# Patient Record
Sex: MALE | Race: WHITE | HISPANIC OR LATINO | Employment: OTHER | URBAN - METROPOLITAN AREA
[De-identification: names, ages, dates, MRNs, and addresses within clinical notes are randomized per-mention and may not be internally consistent; named-entity substitution may affect disease eponyms.]

---

## 2017-04-29 ENCOUNTER — HOSPITAL ENCOUNTER (INPATIENT)
Facility: HOSPITAL | Age: 62
LOS: 9 days | Discharge: HOME | DRG: 291 | End: 2017-05-08
Attending: FAMILY MEDICINE | Admitting: FAMILY MEDICINE
Payer: MEDICAID

## 2017-04-29 DIAGNOSIS — N17.9 ACUTE RENAL FAILURE, UNSPECIFIED ACUTE RENAL FAILURE TYPE: ICD-10-CM

## 2017-04-29 DIAGNOSIS — R07.9 CHEST PAIN: ICD-10-CM

## 2017-04-29 DIAGNOSIS — R07.9 CHEST PAIN, UNSPECIFIED TYPE: ICD-10-CM

## 2017-04-29 DIAGNOSIS — E78.5 HYPERLIPIDEMIA, UNSPECIFIED HYPERLIPIDEMIA TYPE: ICD-10-CM

## 2017-04-29 DIAGNOSIS — E78.5 HYPERLIPIDEMIA ASSOCIATED WITH TYPE 2 DIABETES MELLITUS: ICD-10-CM

## 2017-04-29 DIAGNOSIS — N17.9 AKI (ACUTE KIDNEY INJURY): ICD-10-CM

## 2017-04-29 DIAGNOSIS — E11.69 HYPERLIPIDEMIA ASSOCIATED WITH TYPE 2 DIABETES MELLITUS: ICD-10-CM

## 2017-04-29 DIAGNOSIS — I50.1 PULMONARY EDEMA WITH CONGESTIVE HEART FAILURE: Primary | ICD-10-CM

## 2017-04-29 DIAGNOSIS — R73.9 HYPERGLYCEMIA: ICD-10-CM

## 2017-04-29 DIAGNOSIS — I15.2 HYPERTENSION ASSOCIATED WITH DIABETES: ICD-10-CM

## 2017-04-29 DIAGNOSIS — E11.59 HYPERTENSION ASSOCIATED WITH DIABETES: ICD-10-CM

## 2017-04-29 LAB
ALBUMIN SERPL BCP-MCNC: 4 G/DL
ALLENS TEST: ABNORMAL
ALP SERPL-CCNC: 91 IU/L
ALT SERPL W/O P-5'-P-CCNC: 23 IU/L
ANION GAP SERPL CALC-SCNC: 13 MMOL/L
AST SERPL-CCNC: 18 IU/L
BACTERIA #/AREA URNS HPF: NORMAL /HPF
BASOPHILS # BLD AUTO: 0.08 K/UL
BASOPHILS NFR BLD: 0.6 %
BILIRUB SERPL-MCNC: 0.6 MG/DL
BILIRUB UR QL STRIP: NEGATIVE
BUN SERPL-MCNC: 40 MG/DL
CALCIUM SERPL-MCNC: 9 MG/DL
CHLORIDE SERPL-SCNC: 107 MMOL/L
CLARITY UR: CLEAR
CO2 SERPL-SCNC: 21 MMOL/L
COLOR UR: YELLOW
CREAT SERPL-MCNC: 2.07 MG/DL
DELSYS: ABNORMAL
DIFFERENTIAL METHOD: ABNORMAL
EOSINOPHIL # BLD AUTO: 0.3 K/UL
EOSINOPHIL NFR BLD: 2.4 %
ERYTHROCYTE [DISTWIDTH] IN BLOOD BY AUTOMATED COUNT: 13.9 %
EST. GFR  (AFRICAN AMERICAN): 38.8 ML/MIN/1.73 M^2
EST. GFR  (NON AFRICAN AMERICAN): 33.6 ML/MIN/1.73 M^2
GLUCOSE SERPL-MCNC: 134 MG/DL
GLUCOSE UR QL STRIP: NEGATIVE
HCO3 UR-SCNC: 19.5 MMOL/L (ref 24–28)
HCT VFR BLD AUTO: 30.4 %
HCT VFR BLD CALC: 27 %PCV (ref 36–54)
HGB BLD-MCNC: 10.5 G/DL
HGB BLD-MCNC: 9 G/DL
HGB UR QL STRIP: ABNORMAL
HYALINE CASTS #/AREA URNS LPF: 0 /LPF
INR PPP: 1.1
KETONES UR QL STRIP: NEGATIVE
LACTATE SERPL-SCNC: 1.3 MMOL/L
LEUKOCYTE ESTERASE UR QL STRIP: NEGATIVE
LYMPHOCYTES # BLD AUTO: 1.1 K/UL
LYMPHOCYTES NFR BLD: 8.3 %
MCH RBC QN AUTO: 26.6 PG
MCHC RBC AUTO-ENTMCNC: 34.5 %
MCV RBC AUTO: 77 FL
MICROSCOPIC COMMENT: NORMAL
MONOCYTES # BLD AUTO: 0.9 K/UL
MONOCYTES NFR BLD: 6.5 %
NEUTROPHILS # BLD AUTO: 10.7 K/UL
NEUTROPHILS NFR BLD: 82 %
NITRITE UR QL STRIP: NEGATIVE
NT-PROBNP: 1590 PG/ML
PCO2 BLDA: 32.1 MMHG (ref 35–45)
PH SMN: 7.39 [PH] (ref 7.35–7.45)
PH UR STRIP: 6 [PH] (ref 5–8)
PLATELET # BLD AUTO: 342 K/UL
PMV BLD AUTO: 10.5 FL
PO2 BLDA: 65 MMHG (ref 80–100)
POC BE: -5 MMOL/L
POC IONIZED CALCIUM: 1.21 MMOL/L (ref 1.06–1.42)
POC SATURATED O2: 93 % (ref 95–100)
POC TCO2: 20 MMOL/L (ref 23–27)
POCT GLUCOSE: 105 MG/DL (ref 70–110)
POCT GLUCOSE: 142 MG/DL (ref 70–110)
POCT GLUCOSE: 215 MG/DL (ref 70–110)
POCT GLUCOSE: 82 MG/DL (ref 70–110)
POTASSIUM BLD-SCNC: 4 MMOL/L (ref 3.5–5.1)
POTASSIUM SERPL-SCNC: 4 MMOL/L
PROCALCITONIN SERPL IA-MCNC: <0.09 NG/ML
PROT SERPL-MCNC: 7.5 G/DL
PROT UR QL STRIP: ABNORMAL
PROTHROMBIN TIME: 12.7 SEC
RBC # BLD AUTO: 3.95 M/UL
RBC #/AREA URNS HPF: 2 /HPF (ref 0–4)
SAMPLE: ABNORMAL
SITE: ABNORMAL
SODIUM BLD-SCNC: 138 MMOL/L (ref 136–145)
SODIUM SERPL-SCNC: 141 MMOL/L
SP GR UR STRIP: 1.02 (ref 1–1.03)
TROPONIN I SERPL DL<=0.01 NG/ML-MCNC: 0.01 NG/ML
TROPONIN I SERPL DL<=0.01 NG/ML-MCNC: 0.02 NG/ML
TROPONIN I SERPL DL<=0.01 NG/ML-MCNC: 0.03 NG/ML
URN SPEC COLLECT METH UR: ABNORMAL
UROBILINOGEN UR STRIP-ACNC: NEGATIVE EU/DL
VANCOMYCIN SERPL-MCNC: <1.1 UG/ML
WBC # BLD AUTO: 13.03 K/UL
WBC #/AREA URNS HPF: 0 /HPF (ref 0–5)

## 2017-04-29 PROCEDURE — 87205 SMEAR GRAM STAIN: CPT

## 2017-04-29 PROCEDURE — 36415 COLL VENOUS BLD VENIPUNCTURE: CPT

## 2017-04-29 PROCEDURE — 82803 BLOOD GASES ANY COMBINATION: CPT

## 2017-04-29 PROCEDURE — 27000221 HC OXYGEN, UP TO 24 HOURS

## 2017-04-29 PROCEDURE — 83605 ASSAY OF LACTIC ACID: CPT

## 2017-04-29 PROCEDURE — 96374 THER/PROPH/DIAG INJ IV PUSH: CPT

## 2017-04-29 PROCEDURE — 80053 COMPREHEN METABOLIC PANEL: CPT

## 2017-04-29 PROCEDURE — 85610 PROTHROMBIN TIME: CPT

## 2017-04-29 PROCEDURE — 84484 ASSAY OF TROPONIN QUANT: CPT | Mod: 91

## 2017-04-29 PROCEDURE — 83036 HEMOGLOBIN GLYCOSYLATED A1C: CPT

## 2017-04-29 PROCEDURE — 87040 BLOOD CULTURE FOR BACTERIA: CPT

## 2017-04-29 PROCEDURE — 85025 COMPLETE CBC W/AUTO DIFF WBC: CPT

## 2017-04-29 PROCEDURE — 36600 WITHDRAWAL OF ARTERIAL BLOOD: CPT

## 2017-04-29 PROCEDURE — 96375 TX/PRO/DX INJ NEW DRUG ADDON: CPT

## 2017-04-29 PROCEDURE — 63600175 PHARM REV CODE 636 W HCPCS: Performed by: FAMILY MEDICINE

## 2017-04-29 PROCEDURE — 25000242 PHARM REV CODE 250 ALT 637 W/ HCPCS: Performed by: FAMILY MEDICINE

## 2017-04-29 PROCEDURE — 84145 PROCALCITONIN (PCT): CPT

## 2017-04-29 PROCEDURE — 80202 ASSAY OF VANCOMYCIN: CPT

## 2017-04-29 PROCEDURE — 94761 N-INVAS EAR/PLS OXIMETRY MLT: CPT

## 2017-04-29 PROCEDURE — 94640 AIRWAY INHALATION TREATMENT: CPT

## 2017-04-29 PROCEDURE — 99285 EMERGENCY DEPT VISIT HI MDM: CPT | Mod: 25

## 2017-04-29 PROCEDURE — 11000001 HC ACUTE MED/SURG PRIVATE ROOM

## 2017-04-29 PROCEDURE — 93005 ELECTROCARDIOGRAM TRACING: CPT

## 2017-04-29 PROCEDURE — 81000 URINALYSIS NONAUTO W/SCOPE: CPT

## 2017-04-29 PROCEDURE — 83880 ASSAY OF NATRIURETIC PEPTIDE: CPT

## 2017-04-29 PROCEDURE — 25000003 PHARM REV CODE 250: Performed by: FAMILY MEDICINE

## 2017-04-29 PROCEDURE — 93010 ELECTROCARDIOGRAM REPORT: CPT | Mod: ,,, | Performed by: INTERNAL MEDICINE

## 2017-04-29 RX ORDER — FUROSEMIDE 10 MG/ML
40 INJECTION INTRAMUSCULAR; INTRAVENOUS
Status: COMPLETED | OUTPATIENT
Start: 2017-04-29 | End: 2017-04-29

## 2017-04-29 RX ORDER — IPRATROPIUM BROMIDE AND ALBUTEROL SULFATE 2.5; .5 MG/3ML; MG/3ML
3 SOLUTION RESPIRATORY (INHALATION) ONCE
Status: COMPLETED | OUTPATIENT
Start: 2017-04-29 | End: 2017-04-29

## 2017-04-29 RX ORDER — CEFEPIME HYDROCHLORIDE 2 G/50ML
2 INJECTION, SOLUTION INTRAVENOUS
Status: DISCONTINUED | OUTPATIENT
Start: 2017-04-29 | End: 2017-05-01

## 2017-04-29 RX ORDER — LOSARTAN POTASSIUM AND HYDROCHLOROTHIAZIDE 12.5; 5 MG/1; MG/1
1 TABLET ORAL DAILY
Status: ON HOLD | COMMUNITY
End: 2017-05-08

## 2017-04-29 RX ORDER — PRAVASTATIN SODIUM 20 MG/1
20 TABLET ORAL DAILY
Status: DISCONTINUED | OUTPATIENT
Start: 2017-04-30 | End: 2017-05-08 | Stop reason: HOSPADM

## 2017-04-29 RX ORDER — ACETAMINOPHEN 500 MG
1000 TABLET ORAL
Status: COMPLETED | OUTPATIENT
Start: 2017-04-29 | End: 2017-04-29

## 2017-04-29 RX ORDER — FAMOTIDINE 10 MG/ML
20 INJECTION INTRAVENOUS 2 TIMES DAILY
Status: DISCONTINUED | OUTPATIENT
Start: 2017-04-29 | End: 2017-05-01 | Stop reason: DRUGHIGH

## 2017-04-29 RX ORDER — FUROSEMIDE 10 MG/ML
40 INJECTION INTRAMUSCULAR; INTRAVENOUS 2 TIMES DAILY
Status: DISCONTINUED | OUTPATIENT
Start: 2017-04-29 | End: 2017-04-30

## 2017-04-29 RX ORDER — CARVEDILOL 6.25 MG/1
6.25 TABLET ORAL 2 TIMES DAILY WITH MEALS
Status: ON HOLD | COMMUNITY
End: 2017-05-07 | Stop reason: HOSPADM

## 2017-04-29 RX ORDER — PRAVASTATIN SODIUM 20 MG/1
20 TABLET ORAL DAILY
Status: ON HOLD | COMMUNITY
End: 2017-05-08

## 2017-04-29 RX ORDER — ENOXAPARIN SODIUM 100 MG/ML
40 INJECTION SUBCUTANEOUS EVERY 24 HOURS
Status: DISCONTINUED | OUTPATIENT
Start: 2017-04-29 | End: 2017-05-02 | Stop reason: DRUGHIGH

## 2017-04-29 RX ORDER — AMLODIPINE BESYLATE 5 MG/1
10 TABLET ORAL DAILY
Status: DISCONTINUED | OUTPATIENT
Start: 2017-04-29 | End: 2017-05-08 | Stop reason: HOSPADM

## 2017-04-29 RX ORDER — GARLIC 1000 MG
CAPSULE ORAL
COMMUNITY
End: 2017-06-14 | Stop reason: SDUPTHER

## 2017-04-29 RX ORDER — SERTRALINE HYDROCHLORIDE 25 MG/1
25 TABLET, FILM COATED ORAL DAILY
Status: ON HOLD | COMMUNITY
End: 2017-05-08

## 2017-04-29 RX ORDER — INSULIN ASPART 100 [IU]/ML
INJECTION, SOLUTION INTRAVENOUS; SUBCUTANEOUS CONTINUOUS
Status: ON HOLD | COMMUNITY
End: 2017-05-08 | Stop reason: HOSPADM

## 2017-04-29 RX ORDER — ENALAPRILAT 1.25 MG/ML
1.25 INJECTION INTRAVENOUS
Status: COMPLETED | OUTPATIENT
Start: 2017-04-29 | End: 2017-04-29

## 2017-04-29 RX ORDER — ASPIRIN 325 MG
325 TABLET, DELAYED RELEASE (ENTERIC COATED) ORAL
Status: COMPLETED | OUTPATIENT
Start: 2017-04-29 | End: 2017-04-29

## 2017-04-29 RX ORDER — CETIRIZINE HYDROCHLORIDE 10 MG/1
10 TABLET, CHEWABLE ORAL DAILY
COMMUNITY
End: 2019-02-19

## 2017-04-29 RX ORDER — NAPROXEN SODIUM 220 MG/1
81 TABLET, FILM COATED ORAL DAILY
Status: ON HOLD | COMMUNITY
End: 2017-05-08

## 2017-04-29 RX ADMIN — ENALAPRILAT 1.25 MG: 1.25 INJECTION, SOLUTION INTRAVENOUS at 10:04

## 2017-04-29 RX ADMIN — AMLODIPINE BESYLATE 10 MG: 5 TABLET ORAL at 05:04

## 2017-04-29 RX ADMIN — FAMOTIDINE 20 MG: 10 INJECTION, SOLUTION INTRAVENOUS at 09:04

## 2017-04-29 RX ADMIN — ENOXAPARIN SODIUM 40 MG: 100 INJECTION SUBCUTANEOUS at 05:04

## 2017-04-29 RX ADMIN — ACETAMINOPHEN 1000 MG: 500 TABLET ORAL at 12:04

## 2017-04-29 RX ADMIN — NITROGLYCERIN 1 INCH: 20 OINTMENT TOPICAL at 10:04

## 2017-04-29 RX ADMIN — FUROSEMIDE 40 MG: 10 INJECTION, SOLUTION INTRAMUSCULAR; INTRAVENOUS at 10:04

## 2017-04-29 RX ADMIN — VANCOMYCIN HYDROCHLORIDE 1000 MG: 1 INJECTION, POWDER, LYOPHILIZED, FOR SOLUTION INTRAVENOUS at 05:04

## 2017-04-29 RX ADMIN — ASPIRIN 325 MG: 325 TABLET, DELAYED RELEASE ORAL at 10:04

## 2017-04-29 RX ADMIN — FUROSEMIDE 40 MG: 10 INJECTION, SOLUTION INTRAMUSCULAR; INTRAVENOUS at 05:04

## 2017-04-29 RX ADMIN — IPRATROPIUM BROMIDE AND ALBUTEROL SULFATE 3 ML: .5; 3 SOLUTION RESPIRATORY (INHALATION) at 10:04

## 2017-04-29 RX ADMIN — CEFEPIME HYDROCHLORIDE 2 G: 2 INJECTION, SOLUTION INTRAVENOUS at 05:04

## 2017-04-29 NOTE — ED PROVIDER NOTES
Encounter Date: 4/29/2017       History     Chief Complaint   Patient presents with    Shortness of Breath     SOB that began x 1 days ago, reports CP      Review of patient's allergies indicates:  No Known Allergies  Patient is a 61 y.o. male presenting with the following complaint: shortness of breath. The history is provided by the patient.   Shortness of Breath   This is a new problem. The average episode lasts 1 day. The problem occurs continuously.The current episode started yesterday. The problem has been gradually worsening. Associated symptoms include chest pain and leg swelling. Pertinent negatives include no fever and no cough.     Past Medical History:   Diagnosis Date    Diabetes mellitus     Hypertension      Past Surgical History:   Procedure Laterality Date    PANCREATECTOMY       No family history on file.  Social History   Substance Use Topics    Smoking status: Never Smoker    Smokeless tobacco: Not on file    Alcohol use Not on file     Review of Systems   Constitutional: Negative for fever.   Respiratory: Positive for shortness of breath. Negative for cough.    Cardiovascular: Positive for chest pain and leg swelling. Negative for palpitations.   All other systems reviewed and are negative.      Physical Exam   Initial Vitals   BP Pulse Resp Temp SpO2   -- -- -- -- --            Physical Exam    Nursing note and vitals reviewed.  Constitutional: He appears well-developed and well-nourished.   HENT:   Head: Normocephalic and atraumatic.   Eyes: Conjunctivae and EOM are normal. Pupils are equal, round, and reactive to light.   Neck: Normal range of motion. Neck supple.   Cardiovascular: Normal rate.   Pulmonary/Chest: No respiratory distress. He has rales. He exhibits no tenderness.   Abdominal: Soft. Bowel sounds are normal.   Musculoskeletal: Normal range of motion. He exhibits edema (2+ pitting edema bilaterally).   Neurological: He is alert. He has normal strength and normal reflexes.    Skin: Skin is warm. No rash noted. No erythema.   Psychiatric: He has a normal mood and affect. His behavior is normal.         ED Course   Procedures  Labs Reviewed   CBC W/ AUTO DIFFERENTIAL   COMPREHENSIVE METABOLIC PANEL   TROPONIN I   B-TYPE NATRIURETIC PEPTIDE   PROTIME-INR     EKG Readings: (Independently Interpreted)   Initial Reading: No STEMI.   Normal sinus rhythm at 92 beats for minute normal MT interval normal QRS duration no acute ST segment changes noted          Medical Decision Making:   History:   I obtained history from: someone other than patient.       <> Summary of History: Daughter reports that the patient was diagnosed with cardiomegaly about a year ago and the country.  Also reports was seen at [facility] approximately one month ago for depression.  Has also been seen in [facility] in the past.  Initial Assessment:   Ace and presents dyspneic with chest pain complaining of dyspnea on exertion.  Appears to be in congestive heart failure  Differential Diagnosis:   Congestive heart failure, cardiomyopathy, acute renal failure  Independently Interpreted Test(s):   I have ordered and independently interpreted X-rays - see prior notes.  Clinical Tests:   Radiological Study: Ordered and Reviewed  Medical Tests: Ordered and Reviewed         1048 discussed with patient and family advises would prefer to be transferred to [facility] in [place].  Patient also states his last GFR was 45% approximately 1 month ago.    11:03 AM discussed with Dr. Carol Ann Silva pending callback.      discussed with the patient again patient advises would prefer to go to [place] did readvised Dr. Silva and discussed with Dr. Gonzáles' resident who accepted the patient in transfer        ED Course     Clinical Impression:   The primary encounter diagnosis was Pulmonary edema with congestive heart failure. Diagnoses of Acute renal failure, unspecified acute renal failure type, Chest  pain, unspecified type, Hyperglycemia, and Chest pain were also pertinent to this visit.          Isma Clarke MD  05/04/17 0835

## 2017-04-29 NOTE — IP AVS SNAPSHOT
[facility]  [address]  [place] [address]  Phone: [phone]           Patient Discharge Instructions   Our goal is to set you up for success. This packet includes information on your condition, medications, and your home care.  It will help you care for yourself to prevent having to return to the hospital.     Please ask your nurse if you have any questions.      There are many details to remember when preparing to leave the hospital. Here is what you will need to do:    1. Take your medicine. If you are prescribed medications, review your Medication List on the following pages. You may have new medications to  at the pharmacy and others that you'll need to stop taking. Review the instructions for how and when to take your medications. Talk with your doctor or nurses if you are unsure of what to do.     2. Go to your follow-up appointments. Specific follow-up information is listed in the following pages. Your may be contacted by a nurse or clinical provider about future appointments. Be sure we have all of the phone numbers to reach you. Please contact your provider's office if you are unable to make an appointment.     3. Watch for warning signs. Your doctor or nurse will give you detailed warning signs to watch for and when to call for assistance. These instructions may also include educational information about your condition. If you experience any of warning signs to your health, call your doctor.               ** Verify the list of medication(s) below is accurate and up to date. Carry this with you in case of emergency. If your medications have changed, please notify your healthcare provider.             Medication List      START taking these medications        Additional Info                      albuterol-ipratropium 2.5mg-0.5mg/3mL 0.5 mg-3 mg(2.5 mg base)/3 mL nebulizer solution   Commonly known as:  DUO-NEB   Quantity:  810 mL   Refills:  0   Dose:  3 mL    Last time this  was given:  3 mLs on 5/8/2017  8:25 AM   Instructions:  Take 3 mLs by nebulization every 6 (six) hours while awake. Rescue     Begin Date    AM    Noon    PM    Bedtime       furosemide 40 MG tablet   Commonly known as:  LASIX   Quantity:  180 tablet   Refills:  0   Dose:  80 mg    Last time this was given:  20 mg on 5/8/2017 11:10 AM   Instructions:  Take 2 tablets (80 mg total) by mouth once daily.     Begin Date    AM    Noon    PM    Bedtime       sevelamer carbonate 800 mg Tab   Commonly known as:  RENVELA   Refills:  0   Dose:  800 mg    Last time this was given:  800 mg on 5/8/2017 11:10 AM   Instructions:  Take 1 tablet (800 mg total) by mouth 3 (three) times daily with meals.     Begin Date    AM    Noon    PM    Bedtime         CHANGE how you take these medications        Additional Info                      carvedilol 12.5 MG tablet   Commonly known as:  COREG   Quantity:  180 tablet   Refills:  0   Dose:  12.5 mg   What changed:    - medication strength  - how much to take    Last time this was given:  12.5 mg on 5/8/2017  8:45 AM   Instructions:  Take 1 tablet (12.5 mg total) by mouth 2 (two) times daily with meals.     Begin Date    AM    Noon    PM    Bedtime         CONTINUE taking these medications        Additional Info                      amlodipine 10 MG tablet   Commonly known as:  NORVASC   Quantity:  30 tablet   Refills:  11   Dose:  10 mg    Last time this was given:  10 mg on 5/8/2017  8:45 AM   Instructions:  Take 1 tablet (10 mg total) by mouth once daily.     Begin Date    AM    Noon    PM    Bedtime       aspirin 81 MG Chew   Refills:  0   Dose:  81 mg    Instructions:  Take 81 mg by mouth once daily.     Begin Date    AM    Noon    PM    Bedtime       cetirizine 10 mg chewable tablet   Refills:  0   Dose:  10 mg    Instructions:  Take 10 mg by mouth once daily.     Begin Date    AM    Noon    PM    Bedtime       garlic 1,000 mg Cap   Refills:  0    Instructions:  Take by mouth.      Begin Date    AM    Noon    PM    Bedtime       insulin aspart 100 unit/mL injection   Commonly known as:  NOVOLOG   Refills:  0    Instructions:  Inject into the skin continuous. [employer] insulin pump-Dr. Blount manages at [facility]     Begin Date    AM    Noon    PM    Bedtime       losartan-hydrochlorothiazide 50-12.5 mg 50-12.5 mg per tablet   Commonly known as:  HYZAAR   Refills:  0   Dose:  1 tablet    Instructions:  Take 1 tablet by mouth once daily.     Begin Date    AM    Noon    PM    Bedtime       olmesartan 40 MG tablet   Commonly known as:  BENICAR   Refills:  0   Dose:  40 mg    Instructions:  Take 40 mg by mouth once daily.     Begin Date    AM    Noon    PM    Bedtime       pravastatin 20 MG tablet   Commonly known as:  PRAVACHOL   Refills:  0   Dose:  20 mg    Last time this was given:  20 mg on 5/8/2017  8:45 AM   Instructions:  Take 20 mg by mouth once daily.     Begin Date    AM    Noon    PM    Bedtime       sertraline 25 MG tablet   Commonly known as:  ZOLOFT   Refills:  0   Dose:  25 mg    Instructions:  Take 25 mg by mouth once daily.     Begin Date    AM    Noon    PM    Bedtime            Where to Get Your Medications      These medications were sent to [facility] - [address]  [address], [place] [address]     Phone:  [phone]     furosemide 40 MG tablet         Information about where to get these medications is not yet available     ! Ask your nurse or doctor about these medications     albuterol-ipratropium 2.5mg-0.5mg/3mL 0.5 mg-3 mg(2.5 mg base)/3 mL nebulizer solution    carvedilol 12.5 MG tablet    sevelamer carbonate 800 mg Tab                  Please bring to all follow up appointments:    1. A copy of your discharge instructions.  2. All medicines you are currently taking in their original bottles.  3. Identification and insurance card.    Please arrive 15 minutes ahead of scheduled  "appointment time.    Please call 24 hours in advance if you must reschedule your appointment and/or time.        Your Scheduled Appointments     May 16, 2017  1:20 PM [facility]   Hospital Follow Up with Marilyn Elliott MD   [facility]    [address], [address]  [place] [address] [phone]              Follow-up Information     Follow up with [facility]. Schedule an appointment as soon as possible for a visit in 1 week.    Specialties:  Neurosurgery, Plastic Surgery, Podiatry, Surgical Oncology, Allergy, Cardiothoracic Surgery, Otolaryngology, Gastroenterology, Breast Surgery, Oral Surgery, Oral and Maxillofacial Surgery, Cardiology, Bariatrics, Internal Medicine, Family Medicine, Colon and Rectal Surgery, Dental General Practice, Gynecology, Orthopedic Surgery, Genetics, Endocrinology, Vascular Surgery, Physical Medicine and Rehabilitation, Urology, Neurology, Dermatology, Rheumatology    Why:  nephrology    Contact information:    [address] [facility] [address]  [phone]          Follow up with Calvin Elliott MD On 5/16/2017.    Specialty:  Family Medicine    Why:  1:20pm    Contact information:    [address] [place] [address]  [address]  [place] [address]  [phone]        Referrals     Future Orders    Ambulatory consult to Occupational Therapy     Ambulatory consult to Physical Therapy         Discharge Instructions     Future Orders    Activity as tolerated     Diet renal     TRANSFER TUB BENCH FOR HOME USE     Questions:    Type of Transfer Tub Bench:  Padded    Height:  5' 8" (1.727 m)    Weight:  77.5 kg (170 lb 13.7 oz)    Does patient have medical equipment at home?:  none    Length of need (1-99 months):  99    WALKER FOR HOME USE     Questions:    Type of Walker:  Adult (5'4"-6'6")    With wheels?:  Yes    Height:  5' 8" (1.727 m)    Weight:  77.5 kg (170 lb 13.7 oz)    Length of need (1-99 months):  99    Platform " "attachment:      Accessories/Other:      Assistance needed:      Does patient have medical equipment at home?:  none    Please check all that apply:  Patient is unable to safely ambulate without equipment.    Patient needs help to get in and out of chair.    Walker will be used for gait training.      Discharge References/Attachments     HEART FAILURE: MAKING CHANGES TO YOUR DIET ([place])    HEART FAILURE: TAKING MEDICATION TO CONTROL  ([place])    KNOW YOUR BASELINES, HEART FAILURE  ([place])    MY HEART FAILURE SYMPTOMS CHART ([place])    HEART FAILURE: TRACKING YOUR WEIGHT ([place])    HEART FAILURE: BEING ACTIVE ([place])    HYPERTENSION, ESTABLISHED ([place])    CHRONIC KIDNEY DISEASE (CKD) ([place])    STROKE, DISCHARGE INSTRUCTIONS FOR ([place])    BLOOD SUGAR LOG, USING A ([place])    DIABETES AND HEART DISEASE ([place])    FUROSEMIDE TABLETS (ENGLISH)    SEVELAMER CAPSULES OR TABLETS (ENGLISH)    ALBUTEROL; IPRATROPIUM SOLUTION FOR INHALATION (ENGLISH)        Primary Diagnosis     Your primary diagnosis was:  Heart Failure      Admission Information     Date & Time Provider Department CSN    4/29/2017  9:44 AM Keegan Gonzáles III, MD [facility] [id number]      Care Providers     Provider Role Specialty Primary office phone    Keegan Gonzáles III, MD Attending Provider Family Medicine [phone]    Johann Bruno MD Consulting Physician  Nephrology [phone]      Your Vitals Were     BP Pulse Temp Resp Height Weight    126/60 (BP Location: Left arm, Patient Position: Lying, BP Method: Automatic) 58 98.3 °F (36.8 °C) (Oral) 18 5' 8" (1.727 m) 74.9 kg (165 lb 2 oz)    SpO2 BMI             94% 25.11 kg/m2         Recent Lab Values        6/17/2016 4/29/2017 5/6/2017                     9:41 PM  3:03 PM  4:38 AM         A1C 10.3 (H) 7.6 (H) 7.7 (H)         Comment for A1C at  3:03 PM on 4/29/2017:  According to ADA guidelines, hemoglobin A1C <7.0% represents  optimal control in non-pregnant " diabetic patients.  Different  metrics may apply to specific populations.   Standards of Medical Care in Diabetes - 2016.  For the purpose of screening for the presence of diabetes:  <5.7%     Consistent with the absence of diabetes  5.7-6.4%  Consistent with increasing risk for diabetes   (prediabetes)  >or=6.5%  Consistent with diabetes  Currently no consensus exists for use of hemoglobin A1C  for diagnosis of diabetes for children.      Comment for A1C at  4:38 AM on 5/6/2017:  According to ADA guidelines, hemoglobin A1C <7.0% represents  optimal control in non-pregnant diabetic patients.  Different  metrics may apply to specific populations.   Standards of Medical Care in Diabetes - 2016.  For the purpose of screening for the presence of diabetes:  <5.7%     Consistent with the absence of diabetes  5.7-6.4%  Consistent with increasing risk for diabetes   (prediabetes)  >or=6.5%  Consistent with diabetes  Currently no consensus exists for use of hemoglobin A1C  for diagnosis of diabetes for children.        Allergies as of 5/8/2017        Reactions    Cayenne Pepper       [facility]     [facility] Care [facility] - 24/7 Assistance  Unless otherwise directed by your provider, please contact [facility], our nurse care line that is available for 24/7 assistance.     Registered nurses in the [facility] provide clinical advisement, health education, appointment booking, and other advisory services.  Call for this free service at [phone].        Advance Directives     An advance directive is a document which, in the event you are no longer able to make decisions for yourself, tells your healthcare team what kind of treatment you do or do not want to receive, or who you would like to make those decisions for you.  If you do not currently have an advance directive, [employer] encourages you to create one.  For more information call:  [phone]), [phone] ([phone]),   or log on to [email or web address].        Language Assistance Services     ATTENTION: Language assistance services are available, free of charge. Please call [phone].      ATENCIÓN: Si jose duarte, tiene a gaston disposición servicios gratuitos de asistencia lingüística. Llsophia al [phone].     CHÚ Ý: N?u b?n nói Ti?ng Vi?t, có các d?ch v? h? tr? ngôn ng? mi?n phí dành cho b?n. G?i s? [phone].        Heart Failure Education       Heart Failure: Being Active  You have a condition called heart failure. Being active doesnt mean that you have to wear yourself out. Even a little movement each day helps to strengthen your heart. If you cant get out to exercise, you can do simple stretching and strengthening exercises at home. These are good ways to keep you well-conditioned and prevent you and your heart from becoming excessively weak.    Ideas to get you started  · Add a little movement to things you do now. Walk to mail letters. Park your car at the far end of the parking lot and walk to the store. Walk up a flight of stairs instead of taking the elevator.  · Choose activities you enjoy. You might walk, swim, or ride an exercise bike. Things like gardening and washing the car count, too. Other possibilities include: washing dishes, walking the dog, walking around the mall, and doing aerobic activities with friends.  · Join a group exercise program at a [facility] or [facility], a senior center, or a community center. Or look into a hospital cardiac rehabilitation program. Ask your doctor if you qualify.  Tips to keep you going  · Get up and get dressed each day. Go to a coffee shop and read a newspaper or go somewhere that you'll be in the presence of other active people. Youll feel more like being active.  · Make a plan. Choose one or more activities that you enjoy and that you can easily do. Then plan to do at least one each day. You might write your plan on a calendar.  · Go with a friend or a group if  you like company. This can help you feel supported and stay motivated, too.  · Plan social events that you enjoy. This will keep you mentally engaged as well as physically motivated to do things you find pleasure in.  For your safety  · Talk with your healthcare provider before starting an exercise program.  · Exercise indoors when its too hot or too cold outside, or when the air quality is poor. Try walking at a shopping mall.  · Wear socks and sturdy shoes to maintain your balance and prevent falls.  · Start slowly. Do a few minutes several times a day at first. Increase your time and speed little by little.  · Stop and rest whenever you feel tired or get short of breath.  · Dont push yourself on days when you dont feel well.  Date Last Reviewed: 3/20/2016  © 2285-1216 [employer]. [address] [place] [address]. All rights reserved. This information is not intended as a substitute for professional medical care. Always follow your healthcare professional's instructions.              Heart Failure: Evaluating Your Heart  You have a condition called heart failure. To evaluate your condition, your doctor will examine you, ask questions, and do some tests. Along with looking for signs of heart failure, the doctor looks for any other health problems that may have led to heart failure. The results of your evaluation will help your doctor form a treatment plan.  Health history and physical exam  Your visit will start with a health history. Tell the doctor about any symptoms youve noticed and about all medicines you take. Then youll have a physical exam. This includes listening to your heartbeat and breathing. Youll also be checked for swelling (edema) in your legs and neck. When you have fluid buildup or fluid in the lungs, it may be called congestive heart failure.  Diagnosing heart failure     During an echocardiogram, sound waves bounce off the heart. These are converted into a picture  on the screen.   The following may be done to help your doctor form a diagnosis:  · X-rays show the size and shape of your heart. These pictures can also show fluid in your lungs.  · An electrocardiogram (ECG or EKG) shows the pattern of your heartbeat. Small pads (electrodes) are placed on your chest, arms, and legs. Wires connect the pads to the ECG machine, which records your hearts electrical signals. This can give the doctor information about heart function.  · An echocardiogram uses ultrasound waves to show the structure and movement of your heart muscle. This shows how well the heart pumps. It also shows the thickness of the heart walls, and if the heart is enlarged. It is one of the most useful, non-invasive tests as it provides information about the heart's general function. This helps your doctor make treatment decisions.  · Lab tests evaluate small amounts of blood or urine for signs of problems. A BNP lab test can help diagnose and evaluate heart failure. BNP stands for B-type natriuretic peptide. The ventricles secrete more BNP when heart failure worsens. Lab tests can also provide information about metabolic dysfunction or heart dysfunction.  Your treatment plan  Based on the results of your evaluation and tests, your doctor will develop a treatment plan. This plan is designed to relieve some of your heart failure symptoms and help make you more comfortable. Your treatment plan may include:  · Medicine to help your heart work better and improve your quality of life  · Changes in what you eat and drink to help prevent fluid from backing up in your body  · Daily monitoring of your weight and heart failure symptoms to see how well your treatment plan is working  · Exercise to help you stay healthy  · Help with quitting smoking  · Emotional and psychological support to help adjust to the changes  · Referrals to other specialists to make sure you are being treated comprehensively  Date Last Reviewed:  3/21/2016  © 5114-9731 [employer]. [address] [place] [address], [place] [address]. All rights reserved. This information is not intended as a substitute for professional medical care. Always follow your healthcare professional's instructions.              Heart Failure: Making Changes to Your Diet  You have a condition called heart failure. When you have heart failure, excess fluid is more likely to build up in your body because your heart isn't working well. This makes the heart work harder to pump blood. Fluid buildup causes symptoms such as shortness of breath and swelling (edema). This is often referred to as congestive heart failure or CHF. Controlling the amount of salt (sodium) you eat may help stop fluid from building up. Your doctor may also tell you to reduce the amount of fluid you drink.  Reading food labels    Your healthcare provider will tell you how much sodium you can eat each day. Read food labels to keep track. Keep in mind that certain foods are high in salt. These include canned, frozen, and processed foods. Check the amount of sodium in each serving. Watch out for high-sodium ingredients. These include MSG (monosodium glutamate), baking soda, and sodium phosphate.   Eating less salt  Give yourself time to get used to eating less salt. It may take a little while. Here are some tips to help:  · Take the saltshaker off the table. Replace it with salt-free herb mixes and spices.  · Eat fresh or plain frozen vegetables. These have much less salt than canned vegetables.  · Choose low-sodium snacks like sodium-free pretzels, crackers, or air-popped popcorn.  · Dont add salt to your food when youre cooking. Instead, season your foods with pepper, lemon, garlic, or onion.  · When you eat out, ask that your food be cooked without added salt.  · Avoid eating fried foods as these often have a great deal of salt.  If youre told to limit fluids  You may need to limit how much fluid you have to  help prevent swelling. This includes anything that is liquid at room temperature, such as ice cream and soup. If your doctor tells you to limit fluid, try these tips:  · Measure drinks in a measuring cup before you drink them. This will help you meet daily goals.  · Chill drinks to make them more refreshing.  · Suck on frozen lemon wedges to quench thirst.  · Only drink when youre thirsty.  · Chew sugarless gum or suck on hard candy to keep your mouth moist.  · Weigh yourself daily to know if your body's fluid content is rising.  My sodium goal  Your healthcare provider may give you a sodium goal to meet each day. This includes sodium found in food as well as salt that you add. My goal is to eat no more than ___________ mg of sodium per day.     When to call your doctor  Call your doctor right away if you have any symptoms of worsening heart failure. These can include:  · Sudden weight gain  · Increased swelling of your legs or ankles  · Trouble breathing when youre resting or at night  · Increase in the number of pillows you have to sleep on  · Chest pain, pressure, discomfort, or pain in the jaw, neck, or back   Date Last Reviewed: 3/21/2016  © 9984-5176 [employer]. [address] [place] [address], [place] [address]. All rights reserved. This information is not intended as a substitute for professional medical care. Always follow your healthcare professional's instructions.              Heart Failure: Medicines to Help Your Heart    You have a condition called heart failure (also known as congestive heart failure, or CHF). Your doctor will likely prescribe medicines for heart failure and any underlying health problems you have. Most heart failure patients take one or more types of medicinen. Your healthcare provider will work to find the combination of medicines that works best for you.  Heart failure medicines  Here are the most common heart failure medicines:  · ACE inhibitors lower blood pressure and  decrease strain on the heart. This makes it easier for the heart to pump. Angiotensin receptor blockers have similar effects. These are prescribed for some patients instead of ACE inhibitors.  · Beta-blockers relieve stress on the heart. They also improve symptoms. They may also improve the heart's pumping action over time.  · Diuretics (also called water pills) help rid your body of excess water. This can help rid your body of swelling (edema). Having less fluid to pump means your heart doesnt have to work as hard. Some diuretics make your body lose a mineral called potassium. Your doctor will tell you if you need to take supplements or eat more foods high in potassium.  · Digoxin helps your heart pump with more strength. This helps your heart pump more blood with each beat. So, more oxygen-rich blood travels to the rest of the body.  · Aldosterone antagonists help alter hormones and decrease strain on the heart.  · Hydralazine and nitrates are two separate medicines used together to treat heart failure. They may come in one combination pill. They lower blood pressure and decrease how hard the heart has to pump.  Medicines for related conditions  Controlling other heart problems helps keep heart failure under control, too. Depending on other heart problems you have, medicines may be prescribed to:  · Lower blood pressure (antihypertensives).  · Lower cholesterol levels (statins).  · Prevent blood clots (anticoagulants or aspirin).  · Keep the heartbeat steady (antiarrhythmics).  Date Last Reviewed: 3/5/2016  © 7739-8493 [employer]. [address] [place] [address], [place] [address]. All rights reserved. This information is not intended as a substitute for professional medical care. Always follow your healthcare professional's instructions.              Heart Failure: Procedures That May Help    The heart is a muscle that pumps oxygen-rich blood to all parts of the body. When you have heart failure, the  heart is not able to pump as well as it should. Blood and fluid may back up into the lungs (congestive heart failure), and some parts of the body dont get enough oxygen-rich blood to work normally. These problems lead to the symptoms of heart failure.     Certain procedures may help the heart pump better in some cases of heart failure. Some procedures are done to treat health problems that may have caused the heart failure such as coronary artery disease or heart rhythm problems. For more serious heart failure, other options are available.  Treating artery and valve problems  If you have coronary artery disease or valve disease, procedures may be done to improve blood flow. This helps the heart pump better, which can improve heart failure symptoms. First, your doctor may do a cardiac catheterization to help detect clogged blood vessels or valve damage. During this procedure, a  thin tube (catheter) in inserted into a blood vessel and guided to the heart. There a dye is injected and a special type of X-ray (angiogram) is taken of the blood vessels. Procedures to open a blocked artery or fix damaged valves can also be done using catheterization.  · Angioplasty uses a balloon-tipped instrument at the end of the catheter. The balloon is inflated to widen the narrowed artery. In many cases, a stent is expanded to further support the narrowed artery. A stent is a metal mesh tube.  · Valve surgery repairs or replacement of faulty valves can also be done during catheterization so blood can flow properly through the chambers of the heart.  Bypass surgery is another option to help treat blocked arteries. It uses a healthy blood vessel from elsewhere in the body. The healthy blood vessel is attached above and below the blocked area so that blood can flow around the blocked artery.  Treating heart rhythm problems  A device may be placed in the chest to help a weak heart maintain a healthy, heartbeat so the heart can pump more  effectively:  · Pacemaker. A pacemaker is an implanted device that regulates your heartbeat electronically. It monitors your heart's rhythm and generates a painless electric impulse that helps the heart beat in a regular rhythm. A pacemaker is programmed to meet your specific heart rhythm needs.  · Biventricular pacing/cardiac resynchronization therapy. A type of pacemaker that paces both pumping chambers of the heart at the same time to coordinate contractions and to improve the heart's function. Some people with heart failure are candidates for this therapy.  · Implantable cardioverter defibrillator. A device similar to a pacemaker that senses when the heart is beating too fast and delivers an electrical shock to convert the fast rhythm to a normal rhythm. This can be a life saving device.  In severe cases  In more serious cases of heart failure when other treatments no longer work, other options may include:  · Ventricular assist devices (VADs). These are mechanical devices used to take over the pumping function for one or both of the heart's ventricles, or pumping chambers. A VAD may be necessary when heart failure progresses to the point that medicines and other treatments no longer help. In some cases, a VAD may be used as a bridge to transplant.  · Heart transplant. This is replacing the diseased heart with a healthy one from a donor. This is an option for a few people who are very sick. A heart transplant is very serious and not an option for all patients. Your doctor can tell you more.  Date Last Reviewed: 3/20/2016  © 2716-7545 [employer]. [address] [place] [address]. All rights reserved. This information is not intended as a substitute for professional medical care. Always follow your healthcare professional's instructions.              Heart Failure: Tracking Your Weight  You have a condition called heart failure. When you have heart failure, a sudden weight gain or a steady  rise in weight is a warning sign that your body is retaining too much water and salt. This could mean your heart failure is getting worse. If left untreated, it can cause problems for your lungs and result in shortness of breath. Weighing yourself each day is the best way to know if youre retaining water. If your weight goes up quickly, call your doctor. You will be given instructions on how to get rid of the excess water. You will likely need medicines and to avoid salt. This will help your heart work better.  Call your doctor if you gain more than 2 pounds in 1 day, more than 5 pounds in 1 week, or whatever weight gain you were told to report by your doctor. This is often a sign of worsening heart failure and needs to be evaluated and treated. Your doctor will tell you what to do next.   Tips for weighing yourself    · Weigh yourself at the same time each morning, wearing the same clothes. Weigh yourself after urinating and before eating.  · Use the same scale each day. Make sure the numbers are easy to read. Put the scale on a flat, hard surface -- not on a rug or carpet.  · Do not stop weighing yourself. If you forget one day, weigh again the next morning.  How to use your weight chart  · Keep your weight chart near the scale. Write your weight on the chart as soon as you get off the scale.  · Fill in the month and the start date on the chart. Then write down your weight each day. Your chart will look like this:    · If you miss a day, leave the space blank. Weigh yourself the next day and write your weight in the next space.  · Take your weight chart with you when you go to see your doctor.  Date Last Reviewed: 3/20/2016  © 2098-9626 [employer]. [address] [place] [address], [place] [address]. All rights reserved. This information is not intended as a substitute for professional medical care. Always follow your healthcare professional's instructions.              Heart Failure: Warning Signs of a  Flare-Up  You have a condition called heart failure. Once you have heart failure, flare-ups can happen. Below are signs that can mean your heart failure is getting worse. If you notice any of these warning signs, call your healthcare provider.  Swelling    · Your feet, ankles, or lower legs get puffier.  · You notice skin changes on your lower legs.  · Your shoes feel too tight.  · Your clothes are tighter in the waist.  · You have trouble getting rings on or off your fingers.  Shortness of breath  · You have to breathe harder even when youre doing your normal activities or when youre resting.  · You are short of breath walking up stairs or even short distances.  · You wake up at night short of breath or coughing.  · You need to use more pillows or sit up to sleep.  · You wake up tired or restless.  Other warning signs  · You feel weaker, dizzy, or more tired.  · You have chest pain or changes in your heartbeat.  · You have a cough that wont go away.  · You cant remember things or dont feel like eating.  Tracking your weight  Gaining weight is often the first warning sign that heart failure is getting worse. Gaining even a few pounds can be a sign that your body is retaining excess water and salt. Weighing yourself each day in the morning after you urinate and before you eat, is the best way to know if you're retaining water. Get a scale that is easy to read and make sure you wear the same clothes and use the same scale every time you weigh. Your healthcare provider will show you how to track your weight. Call your doctor if you gain more than 2 pounds in 1 day, 5 pounds in 1 week, or whatever weight gain you were told to report by your doctor. This is often a sign of worsening heart failure and needs to be evaluated and treated before it compromises your breathing. Your doctor will tell you what to do next.    Date Last Reviewed: 3/15/2016  © 4369-0585 [employer]. [address] [place] [address],  [place] [address]. All rights reserved. This information is not intended as a substitute for professional medical care. Always follow your healthcare professional's instructions.              [facility] Sign-Up     Activating your [facility] account is as easy as 1-2-3!     1) Visit [email or web address], select Sign Up Now, enter this activation code and your date of birth, then select Next.  [email or web address]  Expires: 6/15/2017  8:22 PM      2) Create a username and password to use when you visit [facility] in the future and select a security question in case you lose your password and select Next.    3) Enter your e-mail address and click Sign Up!    Additional Information  If you have questions, please e-mail [email or web address] or call [phone] to talk to our [facility] staff. Remember, [facility] is NOT to be used for urgent needs. For medical emergencies, dial 911.          [facility] complies with applicable Federal civil rights laws and does not discriminate on the basis of race, color, national origin, age, disability, or sex.

## 2017-04-29 NOTE — PLAN OF CARE
Problem: Patient Care Overview  Goal: Plan of Care Review  Outcome: Ongoing (interventions implemented as appropriate)  Reviewed plan of care with patient and family members. Patient verbalized understanding. Pt on cardiac diet; tolerates PO meds well. Pt blood glucose monitored and covered independently w/ insulin pump - okay to use per nursing communication. Pt on 2L NC; sats 96%. Up with assist to bathroom. Patient on continuous tele monitoring; NSR; HR 70s. No true red alarms or ectopy noted. Bed alarm set, bed in lowest position, call bell in reach. Fall precautions maintained. Family to remain at bedside. Will continue to monitor.

## 2017-04-29 NOTE — ED NOTES
Resting comfortably in bed at this time. Skin warm and dry. Family at bedside. States he is feeling a little better.

## 2017-04-29 NOTE — NURSING
Pt arrived to hospital via [facility]cher. Transferred self to bed. AAOx3. Pt able to communicate however Congolese is primary language. Daughter and aunt at bedside to assist in communication. VSS per flowsheet. Dr. Alberto at bedside assessing patient. Non-skid socks placed on pt. Verbalized understanding of fall precautions. Bed alarm set, bed in lowest position, call bell in reach. Will continue to monitor.

## 2017-04-29 NOTE — ED NOTES
Pt states he is feeling better at this time, rates the discomfort in his chest 6/10. Family at bedside

## 2017-04-29 NOTE — ED NOTES
Hospitalist at [facility] paged. Dr. Clarke speaking to Dr. Silva at this time.

## 2017-04-29 NOTE — ED NOTES
Pt family requesting [facility] because patient daughter lives out that way but he does not have PCP there

## 2017-04-29 NOTE — SIGNIFICANT EVENT
Results for TAYO TSE (MRN [id number]) as of 4/29/2017 10:55   Ref. Range 4/29/2017 10:42   POC Sodium Latest Ref Range: 136 - 145 mmol/L 138   POC Potassium Latest Ref Range: 3.5 - 5.1 mmol/L 4.0   POC Ionized Calcium Latest Ref Range: 1.06 - 1.42 mmol/L 1.21   POC Hematocrit Latest Ref Range: 36 - 54 %PCV 27 (L)   POC HEMOGLOBIN Latest Units: g/dL 9   POC PH Latest Ref Range: 7.35 - 7.45  7.391   POC PCO2 Latest Ref Range: 35 - 45 mmHg 32.1 (L)   POC PO2 Latest Ref Range: 80 - 100 mmHg 65 (L)   POC BE Latest Ref Range: -2 to 2 mmol/L -5   POC HCO3 Latest Ref Range: 24 - 28 mmol/L 19.5 (L)   POC SATURATED O2 Latest Ref Range: 95 - 100 % 93 (L)   POC TCO2 Latest Ref Range: 23 - 27 mmol/L 20 (L)   Sample Unknown ARTERIAL   DelSys Unknown Nasal Can   Allens Test Unknown Pass   Site Unknown LR   Results reported to Dr. Isma Clarke at bedside on 4/29/2017 @ 1042 by [email or web address]. ABG drawn on NC at 3LPM.

## 2017-04-29 NOTE — H&P
[facility]  History & Physical    SUBJECTIVE:     Chief Complaint/Reason for Admission: SOB    History of Present Illness:  Patient is a 61 y.o. male with PMHx of HLD, HTN, DM2 transferred from [facility] with complaints of progressively worsening SOB x4 days that awakens him from sleep. Also endorses pleuritic CP that began today,  non productive cough and PND. Denies previous episodes but state that while in [place] 1 year ago he was told that he had cardiomegaly was not given any other specifics. Denies palpitations, sputum production, wheezing, sick contacts, recent illness.      PTA Medications   Medication Sig    aspirin 81 MG Chew Take 81 mg by mouth once daily.    carvedilol (COREG) 6.25 MG tablet Take 6.25 mg by mouth 2 (two) times daily with meals.    cetirizine 10 mg chewable tablet Take 10 mg by mouth once daily.    garlic 1,000 mg Cap Take by mouth.    insulin aspart (NOVOLOG) 100 unit/mL injection Inject into the skin continuous. Contin[facility]os insulin pump-Dr. Blount manages at [facility]    losartan-hydrochlorothiazide 50-12.5 mg (HYZAAR) 50-12.5 mg per tablet Take 1 tablet by mouth once daily.    pravastatin (PRAVACHOL) 20 MG tablet Take 20 mg by mouth once daily.    sertraline (ZOLOFT) 25 MG tablet Take 25 mg by mouth once daily.    amlodipine (NORVASC) 10 MG tablet Take 1 tablet (10 mg total) by mouth once daily.    olmesartan (BENICAR) 40 MG tablet Take 40 mg by mouth once daily.       Review of patient's allergies indicates:   Allergen Reactions    Cayenne pepper        Past Medical History:   Diagnosis Date    Diabetes mellitus     Hypertension      Past Surgical History:   Procedure Laterality Date    PANCREATECTOMY       History reviewed. No pertinent family history.  Social History   Substance Use Topics    Smoking status: Never Smoker    Smokeless tobacco: None    Alcohol use No      Review of Systems   Constitutional: Positive for fever and  malaise/fatigue. Negative for chills and diaphoresis.   HENT: Negative for congestion and sore throat.    Eyes: Negative for pain.   Respiratory: Positive for cough (non productive) and shortness of breath. Negative for sputum production and wheezing.         Pleuritic chest pain   Cardiovascular: Positive for leg swelling and PND. Negative for palpitations and orthopnea.   Gastrointestinal: Negative for abdominal pain, nausea and vomiting.   Genitourinary: Negative for dysuria, frequency, hematuria and urgency.   Musculoskeletal: Negative for back pain and neck pain.   Neurological: Negative for dizziness, tingling, focal weakness and headaches.   Psychiatric/Behavioral: Negative for suicidal ideas. The patient is not nervous/anxious.        OBJECTIVE:     Vital Signs (Most Recent):  Temp: (!) 100.5 °F (38.1 °C) (04/29/17 1429)  Pulse: 75 (04/29/17 1429)  Resp: (!) 22 (04/29/17 1429)  BP: 131/60 (04/29/17 1429)  SpO2: (!) 94 % (04/29/17 1316)    Physical Exam   Constitutional: He appears well-developed and well-nourished.   Appears fatigued   HENT:   Head: Normocephalic and atraumatic.   Mouth/Throat: Oropharynx is clear and moist.   NC in place   Eyes: EOM are normal.   Neck: Normal range of motion. Neck supple.   JVD   Cardiovascular: Normal rate, regular rhythm and normal heart sounds.    No murmur heard.  Pulmonary/Chest: No respiratory distress. He has no wheezes. He has rales (diffuse).   Diminished breath sounds bibasilar   Abdominal: Soft. Bowel sounds are normal. He exhibits no mass. There is no tenderness. There is no guarding.   Musculoskeletal: Normal range of motion. He exhibits edema (bilateral LE pitting). He exhibits no tenderness or deformity.   Neurological: He is alert. No cranial nerve deficit. He exhibits normal muscle tone. Coordination normal.   Skin: Skin is warm and dry.   Right lower abdominal region insulin pump   Psychiatric: Judgment and thought content normal.       Laboratory:  CBC:    Recent Labs  Lab 04/29/17  0951 04/29/17  1042   WBC 13.03*  --    RBC 3.95*  --    HGB 10.5*  --    HCT 30.4* 27*     --    MCV 77*  --    MCH 26.6*  --    MCHC 34.5  --      CMP:   Recent Labs  Lab 04/29/17  0951   *   CALCIUM 9.0   ALBUMIN 4.0   PROT 7.5      K 4.0   CO2 21*      BUN 40*   CREATININE 2.07*   ALKPHOS 91   ALT 23   AST 18   BILITOT 0.6     Cardiac markers:   Recent Labs  Lab 04/29/17  0951   TROPONINI 0.015       Diagnostic Results:  CXR: Central pulmonary vascular congestion.  Indication of some interstitial pulmonary edema.  Probable small bilateral pleural effusions.  No large effusions are present. No pneumothorax.    ASSESSMENT/PLAN:   Patient is 60 yo M with PMHx of HLD, HTN, DM2 transferred from [facility] with complaints of progressively worsening SOB found to have pulmonary edema on CXR.    Pulmonary Edema 2/2 Undiagnosed CHF  -Patient with complaints of progressive SOB and bilateral LE edema. CXR with pulmonary vascular congestion  -O2 sats 96% on 4 L.   -ABG: pH: 7.391, PCO2: 32.1, PO2: 65  -BNP 1590. EKG NSR. Troponin negative x1 . Will trend EKG and troponins  -No hx of CHF diagnosis. Will order echo  -Lasix 40 mg BID. Monitor I/Os. Strict I/Os    ALBERTO  -BUN/Cr 40/2.07. Baseline Cr 1.2 (6/2016)  -May be 2/2 to CHF exacerbation  -Will diuresis and monitor Cr  -Will hold nephrotoxics     Fever of Unknown Origin  -Pt with fever 100.5 F  -Denies sick contacts and recent illness  -Will cover with renally dosed vanc and cefepime   -F/U blood cx, urine gram stain  -LA 1.3, F/U procalcitonin    HTN  -Home meds Norvasc, Losartan-HCTZ, Benicar  -BP 130s-150s/60-70s  -Will start Norvasc 10 mg     HLD  -Restart home Lipitor  -Will order Lipid Panel    Diabetes Type 2  -HbA1c 10.3% (6/2016)   -Novolog continuous pump  -Monitor BS      Plan discussed with Dr. Gonzáles     DVT ppx: Lovenox  GI PPX: Famotidine    Dispo: F/U cultures, I/Os, Procalcitonin    Ary Alberto,  MD  4/29/2017  [facility]  1

## 2017-04-29 NOTE — PLAN OF CARE
Temp 100.7. Pt complains of chest pain 6/10 continued from [facility]. Dr. Alberto aware. 2D echo and troponins q6h ordered. Nitro paste to chest intact. No further orders. Pt resting in bed. Will continue to monitor.    @1600: Pt states he feels ok; chest pain 3/10. Pt resting in bed; asymptomatic. Will continue to monitor.

## 2017-04-29 NOTE — ED TRIAGE NOTES
SOB that began x 1 days ago, reports CP. Pt unable to speak in full sentences and appears generally uncomfortable

## 2017-04-30 LAB
ALBUMIN SERPL BCP-MCNC: 3 G/DL
ALP SERPL-CCNC: 70 U/L
ALT SERPL W/O P-5'-P-CCNC: 9 U/L
ANION GAP SERPL CALC-SCNC: 9 MMOL/L
AST SERPL-CCNC: 14 U/L
BASOPHILS # BLD AUTO: 0.07 K/UL
BASOPHILS NFR BLD: 0.8 %
BILIRUB SERPL-MCNC: 0.5 MG/DL
BUN SERPL-MCNC: 43 MG/DL
CALCIUM SERPL-MCNC: 8.8 MG/DL
CHLORIDE SERPL-SCNC: 107 MMOL/L
CK MB SERPL-MCNC: 2.4 NG/ML
CK MB SERPL-MCNC: 2.4 NG/ML
CK MB SERPL-RTO: 1.2 %
CK MB SERPL-RTO: 1.2 %
CK SERPL-CCNC: 197 U/L
CK SERPL-CCNC: 197 U/L
CO2 SERPL-SCNC: 19 MMOL/L
CREAT SERPL-MCNC: 2.1 MG/DL
DIFFERENTIAL METHOD: ABNORMAL
EOSINOPHIL # BLD AUTO: 0.3 K/UL
EOSINOPHIL NFR BLD: 3 %
ERYTHROCYTE [DISTWIDTH] IN BLOOD BY AUTOMATED COUNT: 14 %
EST. GFR  (AFRICAN AMERICAN): 38 ML/MIN/1.73 M^2
EST. GFR  (NON AFRICAN AMERICAN): 33 ML/MIN/1.73 M^2
ESTIMATED AVG GLUCOSE: 171 MG/DL
FLUAV AG SPEC QL IA: NEGATIVE
FLUBV AG SPEC QL IA: NEGATIVE
GLUCOSE SERPL-MCNC: 48 MG/DL
GRAM STN SPEC: NORMAL
GRAM STN SPEC: NORMAL
HBA1C MFR BLD HPLC: 7.6 %
HCT VFR BLD AUTO: 26 %
HGB BLD-MCNC: 9 G/DL
LYMPHOCYTES # BLD AUTO: 1.3 K/UL
LYMPHOCYTES NFR BLD: 14 %
MCH RBC QN AUTO: 26.2 PG
MCHC RBC AUTO-ENTMCNC: 34.6 %
MCV RBC AUTO: 76 FL
MONOCYTES # BLD AUTO: 0.8 K/UL
MONOCYTES NFR BLD: 8.3 %
NEUTROPHILS # BLD AUTO: 6.8 K/UL
NEUTROPHILS NFR BLD: 73.8 %
PLATELET # BLD AUTO: 295 K/UL
PMV BLD AUTO: 10.2 FL
POCT GLUCOSE: 123 MG/DL (ref 70–110)
POCT GLUCOSE: 195 MG/DL (ref 70–110)
POCT GLUCOSE: 43 MG/DL (ref 70–110)
POCT GLUCOSE: 45 MG/DL (ref 70–110)
POCT GLUCOSE: 96 MG/DL (ref 70–110)
POCT GLUCOSE: 97 MG/DL (ref 70–110)
POTASSIUM SERPL-SCNC: 3.8 MMOL/L
PROT SERPL-MCNC: 7 G/DL
RBC # BLD AUTO: 3.43 M/UL
SODIUM SERPL-SCNC: 135 MMOL/L
SPECIMEN SOURCE: NORMAL
TROPONIN I SERPL DL<=0.01 NG/ML-MCNC: 0.02 NG/ML
TROPONIN I SERPL DL<=0.01 NG/ML-MCNC: 0.03 NG/ML
WBC # BLD AUTO: 9.21 K/UL

## 2017-04-30 PROCEDURE — 82553 CREATINE MB FRACTION: CPT

## 2017-04-30 PROCEDURE — 36415 COLL VENOUS BLD VENIPUNCTURE: CPT

## 2017-04-30 PROCEDURE — 94761 N-INVAS EAR/PLS OXIMETRY MLT: CPT

## 2017-04-30 PROCEDURE — 25000003 PHARM REV CODE 250: Performed by: FAMILY MEDICINE

## 2017-04-30 PROCEDURE — 85025 COMPLETE CBC W/AUTO DIFF WBC: CPT

## 2017-04-30 PROCEDURE — 84484 ASSAY OF TROPONIN QUANT: CPT

## 2017-04-30 PROCEDURE — 63600175 PHARM REV CODE 636 W HCPCS: Performed by: FAMILY MEDICINE

## 2017-04-30 PROCEDURE — 93005 ELECTROCARDIOGRAM TRACING: CPT

## 2017-04-30 PROCEDURE — 80053 COMPREHEN METABOLIC PANEL: CPT

## 2017-04-30 PROCEDURE — 87400 INFLUENZA A/B EACH AG IA: CPT

## 2017-04-30 PROCEDURE — 27000221 HC OXYGEN, UP TO 24 HOURS

## 2017-04-30 PROCEDURE — 11000001 HC ACUTE MED/SURG PRIVATE ROOM

## 2017-04-30 PROCEDURE — 93010 ELECTROCARDIOGRAM REPORT: CPT | Mod: ,,, | Performed by: INTERNAL MEDICINE

## 2017-04-30 RX ORDER — NITROGLYCERIN 0.4 MG/1
0.4 TABLET SUBLINGUAL EVERY 5 MIN PRN
Status: DISCONTINUED | OUTPATIENT
Start: 2017-04-30 | End: 2017-05-08 | Stop reason: HOSPADM

## 2017-04-30 RX ORDER — NITROGLYCERIN 0.4 MG/1
0.4 TABLET SUBLINGUAL ONCE
Status: DISCONTINUED | OUTPATIENT
Start: 2017-04-30 | End: 2017-04-30

## 2017-04-30 RX ORDER — MORPHINE SULFATE 2 MG/ML
2 INJECTION, SOLUTION INTRAMUSCULAR; INTRAVENOUS EVERY 4 HOURS PRN
Status: DISCONTINUED | OUTPATIENT
Start: 2017-04-30 | End: 2017-05-08 | Stop reason: HOSPADM

## 2017-04-30 RX ORDER — INSULIN ASPART 100 [IU]/ML
1-10 INJECTION, SOLUTION INTRAVENOUS; SUBCUTANEOUS
Status: DISCONTINUED | OUTPATIENT
Start: 2017-04-30 | End: 2017-05-08 | Stop reason: HOSPADM

## 2017-04-30 RX ORDER — FUROSEMIDE 10 MG/ML
20 INJECTION INTRAMUSCULAR; INTRAVENOUS ONCE
Status: COMPLETED | OUTPATIENT
Start: 2017-04-30 | End: 2017-04-30

## 2017-04-30 RX ORDER — IBUPROFEN 200 MG
24 TABLET ORAL
Status: DISCONTINUED | OUTPATIENT
Start: 2017-04-30 | End: 2017-05-08 | Stop reason: HOSPADM

## 2017-04-30 RX ORDER — POTASSIUM CHLORIDE 20 MEQ/15ML
40 SOLUTION ORAL ONCE
Status: COMPLETED | OUTPATIENT
Start: 2017-04-30 | End: 2017-04-30

## 2017-04-30 RX ORDER — FUROSEMIDE 10 MG/ML
60 INJECTION INTRAMUSCULAR; INTRAVENOUS 2 TIMES DAILY
Status: DISCONTINUED | OUTPATIENT
Start: 2017-04-30 | End: 2017-05-01

## 2017-04-30 RX ORDER — IBUPROFEN 200 MG
16 TABLET ORAL
Status: DISCONTINUED | OUTPATIENT
Start: 2017-04-30 | End: 2017-05-08 | Stop reason: HOSPADM

## 2017-04-30 RX ORDER — GLUCAGON 1 MG
1 KIT INJECTION
Status: DISCONTINUED | OUTPATIENT
Start: 2017-04-30 | End: 2017-05-08 | Stop reason: HOSPADM

## 2017-04-30 RX ADMIN — FUROSEMIDE 40 MG: 10 INJECTION, SOLUTION INTRAMUSCULAR; INTRAVENOUS at 08:04

## 2017-04-30 RX ADMIN — MORPHINE SULFATE 2 MG: 2 INJECTION, SOLUTION INTRAMUSCULAR; INTRAVENOUS at 12:04

## 2017-04-30 RX ADMIN — FAMOTIDINE 20 MG: 10 INJECTION, SOLUTION INTRAVENOUS at 08:04

## 2017-04-30 RX ADMIN — FAMOTIDINE 20 MG: 10 INJECTION, SOLUTION INTRAVENOUS at 09:04

## 2017-04-30 RX ADMIN — FUROSEMIDE 20 MG: 10 INJECTION, SOLUTION INTRAMUSCULAR; INTRAVENOUS at 11:04

## 2017-04-30 RX ADMIN — PRAVASTATIN SODIUM 20 MG: 20 TABLET ORAL at 08:04

## 2017-04-30 RX ADMIN — INSULIN ASPART 1 UNITS: 100 INJECTION, SOLUTION INTRAVENOUS; SUBCUTANEOUS at 09:04

## 2017-04-30 RX ADMIN — NITROGLYCERIN 0.4 MG: 0.4 TABLET SUBLINGUAL at 11:04

## 2017-04-30 RX ADMIN — AMLODIPINE BESYLATE 10 MG: 5 TABLET ORAL at 08:04

## 2017-04-30 RX ADMIN — FUROSEMIDE 60 MG: 10 INJECTION, SOLUTION INTRAMUSCULAR; INTRAVENOUS at 05:04

## 2017-04-30 RX ADMIN — POTASSIUM CHLORIDE 40 MEQ: 20 SOLUTION ORAL at 11:04

## 2017-04-30 RX ADMIN — INSULIN DETEMIR 10 UNITS: 100 INJECTION, SOLUTION SUBCUTANEOUS at 09:04

## 2017-04-30 RX ADMIN — ENOXAPARIN SODIUM 40 MG: 100 INJECTION SUBCUTANEOUS at 05:04

## 2017-04-30 RX ADMIN — CEFEPIME HYDROCHLORIDE 2 G: 2 INJECTION, SOLUTION INTRAVENOUS at 04:04

## 2017-04-30 RX ADMIN — CEFEPIME HYDROCHLORIDE 2 G: 2 INJECTION, SOLUTION INTRAVENOUS at 05:04

## 2017-04-30 RX ADMIN — Medication 1 G: at 05:04

## 2017-04-30 NOTE — PLAN OF CARE
During rounds. pt complaining of non-radiating midsternal chest pain 6/10. /84. HR 84. Dr. Najera notified. STAT EKG and troponin ordered. Will continue to monitor.    @11:37: EKG at bedside. Nitro SL ordered. Morphine PRN ordered.     @11:45: /81. HR 95. Temp 98.8. Dr. Gonzáles and team at bedside assessing pt.     @11:52: Chest pain 6/10 still. One tab nitro SL administered.    @11:57: Chest pain 5/10. /81. HR 93. Temp 98.8. Second nitro SL administered.    @12:05: Chest pain 4/10. /70. HR 93. Temp 98.7. Per Dr. Ramirez and Dr. Najera, administered morphine 2mg IV and hold off on 3rd nitro tab. Continue to monitor pt.     @12:10: Chest pain 3/10. Pt verbalized understanding to call if chest pain persists or worsens. Son at bedside. Will continue to monitor.     @1428: Chest xray done. Pt resting in bed; denies chest pain. Will continue to monitor.

## 2017-04-30 NOTE — PROGRESS NOTES
Progress Note  [facility]    Admit Date: 4/29/2017   LOS: 1 day     SUBJECTIVE:     Follow-up For:  Volume overload and ALBERTO    Pt was seen and examined. Pt still complaining of SOB. Denies CP.     Scheduled Meds:   amlodipine  10 mg Oral Daily    ceFEPime (MAXIPIME) IVPB  2 g Intravenous Q12H    enoxaparin  40 mg Subcutaneous Daily    famotidine (PF)  20 mg Intravenous BID    furosemide  40 mg Intravenous BID    pravastatin  20 mg Oral Daily    vancomycin (VANCOCIN) IVPB  1,000 mg Intravenous Q24H     Continuous Infusions:   PRN Meds:    Review of patient's allergies indicates:   Allergen Reactions    Cayenne pepper        Review of Systems  As per subjective    OBJECTIVE:     Vital Signs (Most Recent)  Temp: 98.4 °F (36.9 °C) (04/30/17 0753)  Pulse: 84 (04/30/17 0841)  Resp: 18 (04/30/17 0753)  BP: (!) 156/67 (04/30/17 0841)  SpO2: 95 % (04/30/17 0753)    Vital Signs Range (Last 24H):  Temp:  [97.9 °F (36.6 °C)-100.7 °F (38.2 °C)]   Pulse:  [65-87]   Resp:  [18-30]   BP: (115-177)/(55-82)   SpO2:  [93 %-96 %]     I & O (Last 24H):  Intake/Output Summary (Last 24 hours) at 04/30/17 1028  Last data filed at 04/30/17 0700   Gross per 24 hour   Intake             1090 ml   Output             1425 ml   Net             -335 ml     Wt Readings from Last 3 Encounters:   04/30/17 88 kg (194 lb 0.1 oz)   06/17/16 83.5 kg (184 lb)     Physical Exam:  Gen: mildly distressed  HEENT: NC, AT  Chest: CTABL  CVS: RRR, no m/r/g  Abdomen: soft, NT, ND, no masses, +BS  Ext: no edema, pulses 2+   Skin: ro rashes  Neuro: A&O x 3, CN's grossly intact, sensation intact to light touch  Psych: Normal mood, affect, thought content    LABS  CBC    Recent Labs  Lab 04/29/17  0951 04/29/17  1042 04/30/17  0530   WBC 13.03*  --  9.21   RBC 3.95*  --  3.43*   HGB 10.5*  --  9.0*   HCT 30.4* 27* 26.0*     --  295   MCV 77*  --  76*   MCH 26.6*  --  26.2*   MCHC 34.5  --  34.6     BMP    Recent Labs  Lab 04/29/17  0951  04/30/17  0530    135*   K 4.0 3.8   CO2 21* 19*    107   BUN 40* 43*   CREATININE 2.07* 2.1*   * 48*       POCT-Glucose  POCT Glucose   Date Value Ref Range Status   04/30/2017 96 70 - 110 mg/dL Final   04/30/2017 45 (LL) 70 - 110 mg/dL Final   04/29/2017 82 70 - 110 mg/dL Final   04/29/2017 105 70 - 110 mg/dL Final   04/29/2017 142 (H) 70 - 110 mg/dL Final   04/29/2017 215 (H) 70 - 110 mg/dL Final         Recent Labs  Lab 04/29/17  0951 04/30/17  0530   CALCIUM 9.0 8.8     LFT    Recent Labs  Lab 04/29/17  0951 04/30/17  0530   PROT 7.5 7.0   ALBUMIN 4.0 3.0*   BILITOT 0.6 0.5   AST 18 14   ALKPHOS 91 70   ALT 23 9*       COAGS    Recent Labs  Lab 04/29/17  0951   INR 1.1     CE    Recent Labs  Lab 04/29/17  1503 04/29/17  1732 04/29/17  2257   TROPONINI 0.024 0.030* 0.027*     ABGs    Recent Labs  Lab 04/29/17  1042   PH 7.391   PCO2 32.1*   PO2 65*   HCO3 19.5*   POCSATURATED 93*   BE -5     BNP  No results for input(s): BNP in the last 168 hours.  UA    Recent Labs  Lab 04/29/17  1804   COLORU Yellow   SPECGRAV 1.020   PHUR 6.0   PROTEINUA 2+*   BACTERIA None     LAST HbA1c  Lab Results   Component Value Date    HGBA1C 7.6 (H) 04/29/2017         Diagnostic Results:    None    ASSESSMENT/PLAN:   Patient is 62 yo M with PMHx of HLD, HTN, DM2 transferred from [facility] with complaints of progressively worsening SOB found to have pulmonary edema on CXR.     Pulmonary Edema 2/2 Undiagnosed CHF  - Patient with complained on admit of progressive SOB and bilateral LE edema. CXR with pulmonary vascular congestion  - O2 sats 96% on 4 L.   - ABG: pH: 7.391, PCO2: 32.1, PO2: 65  - BNP 1590. EKG NSR.   - Mild elevation in troponins, will obtain 1 more trop to confirm downward trend  - No hx of CHF diagnosis. Echo ordered and pending  - Currently on lasix 40 mg BID, only put out ~500cc overnight  - Will increase lasix dose today  - Continue to monitor I/Os. Strict I/Os     ALBERTO  - On admit, BUN/Cr  40/2.07. Baseline Cr 1.2 (6/2016)  - May be 2/2 to CHF exacerbation  - Cr essentially unchanged.   - Continue diuresis and monitor Cr  - Holding nephrotoxics      Fever of Unknown Origin  - afebrile overnight  - Denies sick contacts and recent illness  - Covering with renally dosed vanc and cefepime   - F/U blood cx, urine gram stain  - LA 1.3, Procalcitonin wnl     HTN  - Home meds Norvasc, Losartan-HCTZ, Benicar  - BP 130s-150s/60-70s  - Continue Norvasc 10 mg      HLD  - Restart home Lipitor  - Lipid Panel     Diabetes Type 2  - HbA1c 10.3% (6/2016)   - Novolog continuous pump  - Monitor BS       DVT ppx: Lovenox  GI PPX: Famotidine     Dispo: F/U cultures, I/Os, Procalcitonin      4/30/2017 Keith Gill MD  10:28 AM

## 2017-04-30 NOTE — PLAN OF CARE
Rechecked blood sugar 96. Pt states he is doing well. Sitting up and eating breakfast. Will continue to monitor.

## 2017-04-30 NOTE — PLAN OF CARE
"accucheck blood glucose 45mg/dl. Awake,alert, asymptomatic. 280 ml of apple apple juice given. Tolerated well. Dr Gill notified of 45 blood glucose and pt asymptomatic. States"recheck blood sugar in an hour".  "

## 2017-04-30 NOTE — PLAN OF CARE
Problem: Patient Care Overview  Goal: Plan of Care Review  Outcome: Ongoing (interventions implemented as appropriate)  Plan of care reviewed.blood glucose monitored.pt has insulin pump No SOB noted.On O2@2L/nc.Diuresing well. Administer antibiotic as ordered.bed alarm on for safety. Son at bedside.

## 2017-04-30 NOTE — PT/OT/SLP PROGRESS
Physical Therapy      Ming Boateng  MRN: [id number]    Patient not seen today for eval secondary pt off of floor in medical testing. Will follow-up as able.    Marva Strickland, PT   4/30/17

## 2017-04-30 NOTE — PLAN OF CARE
Pt transferred off floor for chest PA and lateral via stretcher w/ oxygen. Son and nursing student escorted pt with transport Ole.     @1426: Pt back in bed, resting. Denies chest pain. Family at bedside. Will continue to monitor.

## 2017-04-30 NOTE — PLAN OF CARE
Problem: Patient Care Overview  Goal: Plan of Care Review  Outcome: Ongoing (interventions implemented as appropriate)  Reviewed plan of care with patient and family members. Patient verbalized understanding. Pt on 3L NC; sats 95-96%. Pt on cardiac diet; tolerates PO meds well. Blood glucose monitored and covered per personal insulin pump. Flu swab culture obtained. Chest xray done. Pt on continuous tele monitoring; NSR 80s-90s. No true red alarms or ectopy noted. Pt denies SOB and chest pain at this time. Bed alarm set, bed in lowest position, call bell in reach, family at bedside. Will continue to monitor.

## 2017-04-30 NOTE — PLAN OF CARE
Pt was transferred from [facility] with complaints of progressively worsening SOB x4 days that awakens him from sleep. Also endorses pleuritic CP that began today, non productive cough and PND. Denies previous episodes but state that while in [place] 1 year ago he was told that he had cardiomegaly was not given any other specifics. Denies palpitations, sputum production, wheezing, sick contacts, recent illness. Hx of HLD, HTN, DM2       04/30/17 1111   Discharge Assessment   Assessment Type Discharge Planning Assessment   Confirmed/corrected address and phone number on facesheet? Yes   Assessment information obtained from? Patient   Expected Length of Stay (days) 3   Communicated expected length of stay with patient/caregiver yes   Prior to hospitilization cognitive status: Alert/Oriented   Prior to hospitalization functional status: Needs Assistance   Current cognitive status: Alert/Oriented   Current Functional Status: Needs Assistance   Arrived From home or self-care   Lives With sibling(s)  (- Zaria Moreno [phone])   Able to Return to Prior Arrangements no   Is patient able to care for self after discharge? Unable to determine at this time (comments)   How many people do you have in your home that can help with your care after discharge? 1   Who are your caregiver(s) and their phone number(s)? alejandro Moreno [phone]   Patient's perception of discharge disposition home or selfcare   Readmission Within The Last 30 Days no previous admission in last 30 days   Patient currently being followed by outpatient case management? No   Patient currently receives home health services? No   Does the patient currently use HME? No   Patient currently receives private duty nursing? No   Patient currently receives any other outside agency services? No   Equipment Currently Used at Home none   Do you have any problems affording any of your prescribed medications? No   Is the patient taking medications  as prescribed? yes   Do you have any financial concerns preventing you from receiving the healthcare you need? No   Does the patient have transportation to healthcare appointments? Yes   Transportation Available family or friend will provide   On Dialysis? No   Does the patient receive services at the [facility]mad[facility] Clinic? No   Are there any open cases? No   Discharge Plan A Home   Discharge Plan B Home with family   Patient/Family In Agreement With Plan yes     Susu Norwood RN Transitional Navigator  [phone]

## 2017-04-30 NOTE — PT/OT/SLP PROGRESS
Occupational Therapy      Ming Boateng  MRN: [id number]    Patient not seen today secondary to nursing hold upon attempt for OT initial eval @1230.  Pt. with c/o chest pain. Will follow-up later.    Gabbie Stevenson, OT  4/30/2017

## 2017-04-30 NOTE — PLAN OF CARE
Blood sugar 43. 350cc apple juice w/sugar added given to patient with larry crackers. Pt alert, asymptomatic. Dr. Najera notified. Will continue to monitor and recheck BS.    New orders for sliding scale placed. Insulin pump removed by patient and given to family member to take home.       @1710: Rechecked blood sugar: 123. Pt currently eating dinner; turkey and cheese sandwich. Will continue to monitor.

## 2017-05-01 LAB
ALBUMIN SERPL BCP-MCNC: 2.8 G/DL
ALP SERPL-CCNC: 70 U/L
ALT SERPL W/O P-5'-P-CCNC: 7 U/L
ANION GAP SERPL CALC-SCNC: 12 MMOL/L
AST SERPL-CCNC: 10 U/L
BASOPHILS # BLD AUTO: 0.08 K/UL
BASOPHILS NFR BLD: 0.9 %
BILIRUB SERPL-MCNC: 0.5 MG/DL
BUN SERPL-MCNC: 42 MG/DL
CALCIUM SERPL-MCNC: 8.7 MG/DL
CHLORIDE SERPL-SCNC: 104 MMOL/L
CO2 SERPL-SCNC: 18 MMOL/L
CREAT SERPL-MCNC: 2.4 MG/DL
DIASTOLIC DYSFUNCTION: YES
DIFFERENTIAL METHOD: ABNORMAL
EOSINOPHIL # BLD AUTO: 0.2 K/UL
EOSINOPHIL NFR BLD: 2.2 %
ERYTHROCYTE [DISTWIDTH] IN BLOOD BY AUTOMATED COUNT: 14 %
EST. GFR  (AFRICAN AMERICAN): 32 ML/MIN/1.73 M^2
EST. GFR  (NON AFRICAN AMERICAN): 28 ML/MIN/1.73 M^2
ESTIMATED PA SYSTOLIC PRESSURE: 15.67
GLUCOSE SERPL-MCNC: 199 MG/DL
HCT VFR BLD AUTO: 23.7 %
HGB BLD-MCNC: 8.1 G/DL
LYMPHOCYTES # BLD AUTO: 0.8 K/UL
LYMPHOCYTES NFR BLD: 9.3 %
MCH RBC QN AUTO: 26 PG
MCHC RBC AUTO-ENTMCNC: 34.2 %
MCV RBC AUTO: 76 FL
MONOCYTES # BLD AUTO: 0.8 K/UL
MONOCYTES NFR BLD: 9.5 %
NEUTROPHILS # BLD AUTO: 6.8 K/UL
NEUTROPHILS NFR BLD: 77.9 %
PLATELET # BLD AUTO: 311 K/UL
PMV BLD AUTO: 10.2 FL
POCT GLUCOSE: 192 MG/DL (ref 70–110)
POCT GLUCOSE: 207 MG/DL (ref 70–110)
POCT GLUCOSE: 214 MG/DL (ref 70–110)
POCT GLUCOSE: 214 MG/DL (ref 70–110)
POTASSIUM SERPL-SCNC: 4.4 MMOL/L
PROT SERPL-MCNC: 7 G/DL
RBC # BLD AUTO: 3.12 M/UL
RETIRED EF AND QEF - SEE NOTES: 65 (ref 55–65)
SODIUM SERPL-SCNC: 134 MMOL/L
VANCOMYCIN SERPL-MCNC: 14 UG/ML
WBC # BLD AUTO: 8.67 K/UL

## 2017-05-01 PROCEDURE — 25000003 PHARM REV CODE 250: Performed by: FAMILY MEDICINE

## 2017-05-01 PROCEDURE — 80053 COMPREHEN METABOLIC PANEL: CPT

## 2017-05-01 PROCEDURE — 11000001 HC ACUTE MED/SURG PRIVATE ROOM

## 2017-05-01 PROCEDURE — 36415 COLL VENOUS BLD VENIPUNCTURE: CPT

## 2017-05-01 PROCEDURE — 97530 THERAPEUTIC ACTIVITIES: CPT

## 2017-05-01 PROCEDURE — 85025 COMPLETE CBC W/AUTO DIFF WBC: CPT

## 2017-05-01 PROCEDURE — 63600175 PHARM REV CODE 636 W HCPCS

## 2017-05-01 PROCEDURE — 97165 OT EVAL LOW COMPLEX 30 MIN: CPT

## 2017-05-01 PROCEDURE — 93005 ELECTROCARDIOGRAM TRACING: CPT

## 2017-05-01 PROCEDURE — 63600175 PHARM REV CODE 636 W HCPCS: Performed by: FAMILY MEDICINE

## 2017-05-01 PROCEDURE — 84484 ASSAY OF TROPONIN QUANT: CPT

## 2017-05-01 PROCEDURE — 93306 TTE W/DOPPLER COMPLETE: CPT

## 2017-05-01 PROCEDURE — 27000221 HC OXYGEN, UP TO 24 HOURS

## 2017-05-01 PROCEDURE — 94761 N-INVAS EAR/PLS OXIMETRY MLT: CPT

## 2017-05-01 PROCEDURE — 80202 ASSAY OF VANCOMYCIN: CPT

## 2017-05-01 PROCEDURE — 97535 SELF CARE MNGMENT TRAINING: CPT

## 2017-05-01 PROCEDURE — 97161 PT EVAL LOW COMPLEX 20 MIN: CPT

## 2017-05-01 RX ORDER — FUROSEMIDE 20 MG/1
20 TABLET ORAL DAILY
Status: DISCONTINUED | OUTPATIENT
Start: 2017-05-02 | End: 2017-05-02

## 2017-05-01 RX ORDER — FAMOTIDINE 20 MG/1
20 TABLET, FILM COATED ORAL DAILY
Status: DISCONTINUED | OUTPATIENT
Start: 2017-05-02 | End: 2017-05-02

## 2017-05-01 RX ORDER — HYDRALAZINE HYDROCHLORIDE 20 MG/ML
10 INJECTION INTRAMUSCULAR; INTRAVENOUS EVERY 8 HOURS PRN
Status: DISCONTINUED | OUTPATIENT
Start: 2017-05-01 | End: 2017-05-08 | Stop reason: HOSPADM

## 2017-05-01 RX ORDER — MOXIFLOXACIN HYDROCHLORIDE 400 MG/250ML
400 INJECTION, SOLUTION INTRAVENOUS
Status: COMPLETED | OUTPATIENT
Start: 2017-05-01 | End: 2017-05-03

## 2017-05-01 RX ORDER — FAMOTIDINE 10 MG/ML
20 INJECTION INTRAVENOUS DAILY
Status: DISCONTINUED | OUTPATIENT
Start: 2017-05-02 | End: 2017-05-01

## 2017-05-01 RX ADMIN — NITROGLYCERIN 0.4 MG: 0.4 TABLET SUBLINGUAL at 08:05

## 2017-05-01 RX ADMIN — INSULIN DETEMIR 10 UNITS: 100 INJECTION, SOLUTION SUBCUTANEOUS at 09:05

## 2017-05-01 RX ADMIN — INSULIN ASPART 2 UNITS: 100 INJECTION, SOLUTION INTRAVENOUS; SUBCUTANEOUS at 09:05

## 2017-05-01 RX ADMIN — AMLODIPINE BESYLATE 10 MG: 5 TABLET ORAL at 08:05

## 2017-05-01 RX ADMIN — MOXIFLOXACIN HYDROCHLORIDE 400 MG: 400 INJECTION, SOLUTION INTRAVENOUS at 03:05

## 2017-05-01 RX ADMIN — INSULIN ASPART 2 UNITS: 100 INJECTION, SOLUTION INTRAVENOUS; SUBCUTANEOUS at 11:05

## 2017-05-01 RX ADMIN — MORPHINE SULFATE 2 MG: 2 INJECTION, SOLUTION INTRAMUSCULAR; INTRAVENOUS at 11:05

## 2017-05-01 RX ADMIN — INSULIN ASPART 4 UNITS: 100 INJECTION, SOLUTION INTRAVENOUS; SUBCUTANEOUS at 04:05

## 2017-05-01 RX ADMIN — FUROSEMIDE 60 MG: 10 INJECTION, SOLUTION INTRAMUSCULAR; INTRAVENOUS at 08:05

## 2017-05-01 RX ADMIN — FAMOTIDINE 20 MG: 10 INJECTION, SOLUTION INTRAVENOUS at 08:05

## 2017-05-01 RX ADMIN — ENOXAPARIN SODIUM 40 MG: 100 INJECTION SUBCUTANEOUS at 04:05

## 2017-05-01 RX ADMIN — CEFEPIME HYDROCHLORIDE 2 G: 2 INJECTION, SOLUTION INTRAVENOUS at 04:05

## 2017-05-01 RX ADMIN — PRAVASTATIN SODIUM 20 MG: 20 TABLET ORAL at 08:05

## 2017-05-01 RX ADMIN — INSULIN ASPART 4 UNITS: 100 INJECTION, SOLUTION INTRAVENOUS; SUBCUTANEOUS at 08:05

## 2017-05-01 NOTE — PLAN OF CARE
Problem: Patient Care Overview  Goal: Plan of Care Review  Outcome: Ongoing (interventions implemented as appropriate)  Reviewed plan of care with patient and family members. Patient verbalized understanding. Pt had 2D echo and chest xray done today. Patient on continuous tele monitoring; NSR 70s-80s. No true red alarms or ectopy noted. Denies chest pain throughout shift. Bed alarm set, bed in lowest position, call bell in reach. Fall precautions maintained, family to remain at bedside. Will continue to monitor.

## 2017-05-01 NOTE — PLAN OF CARE
Problem: Physical Therapy Goal  Goal: Physical Therapy Goal  Goals to be met by: 2017     Patient will increase functional independence with mobility by performin. Supine to sit with Modified [place]  2. Sit to stand transfer with Modified [place]  3. Gait x 100 feet with Modified [place] using Single-point Cane/ or RW .   Outcome: Ongoing (interventions implemented as appropriate)  Recommend Home with outpatient PT and OT  No immediate DME noted

## 2017-05-01 NOTE — PT/OT/SLP EVAL
Physical Therapy  Evaluation    Ming Boateng   MRN: [id number]   Admitting Diagnosis: Pulmonary edema with congestive heart failure    PT Received On: 05/01/17  PT Start Time: 1330     PT Stop Time: 1345    PT Total Time (min): 15 min       Billable Minutes:  Evaluation 15    Diagnosis: Pulmonary edema with congestive heart failure  The primary encounter diagnosis was Pulmonary edema with congestive heart failure. Diagnoses of Acute renal failure, unspecified acute renal failure type, Chest pain, unspecified type, Hyperglycemia, and Chest pain were also pertinent to this visit.      Past Medical History:   Diagnosis Date    Diabetes mellitus     Hypertension       Past Surgical History:   Procedure Laterality Date    PANCREATECTOMY         Referring physician: Eliecer  Date referred to PT: 5/1/2015    General Precautions: Standard, LVAD, vision impaired  Orthopedic Precautions: N/A   Braces: N/A            Patient History:  Lives With: sibling(s)  Living Arrangements: house  Home Accessibility: stairs within home  Stair Railings at Home: none  Transportation Available: family or friend will provide  Living Environment Comment: will living with daughter after DC from hospital   Equipment Currently Used at Home: none  DME owned (not currently used): none    Previous Level of Function:  Ambulation Skills: independent  Transfer Skills: independent  ADL Skills: independent    Subjective:  Communicated with primary nurse prior to session.  Primary language Telugu   Chief Complaint: weakness  Patient goals:  Get well and go home    Pain Rating:  (did not rate on scale)   Location - Side: Bilateral     Location: chest     Pain Rating Post-Intervention:  (same as PTA)    Objective:   Patient found with: telemetry, oxygen     Cognitive Exam:  Oriented to: Person, Place and Time    Follows Commands/attention: Follows one-step commands and Follows two-step commands  Communication: clear/fluent  Safety awareness/insight to  disability: impaired    Physical Exam:  Postural examination/scapula alignment: Rounded shoulder    Skin integrity: Visible skin intact  Edema: Mild BLE    Sensation:   Intact    Upper Extremity Range of Motion: See O T note  Right Upper Extremity:   Left Upper Extremity:     Upper Extremity Strength:See O T note  Right Upper Extremity:   Left Upper Extremity:     Lower Extremity Range of Motion:  Right Lower Extremity: WFL  Left Lower Extremity: WFL    Lower Extremity Strength:  Right Lower Extremity: WFL  Left Lower Extremity: WFL     Fine motor coordination:  na    Gross motor coordination: impaired    Functional Mobility:  Bed Mobility:  Supine to Sit: Minimum Assistance  Sit to Supine: Minimum Assistance    Transfers:  Sit <> Stand Assistance: Stand By Assistance, Contact Guard Assistance  Sit <> Stand Assistive Device: Rolling Walker    Gait:   Gait Distance: 100 ft with O2 at 3 liter   Assistance 1: Contact Guard Assistance, Stand by Assistance  Gait Assistive Device: Rolling walker  Gait Pattern: reciprocal  Gait Deviation(s): decreased erum, decreased step length, decreased stride length (poor vision L peripheal field)    Stairs:  na    Balance:   Static Sit: FAIR+: Able to take MINIMAL challenges from all directions  Dynamic Sit: FAIR+: Maintains balance through MINIMAL excursions of active trunk motion  Static Stand: FAIR+: Takes MINIMAL challenges from all directions  Dynamic stand: FAIR: Needs CONTACT GUARD during gait    Therapeutic Activities and Exercises:  na    AM-PAC 6 CLICK MOBILITY  How much help from another person does this patient currently need?   1 = Unable, Total/Dependent Assistance  2 = A lot, Maximum/Moderate Assistance  3 = A little, Minimum/Contact Guard/Supervision  4 = None, Modified [place]/Independent    Turning over in bed (including adjusting bedclothes, sheets and blankets)?: 4  Sitting down on and standing up from a chair with arms (e.g., wheelchair, bedside commode,  etc.): 4  Moving from lying on back to sitting on the side of the bed?: 3  Moving to and from a bed to a chair (including a wheelchair)?: 3  Need to walk in hospital room?: 3  Climbing 3-5 steps with a railing?: 3  Total Score: 20     AM-PAC Raw Score CMS G-Code Modifier Level of Impairment Assistance   6 % Total / Unable   7 - 9 CM 80 - 100% Maximal Assist   10 - 14 CL 60 - 80% Moderate Assist   15 - 19 CK 40 - 60% Moderate Assist   20 - 22 CJ 20 - 40% Minimal Assist   23 CI 1-20% SBA / CGA   24 CH 0% Independent/ Mod I     Patient left HOB elevated with all lines intact, call button in reach and family present.    Assessment:   Ming Boateng is a 61 y.o. male with a medical diagnosis of Pulmonary edema with congestive heart failure and presents with weakness, gait disturbance and decline from PLOF Patient should benefit from gait training and strengthening. Patient noted to have a drop in O2 sat from 94 to 88 with gait stopped and patient recovered to 94% no symptoms reported by patient     Rehab identified problem list/impairments: Rehab identified problem list/impairments: weakness, gait instability, impaired balance, impaired endurance, impaired cardiopulmonary response to activity, impaired self care skills, impaired functional mobilty, edema, pain, decreased lower extremity function, decreased upper extremity function    Rehab potential is good.    Activity tolerance: Fair    Discharge recommendations: Discharge Facility/Level Of Care Needs: home health OT, outpatient OT     Barriers to discharge: Barriers to Discharge: None    Equipment recommendations: Equipment Needed After Discharge: bath bench     GOALS:   Physical Therapy Goals        Problem: Physical Therapy Goal    Goal Priority Disciplines Outcome Goal Variances Interventions   Physical Therapy Goal     PT/OT, PT Ongoing (interventions implemented as appropriate)     Description:  Goals to be met by: 6/1/2017     Patient will increase  functional independence with mobility by performin. Supine to sit with Modified [place]  2. Sit to stand transfer with Modified [place]  3. Gait  x 100 feet with Modified [place] using Single-point Cane/ or RW .                 PLAN:    Patient to be seen 6 x/week to address the above listed problems via gait training, therapeutic activities, therapeutic exercises  Plan of Care expires: 17  Plan of Care reviewed with: patient          David G Vilma, PT  2017

## 2017-05-01 NOTE — PLAN OF CARE
Problem: Occupational Therapy Goal  Goal: Occupational Therapy Goal  Goals to be met by: 5/7/2017    Patient will increase functional independence with ADLs by performing:    LE Dressing with Set-up Assistance.  Grooming while standing with Supervision.  Toileting from toilet with Modified [place] for hygiene and clothing management.   Supine to sit with Modified [place].  Stand pivot transfers with Supervision.  Toilet transfer to toilet with Supervision.  Increased functional strength to WNL for use with ADLs and fx mobility.  Outcome: Ongoing (interventions implemented as appropriate)  Pt. Limited by impaired standing balance, weakness, lacks safety, decreased vision, decreased endurance limiting indep and safety in ADLS and fx mobility. Per son, pt was indep ADLS and ambulates without a device prior to onset but has been having recent falls x 2- getting out of tub and on step to house. Pt. has visual  deficits-low vision especially L eye(cataracts with multiple surgeries) which limits indep with use of glucometer pump reading digital numbers and auntie not knowledgaable about assisting him; Son says pt. may over or under medicating him self as a result.  Son indicated he has been trying to reach vendor to come to the home to give instruction to family to help with device use. Pt. Performed bed mobility supervision; sit<>stand SBA; ambulated with SBA-CGA 2/2 swaying balance and LOB.  toiletig Supervision; toilet tf.SBA; LB dressing SBA for socks.  Grooming SBA standing at sink with swaying balance.  He would benefit from OP OT/PT to address strength trn, balance, modifictation trn and education for vision deficits for increase indep and safety with ADLS, medication management and fx mobility. OT explained POC to pt and son; reviewed use of large magnifier to assist with visual deficits to reading small print on glucometer, safety with RW for fx mobility in room.  Continue with OT POC.

## 2017-05-01 NOTE — PLAN OF CARE
Problem: Cardiac: Heart Failure (Adult)  Goal: Signs and Symptoms of Listed Potential Problems Will be Absent, Minimized or Managed (Cardiac: Heart Failure)  Signs and symptoms of listed potential problems will be absent, minimized or managed by discharge/transition of care (reference Cardiac: Heart Failure (Adult) CPG).   Monitor for any SOB and or chest pain. Continuous telemetry monitoring in progress.

## 2017-05-01 NOTE — PROGRESS NOTES
Progress Note  [facility]    Admit Date: 4/29/2017   LOS: 2 days     SUBJECTIVE:   Follow-up For:  Volume overload and ALBERTO    Pt was getting an Echo this am. NAEON per night resident.    Scheduled Meds:   amlodipine  10 mg Oral Daily    ceFEPime (MAXIPIME) IVPB  2 g Intravenous Q12H    enoxaparin  40 mg Subcutaneous Daily    famotidine (PF)  20 mg Intravenous BID    furosemide  60 mg Intravenous BID    insulin detemir  10 Units Subcutaneous QHS    pravastatin  20 mg Oral Daily    vancomycin 1 g in 0.9% sodium chloride 250 mL IVPB (ready to mix system)  1 g Intravenous Q24H     Continuous Infusions:   PRN Meds:    Review of patient's allergies indicates:   Allergen Reactions    Cayenne pepper        Review of Systems  Unable to assess    OBJECTIVE:     Vital Signs (Most Recent)  Temp: 98.6 °F (37 °C) (05/01/17 0811)  Pulse: 80 (05/01/17 0811)  Resp: 20 (05/01/17 0811)  BP: (!) 151/63 (05/01/17 0811)  SpO2: 95 % (05/01/17 0837)    Vital Signs Range (Last 24H):  Temp:  [97.7 °F (36.5 °C)-99.6 °F (37.6 °C)]   Pulse:  [74-95]   Resp:  [18-22]   BP: (140-191)/(63-84)   SpO2:  [88 %-97 %]     I & O (Last 24H):    Intake/Output Summary (Last 24 hours) at 05/01/17 1003  Last data filed at 05/01/17 0900   Gross per 24 hour   Intake              530 ml   Output             2600 ml   Net            -2070 ml     Wt Readings from Last 3 Encounters:   05/01/17 85.5 kg (188 lb 7.9 oz)   06/17/16 83.5 kg (184 lb)     Physical Exam:  Unable to perform.    LABS  CBC    Recent Labs  Lab 04/29/17  0951 04/29/17  1042 04/30/17  0530 05/01/17  0424   WBC 13.03*  --  9.21 8.67   RBC 3.95*  --  3.43* 3.12*   HGB 10.5*  --  9.0* 8.1*   HCT 30.4* 27* 26.0* 23.7*     --  295 311   MCV 77*  --  76* 76*   MCH 26.6*  --  26.2* 26.0*   MCHC 34.5  --  34.6 34.2     BMP    Recent Labs  Lab 04/29/17  0951 04/30/17  0530 05/01/17  0424    135* 134*   K 4.0 3.8 4.4   CO2 21* 19* 18*    107 104   BUN 40* 43* 42*    CREATININE 2.07* 2.1* 2.4*   * 48* 199*       POCT-Glucose  POCT Glucose   Date Value Ref Range Status   05/01/2017 214 (H) 70 - 110 mg/dL Final   04/30/2017 195 (H) 70 - 110 mg/dL Final   04/30/2017 123 (H) 70 - 110 mg/dL Final   04/30/2017 43 (LL) 70 - 110 mg/dL Final   04/30/2017 97 70 - 110 mg/dL Final   04/30/2017 96 70 - 110 mg/dL Final   04/30/2017 45 (LL) 70 - 110 mg/dL Final   04/29/2017 82 70 - 110 mg/dL Final   04/29/2017 105 70 - 110 mg/dL Final   04/29/2017 142 (H) 70 - 110 mg/dL Final   04/29/2017 215 (H) 70 - 110 mg/dL Final         Recent Labs  Lab 04/29/17  0951 04/30/17  0530 05/01/17  0424   CALCIUM 9.0 8.8 8.7     LFT    Recent Labs  Lab 04/29/17  0951 04/30/17  0530 05/01/17  0424   PROT 7.5 7.0 7.0   ALBUMIN 4.0 3.0* 2.8*   BILITOT 0.6 0.5 0.5   AST 18 14 10   ALKPHOS 91 70 70   ALT 23 9* 7*       COAGS    Recent Labs  Lab 04/29/17  0951   INR 1.1     CE    Recent Labs  Lab 04/29/17  1732 04/29/17  2257 04/30/17  1158   TROPONINI 0.030* 0.027* 0.024     LAST HbA1c  Lab Results   Component Value Date    HGBA1C 7.6 (H) 04/29/2017       Diagnostic Results:  IP CONSULT TO CARDIOLOGY-[facility]    ASSESSMENT/PLAN:   Patient is 60 yo M with PMHx of HLD, HTN, DM2 transferred from [facility] with complaints of progressively worsening SOB found to have pulmonary edema on CXR.     Pulmonary Edema 2/2 Undiagnosed CHF  - Patient with complained on admit of progressive SOB and bilateral LE edema. CXR with pulmonary vascular congestion.  - O2 sats 96% on 4 L.   - ABG: pH: 7.391, PCO2: 32.1, PO2: 65  - BNP 1590. EKG NSR.   - trop trended down will get one additional trop to complete the series.  - Echo pending  - Currently on lasix 40 mg BID, only put out ~500cc overnight  - Will increase lasix dose today  - Continue to monitor I/Os. Strict I/Os     ALBERTO  - On admit, BUN/Cr 40/2.07. Baseline Cr 1.2 (6/2016)  - May be 2/2 to CHF exacerbation  - Cr worsened to 2.4. Will hold lasix.   - could be 2/2 to abx.  Will deescalate abx today.  - Holding nephrotoxics     Fever of Unknown Origin  - afebrile since 4/29  - CXR shows possible PNA.  - Denies sick contacts and recent illness  - Cont.vanc and cefepime   - cultures show NGTD so far  - LA 1.3, Procalcitonin wnl  - will deescalate ABX today possibly to moxi.     HTN  - Home meds Norvasc, Losartan-HCTZ, Benicar  - BP 130s-150s/60-70s  - Continue Norvasc 10 mg      HLD  - Restart home Lipitor     Diabetes Type 2  - HbA1c 10.3% (6/2016)   - on home insulin pump but was d/c due to patients multiple episodes of hypoglycemia  - currently on levemir 10u.  - continue to monitor BS.       DVT ppx: Lovenox  GI PPX: Famotidine     Dispo: f/u Echo. Hold lasix for now. Wean patient off O2 as tolerated.    Joseph Rolle MD  [facility] PGY 2  5/1/2017 10:03 AM

## 2017-05-01 NOTE — PROGRESS NOTES
Rounded on patient. Patient Citizen of [place] speaking only. Daughter Nica at bedside Nica [phone]. Daughter states that patient doesn't go to Dr. negrete anymore but goes to [facility] and sees a nephrologist (1st name is Dr. Laura- dtr unsure of last name)- daughter states next appt is on 5/17 at [facility]. Patient does NOT wear oxygen at home.    Fiorella Briceño, RN, CCM, CMSRN  RN Transition Navigator  [phone]

## 2017-05-01 NOTE — PLAN OF CARE
Problem: Patient Care Overview  Goal: Plan of Care Review  Outcome: Ongoing (interventions implemented as appropriate)  Pt's SpO2 95% on 3 lpm NC. No respiratory distress noted. Will continue to monitor SpO2.
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
## 2017-05-01 NOTE — PROGRESS NOTES
.Pharmacy New Medication Education    Patient accepted medication education.    Pharmacy educated patient on the following medications, using the teach-back method.   Norvasc  Cefepime  Lovenox  Pepcid  Lasix  Detemir  Novolog  Morphine  Ntg  Pravachol  Vancomycin    Learners of pharmacy medication education included:  patient    Patient +/- learner response:  verbalize understanding

## 2017-05-01 NOTE — PLAN OF CARE
Problem: Cardiac: Heart Failure (Adult)  Goal: Signs and Symptoms of Listed Potential Problems Will be Absent, Minimized or Managed (Cardiac: Heart Failure)  Signs and symptoms of listed potential problems will be absent, minimized or managed by discharge/transition of care (reference Cardiac: Heart Failure (Adult) CPG).   Outcome: Ongoing (interventions implemented as appropriate)  No c/o chest pain. Pt diuresing well. Maintains O2 during  Shift. No true red alarm noted . maintain NSR HR in the 70's to 80's.blood sugar monitored as ordered.

## 2017-05-01 NOTE — PLAN OF CARE
Problem: Fall Risk (Adult)  Goal: Absence of Falls  Patient will demonstrate the desired outcomes by discharge/transition of care.   Outcome: Ongoing (interventions implemented as appropriate)  Keep bed alarm on and call bell in reach. Q 2 hour rounding to maintain safety.

## 2017-05-01 NOTE — PLAN OF CARE
Pt off floor for 2D echo via wheelchair with oxygen in place. Transported with Bibi.     @1026: Pt back in bed from echo. Denies SOB and chest pain. Bed alarm set, will continue to monitor.

## 2017-05-02 LAB
ALBUMIN SERPL BCP-MCNC: 2.9 G/DL
ALP SERPL-CCNC: 78 U/L
ALT SERPL W/O P-5'-P-CCNC: 9 U/L
ANION GAP SERPL CALC-SCNC: 14 MMOL/L
AST SERPL-CCNC: 15 U/L
BACTERIA #/AREA URNS HPF: ABNORMAL /HPF
BASOPHILS # BLD AUTO: 0.08 K/UL
BASOPHILS NFR BLD: 0.9 %
BILIRUB SERPL-MCNC: 0.6 MG/DL
BILIRUB UR QL STRIP: NEGATIVE
BUN SERPL-MCNC: 49 MG/DL
CALCIUM SERPL-MCNC: 9.2 MG/DL
CHLORIDE SERPL-SCNC: 106 MMOL/L
CHLORIDE UR-SCNC: 31 MMOL/L
CLARITY UR: CLEAR
CO2 SERPL-SCNC: 18 MMOL/L
COLOR UR: YELLOW
CREAT SERPL-MCNC: 2.9 MG/DL
DIFFERENTIAL METHOD: ABNORMAL
EOSINOPHIL # BLD AUTO: 0.3 K/UL
EOSINOPHIL NFR BLD: 3.4 %
ERYTHROCYTE [DISTWIDTH] IN BLOOD BY AUTOMATED COUNT: 13.7 %
EST. GFR  (AFRICAN AMERICAN): 26 ML/MIN/1.73 M^2
EST. GFR  (NON AFRICAN AMERICAN): 22 ML/MIN/1.73 M^2
GLUCOSE SERPL-MCNC: 138 MG/DL
GLUCOSE UR QL STRIP: ABNORMAL
HCT VFR BLD AUTO: 26.1 %
HGB BLD-MCNC: 9 G/DL
HGB UR QL STRIP: ABNORMAL
HYALINE CASTS #/AREA URNS LPF: 1 /LPF
KETONES UR QL STRIP: NEGATIVE
LEUKOCYTE ESTERASE UR QL STRIP: NEGATIVE
LYMPHOCYTES # BLD AUTO: 0.9 K/UL
LYMPHOCYTES NFR BLD: 10.8 %
MCH RBC QN AUTO: 26 PG
MCHC RBC AUTO-ENTMCNC: 34.5 %
MCV RBC AUTO: 75 FL
MICROSCOPIC COMMENT: ABNORMAL
MONOCYTES # BLD AUTO: 0.6 K/UL
MONOCYTES NFR BLD: 7.1 %
NEUTROPHILS # BLD AUTO: 6.7 K/UL
NEUTROPHILS NFR BLD: 77.6 %
NITRITE UR QL STRIP: NEGATIVE
PH UR STRIP: 5 [PH] (ref 5–8)
PLATELET # BLD AUTO: 328 K/UL
PMV BLD AUTO: 9.9 FL
POCT GLUCOSE: 139 MG/DL (ref 70–110)
POCT GLUCOSE: 184 MG/DL (ref 70–110)
POCT GLUCOSE: 216 MG/DL (ref 70–110)
POCT GLUCOSE: 319 MG/DL (ref 70–110)
POTASSIUM SERPL-SCNC: 4.2 MMOL/L
POTASSIUM UR-SCNC: 43 MMOL/L
PROT SERPL-MCNC: 7.4 G/DL
PROT UR QL STRIP: ABNORMAL
RBC # BLD AUTO: 3.46 M/UL
RBC #/AREA URNS HPF: 7 /HPF (ref 0–4)
SODIUM SERPL-SCNC: 138 MMOL/L
SODIUM UR-SCNC: 23 MMOL/L
SP GR UR STRIP: 1.02 (ref 1–1.03)
SQUAMOUS #/AREA URNS HPF: 1 /HPF
TROPONIN I SERPL DL<=0.01 NG/ML-MCNC: 0.03 NG/ML
URN SPEC COLLECT METH UR: ABNORMAL
UROBILINOGEN UR STRIP-ACNC: NEGATIVE EU/DL
WBC # BLD AUTO: 8.61 K/UL
WBC #/AREA URNS HPF: 1 /HPF (ref 0–5)

## 2017-05-02 PROCEDURE — 25000242 PHARM REV CODE 250 ALT 637 W/ HCPCS

## 2017-05-02 PROCEDURE — 94799 UNLISTED PULMONARY SVC/PX: CPT

## 2017-05-02 PROCEDURE — 36415 COLL VENOUS BLD VENIPUNCTURE: CPT

## 2017-05-02 PROCEDURE — 63600175 PHARM REV CODE 636 W HCPCS: Performed by: INTERNAL MEDICINE

## 2017-05-02 PROCEDURE — 84300 ASSAY OF URINE SODIUM: CPT

## 2017-05-02 PROCEDURE — 81000 URINALYSIS NONAUTO W/SCOPE: CPT

## 2017-05-02 PROCEDURE — 85025 COMPLETE CBC W/AUTO DIFF WBC: CPT

## 2017-05-02 PROCEDURE — 82436 ASSAY OF URINE CHLORIDE: CPT

## 2017-05-02 PROCEDURE — 25000003 PHARM REV CODE 250

## 2017-05-02 PROCEDURE — 83935 ASSAY OF URINE OSMOLALITY: CPT

## 2017-05-02 PROCEDURE — 63600175 PHARM REV CODE 636 W HCPCS: Performed by: FAMILY MEDICINE

## 2017-05-02 PROCEDURE — 11000001 HC ACUTE MED/SURG PRIVATE ROOM

## 2017-05-02 PROCEDURE — 25000003 PHARM REV CODE 250: Performed by: FAMILY MEDICINE

## 2017-05-02 PROCEDURE — 27000221 HC OXYGEN, UP TO 24 HOURS

## 2017-05-02 PROCEDURE — 80053 COMPREHEN METABOLIC PANEL: CPT

## 2017-05-02 PROCEDURE — 84133 ASSAY OF URINE POTASSIUM: CPT

## 2017-05-02 PROCEDURE — 94640 AIRWAY INHALATION TREATMENT: CPT

## 2017-05-02 PROCEDURE — 94761 N-INVAS EAR/PLS OXIMETRY MLT: CPT

## 2017-05-02 PROCEDURE — 63600175 PHARM REV CODE 636 W HCPCS

## 2017-05-02 PROCEDURE — 93005 ELECTROCARDIOGRAM TRACING: CPT

## 2017-05-02 RX ORDER — FUROSEMIDE 10 MG/ML
80 INJECTION INTRAMUSCULAR; INTRAVENOUS 2 TIMES DAILY
Status: DISCONTINUED | OUTPATIENT
Start: 2017-05-02 | End: 2017-05-02

## 2017-05-02 RX ORDER — ENOXAPARIN SODIUM 100 MG/ML
30 INJECTION SUBCUTANEOUS EVERY 24 HOURS
Status: DISCONTINUED | OUTPATIENT
Start: 2017-05-02 | End: 2017-05-08 | Stop reason: HOSPADM

## 2017-05-02 RX ORDER — FUROSEMIDE 10 MG/ML
80 INJECTION INTRAMUSCULAR; INTRAVENOUS 2 TIMES DAILY
Status: DISCONTINUED | OUTPATIENT
Start: 2017-05-02 | End: 2017-05-03

## 2017-05-02 RX ORDER — IPRATROPIUM BROMIDE AND ALBUTEROL SULFATE 2.5; .5 MG/3ML; MG/3ML
3 SOLUTION RESPIRATORY (INHALATION)
Status: DISCONTINUED | OUTPATIENT
Start: 2017-05-02 | End: 2017-05-08 | Stop reason: HOSPADM

## 2017-05-02 RX ORDER — CARVEDILOL 12.5 MG/1
12.5 TABLET ORAL 2 TIMES DAILY WITH MEALS
Status: DISCONTINUED | OUTPATIENT
Start: 2017-05-02 | End: 2017-05-08 | Stop reason: HOSPADM

## 2017-05-02 RX ADMIN — HYDRALAZINE HYDROCHLORIDE 10 MG: 20 INJECTION INTRAMUSCULAR; INTRAVENOUS at 12:05

## 2017-05-02 RX ADMIN — INSULIN ASPART 2 UNITS: 100 INJECTION, SOLUTION INTRAVENOUS; SUBCUTANEOUS at 12:05

## 2017-05-02 RX ADMIN — HYDRALAZINE HYDROCHLORIDE 10 MG: 20 INJECTION INTRAMUSCULAR; INTRAVENOUS at 10:05

## 2017-05-02 RX ADMIN — INSULIN DETEMIR 10 UNITS: 100 INJECTION, SOLUTION SUBCUTANEOUS at 08:05

## 2017-05-02 RX ADMIN — MORPHINE SULFATE 2 MG: 2 INJECTION, SOLUTION INTRAMUSCULAR; INTRAVENOUS at 11:05

## 2017-05-02 RX ADMIN — FAMOTIDINE 20 MG: 20 TABLET, FILM COATED ORAL at 10:05

## 2017-05-02 RX ADMIN — ENOXAPARIN SODIUM 30 MG: 100 INJECTION SUBCUTANEOUS at 05:05

## 2017-05-02 RX ADMIN — INSULIN ASPART 4 UNITS: 100 INJECTION, SOLUTION INTRAVENOUS; SUBCUTANEOUS at 05:05

## 2017-05-02 RX ADMIN — INSULIN ASPART 4 UNITS: 100 INJECTION, SOLUTION INTRAVENOUS; SUBCUTANEOUS at 08:05

## 2017-05-02 RX ADMIN — FUROSEMIDE 80 MG: 10 INJECTION, SOLUTION INTRAMUSCULAR; INTRAVENOUS at 03:05

## 2017-05-02 RX ADMIN — CARVEDILOL 12.5 MG: 12.5 TABLET, FILM COATED ORAL at 05:05

## 2017-05-02 RX ADMIN — IPRATROPIUM BROMIDE AND ALBUTEROL SULFATE 3 ML: .5; 3 SOLUTION RESPIRATORY (INHALATION) at 07:05

## 2017-05-02 RX ADMIN — IPRATROPIUM BROMIDE AND ALBUTEROL SULFATE 3 ML: .5; 3 SOLUTION RESPIRATORY (INHALATION) at 02:05

## 2017-05-02 RX ADMIN — MOXIFLOXACIN HYDROCHLORIDE 400 MG: 400 INJECTION, SOLUTION INTRAVENOUS at 03:05

## 2017-05-02 RX ADMIN — AMLODIPINE BESYLATE 10 MG: 5 TABLET ORAL at 10:05

## 2017-05-02 RX ADMIN — FUROSEMIDE 20 MG: 20 TABLET ORAL at 10:05

## 2017-05-02 RX ADMIN — PRAVASTATIN SODIUM 20 MG: 20 TABLET ORAL at 10:05

## 2017-05-02 NOTE — PLAN OF CARE
Problem: Patient Care Overview  Goal: Plan of Care Review  SpO2   94% on   3lpm NC. No apparent distress noted. Will continue to monitor.

## 2017-05-02 NOTE — PT/OT/SLP PROGRESS
Physical Therapy      Ming Boateng  MRN: [id number]  Time 1145    Patient found up in chair with spouse present Patient refused c/o weakness and pain.Will follow as able    David Esparza, PT

## 2017-05-02 NOTE — PT/OT/SLP PROGRESS
Occupational Therapy      Ming Boateng  MRN: [id number]    Patient not seen today secondary to refusal @1416; feeling weak & experiencing 5/10 chest pain described as pressure. Son present to translate English <-> Croatian.  Communicated with nursing prior to attempt at OT tx.  Will follow-up later.    Gabbie Stevenson, OT  5/2/2017

## 2017-05-02 NOTE — PROGRESS NOTES
[facility] Resident JACK Progress Note    Subjective:      Ming Boateng is a 61 y.o. male who is being followed by the [facility] service at [facility] for New Onset Heart Failure.    Patient had an episode of substernal chest pain overnight.  Describes the pain as a 6/10, crushing, pressure-like  pain in the center of his chest which did not radiate.  States that the pain lasted for a few minutes and was relieved with SL Nitroglycerin x 3 and Morphine 2 mg IV x 1.      Still complaining of SOB and non-productive, persistent cough.  Has been having good UOP.  Fluid balance has been net negative 3380 mL since admission.       Objective:   Last 24 Hour Vital Signs:  BP  Min: 137/64  Max: 178/81  Temp  Av.7 °F (37.1 °C)  Min: 97.8 °F (36.6 °C)  Max: 99.5 °F (37.5 °C)  Pulse  Av.1  Min: 70  Max: 86  Resp  Av.2  Min: 18  Max: 22  SpO2  Av %  Min: 94 %  Max: 96 %  I/O last 3 completed shifts:  In: 580 [P.O.:530; IV Piggyback:50]  Out: 2475 [Urine:2475]    Physical Examination:  General: resting comfortably; no apparent respiratory distress  HEENT: NC/AT, PERRL, EOMI, oropharynx clear, nose clear, nasal cannula in place  Neck: JVP @ 8 cm, neck supple, no LAD  CVS: RRR, no murmurs  Resp: decreased BS at B/L bases with bibasilar crackles; no wheezes; no increased work of breathing  Abd: + BS, soft, NT, ND  Ext: 1+ pedal edema to ankles, no cyanosis; distal pulses 2+ and symmetric; CR <2 sec  Skin: chronic venous changes to B/L LE; bruises on B/L LE  Neuro: AAO x 3, no focal deficits       Laboratory:  Laboratory Data Reviewed: yes  Pertinent Findings:  Troponin 0.28  BUN/Cr 49/2.9    Microbiology Data Reviewed: yes  Pertinent Findings:  Microbiology Results (last 7 days)     Procedure Component Value Units Date/Time    Blood culture [[id number]] Collected:  17 1026    Order Status:  Completed Specimen:  Blood from Peripheral, Antecubital, Right Updated:  17      Blood Culture, Routine No Growth to date     Blood Culture, Routine No Growth to date     Blood Culture, Routine No Growth to date    Blood culture [[id number]] Collected:  04/29/17 1243    Order Status:  Completed Specimen:  Blood from Peripheral, Antecubital, Right Updated:  05/01/17 2012     Blood Culture, Routine No Growth to date     Blood Culture, Routine No Growth to date     Blood Culture, Routine No Growth to date    Gram stain [[id number]] Collected:  04/29/17 1809    Order Status:  Completed Specimen:  Urine from Urine, Clean Catch Updated:  04/30/17 0425     Gram Stain Result No WBC's      No organisms seen          Other Results:  EKG (my interpretation): NSR; no evidence of acute ischemia    Radiology Data Reviewed: yes  Pertinent Findings:  pending    Current Medications:     Infusions:        Scheduled:   amlodipine  10 mg Oral Daily    enoxaparin  40 mg Subcutaneous Daily    famotidine  20 mg Oral Daily    furosemide  20 mg Oral Daily    insulin detemir  10 Units Subcutaneous QHS    moxifloxacin  400 mg Intravenous Q24H    pravastatin  20 mg Oral Daily        PRN:  dextrose 50%, dextrose 50%, glucagon (human recombinant), glucose, glucose, hydrALAZINE, insulin aspart, morphine, nitroGLYCERIN    Antibiotics and Day Number of Therapy:  Moxifloxacin    Lines and Day Number of Therapy:  PIV    Assessment:     Ming Boateng is a 61 y.o.male with  Patient Active Problem List    Diagnosis Date Noted    Acute renal failure     Chest pain     Pulmonary edema with congestive heart failure 04/29/2017    Hyperglycemia 06/17/2016    ALBERTO (acute kidney injury) 06/17/2016    Essential hypertension 06/17/2016    Microcytic anemia 06/17/2016    HLD (hyperlipidemia) 06/17/2016        Plan:     Newly Diagnosed HFpEF   - BNP 1590 on presentation  - CXR with cardiolmegaly, pulmonary vascular congestion, and B/L pleural effusions R>L   - Echo with normal EF and diastolic dysfunction  - recommend increase Lasix  to 80 mg IV BID   - daily weights  - low Na diet  - continue fluid restriction 1500 mL daily  - continue to monitor strict I/Os  - recommend that patient undergo an ischemic work-up if he continues to have persistent chest pain     HTN  - BPs better controlled  - recommend strict BP control as best as patient tolerates in the setting of heart failure  - continue Amlodipine 10 mg PO daily   - recommend restarting home Coreg and increasing the dose to 12.5 mg PO BID; continue to increase Coreg as pt tolerates to maximize BP control      HLD  - recommend fasting Lipid panel  - continue Pravastatin     ALBERTO on CKD 3  - BUN/Cr 49/2.9; baseline Cr 1.2  - recommend urine Na, urine Cr, urine urea nitrogen, and urine Eos  - recommend changing lovenox ppx to heparin ppx in the setting of kidney injury  - continue to avoid nephrotoxic agents  - f/u Renal recommendations     Thank you for allowing us to participate in the care of this patient. Please contact me if you have any questions regarding this consult.    Guille Yeh  [id number]  [facility]

## 2017-05-02 NOTE — PROGRESS NOTES
Estimated Creatinine Clearance: 28.5 mL/min (based on Cr of 2.9).  Lovenox 40 mg q24h renal dose adjusted to   Lovenox 30 mg per P&T approved pharmacy protocol

## 2017-05-02 NOTE — PT/OT/SLP PROGRESS
Physical Therapy      Ming Boateng  MRN: [id number]  Time 940    Patient not seen am  OTR radiology. Will follow as able    David Esparza, PT

## 2017-05-02 NOTE — PLAN OF CARE
Problem: Cardiac: Heart Failure (Adult)  Goal: Signs and Symptoms of Listed Potential Problems Will be Absent, Minimized or Managed (Cardiac: Heart Failure)  Signs and symptoms of listed potential problems will be absent, minimized or managed by discharge/transition of care (reference Cardiac: Heart Failure (Adult) CPG).   Outcome: Ongoing (interventions implemented as appropriate)  Pt VSS and sbp 130's-140's overnight. Pt c/o chest pressure 6/10, pt was given nitro SL x3 and pain was relieved until pt ambulated to restroom and became dyspneic and short of breath. MD called to the bedside. Repeat Troponin and EKG drawn, 2 mg IV  Morphine administered and pt felt full relief and felt comfortable enough to fall asleep. Pt is feeling unrested and expresses inability to get good rest in hospital from all of the people coming in and out. Pt and son educated at length about CHF s/s, lifestyle modifications, dietary changes and new 1.5 L fluid restriction. Pt son at the bedside and translating for pt. Plan of care reviewed with patient. Patient verbalized complete understanding. Fall precautions maintained. Bed in lowest position, locked, call light within reach, and bed alarm on. Side rails up x's 2 with slip resistant socks on. Nurse instructed patient to notify staff for any assistance and pt verbalized complete understanding.        Tele Recap:  NSR, 70-80's  No ectopy noted.      Bedside shift report completed with day shift Rn, care handed off.

## 2017-05-02 NOTE — PLAN OF CARE
Problem: Cardiac: Heart Failure (Adult)  Goal: Signs and Symptoms of Listed Potential Problems Will be Absent, Minimized or Managed (Cardiac: Heart Failure)  Signs and symptoms of listed potential problems will be absent, minimized or managed by discharge/transition of care (reference Cardiac: Heart Failure (Adult) CPG).   Outcome: Ongoing (interventions implemented as appropriate)  Pt attempted to get up to use the restroom, pt became increasingly SOB and HICKS. Pt had to sit at the edge of bed for 10 minutes prior to being able to stand up. Dr. Gill notified. MD at bedside to assess pt. Nurse notified to call if pt c/o persistent CP or worsening s/s. No further orders. Nurse will continue to monitor.

## 2017-05-02 NOTE — PROGRESS NOTES
Progress Note  [facility]    Admit Date: 4/29/2017   LOS: 3 days     SUBJECTIVE:   Follow-up For:  Volume overload and ALBERTO    Pt was seen and examined. Patient states he is still SOB and feels he has chest pressure. He states the pain is worsened from yesterday. Patient denies diaphoresis or L arm weakness.     Scheduled Meds:   amlodipine  10 mg Oral Daily    enoxaparin  40 mg Subcutaneous Daily    insulin detemir  10 Units Subcutaneous QHS    moxifloxacin  400 mg Intravenous Q24H    pravastatin  20 mg Oral Daily     Continuous Infusions:   PRN Meds:    Review of patient's allergies indicates:   Allergen Reactions    Cayenne pepper        Review of Systems  As per subjective    OBJECTIVE:     Vital Signs (Most Recent)  Temp: 97.6 °F (36.4 °C) (05/02/17 0735)  Pulse: 85 (05/02/17 0735)  Resp: 20 (05/02/17 0735)  BP: (!) 171/78 (05/02/17 0735)  SpO2: (!) 94 % (05/02/17 0812)    Vital Signs Range (Last 24H):  Temp:  [97.6 °F (36.4 °C)-99.5 °F (37.5 °C)]   Pulse:  [70-86]   Resp:  [18-22]   BP: (137-178)/(59-98)   SpO2:  [94 %-96 %]     I & O (Last 24H):    Intake/Output Summary (Last 24 hours) at 05/02/17 1022  Last data filed at 05/02/17 0517   Gross per 24 hour   Intake              100 ml   Output             1075 ml   Net             -975 ml     Wt Readings from Last 3 Encounters:   05/01/17 85.5 kg (188 lb 7.9 oz)   06/17/16 83.5 kg (184 lb)     Physical Exam:  Physical Exam   Constitutional: He is oriented to person, place, and time.   On 3L O2   HENT:   Head: Normocephalic and atraumatic.   Eyes: Conjunctivae are normal.   Neck: Neck supple. No JVD present.   Cardiovascular: Normal rate, regular rhythm, normal heart sounds and intact distal pulses.    Pulmonary/Chest: He is in respiratory distress.   Shallow breathing   Abdominal: Soft. Bowel sounds are normal. There is no tenderness.   Musculoskeletal: He exhibits no edema.   Neurological: He is alert and oriented to person, place, and time.      LABS  CBC    Recent Labs  Lab 04/30/17 0530 05/01/17 0424 05/02/17 0517   WBC 9.21 8.67 8.61   RBC 3.43* 3.12* 3.46*   HGB 9.0* 8.1* 9.0*   HCT 26.0* 23.7* 26.1*    311 328   MCV 76* 76* 75*   MCH 26.2* 26.0* 26.0*   MCHC 34.6 34.2 34.5     BMP    Recent Labs  Lab 04/30/17 0530 05/01/17 0424 05/02/17 0517   * 134* 138   K 3.8 4.4 4.2   CO2 19* 18* 18*    104 106   BUN 43* 42* 49*   CREATININE 2.1* 2.4* 2.9*   GLU 48* 199* 138*       POCT-Glucose  POCT Glucose   Date Value Ref Range Status   05/02/2017 139 (H) 70 - 110 mg/dL Final   05/01/2017 214 (H) 70 - 110 mg/dL Final   05/01/2017 207 (H) 70 - 110 mg/dL Final   05/01/2017 192 (H) 70 - 110 mg/dL Final   05/01/2017 214 (H) 70 - 110 mg/dL Final   04/30/2017 195 (H) 70 - 110 mg/dL Final   04/30/2017 123 (H) 70 - 110 mg/dL Final   04/30/2017 43 (LL) 70 - 110 mg/dL Final   04/30/2017 97 70 - 110 mg/dL Final   04/30/2017 96 70 - 110 mg/dL Final   04/30/2017 45 (LL) 70 - 110 mg/dL Final   04/29/2017 82 70 - 110 mg/dL Final   04/29/2017 105 70 - 110 mg/dL Final   04/29/2017 142 (H) 70 - 110 mg/dL Final   04/29/2017 215 (H) 70 - 110 mg/dL Final         Recent Labs  Lab 04/30/17 0530 05/01/17 0424 05/02/17 0517   CALCIUM 8.8 8.7 9.2     LFT    Recent Labs  Lab 04/30/17 0530 05/01/17 0424 05/02/17 0517   PROT 7.0 7.0 7.4   ALBUMIN 3.0* 2.8* 2.9*   BILITOT 0.5 0.5 0.6   AST 14 10 15   ALKPHOS 70 70 78   ALT 9* 7* 9*       COAGS    Recent Labs  Lab 04/29/17  0951   INR 1.1     CE    Recent Labs  Lab 04/29/17  2257 04/30/17  1158 05/01/17  2340   TROPONINI 0.027* 0.024 0.028*     LAST HbA1c  Lab Results   Component Value Date    HGBA1C 7.6 (H) 04/29/2017       Diagnostic Results:  IP CONSULT TO CARDIOLOGY-[facility]  IP CONSULT TO NEPHROLOGY-[facility]    ASSESSMENT/PLAN:   Patient is 62 yo M with PMHx of HLD, HTN, DM2 transferred from [facility] with complaints of progressively worsening SOB found to have pulmonary edema on CXR.     Pulmonary Edema 2/2  Undiagnosed CHF  - Patient with complained on admit of progressive SOB and bilateral LE edema. CXR with pulmonary vascular congestion.  - O2 sats 96% on 4 L.   - ABG: pH: 7.391, PCO2: 32.1, PO2: 65  - BNP 1590. EKG NSR.   - trop trended down will get one additional trop to complete the series.  - Echo shows DD with EF of 65%  - CP is worsening. Will trend trop x 2.  - Cards recs is to include coreg for BP control.  - started IV lasix 80mg BID for aggressive diuresis      ALBERTO  - On admit, BUN/Cr 40/2.07. Baseline Cr 1.2 (6/2016)  - May be 2/2 to CHF exacerbation  - Cr worsened to 2.9.   - Holding nephrotoxics  - will get urine lytes  - consulted nephro due to worsening Cr.     Fever of Unknown Origin  - afebrile since 4/29  - CXR shows possible PNA.  - Denies sick contacts and recent illness  - Cont.vanc and cefepime   - cultures show NGTD so far  - LA 1.3, Procalcitonin wnl  - on moxi    HTN  - Home meds Norvasc, Losartan-HCTZ, Benicar  - BP 130s-150s/60-70s  - Continue Norvasc 10 mg     HLD  - Lipitor     Diabetes Type 2  - HbA1c 10.3% (6/2016)   - on home insulin pump but was d/c due to patients multiple episodes of hypoglycemia  - currently on levemir 10u.  - continue to monitor BS.       DVT ppx: Lovenox  GI PPX: Famotidine     Dispo: f/u nephro recs. Continue diuresis for now.    Joseph Rolle MD  [facility] PGY 2  5/2/2017 10:03 AM

## 2017-05-02 NOTE — CONSULTS
U Nephrology Consult  H&P      Consult Requested By: Keegan Gonzáles III, MD  Reason for Consult: alberto on ckd    SUBJECTIVE:     History of Present Illness:  60yo  male, pmhx ckd, htn, dm, chf    nephro on board for ALBERTO. Base Cr 2.4. Now 2.9    Pt states he feels ok, no ha, some cp, no sob, no gi bleed, no gu bleed, no seizures    Review Of Systems:  Neg as per hpi    Past Medical History:   Diagnosis Date    Diabetes mellitus     Hypertension      Past Surgical History:   Procedure Laterality Date    PANCREATECTOMY       History reviewed. No pertinent family history.  Social History   Substance Use Topics    Smoking status: Never Smoker    Smokeless tobacco: None    Alcohol use No       Review of patient's allergies indicates:   Allergen Reactions    Cayenne pepper         Medications:   albuterol-ipratropium 2.5mg-0.5mg/3mL  3 mL Nebulization Q6H WAKE    amlodipine  10 mg Oral Daily    carvedilol  12.5 mg Oral BID WM    enoxaparin  30 mg Subcutaneous Daily    furosemide  80 mg Intravenous BID    insulin detemir  10 Units Subcutaneous QHS    moxifloxacin  400 mg Intravenous Q24H    pravastatin  20 mg Oral Daily         OBJECTIVE:     Vital Signs (Most Recent)  Vitals:    05/02/17 0812 05/02/17 1125 05/02/17 1148 05/02/17 1415   BP:  (!) 182/77     BP Location:  Left arm     Patient Position:  Lying     BP Method:  Automatic     Pulse:  96  94   Resp:  20  18   Temp:  98.3 °F (36.8 °C)     TempSrc:  Oral     SpO2: (!) 94%  95% (!) 94%   Weight:       Height:               Date 05/02/17 0700 - 05/03/17 0659   Shift 1055-7570 6390-6587 7338-2987 24 Hour Total   I  N  T  A  K  E   P.O. 125   125    Shift Total  (mL/kg) 125  (1.5)   125  (1.5)   O  U  T  P  U  T   Urine  (mL/kg/hr) 750  (1.1)   750    Shift Total  (mL/kg) 750  (8.8)   750  (8.8)   Weight (kg) 85.5 85.5 85.5 85.5           Physical Exam:  gen nad  heent ncat eomi  cvs s1s2 heard, no friction rub  resp rales  abd soft  Ext trace  edema  Neuro no asterixis      Laboratory:    Recent Labs  Lab 04/30/17  0530 05/01/17  0424 05/02/17  0517   WBC 9.21 8.67 8.61   HGB 9.0* 8.1* 9.0*   HCT 26.0* 23.7* 26.1*    311 328   MONO 8.3  0.8 9.5  0.8 7.1  0.6       Recent Labs  Lab 04/30/17  0530 05/01/17  0424 05/02/17  0517   * 134* 138   K 3.8 4.4 4.2    104 106   CO2 19* 18* 18*   BUN 43* 42* 49*   CREATININE 2.1* 2.4* 2.9*   CALCIUM 8.8 8.7 9.2       Diagnostic Results:  X-Ray: Reviewed  US: Reviewed  Echo: Reviewed  ACCESS    ASSESSMENT/PLAN:     Patient Active Problem List   Diagnosis    Hyperglycemia    ALBERTO (acute kidney injury)    Essential hypertension    Microcytic anemia    HLD (hyperlipidemia)    Pulmonary edema with congestive heart failure    Acute renal failure    Chest pain       Plan:     PT C/O SOME CP RIGHT NOW  -- f/u ekg  -- mgmt as per primary and cardio    ALBERTO ON CKD  -- cont to monitor uop and Cr daily  -- no need hd at this time  -- continue lasix 80 iv bid    LYTES  -- acceptable, cont to monitor    HTN  -- cont current meds    ANEMIA  -- check iron panel, give iron prn    DM, CHF  -- mgmt as per primary    D/W ATTENDING DR XIONG

## 2017-05-02 NOTE — PLAN OF CARE
Problem: Cardiac: Heart Failure (Adult)  Goal: Signs and Symptoms of Listed Potential Problems Will be Absent, Minimized or Managed (Cardiac: Heart Failure)  Signs and symptoms of listed potential problems will be absent, minimized or managed by discharge/transition of care (reference Cardiac: Heart Failure (Adult) CPG).   Outcome: Ongoing (interventions implemented as appropriate)  Pt son alerted Rn to worsening s/s of SOB and increasing midsternal, non-radiating chest pressure of 6/10. Md Gill notified. Md to order ekg and troponin. Rn instructed to give prn iv morphine when ekg and trop drawn. Son art the bedside. Nurse to continue to monitor.

## 2017-05-03 LAB
ALBUMIN SERPL BCP-MCNC: 2.6 G/DL
ALP SERPL-CCNC: 77 U/L
ALT SERPL W/O P-5'-P-CCNC: 9 U/L
ANION GAP SERPL CALC-SCNC: 13 MMOL/L
AST SERPL-CCNC: 9 U/L
BASOPHILS # BLD AUTO: 0.05 K/UL
BASOPHILS NFR BLD: 0.6 %
BILIRUB SERPL-MCNC: 0.6 MG/DL
BUN SERPL-MCNC: 59 MG/DL
CALCIUM SERPL-MCNC: 8.9 MG/DL
CHLORIDE SERPL-SCNC: 104 MMOL/L
CO2 SERPL-SCNC: 18 MMOL/L
CREAT SERPL-MCNC: 3.3 MG/DL
DIFFERENTIAL METHOD: ABNORMAL
EOSINOPHIL # BLD AUTO: 0.1 K/UL
EOSINOPHIL NFR BLD: 1.5 %
ERYTHROCYTE [DISTWIDTH] IN BLOOD BY AUTOMATED COUNT: 13.9 %
EST. GFR  (AFRICAN AMERICAN): 22 ML/MIN/1.73 M^2
EST. GFR  (NON AFRICAN AMERICAN): 19 ML/MIN/1.73 M^2
GLUCOSE SERPL-MCNC: 142 MG/DL
HCT VFR BLD AUTO: 22.1 %
HGB BLD-MCNC: 7.7 G/DL
HYPOCHROMIA BLD QL SMEAR: ABNORMAL
LYMPHOCYTES # BLD AUTO: 1 K/UL
LYMPHOCYTES NFR BLD: 12.4 %
MAGNESIUM SERPL-MCNC: 1.8 MG/DL
MCH RBC QN AUTO: 26 PG
MCHC RBC AUTO-ENTMCNC: 34.8 %
MCV RBC AUTO: 75 FL
MONOCYTES # BLD AUTO: 0.5 K/UL
MONOCYTES NFR BLD: 6.3 %
NEUTROPHILS # BLD AUTO: 6.2 K/UL
NEUTROPHILS NFR BLD: 79.2 %
OSMOLALITY UR: 340 MOSM/KG
PHOSPHATE SERPL-MCNC: 5.5 MG/DL
PLATELET # BLD AUTO: 323 K/UL
PMV BLD AUTO: 10 FL
POCT GLUCOSE: 156 MG/DL (ref 70–110)
POCT GLUCOSE: 170 MG/DL (ref 70–110)
POCT GLUCOSE: 208 MG/DL (ref 70–110)
POCT GLUCOSE: 268 MG/DL (ref 70–110)
POTASSIUM SERPL-SCNC: 4.3 MMOL/L
PROT SERPL-MCNC: 6.8 G/DL
RBC # BLD AUTO: 2.96 M/UL
SODIUM SERPL-SCNC: 135 MMOL/L
WBC # BLD AUTO: 7.88 K/UL

## 2017-05-03 PROCEDURE — 94761 N-INVAS EAR/PLS OXIMETRY MLT: CPT

## 2017-05-03 PROCEDURE — 25000003 PHARM REV CODE 250: Performed by: INTERNAL MEDICINE

## 2017-05-03 PROCEDURE — 63600175 PHARM REV CODE 636 W HCPCS: Performed by: INTERNAL MEDICINE

## 2017-05-03 PROCEDURE — 63600175 PHARM REV CODE 636 W HCPCS: Performed by: FAMILY MEDICINE

## 2017-05-03 PROCEDURE — 94660 CPAP INITIATION&MGMT: CPT

## 2017-05-03 PROCEDURE — 63600175 PHARM REV CODE 636 W HCPCS

## 2017-05-03 PROCEDURE — 80053 COMPREHEN METABOLIC PANEL: CPT

## 2017-05-03 PROCEDURE — 27000221 HC OXYGEN, UP TO 24 HOURS

## 2017-05-03 PROCEDURE — 25000242 PHARM REV CODE 250 ALT 637 W/ HCPCS

## 2017-05-03 PROCEDURE — 84100 ASSAY OF PHOSPHORUS: CPT

## 2017-05-03 PROCEDURE — 25000003 PHARM REV CODE 250: Performed by: FAMILY MEDICINE

## 2017-05-03 PROCEDURE — 85025 COMPLETE CBC W/AUTO DIFF WBC: CPT

## 2017-05-03 PROCEDURE — 97530 THERAPEUTIC ACTIVITIES: CPT

## 2017-05-03 PROCEDURE — 94640 AIRWAY INHALATION TREATMENT: CPT

## 2017-05-03 PROCEDURE — 36415 COLL VENOUS BLD VENIPUNCTURE: CPT

## 2017-05-03 PROCEDURE — 11000001 HC ACUTE MED/SURG PRIVATE ROOM

## 2017-05-03 PROCEDURE — 83735 ASSAY OF MAGNESIUM: CPT

## 2017-05-03 PROCEDURE — 94799 UNLISTED PULMONARY SVC/PX: CPT

## 2017-05-03 PROCEDURE — 27000190 HC CPAP FULL FACE MASK W/VALVE

## 2017-05-03 RX ORDER — METOLAZONE 5 MG/1
10 TABLET ORAL DAILY
Status: DISCONTINUED | OUTPATIENT
Start: 2017-05-03 | End: 2017-05-03

## 2017-05-03 RX ORDER — FUROSEMIDE 10 MG/ML
200 INJECTION INTRAMUSCULAR; INTRAVENOUS 2 TIMES DAILY
Status: DISCONTINUED | OUTPATIENT
Start: 2017-05-04 | End: 2017-05-04

## 2017-05-03 RX ORDER — FUROSEMIDE 10 MG/ML
200 INJECTION INTRAMUSCULAR; INTRAVENOUS ONCE
Status: COMPLETED | OUTPATIENT
Start: 2017-05-03 | End: 2017-05-03

## 2017-05-03 RX ORDER — METOLAZONE 5 MG/1
10 TABLET ORAL ONCE
Status: COMPLETED | OUTPATIENT
Start: 2017-05-03 | End: 2017-05-03

## 2017-05-03 RX ADMIN — INSULIN ASPART 4 UNITS: 100 INJECTION, SOLUTION INTRAVENOUS; SUBCUTANEOUS at 12:05

## 2017-05-03 RX ADMIN — INSULIN ASPART 2 UNITS: 100 INJECTION, SOLUTION INTRAVENOUS; SUBCUTANEOUS at 08:05

## 2017-05-03 RX ADMIN — INSULIN ASPART 1 UNITS: 100 INJECTION, SOLUTION INTRAVENOUS; SUBCUTANEOUS at 09:05

## 2017-05-03 RX ADMIN — AMLODIPINE BESYLATE 10 MG: 5 TABLET ORAL at 08:05

## 2017-05-03 RX ADMIN — IPRATROPIUM BROMIDE AND ALBUTEROL SULFATE 3 ML: .5; 3 SOLUTION RESPIRATORY (INHALATION) at 08:05

## 2017-05-03 RX ADMIN — METOLAZONE 10 MG: 5 TABLET ORAL at 12:05

## 2017-05-03 RX ADMIN — FUROSEMIDE 80 MG: 10 INJECTION, SOLUTION INTRAMUSCULAR; INTRAVENOUS at 10:05

## 2017-05-03 RX ADMIN — PRAVASTATIN SODIUM 20 MG: 20 TABLET ORAL at 08:05

## 2017-05-03 RX ADMIN — INSULIN DETEMIR 10 UNITS: 100 INJECTION, SOLUTION SUBCUTANEOUS at 09:05

## 2017-05-03 RX ADMIN — CARVEDILOL 12.5 MG: 12.5 TABLET, FILM COATED ORAL at 06:05

## 2017-05-03 RX ADMIN — IPRATROPIUM BROMIDE AND ALBUTEROL SULFATE 3 ML: .5; 3 SOLUTION RESPIRATORY (INHALATION) at 12:05

## 2017-05-03 RX ADMIN — FUROSEMIDE 200 MG: 10 INJECTION, SOLUTION INTRAMUSCULAR; INTRAVENOUS at 01:05

## 2017-05-03 RX ADMIN — INSULIN ASPART 6 UNITS: 100 INJECTION, SOLUTION INTRAVENOUS; SUBCUTANEOUS at 06:05

## 2017-05-03 RX ADMIN — MOXIFLOXACIN HYDROCHLORIDE 400 MG: 400 INJECTION, SOLUTION INTRAVENOUS at 03:05

## 2017-05-03 RX ADMIN — ENOXAPARIN SODIUM 30 MG: 100 INJECTION SUBCUTANEOUS at 06:05

## 2017-05-03 RX ADMIN — CARVEDILOL 12.5 MG: 12.5 TABLET, FILM COATED ORAL at 08:05

## 2017-05-03 NOTE — PROGRESS NOTES
[facility] renal Resident HO-III Progress Note    Subjective:      Pt still sob; states exertional chestpain; still with orthopnea and B LE edema     Objective:   Last 24 Hour Vital Signs:  BP  Min: 115/55  Max: 150/70  Temp  Av.5 °F (36.9 °C)  Min: 98 °F (36.7 °C)  Max: 99.3 °F (37.4 °C)  Pulse  Av.6  Min: 60  Max: 94  Resp  Av  Min: 18  Max: 20  SpO2  Av %  Min: 93 %  Max: 96 %  I/O last 3 completed shifts:  In: 375 [P.O.:375]  Out: 1800 [Urine:1800]    Physical Examination:  GEN - somnolent; oriented to place   NECK - JVP appr 15cm H2O  CHEST - crackles 2/3 up B lung fields  HEART - distant heart sounds; rrr, no m/r/s3/s4  ABD - obese; nonttp; BS +  EXT - 2+ pitting edema to distal knee; warm; 2+ B DP pulses    Laboratory:  Laboratory Data Reviewed: yes  Pertinent Findings:  Component      Latest Ref Rng & Units 5/3/2017   Sodium      136 - 145 mmol/L 135 (L)   Potassium      3.5 - 5.1 mmol/L 4.3   Chloride      95 - 110 mmol/L 104   CO2      23 - 29 mmol/L 18 (L)   Glucose      70 - 110 mg/dL 142 (H)   BUN, Bld      8 - 23 mg/dL 59 (H)   Creatinine      0.5 - 1.4 mg/dL 3.3 (H)   Calcium      8.7 - 10.5 mg/dL 8.9   Total Protein      6.0 - 8.4 g/dL 6.8   Albumin      3.5 - 5.2 g/dL 2.6 (L)   Total Bilirubin      0.1 - 1.0 mg/dL 0.6   Alkaline Phosphatase      55 - 135 U/L 77   AST      10 - 40 U/L 9 (L)   ALT      10 - 44 U/L 9 (L)   Anion Gap      8 - 16 mmol/L 13   eGFR if African American      >60 mL/min/1.73 m:2 22 (A)   eGFR if non African American      >60 mL/min/1.73 m:2 19 (A)           Current Medications:     Infusions:        Scheduled:   albuterol-ipratropium 2.5mg-0.5mg/3mL  3 mL Nebulization Q6H WAKE    amlodipine  10 mg Oral Daily    carvedilol  12.5 mg Oral BID WM    enoxaparin  30 mg Subcutaneous Daily    furosemide  200 mg Intravenous Once    insulin detemir  10 Units Subcutaneous QHS    moxifloxacin  400 mg Intravenous Q24H    pravastatin  20 mg Oral Daily         PRN:  dextrose 50%, dextrose 50%, glucagon (human recombinant), glucose, glucose, hydrALAZINE, insulin aspart, morphine, nitroGLYCERIN      Assessment/Plan     ALBERTO with hypervolemia on CKD III with AGMA  -unknown etiol as of now but includes HFpEF; BL Cr from [facility] records is 1.4-1.7  -Cr worse today; will place cath, give lasix and metolazone as pt is nearing respiratory compromise; renal US pending  -will order Pr/Cr ratio in AM; urine lytes in AM  -expect pt's renal function to improve with diuresis      Case discussed with staff and primary team    Brien Be II  [facility] -III

## 2017-05-03 NOTE — PLAN OF CARE
Problem: Cardiac: Heart Failure (Adult)  Goal: Signs and Symptoms of Listed Potential Problems Will be Absent, Minimized or Managed (Cardiac: Heart Failure)  Signs and symptoms of listed potential problems will be absent, minimized or managed by discharge/transition of care (reference Cardiac: Heart Failure (Adult) CPG).   Pt resting in bed at this time. Family at bedside. Pt continues to have dyspnea on exertion. VSS. Tolerating medications as ordered. Taveras patent and draining to gravity. Clear, yellow urine. Good UOP this shift. Educated with son at bedside translating. Encouraged elevated HOB. Denies pain at this time, reports feeling better today than yesterday. Calling appropriately. Hourly rounding in place. Call light within reach.

## 2017-05-03 NOTE — PROGRESS NOTES
Progress Note  [facility]    Admit Date: 4/29/2017   LOS: 4 days     SUBJECTIVE:   Follow-up For:  Volume overload and ALBERTO    Pt was seen and examined. Patient continues to have SOB. States he uses the incentive spirometry. States chest pain has improved. Denies any other complaints.     Scheduled Meds:   albuterol-ipratropium 2.5mg-0.5mg/3mL  3 mL Nebulization Q6H WAKE    amlodipine  10 mg Oral Daily    carvedilol  12.5 mg Oral BID WM    enoxaparin  30 mg Subcutaneous Daily    insulin detemir  10 Units Subcutaneous QHS    pravastatin  20 mg Oral Daily     Continuous Infusions:   PRN Meds:    Review of patient's allergies indicates:   Allergen Reactions    Cayenne pepper        Review of Systems  As per subjective    OBJECTIVE:     Vital Signs (Most Recent)  Temp: 98.2 °F (36.8 °C) (05/03/17 1209)  Pulse: 68 (05/03/17 1613)  Resp: 18 (05/03/17 1613)  BP: 137/65 (05/03/17 1209)  SpO2: (!) 94 % (05/03/17 1613)    Vital Signs Range (Last 24H):  Temp:  [98 °F (36.7 °C)-99.3 °F (37.4 °C)]   Pulse:  [60-82]   Resp:  [18-20]   BP: (115-150)/(55-70)   SpO2:  [93 %-96 %]     I & O (Last 24H):    Intake/Output Summary (Last 24 hours) at 05/03/17 1727  Last data filed at 05/03/17 1251   Gross per 24 hour   Intake              125 ml   Output             1325 ml   Net            -1200 ml     Wt Readings from Last 3 Encounters:   05/01/17 85.5 kg (188 lb 7.9 oz)   06/17/16 83.5 kg (184 lb)     Physical Exam:  Physical Exam   Constitutional: He is oriented to person, place, and time.   On 3L O2   HENT:   Head: Normocephalic and atraumatic.   Eyes: Conjunctivae are normal.   Neck: Neck supple. No JVD present.   Cardiovascular: Normal rate, regular rhythm, normal heart sounds and intact distal pulses.    Pulmonary/Chest: He is in respiratory distress. He has no rales. He exhibits no tenderness.   Shallow breathing   Abdominal: Soft. Bowel sounds are normal. There is no tenderness.   Musculoskeletal: Edema: +1.    Neurological: He is alert and oriented to person, place, and time.     LABS  CBC    Recent Labs  Lab 05/01/17 0424 05/02/17 0517 05/03/17  0446   WBC 8.67 8.61 7.88   RBC 3.12* 3.46* 2.96*   HGB 8.1* 9.0* 7.7*   HCT 23.7* 26.1* 22.1*    328 323   MCV 76* 75* 75*   MCH 26.0* 26.0* 26.0*   MCHC 34.2 34.5 34.8     BMP    Recent Labs  Lab 05/01/17 0424 05/02/17 0517 05/03/17  0446   * 138 135*   K 4.4 4.2 4.3   CO2 18* 18* 18*    106 104   BUN 42* 49* 59*   CREATININE 2.4* 2.9* 3.3*   * 138* 142*       POCT-Glucose  POCT Glucose   Date Value Ref Range Status   05/03/2017 268 (H) 70 - 110 mg/dL Final   05/03/2017 208 (H) 70 - 110 mg/dL Final   05/03/2017 156 (H) 70 - 110 mg/dL Final   05/02/2017 319 (H) 70 - 110 mg/dL Final   05/02/2017 216 (H) 70 - 110 mg/dL Final   05/02/2017 184 (H) 70 - 110 mg/dL Final   05/02/2017 139 (H) 70 - 110 mg/dL Final   05/01/2017 214 (H) 70 - 110 mg/dL Final   05/01/2017 207 (H) 70 - 110 mg/dL Final   05/01/2017 192 (H) 70 - 110 mg/dL Final   05/01/2017 214 (H) 70 - 110 mg/dL Final   04/30/2017 195 (H) 70 - 110 mg/dL Final         Recent Labs  Lab 05/01/17 0424 05/02/17 0517 05/03/17 0445 05/03/17  0446   CALCIUM 8.7 9.2  --  8.9   MG  --   --  1.8  --    PHOS  --   --  5.5*  --      LFT    Recent Labs  Lab 05/01/17 0424 05/02/17 0517 05/03/17  0446   PROT 7.0 7.4 6.8   ALBUMIN 2.8* 2.9* 2.6*   BILITOT 0.5 0.6 0.6   AST 10 15 9*   ALKPHOS 70 78 77   ALT 7* 9* 9*       COAGS    Recent Labs  Lab 04/29/17  0951   INR 1.1     CE    Recent Labs  Lab 04/29/17  2257 04/30/17  1158 05/01/17  2340   TROPONINI 0.027* 0.024 0.028*     LAST HbA1c  Lab Results   Component Value Date    HGBA1C 7.6 (H) 04/29/2017       Diagnostic Results:  IP CONSULT TO CARDIOLOGY-[facility]  IP CONSULT TO NEPHROLOGY-[facility]    ASSESSMENT/PLAN:   Patient is 62 yo M with PMHx of HLD, HTN, DM2 transferred from [facility] with complaints of progressively worsening SOB found to have pulmonary edema  on CXR.     Pulmonary Edema 2/2 Undiagnosed CHF  - Patient with complained on admit of progressive SOB and bilateral LE edema. CXR with pulmonary vascular congestion.  - CXR from 5/2/17 still shows pulmonary edema  - patient has a net output of 4L  - per nephro will start lasix 160mg BID.  - per card will start BIPAP overnight as well  - currently on 3L O2     ALBERTO  - On admit, BUN/Cr 40/2.07. Baseline Cr 1.2 (6/2016)  - May be 2/2 to CHF exacerbation  - Cr worsened to 3.3  - f/u urine lytes  - per nephro will start lasix at 160mg BID and metolazone     PNA  - CXR shows possible PNA.  - on moxi    HTN  - Home meds Norvasc, Losartan-HCTZ, Benicar  - BP 130s-150s/60-70s  - Continue Norvasc 10 mg     HLD  - Lipitor     Diabetes Type 2  - HbA1c 10.3% (6/2016)   - on home insulin pump but was d/c due to patients multiple episodes of hypoglycemia  - currently on levemir 10u.  - continue to monitor BS.       DVT ppx: Lovenox  GI PPX: Famotidine     Dispo: aggressive diuresis. F/u urine lytes.     Joseph Rolle MD  [facility] PGY 2  5/3/2017 10:03 AM

## 2017-05-03 NOTE — PROGRESS NOTES
[facility] Resident [facility] Progress Note    Subjective:      Ming Boateng is a 61 y.o. male who is being followed by the [facility] service at [facility] for New Onset Heart Failure.    Pt and son reports some continued chest discomfort overnight relieved by Morphine. Still with a cough, but it is improving and occurring mostly at night. He was able to eat his whole food tray this morning; previously only able to eat a couple of bites.     Objective:   Last 24 Hour Vital Signs:  BP  Min: 115/55  Max: 182/77  Temp  Av.5 °F (36.9 °C)  Min: 98 °F (36.7 °C)  Max: 99.3 °F (37.4 °C)  Pulse  Av.5  Min: 60  Max: 96  Resp  Av.3  Min: 18  Max: 20  SpO2  Av.3 %  Min: 93 %  Max: 96 %  I/O last 3 completed shifts:  In: 375 [P.O.:375]  Out: 1800 [Urine:1800]  Net Balance this admission: -4.4L    Physical Examination:  General: resting comfortably; no apparent respiratory distress  HEENT: NC/AT, PERRL, EOMI, oropharynx clear, nose clear, nasal cannula in place  Neck: JVP @ 8 cm, neck supple, no LAD  CVS: RRR, no murmurs  Resp: decreased BS at B/L bases with bibasilar crackles; no wheezes; no increased work of breathing, on 3L NC O2  Abd: + BS, soft, NT, ND  Ext: 1+ pedal edema to ankles, no cyanosis; distal pulses 2+ and symmetric; CR <2 sec  Skin: chronic venous changes to B/L LE; bruises on B/L LE  Neuro: AAO x 3, no focal deficits       Laboratory:  Laboratory Data Reviewed: yes  Pertinent Findings:  BUN/Cr 49/2.9-> 59/3.3    Microbiology Data Reviewed: yes  Pertinent Findings:  Microbiology Results (last 7 days)     Procedure Component Value Units Date/Time    Blood culture [[id number]] Collected:  17 1243    Order Status:  Completed Specimen:  Blood from Peripheral, Antecubital, Right Updated:  17     Blood Culture, Routine No Growth to date     Blood Culture, Routine No Growth to date     Blood Culture, Routine No Growth to date     Blood Culture, Routine No Growth  to date    Blood culture [[id number]] Collected:  04/29/17 1026    Order Status:  Completed Specimen:  Blood from Peripheral, Antecubital, Right Updated:  05/02/17 2012     Blood Culture, Routine No Growth to date     Blood Culture, Routine No Growth to date     Blood Culture, Routine No Growth to date     Blood Culture, Routine No Growth to date    Gram stain [[id number]] Collected:  04/29/17 1809    Order Status:  Completed Specimen:  Urine from Urine, Clean Catch Updated:  04/30/17 0425     Gram Stain Result No WBC's      No organisms seen        Other Results:  EKG (my interpretation): NSR; no evidence of acute ischemia    Radiology Data Reviewed: yes  Pertinent Findings:  pending    Current Medications:     Infusions:        Scheduled:   albuterol-ipratropium 2.5mg-0.5mg/3mL  3 mL Nebulization Q6H WAKE    amlodipine  10 mg Oral Daily    carvedilol  12.5 mg Oral BID WM    enoxaparin  30 mg Subcutaneous Daily    furosemide  80 mg Intravenous BID    insulin detemir  10 Units Subcutaneous QHS    moxifloxacin  400 mg Intravenous Q24H    pravastatin  20 mg Oral Daily        PRN:  dextrose 50%, dextrose 50%, glucagon (human recombinant), glucose, glucose, hydrALAZINE, insulin aspart, morphine, nitroGLYCERIN    Antibiotics and Day Number of Therapy:  Moxifloxacin    Lines and Day Number of Therapy:  PIV    Assessment:     Ming Boateng is a 61 y.o.male with  Patient Active Problem List    Diagnosis Date Noted    Acute renal failure     Chest pain     Pulmonary edema with congestive heart failure 04/29/2017    Hyperglycemia 06/17/2016    ALBERTO (acute kidney injury) 06/17/2016    Essential hypertension 06/17/2016    Microcytic anemia 06/17/2016    HLD (hyperlipidemia) 06/17/2016        Plan:     Newly Diagnosed HFpEF   - BNP 1590 on presentation  - CXR with cardiolmegaly, pulmonary vascular congestion, and B/L pleural effusions R>L   - Echo with normal EF and diastolic dysfunction  - recommended increase Lasix  to 80 mg IV BID; given worsening ALBERTO and rise in creatinine today (3.3), recommend less aggressive diuresis for now    -recommend BiPAP at night  - continue to monitor strict I/Os with daily weights  - low Na diet with fluid restriction to 1500mL daily   - recommend that patient undergo an ischemic work-up if he continues to have persistent chest pain     HTN  - BPs better controlled  - recommend strict BP control as best as patient tolerates in the setting of heart failure  - continue Amlodipine 10 mg PO daily   - recommend restarting home Coreg and increasing the dose to 12.5 mg PO BID; continue to increase Coreg as pt tolerates to maximize BP control      HLD  - recommend fasting Lipid panel  - continue Pravastatin     ALBERTO on CKD 3  - BUN/Cr 49/2.9; baseline Cr 1.2  - recommend urine Na, urine Cr, urine urea nitrogen, and urine Eos  - recommend changing lovenox ppx to heparin ppx in the setting of kidney injury  - continue to avoid nephrotoxic agents  - f/u Renal recommendations     Thank you for allowing us to participate in the care of this patient. Please contact me if you have any questions regarding this consult.    David Cardoza  [facility]  [facility] Card[facility] Service

## 2017-05-03 NOTE — PLAN OF CARE
Rounded with MD team, patient remains in hospital. Patient on oxygen (does NOT wear home oxygen).  MD team informed patient they will order BIPAP for patient to trial.    PT/OT recommending outpatient PT/OT  Patient does have CPAP at home.  Sees Renal doctor at [facility].    Will obtain followup with [facility] family medicine clinic upon discharge.       05/03/17 1142   Discharge Reassessment   Assessment Type Discharge Planning Reassessment   Can the patient answer the patient profile reliably? Yes, cognitively intact   Describe the patient's ability to walk at the present time. Minor restrictions or changes   How often would a person be available to care for the patient? Whenever needed   Discharge plan remains the same: Yes   Provided patient/caregiver education on the expected discharge date and the discharge plan Yes   Discharge Plan A Home;Home with family   Explained to the the patient/caregiver why the discharge planned changed: Yes   Involved the patient/caregiver in establishing a new discharge plan: Yes     Fiorella Briceño, RN, CCM, CMSRN  RN Transition Navigator  [phone]

## 2017-05-03 NOTE — PT/OT/SLP PROGRESS
Physical Therapy      Ming Boateng  MRN: [id number]    Patient not seen today secondary to pt/family refusing at this time, stated he amb'd a short distance this morning and was having chest pains. Pt up in bathroom at this time, NO O2 on, provided extension line for pt to use when moving around room w/ family.  Nsg notified. Will follow-up in AM.    Deepika Hickey PTA

## 2017-05-03 NOTE — PLAN OF CARE
Problem: Occupational Therapy Goal  Goal: Occupational Therapy Goal  Goals to be met by: 5/7/2017    Patient will increase functional independence with ADLs by performing:    LE Dressing with Set-up Assistance.  Grooming while standing with Supervision.  Toileting from toilet with Modified [place] for hygiene and clothing management.   Supine to sit with Modified [place].  Stand pivot transfers with Supervision.  Toilet transfer to toilet with Supervision.  Increased functional strength to WNL for use with ADLs and fx mobility.   Outcome: Ongoing (interventions implemented as appropriate)  Son present for English <-> Yi translation.  Pt. With c/o initial 3/10 CP .  Educated on safe hand placement during RW usage for transitional movement.  Performed sit <-> stand at bedside chair X 3 with initial v/c for safe hand placement with good carryover.  Required prolonged seated rest break.  Pt. With c/o 9/10 CP after ther-act.  Pt. Expressed pain decreased to 7/10 with prolonged seated rest break.  OT treatment session terminated early per family request.  Nursing notified.  Pt. Limited by CP with activity this day.  To benefit from OT to increase independence in self-care, improve endurance/activity tolerance, improve balance/stability.  Continue POC.

## 2017-05-04 LAB
ALBUMIN SERPL BCP-MCNC: 3 G/DL
ALP SERPL-CCNC: 92 U/L
ALT SERPL W/O P-5'-P-CCNC: 12 U/L
ANION GAP SERPL CALC-SCNC: 15 MMOL/L
AST SERPL-CCNC: 14 U/L
BACTERIA BLD CULT: NORMAL
BACTERIA BLD CULT: NORMAL
BASOPHILS # BLD AUTO: 0.08 K/UL
BASOPHILS NFR BLD: 0.9 %
BILIRUB SERPL-MCNC: 0.4 MG/DL
BUN SERPL-MCNC: 70 MG/DL
CALCIUM SERPL-MCNC: 9.3 MG/DL
CHLORIDE SERPL-SCNC: 99 MMOL/L
CO2 SERPL-SCNC: 19 MMOL/L
CREAT SERPL-MCNC: 3.3 MG/DL
CREAT UR-MCNC: 32.3 MG/DL
CREAT UR-MCNC: 32.3 MG/DL
DIFFERENTIAL METHOD: ABNORMAL
EOSINOPHIL # BLD AUTO: 0.3 K/UL
EOSINOPHIL NFR BLD: 3.8 %
ERYTHROCYTE [DISTWIDTH] IN BLOOD BY AUTOMATED COUNT: 13.8 %
EST. GFR  (AFRICAN AMERICAN): 22 ML/MIN/1.73 M^2
EST. GFR  (NON AFRICAN AMERICAN): 19 ML/MIN/1.73 M^2
GLUCOSE SERPL-MCNC: 118 MG/DL
HCT VFR BLD AUTO: 24.1 %
HGB BLD-MCNC: 8.5 G/DL
LYMPHOCYTES # BLD AUTO: 1.1 K/UL
LYMPHOCYTES NFR BLD: 12.4 %
MAGNESIUM SERPL-MCNC: 1.9 MG/DL
MCH RBC QN AUTO: 26.2 PG
MCHC RBC AUTO-ENTMCNC: 35.3 %
MCV RBC AUTO: 74 FL
MONOCYTES # BLD AUTO: 0.6 K/UL
MONOCYTES NFR BLD: 6.7 %
NEUTROPHILS # BLD AUTO: 6.9 K/UL
NEUTROPHILS NFR BLD: 76.1 %
PHOSPHATE SERPL-MCNC: 5.8 MG/DL
PLATELET # BLD AUTO: 378 K/UL
PMV BLD AUTO: 9.7 FL
POCT GLUCOSE: 141 MG/DL (ref 70–110)
POCT GLUCOSE: 177 MG/DL (ref 70–110)
POCT GLUCOSE: 201 MG/DL (ref 70–110)
POCT GLUCOSE: 225 MG/DL (ref 70–110)
POTASSIUM SERPL-SCNC: 3.8 MMOL/L
PROT SERPL-MCNC: 7.7 G/DL
PROT UR-MCNC: 57 MG/DL
PROT/CREAT RATIO, UR: 1.76
RBC # BLD AUTO: 3.24 M/UL
SODIUM SERPL-SCNC: 133 MMOL/L
UUN UR-MCNC: 158 MG/DL
WBC # BLD AUTO: 9.06 K/UL

## 2017-05-04 PROCEDURE — 36415 COLL VENOUS BLD VENIPUNCTURE: CPT

## 2017-05-04 PROCEDURE — 97530 THERAPEUTIC ACTIVITIES: CPT

## 2017-05-04 PROCEDURE — 27000221 HC OXYGEN, UP TO 24 HOURS

## 2017-05-04 PROCEDURE — 25000003 PHARM REV CODE 250: Performed by: FAMILY MEDICINE

## 2017-05-04 PROCEDURE — 63600175 PHARM REV CODE 636 W HCPCS: Performed by: FAMILY MEDICINE

## 2017-05-04 PROCEDURE — 97110 THERAPEUTIC EXERCISES: CPT

## 2017-05-04 PROCEDURE — 25000242 PHARM REV CODE 250 ALT 637 W/ HCPCS

## 2017-05-04 PROCEDURE — 11000001 HC ACUTE MED/SURG PRIVATE ROOM

## 2017-05-04 PROCEDURE — 80053 COMPREHEN METABOLIC PANEL: CPT

## 2017-05-04 PROCEDURE — 83735 ASSAY OF MAGNESIUM: CPT

## 2017-05-04 PROCEDURE — 97116 GAIT TRAINING THERAPY: CPT

## 2017-05-04 PROCEDURE — 97535 SELF CARE MNGMENT TRAINING: CPT

## 2017-05-04 PROCEDURE — 63600175 PHARM REV CODE 636 W HCPCS

## 2017-05-04 PROCEDURE — 25000003 PHARM REV CODE 250: Performed by: INTERNAL MEDICINE

## 2017-05-04 PROCEDURE — 85025 COMPLETE CBC W/AUTO DIFF WBC: CPT

## 2017-05-04 PROCEDURE — 94660 CPAP INITIATION&MGMT: CPT

## 2017-05-04 PROCEDURE — 63600175 PHARM REV CODE 636 W HCPCS: Performed by: INTERNAL MEDICINE

## 2017-05-04 PROCEDURE — 99900035 HC TECH TIME PER 15 MIN (STAT)

## 2017-05-04 PROCEDURE — 84540 ASSAY OF URINE/UREA-N: CPT

## 2017-05-04 PROCEDURE — 94799 UNLISTED PULMONARY SVC/PX: CPT

## 2017-05-04 PROCEDURE — 94761 N-INVAS EAR/PLS OXIMETRY MLT: CPT

## 2017-05-04 PROCEDURE — 94640 AIRWAY INHALATION TREATMENT: CPT

## 2017-05-04 PROCEDURE — 84100 ASSAY OF PHOSPHORUS: CPT

## 2017-05-04 PROCEDURE — 82570 ASSAY OF URINE CREATININE: CPT

## 2017-05-04 RX ORDER — FUROSEMIDE 10 MG/ML
160 INJECTION INTRAMUSCULAR; INTRAVENOUS 2 TIMES DAILY
Status: DISCONTINUED | OUTPATIENT
Start: 2017-05-04 | End: 2017-05-06

## 2017-05-04 RX ORDER — METOLAZONE 5 MG/1
10 TABLET ORAL DAILY
Status: DISCONTINUED | OUTPATIENT
Start: 2017-05-04 | End: 2017-05-08 | Stop reason: HOSPADM

## 2017-05-04 RX ADMIN — INSULIN ASPART 2 UNITS: 100 INJECTION, SOLUTION INTRAVENOUS; SUBCUTANEOUS at 10:05

## 2017-05-04 RX ADMIN — IPRATROPIUM BROMIDE AND ALBUTEROL SULFATE 3 ML: .5; 3 SOLUTION RESPIRATORY (INHALATION) at 07:05

## 2017-05-04 RX ADMIN — CARVEDILOL 12.5 MG: 12.5 TABLET, FILM COATED ORAL at 05:05

## 2017-05-04 RX ADMIN — METOLAZONE 10 MG: 5 TABLET ORAL at 08:05

## 2017-05-04 RX ADMIN — IPRATROPIUM BROMIDE AND ALBUTEROL SULFATE 3 ML: .5; 3 SOLUTION RESPIRATORY (INHALATION) at 02:05

## 2017-05-04 RX ADMIN — INSULIN ASPART 2 UNITS: 100 INJECTION, SOLUTION INTRAVENOUS; SUBCUTANEOUS at 12:05

## 2017-05-04 RX ADMIN — AMLODIPINE BESYLATE 10 MG: 5 TABLET ORAL at 08:05

## 2017-05-04 RX ADMIN — FUROSEMIDE 160 MG: 10 INJECTION, SOLUTION INTRAMUSCULAR; INTRAVENOUS at 05:05

## 2017-05-04 RX ADMIN — FUROSEMIDE 200 MG: 10 INJECTION, SOLUTION INTRAMUSCULAR; INTRAVENOUS at 01:05

## 2017-05-04 RX ADMIN — CARVEDILOL 12.5 MG: 12.5 TABLET, FILM COATED ORAL at 08:05

## 2017-05-04 RX ADMIN — FUROSEMIDE 160 MG: 10 INJECTION, SOLUTION INTRAMUSCULAR; INTRAVENOUS at 09:05

## 2017-05-04 RX ADMIN — INSULIN DETEMIR 10 UNITS: 100 INJECTION, SOLUTION SUBCUTANEOUS at 10:05

## 2017-05-04 RX ADMIN — ENOXAPARIN SODIUM 30 MG: 100 INJECTION SUBCUTANEOUS at 05:05

## 2017-05-04 RX ADMIN — PRAVASTATIN SODIUM 20 MG: 20 TABLET ORAL at 08:05

## 2017-05-04 NOTE — PLAN OF CARE
Problem: Occupational Therapy Goal  Goal: Occupational Therapy Goal  Goals to be met by: 5/7/2017    Patient will increase functional independence with ADLs by performing:    LE Dressing with Set-up Assistance.  Grooming while standing with Supervision.  Toileting from toilet with Modified [place] for hygiene and clothing management.   Supine to sit with Modified [place].  Stand pivot transfers with Supervision.  Toilet transfer to toilet with Supervision.  Increased functional strength to WNL for use with ADLs and fx mobility.   Outcome: Ongoing (interventions implemented as appropriate)  Ming Boateng is a 61 y.o. male with a medical diagnosis of Pulmonary edema with congestive heart failure and presents with L sided weakness, SOB and with decreased overall strength, endurance, balance and [place] and safety with ADL's and fx mobility. Pt progressing towards goals. Reports fatigue and mild SOB after session, however, no chest pain. Continue OT services to address functional goals  JUDD Gonzalez/MIKI

## 2017-05-04 NOTE — PROGRESS NOTES
U renal Resident -III Progress Note    Subjective:      Pt less sob; denies chestpain     Objective:   Last 24 Hour Vital Signs:  BP  Min: 121/59  Max: 143/59  Temp  Av.4 °F (36.9 °C)  Min: 98.1 °F (36.7 °C)  Max: 98.6 °F (37 °C)  Pulse  Av.6  Min: 57  Max: 82  Resp  Av  Min: 18  Max: 22  SpO2  Av %  Min: 93 %  Max: 95 %  I/O last 3 completed shifts:  In: 500 [P.O.:250; IV Piggyback:250]  Out: 3900 [Urine:3900]    Physical Examination:  GEN - somnolent; NAD  NECK - JVP appr 11-12cm H2O  CHEST - crackles 1/2 way up B lung fields  HEART - distant heart sounds; rrr, no m/r/s3/s4  ABD - obese; nonttp; BS +  EXT - 2+ pitting edema to mid shin; warm; 2+ B DP pulses    Laboratory:  Laboratory Data Reviewed: yes  Pertinent Findings:  AM labs pending      Current Medications:     Infusions:        Scheduled:   albuterol-ipratropium 2.5mg-0.5mg/3mL  3 mL Nebulization Q6H WAKE    amlodipine  10 mg Oral Daily    carvedilol  12.5 mg Oral BID WM    enoxaparin  30 mg Subcutaneous Daily    furosemide  160 mg Intravenous BID    insulin detemir  10 Units Subcutaneous QHS    pravastatin  20 mg Oral Daily        PRN:  dextrose 50%, dextrose 50%, glucagon (human recombinant), glucose, glucose, hydrALAZINE, insulin aspart, morphine, nitroGLYCERIN      Assessment/Plan     ALBERTO with hypervolemia on CKD III with AGMA  -unknown etiol as of now but includes HFpEF; BL Cr from [facility] records is 1.4-1.7; renal US with evidence of chronicity  -UO approx 3 L; arellano inserted with approx 600mL of urine - ? Component of retention?; CXR with minimal improvement from 17  -would rec continued diuresis with metolazone/lasix in AM and lasix in pm      Case discussed with staff and primary team    Brien Be II  [facility] Internal Medicine -III

## 2017-05-04 NOTE — PT/OT/SLP PROGRESS
Occupational Therapy  Treatment    Ming Boateng   MRN: [id number]   Admitting Diagnosis: Pulmonary edema with congestive heart failure    OT Date of Treatment: 17   OT Start Time: 1330  OT Stop Time: 1354  OT Total Time (min): 24 min    Billable Minutes:  Self Care/Home Management 14 and Therapeutic Activity 10    General Precautions: Standard, vision impaired  Orthopedic Precautions:    Braces:      Do you have any cultural, spiritual, Pentecostalism conflicts, given your current situation?: none    Subjective:  Communicated with RN prior to session.  Son present to translate. Pt agreeable to therapy-wants to walk.    Pain Ratin/10                   Objective:  Patient found with: telemetry, oxygen     Functional Mobility:  Bed Mobility:  Rolling/Turning to Left: With side rail, Stand by assistance  Scooting/Bridging: Stand by Assistance (scoot EOB)  Supine to Sit: Contact Guard Assistance, WIth side rail  Sit to Supine: Contact Guard Assistance    Transfers:   Sit <> Stand Assistance: Contact Guard Assistance  Sit <> Stand Assistive Device: Rolling Walker  Bed <> Chair Technique: Stand Pivot  Bed <> Chair Transfer Assistance: Contact Guard Assistance  Bed <> Chair Assistive Device: Rolling Walker    Functional Ambulation: Pt ambulate to and from door x 2 (rest break between sets) and to and from sink with Min A using RW ~50 feet total.  Pt's son present translating throughout tx. Pt required vc's to increase step length and widen base of support and to hold head up. Pt require assist to manage RW.  Pt weaker on L side. Pt with delayed responses to instructions given.      Activities of Daily Living:     LE Dressing Level of Assistance: Minimum assistance (brice socks EOB)  Grooming Position: Standing at sink x 3 min   Grooming Level of Assistance: Contact guard assistance                Balance:   Static Sit: FAIR: Maintains without assist, but unable to take any challenges   Dynamic Sit: FAIR: Cannot move trunk  without losing balance  Static Stand: POOR+: Needs MINIMAL assist to maintain  Dynamic stand: POOR: N/A      AM-PAC 6 CLICK ADL   How much help from another person does this patient currently need?   1 = Unable, Total/Dependent Assistance  2 = A lot, Maximum/Moderate Assistance  3 = A little, Minimum/Contact Guard/Supervision  4 = None, Modified [place]/Independent    Putting on and taking off regular lower body clothing? : 3  Bathing (including washing, rinsing, drying)?: 3  Toileting, which includes using toilet, bedpan, or urinal? : 3  Putting on and taking off regular upper body clothing?: 3  Taking care of personal grooming such as brushing teeth?: 3  Eating meals?: 3  Total Score: 18     AM-PAC Raw Score CMS G-Code Modifier Level of Impairment Assistance   6 % Total / Unable   7 - 9 CM 80 - 100% Maximal Assist   10 - 14 CL 60 - 80% Moderate Assist   15 - 19 CK 40 - 60% Moderate Assist   20 - 22 CJ 20 - 40% Minimal Assist   23 CI 1-20% SBA / CGA   24 CH 0% Independent/ Mod I     Patient left supine with all lines intact, call button in reach, bed alarm on and n present    ASSESSMENT:  Ming Boateng is a 61 y.o. male with a medical diagnosis of Pulmonary edema with congestive heart failure and presents with L sided weakness, SOB and with decreased overall strength, endurance, balance and [place] and safety with ADL's and fx mobility. Pt progressing towards goals. Reports fatigue and mild SOB after session, however, no chest pain. Continue OT services to address functional goals    .    Rehab identified problem list/impairments: Rehab identified problem list/impairments: weakness, impaired endurance, impaired self care skills, impaired functional mobilty, gait instability, impaired balance, visual deficits, decreased safety awareness    Rehab potential is good.    Activity tolerance: Good    Discharge recommendations:       Barriers to discharge:      Equipment recommendations:       GOALS:    Occupational Therapy Goals        Problem: Occupational Therapy Goal    Goal Priority Disciplines Outcome Interventions   Occupational Therapy Goal     OT, PT/OT Ongoing (interventions implemented as appropriate)    Description:  Goals to be met by: 5/7/2017    Patient will increase functional independence with ADLs by performing:    LE Dressing with Set-up Assistance.  Grooming while standing with Supervision.  Toileting from toilet with Modified [place] for hygiene and clothing management.   Supine to sit with Modified [place].  Stand pivot transfers with Supervision.  Toilet transfer to toilet with Supervision.  Increased functional strength to WNL for use with ADLs and fx mobility.                Plan:  Patient to be seen 5 x/week to address the above listed problems via self-care/home management, therapeutic activities, therapeutic exercises  Plan of Care expires: 06/01/17  Plan of Care reviewed with: patient, salma Dubon JOHNATHAN Moody  05/04/2017

## 2017-05-04 NOTE — PROGRESS NOTES
[facility] Card[facility] Resident HOJAYLAN Progress Note    Subjective:      Ming Boateng is a 61 y.o. male who is being followed by the [facility] service at [facility] for new onset HFpEF.      Patient's breathing was much improved overnight on BiPAP.  Also states that his cough is much improved.  Still with good UOP on increased diuretic therapy.  Pt is net negative 3.1 L overnight and net negative 7.3 L since admission.      Denies any fevers, chills, N/V, chest pain, or leg swelling.        Objective:   Last 24 Hour Vital Signs:  BP  Min: 121/59  Max: 143/59  Temp  Av.4 °F (36.9 °C)  Min: 98.1 °F (36.7 °C)  Max: 98.6 °F (37 °C)  Pulse  Av.6  Min: 57  Max: 82  Resp  Av  Min: 18  Max: 22  SpO2  Av %  Min: 93 %  Max: 95 %  I/O last 3 completed shifts:  In: 500 [P.O.:250; IV Piggyback:250]  Out: 3900 [Urine:3900]    Physical Examination:  General: resting comfortably in the chair; no apparent respiratory distress  HEENT: NC/AT, PERRL, EOMI, oropharynx clear, nose clear, nasal cannula in place  Neck: JVP @ 8 cm, neck supple, no LAD  CVS: RRR, no murmurs  Resp: decreased BS at B/L bases with bibasilar crackles; no wheezes; no increased work of breathing, on 3L NC O2  Abd: + BS, soft, NT, ND  Ext: 1+ pedal edema to ankles, no cyanosis; distal pulses 2+ and symmetric; CR <2 sec  Skin: chronic venous changes to B/L LE; bruises on B/L LE  Neuro: AAO x 3, no focal deficits       Laboratory:  Laboratory Data Reviewed: yes  Pertinent Findings:  CBC and CMP pending    Microbiology Data Reviewed: yes  Pertinent Findings:  Microbiology Results (last 7 days)     Procedure Component Value Units Date/Time    Blood culture [[id number]] Collected:  17 1243    Order Status:  Completed Specimen:  Blood from Peripheral, Antecubital, Right Updated:  17     Blood Culture, Routine No Growth to date     Blood Culture, Routine No Growth to date     Blood Culture, Routine No Growth to date      Blood Culture, Routine No Growth to date     Blood Culture, Routine No Growth to date    Blood culture [[id number]] Collected:  04/29/17 1026    Order Status:  Completed Specimen:  Blood from Peripheral, Antecubital, Right Updated:  05/03/17 2012     Blood Culture, Routine No Growth to date     Blood Culture, Routine No Growth to date     Blood Culture, Routine No Growth to date     Blood Culture, Routine No Growth to date     Blood Culture, Routine No Growth to date    Gram stain [[id number]] Collected:  04/29/17 1809    Order Status:  Completed Specimen:  Urine from Urine, Clean Catch Updated:  04/30/17 0425     Gram Stain Result No WBC's      No organisms seen          Other Results:  EKG (my interpretation): no new EKG    Radiology Data Reviewed: yes  Pertinent Findings:  5/4 CXR pending  5/3 Renal U/S - chronic medical renal disease; no hydronephrosis; no nephrolithiasis    Current Medications:     Infusions:        Scheduled:   albuterol-ipratropium 2.5mg-0.5mg/3mL  3 mL Nebulization Q6H WAKE    amlodipine  10 mg Oral Daily    carvedilol  12.5 mg Oral BID WM    enoxaparin  30 mg Subcutaneous Daily    furosemide  160 mg Intravenous BID    insulin detemir  10 Units Subcutaneous QHS    pravastatin  20 mg Oral Daily        PRN:  dextrose 50%, dextrose 50%, glucagon (human recombinant), glucose, glucose, hydrALAZINE, insulin aspart, morphine, nitroGLYCERIN    Antibiotics and Day Number of Therapy:  Moxifloxacin    Lines and Day Number of Therapy:  PIV    Assessment:     Ming Boateng is a 61 y.o.male with  Patient Active Problem List    Diagnosis Date Noted    Acute renal failure     Chest pain     Pulmonary edema with congestive heart failure 04/29/2017    Hyperglycemia 06/17/2016    ALBERTO (acute kidney injury) 06/17/2016    Essential hypertension 06/17/2016    Microcytic anemia 06/17/2016    HLD (hyperlipidemia) 06/17/2016        Plan:     Newly Diagnosed HFpEF   - continue nightly BiPAP  - continue  diuresis per Nephrology recs  - continue to monitor strict I/Os with daily weights  - low Na diet with fluid restriction to 1500mL daily   - recommend that patient undergo an ischemic work-up if he continues to have persistent chest pain     HTN  - BPs better controlled  - continue Amlodipine 10 mg PO daily   - continue Coreg; recommend increasing Coreg to 25 mg PO BID as BP still elevated      HLD  - recommend fasting Lipid panel  - continue Pravastatin     ALBERTO on CKD 3  - BUN/Cr pending today  - urine lytes pending  - continue to avoid nephrotoxic agents  - f/u Renal recommendations       Guille Yeh  [facility] Internal Medicine HO-IV  [facility] Cardiology Service

## 2017-05-04 NOTE — PROGRESS NOTES
Progress Note  [facility]    Admit Date: 4/29/2017   LOS: 5 days     SUBJECTIVE:   Follow-up For:  Volume overload and ALBERTO    Pt was seen and examined. Patient continues to have SOB. Did well with BIPAP and lasix overnight. Feels his breathing has improved.    Scheduled Meds:   albuterol-ipratropium 2.5mg-0.5mg/3mL  3 mL Nebulization Q6H WAKE    amlodipine  10 mg Oral Daily    carvedilol  12.5 mg Oral BID WM    enoxaparin  30 mg Subcutaneous Daily    furosemide  160 mg Intravenous BID    insulin detemir  10 Units Subcutaneous QHS    metOLazone  10 mg Oral Daily    pravastatin  20 mg Oral Daily     Continuous Infusions:   PRN Meds:    Review of patient's allergies indicates:   Allergen Reactions    Cayenne pepper        Review of Systems  As per subjective    OBJECTIVE:     Vital Signs (Most Recent)  Temp: 98.3 °F (36.8 °C) (05/04/17 0800)  Pulse: 70 (05/04/17 0800)  Resp: (!) 21 (05/04/17 0800)  BP: (!) 144/69 (05/04/17 0800)  SpO2: (!) 94 % (05/04/17 0728)    Vital Signs Range (Last 24H):  Temp:  [98.1 °F (36.7 °C)-98.6 °F (37 °C)]   Pulse:  [57-70]   Resp:  [18-22]   BP: (121-144)/(59-69)   SpO2:  [93 %-95 %]     I & O (Last 24H):    Intake/Output Summary (Last 24 hours) at 05/04/17 0915  Last data filed at 05/04/17 0604   Gross per 24 hour   Intake              375 ml   Output             3300 ml   Net            -2925 ml     Wt Readings from Last 3 Encounters:   05/01/17 85.5 kg (188 lb 7.9 oz)   06/17/16 83.5 kg (184 lb)     Physical Exam:  Physical Exam   Constitutional: He is oriented to person, place, and time.   On 3L O2   HENT:   Head: Normocephalic and atraumatic.   Eyes: Conjunctivae are normal.   Neck: Neck supple. No JVD present.   Cardiovascular: Normal rate, regular rhythm, normal heart sounds and intact distal pulses.    Pulmonary/Chest: He is in respiratory distress. He has no rales. He exhibits no tenderness.   Abdominal: Soft. Bowel sounds are normal. There is no tenderness.    Musculoskeletal: Edema: +1.   Neurological: He is alert and oriented to person, place, and time.     LABS  CBC    Recent Labs  Lab 05/02/17 0517 05/03/17 0446 05/04/17  0755   WBC 8.61 7.88 9.06   RBC 3.46* 2.96* 3.24*   HGB 9.0* 7.7* 8.5*   HCT 26.1* 22.1* 24.1*    323 378*   MCV 75* 75* 74*   MCH 26.0* 26.0* 26.2*   MCHC 34.5 34.8 35.3     BMP    Recent Labs  Lab 05/01/17 0424 05/02/17 0517 05/03/17 0446   * 138 135*   K 4.4 4.2 4.3   CO2 18* 18* 18*    106 104   BUN 42* 49* 59*   CREATININE 2.4* 2.9* 3.3*   * 138* 142*       POCT-Glucose  POCT Glucose   Date Value Ref Range Status   05/04/2017 141 (H) 70 - 110 mg/dL Final   05/03/2017 170 (H) 70 - 110 mg/dL Final   05/03/2017 268 (H) 70 - 110 mg/dL Final   05/03/2017 208 (H) 70 - 110 mg/dL Final   05/03/2017 156 (H) 70 - 110 mg/dL Final   05/02/2017 319 (H) 70 - 110 mg/dL Final   05/02/2017 216 (H) 70 - 110 mg/dL Final   05/02/2017 184 (H) 70 - 110 mg/dL Final   05/02/2017 139 (H) 70 - 110 mg/dL Final   05/01/2017 214 (H) 70 - 110 mg/dL Final   05/01/2017 207 (H) 70 - 110 mg/dL Final   05/01/2017 192 (H) 70 - 110 mg/dL Final         Recent Labs  Lab 05/01/17 0424 05/02/17 0517 05/03/17 0445 05/03/17 0446 05/04/17  0755   CALCIUM 8.7 9.2  --  8.9  --    MG  --   --  1.8  --  1.9   PHOS  --   --  5.5*  --  5.8*     LFT    Recent Labs  Lab 05/01/17 0424 05/02/17  0517 05/03/17  0446   PROT 7.0 7.4 6.8   ALBUMIN 2.8* 2.9* 2.6*   BILITOT 0.5 0.6 0.6   AST 10 15 9*   ALKPHOS 70 78 77   ALT 7* 9* 9*       COAGS    Recent Labs  Lab 04/29/17  0951   INR 1.1     CE    Recent Labs  Lab 04/29/17  2257 04/30/17  1158 05/01/17  2340   TROPONINI 0.027* 0.024 0.028*     LAST HbA1c  Lab Results   Component Value Date    HGBA1C 7.6 (H) 04/29/2017       Diagnostic Results:  IP CONSULT TO CARDIOLOGY-[facility]  IP CONSULT TO NEPHROLOGY-[facility]    ASSESSMENT/PLAN:   Patient is 62 yo M with PMHx of HLD, HTN, DM2 transferred from [facility] with complaints  of progressively worsening SOB found to have pulmonary edema on CXR.     Pulmonary Edema 2/2 Undiagnosed CHF  - Patient with complained on admit of progressive SOB and bilateral LE edema. CXR with pulmonary vascular congestion.  - CXR from 5/2/17 still shows pulmonary edema  - Echo shows DD w/ 60% EF  - patient had a 3L of UOP over the last 24 hrs  - continue 200mg of lasix  - did well with BIPAP overnight  - currently on 3L O2      ALBERTO  - On admit, BUN/Cr 40/2.07. Baseline Cr 1.2 (6/2016)  - May be 2/2 to CHF exacerbation  - Cr pending  - per nephro will start lasix at 200mg BID and metolazone     PNA  - CXR shows possible PNA.  - on moxi    HTN  - Home meds Norvasc, Losartan-HCTZ, Benicar  - BP 130s-150s/60-70s  - Continue Norvasc 10 mg     HLD  - Lipitor     Diabetes Type 2  - HbA1c 10.3% (6/2016)   - on home insulin pump but was d/c due to patients multiple episodes of hypoglycemia  - currently on levemir 10u.  - continue to monitor BS.       DVT ppx: Lovenox  GI PPX: Famotidine     Dispo: aggressive diuresis. F/u CMP to monitor Cr.    Joseph Rolle MD  [facility] PGY 2  5/4/2017 10:03 AM

## 2017-05-04 NOTE — PLAN OF CARE
Problem: Patient Care Overview  Goal: Plan of Care Review  Outcome: Ongoing (interventions implemented as appropriate)  Pt's SpO2 94% on 3 lpm NC. No adverse reactions to aerosol tx. No respiratory distress noted. Continue to monitor SpO2.

## 2017-05-05 LAB
ALBUMIN SERPL BCP-MCNC: 3 G/DL
ALP SERPL-CCNC: 88 U/L
ALT SERPL W/O P-5'-P-CCNC: 14 U/L
ANION GAP SERPL CALC-SCNC: 16 MMOL/L
AST SERPL-CCNC: 15 U/L
BASOPHILS # BLD AUTO: 0.07 K/UL
BASOPHILS NFR BLD: 0.9 %
BILIRUB SERPL-MCNC: 0.3 MG/DL
BUN SERPL-MCNC: 74 MG/DL
CALCIUM SERPL-MCNC: 9.3 MG/DL
CHLORIDE SERPL-SCNC: 96 MMOL/L
CO2 SERPL-SCNC: 23 MMOL/L
CREAT SERPL-MCNC: 3.1 MG/DL
DIFFERENTIAL METHOD: ABNORMAL
EOSINOPHIL # BLD AUTO: 0.5 K/UL
EOSINOPHIL NFR BLD: 5.9 %
ERYTHROCYTE [DISTWIDTH] IN BLOOD BY AUTOMATED COUNT: 13.7 %
EST. GFR  (AFRICAN AMERICAN): 24 ML/MIN/1.73 M^2
EST. GFR  (NON AFRICAN AMERICAN): 21 ML/MIN/1.73 M^2
GLUCOSE SERPL-MCNC: 137 MG/DL
HCT VFR BLD AUTO: 23.6 %
HGB BLD-MCNC: 8.2 G/DL
HYPOCHROMIA BLD QL SMEAR: ABNORMAL
LYMPHOCYTES # BLD AUTO: 1 K/UL
LYMPHOCYTES NFR BLD: 13 %
MAGNESIUM SERPL-MCNC: 1.8 MG/DL
MCH RBC QN AUTO: 25.8 PG
MCHC RBC AUTO-ENTMCNC: 34.7 %
MCV RBC AUTO: 74 FL
MONOCYTES # BLD AUTO: 0.8 K/UL
MONOCYTES NFR BLD: 10.2 %
NEUTROPHILS # BLD AUTO: 5.4 K/UL
NEUTROPHILS NFR BLD: 70 %
PHOSPHATE SERPL-MCNC: 6.5 MG/DL
PLATELET # BLD AUTO: 383 K/UL
PLATELET BLD QL SMEAR: ABNORMAL
PMV BLD AUTO: 9.4 FL
POCT GLUCOSE: 137 MG/DL (ref 70–110)
POCT GLUCOSE: 189 MG/DL (ref 70–110)
POCT GLUCOSE: 242 MG/DL (ref 70–110)
POLYCHROMASIA BLD QL SMEAR: ABNORMAL
POTASSIUM SERPL-SCNC: 3.4 MMOL/L
PROT SERPL-MCNC: 7.7 G/DL
RBC # BLD AUTO: 3.18 M/UL
SODIUM SERPL-SCNC: 135 MMOL/L
TARGETS BLD QL SMEAR: ABNORMAL
WBC # BLD AUTO: 7.77 K/UL

## 2017-05-05 PROCEDURE — 97116 GAIT TRAINING THERAPY: CPT

## 2017-05-05 PROCEDURE — 11000001 HC ACUTE MED/SURG PRIVATE ROOM

## 2017-05-05 PROCEDURE — 97110 THERAPEUTIC EXERCISES: CPT

## 2017-05-05 PROCEDURE — 94761 N-INVAS EAR/PLS OXIMETRY MLT: CPT

## 2017-05-05 PROCEDURE — 94799 UNLISTED PULMONARY SVC/PX: CPT

## 2017-05-05 PROCEDURE — 25000003 PHARM REV CODE 250

## 2017-05-05 PROCEDURE — 25000003 PHARM REV CODE 250: Performed by: FAMILY MEDICINE

## 2017-05-05 PROCEDURE — 25000242 PHARM REV CODE 250 ALT 637 W/ HCPCS

## 2017-05-05 PROCEDURE — 85025 COMPLETE CBC W/AUTO DIFF WBC: CPT

## 2017-05-05 PROCEDURE — 94640 AIRWAY INHALATION TREATMENT: CPT

## 2017-05-05 PROCEDURE — 97535 SELF CARE MNGMENT TRAINING: CPT

## 2017-05-05 PROCEDURE — 63600175 PHARM REV CODE 636 W HCPCS: Performed by: FAMILY MEDICINE

## 2017-05-05 PROCEDURE — 84100 ASSAY OF PHOSPHORUS: CPT

## 2017-05-05 PROCEDURE — 27000221 HC OXYGEN, UP TO 24 HOURS

## 2017-05-05 PROCEDURE — 25000003 PHARM REV CODE 250: Performed by: INTERNAL MEDICINE

## 2017-05-05 PROCEDURE — 63600175 PHARM REV CODE 636 W HCPCS

## 2017-05-05 PROCEDURE — 83735 ASSAY OF MAGNESIUM: CPT

## 2017-05-05 PROCEDURE — 80053 COMPREHEN METABOLIC PANEL: CPT

## 2017-05-05 RX ORDER — POTASSIUM CHLORIDE 7.45 MG/ML
10 INJECTION INTRAVENOUS ONCE
Status: COMPLETED | OUTPATIENT
Start: 2017-05-05 | End: 2017-05-05

## 2017-05-05 RX ORDER — SEVELAMER CARBONATE 800 MG/1
800 TABLET, FILM COATED ORAL
Status: DISCONTINUED | OUTPATIENT
Start: 2017-05-05 | End: 2017-05-08 | Stop reason: HOSPADM

## 2017-05-05 RX ORDER — POTASSIUM CHLORIDE 20 MEQ/1
40 TABLET, EXTENDED RELEASE ORAL 2 TIMES DAILY
Status: COMPLETED | OUTPATIENT
Start: 2017-05-05 | End: 2017-05-05

## 2017-05-05 RX ADMIN — FUROSEMIDE 160 MG: 10 INJECTION, SOLUTION INTRAMUSCULAR; INTRAVENOUS at 09:05

## 2017-05-05 RX ADMIN — INSULIN ASPART 4 UNITS: 100 INJECTION, SOLUTION INTRAVENOUS; SUBCUTANEOUS at 04:05

## 2017-05-05 RX ADMIN — METOLAZONE 10 MG: 5 TABLET ORAL at 09:05

## 2017-05-05 RX ADMIN — AMLODIPINE BESYLATE 10 MG: 5 TABLET ORAL at 09:05

## 2017-05-05 RX ADMIN — POTASSIUM CHLORIDE 10 MEQ: 10 INJECTION, SOLUTION INTRAVENOUS at 11:05

## 2017-05-05 RX ADMIN — CARVEDILOL 12.5 MG: 12.5 TABLET, FILM COATED ORAL at 04:05

## 2017-05-05 RX ADMIN — SEVELAMER CARBONATE 800 MG: 800 TABLET, FILM COATED ORAL at 12:05

## 2017-05-05 RX ADMIN — CARVEDILOL 12.5 MG: 12.5 TABLET, FILM COATED ORAL at 09:05

## 2017-05-05 RX ADMIN — ENOXAPARIN SODIUM 30 MG: 100 INJECTION SUBCUTANEOUS at 04:05

## 2017-05-05 RX ADMIN — FUROSEMIDE 160 MG: 10 INJECTION, SOLUTION INTRAMUSCULAR; INTRAVENOUS at 05:05

## 2017-05-05 RX ADMIN — SEVELAMER CARBONATE 800 MG: 800 TABLET, FILM COATED ORAL at 09:05

## 2017-05-05 RX ADMIN — IPRATROPIUM BROMIDE AND ALBUTEROL SULFATE 3 ML: .5; 3 SOLUTION RESPIRATORY (INHALATION) at 08:05

## 2017-05-05 RX ADMIN — SEVELAMER CARBONATE 800 MG: 800 TABLET, FILM COATED ORAL at 04:05

## 2017-05-05 RX ADMIN — IPRATROPIUM BROMIDE AND ALBUTEROL SULFATE 3 ML: .5; 3 SOLUTION RESPIRATORY (INHALATION) at 01:05

## 2017-05-05 RX ADMIN — POTASSIUM CHLORIDE 40 MEQ: 20 TABLET, EXTENDED RELEASE ORAL at 09:05

## 2017-05-05 RX ADMIN — POTASSIUM CHLORIDE 40 MEQ: 20 TABLET, EXTENDED RELEASE ORAL at 10:05

## 2017-05-05 RX ADMIN — INSULIN DETEMIR 10 UNITS: 100 INJECTION, SOLUTION SUBCUTANEOUS at 10:05

## 2017-05-05 RX ADMIN — PRAVASTATIN SODIUM 20 MG: 20 TABLET ORAL at 09:05

## 2017-05-05 RX ADMIN — INSULIN ASPART 2 UNITS: 100 INJECTION, SOLUTION INTRAVENOUS; SUBCUTANEOUS at 10:05

## 2017-05-05 NOTE — PT/OT/SLP PROGRESS
Physical Therapy  Treatment    Ming Boateng   MRN: [id number]   Admitting Diagnosis: Pulmonary edema with congestive heart failure    PT Received On: 17  PT Start Time: 1420     PT Stop Time: 1445    PT Total Time (min): 25 min       Billable Minutes:  Gait [id number] and Therapeutic Exercise 12    Treatment Type: Treatment  PT/PTA: PTA     PTA Visit Number: 1       General Precautions: Standard, LVAD, vision impaired  Orthopedic Precautions: N/A   Braces:           Subjective:  Communicated with RN (kristine) prior to session.  Pt agreed to tx.    Pain Ratin/10              Pain Rating Post-Intervention: 0/10    Objective:   Patient found with: telemetry, oxygen    Functional Mobility:  Bed Mobility:   Supine to Sit: Stand by Assistance  Sit to Supine: Stand by Assistance    Transfers:  Sit <> Stand Assistance: Contact Guard Assistance (vc's for hand placement)  Sit <> Stand Assistive Device: Rolling Walker    Gait:   Gait Distance: 64ft with RW,  Mod/Gabrielle , and assist for portable O2 @ 3lpm.  Assist for RW management on turns and for balance secondary to Mild to moderate L lean that increased toward the end of gait.  Assistance 1: Minimum assistance, Moderate assistance  Gait Assistive Device: Rolling walker  Gait Pattern: reciprocal  Gait Deviation(s): decreased erum, decreased step length, decreased stride length, lateral lean    Stairs:      Balance:   Static Sit: FAIR+: Able to take MINIMAL challenges from all directions  Dynamic Sit: FAIR+: Maintains balance through MINIMAL excursions of active trunk motion  Static Stand: FAIR+: Takes MINIMAL challenges from all directions  Dynamic stand: POOR: N/A     Therapeutic Activities and Exercises:  Seated BLE therex: AP, LAQ, hip flexion/ABD/ADD x 10 -15 reps with occasional Gabrielle and vc's for proper technique.   AMB: as above:  Vc's to keep eyes opened toward the end of gait with increased L lateral lean.    AM-PAC 6 CLICK MOBILITY  How much help from another  person does this patient currently need?   1 = Unable, Total/Dependent Assistance  2 = A lot, Maximum/Moderate Assistance  3 = A little, Minimum/Contact Guard/Supervision  4 = None, Modified [place]/Independent    Turning over in bed (including adjusting bedclothes, sheets and blankets)?: 4  Sitting down on and standing up from a chair with arms (e.g., wheelchair, bedside commode, etc.): 3  Moving from lying on back to sitting on the side of the bed?: 4  Moving to and from a bed to a chair (including a wheelchair)?: 3  Need to walk in hospital room?: 3  Climbing 3-5 steps with a railing?: 3  Total Score: 20    AM-PAC Raw Score CMS G-Code Modifier Level of Impairment Assistance   6 % Total / Unable   7 - 9 CM 80 - 100% Maximal Assist   10 - 14 CL 60 - 80% Moderate Assist   15 - 19 CK 40 - 60% Moderate Assist   20 - 22 CJ 20 - 40% Minimal Assist   23 CI 1-20% SBA / CGA   24 CH 0% Independent/ Mod I     Patient left supine with all lines intact, call button in reach, bed alarm on and RN notified.    Assessment:  Ming Boateng is a 61 y.o. male with a medical diagnosis of Pulmonary edema with congestive heart failure and presents with decreased strength, endurance, and balance.  Pt would continue to benefit from P.T. To address impairments listed below.    Rehab identified problem list/impairments: Rehab identified problem list/impairments: weakness, impaired endurance, impaired self care skills, impaired functional mobilty, gait instability, impaired balance, decreased safety awareness, visual deficits    Rehab potential is fair.    Activity tolerance: Fair    Discharge recommendations: Discharge Facility/Level Of Care Needs: home health PT     Barriers to discharge: Barriers to Discharge: None    Equipment recommendations: Equipment Needed After Discharge: bath bench     GOALS:   Physical Therapy Goals        Problem: Physical Therapy Goal    Goal Priority Disciplines Outcome Goal Variances Interventions    Physical Therapy Goal     PT/OT, PT Ongoing (interventions implemented as appropriate)     Description:  Goals to be met by: 2017     Patient will increase functional independence with mobility by performin. Supine to sit with Modified [place]  2. Sit to stand transfer with Modified [place]  3. Gait  x 100 feet with Modified [place] using Single-point Cane/ or RW .                 PLAN:    Patient to be seen 6 x/week  to address the above listed problems via gait training, therapeutic activities, therapeutic exercises  Plan of Care expires: 17  Plan of Care reviewed with: patient         Monalisa Murry, PTA  2017

## 2017-05-05 NOTE — PT/OT/SLP PROGRESS
Physical Therapy  Treatment    Ming Boateng   MRN: [id number]   Admitting Diagnosis: Pulmonary edema with congestive heart failure    PT Received On: 17  PT Start Time: 1345     PT Stop Time: 1358    PT Total Time (min): 13 min       Billable Minutes:  Therapeutic Exercise 13    Treatment Type: Treatment  PT/PTA: PTA     PTA Visit Number: 2       General Precautions: Standard, LVAD, vision impaired  Orthopedic Precautions: N/A   Braces:           Subjective:  Communicated with RN (Marva) prior to session.  Pt declined OOB or EOB activities secondary to fatigue.  Pt agreed to BLE therex in bed.      Pain Ratin/10              Pain Rating Post-Intervention: 0/10    Objective:   Patient found with: telemetry, peripheral IV    Functional Mobility:  Bed Mobility:   Supine to Sit: Stand by Assistance  Sit to Supine: Stand by Assistance    Transfers:  Sit <> Stand Assistance: Contact Guard Assistance (vc's for hand placement)  Sit <> Stand Assistive Device: Rolling Walker    Gait:   Gait Distance: 64ft with RW,  Mod/Gabrielle , and assist for portable O2 @ 3lpm.  Assist for RW management on turns and for balance secondary to Mild to moderate L lean that increased toward the end of gait.  Assistance 1: Minimum assistance, Moderate assistance  Gait Assistive Device: Rolling walker  Gait Pattern: reciprocal  Gait Deviation(s): decreased erum, decreased step length, decreased stride length, lateral lean    Stairs:  Therapeutic Activities and Exercises:  Supine BLE therex/PROM:  AP, SAQs, heelslides, hip ABD/ADD/IR/ER x 15 reps.  HC stretches 3 x 30 hold.  Pt had difficulty keeping his eyes opened toward the end of tx.    AM-PAC 6 CLICK MOBILITY  How much help from another person does this patient currently need?   1 = Unable, Total/Dependent Assistance  2 = A lot, Maximum/Moderate Assistance  3 = A little, Minimum/Contact Guard/Supervision  4 = None, Modified [place]/Independent    Turning over in bed (including  adjusting bedclothes, sheets and blankets)?: 4  Sitting down on and standing up from a chair with arms (e.g., wheelchair, bedside commode, etc.): 3  Moving from lying on back to sitting on the side of the bed?: 4  Moving to and from a bed to a chair (including a wheelchair)?: 3  Need to walk in hospital room?: 3  Climbing 3-5 steps with a railing?: 3  Total Score: 20    AM-PAC Raw Score CMS G-Code Modifier Level of Impairment Assistance   6 % Total / Unable   7 - 9 CM 80 - 100% Maximal Assist   10 - 14 CL 60 - 80% Moderate Assist   15 - 19 CK 40 - 60% Moderate Assist   20 - 22 CJ 20 - 40% Minimal Assist   23 CI 1-20% SBA / CGA   24 CH 0% Independent/ Mod I     Patient left supine with all lines intact, call button in reach, bed alarm on and RN notified.    Assessment:  Ming Boateng is a 61 y.o. male with a medical diagnosis of Pulmonary edema with congestive heart failure and presents with decreased strength and endurance.  Pt would continue to benefit from P.T. To address impairments listed below.    Rehab identified problem list/impairments: Rehab identified problem list/impairments: weakness, impaired endurance, impaired self care skills, impaired functional mobilty, decreased lower extremity function, visual deficits, decreased safety awareness    Rehab potential is fair.    Activity tolerance: Fair    Discharge recommendations: Discharge Facility/Level Of Care Needs: home health PT     Barriers to discharge: Barriers to Discharge: None    Equipment recommendations: Equipment Needed After Discharge: bath bench     GOALS:   Physical Therapy Goals        Problem: Physical Therapy Goal    Goal Priority Disciplines Outcome Goal Variances Interventions   Physical Therapy Goal     PT/OT, PT Ongoing (interventions implemented as appropriate)     Description:  Goals to be met by: 2017     Patient will increase functional independence with mobility by performin. Supine to sit with Modified  [place]  2. Sit to stand transfer with Modified [place]  3. Gait  x 100 feet with Modified [place] using Single-point Cane/ or RW .                 PLAN:    Patient to be seen 6 x/week  to address the above listed problems via gait training, therapeutic activities, therapeutic exercises  Plan of Care expires: 06/01/17  Plan of Care reviewed with: patient         Monalisa Murry, PTA  05/05/2017

## 2017-05-05 NOTE — PROGRESS NOTES
Home Oxygen Evaluation    Date Performed: 2017    1) Patient's Home O2 Sat on room air, while at rest: 97%        If O2 sats on room air at rest are 88% or below, patient qualifies. No additional testing needed. Document N/A in steps 2 and 3. If 89% or above, complete steps 2.      2) Patient's O2 Sat on room air while exercisin%        If O2 sats on room air while exercising remain 89% or above patient does not qualify, no further testing needed Document N/A in step 3. If O2 sats on room air while exercising are 88% or below, continue to step 3.

## 2017-05-05 NOTE — PLAN OF CARE
Problem: Occupational Therapy Goal  Goal: Occupational Therapy Goal  Goals to be met by: 5/7/2017    Patient will increase functional independence with ADLs by performing:    LE Dressing with Set-up Assistance.  Grooming while standing with Supervision.  Toileting from toilet with Modified [place] for hygiene and clothing management.   Supine to sit with Modified [place].  Stand pivot transfers with Supervision.  Toilet transfer to toilet with Supervision.  Increased functional strength to WNL for use with ADLs and fx mobility.   Outcome: Ongoing (interventions implemented as appropriate)  Ming Boateng is a 61 y.o. male with a medical diagnosis of Pulmonary edema with congestive heart failure and presents with L sided weakness, delayed processing and with decreased overall strength, endurance, balance and [place] and safety with ADL's and fx mobility. Pt ambulated to and from sink and performed simple G/H tasks on room air per RN request and O2 sats at 94%. Pt is unsafe ambulating and transferring with RW, requires Min A to manage RW and CGA-Gabrielle for balance. Pt is a fall risk. Rec 24 hour assistance at time of discharge. Family reports pt needs a RW, BSC and bath bench at time of discharge. [place] notified.   Continue OT services to address functional goals.     JUDD Gonzalez/MIKI

## 2017-05-05 NOTE — PLAN OF CARE
Problem: Physical Therapy Goal  Goal: Physical Therapy Goal  Goals to be met by: 2017     Patient will increase functional independence with mobility by performin. Supine to sit with Modified [place]  2. Sit to stand transfer with Modified [place]  3. Gait x 100 feet with Modified [place] using Single-point Cane/ or RW .    Outcome: Ongoing (interventions implemented as appropriate)  Continue working toward goals

## 2017-05-05 NOTE — PLAN OF CARE
Problem: Cardiac: Heart Failure (Adult)  Goal: Signs and Symptoms of Listed Potential Problems Will be Absent, Minimized or Managed (Cardiac: Heart Failure)  Signs and symptoms of listed potential problems will be absent, minimized or managed by discharge/transition of care (reference Cardiac: Heart Failure (Adult) CPG).   Pt resting comfortably in bed with son at bedside. Breathing appears less labored this shift. UOP continues via arellano draining to gravity. Pt more fatigued today after walking with PT. Calling appropriately. Call light within reach.

## 2017-05-05 NOTE — PROGRESS NOTES
Patient is not able to wear the BIPAP duo to claustrophobia to the mask. Patient's daughter said he was not able to wear his home CPAP because he can not tolerate his mask.

## 2017-05-05 NOTE — PROGRESS NOTES
Progress Note  [facility]    Admit Date: 4/29/2017   LOS: 6 days     SUBJECTIVE:   Follow-up For:  Volume overload and ALBERTO    Pt was seen and examined. Patient continues to have SOB. Patient refused BIPAP last night. Daughter stated he did not sleep at night time. Patient states he feels better but daughter states he looks fatigue.    Scheduled Meds:   albuterol-ipratropium 2.5mg-0.5mg/3mL  3 mL Nebulization Q6H WAKE    amlodipine  10 mg Oral Daily    carvedilol  12.5 mg Oral BID WM    enoxaparin  30 mg Subcutaneous Daily    furosemide  160 mg Intravenous BID    insulin detemir  10 Units Subcutaneous QHS    metOLazone  10 mg Oral Daily    potassium chloride  40 mEq Oral BID    pravastatin  20 mg Oral Daily    sevelamer carbonate  800 mg Oral TID WM     Continuous Infusions:   PRN Meds:    Review of patient's allergies indicates:   Allergen Reactions    Cayenne pepper        Review of Systems  As per subjective    OBJECTIVE:     Vital Signs (Most Recent)  Temp: 98.4 °F (36.9 °C) (05/05/17 0415)  Pulse: 60 (05/05/17 0530)  Resp: 18 (05/05/17 0415)  BP: (!) 155/72 (05/05/17 0415)  SpO2: 96 % (05/05/17 0430)    Vital Signs Range (Last 24H):  Temp:  [97.3 °F (36.3 °C)-99 °F (37.2 °C)]   Pulse:  [56-68]   Resp:  [17-20]   BP: (140-155)/(64-72)   SpO2:  [96 %-99 %]     I & O (Last 24H):    Intake/Output Summary (Last 24 hours) at 05/05/17 0823  Last data filed at 05/05/17 0415   Gross per 24 hour   Intake              525 ml   Output             5350 ml   Net            -4825 ml     Wt Readings from Last 3 Encounters:   05/01/17 85.5 kg (188 lb 7.9 oz)   06/17/16 83.5 kg (184 lb)     Physical Exam:  Physical Exam   Constitutional: He is oriented to person, place, and time.   On 3L O2  fatigue   HENT:   Head: Normocephalic and atraumatic.   Eyes: Conjunctivae are normal.   Neck: Neck supple. No JVD present.   Cardiovascular: Normal rate, regular rhythm, normal heart sounds and intact distal pulses.     Pulmonary/Chest: He is in respiratory distress. He has no rales. He exhibits no tenderness.   Abdominal: Soft. Bowel sounds are normal. There is no tenderness.   Neurological: He is alert and oriented to person, place, and time.     LABS  CBC    Recent Labs  Lab 05/03/17 0446 05/04/17 0755 05/05/17 0427   WBC 7.88 9.06 7.77   RBC 2.96* 3.24* 3.18*   HGB 7.7* 8.5* 8.2*   HCT 22.1* 24.1* 23.6*    378* 383*   MCV 75* 74* 74*   MCH 26.0* 26.2* 25.8*   MCHC 34.8 35.3 34.7     BMP    Recent Labs  Lab 05/03/17 0446 05/04/17 0755 05/05/17 0427   * 133* 135*   K 4.3 3.8 3.4*   CO2 18* 19* 23    99 96   BUN 59* 70* 74*   CREATININE 3.3* 3.3* 3.1*   * 118* 137*       POCT-Glucose  POCT Glucose   Date Value Ref Range Status   05/05/2017 137 (H) 70 - 110 mg/dL Final   05/04/2017 201 (H) 70 - 110 mg/dL Final   05/04/2017 225 (H) 70 - 110 mg/dL Final   05/04/2017 177 (H) 70 - 110 mg/dL Final   05/04/2017 141 (H) 70 - 110 mg/dL Final   05/03/2017 170 (H) 70 - 110 mg/dL Final   05/03/2017 268 (H) 70 - 110 mg/dL Final   05/03/2017 208 (H) 70 - 110 mg/dL Final   05/03/2017 156 (H) 70 - 110 mg/dL Final   05/02/2017 319 (H) 70 - 110 mg/dL Final   05/02/2017 216 (H) 70 - 110 mg/dL Final   05/02/2017 184 (H) 70 - 110 mg/dL Final         Recent Labs  Lab 05/03/17 0445 05/03/17 0446 05/04/17 0755 05/05/17 0427   CALCIUM  --  8.9 9.3 9.3   MG 1.8  --  1.9 1.8   PHOS 5.5*  --  5.8* 6.5*     LFT    Recent Labs  Lab 05/03/17  0446 05/04/17  0755 05/05/17  0427   PROT 6.8 7.7 7.7   ALBUMIN 2.6* 3.0* 3.0*   BILITOT 0.6 0.4 0.3   AST 9* 14 15   ALKPHOS 77 92 88   ALT 9* 12 14       COAGS    Recent Labs  Lab 04/29/17  0951   INR 1.1     CE    Recent Labs  Lab 04/29/17  2257 04/30/17  1158 05/01/17  2340   TROPONINI 0.027* 0.024 0.028*     LAST HbA1c  Lab Results   Component Value Date    HGBA1C 7.6 (H) 04/29/2017       Diagnostic Results:  IP CONSULT TO CARDIOLOGY-[facility]  IP CONSULT TO  NEPHROLOGY-[facility]    ASSESSMENT/PLAN:   Patient is 62 yo M with PMHx of HLD, HTN, DM2 transferred from [facility] with complaints of progressively worsening SOB found to have pulmonary edema on CXR.     Pulmonary Edema 2/2 Undiagnosed CHF  - Patient with complained on admit of progressive SOB and bilateral LE edema. CXR with pulmonary vascular congestion.  - CXR from 5/2/17 still shows pulmonary edema  - Echo shows DD w/ 60% EF  - patient had a 5.3L of UOP over the last 24 hrs  - continue lasix 160mg BID  - currently on 3L O2. Will wean as tolerated.   - refused BIPAP overnight  - f/u nephro and cardiac recs     ALBERTO  - On admit, BUN/Cr 40/2.07. Baseline Cr 1.2 (6/2016)  - May be 2/2 to CHF exacerbation  - Cr improved to 3.1 from 3.3.   - per nephro will start lasix at 160mg BID and metolazone in AM dose of lasix     PNA  - CXR shows possible PNA.  - on duonebs  - on moxi    HTN  - Home meds Norvasc, Losartan-HCTZ, Benicar  - BP 130s-150s/60-70s  - Continue Norvasc 10 mg     HLD  - Lipitor     Diabetes Type 2  - HbA1c 10.3% (6/2016)   - on home insulin pump but was d/c due to patients multiple episodes of hypoglycemia  - currently on levemir 10u.  - continue to monitor BS.       DVT ppx: Lovenox  GI PPX: Famotidine     Dispo: continue aggressive diuresis.     Joseph Rolle MD  [facility] PGY 2  5/5/2017 10:03 AM

## 2017-05-05 NOTE — PT/OT/SLP PROGRESS
Occupational Therapy  Treatment    Ming Boateng   MRN: [id number]   Admitting Diagnosis: Pulmonary edema with congestive heart failure    OT Date of Treatment: 17   OT Start Time: 10261120  OT Stop Time: 10411130  OT Total Time (min): 15 min/10= 25 total    Billable Minutes:  Self Care/Home Management 15/10    General Precautions: Standard, fall, vision impaired  Orthopedic Precautions: N/A  Braces:      Do you have any cultural, spiritual, Yarsani conflicts, given your current situation?: none    Subjective:  Communicated with RN prior to session.  Pt agreeable to tx. Family present to translate.    Pain Ratin/10              Pain Rating Post-Intervention: 0/10    Objective:  Patient found with: telemetry, oxygen, peripheral IV     Functional Mobility:  Bed Mobility:       Transfers:   Sit <> Stand Assistance: Contact Guard Assistance  Sit <> Stand Assistive Device: Rolling Walker  Bed <> Chair Technique: Stand Pivot  Bed <> Chair Transfer Assistance: Contact Guard Assistance, Minimum Assistance  Bed <> Chair Assistive Device: Rolling Walker    Functional Ambulation: Pt amb ~16 feet total with CGA-Min A using RW. Pt with decreased awareness of L side and leans to L side with gait. Pt unsafe with RW and requires vc's throughout for walker safety.     Activities of Daily Living:     Grooming Position: Standing at sink x 4 min. Pt leaning to L side and requires vc's to correct posture.   Grooming Level of Assistance: Contact guard assistance, Minimum assistance                  Balance:   Static Sit: FAIR+: Able to take MINIMAL challenges from all directions  Dynamic Sit: FAIR+: Maintains balance through MINIMAL excursions of active trunk motion  Static Stand: POOR+: Needs MINIMAL assist to maintain  Dynamic stand: POOR: N/A    Therapeutic Activities and Exercises:  Pt performed BUE AROM for shld flexion, elbow flex/ext, and fisting x 10 reps. VC's to include LUE in therex.    AM-PAC 6 CLICK ADL   How much  help from another person does this patient currently need?   1 = Unable, Total/Dependent Assistance  2 = A lot, Maximum/Moderate Assistance  3 = A little, Minimum/Contact Guard/Supervision  4 = None, Modified [place]/Independent    Putting on and taking off regular lower body clothing? : 3  Bathing (including washing, rinsing, drying)?: 3  Toileting, which includes using toilet, bedpan, or urinal? : 3  Putting on and taking off regular upper body clothing?: 3  Taking care of personal grooming such as brushing teeth?: 3  Eating meals?: 3  Total Score: 18     AM-PAC Raw Score CMS G-Code Modifier Level of Impairment Assistance   6 % Total / Unable   7 - 9 CM 80 - 100% Maximal Assist   10 - 14 CL 60 - 80% Moderate Assist   15 - 19 CK 40 - 60% Moderate Assist   20 - 22 CJ 20 - 40% Minimal Assist   23 CI 1-20% SBA / CGA   24 CH 0% Independent/ Mod I     Patient left up in chair with all lines intact, call button in reach and family present    ASSESSMENT:  Ming Boateng is a 61 y.o. male with a medical diagnosis of Pulmonary edema with congestive heart failure and presents with L sided weakness, delayed processing and with decreased overall strength, endurance, balance and [place] and safety with ADL's and fx mobility. Pt ambulated to and from sink and performed simple G/H tasks on room air per RN request and O2 sats at 94%. Pt is unsafe ambulating and transferring with RW, requires Min A to manage RW and CGA-Gabrielle for balance. Rec 24 hour assistance at time of discharge. Family reports pt needs a RW, BSC and bath bench at time of discharge. [place] notified.   Continue OT services to address functional goals.     .    Rehab identified problem list/impairments: Rehab identified problem list/impairments: weakness, impaired endurance, impaired self care skills, impaired functional mobilty, gait instability, impaired balance, visual deficits, impaired cognition, decreased coordination, decreased upper extremity  function, decreased lower extremity function, decreased safety awareness, decreased ROM, impaired coordination, impaired fine motor    Rehab potential is good.    Activity tolerance: Good    Discharge recommendations:       Barriers to discharge:      Equipment recommendations: bedside commode, walker, rolling, bath bench     GOALS:   Occupational Therapy Goals        Problem: Occupational Therapy Goal    Goal Priority Disciplines Outcome Interventions   Occupational Therapy Goal     OT, PT/OT Ongoing (interventions implemented as appropriate)    Description:  Goals to be met by: 5/7/2017    Patient will increase functional independence with ADLs by performing:    LE Dressing with Set-up Assistance.  Grooming while standing with Supervision.  Toileting from toilet with Modified [place] for hygiene and clothing management.   Supine to sit with Modified [place].  Stand pivot transfers with Supervision.  Toilet transfer to toilet with Supervision.  Increased functional strength to WNL for use with ADLs and fx mobility.                Plan:  Patient to be seen 5 x/week to address the above listed problems via self-care/home management, therapeutic activities, therapeutic exercises  Plan of Care expires: 06/01/17  Plan of Care reviewed with: patient, daughter         Ling MoodyJOHNATHAN  05/05/2017

## 2017-05-05 NOTE — PLAN OF CARE
Problem: Physical Therapy Goal  Goal: Physical Therapy Goal  Goals to be met by: 2017     Patient will increase functional independence with mobility by performin. Supine to sit with Modified [place]  2. Sit to stand transfer with Modified [place]  3. Gait x 100 feet with Modified [place] using Single-point Cane/ or RW .    Outcome: Ongoing (interventions implemented as appropriate)  Continue working toward goals.

## 2017-05-05 NOTE — PROGRESS NOTES
[facility] Resident [facility] Progress Note    Subjective:      Pt less sob; denies chestpain     Objective:   Last 24 Hour Vital Signs:  BP  Min: 140/68  Max: 155/72  Temp  Av.3 °F (36.8 °C)  Min: 97.3 °F (36.3 °C)  Max: 99 °F (37.2 °C)  Pulse  Av.2  Min: 56  Max: 70  Resp  Av.8  Min: 17  Max: 21  SpO2  Av.4 %  Min: 96 %  Max: 99 %  I/O last 3 completed shifts:  In: 525 [P.O.:525]  Out: 7750 [Urine:7750]    Physical Examination:  GEN - somnolent; NAD  NECK - JVP appr 11cm H2O  CHEST - crackles 1/4 way up B lung fields  HEART - distant heart sounds; rrr, no m/r/s3/s4  ABD - obese; nonttp; BS +  EXT - 1+ pitting edema to ankles; warm; 2+ B DP pulses    Laboratory:  Laboratory Data Reviewed: yes  Pertinent Findings:  Component      Latest Ref Rng & Units 2017   Sodium      136 - 145 mmol/L 135 (L)   Potassium      3.5 - 5.1 mmol/L 3.4 (L)   Chloride      95 - 110 mmol/L 96   CO2      23 - 29 mmol/L 23   Glucose      70 - 110 mg/dL 137 (H)   BUN, Bld      8 - 23 mg/dL 74 (H)   Creatinine      0.5 - 1.4 mg/dL 3.1 (H)   Calcium      8.7 - 10.5 mg/dL 9.3   Total Protein      6.0 - 8.4 g/dL 7.7   Albumin      3.5 - 5.2 g/dL 3.0 (L)   Total Bilirubin      0.1 - 1.0 mg/dL 0.3   Alkaline Phosphatase      55 - 135 U/L 88   AST      10 - 40 U/L 15   ALT      10 - 44 U/L 14   Anion Gap      8 - 16 mmol/L 16   eGFR if African American      >60 mL/min/1.73 m:2 24 (A)   eGFR if non African American      >60 mL/min/1.73 m:2 21 (A)         Current Medications:     Infusions:        Scheduled:   albuterol-ipratropium 2.5mg-0.5mg/3mL  3 mL Nebulization Q6H WAKE    amlodipine  10 mg Oral Daily    carvedilol  12.5 mg Oral BID WM    enoxaparin  30 mg Subcutaneous Daily    furosemide  160 mg Intravenous BID    insulin detemir  10 Units Subcutaneous QHS    metOLazone  10 mg Oral Daily    potassium chloride  40 mEq Oral BID    pravastatin  20 mg Oral Daily    sevelamer carbonate  800 mg Oral TID WM         PRN:  dextrose 50%, dextrose 50%, glucagon (human recombinant), glucose, glucose, hydrALAZINE, insulin aspart, morphine, nitroGLYCERIN      Assessment/Plan     ALBERTO with hypervolemia on CKD III with AGMA  -unknown etiol as of now but includes HFpEF; BL Cr from [facility] records is 1.4-1.7; renal US with evidence of chronicity  -Cr 3.1 - improving; UO approx 5 L; arellano inserted with approx 600mL of urine - ? Component of retention?; CXR with improvement from 5/4/17  -would rec continued diuresis with metolazone/lasix in AM and lasix in pm X1 more day; then consider switching to lower dose diuretic or oral  -Mg/K replacement per primary team      Case discussed with staff and primary team    Brien Be II  [facility] -[facility]

## 2017-05-05 NOTE — PLAN OF CARE
Problem: Patient Care Overview  Goal: Plan of Care Review  Pt received on 2.5 lpm NC with SpO2 98 %. Pt doing IS @  500 L. Will continue to encourage deep breathing and coughing. No respiratory distress noted. Will continue to monitor.

## 2017-05-06 LAB
ALBUMIN SERPL BCP-MCNC: 3.1 G/DL
ALP SERPL-CCNC: 93 U/L
ALT SERPL W/O P-5'-P-CCNC: 13 U/L
ANION GAP SERPL CALC-SCNC: 14 MMOL/L
AST SERPL-CCNC: 19 U/L
BASOPHILS # BLD AUTO: 0.07 K/UL
BASOPHILS NFR BLD: 0.9 %
BILIRUB SERPL-MCNC: 0.3 MG/DL
BUN SERPL-MCNC: 75 MG/DL
CALCIUM SERPL-MCNC: 9.7 MG/DL
CHLORIDE SERPL-SCNC: 95 MMOL/L
CO2 SERPL-SCNC: 26 MMOL/L
CREAT SERPL-MCNC: 2.7 MG/DL
DIFFERENTIAL METHOD: ABNORMAL
EOSINOPHIL # BLD AUTO: 0.4 K/UL
EOSINOPHIL NFR BLD: 5.5 %
ERYTHROCYTE [DISTWIDTH] IN BLOOD BY AUTOMATED COUNT: 13.5 %
EST. GFR  (AFRICAN AMERICAN): 28 ML/MIN/1.73 M^2
EST. GFR  (NON AFRICAN AMERICAN): 24 ML/MIN/1.73 M^2
GLUCOSE SERPL-MCNC: 212 MG/DL
HCT VFR BLD AUTO: 26.4 %
HGB BLD-MCNC: 9.3 G/DL
HYPOCHROMIA BLD QL SMEAR: ABNORMAL
LYMPHOCYTES # BLD AUTO: 1 K/UL
LYMPHOCYTES NFR BLD: 13.3 %
MAGNESIUM SERPL-MCNC: 1.7 MG/DL
MCH RBC QN AUTO: 26.1 PG
MCHC RBC AUTO-ENTMCNC: 35.2 %
MCV RBC AUTO: 74 FL
MONOCYTES # BLD AUTO: 0.9 K/UL
MONOCYTES NFR BLD: 11.5 %
NEUTROPHILS # BLD AUTO: 5.2 K/UL
NEUTROPHILS NFR BLD: 68.8 %
PHOSPHATE SERPL-MCNC: 5.6 MG/DL
PLATELET # BLD AUTO: 447 K/UL
PLATELET BLD QL SMEAR: ABNORMAL
PMV BLD AUTO: 9.9 FL
POCT GLUCOSE: 192 MG/DL (ref 70–110)
POCT GLUCOSE: 210 MG/DL (ref 70–110)
POCT GLUCOSE: 238 MG/DL (ref 70–110)
POCT GLUCOSE: 242 MG/DL (ref 70–110)
POCT GLUCOSE: 269 MG/DL (ref 70–110)
POLYCHROMASIA BLD QL SMEAR: ABNORMAL
POTASSIUM SERPL-SCNC: 4 MMOL/L
PROT SERPL-MCNC: 8 G/DL
RBC # BLD AUTO: 3.57 M/UL
SODIUM SERPL-SCNC: 135 MMOL/L
TARGETS BLD QL SMEAR: ABNORMAL
WBC # BLD AUTO: 7.62 K/UL

## 2017-05-06 PROCEDURE — 94799 UNLISTED PULMONARY SVC/PX: CPT

## 2017-05-06 PROCEDURE — 63600175 PHARM REV CODE 636 W HCPCS: Performed by: FAMILY MEDICINE

## 2017-05-06 PROCEDURE — 27000221 HC OXYGEN, UP TO 24 HOURS

## 2017-05-06 PROCEDURE — 25000003 PHARM REV CODE 250: Performed by: INTERNAL MEDICINE

## 2017-05-06 PROCEDURE — 36415 COLL VENOUS BLD VENIPUNCTURE: CPT

## 2017-05-06 PROCEDURE — 63600175 PHARM REV CODE 636 W HCPCS: Performed by: STUDENT IN AN ORGANIZED HEALTH CARE EDUCATION/TRAINING PROGRAM

## 2017-05-06 PROCEDURE — 11000001 HC ACUTE MED/SURG PRIVATE ROOM

## 2017-05-06 PROCEDURE — 63600175 PHARM REV CODE 636 W HCPCS

## 2017-05-06 PROCEDURE — 25000242 PHARM REV CODE 250 ALT 637 W/ HCPCS

## 2017-05-06 PROCEDURE — 25000003 PHARM REV CODE 250: Performed by: FAMILY MEDICINE

## 2017-05-06 PROCEDURE — 83735 ASSAY OF MAGNESIUM: CPT

## 2017-05-06 PROCEDURE — 83036 HEMOGLOBIN GLYCOSYLATED A1C: CPT

## 2017-05-06 PROCEDURE — 84100 ASSAY OF PHOSPHORUS: CPT

## 2017-05-06 PROCEDURE — 99900035 HC TECH TIME PER 15 MIN (STAT)

## 2017-05-06 PROCEDURE — 80053 COMPREHEN METABOLIC PANEL: CPT

## 2017-05-06 PROCEDURE — 25000003 PHARM REV CODE 250

## 2017-05-06 PROCEDURE — 94640 AIRWAY INHALATION TREATMENT: CPT

## 2017-05-06 PROCEDURE — 94761 N-INVAS EAR/PLS OXIMETRY MLT: CPT

## 2017-05-06 PROCEDURE — 85025 COMPLETE CBC W/AUTO DIFF WBC: CPT

## 2017-05-06 RX ORDER — FUROSEMIDE 10 MG/ML
40 INJECTION INTRAMUSCULAR; INTRAVENOUS 2 TIMES DAILY
Status: DISCONTINUED | OUTPATIENT
Start: 2017-05-06 | End: 2017-05-08

## 2017-05-06 RX ORDER — MAGNESIUM SULFATE HEPTAHYDRATE 40 MG/ML
2 INJECTION, SOLUTION INTRAVENOUS ONCE
Status: COMPLETED | OUTPATIENT
Start: 2017-05-06 | End: 2017-05-06

## 2017-05-06 RX ADMIN — SEVELAMER CARBONATE 800 MG: 800 TABLET, FILM COATED ORAL at 01:05

## 2017-05-06 RX ADMIN — ENOXAPARIN SODIUM 30 MG: 100 INJECTION SUBCUTANEOUS at 05:05

## 2017-05-06 RX ADMIN — INSULIN DETEMIR 10 UNITS: 100 INJECTION, SOLUTION SUBCUTANEOUS at 08:05

## 2017-05-06 RX ADMIN — INSULIN ASPART 4 UNITS: 100 INJECTION, SOLUTION INTRAVENOUS; SUBCUTANEOUS at 05:05

## 2017-05-06 RX ADMIN — FUROSEMIDE 160 MG: 10 INJECTION, SOLUTION INTRAMUSCULAR; INTRAVENOUS at 09:05

## 2017-05-06 RX ADMIN — PRAVASTATIN SODIUM 20 MG: 20 TABLET ORAL at 09:05

## 2017-05-06 RX ADMIN — INSULIN ASPART 6 UNITS: 100 INJECTION, SOLUTION INTRAVENOUS; SUBCUTANEOUS at 01:05

## 2017-05-06 RX ADMIN — FUROSEMIDE 40 MG: 10 INJECTION, SOLUTION INTRAMUSCULAR; INTRAVENOUS at 05:05

## 2017-05-06 RX ADMIN — METOLAZONE 10 MG: 5 TABLET ORAL at 09:05

## 2017-05-06 RX ADMIN — MAGNESIUM SULFATE IN WATER 2 G: 40 INJECTION, SOLUTION INTRAVENOUS at 10:05

## 2017-05-06 RX ADMIN — IPRATROPIUM BROMIDE AND ALBUTEROL SULFATE 3 ML: .5; 3 SOLUTION RESPIRATORY (INHALATION) at 02:05

## 2017-05-06 RX ADMIN — AMLODIPINE BESYLATE 10 MG: 5 TABLET ORAL at 09:05

## 2017-05-06 RX ADMIN — CARVEDILOL 12.5 MG: 12.5 TABLET, FILM COATED ORAL at 05:05

## 2017-05-06 RX ADMIN — SEVELAMER CARBONATE 800 MG: 800 TABLET, FILM COATED ORAL at 05:05

## 2017-05-06 RX ADMIN — SEVELAMER CARBONATE 800 MG: 800 TABLET, FILM COATED ORAL at 09:05

## 2017-05-06 RX ADMIN — CARVEDILOL 12.5 MG: 12.5 TABLET, FILM COATED ORAL at 09:05

## 2017-05-06 RX ADMIN — IPRATROPIUM BROMIDE AND ALBUTEROL SULFATE 3 ML: .5; 3 SOLUTION RESPIRATORY (INHALATION) at 07:05

## 2017-05-06 RX ADMIN — IPRATROPIUM BROMIDE AND ALBUTEROL SULFATE 3 ML: .5; 3 SOLUTION RESPIRATORY (INHALATION) at 08:05

## 2017-05-06 NOTE — NURSING
Informed patient and son to call when he urinates so that I can scan bladder to check for residual.

## 2017-05-06 NOTE — NURSING
Patient's son states patient used the restroom and told nursing assistant that patient went. Instructed patient to call my phone directly so that I can scan bladder.

## 2017-05-06 NOTE — NURSING
Patients daughter states that patient has not urinated again but she will contact us when patient does.

## 2017-05-06 NOTE — PROGRESS NOTES
Progress Note  [facility]  Admit Date: 4/29/2017   LOS: 7 days   SUBJECTIVE:   Follow-up For: Volume overload and ALBERTO    Patient seen and examined this AM. No acute events overnight. Patient continues to require supplemental O2 but states that his symptoms have improve significantly. Tolerating PO diet without any difficulties.    Review of Systems   Constitutional: Negative for chills and fever.   Respiratory: Positive for shortness of breath (Improving). Negative for cough, sputum production and wheezing.    Cardiovascular: Negative for chest pain.   Gastrointestinal: Negative for abdominal pain, nausea and vomiting.   Genitourinary: Negative for dysuria.   Musculoskeletal: Negative for back pain.   Neurological: Negative for dizziness and headaches.     OBJECTIVE:   Vital Signs (Most Recent)  Temp: 97.6 °F (36.4 °C) (05/06/17 0735)  Pulse: 60 (05/06/17 0917)  Resp: 20 (05/06/17 0828)  BP: 137/67 (05/06/17 0735)  SpO2: 99 % (05/06/17 0828)    I & O (Last 24H):  Intake/Output Summary (Last 24 hours) at 05/06/17 0954  Last data filed at 05/06/17 0005   Gross per 24 hour   Intake              250 ml   Output             3750 ml   Net            -3500 ml     Wt Readings from Last 3 Encounters:   05/01/17 85.5 kg (188 lb 7.9 oz)   06/17/16 83.5 kg (184 lb)       Current Diet Order   Procedures    Diet Cardiac Fluid - 1500mL; Diabetic 1800, Renal     renal     Order Specific Question:   Fluid restriction:     Answer:   Fluid - 1500mL     Order Specific Question:   Additional restrictions:     Answer:   Diabetic 1800     Order Specific Question:   Additional restrictions:     Answer:   Renal        Physical Exam   Constitutional: He is oriented to person, place, and time. No distress.   HENT:   Head: Normocephalic and atraumatic.   Cardiovascular: Normal rate, regular rhythm and normal heart sounds.    No murmur heard.  Pulmonary/Chest: Effort normal. No respiratory distress. He has no wheezes. He has rales.    Abdominal: Soft. He exhibits no distension. There is no tenderness.   Musculoskeletal: Normal range of motion. He exhibits no edema or tenderness.   Neurological: He is alert and oriented to person, place, and time.   Skin: He is not diaphoretic.     Laboratory Data:  CBC    Recent Labs  Lab 05/03/17  0446 05/04/17 0755 05/05/17 0427 05/06/17  0438   WBC 7.88 9.06 7.77  --    RBC 2.96* 3.24* 3.18*  --    HGB 7.7* 8.5* 8.2* 9.3*   HCT 22.1* 24.1* 23.6* 26.4*    378* 383* 447*   MCV 75* 74* 74* 74*   MCH 26.0* 26.2* 25.8* 26.1*   MCHC 34.8 35.3 34.7 35.2     CMP    Recent Labs  Lab 05/04/17 0755 05/05/17 0427 05/06/17  0438   CALCIUM 9.3 9.3 9.7   PROT 7.7 7.7 8.0   * 135* 135*   K 3.8 3.4* 4.0   CO2 19* 23 26   CL 99 96 95   BUN 70* 74* 75*   CREATININE 3.3* 3.1* 2.7*   ALKPHOS 92 88 93   ALT 12 14 13   AST 14 15 19   BILITOT 0.4 0.3 0.3     POCT-Glucose  POCT Glucose   Date Value Ref Range Status   05/06/2017 210 (H) 70 - 110 mg/dL Final   05/05/2017 192 (H) 70 - 110 mg/dL Final   05/05/2017 242 (H) 70 - 110 mg/dL Final   05/05/2017 189 (H) 70 - 110 mg/dL Final   05/05/2017 137 (H) 70 - 110 mg/dL Final   05/04/2017 201 (H) 70 - 110 mg/dL Final   05/04/2017 225 (H) 70 - 110 mg/dL Final   05/04/2017 177 (H) 70 - 110 mg/dL Final   05/04/2017 141 (H) 70 - 110 mg/dL Final   05/03/2017 170 (H) 70 - 110 mg/dL Final   05/03/2017 268 (H) 70 - 110 mg/dL Final   05/03/2017 208 (H) 70 - 110 mg/dL Final     MICRO  Microbiology Results (last 7 days)     Procedure Component Value Units Date/Time    Blood culture [[id number]] Collected:  04/29/17 1026    Order Status:  Completed Specimen:  Blood from Peripheral, Antecubital, Right Updated:  05/04/17 2012     Blood Culture, Routine No growth after 5 days.    Blood culture [[id number]] Collected:  04/29/17 1243    Order Status:  Completed Specimen:  Blood from Peripheral, Antecubital, Right Updated:  05/04/17 2012     Blood Culture, Routine No growth after 5 days.     Gram stain [[id number]] Collected:  04/29/17 1809    Order Status:  Completed Specimen:  Urine from Urine, Clean Catch Updated:  04/30/17 5409     Gram Stain Result No WBC's      No organisms seen        ASSESSMENT/PLAN:   Ming Boateng is a 61 y.o. male with PMHx of HLD, HTN, DM2 transferred from [facility] with complaints of progressively worsening SOB found to have pulmonary edema on CXR.    Pulmonary Edema  - CXR showing pulmonary edema  - UOP 3750cc overnight  - Nephrology consulted, appreciate rec's  - Continue diuresis, reduce lasix to 40mg BID    ALBERTO  - BUN/Cr 75/2.7 this AM  - Cr improved from 3.1 yesterday  - Avoid nephrotoxic agents  - Continue to monitor    PNA  - Possible PNA on CXR  - Continue Moxifloxacin    HTN  - /67 this AM  - Continue Norvasc 10mg    HLD  - Continue home Lipitor    DM  - Last Hb A1c 10.3% (6/2016), f/u repeat Hb A1c  - Continue Levemir    PPx  Lovenox  Pepcid    Dispo: Continue diuresis. Monitor I&Os    Calvin Elliott MD  [facility]  2  05/06/2017 10:08 AM

## 2017-05-06 NOTE — PLAN OF CARE
Problem: Patient Care Overview  Goal: Plan of Care Review  Pt received on nasal cannula at  1 lpm. SPO2  99%.  Pt in no apparent respiratory distress.  Will continue to monitor.

## 2017-05-06 NOTE — PLAN OF CARE
Problem: Patient Care Overview  Goal: Plan of Care Review  Outcome: Ongoing (interventions implemented as appropriate)  Plan of care reviewed with patient and family. Voice understanding. NSR on monitor today with no red alarms noted. Taveras discontinued today with voiding trial. Patient will be monitored overnight.

## 2017-05-07 LAB
ALBUMIN SERPL BCP-MCNC: 3.3 G/DL
ALP SERPL-CCNC: 89 U/L
ALT SERPL W/O P-5'-P-CCNC: 14 U/L
ANION GAP SERPL CALC-SCNC: 11 MMOL/L
ANISOCYTOSIS BLD QL SMEAR: SLIGHT
AST SERPL-CCNC: 14 U/L
BASOPHILS # BLD AUTO: 0.09 K/UL
BASOPHILS NFR BLD: 1.1 %
BILIRUB SERPL-MCNC: 0.3 MG/DL
BUN SERPL-MCNC: 77 MG/DL
CALCIUM SERPL-MCNC: 9.8 MG/DL
CHLORIDE SERPL-SCNC: 92 MMOL/L
CO2 SERPL-SCNC: 31 MMOL/L
CREAT SERPL-MCNC: 2.8 MG/DL
DIFFERENTIAL METHOD: ABNORMAL
EOSINOPHIL # BLD AUTO: 0.4 K/UL
EOSINOPHIL NFR BLD: 5.1 %
ERYTHROCYTE [DISTWIDTH] IN BLOOD BY AUTOMATED COUNT: 13.4 %
EST. GFR  (AFRICAN AMERICAN): 27 ML/MIN/1.73 M^2
EST. GFR  (NON AFRICAN AMERICAN): 23 ML/MIN/1.73 M^2
ESTIMATED AVG GLUCOSE: 174 MG/DL
GLUCOSE SERPL-MCNC: 191 MG/DL
HBA1C MFR BLD HPLC: 7.7 %
HCT VFR BLD AUTO: 29.1 %
HGB BLD-MCNC: 10.2 G/DL
HYPOCHROMIA BLD QL SMEAR: ABNORMAL
LYMPHOCYTES # BLD AUTO: 1.2 K/UL
LYMPHOCYTES NFR BLD: 14.6 %
MAGNESIUM SERPL-MCNC: 2.2 MG/DL
MCH RBC QN AUTO: 26 PG
MCHC RBC AUTO-ENTMCNC: 35.1 %
MCV RBC AUTO: 74 FL
MONOCYTES # BLD AUTO: 0.8 K/UL
MONOCYTES NFR BLD: 10 %
NEUTROPHILS # BLD AUTO: 5.8 K/UL
NEUTROPHILS NFR BLD: 69.2 %
PHOSPHATE SERPL-MCNC: 5.6 MG/DL
PLATELET # BLD AUTO: 471 K/UL
PLATELET BLD QL SMEAR: ABNORMAL
PMV BLD AUTO: 9.5 FL
POCT GLUCOSE: 191 MG/DL (ref 70–110)
POCT GLUCOSE: 230 MG/DL (ref 70–110)
POCT GLUCOSE: 253 MG/DL (ref 70–110)
POCT GLUCOSE: 266 MG/DL (ref 70–110)
POTASSIUM SERPL-SCNC: 3.4 MMOL/L
PROT SERPL-MCNC: 8.1 G/DL
RBC # BLD AUTO: 3.93 M/UL
SODIUM SERPL-SCNC: 134 MMOL/L
WBC # BLD AUTO: 8.36 K/UL

## 2017-05-07 PROCEDURE — 63600175 PHARM REV CODE 636 W HCPCS: Performed by: FAMILY MEDICINE

## 2017-05-07 PROCEDURE — 80053 COMPREHEN METABOLIC PANEL: CPT

## 2017-05-07 PROCEDURE — 83735 ASSAY OF MAGNESIUM: CPT

## 2017-05-07 PROCEDURE — 25000003 PHARM REV CODE 250: Performed by: INTERNAL MEDICINE

## 2017-05-07 PROCEDURE — 97116 GAIT TRAINING THERAPY: CPT

## 2017-05-07 PROCEDURE — 11000001 HC ACUTE MED/SURG PRIVATE ROOM

## 2017-05-07 PROCEDURE — 25000003 PHARM REV CODE 250

## 2017-05-07 PROCEDURE — 94761 N-INVAS EAR/PLS OXIMETRY MLT: CPT

## 2017-05-07 PROCEDURE — 25000003 PHARM REV CODE 250: Performed by: FAMILY MEDICINE

## 2017-05-07 PROCEDURE — 63600175 PHARM REV CODE 636 W HCPCS: Performed by: STUDENT IN AN ORGANIZED HEALTH CARE EDUCATION/TRAINING PROGRAM

## 2017-05-07 PROCEDURE — 84100 ASSAY OF PHOSPHORUS: CPT

## 2017-05-07 PROCEDURE — 94799 UNLISTED PULMONARY SVC/PX: CPT

## 2017-05-07 PROCEDURE — 25000242 PHARM REV CODE 250 ALT 637 W/ HCPCS

## 2017-05-07 PROCEDURE — 97110 THERAPEUTIC EXERCISES: CPT

## 2017-05-07 PROCEDURE — 99900035 HC TECH TIME PER 15 MIN (STAT)

## 2017-05-07 PROCEDURE — 94640 AIRWAY INHALATION TREATMENT: CPT

## 2017-05-07 PROCEDURE — 85025 COMPLETE CBC W/AUTO DIFF WBC: CPT

## 2017-05-07 RX ORDER — FUROSEMIDE 40 MG/1
40 TABLET ORAL 2 TIMES DAILY
Qty: 180 TABLET | Refills: 0
Start: 2017-05-07 | End: 2017-05-08

## 2017-05-07 RX ORDER — SEVELAMER CARBONATE 800 MG/1
800 TABLET, FILM COATED ORAL
Start: 2017-05-07 | End: 2017-05-08

## 2017-05-07 RX ORDER — CARVEDILOL 12.5 MG/1
12.5 TABLET ORAL 2 TIMES DAILY WITH MEALS
Qty: 180 TABLET | Refills: 0
Start: 2017-05-07 | End: 2017-05-08

## 2017-05-07 RX ORDER — IPRATROPIUM BROMIDE AND ALBUTEROL SULFATE 2.5; .5 MG/3ML; MG/3ML
3 SOLUTION RESPIRATORY (INHALATION)
Qty: 810 ML | Refills: 0
Start: 2017-05-07 | End: 2017-05-08 | Stop reason: HOSPADM

## 2017-05-07 RX ORDER — FUROSEMIDE 10 MG/ML
40 INJECTION INTRAMUSCULAR; INTRAVENOUS 2 TIMES DAILY
Status: CANCELLED | OUTPATIENT
Start: 2017-05-07

## 2017-05-07 RX ORDER — AMLODIPINE BESYLATE 10 MG/1
10 TABLET ORAL DAILY
Qty: 90 TABLET | Refills: 0 | Status: CANCELLED
Start: 2017-05-07 | End: 2018-05-07

## 2017-05-07 RX ADMIN — INSULIN ASPART 6 UNITS: 100 INJECTION, SOLUTION INTRAVENOUS; SUBCUTANEOUS at 05:05

## 2017-05-07 RX ADMIN — SEVELAMER CARBONATE 800 MG: 800 TABLET, FILM COATED ORAL at 05:05

## 2017-05-07 RX ADMIN — IPRATROPIUM BROMIDE AND ALBUTEROL SULFATE 3 ML: .5; 3 SOLUTION RESPIRATORY (INHALATION) at 08:05

## 2017-05-07 RX ADMIN — ENOXAPARIN SODIUM 30 MG: 100 INJECTION SUBCUTANEOUS at 05:05

## 2017-05-07 RX ADMIN — INSULIN ASPART 2 UNITS: 100 INJECTION, SOLUTION INTRAVENOUS; SUBCUTANEOUS at 08:05

## 2017-05-07 RX ADMIN — IPRATROPIUM BROMIDE AND ALBUTEROL SULFATE 3 ML: .5; 3 SOLUTION RESPIRATORY (INHALATION) at 07:05

## 2017-05-07 RX ADMIN — CARVEDILOL 12.5 MG: 12.5 TABLET, FILM COATED ORAL at 08:05

## 2017-05-07 RX ADMIN — PRAVASTATIN SODIUM 20 MG: 20 TABLET ORAL at 08:05

## 2017-05-07 RX ADMIN — IPRATROPIUM BROMIDE AND ALBUTEROL SULFATE 3 ML: .5; 3 SOLUTION RESPIRATORY (INHALATION) at 01:05

## 2017-05-07 RX ADMIN — FUROSEMIDE 40 MG: 10 INJECTION, SOLUTION INTRAMUSCULAR; INTRAVENOUS at 08:05

## 2017-05-07 RX ADMIN — METOLAZONE 10 MG: 5 TABLET ORAL at 08:05

## 2017-05-07 RX ADMIN — FUROSEMIDE 40 MG: 10 INJECTION, SOLUTION INTRAMUSCULAR; INTRAVENOUS at 05:05

## 2017-05-07 RX ADMIN — AMLODIPINE BESYLATE 10 MG: 5 TABLET ORAL at 08:05

## 2017-05-07 RX ADMIN — CARVEDILOL 12.5 MG: 12.5 TABLET, FILM COATED ORAL at 05:05

## 2017-05-07 RX ADMIN — SEVELAMER CARBONATE 800 MG: 800 TABLET, FILM COATED ORAL at 08:05

## 2017-05-07 RX ADMIN — SEVELAMER CARBONATE 800 MG: 800 TABLET, FILM COATED ORAL at 11:05

## 2017-05-07 RX ADMIN — INSULIN DETEMIR 10 UNITS: 100 INJECTION, SOLUTION SUBCUTANEOUS at 10:05

## 2017-05-07 RX ADMIN — INSULIN ASPART 2 UNITS: 100 INJECTION, SOLUTION INTRAVENOUS; SUBCUTANEOUS at 10:05

## 2017-05-07 NOTE — PLAN OF CARE
Cxr completed , refused to det back into bed.Attempting to get up to bathroom without calling. Safety/fall risk teaching reinforced. .

## 2017-05-07 NOTE — PLAN OF CARE
Problem: Patient Care Overview  Goal: Plan of Care Review  SpO2   97% on  1 lpm NC. Pt getting   500mL of predicted   2800mL on incentive spirometry. Pt encouraged to continue deep breathing and coughing. No apparent distress noted. Will continue to monitor

## 2017-05-07 NOTE — PROGRESS NOTES
Progress Note  [facility]    Admit Date: 4/29/2017   LOS: 8 days     SUBJECTIVE:   Follow-up For:  Volume overload and ALBERTO    Pt was seen and examined. Still on 1L of O2. Patient otherwise states he feels better.    Scheduled Meds:   albuterol-ipratropium 2.5mg-0.5mg/3mL  3 mL Nebulization Q6H WAKE    amlodipine  10 mg Oral Daily    carvedilol  12.5 mg Oral BID WM    enoxaparin  30 mg Subcutaneous Daily    furosemide  40 mg Intravenous BID    insulin detemir  10 Units Subcutaneous QHS    metOLazone  10 mg Oral Daily    pravastatin  20 mg Oral Daily    sevelamer carbonate  800 mg Oral TID WM     Continuous Infusions:   PRN Meds:    Review of patient's allergies indicates:   Allergen Reactions    Cayenne pepper        Review of Systems  As per subjective    OBJECTIVE:     Vital Signs (Most Recent)  Temp: 97.6 °F (36.4 °C) (05/07/17 0405)  Pulse: (!) 58 (05/07/17 0834)  Resp: 18 (05/07/17 0833)  BP: 134/72 (05/07/17 0834)  SpO2: 96 % (05/07/17 0833)    Vital Signs Range (Last 24H):  Temp:  [97.6 °F (36.4 °C)-98.7 °F (37.1 °C)]   Pulse:  [52-66]   Resp:  [16-20]   BP: (131-146)/(58-72)   SpO2:  [95 %-99 %]     I & O (Last 24H):    Intake/Output Summary (Last 24 hours) at 05/07/17 0842  Last data filed at 05/07/17 0335   Gross per 24 hour   Intake              500 ml   Output             2925 ml   Net            -2425 ml     Wt Readings from Last 3 Encounters:   05/07/17 77.5 kg (170 lb 13.7 oz)   06/17/16 83.5 kg (184 lb)     Physical Exam:  Physical Exam   Constitutional: He is oriented to person, place, and time.   On 1L O2   HENT:   Head: Normocephalic and atraumatic.   Eyes: Conjunctivae are normal.   Neck: Neck supple. No JVD present.   Cardiovascular: Normal rate, regular rhythm, normal heart sounds and intact distal pulses.    Pulmonary/Chest: He has no wheezes. He has no rales. He exhibits no tenderness.   Abdominal: Soft. Bowel sounds are normal. There is no tenderness.   Musculoskeletal: He  exhibits no edema.   Neurological: He is alert and oriented to person, place, and time.     LABS  CBC    Recent Labs  Lab 05/05/17 0427 05/06/17 0438 05/07/17  0358   WBC 7.77 7.62 8.36   RBC 3.18* 3.57* 3.93*   HGB 8.2* 9.3* 10.2*   HCT 23.6* 26.4* 29.1*   * 447* 471*   MCV 74* 74* 74*   MCH 25.8* 26.1* 26.0*   MCHC 34.7 35.2 35.1     BMP    Recent Labs  Lab 05/05/17 0427 05/06/17 0438 05/07/17 0358   * 135* 134*   K 3.4* 4.0 3.4*   CO2 23 26 31*   CL 96 95 92*   BUN 74* 75* 77*   CREATININE 3.1* 2.7* 2.8*   * 212* 191*       POCT-Glucose  POCT Glucose   Date Value Ref Range Status   05/07/2017 191 (H) 70 - 110 mg/dL Final   05/06/2017 238 (H) 70 - 110 mg/dL Final   05/06/2017 242 (H) 70 - 110 mg/dL Final   05/06/2017 269 (H) 70 - 110 mg/dL Final   05/06/2017 210 (H) 70 - 110 mg/dL Final   05/05/2017 192 (H) 70 - 110 mg/dL Final   05/05/2017 242 (H) 70 - 110 mg/dL Final   05/05/2017 189 (H) 70 - 110 mg/dL Final   05/05/2017 137 (H) 70 - 110 mg/dL Final   05/04/2017 201 (H) 70 - 110 mg/dL Final   05/04/2017 225 (H) 70 - 110 mg/dL Final   05/04/2017 177 (H) 70 - 110 mg/dL Final         Recent Labs  Lab 05/05/17 0427 05/06/17 0438 05/07/17  0358   CALCIUM 9.3 9.7 9.8   MG 1.8 1.7 2.2   PHOS 6.5* 5.6* 5.6*     LFT    Recent Labs  Lab 05/05/17  0427 05/06/17  0438 05/07/17  0358   PROT 7.7 8.0 8.1   ALBUMIN 3.0* 3.1* 3.3*   BILITOT 0.3 0.3 0.3   AST 15 19 14   ALKPHOS 88 93 89   ALT 14 13 14     CE    Recent Labs  Lab 04/30/17  1158 05/01/17  2340   TROPONINI 0.024 0.028*     LAST HbA1c  Lab Results   Component Value Date    HGBA1C 7.7 (H) 05/06/2017       Diagnostic Results:  IP CONSULT TO CARDIOLOGY-[facility]  IP CONSULT TO NEPHROLOGY-[facility]    ASSESSMENT/PLAN:   Ming Boateng is a 61 y.o. male with PMHx of HLD, HTN, DM2 transferred from [facility] with complaints of progressively worsening SOB found to have pulmonary edema on CXR.     Pulmonary Edema  - CXR showing pulmonary edema  - UOP 3750cc  overnight  - Nephrology consulted, appreciate rec's  - Continue diuresis on lasix to 40mg BID     ALBERTO  - BUN/Cr 75/2.7 this AM  - Cr increased to 2.8 from 2.7  - Avoid nephrotoxic agents  - Continue to monitor  - weight today is 18lbs from admit.     PNA  - Possible PNA on CXR  - Continue Moxifloxacin     HTN  - /67 this AM  - Continue Norvasc 10mg     HLD  - Continue home Lipitor     DM  - Last Hb A1c 10.3% (6/2016), f/u repeat Hb A1c  - Continue Levemir     PPx  Lovenox  Pepcid     Dispo: Continue diuresis. F/u w/ cards and nephro.    Joseph Rolle MD  [facility] PGY 2  5/7/2017 10:03 AM

## 2017-05-07 NOTE — PLAN OF CARE
Transported to x ray. Left sided weakness persists. Pox on RA 95%.Unsteady upon ambulation.Dr Phillips aware of above.On telemetry an d fall precautions.Bed alarm on while in bed.

## 2017-05-07 NOTE — PROGRESS NOTES
[facility] renal Resident HO-III Progress Note    Subjective:      Pt seen and examined. LL swelling decreases. Feels ok. Sons at bedside     Objective:   Last 24 Hour Vital Signs:  BP  Min: 131/63  Max: 145/66  Temp  Av.2 °F (36.8 °C)  Min: 97.6 °F (36.4 °C)  Max: 98.7 °F (37.1 °C)  Pulse  Av.9  Min: 54  Max: 63  Resp  Av.6  Min: 16  Max: 20  SpO2  Av %  Min: 94 %  Max: 99 %  Weight  Av.8 kg (171 lb 8.3 oz)  Min: 77.8 kg (171 lb 8.3 oz)  Max: 77.8 kg (171 lb 8.3 oz)  I/O last 3 completed shifts:  In: 930 [P.O.:930]  Out: 6050 [Urine:6050]    Physical Examination:  GEN - somnolent; NAD  NECK - JVP appr 11cm H2O  CHEST - crackles 1/4 way up B lung fields  HEART - distant heart sounds; rrr, no m/r/s3/s4  ABD - obese; nonttp; BS +  EXT - 1+ pitting edema to ankles; warm; 2+ B DP pulses    Laboratory:  Laboratory Data Reviewed: yes  Pertinent Findings:  Component      Latest Ref Rng & Units 2017   Sodium      136 - 145 mmol/L 135 (L)   Potassium      3.5 - 5.1 mmol/L 3.4 (L)   Chloride      95 - 110 mmol/L 96   CO2      23 - 29 mmol/L 23   Glucose      70 - 110 mg/dL 137 (H)   BUN, Bld      8 - 23 mg/dL 74 (H)   Creatinine      0.5 - 1.4 mg/dL 3.1 (H)   Calcium      8.7 - 10.5 mg/dL 9.3   Total Protein      6.0 - 8.4 g/dL 7.7   Albumin      3.5 - 5.2 g/dL 3.0 (L)   Total Bilirubin      0.1 - 1.0 mg/dL 0.3   Alkaline Phosphatase      55 - 135 U/L 88   AST      10 - 40 U/L 15   ALT      10 - 44 U/L 14   Anion Gap      8 - 16 mmol/L 16   eGFR if African American      >60 mL/min/1.73 m:2 24 (A)   eGFR if non African American      >60 mL/min/1.73 m:2 21 (A)         Current Medications:     Infusions:        Scheduled:   albuterol-ipratropium 2.5mg-0.5mg/3mL  3 mL Nebulization Q6H WAKE    amlodipine  10 mg Oral Daily    carvedilol  12.5 mg Oral BID WM    enoxaparin  30 mg Subcutaneous Daily    furosemide  40 mg Intravenous BID    insulin detemir  10 Units Subcutaneous QHS    metOLazone  10 mg Oral  Daily    pravastatin  20 mg Oral Daily    sevelamer carbonate  800 mg Oral TID WM        PRN:  dextrose 50%, dextrose 50%, glucagon (human recombinant), glucose, glucose, hydrALAZINE, insulin aspart, morphine, nitroGLYCERIN      Assessment/Plan     ALBERTO with hypervolemia on CKD III with AGMA  -unknown etiol as of now but includes HFpEF; BL Cr from [facility] records is 1.4-1.7; renal US with evidence of chronicity  -Cr - improving; UO approx 3 L; arellano inserted with approx 600mL of urine - ? Component of retention?; CXR with improvement from 5/4/17  -would rec continued diuresis with metolazone/lasix   -Mg/K replacement per primary team    D/w attending Dr Kumar

## 2017-05-07 NOTE — PT/OT/SLP PROGRESS
Physical Therapy  Treatment    Ming Boateng   MRN: [id number]   Admitting Diagnosis: Pulmonary edema with congestive heart failure    PT Received On: 17  PT Start Time: 1146     PT Stop Time: 1222    PT Total Time (min): 36 min       Billable Minutes:  Gait Training 20 and Therapeutic Exercise  16    Treatment Type: Treatment  PT/PTA: PTA     PTA Visit Number: 3       General Precautions: Standard, LVAD, vision impaired  Orthopedic Precautions: N/A   Braces: N/A         Subjective:  Communicated with nursing prior to session. Pt in bedside chair and pt and sister agreed to treatment. Sister agreed to push wheelchair behind pt while performing ambulation training.      Pain Ratin/10              Pain Rating Post-Intervention: 0/10    Objective:   Patient found with: telemetry    Functional Mobility:  Bed Mobility:   Scooting/Bridging: Stand by Assistance  Supine to Sit:  (activity did not occur as pt up in bedside chair)  Sit to Supine:  (activity did not occur as pt requested to sit in bedisde chair)    Transfers:  Sit <> Stand Assistance: Contact Guard Assistance (verbal cueing for proper hand placement)  Sit <> Stand Assistive Device: Rolling Walker    Gait:   Gait Distance: ~80 ft x 2 trials  Verbal/tactile cueing for walker management  Assistance 1: Minimum assistance  Gait Assistive Device: Rolling walker  Gait Deviation(s): decreased erum, decreased step length, decreased stride length    Stairs:      Balance:   Static Sit: FAIR+: Able to take MINIMAL challenges from all directions  Dynamic Sit: FAIR+: Maintains balance through MINIMAL excursions of active trunk motion  Static Stand: FAIR: Maintains without assist but unable to take challenges  Dynamic stand: FAIR: Needs CONTACT GUARD during gait     Therapeutic Activities and Exercises:  All transfers with assistance as documented above. Pt performed ambulation training ~80ft x 2 trials with RW and min A plus verbal/tactile cueing for walker  management. PTA stood on pt's left side to encourage looking to left secondary to left sided weakness and pt favoring turning head to right side. Did not ambulate with oxygen and SpO2 level post session at 95 RA. Seated therapeutic exercises performed between ambulation trials. BLE's 1 x 10 reps consisting of LAQ's, hip add/abd, and heel/toe raises. Required some tactile cueing to ensure full ROM on LLE.    AM-PAC 6 CLICK MOBILITY  How much help from another person does this patient currently need?   1 = Unable, Total/Dependent Assistance  2 = A lot, Maximum/Moderate Assistance  3 = A little, Minimum/Contact Guard/Supervision  4 = None, Modified [place]/Independent    Turning over in bed (including adjusting bedclothes, sheets and blankets)?: 4  Sitting down on and standing up from a chair with arms (e.g., wheelchair, bedside commode, etc.): 3  Moving from lying on back to sitting on the side of the bed?: 4  Moving to and from a bed to a chair (including a wheelchair)?: 3  Need to walk in hospital room?: 3  Climbing 3-5 steps with a railing?: 3  Total Score: 20    Patient left up in chair with all lines intact, call button in reach, nursing notified and sister present.    Assessment:  Ming Boateng is a 61 y.o. male with a medical diagnosis of Pulmonary edema with congestive heart failure. Pt observed ambulating in hallway in early am with son. Pt seen at ~11:46 for therapy session. Able to increase ambulation distance and oxygen not donned during exercise session with SpO2 level taken post session and level at 95 RA. Pt still needs verbal cueing to increase activities done to left side secondary to weakness. Will continue PT to achieve all established goals.        Rehab identified problem list/impairments: Rehab identified problem list/impairments: weakness, impaired endurance, impaired self care skills, impaired functional mobilty, gait instability, impaired balance, visual deficits    Rehab potential is  good.    Activity tolerance: Good    Discharge recommendations:       Barriers to discharge:      Equipment recommendations:       GOALS:   Physical Therapy Goals        Problem: Physical Therapy Goal    Goal Priority Disciplines Outcome Goal Variances Interventions   Physical Therapy Goal     PT/OT, PT Ongoing (interventions implemented as appropriate)     Description:  Goals to be met by: 2017     Patient will increase functional independence with mobility by performin. Supine to sit with Modified [place]  2. Sit to stand transfer with Modified [place]  3. Gait  x 100 feet with Modified [place] using Single-point Cane/ or RW .                 PLAN:    Patient to be seen 6 x/week  to address the above listed problems via gait training, therapeutic activities, therapeutic exercises  Plan of Care expires: 17  Plan of Care reviewed with: patient, sibling         Paulette LATIF Lachelle, PTA  2017

## 2017-05-07 NOTE — PLAN OF CARE
Problem: Physical Therapy Goal  Goal: Physical Therapy Goal  Goals to be met by: 2017     Patient will increase functional independence with mobility by performin. Supine to sit with Modified [place]  2. Sit to stand transfer with Modified [place]  3. Gait x 100 feet with Modified [place] using Single-point Cane/ or RW .    Outcome: Ongoing (interventions implemented as appropriate)      Pt continues to progress toward goals.

## 2017-05-07 NOTE — PLAN OF CARE
Continued on telemetry and fall precautions.No true red alarm ectopy.  Voiding well per urinal. Family at bedside.

## 2017-05-08 VITALS
HEIGHT: 68 IN | BODY MASS INDEX: 25.03 KG/M2 | RESPIRATION RATE: 18 BRPM | SYSTOLIC BLOOD PRESSURE: 126 MMHG | WEIGHT: 165.13 LBS | DIASTOLIC BLOOD PRESSURE: 60 MMHG | OXYGEN SATURATION: 94 % | TEMPERATURE: 98 F | HEART RATE: 58 BPM

## 2017-05-08 LAB
ALBUMIN SERPL BCP-MCNC: 3.5 G/DL
ALP SERPL-CCNC: 93 U/L
ALT SERPL W/O P-5'-P-CCNC: 18 U/L
ANION GAP SERPL CALC-SCNC: 15 MMOL/L
ANISOCYTOSIS BLD QL SMEAR: SLIGHT
AST SERPL-CCNC: 20 U/L
BASOPHILS # BLD AUTO: 0.09 K/UL
BASOPHILS NFR BLD: 1.1 %
BILIRUB SERPL-MCNC: 0.3 MG/DL
BUN SERPL-MCNC: 85 MG/DL
CALCIUM SERPL-MCNC: 10 MG/DL
CHLORIDE SERPL-SCNC: 92 MMOL/L
CO2 SERPL-SCNC: 27 MMOL/L
CREAT SERPL-MCNC: 3 MG/DL
DIFFERENTIAL METHOD: ABNORMAL
EOSINOPHIL # BLD AUTO: 0.4 K/UL
EOSINOPHIL NFR BLD: 4.2 %
ERYTHROCYTE [DISTWIDTH] IN BLOOD BY AUTOMATED COUNT: 13.5 %
EST. GFR  (AFRICAN AMERICAN): 25 ML/MIN/1.73 M^2
EST. GFR  (NON AFRICAN AMERICAN): 21 ML/MIN/1.73 M^2
GLUCOSE SERPL-MCNC: 208 MG/DL
HCT VFR BLD AUTO: 31.4 %
HGB BLD-MCNC: 10.7 G/DL
HYPOCHROMIA BLD QL SMEAR: ABNORMAL
LYMPHOCYTES # BLD AUTO: 1.5 K/UL
LYMPHOCYTES NFR BLD: 17.9 %
MAGNESIUM SERPL-MCNC: 2.5 MG/DL
MCH RBC QN AUTO: 25.4 PG
MCHC RBC AUTO-ENTMCNC: 34.1 %
MCV RBC AUTO: 75 FL
MONOCYTES # BLD AUTO: 0.5 K/UL
MONOCYTES NFR BLD: 6 %
NEUTROPHILS # BLD AUTO: 6.1 K/UL
NEUTROPHILS NFR BLD: 70.8 %
PHOSPHATE SERPL-MCNC: 5.3 MG/DL
PLATELET # BLD AUTO: 524 K/UL
PLATELET BLD QL SMEAR: ABNORMAL
PMV BLD AUTO: 9.7 FL
POCT GLUCOSE: 204 MG/DL (ref 70–110)
POCT GLUCOSE: 268 MG/DL (ref 70–110)
POLYCHROMASIA BLD QL SMEAR: ABNORMAL
POTASSIUM SERPL-SCNC: 3.7 MMOL/L
PROT SERPL-MCNC: 8.5 G/DL
RBC # BLD AUTO: 4.21 M/UL
SODIUM SERPL-SCNC: 134 MMOL/L
TARGETS BLD QL SMEAR: ABNORMAL
WBC # BLD AUTO: 8.56 K/UL

## 2017-05-08 PROCEDURE — 25000003 PHARM REV CODE 250: Performed by: FAMILY MEDICINE

## 2017-05-08 PROCEDURE — 80053 COMPREHEN METABOLIC PANEL: CPT

## 2017-05-08 PROCEDURE — 25000242 PHARM REV CODE 250 ALT 637 W/ HCPCS

## 2017-05-08 PROCEDURE — 97110 THERAPEUTIC EXERCISES: CPT

## 2017-05-08 PROCEDURE — 25000003 PHARM REV CODE 250: Performed by: INTERNAL MEDICINE

## 2017-05-08 PROCEDURE — 63600175 PHARM REV CODE 636 W HCPCS: Performed by: STUDENT IN AN ORGANIZED HEALTH CARE EDUCATION/TRAINING PROGRAM

## 2017-05-08 PROCEDURE — 25000003 PHARM REV CODE 250

## 2017-05-08 PROCEDURE — 36415 COLL VENOUS BLD VENIPUNCTURE: CPT

## 2017-05-08 PROCEDURE — 84100 ASSAY OF PHOSPHORUS: CPT

## 2017-05-08 PROCEDURE — 83735 ASSAY OF MAGNESIUM: CPT

## 2017-05-08 PROCEDURE — 94640 AIRWAY INHALATION TREATMENT: CPT

## 2017-05-08 PROCEDURE — 97116 GAIT TRAINING THERAPY: CPT

## 2017-05-08 PROCEDURE — 94761 N-INVAS EAR/PLS OXIMETRY MLT: CPT

## 2017-05-08 RX ORDER — FUROSEMIDE 20 MG/1
20 TABLET ORAL DAILY
Status: DISCONTINUED | OUTPATIENT
Start: 2017-05-08 | End: 2017-05-08 | Stop reason: HOSPADM

## 2017-05-08 RX ORDER — LANCING DEVICE
1 EACH MISCELLANEOUS
Qty: 1 EACH | Refills: 11 | Status: SHIPPED | OUTPATIENT
Start: 2017-05-08 | End: 2018-02-05 | Stop reason: SDUPTHER

## 2017-05-08 RX ORDER — LOSARTAN POTASSIUM AND HYDROCHLOROTHIAZIDE 12.5; 5 MG/1; MG/1
1 TABLET ORAL DAILY
Qty: 90 TABLET | Refills: 0 | Status: SHIPPED | OUTPATIENT
Start: 2017-05-08 | End: 2017-06-14

## 2017-05-08 RX ORDER — PRAVASTATIN SODIUM 20 MG/1
20 TABLET ORAL DAILY
Qty: 90 TABLET | Refills: 0 | Status: SHIPPED | OUTPATIENT
Start: 2017-05-08 | End: 2017-08-07 | Stop reason: SDUPTHER

## 2017-05-08 RX ORDER — INSULIN GLARGINE 100 [IU]/ML
15 INJECTION, SOLUTION SUBCUTANEOUS NIGHTLY
Qty: 4.5 ML | Refills: 11 | Status: SHIPPED | OUTPATIENT
Start: 2017-05-08 | End: 2017-05-08

## 2017-05-08 RX ORDER — INSULIN GLARGINE 300 [IU]/ML
15 INJECTION, SOLUTION SUBCUTANEOUS NIGHTLY
Qty: 4.5 ML | Refills: 3 | Status: SHIPPED | OUTPATIENT
Start: 2017-05-08 | End: 2017-06-18 | Stop reason: SDUPTHER

## 2017-05-08 RX ORDER — AMLODIPINE BESYLATE 10 MG/1
10 TABLET ORAL DAILY
Qty: 90 TABLET | Refills: 0 | Status: SHIPPED | OUTPATIENT
Start: 2017-05-08 | End: 2017-08-30 | Stop reason: SDUPTHER

## 2017-05-08 RX ORDER — CARVEDILOL 12.5 MG/1
12.5 TABLET ORAL 2 TIMES DAILY WITH MEALS
Qty: 180 TABLET | Refills: 0 | Status: SHIPPED | OUTPATIENT
Start: 2017-05-08 | End: 2017-05-19 | Stop reason: SDUPTHER

## 2017-05-08 RX ORDER — NAPROXEN SODIUM 220 MG/1
81 TABLET, FILM COATED ORAL DAILY
Qty: 90 TABLET | Refills: 0 | Status: SHIPPED | OUTPATIENT
Start: 2017-05-08 | End: 2017-06-14 | Stop reason: SDUPTHER

## 2017-05-08 RX ORDER — SEVELAMER CARBONATE 800 MG/1
800 TABLET, FILM COATED ORAL
Qty: 42 TABLET | Refills: 0 | Status: SHIPPED | OUTPATIENT
Start: 2017-05-08 | End: 2019-02-19

## 2017-05-08 RX ORDER — LANCETS
1 EACH MISCELLANEOUS
Qty: 100 EACH | Refills: 11 | Status: SHIPPED | OUTPATIENT
Start: 2017-05-08 | End: 2017-08-30 | Stop reason: SDUPTHER

## 2017-05-08 RX ORDER — FUROSEMIDE 40 MG/1
80 TABLET ORAL DAILY
Qty: 180 TABLET | Refills: 0 | Status: SHIPPED | OUTPATIENT
Start: 2017-05-08 | End: 2017-08-09 | Stop reason: SDUPTHER

## 2017-05-08 RX ORDER — CALCIUM ACETATE 667 MG/1
667 CAPSULE ORAL
Qty: 270 CAPSULE | Refills: 3 | Status: SHIPPED | OUTPATIENT
Start: 2017-05-08 | End: 2018-04-25

## 2017-05-08 RX ORDER — INSULIN PUMP SYRINGE, 3 ML
EACH MISCELLANEOUS
Qty: 1 EACH | Refills: 0 | Status: ON HOLD | OUTPATIENT
Start: 2017-05-08 | End: 2020-09-18 | Stop reason: HOSPADM

## 2017-05-08 RX ORDER — FUROSEMIDE 40 MG/1
80 TABLET ORAL DAILY
Qty: 180 TABLET | Refills: 0 | Status: SHIPPED | OUTPATIENT
Start: 2017-05-08 | End: 2017-05-08

## 2017-05-08 RX ORDER — SERTRALINE HYDROCHLORIDE 25 MG/1
25 TABLET, FILM COATED ORAL DAILY
Qty: 90 TABLET | Refills: 0 | Status: SHIPPED | OUTPATIENT
Start: 2017-05-08 | End: 2017-08-07 | Stop reason: SDUPTHER

## 2017-05-08 RX ORDER — PEN NEEDLE, DIABETIC 29 G X1/2"
1 NEEDLE, DISPOSABLE MISCELLANEOUS
Qty: 100 EACH | Refills: 11 | Status: SHIPPED | OUTPATIENT
Start: 2017-05-08 | End: 2017-08-30 | Stop reason: SDUPTHER

## 2017-05-08 RX ADMIN — FUROSEMIDE 40 MG: 10 INJECTION, SOLUTION INTRAMUSCULAR; INTRAVENOUS at 08:05

## 2017-05-08 RX ADMIN — CARVEDILOL 12.5 MG: 12.5 TABLET, FILM COATED ORAL at 08:05

## 2017-05-08 RX ADMIN — PRAVASTATIN SODIUM 20 MG: 20 TABLET ORAL at 08:05

## 2017-05-08 RX ADMIN — SEVELAMER CARBONATE 800 MG: 800 TABLET, FILM COATED ORAL at 08:05

## 2017-05-08 RX ADMIN — INSULIN ASPART 4 UNITS: 100 INJECTION, SOLUTION INTRAVENOUS; SUBCUTANEOUS at 08:05

## 2017-05-08 RX ADMIN — AMLODIPINE BESYLATE 10 MG: 5 TABLET ORAL at 08:05

## 2017-05-08 RX ADMIN — FUROSEMIDE 20 MG: 20 TABLET ORAL at 11:05

## 2017-05-08 RX ADMIN — IPRATROPIUM BROMIDE AND ALBUTEROL SULFATE 3 ML: .5; 3 SOLUTION RESPIRATORY (INHALATION) at 08:05

## 2017-05-08 RX ADMIN — INSULIN ASPART 6 UNITS: 100 INJECTION, SOLUTION INTRAVENOUS; SUBCUTANEOUS at 11:05

## 2017-05-08 RX ADMIN — SEVELAMER CARBONATE 800 MG: 800 TABLET, FILM COATED ORAL at 11:05

## 2017-05-08 RX ADMIN — METOLAZONE 10 MG: 5 TABLET ORAL at 08:05

## 2017-05-08 NOTE — PROGRESS NOTES
[facility] renal Resident [facility] Progress Note    Subjective:      Denies chestpain     Objective:   Last 24 Hour Vital Signs:  BP  Min: 124/62  Max: 157/72  Temp  Av.9 °F (36.6 °C)  Min: 97.4 °F (36.3 °C)  Max: 98.5 °F (36.9 °C)  Pulse  Av.2  Min: 56  Max: 65  Resp  Av.4  Min: 16  Max: 18  SpO2  Av.9 %  Min: 92 %  Max: 97 %  Weight  Av.9 kg (165 lb 2 oz)  Min: 74.9 kg (165 lb 2 oz)  Max: 74.9 kg (165 lb 2 oz)  I/O last 3 completed shifts:  In: 540 [P.O.:540]  Out: 2375 [Urine:2375]    Physical Examination:  GEN - somnolent; NAD  NECK - flat neck veins  CHEST - CTA B  HEART - distant heart sounds; rrr, no m/r/s3/s4  ABD - obese; nonttp; BS +  EXT - no edema; warm; 2+ B DP pulses    Laboratory:  Laboratory Data Reviewed: yes  Pertinent Findings:  Component      Latest Ref Rng & Units 2017   Sodium      136 - 145 mmol/L 134 (L)   Potassium      3.5 - 5.1 mmol/L 3.7   Chloride      95 - 110 mmol/L 92 (L)   CO2      23 - 29 mmol/L 27   Glucose      70 - 110 mg/dL 208 (H)   BUN, Bld      8 - 23 mg/dL 85 (H)   Creatinine      0.5 - 1.4 mg/dL 3.0 (H)   Calcium      8.7 - 10.5 mg/dL 10.0   Total Protein      6.0 - 8.4 g/dL 8.5 (H)   Albumin      3.5 - 5.2 g/dL 3.5   Total Bilirubin      0.1 - 1.0 mg/dL 0.3   Alkaline Phosphatase      55 - 135 U/L 93   AST      10 - 40 U/L 20   ALT      10 - 44 U/L 18   Anion Gap      8 - 16 mmol/L 15   eGFR if African American      >60 mL/min/1.73 m:2 25 (A)   eGFR if non African American      >60 mL/min/1.73 m:2 21 (A)       Current Medications:     Infusions:        Scheduled:   albuterol-ipratropium 2.5mg-0.5mg/3mL  3 mL Nebulization Q6H WAKE    amlodipine  10 mg Oral Daily    carvedilol  12.5 mg Oral BID WM    enoxaparin  30 mg Subcutaneous Daily    furosemide  20 mg Oral Daily    insulin detemir  10 Units Subcutaneous QHS    metOLazone  10 mg Oral Daily    pravastatin  20 mg Oral Daily    sevelamer carbonate  800 mg Oral TID WM        PRN:  dextrose 50%,  dextrose 50%, glucagon (human recombinant), glucose, glucose, hydrALAZINE, insulin aspart, morphine, nitroGLYCERIN      Assessment/Plan     ALBERTO with hypervolemia on CKD III with AGMA  -unknown etiol as of now but includes HFpEF; BL Cr from [facility] records is 1.4-1.7; renal US with evidence of chronicity  -would continue with oral lasix at 80mg daily with close clinic f/u as outpt; would stop metolazone  -if being discharged, pt needs close f/u with nephrology at [facility] where he is an established pt      Case discussed with staff and primary team    Brien Be II  [facility]

## 2017-05-08 NOTE — PLAN OF CARE
Problem: Patient Care Overview  Goal: Plan of Care Review  Outcome: Ongoing (interventions implemented as appropriate)  Pt on RA, SPO2  96%.  Pt in no apparent respiratory distress. Will continue to monitor.

## 2017-05-08 NOTE — PLAN OF CARE
Problem: Physical Therapy Goal  Goal: Physical Therapy Goal  Goals to be met by: 2017     Patient will increase functional independence with mobility by performin. Supine to sit with Modified [place] MET 17  2. Sit to stand transfer with Modified [place]  3. Gait x 100 feet with Modified [place] using Single-point Cane/ or RW .    Outcome: Ongoing (interventions implemented as appropriate)  Goal 1 met

## 2017-05-08 NOTE — PROGRESS NOTES
Faxed Rolling walker and TTB to [facility].    Fiorella Briceño, RN, CCM, CMSRN  RN Transition Navigator  [phone]

## 2017-05-08 NOTE — PT/OT/SLP PROGRESS
Physical Therapy  Treatment    Ming Boateng   MRN: [id number]   Admitting Diagnosis: Pulmonary edema with congestive heart failure    PT Received On: 17  PT Start Time: 1227     PT Stop Time: 1252    PT Total Time (min): 25 min       Billable Minutes:  Gait [id number] and Therapeutic Exercise 11    Treatment Type: Treatment  PT/PTA: PTA     PTA Visit Number: 4       General Precautions: Standard, fall, vision impaired  Orthopedic Precautions: N/A   Braces:           Subjective:  Communicated with nsg prior to session.      Pain Ratin/10                   Objective:   Patient found with: telemetry    Functional Mobility:  Bed Mobility:        Transfers:  Sit <> Stand Assistance: Supervision, Stand By Assistance  Sit <> Stand Assistive Device: Rolling Walker    Gait:   Gait Distance: ~175' X 1 with v/c's and Min A for turning RW  Assistance 1: Contact Guard Assistance, Minimum assistance  Gait Assistive Device: Rolling walker  Gait Pattern: reciprocal  Gait Deviation(s): decreased erum, decreased step length, decreased stride length    Stairs:      Balance:   Static Sit: FAIR+: Able to take MINIMAL challenges from all directions  Dynamic Sit: FAIR+: Maintains balance through MINIMAL excursions of active trunk motion  Static Stand: FAIR+: Takes MINIMAL challenges from all directions  Dynamic stand: POOR: N/A     Therapeutic Activities and Exercises:       AM-PAC 6 CLICK MOBILITY  How much help from another person does this patient currently need?   1 = Unable, Total/Dependent Assistance  2 = A lot, Maximum/Moderate Assistance  3 = A little, Minimum/Contact Guard/Supervision  4 = None, Modified [place]/Independent    Turning over in bed (including adjusting bedclothes, sheets and blankets)?: 4  Sitting down on and standing up from a chair with arms (e.g., wheelchair, bedside commode, etc.): 3  Moving from lying on back to sitting on the side of the bed?: 4  Moving to and from a bed to a chair (including a  wheelchair)?: 3  Need to walk in hospital room?: 3  Climbing 3-5 steps with a railing?: 3  Total Score: 20    AM-PAC Raw Score CMS G-Code Modifier Level of Impairment Assistance   6 % Total / Unable   7 - 9 CM 80 - 100% Maximal Assist   10 - 14 CL 60 - 80% Moderate Assist   15 - 19 CK 40 - 60% Moderate Assist   20 - 22 CJ 20 - 40% Minimal Assist   23 CI 1-20% SBA / CGA   24 CH 0% Independent/ Mod I     Patient left supine with all lines intact, call button in reach and son present.    Assessment:  Ming Boateng is a 61 y.o. male with a medical diagnosis of Pulmonary edema with congestive heart failure and presents with .    Rehab identified problem list/impairments: Rehab identified problem list/impairments: weakness, impaired endurance, impaired functional mobilty, gait instability, impaired balance, impaired coordination    Rehab potential is good.    Activity tolerance: Fair    Discharge recommendations: Discharge Facility/Level Of Care Needs: outpatient PT (as per conversation with pt's son)     Barriers to discharge:      Equipment recommendations: Equipment Needed After Discharge: bath bench     GOALS:   Physical Therapy Goals        Problem: Physical Therapy Goal    Goal Priority Disciplines Outcome Goal Variances Interventions   Physical Therapy Goal     PT/OT, PT Ongoing (interventions implemented as appropriate)     Description:  Goals to be met by: 2017     Patient will increase functional independence with mobility by performin. Supine to sit with Modified [place] MET 17  2. Sit to stand transfer with Modified [place]  3. Gait  x 100 feet with Modified [place] using Single-point Cane/ or RW .                  PLAN:    Patient to be seen 6 x/week  to address the above listed problems via gait training, therapeutic activities, therapeutic exercises  Plan of Care expires: 17  Plan of Care reviewed with: patient, son         Brant LOPEZ Anthony, PTA  2017

## 2017-05-08 NOTE — PLAN OF CARE
Problem: Patient Care Overview  Goal: Plan of Care Review  Outcome: Ongoing (interventions implemented as appropriate)  Pt's SpO2 95% on RA. No adverse reactions to aerosol tx. No respiratory distress noted. Continue to monitor SpO2.

## 2017-05-08 NOTE — PROGRESS NOTES
[facility] renal Resident HO-III Progress Note    Subjective:      Pt seen and examined. LL swelling decreases. Feels ok. Sons at bedside     Objective:   Last 24 Hour Vital Signs:  BP  Min: 132/67  Max: 146/70  Temp  Av.8 °F (36.6 °C)  Min: 97.6 °F (36.4 °C)  Max: 98 °F (36.7 °C)  Pulse  Av.7  Min: 52  Max: 66  Resp  Av.3  Min: 16  Max: 18  SpO2  Av.8 %  Min: 95 %  Max: 97 %  Weight  Av.5 kg (170 lb 13.7 oz)  Min: 77.5 kg (170 lb 13.7 oz)  Max: 77.5 kg (170 lb 13.7 oz)  I/O last 3 completed shifts:  In: 1040 [P.O.:1040]  Out: 3750 [Urine:3750]    Physical Examination:  GEN - somnolent; NAD  NECK - JVP appr 11cm H2O  CHEST - crackles 1/4 way up B lung fields  HEART - distant heart sounds; rrr, no m/r/s3/s4  ABD - obese; nonttp; BS +  EXT - 1+ pitting edema to ankles; warm; 2+ B DP pulses    Laboratory:  Laboratory Data Reviewed: yes  Pertinent Findings:  Component      Latest Ref Rng & Units 2017   Sodium      136 - 145 mmol/L 135 (L)   Potassium      3.5 - 5.1 mmol/L 3.4 (L)   Chloride      95 - 110 mmol/L 96   CO2      23 - 29 mmol/L 23   Glucose      70 - 110 mg/dL 137 (H)   BUN, Bld      8 - 23 mg/dL 74 (H)   Creatinine      0.5 - 1.4 mg/dL 3.1 (H)   Calcium      8.7 - 10.5 mg/dL 9.3   Total Protein      6.0 - 8.4 g/dL 7.7   Albumin      3.5 - 5.2 g/dL 3.0 (L)   Total Bilirubin      0.1 - 1.0 mg/dL 0.3   Alkaline Phosphatase      55 - 135 U/L 88   AST      10 - 40 U/L 15   ALT      10 - 44 U/L 14   Anion Gap      8 - 16 mmol/L 16   eGFR if African American      >60 mL/min/1.73 m:2 24 (A)   eGFR if non African American      >60 mL/min/1.73 m:2 21 (A)         Current Medications:     Infusions:        Scheduled:   albuterol-ipratropium 2.5mg-0.5mg/3mL  3 mL Nebulization Q6H WAKE    amlodipine  10 mg Oral Daily    carvedilol  12.5 mg Oral BID WM    enoxaparin  30 mg Subcutaneous Daily    furosemide  40 mg Intravenous BID    insulin detemir  10 Units Subcutaneous QHS    metOLazone  10 mg  Oral Daily    pravastatin  20 mg Oral Daily    sevelamer carbonate  800 mg Oral TID WM        PRN:  dextrose 50%, dextrose 50%, glucagon (human recombinant), glucose, glucose, hydrALAZINE, insulin aspart, morphine, nitroGLYCERIN      Assessment/Plan     ALBERTO with hypervolemia on CKD III with AGMA  -unknown etiol as of now but includes HFpEF; BL Cr [facility] is 1.4-1.7; renal US with evidence of chronicity  -Cr - improving; arellano removed, uop adequate  -would rec continued diuresis with metolazone/lasix   -Mg/K replacement per primary team    D/w attending Dr Kumar

## 2017-05-08 NOTE — PROGRESS NOTES
Progress Note  [facility]    Admit Date: 4/29/2017   LOS: 9 days     SUBJECTIVE:   Follow-up For:  Volume overload and ALBERTO    Pt was seen and examined. TC. Patient states he is improving. Denies any complaints at this time.    Scheduled Meds:   albuterol-ipratropium 2.5mg-0.5mg/3mL  3 mL Nebulization Q6H WAKE    amlodipine  10 mg Oral Daily    carvedilol  12.5 mg Oral BID WM    enoxaparin  30 mg Subcutaneous Daily    furosemide  40 mg Intravenous BID    insulin detemir  10 Units Subcutaneous QHS    metOLazone  10 mg Oral Daily    pravastatin  20 mg Oral Daily    sevelamer carbonate  800 mg Oral TID WM     Continuous Infusions:   PRN Meds:    Review of patient's allergies indicates:   Allergen Reactions    Cayenne pepper        Review of Systems  As per subjective    OBJECTIVE:     Vital Signs (Most Recent)  Temp: 98.1 °F (36.7 °C) (05/08/17 0800)  Pulse: 63 (05/08/17 0825)  Resp: 18 (05/08/17 0825)  BP: (!) 147/70 (05/08/17 0800)  SpO2: (!) 94 % (05/08/17 0825)    Vital Signs Range (Last 24H):  Temp:  [97.4 °F (36.3 °C)-98.5 °F (36.9 °C)]   Pulse:  [56-65]   Resp:  [16-18]   BP: (124-157)/(61-72)   SpO2:  [92 %-97 %]     I & O (Last 24H):    Intake/Output Summary (Last 24 hours) at 05/08/17 0926  Last data filed at 05/08/17 0800   Gross per 24 hour   Intake              610 ml   Output             1275 ml   Net             -665 ml     Wt Readings from Last 3 Encounters:   05/08/17 74.9 kg (165 lb 2 oz)   06/17/16 83.5 kg (184 lb)     Physical Exam:  Physical Exam   Constitutional: He is oriented to person, place, and time.   HENT:   Head: Normocephalic and atraumatic.   Eyes: Conjunctivae are normal.   Neck: Neck supple. No JVD present.   Cardiovascular: Normal rate, regular rhythm, normal heart sounds and intact distal pulses.    Pulmonary/Chest: He has no wheezes. He has no rales. He exhibits no tenderness.   Abdominal: Soft. Bowel sounds are normal. There is no tenderness.   Musculoskeletal:  He exhibits no edema.   Neurological: He is alert and oriented to person, place, and time.     LABS  CBC    Recent Labs  Lab 05/06/17 0438 05/07/17 0358 05/08/17 0616   WBC 7.62 8.36 8.56   RBC 3.57* 3.93* 4.21*   HGB 9.3* 10.2* 10.7*   HCT 26.4* 29.1* 31.4*   * 471* 524*   MCV 74* 74* 75*   MCH 26.1* 26.0* 25.4*   MCHC 35.2 35.1 34.1     BMP    Recent Labs  Lab 05/06/17 0438 05/07/17 0358 05/08/17 0616   * 134* 134*   K 4.0 3.4* 3.7   CO2 26 31* 27   CL 95 92* 92*   BUN 75* 77* 85*   CREATININE 2.7* 2.8* 3.0*   * 191* 208*       POCT-Glucose  POCT Glucose   Date Value Ref Range Status   05/08/2017 204 (H) 70 - 110 mg/dL Final   05/07/2017 230 (H) 70 - 110 mg/dL Final   05/07/2017 266 (H) 70 - 110 mg/dL Final   05/07/2017 253 (H) 70 - 110 mg/dL Final   05/07/2017 191 (H) 70 - 110 mg/dL Final   05/06/2017 238 (H) 70 - 110 mg/dL Final   05/06/2017 242 (H) 70 - 110 mg/dL Final   05/06/2017 269 (H) 70 - 110 mg/dL Final   05/06/2017 210 (H) 70 - 110 mg/dL Final   05/05/2017 192 (H) 70 - 110 mg/dL Final   05/05/2017 242 (H) 70 - 110 mg/dL Final   05/05/2017 189 (H) 70 - 110 mg/dL Final         Recent Labs  Lab 05/06/17 0438 05/07/17 0358 05/08/17 0616   CALCIUM 9.7 9.8 10.0   MG 1.7 2.2 2.5   PHOS 5.6* 5.6* 5.3*     LFT    Recent Labs  Lab 05/06/17  0438 05/07/17  0358 05/08/17  0616   PROT 8.0 8.1 8.5*   ALBUMIN 3.1* 3.3* 3.5   BILITOT 0.3 0.3 0.3   AST 19 14 20   ALKPHOS 93 89 93   ALT 13 14 18     CE    Recent Labs  Lab 05/01/17  2340   TROPONINI 0.028*     LAST HbA1c  Lab Results   Component Value Date    HGBA1C 7.7 (H) 05/06/2017       Diagnostic Results:  IP CONSULT TO CARDIOLOGY-[facility]  IP CONSULT TO NEPHROLOGY-[facility]    ASSESSMENT/PLAN:   Ming Boateng is a 61 y.o. male with PMHx of HLD, HTN, DM2 transferred from [facility] with complaints of progressively worsening SOB found to have pulmonary edema on CXR.     Pulmonary Edema  - CXR showing pulmonary edema  - UOP 3750cc overnight  -  Nephrology consulted, appreciate rec's  - Continue diuresis on lasix to 40mg BID     ALBERTO  - BUN/Cr 75/2.7 this AM  - Cr increased to 3.0 from 2.8  - weight today is 18lbs less from admit  - patient is on IV lasix 40mg BID.   - will change to PO lasix 40mg. Since patient is probably now over diuresed      PNA  - Possible PNA on CXR  - dc moxi since patient received his course.     HTN  - /67 this AM  - Continue Norvasc 10mg     HLD  - Continue home Lipitor     DM  - Last Hb A1c 10.3% (6/2016), f/u repeat Hb A1c  - Continue Levemir     PPx  Lovenox  Pepcid     Dispo: changed to PO lasix. F/u SW.    Joseph Rolle MD  [facility] PGY 2  5/8/2017 10:03 AM

## 2017-05-08 NOTE — PLAN OF CARE
Future Appointments  Date Time Provider Department Center   5/16/2017 1:20 PM [facility]     Patient received DME equipment RW and BSC from [facility] this morning.       05/08/17 1441   Final Note   Assessment Type Final Discharge Note   Discharge Disposition Home   Discharge planning education complete? Yes   Hospital Follow Up  Appt(s) scheduled? Yes   Discharge plans and expectations educations in teach back method with documentation complete? Yes   Offered [facility] -- Bedside Delivery? Yes   Discharge/Hospital Encounter Summary to (non-[facility]) PCP No   Referral to Outpatient Case Management complete? No   Referral to / orders for Home Health Complete? No   30 day supply of medicines given at discharge, if documented non-compliance / non-adherence? No   Any social issues identified prior to discharge? No   Did you assess the readiness or willingness of the family or caregiver to support self management of care? No   Right Care Referral Info   Post Acute Recommendation No Care     Fiorella Briceño, RN, CCM, CMSRN  RN Transition Navigator  [phone]

## 2017-05-08 NOTE — PLAN OF CARE
Problem: Patient Care Overview  Goal: Plan of Care Review  Outcome: Ongoing (interventions implemented as appropriate)  Pt on RA, SPO2  94%.  Pt in no apparent respiratory distress. Will continue to monitor.

## 2017-05-10 NOTE — PT/OT/SLP DISCHARGE
Occupational Therapy Discharge Summary    Ming Boateng  MRN: [id number]   Pulmonary edema with congestive heart failure   Patient Discharged from acute Occupational Therapy on 5/8/17.  Please refer to prior OT note dated on 5/5/17 for functional status.     Assessment:   Patient appropriate for care in another setting.  GOALS:   Occupational Therapy Goals     Not on file      Multidisciplinary Problems (Resolved)        Problem: Occupational Therapy Goal    Goal Priority Disciplines Outcome Interventions   Occupational Therapy Goal   (Resolved)     OT, PT/OT Outcome(s) achieved    Description:  Goals to be met by: 5/7/2017    Patient will increase functional independence with ADLs by performing:    LE Dressing with Set-up Assistance.  Grooming while standing with Supervision.  Toileting from toilet with Modified [place] for hygiene and clothing management.   Supine to sit with Modified [place].  Stand pivot transfers with Supervision.  Toilet transfer to toilet with Supervision.  Increased functional strength to WNL for use with ADLs and fx mobility.              Reasons for Discontinuation of Therapy Services  Transfer to alternate level of care.      Plan:  Patient Discharged to: home; see chart for d/c dispo.    Anna Harris, OT  5/10/2017

## 2017-05-11 PROBLEM — N17.9 ACUTE RENAL FAILURE: Status: ACTIVE | Noted: 2017-05-11

## 2017-05-11 PROBLEM — R07.9 CHEST PAIN: Status: ACTIVE | Noted: 2017-05-11

## 2017-05-14 NOTE — DISCHARGE SUMMARY
Discharge Summary      Admit Date: 4/29/2017    Discharge Date and Time:5/8/2017     Attending Physician: Dr. Keegan Gonzáles    Discharge Physician: Joseph Rolle    Principal Diagnoses: Pulmonary edema with congestive heart failure  The primary encounter diagnosis was Pulmonary edema with congestive heart failure. Diagnoses of Acute renal failure, unspecified acute renal failure type, Chest pain, unspecified type, Hyperglycemia, Chest pain, ALBERTO (acute kidney injury), and Hyperlipidemia, unspecified hyperlipidemia type were also pertinent to this visit.    Discharged Condition: stable    Hospital Course: Ming Boateng is a 61 y.o. male with pmh  has a past medical history of Diabetes mellitus and Hypertension. who presented with Pulmonary edema with congestive heart failure. The following is the hospital course problems:  Patient is a 61 y.o. male with PMHx of HLD, HTN, DM2 transferred from [facility] with complaints of progressively worsening SOB x4 days that awakens him from sleep. Also endorses pleuritic CP that began today, non productive cough and PND. Denies previous episodes but state that while in [place] 1 year ago he was told that he had cardiomegaly was not given any other specifics. Denies palpitations, sputum production, wheezing, sick contacts, recent illness.    Ming Boateng is a 61 y.o. male with PMHx of HLD, HTN, DM2 transferred from [facility] with complaints of progressively worsening SOB found to have pulmonary edema on CXR.      Pulmonary Edema  - CXR showing pulmonary edema  - started diuresing the patient with lasix  - Cr initially increased but declined after patient was started on more aggressive diuresis with lasix 160mg BID  - patient was net neg 17L at discharge.  - patient's dry weight is 165lbs      ALBERTO  - Cr improved throughout hospital stay  - most likely 2/2 to cardiorenal syndrome      PNA  - CXR showed possible PNA  - received moxi      HTN  - oh home  Norvasc 10mg      HLD  -  Continued on  home Lipitor      DM  - Continued Levemir with ISS    Consults: IP CONSULT TO CARDIOLOGY-[facility]  IP CONSULT TO NEPHROLOGY-[facility]    Significant Diagnostic Studies:   CBC     Recent Labs  Lab 05/06/17 0438 05/07/17 0358 05/08/17 0616   WBC 7.62 8.36 8.56   RBC 3.57* 3.93* 4.21*   HGB 9.3* 10.2* 10.7*   HCT 26.4* 29.1* 31.4*   * 471* 524*   MCV 74* 74* 75*   MCH 26.1* 26.0* 25.4*   MCHC 35.2 35.1 34.1      BMP     Recent Labs  Lab 05/06/17 0438 05/07/17 0358 05/08/17 0616   * 134* 134*   K 4.0 3.4* 3.7   CO2 26 31* 27   CL 95 92* 92*   BUN 75* 77* 85*   CREATININE 2.7* 2.8* 3.0*   * 191* 208*         POCT-Glucose  POCT Glucose   Date Value Ref Range Status   05/08/2017 204 (H) 70 - 110 mg/dL Final   05/07/2017 230 (H) 70 - 110 mg/dL Final   05/07/2017 266 (H) 70 - 110 mg/dL Final   05/07/2017 253 (H) 70 - 110 mg/dL Final   05/07/2017 191 (H) 70 - 110 mg/dL Final   05/06/2017 238 (H) 70 - 110 mg/dL Final   05/06/2017 242 (H) 70 - 110 mg/dL Final   05/06/2017 269 (H) 70 - 110 mg/dL Final   05/06/2017 210 (H) 70 - 110 mg/dL Final   05/05/2017 192 (H) 70 - 110 mg/dL Final   05/05/2017 242 (H) 70 - 110 mg/dL Final   05/05/2017 189 (H) 70 - 110 mg/dL Final       Recent Labs  Lab 05/06/17 0438 05/07/17 0358 05/08/17 0616   CALCIUM 9.7 9.8 10.0   MG 1.7 2.2 2.5   PHOS 5.6* 5.6* 5.3*      LFT     Recent Labs  Lab 05/06/17 0438 05/07/17  0358 05/08/17  0616   PROT 8.0 8.1 8.5*   ALBUMIN 3.1* 3.3* 3.5   BILITOT 0.3 0.3 0.3   AST 19 14 20   ALKPHOS 93 89 93   ALT 13 14 18      CE     Recent Labs  Lab 05/01/17  2340   TROPONINI 0.028*      LAST HbA1c        Lab Results   Component Value Date     HGBA1C 7.7 (H) 05/06/2017         Disposition: Home or Self Care    Patient Instructions:   Discharge Medication List as of 5/8/2017  3:03 PM      START taking these medications    Details   albuterol-ipratropium 2.5mg-0.5mg/3mL (DUO-NEB) 0.5 mg-3 mg(2.5 mg base)/3 mL nebulizer solution Take 3 mLs by  "nebulization every 6 (six) hours while awake. Rescue, Starting 5/7/2017, Until Mon 5/7/18, No Print      sevelamer carbonate (RENVELA) 800 mg Tab Take 1 tablet (800 mg total) by mouth 3 (three) times daily with meals., Starting 5/7/2017, Until Mon 5/7/18, No Print         CONTINUE these medications which have CHANGED    Details   carvedilol (COREG) 12.5 MG tablet Take 1 tablet (12.5 mg total) by mouth 2 (two) times daily with meals., Starting 5/7/2017, Until Mon 5/7/18, No Print      furosemide (LASIX) 40 MG tablet Take 2 tablets (80 mg total) by mouth once daily., Starting 5/8/2017, Until Tue 5/8/18, Normal         CONTINUE these medications which have NOT CHANGED    Details   cetirizine 10 mg chewable tablet Take 10 mg by mouth once daily., Until Discontinued, Historical Med      garlic 1,000 mg Cap Take by mouth., Until Discontinued, Historical Med      aspirin 81 MG Chew Take 81 mg by mouth once daily., Until Discontinued, Historical Med      insulin aspart (NOVOLOG) 100 unit/mL injection Inject into the skin continuous. [employer] insulin pump-Dr. Blount manages at [facility], Until Discontinued, Historical Med      losartan-hydrochlorothiazide 50-12.5 mg (HYZAAR) 50-12.5 mg per tablet Take 1 tablet by mouth once daily., Until Discontinued, Historical Med      pravastatin (PRAVACHOL) 20 MG tablet Take 20 mg by mouth once daily., Until Discontinued, Historical Med      sertraline (ZOLOFT) 25 MG tablet Take 25 mg by mouth once daily., Until Discontinued, Historical Med      amlodipine (NORVASC) 10 MG tablet Take 1 tablet (10 mg total) by mouth once daily., Starting 6/18/2016, Until Sun 6/18/17, Print      olmesartan (BENICAR) 40 MG tablet Take 40 mg by mouth once daily., Until Discontinued, Historical Med               Discharge Procedure Orders  TRANSFER TUB BENCH FOR HOME USE   Order Specific Question Answer Comments   Type of Transfer Tub Bench: Padded    Height: 5' 8" (1.727 m)    Weight: 77.5 kg (170 " "lb 13.7 oz)    Does patient have medical equipment at home? none    Length of need (1-99 months): 99      WALKER FOR HOME USE   Order Specific Question Answer Comments   Type of Walker: Adult (5'4"-6'6")    With wheels? Yes    Height: 5' 8" (1.727 m)    Weight: 77.5 kg (170 lb 13.7 oz)    Length of need (1-99 months): 99    Does patient have medical equipment at home? none    Please check all that apply: Patient is unable to safely ambulate without equipment.    Please check all that apply: Patient needs help to get in and out of chair.    Please check all that apply: Walker will be used for gait training.      COMPREHENSIVE METABOLIC PANEL   Standing Status: Future  Standing Exp. Date: 07/07/18     PHOSPHORUS   Standing Status: Future  Standing Exp. Date: 07/07/18     Ambulatory consult to Physical Therapy   Referral Priority: Routine Referral Type: Physical Medicine   Referral Reason: Specialty Services Required    Requested Specialty: Physical Therapy    Number of Visits Requested: 1      Ambulatory consult to Occupational Therapy   Referral Priority: Routine Referral Type: Occupational Therapy   Referral Reason: Specialty Services Required    Requested Specialty: Occupational Therapy    Number of Visits Requested: 1      Diet renal     Activity as tolerated       Follow-up Information     Follow up with [facility]. Schedule an appointment as soon as possible for a visit in 1 week.    Specialties:  Neurosurgery, Plastic Surgery, Podiatry, Surgical Oncology, Allergy, Cardiothoracic Surgery, Otolaryngology, Gastroenterology, Breast Surgery, Oral Surgery, Oral and Maxillofacial Surgery, Cardiology, Bariatrics, Internal Medicine, Family Medicine, Colon and Rectal Surgery, Dental General Practice, Gynecology, Orthopedic Surgery, Genetics, Endocrinology, Vascular Surgery, Physical Medicine and Rehabilitation, Urology, Neurology, Dermatology, Rheumatology    Why:  nephrology    Contact " information:    [address] [facility] [place] [address]  [phone]          Follow up with Calvin Elliott MD On 5/16/2017.    Specialty:  Family Medicine    Why:  1:20pm    Contact information:    Mariel [facility]  [address] [place] [address]  [phone]              Joseph Rolle  05/08/2017  4:40 PM

## 2017-05-16 ENCOUNTER — HOSPITAL ENCOUNTER (EMERGENCY)
Facility: HOSPITAL | Age: 62
Discharge: HOME | End: 2017-05-16
Attending: EMERGENCY MEDICINE
Payer: MEDICAID

## 2017-05-16 VITALS
DIASTOLIC BLOOD PRESSURE: 75 MMHG | RESPIRATION RATE: 14 BRPM | BODY MASS INDEX: 25.76 KG/M2 | TEMPERATURE: 98 F | HEIGHT: 68 IN | SYSTOLIC BLOOD PRESSURE: 157 MMHG | HEART RATE: 56 BPM | WEIGHT: 170 LBS | OXYGEN SATURATION: 100 %

## 2017-05-16 DIAGNOSIS — R55 SYNCOPE, UNSPECIFIED SYNCOPE TYPE: Primary | ICD-10-CM

## 2017-05-16 LAB
ALBUMIN SERPL BCP-MCNC: 3.7 G/DL
ALP SERPL-CCNC: 75 U/L
ALT SERPL W/O P-5'-P-CCNC: 18 U/L
ANION GAP SERPL CALC-SCNC: 14 MMOL/L
AST SERPL-CCNC: 17 U/L
BASOPHILS # BLD AUTO: 0.11 K/UL
BASOPHILS NFR BLD: 1.6 %
BILIRUB SERPL-MCNC: 0.3 MG/DL
BNP SERPL-MCNC: 41 PG/ML
BUN SERPL-MCNC: 99 MG/DL
CALCIUM SERPL-MCNC: 9.8 MG/DL
CHLORIDE SERPL-SCNC: 96 MMOL/L
CO2 SERPL-SCNC: 27 MMOL/L
CREAT SERPL-MCNC: 2.4 MG/DL
DIFFERENTIAL METHOD: ABNORMAL
EOSINOPHIL # BLD AUTO: 0.2 K/UL
EOSINOPHIL NFR BLD: 3.2 %
ERYTHROCYTE [DISTWIDTH] IN BLOOD BY AUTOMATED COUNT: 13.7 %
EST. GFR  (AFRICAN AMERICAN): 32 ML/MIN/1.73 M^2
EST. GFR  (NON AFRICAN AMERICAN): 28 ML/MIN/1.73 M^2
GLUCOSE SERPL-MCNC: 165 MG/DL
HCT VFR BLD AUTO: 30.5 %
HGB BLD-MCNC: 10.5 G/DL
HYPOCHROMIA BLD QL SMEAR: ABNORMAL
LYMPHOCYTES # BLD AUTO: 1.3 K/UL
LYMPHOCYTES NFR BLD: 17.9 %
MCH RBC QN AUTO: 25.8 PG
MCHC RBC AUTO-ENTMCNC: 34.4 %
MCV RBC AUTO: 75 FL
MONOCYTES # BLD AUTO: 0.5 K/UL
MONOCYTES NFR BLD: 7.5 %
NEUTROPHILS # BLD AUTO: 4.9 K/UL
NEUTROPHILS NFR BLD: 69.8 %
PLATELET # BLD AUTO: 482 K/UL
PLATELET BLD QL SMEAR: ABNORMAL
PMV BLD AUTO: 10 FL
POCT GLUCOSE: 189 MG/DL (ref 70–110)
POTASSIUM SERPL-SCNC: 3.9 MMOL/L
PROT SERPL-MCNC: 7.8 G/DL
RBC # BLD AUTO: 4.07 M/UL
SODIUM SERPL-SCNC: 137 MMOL/L
TROPONIN I SERPL DL<=0.01 NG/ML-MCNC: 0.01 NG/ML
WBC # BLD AUTO: 6.97 K/UL

## 2017-05-16 PROCEDURE — 99284 EMERGENCY DEPT VISIT MOD MDM: CPT | Mod: 25

## 2017-05-16 PROCEDURE — 83880 ASSAY OF NATRIURETIC PEPTIDE: CPT

## 2017-05-16 PROCEDURE — 80053 COMPREHEN METABOLIC PANEL: CPT

## 2017-05-16 PROCEDURE — 84484 ASSAY OF TROPONIN QUANT: CPT

## 2017-05-16 PROCEDURE — 82962 GLUCOSE BLOOD TEST: CPT

## 2017-05-16 PROCEDURE — 93005 ELECTROCARDIOGRAM TRACING: CPT

## 2017-05-16 PROCEDURE — 85025 COMPLETE CBC W/AUTO DIFF WBC: CPT

## 2017-05-16 PROCEDURE — 93010 ELECTROCARDIOGRAM REPORT: CPT | Mod: ,,, | Performed by: INTERNAL MEDICINE

## 2017-05-16 NOTE — ED AVS SNAPSHOT
[facility]  [address] [place] [address]               Ming Boateng   2017  1:00 PM   ED    Description:  Male : 1955   Department:  [facility]           Your Care was Coordinated By:     Provider Role From To    Darius Campos MD Attending Provider 17 1311 --      Reason for Visit     Loss of Consciousness           Diagnoses this Visit        Comments    Syncope, unspecified syncope type    -  Primary       ED Disposition     ED Disposition Condition Comment    Discharge             To Do List           Follow-up Information     Follow up with Calvin Elliott MD On 2017.    Specialty:  Family Medicine    Why:  On Friday at 10 AM    Contact information:    [address]  [address]  [place] [address]  [phone]        [facility]     [facility] -  Assistance  Unless otherwise directed by your provider, please contact [facility], our nurse care line that is available for  assistance.     Registered nurses in the [facility] provide: appointment scheduling, clinical advisement, health education, and other advisory services.  Call: [phone] (toll free)               Medications           Message regarding Medications     Verify the changes and/or additions to your medication regime listed below are the same as discussed with your clinician today.  If any of these changes or additions are incorrect, please notify your healthcare provider.        These medications were administered today        Dose Freq    sodium chloride 0.9% bolus 1,000 mL 1,000 mL Once    Sig: Inject 1,000 mLs into the vein once.    Class: Normal    Route: Intravenous           Verify that the below list of medications is an accurate representation of the medications you are currently taking.  If none reported, the list may be blank. If incorrect, please contact your healthcare provider. Carry this  "list with you in case of emergency.           Current Medications     amlodipine (NORVASC) 10 MG tablet Take 1 tablet (10 mg total) by mouth once daily.    aspirin 81 MG Chew Take 1 tablet (81 mg total) by mouth once daily.    blood sugar diagnostic Strp 1 strip by Misc.(Non-Drug; Combo Route) route 3 (three) times daily with meals.    blood-glucose meter kit Use as instructed    calcium acetate (PHOSLO) 667 mg capsule Take 1 capsule (667 mg total) by mouth 3 (three) times daily with meals.    carvedilol (COREG) 12.5 MG tablet Take 1 tablet (12.5 mg total) by mouth 2 (two) times daily with meals.    cetirizine 10 mg chewable tablet Take 10 mg by mouth once daily.    furosemide (LASIX) 40 MG tablet Take 2 tablets (80 mg total) by mouth once daily.    garlic 1,000 mg Cap Take by mouth.    insulin glargine, TOUJEO, (TOUJEO) 300 unit/mL (1.5 mL) InPn pen Inject 15 Units into the skin every evening.    lancets (LANCETS,ULTRA THIN) Misc 1 lancet by Misc.(Non-Drug; Combo Route) route 3 (three) times daily with meals.    lancing device Misc 1 lancet by Misc.(Non-Drug; Combo Route) route 3 (three) times daily with meals.    losartan-hydrochlorothiazide 50-12.5 mg (HYZAAR) 50-12.5 mg per tablet Take 1 tablet by mouth once daily.    pen needle, diabetic 29 gauge x 1/2" Ndle 1 pen by Misc.(Non-Drug; Combo Route) route 3 (three) times daily with meals.    pravastatin (PRAVACHOL) 20 MG tablet Take 1 tablet (20 mg total) by mouth once daily.    sertraline (ZOLOFT) 25 MG tablet Take 1 tablet (25 mg total) by mouth once daily.    sevelamer carbonate (RENVELA) 800 mg Tab Take 1 tablet (800 mg total) by mouth 3 (three) times daily with meals.    sodium chloride 0.9% bolus 1,000 mL Inject 1,000 mLs into the vein once.           Clinical Reference Information           Your Vitals Were     BP Pulse Temp Resp Height Weight    143/61 (Patient Position: Sitting) 52 97.7 °F (36.5 °C) (Oral) 18 5' 8" (1.727 m) 77.1 kg (170 lb)    SpO2 BMI    "          100% 25.85 kg/m2         Allergies as of 5/16/2017        Reactions    Cayenne Pepper       Immunizations Administered on Date of Encounter - 5/16/2017     None      ED Micro, Lab, POCT     Start Ordered       Status Ordering Provider    05/16/17 1318 05/16/17 1318  POCT glucose  Once      Final result     05/16/17 1312 05/16/17 1312  CBC auto differential  STAT      Final result     05/16/17 1312 05/16/17 1312  Comprehensive metabolic panel  STAT      Final result     05/16/17 1312 05/16/17 1312  Urinalysis - Clean Catch  STAT      Acknowledged     05/16/17 1312 05/16/17 1312  Troponin I  STAT      Final result     05/16/17 1312 05/16/17 1312  B-Type natriuretic peptide (BNP)  STAT      Final result       ED Imaging Orders     Start Ordered       Status Ordering Provider    05/16/17 1312 05/16/17 1312  X-Ray Chest PA And Lateral  1 time imaging      Final result       Discharge References/Attachments     SYNCOPE, CAUSES OF (ENGLISH)    SYNCOPE, TREATING: PREVENTION (ENGLISH)      Your Scheduled Appointments     May 19, 2017 10:20 AM CDT   Established Patient Visit with Shailesh Najera MD   [facility] ([facility])    [address] [place] [address], [address]  [facility] [address]   [phone]            May 24, 2017  9:00 AM CDT   New Occupational Therapy Patient with Talisha Young, OT   [facility]    [address]  [place] [address]   [phone]            May 24, 2017 10:00 AM CDT   New Physical Therapy Patient with Elizabeth Santiago, PT   [facility] ([facility]    [address]  [place] [address]   [phone]              [employer] [employer] [facility] Sign-Up     Activating your [facility] account is as easy as 1-2-3!     1) Visit [email or web address], select Sign Up Now, enter this activation code and your date of birth, then select Next.  [email or web address]  Expires: 6/15/2017  8:22 PM      2) Create a username  and password to use when you visit [facility] in the future and select a security question in case you lose your password and select Next.    3) Enter your e-mail address and click Sign Up!    Additional Information  If you have questions, please e-mail [email or web address] or call [phone] to talk to our [employer] staff. Remember, [facility] is NOT to be used for urgent needs. For medical emergencies, dial 911.          [facility] complies with applicable Federal civil rights laws and does not discriminate on the basis of race, color, national origin, age, disability, or sex.        Language Assistance Services     ATTENTION: Language assistance services are available, free of charge. Please call [phone].      ATENCIÓN: Si habla español, tiene a gaston disposición servicios gratuitos de asistencia lingüística. Llame al [phone].     CHÚ Ý: N?u b?n nói Ti?ng Vi?t, có các d?ch v? h? tr? ngôn ng? mi?n phí dành cho b?n. G?i s? [phone].

## 2017-05-16 NOTE — ED PROVIDER NOTES
Encounter Date: 5/16/2017       History     Chief Complaint   Patient presents with    Loss of Consciousness     became weak and dizzy today and lost consciousness briefly, falling to the ground. Witnessed by family. Denies hitting head.      Review of patient's allergies indicates:   Allergen Reactions    Cayenne pepper      HPI Comments: 61-year-old male brought to the emergency room by daughter after a syncopal episode.  Patient had just stood up and he felt lightheaded, subsequently falling.  Was back to baseline after being laid supine.  Denies any symptoms at this time.  Does report landing on his left elbow, but he and family deny striking his head.  It was recently discharged after being admitted for 1-2 weeks for fluid overload, and has been adhering to his fluid restriction and  regimen.  However, he reports one episode of near-syncope last week and now a syncopal episode today.  Denies any other symptoms.  Denies any vision changes, sore throat, headache focal numbness or weakness, chest pain, shortness of breath, abdominal pain, constipation, diarrhea, dysuria, hematuria, fever, chills.    The history is provided by the patient and a relative.     Past Medical History:   Diagnosis Date    CHF (congestive heart failure)     Diabetes mellitus     Hypertension      Past Surgical History:   Procedure Laterality Date    PANCREATECTOMY       No family history on file.  Social History   Substance Use Topics    Smoking status: Never Smoker    Smokeless tobacco: None    Alcohol use No     Review of Systems   Constitutional: Negative for chills, fatigue and fever.   HENT: Negative for congestion, rhinorrhea, sore throat and voice change.    Eyes: Negative for photophobia, pain and redness.   Respiratory: Negative for cough, choking and shortness of breath.    Cardiovascular: Negative for chest pain, palpitations and leg swelling.   Gastrointestinal: Negative for abdominal pain, diarrhea, nausea and  vomiting.   Genitourinary: Negative for dysuria, frequency and urgency.   Musculoskeletal: Negative for back pain, neck pain and neck stiffness.   Neurological: Positive for syncope and light-headedness. Negative for seizures, speech difficulty, numbness and headaches.   All other systems reviewed and are negative.      Physical Exam   Initial Vitals   BP Pulse Resp Temp SpO2   05/16/17 1301 05/16/17 1301 05/16/17 1301 05/16/17 1301 05/16/17 1301   143/61 52 18 97.7 °F (36.5 °C) 100 %     Physical Exam    Nursing note and vitals reviewed.  Constitutional: He appears well-developed and well-nourished. He appears distressed.   HENT:   Head: Normocephalic and atraumatic.   Oropharynx clear; dry mucous membranes   Eyes: Conjunctivae and EOM are normal. Pupils are equal, round, and reactive to light.   Neck: Normal range of motion. Neck supple. No tracheal deviation present.   Cardiovascular: Normal rate, regular rhythm, normal heart sounds and intact distal pulses.   Pulmonary/Chest: Breath sounds normal. No respiratory distress. He has no wheezes. He has no rhonchi. He has no rales.   Abdominal: Soft. Bowel sounds are normal. He exhibits no distension. There is no tenderness. There is no rebound and no guarding.   Musculoskeletal: Normal range of motion. He exhibits edema (Trace pitting edema to bilateral lower extremities). He exhibits no tenderness.   Abrasion to left elbow noted   Neurological: He is alert and oriented to person, place, and time. He has normal strength. No cranial nerve deficit or sensory deficit.   Skin: Skin is warm and dry.         ED Course   Procedures  Labs Reviewed   CBC W/ AUTO DIFFERENTIAL - Abnormal; Notable for the following:        Result Value    RBC 4.07 (*)     Hemoglobin 10.5 (*)     Hematocrit 30.5 (*)     MCV 75 (*)     MCH 25.8 (*)     Platelets 482 (*)     Lymph% 17.9 (*)     Platelet Estimate Increased (*)     All other components within normal limits   COMPREHENSIVE METABOLIC  PANEL - Abnormal; Notable for the following:     Glucose 165 (*)     BUN, Bld 99 (*)     Creatinine 2.4 (*)     eGFR if  32 (*)     eGFR if non  28 (*)     All other components within normal limits   POCT GLUCOSE - Abnormal; Notable for the following:     POCT Glucose 189 (*)     All other components within normal limits   TROPONIN I   B-TYPE NATRIURETIC PEPTIDE   URINALYSIS     EKG Readings: (Independently Interpreted)   Initial Reading: No STEMI. Previous EKG Date: 5/2/17 (Unchanged). Rhythm: Normal Sinus Rhythm. Heart Rate: 54. Ectopy: No Ectopy. Conduction: Normal. ST Segments: Normal ST Segments. T Waves: Normal. Axis: Normal.     ECG Results         EKG 12-lead (Final result) Result time:  05/16/17 14:10:51    Final result by Interface, Lab In [facility] (05/16/17 14:10:51)    Narrative:    Test Reason : R07.9  Blood Pressure :  /  mmHG  Vent. Rate : 054 BPM     Atrial Rate : 054 BPM     P-R Int : 208 ms          QRS Dur : 104 ms      QT Int : 478 ms       P-R-T Axes : 061 049 072 degrees     QTc Int : 453 ms    Sinus bradycardia  Otherwise normal ECG  Compared to previous tracing  rate have decreased    Confirmed by [place]an [place]ario [place] ([id number]) on 5/16/2017 2:10:45 PM    Referred By: [email or web address]   SELF           Confirmed By:Anil Rosario MD            X-Rays:   Independently Interpreted Readings:   Chest X-Ray: CXR Interpreted by radiologist and visualized by me:      Imaging Results         X-Ray Chest PA And Lateral (Final result) Result time:  05/16/17 14:02:16    Final result by Sterling Sanon MD (05/16/17 14:02:16)    Impression:     No active process.      Electronically signed by: CHIO SANON MD  Date:     05/16/17  Time:    14:02     Narrative:    2 views of chest, comparison 05/07/2017.  Mild cardiomegaly.  Lungs clear.              Medical Decision Making:   Initial Assessment:   61-year-old Male presents the emergency department after a syncopal  episode  Differential Diagnosis:   Dehydration, vasovagal, electrolyte dyscrasia, arrhythmia, iatrogenic  Independently Interpreted Test(s):   I have ordered and independently interpreted X-rays - see prior notes.  I have ordered and independently interpreted EKG Reading(s) - see prior notes  Clinical Tests:   Lab Tests: Reviewed       <> Summary of Lab: Elevated BUNs consistent with prior  ED Management:  Patient in no apparent distress at this time.  I have called [facility] family medicine to attempt to reschedule the patient's appointment that he missed today.  Discussed disposition with family including discharge with instructions to follow-up with [facility] family medicine, return with new or worsening symptoms.  They expressed good understanding and are comfortable with discharge at this time. [facility] FM has seen patient at bedside as well, has scheduled an appointment for tomorrow at 1500                   ED Course     Clinical Impression:   The encounter diagnosis was Syncope, unspecified syncope type.    Disposition:   Disposition: Discharged  Condition: Stable       Darius Campos MD  05/16/17 0798

## 2017-05-16 NOTE — ED TRIAGE NOTES
60 Y/O M with CC of LOC. Pt had syncopal episode today. Family states HR had been 60 and was given his medications, following parameters. Currently bradycardic. States is feeling a little better. No other complaints verbalized. Patient on cardiac monitor, automatic blood pressure cuff and pulse oximeter. NAD noted. Will continue to monitor.

## 2017-05-19 ENCOUNTER — OFFICE VISIT (OUTPATIENT)
Dept: FAMILY MEDICINE | Facility: HOSPITAL | Age: 62
End: 2017-05-19
Attending: FAMILY MEDICINE
Payer: MEDICAID

## 2017-05-19 VITALS
SYSTOLIC BLOOD PRESSURE: 111 MMHG | WEIGHT: 173.94 LBS | HEART RATE: 65 BPM | DIASTOLIC BLOOD PRESSURE: 62 MMHG | BODY MASS INDEX: 26.36 KG/M2 | HEIGHT: 68 IN

## 2017-05-19 DIAGNOSIS — I50.30 (HFPEF) HEART FAILURE WITH PRESERVED EJECTION FRACTION: ICD-10-CM

## 2017-05-19 DIAGNOSIS — I50.1 PULMONARY EDEMA WITH CONGESTIVE HEART FAILURE: Primary | ICD-10-CM

## 2017-05-19 PROCEDURE — 99213 OFFICE O/P EST LOW 20 MIN: CPT | Performed by: FAMILY MEDICINE

## 2017-05-19 RX ORDER — LOSARTAN POTASSIUM AND HYDROCHLOROTHIAZIDE 25; 100 MG/1; MG/1
1 TABLET ORAL
COMMUNITY
End: 2017-08-30 | Stop reason: SDUPTHER

## 2017-05-19 RX ORDER — PRAVASTATIN SODIUM 20 MG/1
20 TABLET ORAL
COMMUNITY
End: 2017-06-14 | Stop reason: SDUPTHER

## 2017-05-19 RX ORDER — NAPROXEN SODIUM 220 MG/1
81 TABLET, FILM COATED ORAL
COMMUNITY
Start: 2016-10-06 | End: 2017-08-09 | Stop reason: SDUPTHER

## 2017-05-19 RX ORDER — INSULIN LISPRO 100 [IU]/ML
6 INJECTION, SOLUTION INTRAVENOUS; SUBCUTANEOUS
COMMUNITY
Start: 2016-10-06 | End: 2017-06-18 | Stop reason: SDUPTHER

## 2017-05-19 RX ORDER — AMLODIPINE BESYLATE 5 MG/1
5 TABLET ORAL EVERY MORNING
Status: ON HOLD | COMMUNITY
Start: 2016-10-06 | End: 2017-07-20

## 2017-05-19 RX ORDER — INSULIN ASPART 100 [IU]/ML
0.5 INJECTION, SOLUTION INTRAVENOUS; SUBCUTANEOUS
COMMUNITY
End: 2017-06-18 | Stop reason: SDUPTHER

## 2017-05-19 RX ORDER — INSULIN GLARGINE 100 [IU]/ML
10 INJECTION, SOLUTION SUBCUTANEOUS
COMMUNITY
Start: 2016-10-06 | End: 2017-06-18 | Stop reason: SDUPTHER

## 2017-05-19 RX ORDER — SERTRALINE HYDROCHLORIDE 100 MG/1
100 TABLET, FILM COATED ORAL
Status: ON HOLD | COMMUNITY
End: 2017-07-20

## 2017-05-19 RX ORDER — CARVEDILOL 12.5 MG/1
6.25 TABLET ORAL 2 TIMES DAILY WITH MEALS
Qty: 180 TABLET | Refills: 0 | Status: SHIPPED | OUTPATIENT
Start: 2017-05-19 | End: 2017-08-30 | Stop reason: SDUPTHER

## 2017-05-19 RX ORDER — HYDROCODONE BITARTRATE AND ACETAMINOPHEN 7.5; 325 MG/1; MG/1
TABLET ORAL
Refills: 0 | Status: ON HOLD | COMMUNITY
Start: 2017-02-20 | End: 2017-07-20

## 2017-05-19 RX ORDER — IBUPROFEN 600 MG/1
TABLET ORAL
Refills: 0 | COMMUNITY
Start: 2017-02-20 | End: 2017-12-14

## 2017-05-19 RX ORDER — ASPIRIN 81 MG/1
81 TABLET ORAL
COMMUNITY
Start: 2017-03-19 | End: 2017-06-14 | Stop reason: SDUPTHER

## 2017-05-19 RX ORDER — GARLIC 1000 MG
CAPSULE ORAL
COMMUNITY
End: 2019-02-19

## 2017-05-19 NOTE — PROGRESS NOTES
Subjective:       Patient ID: Ming Boateng is a 61 y.o. male.    Chief Complaint: Follow-up (hospital)    HPI Comments: Patient is a 62 yo male here for hospital follow up. Patient was recently admitted for new onset decompensated HFpEF with EF of 65%. Today patient's daughter states that patient last week patient had syncopal episode that resulted in patient being taken to the ED. In the ED patient was evaluated and it was determined that it was caused by high dose of coreg, 12.5mg BID. Today patient was been taking 6.25mg BID and has not felt dizzy since. Family is planning moving to [place] with son for better at home care.     Review of Systems   Constitutional: Negative for chills, fatigue and fever.   HENT: Negative for congestion and sinus pressure.    Respiratory: Negative for chest tightness and shortness of breath.    Cardiovascular: Negative for chest pain, palpitations and leg swelling.   Gastrointestinal: Negative for abdominal pain, constipation, diarrhea, nausea and vomiting.   Genitourinary: Negative for dysuria.   Neurological: Negative for light-headedness.       Objective:      Vitals:    05/19/17 1102   BP: 111/62   Pulse: 65     Physical Exam   Constitutional: He is oriented to person, place, and time. He appears well-developed and well-nourished.   HENT:   Head: Normocephalic and atraumatic.   Eyes: Pupils are equal, round, and reactive to light.   Cardiovascular: Normal rate, regular rhythm, normal heart sounds and intact distal pulses.  Exam reveals no gallop and no friction rub.    No murmur heard.  Pulmonary/Chest: Effort normal and breath sounds normal. No respiratory distress. He has no wheezes. He has no rales. He exhibits no tenderness.   Abdominal: Soft. Bowel sounds are normal. There is no tenderness.   Musculoskeletal: Normal range of motion. He exhibits no edema or tenderness.   Neurological: He is alert and oriented to person, place, and time. He has normal reflexes.   Skin: Skin is  warm. No erythema.   Psychiatric: He has a normal mood and affect. His behavior is normal.   Nursing note and vitals reviewed.      Assessment:       1. Pulmonary edema with congestive heart failure    2. (HFpEF) heart failure with preserved ejection fraction        Plan:       Pulmonary edema with congestive heart failure  -     Ambulatory referral to Cardiology    (HFpEF) heart failure with preserved ejection fraction  -     Ambulatory referral to Cardiology  -     carvedilol (COREG) 12.5 MG tablet; Take 0.5 tablets (6.25 mg total) by mouth 2 (two) times daily with meals.  Dispense: 180 tablet; Refill: 0    Patient to continue follow up with Ne[facility]rology at [facility].     Return in about 4 weeks (around 6/16/2017).    Shailesh Najera MD  5/19/2017  1:27 PM  [employer] [facility] PGY-2

## 2017-05-22 NOTE — PROGRESS NOTES
I assume primary medical responsibility for this patient, I have reviewed the case history, findings, diagnosis and treatment plan with the resident and agree that the care is reasonable and necessary. This service has been performed by a resident without the presence of a teaching physician under the primary care exception  Suzi Fulton  5/22/2017

## 2017-05-24 ENCOUNTER — CLINICAL SUPPORT (OUTPATIENT)
Dept: REHABILITATION | Facility: HOSPITAL | Age: 62
End: 2017-05-24
Attending: STUDENT IN AN ORGANIZED HEALTH CARE EDUCATION/TRAINING PROGRAM
Payer: MEDICAID

## 2017-05-24 DIAGNOSIS — R29.898 WEAKNESS OF BOTH LOWER EXTREMITIES: Primary | ICD-10-CM

## 2017-05-24 DIAGNOSIS — Z78.9 DECREASED ACTIVITIES OF DAILY LIVING (ADL): ICD-10-CM

## 2017-05-24 DIAGNOSIS — R26.81 GAIT INSTABILITY: ICD-10-CM

## 2017-05-24 DIAGNOSIS — R29.898 UPPER EXTREMITY WEAKNESS: ICD-10-CM

## 2017-05-24 DIAGNOSIS — R53.81 DEBILITY: ICD-10-CM

## 2017-05-24 PROCEDURE — 97163 PT EVAL HIGH COMPLEX 45 MIN: CPT | Performed by: PHYSICAL MEDICINE & REHABILITATION

## 2017-05-24 PROCEDURE — 97166 OT EVAL MOD COMPLEX 45 MIN: CPT

## 2017-05-24 NOTE — PLAN OF CARE
Create Note                              My Note   10:07 AM             Edit                                       Name:Ming Boateng    Physician:Sendy Dueñas DO    Date of eval:5/24/2017    Orders:  Physical Therapy evaluate and treat    Clinic: [id number]    Diagnosis:      1.     Weakness of both lower extremities              2.     Gait instability              3.     Debility                        Precautions: CVA, CHF    Evaluation date: 5/24/2017    Visit # authorized: 1/50    Authorization period: 12/31/17    Plan of care expiration: 7/19/17         Start time: 10:10 AM    Stop Time: 10:55 AM           Timed                   Procedure      Min         Units                                                                                              Untimed                   Procedure      Min      Units        IE     45     1                                                           Subjective            Chief complaint: Patient's son states 3 weeks ago patient collapsed at home due to CHF and was hospitalized for 9 days. After discharge, fell at home in bathtub and sustained cuts and abrasions on left elbow and left side of back.  Also, states in November, 2016 patient had CVA with weakness to left side. Post CVA, was using RW with assistance of another person to move in house. States now needs assistance with transfers and with walking.     Onset of condition : 6 months/3 weeks     Mechanism of onset :  CVA/CHF/Fall         Current functional limitations: transfers; walking, dressing and bathing    PLOF includes:Independent with self care and ambulated independent of AD    Prior Physical Therapy: Acute care PT/OT              Home/Living Environment:Lives with family with 5 inch step to enter home         Pertinent PMH/Comorbidities:HTN, CHF, CVA, Diabetes, fainting/dizziness occasionally, renal failure         Patients structured exercise routine:  "None    Exercise routine prior to onset: None         Occupation: Pt is retired.         Allergies:              Review of patient's allergies indicates:       Allergen     Reactions            Cayenne     Anaphylaxis                   Throat swells up             Cayenne pepper                       MRI: None on file              Xray: None on file         Pain level with 0 being the lowest and 10 being the highest presently: 0/10    Pain level with 0 being the lowest and 10 being the highest at worst: 0/10    Pain level with 0 being the lowest and 10 being the highest at best: 0/10          Patient Goals: "I want to be able to walk better"           Objective            Postural examination in standing and sitting:    - (+) forward head    - (+) forward shoulders    - (-)  hip high    -(-)  shoulder high    - (-) genu valgus/varus              Functional assessment:     - walking/gait: Ambulates using RW with SBA of one with slow pace and short stride    - sit to stand: Independent, uses hands to push up from sitting    - sit to supine: min assist of one         - supine to sit: Min assist of one              Knee  ROM: (measured in degrees)       Knee     (R)     (L)       Flexion     135     135       Extension     0     0            Hip  ROM: (measured in degrees)       Hip     (R)     (L)       Flexion     100     100       Abduction     20     20       Extension     5     5                                                    Muscle Strength      MMT     R     L       Hip flexion     4/5     4/5       Hip abduction     4/5     4/5       Hip extension     4/5     4/5       Hip ER     4/5     4/5       Hip IR     4/5     4/5       Knee extension     4/5     4/5       Knee flexion     4/5     4/5       Ankle dorsiflexion     4/5     4/5       Ankle plantar flexion     4/5     4/5       Ankle inversion     4/5     4/5       Ankle eversion     4/5     4/5            Endurance is  fair          Special " tests:Balance: TUG: used RW and took 26 seconds to complete                                         30 second Chair Stand Test: 0 --used his hands to push up form sitting    High risk for falls              Sensation: Intact         Outcome measures:      Impairment function:  Mobility    FOTO  score: 26/100         PATIENT SEEN FOR EVALUATION ONLY                      Assessment       This is a 61 y.o. male referred to outpatient physical therapy and presents with a medical diagnosis of debility, lower extremity weakness, gait instability and PT diagnosis/findings of decreased strength of bilateral lower extremities, requires assitance with transfers, gait instability  demonstrating limitations as described in the problem list. Patient was in agreement with set goals and plan of care.  Pt will benefit from physical therapy services in order to maximize functional independence. Rehab potential is good           History    Co-morbidities and personal factors that may impact the plan of care     Examination    Body Structures and Functions, activity limitations and participation restrictions that may impact the plan of care     Clinical Presentation       Decision Making/ Complexity Score       Co-morbidities:          CVA, CHF, dizziness/fainting, renal falure         Personal Factors:     Language-speaks Tajik     Body Regions:bilateral lower extremities         Body Systems: Musculoskeletal: decreased strength BLE; neuromuscular: gait instabilty and poor/fair balance ; requires assist wit transfers         Activity limitations/Participation Restrictions: Mobility, self care and general tasks and demands                                     Evoloving clinical presentation with unstable and unpredictable characteristics               High         FOTO Score: 26/100                      Medical necessity is demonstrated by the following IMPAIRMENTS/PROBLEM LIST:    Decreased strength BLE    Gait  Instability    Requires assistance for transfers    Requires assistance with ADL and household activities     Functional impairment rating of: 70%         GOALS:     Short Term Goals:  4 weeks    Increase strength 1/2 muscle grade    Patient will be able to perform transfers independently    Be able to perform HEP with minimal cueing required    Improve functional impairment of mobility to 50%         Long Term Goals: 8 weeks    Improve muscle strength with MMT to 4+/5 to 5/5    Restore ability to ambulate with normal gait pattern using necessary AD    Restore ability to stand for ADL with good balance    Restore ability to perform sit to stand transfer independently    Restore ability to climb stairs in a safe manner with necessary AD    Restore ability to perform sit to supine and supine to sit transfers independently    Restore ability to perform ADL's independently     Improve functional impairment of mobility to 40%           Plan            Pt will be treated by physical therapy 2 times a week for 8 weeks to include: Therapeutic exercises to increase ROM, strength and stabilization; joint and soft tissue mobilization with manual therapy techniques to decrease muscle tightness, pain and improve joint mobility; neuromuscular re-education to improve balance, coordination, kinesthetic sense and proprioception, therapeutic activities to improve coordination, strength and function, therapeutic taping to decrease pain, provide support and improve function of the LE(s); modalities such as moist heat, ice, ultrasound and electrical stimulation to increase circulation, decrease pain and inflammation; dry needling with manual therapy techniques to decrease pain, inflammation and swelling, increase circulation and promote healing process will be considered and utilized as needed;   Pt may be seen by PTA to carry out plan of care as part of the Rehab team.         I certify the need for these services furnished under this  plan of treatment and while under my care.         ____________________________________ Physician/Referring Practitioner                                              Date of Signature                             Elizabeth Santiago PT

## 2017-05-24 NOTE — PROGRESS NOTES
Name:Ming Boateng  Physician:Sendy Dueñas DO  Date of eval:5/24/2017  Orders:  Physical Therapy evaluate and treat  Clinic: [id number]  Diagnosis:  1. Weakness of both lower extremities     2. Gait instability     3. Debility         Precautions: CVA, CHF  Evaluation date: 5/24/2017  Visit # authorized: 1/50  Authorization period: 12/31/17  Plan of care expiration: 7/19/17    Start time: 10:10 AM  Stop Time: 10:55 AM    Timed     Procedure  Min     Units                         Untimed     Procedure  Min  Units    IE 45 1                 Subjective     Chief complaint: Patient's son states 3 weeks ago patient collapsed at home due to CHF and was hospitalized for 9 days. After discharge, fell at home in bathtub and sustained cuts and abrasions on left elbow and left side of back.  Also, states in November, 2016 patient had CVA with weakness to left side. Post CVA, was using RW with assistance of another person to move in house. States now needs assistance with transfers and with walking.   Onset of condition : 6 months/3 weeks   Mechanism of onset :  CVA/CHF/Fall    Current functional limitations: transfers; walking, dressing and bathing  PLOF includes:Independent with self care and ambulated independent of AD  Prior Physical Therapy: Acute care PT/OT      Home/Living Environment:Lives with family with 5 inch step to enter home    Pertinent PMH/Comorbidities:HTN, CHF, CVA, Diabetes, fainting/dizziness occasionally, renal failure    Patients structured exercise routine: None  Exercise routine prior to onset: None    Occupation: Pt is retired.    Allergies:    Review of patient's allergies indicates:   Allergen Reactions    Cayenne Anaphylaxis     Throat swells up     Cayenne pepper        MRI: None on file            Xray: None on file    Pain level with 0 being the lowest and 10 being the highest presently: 0/10  Pain level with 0 being the lowest and 10 being the highest at worst: 0/10  Pain level with 0 being the  "lowest and 10 being the highest at best: 0/10     Patient Goals: "I want to be able to walk better"    Objective     Postural examination in standing and sitting:  - (+) forward head  - (+) forward shoulders  - (-)  hip high  -(-)  shoulder high  - (-) genu valgus/varus        Functional assessment:   - walking/gait: Ambulates using RW with SBA of one with slow pace and short stride  - sit to stand: Independent, uses hands to push up from sitting  - sit to supine: min assist of one       - supine to sit: Min assist of one      Knee  ROM: (measured in degrees)   Knee (R) (L)   Flexion 135 135   Extension 0 0     Hip  ROM: (measured in degrees)   Hip (R) (L)   Flexion 100 100   Abduction 20 20   Extension 5 5               Muscle Strength  MMT R L   Hip flexion 4/5 4/5   Hip abduction 4/5 4/5   Hip extension 4/5 4/5   Hip ER 4/5 4/5   Hip IR 4/5 4/5   Knee extension 4/5 4/5   Knee flexion 4/5 4/5   Ankle dorsiflexion 4/5 4/5   Ankle plantar flexion 4/5 4/5   Ankle inversion 4/5 4/5   Ankle eversion 4/5 4/5     Endurance is  fair     Special tests:Balance: TUG: used RW and took 26 seconds to complete                                       30 second Chair Stand Test: 0 --used his hands to push up form sitting  High risk for falls      Sensation: Intact    Outcome measures:    Impairment function:  Mobility  FOTO  score: 26/100    PATIENT SEEN FOR EVALUATION ONLY         Assessment   This is a 61 y.o. male referred to outpatient physical therapy and presents with a medical diagnosis of debility, lower extremity weakness, gait instability and PT diagnosis/findings of decreased strength of bilateral lower extremities, requires assitance with transfers, gait instability  demonstrating limitations as described in the problem list. Patient was in agreement with set goals and plan of care.  Pt will benefit from physical therapy services in order to maximize functional independence. Rehab potential is " good    History  Co-morbidities and personal factors that may impact the plan of care Examination  Body Structures and Functions, activity limitations and participation restrictions that may impact the plan of care Clinical Presentation   Decision Making/ Complexity Score   Co-morbidities:     CVA, CHF, dizziness/fainting, renal falure    Personal Factors:   Language-speaks Kiswahili Body Regions:bilateral lower extremities    Body Systems: Musculoskeletal: decreased strength BLE; neuromuscular: gait instabilty and poor/fair balance ; requires assist wit transfers    Activity limitations/Participation Restrictions: Mobility, self care and general tasks and demands               Evoloving clinical presentation with unstable and unpredictable characteristics     High    FOTO Score: 26/100         Medical necessity is demonstrated by the following IMPAIRMENTS/PROBLEM LIST:  Decreased strength BLE  Gait Instability  Requires assistance for transfers  Requires assistance with ADL and household activities   Functional impairment rating of: 70%    GOALS:   Short Term Goals:  4 weeks  Increase strength 1/2 muscle grade  Patient will be able to perform transfers independently  Be able to perform HEP with minimal cueing required  Improve functional impairment of mobility to 50%    Long Term Goals: 8 weeks  Improve muscle strength with MMT to 4+/5 to 5/5  Restore ability to ambulate with normal gait pattern using necessary AD  Restore ability to stand for ADL with good balance  Restore ability to perform sit to stand transfer independently  Restore ability to climb stairs in a safe manner with necessary AD  Restore ability to perform sit to supine and supine to sit transfers independently  Restore ability to perform ADL's independently   Improve functional impairment of mobility to 40%    Plan     Pt will be treated by physical therapy 2 times a week for 8 weeks to include: Therapeutic exercises to increase ROM, strength and  stabilization; joint and soft tissue mobilization with manual therapy techniques to decrease muscle tightness, pain and improve joint mobility; neuromuscular re-education to improve balance, coordination, kinesthetic sense and proprioception, therapeutic activities to improve coordination, strength and function, therapeutic taping to decrease pain, provide support and improve function of the LE(s); modalities such as moist heat, ice, ultrasound and electrical stimulation to increase circulation, decrease pain and inflammation; dry needling with manual therapy techniques to decrease pain, inflammation and swelling, increase circulation and promote healing process will be considered and utilized as needed;   Pt may be seen by PTA to carry out plan of care as part of the Rehab team.    I certify the need for these services furnished under this plan of treatment and while under my care.    ____________________________________ Physician/Referring Practitioner                                Date of Signature            Elizabeth Santiago PT
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
## 2017-05-29 PROBLEM — Z78.9 DECREASED ACTIVITIES OF DAILY LIVING (ADL): Status: ACTIVE | Noted: 2017-05-29

## 2017-05-29 PROBLEM — R29.898 UPPER EXTREMITY WEAKNESS: Status: ACTIVE | Noted: 2017-05-29

## 2017-05-30 ENCOUNTER — CLINICAL SUPPORT (OUTPATIENT)
Dept: REHABILITATION | Facility: HOSPITAL | Age: 62
End: 2017-05-30
Attending: STUDENT IN AN ORGANIZED HEALTH CARE EDUCATION/TRAINING PROGRAM
Payer: MEDICAID

## 2017-05-30 DIAGNOSIS — R29.898 UPPER EXTREMITY WEAKNESS: ICD-10-CM

## 2017-05-30 DIAGNOSIS — Z78.9 DECREASED ACTIVITIES OF DAILY LIVING (ADL): ICD-10-CM

## 2017-05-30 PROCEDURE — 97530 THERAPEUTIC ACTIVITIES: CPT

## 2017-05-30 NOTE — PROGRESS NOTES
"TIME RECORD    Date:  05/30/2017    Start Time:  9:45  Stop Time:  10:35    PROCEDURES:    TIMED  Procedure Min.   TA 50                     UNTIMED  Procedure Min.             Total Timed Minutes:  50  Total Timed Units:  3  Total Untimed Units:  0  Charges Billed/# of units:  3TA      Progress/Current Status    Subjective:     Patient ID: Ming Boateng is a 61 y.o. male.  Diagnosis:   1. Upper extremity weakness     2. Decreased activities of daily living (ADL)       Pain: 0 /10  Pt states "I feel ok."      Objective:     Pt seen by OT this session. Treatment consisted of the following:     Date: 5/30/17    Visit: 1   Shoulder shrugs 10 reps   Scapular squeezes 10 reps   Dowel - FF, supine, 0# 2 x 10 reps   Dowel - chest press, supine, 2# 2 x 10 reps   Tband- yellow    -rows 2 x 10 reps   -horizontal abd 2 x 10 reps   -biceps curls 2 x 10 reps   -triceps ext 2 x 10 reps   UBE, L1 2' forward, 2'30" back   PHG, 25# 3 x 10 reps, B hands   Wrist dexerciser with 1# wrist weight 4 reps with R hand, 2 reps with L hand   Functional task addressing buttoning Required min A, pt reported he could not see it well due to not having his glasses               Assessment:     Pt tolerated treatment fairly well today. Pt denied pain during session. Pt with good participation and reports compliance with HEP.  Pt and his sister educated on pt new HEP with handout and yellow theraband.  Pt and sister verbalized understanding of education.  Pt cont to present with decreased endurance, decreased ADLs, and weakness in B UE.  Pt would cont to benefit from skilled OT services to maximize functional use of B UE.    Patient Education/Response:     Cont HEP. Added theraband rows, horizontal abd, biceps curls, and triceps ext with yellow band. Pt given handout and yellow band. Pt verbalized and demonstrated understanding of HEP.    Plans and Goals:     Cont OT poc 1-2x/week for 6-8 weeks during certification period 5/24/17 to 7/24/17 in pursuit " of established goals.    Goals to be met in 4 weeks: (6/24/17)  1) Initiate Hep   2) Pt will increase L shoulder AROM by 10 degrees grossly for improved performance with overhead ADL's  3) Pt will report 3/10 pain in (R)shoulder at worst  4) Pt will demonstrate increased MMT in each UE by at least 1/2 grade for increased function     Goals to be met by discharge:  1) Independent with HEP  2) Pt will require no more than Supervision with feeding  3) Pt will demonstrate (L/R) UE MMT WFL grossly for [place] with functional activities  4) Independent and pain free with ADL's and IADL's  5) Patient will be able to achieve FOTO less than or equal to 43% limitation with UE function and self care demonstrating overall improved functional ability with upper extremity.   6) pt will be Supervision with UE dressing     OSKAR Martines

## 2017-06-01 ENCOUNTER — CLINICAL SUPPORT (OUTPATIENT)
Dept: REHABILITATION | Facility: HOSPITAL | Age: 62
End: 2017-06-01
Attending: STUDENT IN AN ORGANIZED HEALTH CARE EDUCATION/TRAINING PROGRAM
Payer: MEDICAID

## 2017-06-01 DIAGNOSIS — R53.81 DEBILITY: ICD-10-CM

## 2017-06-01 DIAGNOSIS — R26.81 GAIT INSTABILITY: ICD-10-CM

## 2017-06-01 DIAGNOSIS — R29.898 WEAKNESS OF BOTH LOWER EXTREMITIES: ICD-10-CM

## 2017-06-01 PROCEDURE — 97110 THERAPEUTIC EXERCISES: CPT | Performed by: PHYSICAL MEDICINE & REHABILITATION

## 2017-06-01 NOTE — PROGRESS NOTES
"TIME RECORD    Date:  06/01/2017    Start Time:  9:50  Stop Time:  10:30    PROCEDURES:    TIMED  Procedure Min.   TE x 3 40                     UNTIMED  Procedure Min.             Total Timed Minutes:  40  Total Timed Units:  3  Total Untimed Units:  0  Charges Billed/# of units:  3      Progress/Current Status    Subjective:     Patient ID: Ming Boateng is a 61 y.o. male.  Diagnosis: No diagnosis found.  Pain: 0/10    Objective:         Date  06/01/17   VISIT 2 / 50   FOTO    POC 7/19/17   Supine    Long Sit HS 10 x 10" (B)   QS 10 x 5" (B)   SAQ 2 x 10 (B)   SLR 2 x 10 (B)   ABD Clams 2 x 10 RTB   Seated    Brittanie Hip Add 2 x 10 x 3"   LAQs 2 x 10 (B)   Seated Hip Flex 2 x 10 (B)       Standing    Heel Raises 2 x 10   Toe Raises 2 x 10                               Initials CSL       Assessment:     Pt entered the clinic with a rolling walker with a slightly shuffled gait. Pt's sister remained in the waiting room. Physical therapy treatment was tolerated well. Patient required moderate cueing during therapeutic exercises and had some difficulty understanding some verbal cues. Patient given HEP to follow.     GOALS:   Short Term Goals:  4 weeks  Increase strength 1/2 muscle grade  Patient will be able to perform transfers independently  Be able to perform HEP with minimal cueing required  Improve functional impairment of mobility to 50%     Long Term Goals: 8 weeks  Improve muscle strength with MMT to 4+/5 to 5/5  Restore ability to ambulate with normal gait pattern using necessary AD  Restore ability to stand for ADL with good balance  Restore ability to perform sit to stand transfer independently  Restore ability to climb stairs in a safe manner with necessary AD  Restore ability to perform sit to supine and supine to sit transfers independently  Restore ability to perform ADL's independently   Improve functional impairment of mobility to 40%    Patient Education/Response:     HEP education of hamstring stretches, " "straight leg raises, heel raises, and calf raises. Pt received a printout of the exercises and both him and his sister were educated on how to perform the exercises. The patient and his sister both showed understanding of the exercises for the HEP.     Plans and Goals:     Patient should continue with physical therapy treatment POC as tolerated and make modifications as the patient progresses.     Froilan Guevara, SPT    "I, Elizabeth Santiago, PT , certify that I was present in the room directing the student in service delivery and guiding them using my skilled judgement.  As the co-signing therapist, I have reviewed the student's documentation and am responsible for the treatment, assessment and plan."    Elizabeth Santiago, PT  "

## 2017-06-08 ENCOUNTER — CLINICAL SUPPORT (OUTPATIENT)
Dept: REHABILITATION | Facility: HOSPITAL | Age: 62
End: 2017-06-08
Attending: STUDENT IN AN ORGANIZED HEALTH CARE EDUCATION/TRAINING PROGRAM
Payer: MEDICAID

## 2017-06-08 DIAGNOSIS — R53.81 DEBILITY: ICD-10-CM

## 2017-06-08 DIAGNOSIS — R29.898 UPPER EXTREMITY WEAKNESS: ICD-10-CM

## 2017-06-08 DIAGNOSIS — Z78.9 DECREASED ACTIVITIES OF DAILY LIVING (ADL): ICD-10-CM

## 2017-06-08 DIAGNOSIS — R26.81 GAIT INSTABILITY: ICD-10-CM

## 2017-06-08 DIAGNOSIS — R29.898 WEAKNESS OF BOTH LOWER EXTREMITIES: ICD-10-CM

## 2017-06-08 PROCEDURE — 97530 THERAPEUTIC ACTIVITIES: CPT

## 2017-06-08 PROCEDURE — 97110 THERAPEUTIC EXERCISES: CPT

## 2017-06-08 NOTE — PROGRESS NOTES
"TIME RECORD    Date:  06/08/2017    Start Time:  9:55  Stop Time:  10:40    PROCEDURES:    TIMED  Procedure Min.   TE x 2 30   TE supervised 15                 UNTIMED  Procedure Min.             Total Timed Minutes:  30  Total Timed Units:  2  Total Untimed Units:  0  Charges Billed/# of units:  2 - TE      Progress/Current Status    Subjective:     Patient ID: Ming Boateng is a 61 y.o. male.  Diagnosis:   1. Weakness of both lower extremities     2. Gait instability     3. Debility       Pain: Pt reports pain at 0/10, and he states that he is "feeling good"    Objective:     Pt entered the clinic with roller walker. Pt began treatment 1:1 with PT/SPT x 30 minutes followed by supervision with PT/SPT x 15 minutes performing exercises in the therapy exercise log below to increase bilateral LE strength, ROM, flexibility, and function.       Date  06/08/17 06/01/17   VISIT 3 / 50 2 / 50   FOTO     POC 7/19/17 7/19/17   Supine     Long Sit HS 10 x 10" (B) 10 x 10" (B)   QS -- 10 x 5" (B)   SAQ 2 x 15 (B) 2 x 10 (B)   SLR 2 x 10 (B) 2 x 10 (B)   Bridges 2 x 10    ABD Clams 2 x 10 GTB 2 x 10 RTB   Seated     Brittanie Hip Add 2 x 10 x 3" 2 x 10 x 3"   LAQs 2 x 15 (B) 2 x 10 (B)   Seated Hip Flex 2 x 15 (B) 2 x 10 (B)   Hamstring Curl 2 x 10 RTB         Standing     Heel Raises 2 x 15 2 x 10   Toe Raises 2 x 15 2 x 10        Mini Squats --    Hip Flex --    Hip Ext --    Hip Abd --              Initials CSL CSL       Assessment:     Pt entered the clinic with a rolling walker. Physical therapy treatment was tolerated well with no increase in symptoms during physical therapy treatment. Patient required cueing during all therapeutic exercises and had some difficulty understanding some verbal cues such a repetition count. Pt required frequent cueing for holding the hamstring stretches for the full 10 seconds. Pt educated with new exercises according to the physical therapy exercise log above and demonstrated understanding.    GOALS: " "  Short Term Goals:  4 weeks  Increase strength 1/2 muscle grade  Patient will be able to perform transfers independently  Be able to perform HEP with minimal cueing required  Improve functional impairment of mobility to 50%     Long Term Goals: 8 weeks  Improve muscle strength with MMT to 4+/5 to 5/5  Restore ability to ambulate with normal gait pattern using necessary AD  Restore ability to stand for ADL with good balance  Restore ability to perform sit to stand transfer independently  Restore ability to climb stairs in a safe manner with necessary AD  Restore ability to perform sit to supine and supine to sit transfers independently  Restore ability to perform ADL's independently   Improve functional impairment of mobility to 40%    Patient Education/Response:     Patient educated on how to perform glut bridges along with being given a printout to add to the established HEP. The patient demonstrated understanding to continue HEP.     Plans and Goals:     Patient should continue with physical therapy treatment POC as tolerated and make modifications as the patient progresses.     Froilan Guevara, SPT    "I, Rhoda Franco, PT , certify that I was present in the room directing the student in service delivery and guiding them using my skilled judgement.  As the co-signing therapist, I have reviewed the student's documentation and am responsible for the treatment, assessment and plan."    Rhoda Franco, PT      "

## 2017-06-13 ENCOUNTER — CLINICAL SUPPORT (OUTPATIENT)
Dept: REHABILITATION | Facility: HOSPITAL | Age: 62
End: 2017-06-13
Attending: STUDENT IN AN ORGANIZED HEALTH CARE EDUCATION/TRAINING PROGRAM
Payer: MEDICAID

## 2017-06-13 DIAGNOSIS — R26.81 GAIT INSTABILITY: ICD-10-CM

## 2017-06-13 DIAGNOSIS — R29.898 UPPER EXTREMITY WEAKNESS: ICD-10-CM

## 2017-06-13 DIAGNOSIS — R29.898 WEAKNESS OF BOTH LOWER EXTREMITIES: ICD-10-CM

## 2017-06-13 DIAGNOSIS — Z78.9 DECREASED ACTIVITIES OF DAILY LIVING (ADL): ICD-10-CM

## 2017-06-13 DIAGNOSIS — R53.81 DEBILITY: ICD-10-CM

## 2017-06-13 PROCEDURE — 97530 THERAPEUTIC ACTIVITIES: CPT

## 2017-06-13 PROCEDURE — 97110 THERAPEUTIC EXERCISES: CPT | Performed by: PHYSICAL MEDICINE & REHABILITATION

## 2017-06-13 NOTE — PROGRESS NOTES
"Name: Ming Boateng  Clinic Number: [id number]  Diagnosis:   Encounter Diagnoses   Name Primary?    Weakness of both lower extremities     Gait instability     Debility      Physician: Sendy Dueñas,   Treatment Orders: PT Eval and Treat  Past Medical History:   Diagnosis Date    CHF (congestive heart failure)     Diabetes mellitus     Hypertension        Precautions: None  Visit # authorized: 4/50 on 6/13/2017  Authorization period: 12/31/17  Plan of care expiration: 7/19/17    Start time: 11:00 AM  Stop Time: 11:45 AM    Subjective     Pt reports: feeling better and stronger.    Pain Scale: before treatment: 0/10 currently; after treatment: 0/10    Objective       TREATMENT  Therapeutic exercise: Ming received therapeutic exercises to develop ROM, strength and endurance for 45 minutes including:     Date  6/13/17 06/08/17 06/01/17   VISIT 4/50 3 / 50 2 / 50   FOTO 3/5 2/5 1/5   POC 7/19/17 7/19/17 7/19/17   Supine      Glut Sets 2 x 10 x 5"     Supine HSS w/strap 10 x 10" 10 x 10" (B) 10 x 10" (B)   QS 2 x 10 x 5" -- 10 x 5" (B)   SAQ 2 x 15 (B) 2 x 15 (B) 2 x 10 (B)   SLR 2 x 10 (B) 2 x 10 (B) 2 x 10 (B)   Bridges 2 x 10 2 x 10    ABD Clams 2 x 10 GTB 2 x 10 GTB 2 x 10 RTB   Seated      Brittanie Hip Add 2 x 10 x3" 2 x 10 x 3" 2 x 10 x 3"   LAQs 2 x 15 (B) 2 x 15 (B) 2 x 10 (B)   Seated Hip Flex 2 x 15 2 x 15 (B) 2 x 10 (B)   Hamstring Curl 2 x 10 RTB 2 x 10 RTB          Standing      Heel Raises 2 x 15 2 x 15 2 x 10   Toe Raises 2 x 15 2 x 15 2 x 10         Mini Squats 1 x 10 --    Hip Flex -- --    Hip Ext -- --    Hip Abd -- --                Initials VK CSL CSL       Written Home Exercises Provided: Continue with current HEP/mini Squats  Pt demo good understanding of the education provided. Ming demonstrated good return demonstration of activities.     Pt. education:  · Posture reeducation, body mechanics, HEP,   · No spiritual or educational barriers to learning provided  · Pt has no cultural, educational or " language barriers to learning provided.    Assessment     Patient performed above exercise program with verbal cueing for proper technique and tolerated addition of mini squats. Patient ambulated out of clinic independent of RW with good balance and normal gait pattern  Patient instructed to continue with HEP. Pt will continue to benefit from skilled outpatient physical therapy to address the remaining functional deficits, provide pt/family education, and to maximize pt's level of independence in the home and community environment. .     Goals:   Short Term Goals:  4 weeks  Increase strength 1/2 muscle grade  Patient will be able to perform transfers independently  Be able to perform HEP with minimal cueing required  Improve functional impairment of mobility to 50%    Long Term Goals: 8 weeks  Improve muscle strength with MMT to 4+/5 to 5/5  Restore ability to ambulate with normal gait pattern using necessary AD  Restore ability to stand for ADL with good balance  Restore ability to perform sit to stand transfer independently  Restore ability to climb stairs in a safe manner with necessary AD  Restore ability to perform sit to supine and supine to sit transfers independently  Restore ability to perform ADL's independently   Improve functional impairment of mobility to 40%    Anticipated barriers to physical therapy: None  Pt's spiritual, cultural and educational needs considered and pt agreeable to plan of care and goals        Plan   Continue with established Plan of Care towards PT goals.              Elizabeth Santiago, PT

## 2017-06-13 NOTE — PROGRESS NOTES
"TIME RECORD    Date:  06/13/2017    Start Time:  10:00  Stop Time:  10:45    PROCEDURES:    TIMED  Procedure Min.   TA 45 (15 min 1:1, 30 supervised)                     UNTIMED  Procedure Min.             Total Timed Minutes:  45  Total Timed Units:  3  Total Untimed Units:  0  Charges Billed/# of units:  1TA      Progress/Current Status    Subjective:     Patient ID: Ming Boateng is a 61 y.o. male.  Diagnosis:   1. Upper extremity weakness     2. Decreased activities of daily living (ADL)       Pain: 0 /10  Pt states "I feel ok."  Pt denied c/o pain but reported occasional soreness in B upper traps. Pt reports compliance with HEP. Pt arrived ambulating with RW    Objective:     Pt seen by OT this session. Treatment consisted of the following:     Date: 6/13/17    Visit: 3   Shoulder shrugs 10 reps   Scapular squeezes 10 reps   Dowel - FF, supine, 2# 2 x 10 reps   Dowel - chest press, supine, 3# 3 x 10 reps   Tband- yellow    -rows 2 x 10 reps   -ext standing 2 x 10 reps   -horizontal abd 2 x 10 reps (deferred today due to shoulder pain)   -biceps curls 2 x 10 reps   -triceps ext 2 x 10 reps,   UBE, L3 3' forward, 2' back   PHG, 25# 3 x 10 reps, B hands   Wrist dexerciser with 1# wrist weight 5 reps with R hand, 5 reps with L hand   Pulleys - FF 3'   Supination/pronation, 1# 10 reps each   Wrist flex/ext , 1# 20 reps, B hands   Shoulder abd, 1# 10 reps B UE       Assessment:     Pt tolerated treatment fairly well today. Pt reported occasional soreness in B upper traps. Pt with good participation and reports compliance with HEP.  Pt tolerated progression of ex today without c/o pain. Pt requires min cues to take breaks and pace himself.  Pt cont to present with decreased endurance, decreased ADLs, and weakness in B UE.  Pt progressing well towards goals.  Pt would cont to benefit from skilled OT services to maximize functional use of B UE.    Patient Education/Response:     Cont HEP.     Plans and Goals:     Cont OT " poc 1-2x/week for 6-8 weeks during certification period 5/24/17 to 7/24/17 in pursuit of established goals.    Goals to be met in 4 weeks: (6/24/17)  1) Initiate Hep   2) Pt will increase L shoulder AROM by 10 degrees grossly for improved performance with overhead ADL's  3) Pt will report 3/10 pain in (R)shoulder at worst  4) Pt will demonstrate increased MMT in each UE by at least 1/2 grade for increased function     Goals to be met by discharge:  1) Independent with HEP  2) Pt will require no more than Supervision with feeding  3) Pt will demonstrate (L/R) UE MMT WFL grossly for [place] with functional activities  4) Independent and pain free with ADL's and IADL's  5) Patient will be able to achieve FOTO less than or equal to 43% limitation with UE function and self care demonstrating overall improved functional ability with upper extremity.   6) pt will be Supervision with UE dressing     OSKAR Martines

## 2017-06-14 ENCOUNTER — LAB VISIT (OUTPATIENT)
Dept: LAB | Facility: HOSPITAL | Age: 62
End: 2017-06-14
Attending: FAMILY MEDICINE
Payer: MEDICAID

## 2017-06-14 ENCOUNTER — OFFICE VISIT (OUTPATIENT)
Dept: FAMILY MEDICINE | Facility: HOSPITAL | Age: 62
End: 2017-06-14
Payer: MEDICAID

## 2017-06-14 VITALS
HEART RATE: 66 BPM | DIASTOLIC BLOOD PRESSURE: 63 MMHG | WEIGHT: 170.88 LBS | HEIGHT: 67 IN | BODY MASS INDEX: 26.82 KG/M2 | SYSTOLIC BLOOD PRESSURE: 112 MMHG

## 2017-06-14 DIAGNOSIS — I10 ESSENTIAL HYPERTENSION: ICD-10-CM

## 2017-06-14 DIAGNOSIS — Z79.4 TYPE 2 DIABETES MELLITUS WITH COMPLICATION, WITH LONG-TERM CURRENT USE OF INSULIN: ICD-10-CM

## 2017-06-14 DIAGNOSIS — I10 ESSENTIAL HYPERTENSION: Primary | ICD-10-CM

## 2017-06-14 DIAGNOSIS — I50.30 (HFPEF) HEART FAILURE WITH PRESERVED EJECTION FRACTION: ICD-10-CM

## 2017-06-14 DIAGNOSIS — E11.8 TYPE 2 DIABETES MELLITUS WITH COMPLICATION, WITH LONG-TERM CURRENT USE OF INSULIN: ICD-10-CM

## 2017-06-14 DIAGNOSIS — N17.9 AKI (ACUTE KIDNEY INJURY): ICD-10-CM

## 2017-06-14 PROBLEM — R07.9 CHEST PAIN: Status: RESOLVED | Noted: 2017-05-11 | Resolved: 2017-06-14

## 2017-06-14 PROBLEM — I50.1 PULMONARY EDEMA WITH CONGESTIVE HEART FAILURE: Status: RESOLVED | Noted: 2017-04-29 | Resolved: 2017-06-14

## 2017-06-14 LAB
ALBUMIN SERPL BCP-MCNC: 3.8 G/DL
ALP SERPL-CCNC: 82 U/L
ALT SERPL W/O P-5'-P-CCNC: 17 U/L
ANION GAP SERPL CALC-SCNC: 11 MMOL/L
ANION GAP SERPL CALC-SCNC: 11 MMOL/L
AST SERPL-CCNC: 20 U/L
BILIRUB SERPL-MCNC: 0.3 MG/DL
BUN SERPL-MCNC: 68 MG/DL
BUN SERPL-MCNC: 68 MG/DL
CALCIUM SERPL-MCNC: 9.7 MG/DL
CALCIUM SERPL-MCNC: 9.7 MG/DL
CHLORIDE SERPL-SCNC: 100 MMOL/L
CHLORIDE SERPL-SCNC: 100 MMOL/L
CO2 SERPL-SCNC: 25 MMOL/L
CO2 SERPL-SCNC: 25 MMOL/L
CREAT SERPL-MCNC: 3 MG/DL
CREAT SERPL-MCNC: 3 MG/DL
EST. GFR  (AFRICAN AMERICAN): 25 ML/MIN/1.73 M^2
EST. GFR  (AFRICAN AMERICAN): 25 ML/MIN/1.73 M^2
EST. GFR  (NON AFRICAN AMERICAN): 21 ML/MIN/1.73 M^2
EST. GFR  (NON AFRICAN AMERICAN): 21 ML/MIN/1.73 M^2
GLUCOSE SERPL-MCNC: 88 MG/DL
GLUCOSE SERPL-MCNC: 88 MG/DL
PHOSPHATE SERPL-MCNC: 4.6 MG/DL
POTASSIUM SERPL-SCNC: 4 MMOL/L
POTASSIUM SERPL-SCNC: 4 MMOL/L
PROT SERPL-MCNC: 8.3 G/DL
SODIUM SERPL-SCNC: 136 MMOL/L
SODIUM SERPL-SCNC: 136 MMOL/L

## 2017-06-14 PROCEDURE — 36415 COLL VENOUS BLD VENIPUNCTURE: CPT

## 2017-06-14 PROCEDURE — 99214 OFFICE O/P EST MOD 30 MIN: CPT | Performed by: FAMILY MEDICINE

## 2017-06-14 PROCEDURE — 84100 ASSAY OF PHOSPHORUS: CPT

## 2017-06-14 PROCEDURE — 80053 COMPREHEN METABOLIC PANEL: CPT

## 2017-06-14 NOTE — PROGRESS NOTES
"Subjective:       Patient ID: Ming Boateng is a 61 y.o. male.    Chief Complaint: Follow-up    HPI   History obtained from patient and niece at bedside.     This 61 y.o. male with PMHx of HTN, diabetes and CHF presents to the clinic for follow-up visit. Patient states he was admitted to the hospital approximately 1 month ago for heart failure. He states he has an appointment with a cardiologist on 7/3/17. Since being discharged, he reports no dietary changes and reports compliance with his prescribed medications. He reports onset of generalized fatigue upon awakening this morning. He states he had difficulty sleeping last night but reports no known causes of his sleeplessness. He states he feels "tired" but is not sleepy. He states that he sleeps on 3 pillows at night. He denies associated chest pain, leg swelling, SOB, cough, fever, chills, dizziness, headache, urinary frequency, orthopnea. He states he has been attending physical therapy for the last few days without difficulty. He states that he was able to ambulate without his walker at physical therapy but used his walker today because of the weakness.     Pt's niece reports the pt's home blood sugar readings have been "high." She reports preprandial blood glucose of 200 this morning. She reports morning preprandial blood glucose of 400 3 days ago. She reports compliance with his prescribed medications and reports no dietary changes. He states that his 4 hour postprandial blood glucose is normally 150.     Review of Systems   Constitutional: Positive for fatigue. Negative for activity change, appetite change, chills and fever.   HENT: Negative for congestion.    Eyes: Negative for visual disturbance.   Respiratory: Negative for cough, chest tightness and shortness of breath.    Cardiovascular: Negative for chest pain and leg swelling.   Gastrointestinal: Negative for abdominal pain, constipation, diarrhea, nausea and vomiting.   Genitourinary: Negative for " dysuria and frequency.   Musculoskeletal: Negative for arthralgias.   Skin: Negative for color change.   Neurological: Positive for weakness. Negative for dizziness and headaches.       Objective:      Vitals:    06/14/17 1326   BP: 112/63   Pulse: 66     Physical Exam   Constitutional: He is oriented to person, place, and time. He appears well-developed and well-nourished. No distress.   HENT:   Head: Normocephalic and atraumatic.   Eyes: EOM are normal. Pupils are equal, round, and reactive to light.   Neck: Normal range of motion. Neck supple.   Cardiovascular: Normal rate, regular rhythm and normal heart sounds.    No murmur heard.  Pulmonary/Chest: Effort normal and breath sounds normal. He has no wheezes. He has no rales.   Abdominal: Soft. Bowel sounds are normal. He exhibits no distension. There is no tenderness.   Musculoskeletal: Normal range of motion. He exhibits no edema or tenderness.   Neurological: He is alert and oriented to person, place, and time.   Skin: Skin is warm and dry. He is not diaphoretic.   Nursing note and vitals reviewed.      Assessment:       1. Essential hypertension    2. (HFpEF) heart failure with preserved ejection fraction    3. Type 2 diabetes mellitus with complication, with long-term current use of insulin        Plan:       Essential hypertension  - /63 today  - Continue Norvasc, Coreg, and Losartan-HCTZ  -     Basic metabolic panel; Future; Expected date: 06/14/2017    (HFpEF) heart failure with preserved ejection fraction  - Last EF 65% with DD  - Patient scheduled to follow up with cardiology on 7/3/17  - Continue Coreg and Lasix    Type 2 diabetes mellitus with complication, with long-term current use of insulin  - BG 98 in clinic, patient stated that he ate about an hour ago  - Last Hb A1c 7.7  - Continue Lantus and Novolog    Return in about 1 month (around 7/14/2017), or if symptoms worsen or fail to improve.

## 2017-06-15 ENCOUNTER — CLINICAL SUPPORT (OUTPATIENT)
Dept: REHABILITATION | Facility: HOSPITAL | Age: 62
End: 2017-06-15
Attending: STUDENT IN AN ORGANIZED HEALTH CARE EDUCATION/TRAINING PROGRAM
Payer: MEDICAID

## 2017-06-15 DIAGNOSIS — R29.898 UPPER EXTREMITY WEAKNESS: ICD-10-CM

## 2017-06-15 DIAGNOSIS — Z79.4 TYPE 2 DIABETES MELLITUS WITH COMPLICATION, WITH LONG-TERM CURRENT USE OF INSULIN: Primary | ICD-10-CM

## 2017-06-15 DIAGNOSIS — R26.81 GAIT INSTABILITY: ICD-10-CM

## 2017-06-15 DIAGNOSIS — Z78.9 DECREASED ACTIVITIES OF DAILY LIVING (ADL): ICD-10-CM

## 2017-06-15 DIAGNOSIS — R53.81 DEBILITY: ICD-10-CM

## 2017-06-15 DIAGNOSIS — R29.898 WEAKNESS OF BOTH LOWER EXTREMITIES: ICD-10-CM

## 2017-06-15 DIAGNOSIS — E11.8 TYPE 2 DIABETES MELLITUS WITH COMPLICATION, WITH LONG-TERM CURRENT USE OF INSULIN: Primary | ICD-10-CM

## 2017-06-15 DIAGNOSIS — I50.30 (HFPEF) HEART FAILURE WITH PRESERVED EJECTION FRACTION: ICD-10-CM

## 2017-06-15 PROCEDURE — 97530 THERAPEUTIC ACTIVITIES: CPT

## 2017-06-15 PROCEDURE — 97110 THERAPEUTIC EXERCISES: CPT | Performed by: PHYSICAL MEDICINE & REHABILITATION

## 2017-06-15 NOTE — PROGRESS NOTES
"Name: Ming Boateng  Clinic Number: [id number]  Diagnosis:   Encounter Diagnoses   Name Primary?    Weakness of both lower extremities     Gait instability     Debility      Physician: Sendy Dueñas DO  Treatment Orders: PT Eval and Treat  Past Medical History:   Diagnosis Date    CHF (congestive heart failure)     Diabetes mellitus     Hypertension        Precautions: None  Visit # authorized: 4/50 on 6/15/2017  Authorization period: 12/31/17  Plan of care expiration: 7/19/17    Start time: 10:05 AM  Stop Time: 11:00 AM    Timed     Procedure  Min     Units    TE x 4 55 4                    Untimed     Procedure  Min  Units                      Subjective     Pt reports: feeling good today and that he is moving around better. At home he is only using his RW when he feels tired or unstable but otherwise is walking without an AD.Patient reports feeling stronger and reports no increased symptoms after treatment.    Pain Scale: before treatment: 0/10 currently; after treatment: 0/10    Objective       TREATMENT  Therapeutic exercise: Ming received therapeutic exercises to develop ROM, strength and endurance for 55 minutes including:     Date  6/15/17 6/13/17 06/08/17 06/01/17   VISIT 5/50 4/50 3 / 50 2 / 50   FOTO 4/5 3/5 2/5 1/5   POC 7/19/17 7/19/17 7/19/17 7/19/17   Supine       Glut Sets 2 x 10 x 5" 2 x 10 x 5"     Supine HSS w/strap 10 x 10" 10 x 10" 10 x 10" (B) 10 x 10" (B)   QS 2 x 15 x 5" 2 x 10 x 5" -- 10 x 5" (B)   SAQ 2 x 10 x 1# 2 x 15 (B) 2 x 15 (B) 2 x 10 (B)   SLR 3 x 10 (B) 2 x 10 (B) 2 x 10 (B) 2 x 10 (B)   Bridges 2 x 10 2 x 10 2 x 10    ABD Clams 2 x 15 GTB 2 x 10 GTB 2 x 10 GTB 2 x 10 RTB   Seated       Brittanie Hip Add 2 x 10 x 3" 2 x 10 x3" 2 x 10 x 3" 2 x 10 x 3"   LAQs 2 x 15 2 x 15 (B) 2 x 15 (B) 2 x 10 (B)   Seated Hip Flex 2 x 15 2 x 15 2 x 15 (B) 2 x 10 (B)   Hamstring Curl 2 x 10 RTB 2 x 10 RTB 2 x 10 RTB           Standing       Heel Raises 2 x 15 2 x 15 2 x 15 2 x 10   Toe Raises 2 x 15 2 " x 15 2 x 15 2 x 10          Mini Squats 2 x 10 1 x 10 --    Hip Flex -- -- --    Hip Ext -- -- --    Hip Abd -- -- --                  Initials CSL VK CSL CSL       Written Home Exercises Provided: Continue with current HEP  Pt demo good understanding of the education provided. Ming demonstrated good return demonstration of activities.     Pt. education:  · Posture reeducation, body mechanics, HEP,   · No spiritual or educational barriers to learning provided  · Pt has no cultural, educational or language barriers to learning provided.    Assessment     Patient arrived with a family member who waited in the waiting room during treatment. Patient ambulated into clinic with a RW with a normal gait pattern standing more erect than previous visits. Patient performed above exercise program with verbal cueing for proper technique. Patient ambulated out of clinic independent of RW with good balance and normal gait pattern.  Patient instructed to continue with HEP. Pt will continue to benefit from skilled outpatient physical therapy to address the remaining functional deficits, provide pt/family education, and to maximize pt's level of independence in the home and community environment. .     Goals:   Short Term Goals:  4 weeks  Increase strength 1/2 muscle grade  Patient will be able to perform transfers independently  Be able to perform HEP with minimal cueing required  Improve functional impairment of mobility to 50%    Long Term Goals: 8 weeks  Improve muscle strength with MMT to 4+/5 to 5/5  Restore ability to ambulate with normal gait pattern using necessary AD  Restore ability to stand for ADL with good balance  Restore ability to perform sit to stand transfer independently  Restore ability to climb stairs in a safe manner with necessary AD  Restore ability to perform sit to supine and supine to sit transfers independently  Restore ability to perform ADL's independently   Improve functional impairment of mobility to  40%    Anticipated barriers to physical therapy: None  Pt's spiritual, cultural and educational needs considered and pt agreeable to plan of care and goals        Plan   Continue with established Plan of Care towards PT goals.            Froilan Guevara, SPT  I certify that I was present in the room directing the student in service delivery and guiding them using my skilled judgment. As the co-signing therapist I have reviewed the students documentation and am responsible for the treatment, assessment, and plan.   Elizabeth Santiago, PT

## 2017-06-15 NOTE — PROGRESS NOTES
I assume primary medical responsibility for this patient, I have reviewed the case history, findings, diagnosis and treatment plan with the resident and agree that the care is reasonable and necessary. This service has been performed by a resident without the presence of a teaching physician under the primary care exception  Suzi Fulton  6/15/2017

## 2017-06-15 NOTE — PROGRESS NOTES
"TIME RECORD    Date:  06/15/2017    Start Time:  11:05  Stop Time:  11:50    PROCEDURES:    TIMED  Procedure Min.   TA 45                      UNTIMED  Procedure Min.             Total Timed Minutes:  45  Total Timed Units:  3  Total Untimed Units:  0  Charges Billed/# of units:  3TA      Progress/Current Status    Subjective:     Patient ID: Ming Boateng is a 61 y.o. male.  Diagnosis:   1. Upper extremity weakness     2. Decreased activities of daily living (ADL)       Pain: 0 /10  Pt states "I feel ok."  Pt denied c/o pain but reported occasional soreness in B upper traps. Pt reports compliance with HEP. Pt arrived ambulating with RW    Objective:     Pt seen by OT this session. Treatment consisted of the following:     Date: 6/15/17    Visit: 4   Shoulder shrugs 10 reps   Scapular squeezes 10 reps   Dowel - FF, supine, 2# 2 x 10 reps   Dowel - chest press, supine, 3# 3 x 10 reps   Tband- yellow    -rows 2 x 10 reps   -ext standing 2 x 10 reps   -horizontal abd 2 x 10 reps (deferred today due to shoulder pain)   -biceps curls 2 x 10 reps   -triceps ext 2 x 10 reps,   UBE, L3.5 3' forward, 2' back   PHG, 35# 2 x 10 reps, B hands   Wrist dexerciser with 1# wrist weight 5 reps with R hand, 5 reps with L hand (not today)   Pulleys - FF 3'   Supination/pronation, 2# 20 reps each   Wrist flex/ext , 2# 20 reps, B hands       Assessment:     Pt tolerated treatment fairly well today. Pt reported occasional soreness in B upper traps after tband ex. Pt with good participation and reports compliance with HEP.  Pt demonstrated improved balance today while walking and during some standing ex.  Pt tolerated progression of ex today without c/o pain. Pt requires min cues to take breaks and pace himself, and required min cues for ex techniques at times.  Pt cont to present with decreased endurance, decreased ADLs, and weakness in B UE.  Pt progressing well towards goals.  Pt would cont to benefit from skilled OT services to maximize " functional use of B UE.    Patient Education/Response:     Cont HEP.     Plans and Goals:     Cont OT poc 1-2x/week for 6-8 weeks during certification period 5/24/17 to 7/24/17 in pursuit of established goals.    Goals to be met in 4 weeks: (6/24/17)  1) Initiate Hep   2) Pt will increase L shoulder AROM by 10 degrees grossly for improved performance with overhead ADL's  3) Pt will report 3/10 pain in (R)shoulder at worst  4) Pt will demonstrate increased MMT in each UE by at least 1/2 grade for increased function     Goals to be met by discharge:  1) Independent with HEP  2) Pt will require no more than Supervision with feeding  3) Pt will demonstrate (L/R) UE MMT WFL grossly for [place] with functional activities  4) Independent and pain free with ADL's and IADL's  5) Patient will be able to achieve FOTO less than or equal to 43% limitation with UE function and self care demonstrating overall improved functional ability with upper extremity.   6) pt will be Supervision with UE dressing     OSKAR Martines

## 2017-06-15 NOTE — TELEPHONE ENCOUNTER
No previous message in chart of patient calling. Patient was seen in clinic on 06/14/2017. Message sent to the [facility].   Libia Morton LPN  06/15/2017

## 2017-06-15 NOTE — TELEPHONE ENCOUNTER
----- Message from Tonie Clark sent at 6/15/2017 10:56 AM CDT -----  Contact: SELF [phone]  PT STILL NEED HIS INSULT SYRINGE HAS CALL MANY TIME

## 2017-06-16 DIAGNOSIS — N18.4 STAGE 4 CHRONIC KIDNEY DISEASE: Primary | ICD-10-CM

## 2017-06-18 RX ORDER — INSULIN LISPRO 100 [IU]/ML
6 INJECTION, SOLUTION INTRAVENOUS; SUBCUTANEOUS
Qty: 16.2 ML | Refills: 3 | Status: SHIPPED | OUTPATIENT
Start: 2017-06-18 | End: 2017-08-30 | Stop reason: SDUPTHER

## 2017-06-18 RX ORDER — INSULIN GLARGINE 300 [IU]/ML
15 INJECTION, SOLUTION SUBCUTANEOUS NIGHTLY
Qty: 4.5 ML | Refills: 0 | Status: SHIPPED | OUTPATIENT
Start: 2017-06-18 | End: 2017-08-03 | Stop reason: SDUPTHER

## 2017-06-18 NOTE — TELEPHONE ENCOUNTER
Medication e-prescribed to pharmacy.    Calvin Elliott MD  [facility]  2  06/18/2017 2:28 PM

## 2017-06-21 ENCOUNTER — CLINICAL SUPPORT (OUTPATIENT)
Dept: REHABILITATION | Facility: HOSPITAL | Age: 62
End: 2017-06-21
Attending: STUDENT IN AN ORGANIZED HEALTH CARE EDUCATION/TRAINING PROGRAM
Payer: MEDICAID

## 2017-06-21 DIAGNOSIS — R53.81 DEBILITY: ICD-10-CM

## 2017-06-21 DIAGNOSIS — R26.81 GAIT INSTABILITY: ICD-10-CM

## 2017-06-21 DIAGNOSIS — R29.898 WEAKNESS OF BOTH LOWER EXTREMITIES: ICD-10-CM

## 2017-06-21 PROCEDURE — 97110 THERAPEUTIC EXERCISES: CPT | Performed by: PHYSICAL MEDICINE & REHABILITATION

## 2017-06-21 NOTE — PROGRESS NOTES
"Name: Ming Boateng  Clinic Number: [id number]  Diagnosis:   Encounter Diagnoses   Name Primary?    Weakness of both lower extremities     Gait instability     Debility      Physician: Sendy Dueñas,   Treatment Orders: PT Eval and Treat  Past Medical History:   Diagnosis Date    CHF (congestive heart failure)     Diabetes mellitus     Hypertension        Precautions: None  Visit # authorized: 4/50 on 6/21/2017  Authorization period: 12/31/17  Plan of care expiration: 7/19/17    Start time: 1:35 PM  Stop Time: 2:30 PM    Timed     Procedure  Min     Units    TE x 4 55 4                    Untimed     Procedure  Min  Units                      Subjective     Pt reports: feeling good today and that he is moving around more without the RW. At home he is only using his RW when he feels tired or unstable but otherwise is walking without an AD.Patient reports feeling stronger and reports no increased symptoms after treatment.    Pain Scale: before treatment: 0/10 currently; after treatment: 0/10    Objective       TREATMENT  Therapeutic exercise: Ming received therapeutic exercises to develop ROM, strength and endurance for 55 minutes including:     Date  6/21/17 6/15/17 6/13/17 06/08/17 06/01/17   VISIT 6/50 5/50 4/50 3 / 50 2 / 50   FOTO 57/100 4/5 3/5 2/5 1/5   POC 7/19/17 7/19/17 7/19/17 7/19/17 7/19/17   Supine        Glut Sets 2 x 10 x 5" 2 x 10 x 5" 2 x 10 x 5"     Supine HSS w/strap 10 x 10" 10 x 10" 10 x 10" 10 x 10" (B) 10 x 10" (B)   QS 2 x 15 x 5" 2 x 15 x 5" 2 x 10 x 5" -- 10 x 5" (B)   SAQ 2 x 10 x 1# 2 x 10 x 1# 2 x 15 (B) 2 x 15 (B) 2 x 10 (B)   SLR 2 x 10 x 1# 3 x 10 (B) 2 x 10 (B) 2 x 10 (B) 2 x 10 (B)   Bridges 2 x 10 2 x 10 2 x 10 2 x 10    ABD Clams 2 x 10 BTB 2 x 15 GTB 2 x 10 GTB 2 x 10 GTB 2 x 10 RTB   Seated        Brittanie Hip Add 2 x 15 x 3" 2 x 10 x 3" 2 x 10 x3" 2 x 10 x 3" 2 x 10 x 3"   LAQs 2 x 10 x 1# 2 x 15 2 x 15 (B) 2 x 15 (B) 2 x 10 (B)   Seated Hip Flex 2 x 10 x 1# 2 x 15 2 x 15 2 x 15 " (B) 2 x 10 (B)   Hamstring Curl 2 x 15 RTB 2 x 10 RTB 2 x 10 RTB 2 x 10 RTB            Standing        Heel Raises 2 x 15 2 x 15 2 x 15 2 x 15 2 x 10   Toe Raises 2 x 15 2 x 15 2 x 15 2 x 15 2 x 10           Mini Squats 2 x 10 2 x 10 1 x 10 --    Hip Flex -- -- -- --    Hip Ext -- -- -- --    Hip Abd -- -- -- --                    Initials CSL/VK CSL/VK VK CSL/VK CSL/VK       Written Home Exercises Provided: Continue with current HEP  Pt demo good understanding of the education provided. Ming demonstrated good return demonstration of activities.     Pt. education:  · Posture reeducation, body mechanics, HEP,   · No spiritual or educational barriers to learning provided  · Pt has no cultural, educational or language barriers to learning provided.    FOTO score 57/100 with 40%-60% impairment    Assessment     Patient arrived with a family member who waited in the waiting room during treatment. Patient ambulated into clinic without AD with a normal gait pattern. Patient performed above exercise program with verbal cueing for proper technique. Patient ambulated out of clinic independent of RW with good balance and normal gait pattern. Patient performed the FOTO assessment and scored a 57/100 showing a decrease to 40%-60% impairment. Patient instructed to continue with HEP. Pt will continue to benefit from skilled outpatient physical therapy to address the remaining functional deficits, provide pt/family education, and to maximize pt's level of independence in the home and community environment. .     Goals:   Short Term Goals:  4 weeks  Increase strength 1/2 muscle grade  Patient will be able to perform transfers independently  Be able to perform HEP with minimal cueing required  Improve functional impairment of mobility to 50%    Long Term Goals: 8 weeks  Improve muscle strength with MMT to 4+/5 to 5/5  Restore ability to ambulate with normal gait pattern using necessary AD  Restore ability to stand for ADL with good  balance  Restore ability to perform sit to stand transfer independently  Restore ability to climb stairs in a safe manner with necessary AD  Restore ability to perform sit to supine and supine to sit transfers independently  Restore ability to perform ADL's independently   Improve functional impairment of mobility to 40%    Anticipated barriers to physical therapy: None  Pt's spiritual, cultural and educational needs considered and pt agreeable to plan of care and goals        Plan   Continue with established Plan of Care towards PT goals.            Froilan Guevara, SPT     I certify that I was present in the room directing the student in service delivery and guiding them using my skilled judgment. As the co-signing therapist I have reviewed the students documentation and am responsible for the treatment, assessment, and plan.   Elizabeth Santiago, PT

## 2017-06-27 ENCOUNTER — CLINICAL SUPPORT (OUTPATIENT)
Dept: REHABILITATION | Facility: HOSPITAL | Age: 62
End: 2017-06-27
Attending: STUDENT IN AN ORGANIZED HEALTH CARE EDUCATION/TRAINING PROGRAM
Payer: MEDICAID

## 2017-06-27 DIAGNOSIS — R53.81 DEBILITY: ICD-10-CM

## 2017-06-27 DIAGNOSIS — R26.81 GAIT INSTABILITY: ICD-10-CM

## 2017-06-27 DIAGNOSIS — Z78.9 DECREASED ACTIVITIES OF DAILY LIVING (ADL): ICD-10-CM

## 2017-06-27 DIAGNOSIS — R29.898 WEAKNESS OF BOTH LOWER EXTREMITIES: ICD-10-CM

## 2017-06-27 DIAGNOSIS — R29.898 UPPER EXTREMITY WEAKNESS: ICD-10-CM

## 2017-06-27 PROCEDURE — 97110 THERAPEUTIC EXERCISES: CPT | Performed by: PHYSICAL MEDICINE & REHABILITATION

## 2017-06-27 PROCEDURE — 97530 THERAPEUTIC ACTIVITIES: CPT

## 2017-06-27 NOTE — PROGRESS NOTES
"Name: Ming Boateng  Clinic Number: [id number]  Diagnosis:   Encounter Diagnoses   Name Primary?    Weakness of both lower extremities     Gait instability     Debility      Physician: Sendy Dueñas DO  Treatment Orders: PT Eval and Treat  Past Medical History:   Diagnosis Date    CHF (congestive heart failure)     Diabetes mellitus     Hypertension        Precautions: None  Visit # authorized: 7/50 on 6/27/2017  Authorization period: 12/31/17  Plan of care expiration: 7/19/17    Start time: 10:45 PM  Stop Time: 11:45 PM    Timed     Procedure  Min     Units    TE x 4 60 4                    Untimed     Procedure  Min  Units                      Subjective     Pt reports: feeling good today and he is moving around much better.    Pain Scale: before treatment: 0/10 currently; after treatment: 0/10    Objective       TREATMENT  Therapeutic exercise: Ming received therapeutic exercises to develop ROM, strength and endurance for 60 minutes including:     Date  6/27/17 6/21/17 6/15/17 6/13/17 06/08/17 06/01/17   VISIT 7/50 6/50 5/50 4/50 3 / 50 2 / 50   FOTO -- 5/5 4/5 3/5 2/5 1/5   POC 7/19/17 7/19/17 7/19/17 7/19/17 7/19/17 7/19/17   Supine         Glut Sets 2 x 10 x 5" 2 x 10 x 5" 2 x 10 x 5" 2 x 10 x 5"     Supine HSS w/strap 10 x 10" 10 x 10" 10 x 10" 10 x 10" 10 x 10" (B) 10 x 10" (B)   QS 2 x 15 x 5" 2 x 15 x 5" 2 x 15 x 5" 2 x 10 x 5" -- 10 x 5" (B)   SAQ 2 x 10 x 1.5# 2 x 10 x 1# 2 x 10 x 1# 2 x 15 (B) 2 x 15 (B) 2 x 10 (B)   SLR 2 x 10 x 1# 2 x 10 x 1# 3 x 10 (B) 2 x 10 (B) 2 x 10 (B) 2 x 10 (B)   Bridges 2 x 10 2 x 10 2 x 10 2 x 10 2 x 10    ABD Clams 2 x 15 BTB 2 x 10 BTB 2 x 15 GTB 2 x 10 GTB 2 x 10 GTB 2 x 10 RTB   Seated         Brittanie Hip Add 2 x 15 x 3" 2 x 15 x 3" 2 x 10 x 3" 2 x 10 x3" 2 x 10 x 3" 2 x 10 x 3"   LAQs 2 x 10 x 1# 2 x 10 x 1# 2 x 15 2 x 15 (B) 2 x 15 (B) 2 x 10 (B)   Seated Hip Flex 2 x 10 x 1# 2 x 10 x 1# 2 x 15 2 x 15 2 x 15 (B) 2 x 10 (B)   Hamstring Curl 2 x 15 RTB 2 x 15 RTB 2 x 10 " RTB 2 x 10 RTB 2 x 10 RTB             Standing         Heel Raises 2 x 15 2 x 15 2 x 15 2 x 15 2 x 15 2 x 10   Toe Raises oot 2 x 15 2 x 15 2 x 15 2 x 15 2 x 10            Mini Squats 1 x 10 2 x 10 2 x 10 1 x 10 --    Hip Flex -- -- -- -- --    Hip Ext -- -- -- -- --    Hip Abd -- -- -- -- --    Side Stepping 15 ft x 4        Heel-Toe Steps 15 ft x 4                 Initials CSL/VK CSL/VK CSL/VK VK CSL/VK CSL/VK       Written Home Exercises Provided: Continue with current HEP  Pt demo good understanding of the education provided. Ming demonstrated good return demonstration of activities.     Pt. education:  · Posture reeducation, body mechanics, HEP,   · No spiritual or educational barriers to learning provided  · Pt has no cultural, educational or language barriers to learning provided.      Assessment     Patient arrived with a family member who waited in the waiting room during treatment. Patient ambulated into clinic without AD with a normal gait pattern. Patient performed above exercise program with verbal cueing for proper technique. Patient ambulated out of clinic independent of RW with good balance and normal gait pattern.Patient instructed to continue with HEP. Pt will continue to benefit from skilled outpatient physical therapy to address the remaining functional deficits, provide pt/family education, and to maximize pt's level of independence in the home and community environment. .     Goals:   Short Term Goals:  4 weeks  Increase strength 1/2 muscle grade  Patient will be able to perform transfers independently  Be able to perform HEP with minimal cueing required  Improve functional impairment of mobility to 50%    Long Term Goals: 8 weeks  Improve muscle strength with MMT to 4+/5 to 5/5  Restore ability to ambulate with normal gait pattern using necessary AD  Restore ability to stand for ADL with good balance  Restore ability to perform sit to stand transfer independently  Restore ability to climb  stairs in a safe manner with necessary AD  Restore ability to perform sit to supine and supine to sit transfers independently  Restore ability to perform ADL's independently   Improve functional impairment of mobility to 40%    Anticipated barriers to physical therapy: None  Pt's spiritual, cultural and educational needs considered and pt agreeable to plan of care and goals        Plan   Continue with established Plan of Care towards PT goals.            Froilan Guevara, SPT     I certify that I was present in the room directing the student in service delivery and guiding them using my skilled judgment. As the co-signing therapist I have reviewed the students documentation and am responsible for the treatment, assessment, and plan.   Elizabeth Santiago, PT

## 2017-06-27 NOTE — PROGRESS NOTES
"TIME RECORD    Date:  06/27/2017    Start Time:  9:55  Stop Time:  10:40    PROCEDURES:    TIMED  Procedure Min.   TA 45                      UNTIMED  Procedure Min.             Total Timed Minutes:  45  Total Timed Units:  3  Total Untimed Units:  0  Charges Billed/# of units:  3TA      Progress/Current Status    Subjective:     Patient ID: Ming Boateng is a 61 y.o. male.  Diagnosis:   1. Upper extremity weakness     2. Decreased activities of daily living (ADL)       Pain: 0 /10  Pt states "I feel good."  Pt denied c/o pain but reported occasional soreness in B upper traps. Pt reports compliance with HEP. Pt not with RW today. Pt denies many limitations with his UE function at this time.  He reports he does not feel he needs to cont therapy with OT at this time.    Objective:     Measurements taken today 6/27/17: (improvement since eval)  Range of Motion:   Shoulder Right   Left   Pain/Dysfunction with Movement     AROM MMT AROM MMT     flexion WFL 5/5 (+) 110 (+10) 3+/5 (+)     extension WFL 5/5 (+) WFL 4/5 (+)     abduction WFL 5/5 (+) 90 (=) 3-/5 (=)     adduction WFL NT NT NT     Internal rotation WFL 5/5 (+) To L4 (+) 4/5 (+)     ER at 90° abd WFL 5/5 (+) NT 3/5 (=)     ER at 0° abd WFL 5/5 (+) 30 NT     Elbow flex WFL 5/5 WFL 5/5 (+)     Elbow ext WFL 5/5 (+) WFL 4/5 (+)     supination WFL 5/5 WFL (+) 5/5      pronation WFL 5/5 40 (=) 5/5     Wrist flex WFL 5/5 WFL 5/5 (+)     Wrist ext WFL 5/5 WFL 5/5       FOTO:  21% limitation with UE function. (34% improved since eval)    Pt seen by OT this session. Treatment consisted of the following:     Date: 6/27/17    Visit: 5   Shoulder shrugs 10 reps   Scapular squeezes 10 reps   Dowel - FF, supine, 2# 2 x 10 reps   Dowel - chest press, supine, 3# 3 x 10 reps   Tband- yellow, standing    -rows 3 x 10 reps   -ext standing 4 x 10 reps   -horizontal abd 2 x 10 reps (deferred today due to shoulder pain)   -biceps curls 2 x 10 reps   -triceps ext 2 x 10 reps,   UBE, " L3.5 3' forward, 2' back   PHG, 35# 2 x 10 reps, B hands   Pulleys - FF 3'       Assessment:     Pt reassessed today. Pt presents with R UE ROM and strength WFL, and 5/5. Pt presents with L UE improved strength and ROM. Pt denies limitations with his UEs at this time. He reports he feels ready for discharge from OT.  Pt tolerated treatment fairly well today.  Pt with good participation and reports compliance with HEP.  Reviewed energy conservation techniques.  Pt received maximum benefit from skilled OT services. No further OT recommended at this time.    Patient Education/Response:     Cont HEP as tolerated    Plans and Goals:     Discharge from skilled OT services at this time.    Goals to be met in 4 weeks: (6/24/17)  1) Initiate Hep ---met  2) Pt will increase L shoulder AROM by 10 degrees grossly for improved performance with overhead ADL's---met  3) Pt will report 3/10 pain in (R)shoulder at worst---met  4) Pt will demonstrate increased MMT in each UE by at least 1/2 grade for increased function---met     Goals to be met by discharge:  1) Independent with HEP---met  2) Pt will require no more than Supervision with feeding---met per pt  3) Pt will demonstrate (L/R) UE MMT WFL grossly for [place] with functional activities---met with R, still limited with L but improved  4) Independent and pain free with ADL's and IADL's---not met due to safety, pt is pain free  5) Patient will be able to achieve FOTO less than or equal to 43% limitation with UE function and self care demonstrating overall improved functional ability with upper extremity. ---met  6) pt will be Supervision with UE dressing---met     OSKAR Martines      REHAB SERVICES OUTPATIENT DISCHARGE SUMMARY  Occupational Therapy      Name:  Ming Boateng  Date:  6/27/17  Date of Evaluation:  5/24/17  Physician:  Sendy Dueñas DO  Total # Of Visits:  5  Cancelled:  1  No Shows:  0  Diagnosis:    1. Upper extremity weakness     2. Decreased  activities of daily living (ADL)         Physical/Functional Status:  At time of discharge, patient was able to demonstrate R UE functional AROM and strength, and improved L UE strength and AROM. Pt denies funcitonal limitation with B UE. Pt cont to require supervision with some ADLs due to safety.  See note above    The patient is to be discharged from our Therapy service for the following reason(s):  Patient has met all of his/her goals, Patient has reached the maximum rehab potential for the present time and Patient requested discharge    Degree of Goal Achievement:  Patient has met all goals. See note above    Patient Education:  Reviewed AROM and tband ex to perform as tolerated    Discharge Plan:  Discharge form outpatient OT services, follow up with MD as needed

## 2017-06-29 ENCOUNTER — CLINICAL SUPPORT (OUTPATIENT)
Dept: REHABILITATION | Facility: HOSPITAL | Age: 62
End: 2017-06-29
Attending: STUDENT IN AN ORGANIZED HEALTH CARE EDUCATION/TRAINING PROGRAM
Payer: MEDICAID

## 2017-06-29 DIAGNOSIS — R26.81 GAIT INSTABILITY: ICD-10-CM

## 2017-06-29 DIAGNOSIS — R53.81 DEBILITY: ICD-10-CM

## 2017-06-29 DIAGNOSIS — R29.898 WEAKNESS OF BOTH LOWER EXTREMITIES: ICD-10-CM

## 2017-06-29 PROCEDURE — 97110 THERAPEUTIC EXERCISES: CPT | Performed by: PHYSICAL MEDICINE & REHABILITATION

## 2017-06-29 NOTE — PROGRESS NOTES
"Name: Ming Boateng  Clinic Number: [id number]  Diagnosis:   Encounter Diagnoses   Name Primary?    Weakness of both lower extremities     Gait instability     Debility      Physician: Sendy Dueñas DO  Treatment Orders: PT Eval and Treat  Past Medical History:   Diagnosis Date    CHF (congestive heart failure)     Diabetes mellitus     Hypertension        Precautions: None  Visit # authorized: 8/50 on 6/29/2017  Authorization period: 12/31/17  Plan of care expiration: 7/19/17    Start time: 10:00 PM  Stop Time: 11:00 PM    Timed     Procedure  Min     Units    TE x 4 60 4                    Untimed     Procedure  Min  Units                      Subjective     Pt reports: "I am feeling good today."    Pain Scale: before treatment: 0/10 currently; after treatment: 0/10    Objective       TREATMENT  Therapeutic exercise: Ming received therapeutic exercises to develop ROM, strength and endurance for 60 minutes including:     Date  6/29/17 6/27/17 6/21/17 6/15/17 6/13/17 06/08/17 06/01/17   VISIT 8/50 7/50 6/50 5/50 4/50 3 / 50 2 / 50   FOTO -- -- 5/5 4/5 3/5 2/5 1/5   POC 7/19/17 7/19/17 7/19/17 7/19/17 7/19/17 7/19/17 7/19/17   Supine          Glut Sets 2 x 15 x 5" 2 x 10 x 5" 2 x 10 x 5" 2 x 10 x 5" 2 x 10 x 5"     Supine HSS w/strap 10 x 10" 10 x 10" 10 x 10" 10 x 10" 10 x 10" 10 x 10" (B) 10 x 10" (B)   QS 2 x 15 x 5" 2 x 15 x 5" 2 x 15 x 5" 2 x 15 x 5" 2 x 10 x 5" -- 10 x 5" (B)   SAQ 2 x 10 x 1.5# 2 x 10 x 1.5# 2 x 10 x 1# 2 x 10 x 1# 2 x 15 (B) 2 x 15 (B) 2 x 10 (B)   SLR 2 x 10 x 1# 2 x 10 x 1# 2 x 10 x 1# 3 x 10 (B) 2 x 10 (B) 2 x 10 (B) 2 x 10 (B)   Bridges 2 x 10 2 x 10 2 x 10 2 x 10 2 x 10 2 x 10    ABD Clams 2 x 15 BTB 2 x 15 BTB 2 x 10 BTB 2 x 15 GTB 2 x 10 GTB 2 x 10 GTB 2 x 10 RTB   Seated          Brittanie Hip Add 2 x 15 x 3" 2 x 15 x 3" 2 x 15 x 3" 2 x 10 x 3" 2 x 10 x3" 2 x 10 x 3" 2 x 10 x 3"   LAQs 2 x 10 x 1.5# 2 x 10 x 1# 2 x 10 x 1# 2 x 15 2 x 15 (B) 2 x 15 (B) 2 x 10 (B)   Seated Hip Flex 2 x 10 " x 1.5# 2 x 10 x 1# 2 x 10 x 1# 2 x 15 2 x 15 2 x 15 (B) 2 x 10 (B)   Hamstring Curl oot 2 x 15 RTB 2 x 15 RTB 2 x 10 RTB 2 x 10 RTB 2 x 10 RTB              Standing          Heel Raises oot 2 x 15 2 x 15 2 x 15 2 x 15 2 x 15 2 x 10   Toe Raises oot oot 2 x 15 2 x 15 2 x 15 2 x 15 2 x 10             Mini Squats 1 x 10 1 x 10 2 x 10 2 x 10 1 x 10 --    Hip Flex -- -- -- -- -- --    Hip Ext -- -- -- -- -- --    Hip Abd -- -- -- -- -- --    Side Stepping 15 ft x 4 15 ft x 4        Heel-Toe Steps 15 ft x 4 15 ft x 4                  Initials CSL/VK CSL/VK CSL/VK CSL/VK VK CSL/VK CSL/VK       Written Home Exercises Provided: Continue with current HEP  Pt demo good understanding of the education provided. Ming demonstrated good return demonstration of activities.     Pt. education:  · Posture reeducation, body mechanics, HEP,   · No spiritual or educational barriers to learning provided  · Pt has no cultural, educational or language barriers to learning provided.      Assessment     Patient arrived with a family member who waited in the waiting room during treatment. Patient ambulated into clinic without AD with a normal gait pattern. Patient performed above exercise program with verbal cueing for proper technique. The patient was unable to complete all of his exercises due to time constraints from the patient being more tired today and moving slower. Patient ambulated out of clinic independent of RW with good balance and normal gait pattern.Patient instructed to continue with HEP. Pt will continue to benefit from skilled outpatient physical therapy to address the remaining functional deficits, provide pt/family education, and to maximize pt's level of independence in the home and community environment. .     Goals:   Short Term Goals:  4 weeks  Increase strength 1/2 muscle grade  Patient will be able to perform transfers independently  Be able to perform HEP with minimal cueing required  Improve functional impairment of  mobility to 50%    Long Term Goals: 8 weeks  Improve muscle strength with MMT to 4+/5 to 5/5  Restore ability to ambulate with normal gait pattern using necessary AD  Restore ability to stand for ADL with good balance  Restore ability to perform sit to stand transfer independently  Restore ability to climb stairs in a safe manner with necessary AD  Restore ability to perform sit to supine and supine to sit transfers independently  Restore ability to perform ADL's independently   Improve functional impairment of mobility to 40%    Anticipated barriers to physical therapy: None  Pt's spiritual, cultural and educational needs considered and pt agreeable to plan of care and goals        Plan   Continue with established Plan of Care towards PT goals.            Froilan Guevara, SPT     I certify that I was present in the room directing the student in service delivery and guiding them using my skilled judgment. As the co-signing therapist I have reviewed the students documentation and am responsible for the treatment, assessment, and plan.   Elizabeth Santiago, PT

## 2017-07-03 PROBLEM — R29.898 UPPER EXTREMITY WEAKNESS: Status: RESOLVED | Noted: 2017-05-29 | Resolved: 2017-07-03

## 2017-07-03 PROBLEM — Z78.9 DECREASED ACTIVITIES OF DAILY LIVING (ADL): Status: RESOLVED | Noted: 2017-05-29 | Resolved: 2017-07-03

## 2017-07-06 ENCOUNTER — CLINICAL SUPPORT (OUTPATIENT)
Dept: REHABILITATION | Facility: HOSPITAL | Age: 62
End: 2017-07-06
Attending: STUDENT IN AN ORGANIZED HEALTH CARE EDUCATION/TRAINING PROGRAM
Payer: MEDICAID

## 2017-07-06 DIAGNOSIS — R29.898 WEAKNESS OF BOTH LOWER EXTREMITIES: ICD-10-CM

## 2017-07-06 DIAGNOSIS — R53.81 DEBILITY: ICD-10-CM

## 2017-07-06 DIAGNOSIS — R26.81 GAIT INSTABILITY: ICD-10-CM

## 2017-07-06 PROCEDURE — 97110 THERAPEUTIC EXERCISES: CPT

## 2017-07-06 NOTE — PROGRESS NOTES
"Name: Ming Boateng  Clinic Number: [id number]  Diagnosis:   Encounter Diagnoses   Name Primary?    Weakness of both lower extremities     Gait instability     Debility      Physician: Sendy Dueñas,   Treatment Orders: PT Eval and Treat  Past Medical History:   Diagnosis Date    CHF (congestive heart failure)     Diabetes mellitus     Hypertension        Precautions: None  Visit # authorized: 8/50 on 7/6/2017  Authorization period: 12/31/17  Plan of care expiration: 7/19/17    Start time: 11:00 PM  Stop Time: 12:00 PM    Timed     Procedure  Min     Units    TE x 4 60 4                    Untimed     Procedure  Min  Units                      Subjective     Pt reports: "I am feeling alright today."    Pain Scale: before treatment: 0/10 currently; after treatment: 0/10    Objective     TREATMENT  Therapeutic exercise: Ming received therapeutic exercises to develop ROM, strength and endurance for 60 minutes including:     Date  7/06/17 6/29/17 6/27/17 6/21/17 6/15/17 6/13/17 06/08/17 06/01/17   VISIT 9/50 8/50 7/50 6/50 5/50 4/50 3 / 50 2 / 50   FOTO -- -- -- 5/5 4/5 3/5 2/5 1/5   POC 7/19/17 7/19/17 7/19/17 7/19/17 7/19/17 7/19/17 7/19/17 7/19/17   Supine           Glut Sets D/C 2 x 15 x 5" 2 x 10 x 5" 2 x 10 x 5" 2 x 10 x 5" 2 x 10 x 5"     Supine HSS w/strap 10 x 10" 10 x 10" 10 x 10" 10 x 10" 10 x 10" 10 x 10" 10 x 10" (B) 10 x 10" (B)   QS D/C 2 x 15 x 5" 2 x 15 x 5" 2 x 15 x 5" 2 x 15 x 5" 2 x 10 x 5" -- 10 x 5" (B)   SAQ 2 x 10 x 2# 2 x 10 x 1.5# 2 x 10 x 1.5# 2 x 10 x 1# 2 x 10 x 1# 2 x 15 (B) 2 x 15 (B) 2 x 10 (B)   SLR 2 x 10 x 1.5# 2 x 10 x 1# 2 x 10 x 1# 2 x 10 x 1# 3 x 10 (B) 2 x 10 (B) 2 x 10 (B) 2 x 10 (B)   Bridges 2 x 15 2 x 10 2 x 10 2 x 10 2 x 10 2 x 10 2 x 10    S/L Hip ABD --          S/L Hip ADD --          ABD Clams D/C 2 x 15 BTB 2 x 15 BTB 2 x 10 BTB 2 x 15 GTB 2 x 10 GTB 2 x 10 GTB 2 x 10 RTB   Seated           Brittanie Hip Add D/C 2 x 15 x 3" 2 x 15 x 3" 2 x 15 x 3" 2 x 10 x 3" 2 x 10 x3" 2 " "x 10 x 3" 2 x 10 x 3"   LAQs 2 x 15 x 1.5# 2 x 10 x 1.5# 2 x 10 x 1# 2 x 10 x 1# 2 x 15 2 x 15 (B) 2 x 15 (B) 2 x 10 (B)   Seated Hip Flex 2 x 15 x 1.5# 2 x 10 x 1.5# 2 x 10 x 1# 2 x 10 x 1# 2 x 15 2 x 15 2 x 15 (B) 2 x 10 (B)   Hamstring Curl 2 x 10 GTB oot 2 x 15 RTB 2 x 15 RTB 2 x 10 RTB 2 x 10 RTB 2 x 10 RTB               Standing           Heel Raises 2 x 10 oot 2 x 15 2 x 15 2 x 15 2 x 15 2 x 15 2 x 10   Toe Raises 2 x 10 oot oot 2 x 15 2 x 15 2 x 15 2 x 15 2 x 10              Mini Squats 2 x 10 1 x 10 1 x 10 2 x 10 2 x 10 1 x 10 --    Hip Flex -- -- -- -- -- -- --    Hip Ext -- -- -- -- -- -- --    Hip Abd 2 x 15 -- -- -- -- -- --    Side Stepping 15 ft x 4 15 ft x 4 15 ft x 4        Heel-Toe Steps 15 ft x 4 15 ft x 4 15 ft x 4        Step Ups 2 x 10          Initials CSL CSL/VK CSL/VK CSL/VK CSL/VK VK CSL/VK CSL/VK       Written Home Exercises Provided: Continue with current HEP  Pt demo good understanding of the education provided. Ming demonstrated good return demonstration of activities.     Pt. education:  · Posture reeducation, body mechanics, HEP,   · No spiritual or educational barriers to learning provided  · Pt has no cultural, educational or language barriers to learning provided.      Assessment     Patient arrived with a family member who waited in the waiting room during treatment. Patient ambulated into clinic without AD with a normal gait pattern. Patient performed above exercise program with verbal cueing for proper technique. The patient was able to complete all of his exercises today without increased symptoms of LB pain. Patient ambulated out of clinic independent of RW with good balance and normal gait pattern.Patient instructed to continue with HEP. Pt will continue to benefit from skilled outpatient physical therapy to address the remaining functional deficits, provide pt/family education, and to maximize pt's level of independence in the home and community environment. .     Goals: " "  Short Term Goals:  4 weeks  Increase strength 1/2 muscle grade  Patient will be able to perform transfers independently  Be able to perform HEP with minimal cueing required  Improve functional impairment of mobility to 50%    Long Term Goals: 8 weeks  Improve muscle strength with MMT to 4+/5 to 5/5  Restore ability to ambulate with normal gait pattern using necessary AD  Restore ability to stand for ADL with good balance  Restore ability to perform sit to stand transfer independently  Restore ability to climb stairs in a safe manner with necessary AD  Restore ability to perform sit to supine and supine to sit transfers independently  Restore ability to perform ADL's independently   Improve functional impairment of mobility to 40%    Anticipated barriers to physical therapy: None  Pt's spiritual, cultural and educational needs considered and pt agreeable to plan of care and goals        Plan   Continue with established Plan of Care towards PT goals.            Froilan Guevara, SPT       "I, Rhoda Franco, PT , certify that I was present in the room directing the student in service delivery and guiding them using my skilled judgement.  As the co-signing therapist, I have reviewed the student's documentation and am responsible for the treatment, assessment and plan."    Rhoda Franco, PT    "

## 2017-07-12 ENCOUNTER — CLINICAL SUPPORT (OUTPATIENT)
Dept: REHABILITATION | Facility: HOSPITAL | Age: 62
End: 2017-07-12
Attending: STUDENT IN AN ORGANIZED HEALTH CARE EDUCATION/TRAINING PROGRAM
Payer: MEDICAID

## 2017-07-12 DIAGNOSIS — R26.81 GAIT INSTABILITY: ICD-10-CM

## 2017-07-12 DIAGNOSIS — R53.81 DEBILITY: ICD-10-CM

## 2017-07-12 DIAGNOSIS — R29.898 WEAKNESS OF BOTH LOWER EXTREMITIES: ICD-10-CM

## 2017-07-12 PROCEDURE — 97110 THERAPEUTIC EXERCISES: CPT | Performed by: PHYSICAL MEDICINE & REHABILITATION

## 2017-07-12 NOTE — PROGRESS NOTES
"Name: Ming Boateng  Clinic Number: [id number]  Diagnosis:   Encounter Diagnoses   Name Primary?    Weakness of both lower extremities     Gait instability     Debility      Physician: Marilyn Elliott MD  Treatment Orders: PT Eval and Treat  Past Medical History:   Diagnosis Date    CHF (congestive heart failure)     Diabetes mellitus     Hypertension        Precautions: None  Visit # authorized: 8/50 on 7/12/2017  Authorization period: 12/31/17  Plan of care expiration: 7/19/17    Start time: 9:00 PM  Stop Time: 9:45 PM    Timed     Procedure  Min     Units    TE x 4 45 3                    Untimed     Procedure  Min  Units                      Subjective     Pt reports: "I am feeling good today."    Pain Scale: before treatment: 0/10 currently; after treatment: 0/10    Objective     TREATMENT  Therapeutic exercise: Ming received therapeutic exercises to develop ROM, strength and endurance for 45 minutes including:     Date  7/12/17 7/06/17 6/29/17 6/27/17 6/21/17 6/15/17 6/13/17 06/08/17 06/01/17   VISIT 10/50 9/50 8/50 7/50 6/50 5/50 4/50 3 / 50 2 / 50   FOTO -- -- -- -- 5/5 4/5 3/5 2/5 1/5   POC 7/19/17 7/19/17 7/19/17 7/19/17 7/19/17 7/19/17 7/19/17 7/19/17 7/19/17   Supine            Glut Sets -- D/C 2 x 15 x 5" 2 x 10 x 5" 2 x 10 x 5" 2 x 10 x 5" 2 x 10 x 5"     Supine HSS w/strap 10 x 10" 10 x 10" 10 x 10" 10 x 10" 10 x 10" 10 x 10" 10 x 10" 10 x 10" (B) 10 x 10" (B)   QS -- D/C 2 x 15 x 5" 2 x 15 x 5" 2 x 15 x 5" 2 x 15 x 5" 2 x 10 x 5" -- 10 x 5" (B)   SAQ 2 x 15 x 2# 2 x 10 x 2# 2 x 10 x 1.5# 2 x 10 x 1.5# 2 x 10 x 1# 2 x 10 x 1# 2 x 15 (B) 2 x 15 (B) 2 x 10 (B)   SLR 2 x 15 x 1.5# 2 x 10 x 1.5# 2 x 10 x 1# 2 x 10 x 1# 2 x 10 x 1# 3 x 10 (B) 2 x 10 (B) 2 x 10 (B) 2 x 10 (B)   Bridges 2 x 15 2 x 15 2 x 10 2 x 10 2 x 10 2 x 10 2 x 10 2 x 10    S/L Hip ABD -- --          S/L Hip ADD -- --          ABD Clams 2 x 15 BTB -- 2 x 15 BTB 2 x 15 BTB 2 x 10 BTB 2 x 15 GTB 2 x 10 GTB 2 x 10 GTB 2 x 10 RTB   Seated   " "         Brittanie Hip Add -- D/C 2 x 15 x 3" 2 x 15 x 3" 2 x 15 x 3" 2 x 10 x 3" 2 x 10 x3" 2 x 10 x 3" 2 x 10 x 3"   LAQs 2 x 10 x 2# 2 x 15 x 1.5# 2 x 10 x 1.5# 2 x 10 x 1# 2 x 10 x 1# 2 x 15 2 x 15 (B) 2 x 15 (B) 2 x 10 (B)   Seated Hip Flex 2 x 10 x 2# 2 x 15 x 1.5# 2 x 10 x 1.5# 2 x 10 x 1# 2 x 10 x 1# 2 x 15 2 x 15 2 x 15 (B) 2 x 10 (B)   Hamstring Curl 2 x 10 GTB 2 x 10 GTB oot 2 x 15 RTB 2 x 15 RTB 2 x 10 RTB 2 x 10 RTB 2 x 10 RTB                Standing            Heel Raises 2 x 10 2 x 10 oot 2 x 15 2 x 15 2 x 15 2 x 15 2 x 15 2 x 10   Toe Raises 2 x 10 2 x 10 oot oot 2 x 15 2 x 15 2 x 15 2 x 15 2 x 10               Mini Squats 2 x 10 2 x 10 1 x 10 1 x 10 2 x 10 2 x 10 1 x 10 --    Hip Flex -- -- -- -- -- -- -- --    Hip Ext -- -- -- -- -- -- -- --    Hip Abd 2 x 15 2 x 15 -- -- -- -- -- --    Side Stepping oot 15 ft x 4 15 ft x 4 15 ft x 4        Heel-Toe Steps oot 15 ft x 4 15 ft x 4 15 ft x 4        Step Ups oot 2 x 10          Initials CSL/VK CSL/VK CSL/VK CSL/VK CSL/VK CSL/VK VK CSL/VK CSL/VK       Written Home Exercises Provided: Continue with current HEP  Pt demo good understanding of the education provided. Ming demonstrated good return demonstration of activities.     Pt. education:  · Posture reeducation, body mechanics, HEP,   · No spiritual or educational barriers to learning provided  · Pt has no cultural, educational or language barriers to learning provided.      Assessment     Patient arrived with a family member who waited in the waiting room during treatment. Patient ambulated into clinic without AD with a normal gait pattern. Patient performed above exercise program with verbal cueing for proper technique. The patient was unable to complete all of his exercises today due to nausea but completed the ones listed above without increased symptoms of LB pain. Patient ambulated out of clinic independent of RW with good balance and normal gait pattern.Patient instructed to continue with HEP. Pt " "will continue to benefit from skilled outpatient physical therapy to address the remaining functional deficits, provide pt/family education, and to maximize pt's level of independence in the home and community environment. .     Goals:   Short Term Goals:  4 weeks  Increase strength 1/2 muscle grade  Patient will be able to perform transfers independently  Be able to perform HEP with minimal cueing required  Improve functional impairment of mobility to 50%    Long Term Goals: 8 weeks  Improve muscle strength with MMT to 4+/5 to 5/5  Restore ability to ambulate with normal gait pattern using necessary AD  Restore ability to stand for ADL with good balance  Restore ability to perform sit to stand transfer independently  Restore ability to climb stairs in a safe manner with necessary AD  Restore ability to perform sit to supine and supine to sit transfers independently  Restore ability to perform ADL's independently   Improve functional impairment of mobility to 40%    Anticipated barriers to physical therapy: None  Pt's spiritual, cultural and educational needs considered and pt agreeable to plan of care and goals        Plan   Continue with established Plan of Care towards PT goals.        Froilan Guevara, SPT     "I, Elizabeth Santiago, PT , certify that I was present in the room directing the student in service delivery and guiding them using my skilled judgement.  As the co-signing therapist, I have reviewed the student's documentation and am responsible for the treatment, assessment and plan."    Elizabeth Santiago, PT  "

## 2017-07-13 ENCOUNTER — CLINICAL SUPPORT (OUTPATIENT)
Dept: REHABILITATION | Facility: HOSPITAL | Age: 62
End: 2017-07-13
Attending: STUDENT IN AN ORGANIZED HEALTH CARE EDUCATION/TRAINING PROGRAM
Payer: MEDICAID

## 2017-07-13 ENCOUNTER — DOCUMENTATION ONLY (OUTPATIENT)
Dept: REHABILITATION | Facility: HOSPITAL | Age: 62
End: 2017-07-13

## 2017-07-13 DIAGNOSIS — R29.898 WEAKNESS OF BOTH LOWER EXTREMITIES: ICD-10-CM

## 2017-07-13 DIAGNOSIS — R53.81 DEBILITY: ICD-10-CM

## 2017-07-13 DIAGNOSIS — R26.81 GAIT INSTABILITY: ICD-10-CM

## 2017-07-13 PROCEDURE — 97110 THERAPEUTIC EXERCISES: CPT

## 2017-07-13 NOTE — PROGRESS NOTES
"Name: Ming Boateng  Clinic Number: [id number]  Diagnosis:   Encounter Diagnoses   Name Primary?    Weakness of both lower extremities     Gait instability     Debility      Physician: Marilyn Elliott MD  Treatment Orders: PT Eval and Treat  Past Medical History:   Diagnosis Date    CHF (congestive heart failure)     Diabetes mellitus     Hypertension        Precautions: None  Visit # authorized: 11/50 on 7/13/2017  Authorization period: 12/31/17  Plan of care expiration: 7/19/17    Start time: 2:00 PM  Stop Time: 2:55 PM    Timed     Procedure  Min     Units    TE x 4 55 4                    Untimed     Procedure  Min  Units                      Subjective     Pt reports: "no pain today".    Pain Scale: before treatment: 0/10 currently; after treatment: 0/10    Objective     TREATMENT  Therapeutic exercise: Ming received therapeutic exercises, along with today's progressions, 1:1 with PTA x 55 minutes to develop ROM, strength and endurance including:     Date  07/13/17 7/12/17 7/06/17 6/29/17 6/27/17 6/21/17 6/15/17 6/13/17 06/08/17 06/01/17   VISIT 11/50 10/50 9/50 8/50 7/50 6/50 5/50 4/50 3 / 50 2 / 50   FOTO -- -- -- -- -- 5/5 4/5 3/5 2/5 1/5   POC 07/19/17 7/19/17 7/19/17 7/19/17 7/19/17 7/19/17 7/19/17 7/19/17 7/19/17 7/19/17   Face to face 08/13/17                         Supine             Glut Sets -- -- D/C 2 x 15 x 5" 2 x 10 x 5" 2 x 10 x 5" 2 x 10 x 5" 2 x 10 x 5"     Supine HSS w/strap 10 x 10" 10 x 10" 10 x 10" 10 x 10" 10 x 10" 10 x 10" 10 x 10" 10 x 10" 10 x 10" (B) 10 x 10" (B)   QS -- -- D/C 2 x 15 x 5" 2 x 15 x 5" 2 x 15 x 5" 2 x 15 x 5" 2 x 10 x 5" -- 10 x 5" (B)   SAQ 2 x 15 x 2# 2 x 15 x 2# 2 x 10 x 2# 2 x 10 x 1.5# 2 x 10 x 1.5# 2 x 10 x 1# 2 x 10 x 1# 2 x 15 (B) 2 x 15 (B) 2 x 10 (B)   SLR 2 x 15 x 2# 2 x 15 x 1.5# 2 x 10 x 1.5# 2 x 10 x 1# 2 x 10 x 1# 2 x 10 x 1# 3 x 10 (B) 2 x 10 (B) 2 x 10 (B) 2 x 10 (B)   Bridges 2 x 15 2 x 15 2 x 15 2 x 10 2 x 10 2 x 10 2 x 10 2 x 10 2 x 10    S/L Hip " "ABD -- -- --          S/L Hip ADD -- -- --          ABD Clams 2 x 15 BTB 2 x 15 BTB -- 2 x 15 BTB 2 x 15 BTB 2 x 10 BTB 2 x 15 GTB 2 x 10 GTB 2 x 10 GTB 2 x 10 RTB   Seated             Brittanie Hip Add -- -- D/C 2 x 15 x 3" 2 x 15 x 3" 2 x 15 x 3" 2 x 10 x 3" 2 x 10 x3" 2 x 10 x 3" 2 x 10 x 3"   LAQs 2 x 15 x 2# 2 x 10 x 2# 2 x 15 x 1.5# 2 x 10 x 1.5# 2 x 10 x 1# 2 x 10 x 1# 2 x 15 2 x 15 (B) 2 x 15 (B) 2 x 10 (B)   Seated Hip Flex 2 x 10 x 2# 2 x 10 x 2# 2 x 15 x 1.5# 2 x 10 x 1.5# 2 x 10 x 1# 2 x 10 x 1# 2 x 15 2 x 15 2 x 15 (B) 2 x 10 (B)   Hamstring Curl 2 x 15 GTB 2 x 10 GTB 2 x 10 GTB oot 2 x 15 RTB 2 x 15 RTB 2 x 10 RTB 2 x 10 RTB 2 x 10 RTB                 Standing             Heel Raises 3 x 10 2 x 10 2 x 10 oot 2 x 15 2 x 15 2 x 15 2 x 15 2 x 15 2 x 10   Toe Raises 3 x 10 2 x 10 2 x 10 oot oot 2 x 15 2 x 15 2 x 15 2 x 15 2 x 10   Mini Squats 3 x 10 2 x 10 2 x 10 1 x 10 1 x 10 2 x 10 2 x 10 1 x 10 --    Hip Flex -- -- -- -- -- -- -- -- --    Hip Ext -- -- -- -- -- -- -- -- --    Hip Abd 2 x 15 2 x 15 2 x 15 -- -- -- -- -- --    Side Stepping 15 ft x 4 oot 15 ft x 4 15 ft x 4 15 ft x 4        Heel-Toe Steps 8 ft x 4 oot 15 ft x 4 15 ft x 4 15 ft x 4        Step Ups L1  2 x 10 oot 2 x 10                       Initials GWA 1/6 CSL/VK CSL/VK CSL/VK CSL/VK CSL/VK CSL/VK VK CSL/VK CSL/VK       Written Home Exercises Provided: Continue with current HEP  Pt demo good understanding of the education provided. Ming demonstrated good return demonstration of activities.     Pt. education:  · Posture reeducation, body mechanics, HEP,   · No spiritual or educational barriers to learning provided  · Pt has no cultural, educational or language barriers to learning provided.      Assessment     Patient ambulated into clinic without AD with a normal gait pattern. Patient performed above exercise program with verbal cueing for proper technique and although was instructed to perform 20 reps on most of his exercises, he performed 30 " reps with no increase in symptoms.  Patient performed heel-toe walks in parallel bars today as his balance was off and felt he would be safer in the parallel bars. Patient ambulated out of clinic independent of RW with good balance and normal gait pattern.  Patient instructed to continue with HEP. Pt will continue to benefit from skilled outpatient physical therapy to address the remaining functional deficits, provide pt/family education, and to maximize pt's level of independence in the home and community environment. .     Goals:   Short Term Goals:  4 weeks  Increase strength 1/2 muscle grade  Patient will be able to perform transfers independently  Be able to perform HEP with minimal cueing required  Improve functional impairment of mobility to 50%    Long Term Goals: 8 weeks  Improve muscle strength with MMT to 4+/5 to 5/5  Restore ability to ambulate with normal gait pattern using necessary AD  Restore ability to stand for ADL with good balance  Restore ability to perform sit to stand transfer independently  Restore ability to climb stairs in a safe manner with necessary AD  Restore ability to perform sit to supine and supine to sit transfers independently  Restore ability to perform ADL's independently   Improve functional impairment of mobility to 40%    Anticipated barriers to physical therapy: None  Pt's spiritual, cultural and educational needs considered and pt agreeable to plan of care and goals        Plan   Continue with established Plan of Care towards PT goals.

## 2017-07-13 NOTE — PROGRESS NOTES
Face to Face PTA Conference performed with Edvin Cadet, PTA regarding patient's current status, overall progress, and plan of care

## 2017-07-14 NOTE — PROGRESS NOTES
Face to Face PTA Conference performed with Elizabeth Santiago PT regarding patient's current status, overall progress, and plan of care    Edvin Cadet  7/14/2017

## 2017-07-17 ENCOUNTER — CLINICAL SUPPORT (OUTPATIENT)
Dept: REHABILITATION | Facility: HOSPITAL | Age: 62
End: 2017-07-17
Attending: STUDENT IN AN ORGANIZED HEALTH CARE EDUCATION/TRAINING PROGRAM
Payer: MEDICAID

## 2017-07-17 DIAGNOSIS — R29.898 WEAKNESS OF BOTH LOWER EXTREMITIES: ICD-10-CM

## 2017-07-17 DIAGNOSIS — R53.81 DEBILITY: ICD-10-CM

## 2017-07-17 DIAGNOSIS — R26.81 GAIT INSTABILITY: ICD-10-CM

## 2017-07-17 PROCEDURE — 97110 THERAPEUTIC EXERCISES: CPT | Performed by: PHYSICAL MEDICINE & REHABILITATION

## 2017-07-17 NOTE — PROGRESS NOTES
"Name: Ming Boateng  Clinic Number: [id number]  Diagnosis:   Encounter Diagnoses   Name Primary?    Weakness of both lower extremities     Gait instability     Debility      Physician: Sendy Dueñas DO  Treatment Orders: PT Eval and Treat  Past Medical History:   Diagnosis Date    CHF (congestive heart failure)     Diabetes mellitus     Hypertension        Precautions: None  Visit # authorized: 12/50 on 7/17/2017  Authorization period: 12/31/17  Plan of care expiration: 7/19/17    Start time: 10:50 AM  Stop Time:  11:40 AM    Timed     Procedure  Min     Units    TE x 4 50 3                    Untimed     Procedure  Min  Units                      Subjective     Pt reports: "I am feeling good today."    Pain Scale: before treatment: 0/10 currently; after treatment: 0/10    Objective     TREATMENT  Therapeutic exercise: Ming received therapeutic exercises to develop ROM, strength and endurance for 50 minutes including:     Date  7/17/17 07/13/17 7/12/17 7/06/17 6/29/17 6/27/17 6/21/17 6/15/17 6/13/17 06/08/17 06/01/17   VISIT 12/50 11/50 10/50 9/50 8/50 7/50 6/50 5/50 4/50 3 / 50 2 / 50   FOTO -- -- -- -- -- -- 5/5 4/5 3/5 2/5 1/5   POC 7/19/17 07/19/17 7/19/17 7/19/17 7/19/17 7/19/17 7/19/17 7/19/17 7/19/17 7/19/17 7/19/17   Face to face 8/13/17 08/13/17                          Supine              Glut Sets -- -- -- D/C 2 x 15 x 5" 2 x 10 x 5" 2 x 10 x 5" 2 x 10 x 5" 2 x 10 x 5"     Supine HSS w/strap 10 x 10" 10 x 10" 10 x 10" 10 x 10" 10 x 10" 10 x 10" 10 x 10" 10 x 10" 10 x 10" 10 x 10" (B) 10 x 10" (B)   QS -- -- -- D/C 2 x 15 x 5" 2 x 15 x 5" 2 x 15 x 5" 2 x 15 x 5" 2 x 10 x 5" -- 10 x 5" (B)   SAQ 2 x 15 2# 2 x 15 x 2# 2 x 15 x 2# 2 x 10 x 2# 2 x 10 x 1.5# 2 x 10 x 1.5# 2 x 10 x 1# 2 x 10 x 1# 2 x 15 (B) 2 x 15 (B) 2 x 10 (B)   SLR  2 x 15 x 2# 2 x 15 x 1.5# 2 x 10 x 1.5# 2 x 10 x 1# 2 x 10 x 1# 2 x 10 x 1# 3 x 10 (B) 2 x 10 (B) 2 x 10 (B) 2 x 10 (B)   Bridges 2 x 15 2 x 15 2 x 15 2 x 15 2 x 10 2 x 10 2 x " "10 2 x 10 2 x 10 2 x 10    S/L Hip ABD -- -- -- --          S/L Hip ADD -- -- -- --          ABD Clams 2 x 15 BTB 2 x 15 BTB 2 x 15 BTB -- 2 x 15 BTB 2 x 15 BTB 2 x 10 BTB 2 x 15 GTB 2 x 10 GTB 2 x 10 GTB 2 x 10 RTB   Seated              Brittanie Hip Add -- -- -- D/C 2 x 15 x 3" 2 x 15 x 3" 2 x 15 x 3" 2 x 10 x 3" 2 x 10 x3" 2 x 10 x 3" 2 x 10 x 3"   LAQs 2 x 15 2# 2 x 15 x 2# 2 x 10 x 2# 2 x 15 x 1.5# 2 x 10 x 1.5# 2 x 10 x 1# 2 x 10 x 1# 2 x 15 2 x 15 (B) 2 x 15 (B) 2 x 10 (B)   Seated Hip Flex 2 x 15 2# 2 x 10 x 2# 2 x 10 x 2# 2 x 15 x 1.5# 2 x 10 x 1.5# 2 x 10 x 1# 2 x 10 x 1# 2 x 15 2 x 15 2 x 15 (B) 2 x 10 (B)   Hamstring Curl  2 x 15 GTB 2 x 15 GTB 2 x 10 GTB 2 x 10 GTB oot 2 x 15 RTB 2 x 15 RTB 2 x 10 RTB 2 x 10 RTB 2 x 10 RTB                  Standing              Heel Raises 2 x 15 3 x 10 2 x 10 2 x 10 oot 2 x 15 2 x 15 2 x 15 2 x 15 2 x 15 2 x 10   Toe Raises 2 x 15 3 x 10 2 x 10 2 x 10 oot oot 2 x 15 2 x 15 2 x 15 2 x 15 2 x 10   Mini Squats 2 x 15 3 x 10 2 x 10 2 x 10 1 x 10 1 x 10 2 x 10 2 x 10 1 x 10 --    Hip Flex -- -- -- -- -- -- -- -- -- --    Hip Ext --- -- -- -- -- -- -- -- -- --    Hip Abd 2 x 15 2 x 15 2 x 15 2 x 15 -- -- -- -- -- --    Side Stepping 15 ft x 4 15 ft x 4 oot 15 ft x 4 15 ft x 4 15 ft x 4        Heel-Toe Steps 15 ft x4 8 ft x 4 oot 15 ft x 4 15 ft x 4 15 ft x 4        Step Ups L1 x 20 L1  2 x 10 oot 2 x 10                        Initials VK GWA 1/6 CSL/VK CSL/VK CSL/VK CSL/VK CSL/VK CSL/VK VK CSL/VK CSL/VK         Written Home Exercises Provided: Continue with current HEP  Pt demo good understanding of the education provided. Ming demonstrated good return demonstration of activities.     Pt. education:  · Posture reeducation, body mechanics, HEP,   · No spiritual or educational barriers to learning provided  · Pt has no cultural, educational or language barriers to learning provided.      Assessment     Patient ambulated into clinic without AD with a normal gait pattern. Patient " performed above exercise program with minimal verbal cueing for proper technique. Patient instructed to continue with HEP. Pt will continue to benefit from skilled outpatient physical therapy to address the remaining functional deficits, provide pt/family education, and to maximize pt's level of independence in the home and community environment. .     Goals:   Short Term Goals:  4 weeks  Increase strength 1/2 muscle grade  Patient will be able to perform transfers independently  Be able to perform HEP with minimal cueing required  Improve functional impairment of mobility to 50%    Long Term Goals: 8 weeks  Improve muscle strength with MMT to 4+/5 to 5/5  Restore ability to ambulate with normal gait pattern using necessary AD  Restore ability to stand for ADL with good balance  Restore ability to perform sit to stand transfer independently  Restore ability to climb stairs in a safe manner with necessary AD  Restore ability to perform sit to supine and supine to sit transfers independently  Restore ability to perform ADL's independently   Improve functional impairment of mobility to 40%    Anticipated barriers to physical therapy: None  Pt's spiritual, cultural and educational needs considered and pt agreeable to plan of care and goals        Plan   Continue with established Plan of Care towards PT goals. Will reassess next visit for end of POC and for discharge.        Elizabeth Santiago, PT

## 2017-07-19 ENCOUNTER — CLINICAL SUPPORT (OUTPATIENT)
Dept: REHABILITATION | Facility: HOSPITAL | Age: 62
End: 2017-07-19
Attending: STUDENT IN AN ORGANIZED HEALTH CARE EDUCATION/TRAINING PROGRAM
Payer: MEDICAID

## 2017-07-19 ENCOUNTER — ANESTHESIA EVENT (OUTPATIENT)
Dept: SURGERY | Facility: AMBULARY SURGERY CENTER | Age: 62
End: 2017-07-19
Payer: MEDICAID

## 2017-07-19 DIAGNOSIS — R29.898 WEAKNESS OF BOTH LOWER EXTREMITIES: ICD-10-CM

## 2017-07-19 DIAGNOSIS — R53.81 DEBILITY: ICD-10-CM

## 2017-07-19 DIAGNOSIS — R26.81 GAIT INSTABILITY: ICD-10-CM

## 2017-07-19 PROCEDURE — 97110 THERAPEUTIC EXERCISES: CPT | Performed by: PHYSICAL MEDICINE & REHABILITATION

## 2017-07-19 NOTE — PROGRESS NOTES
"Name: Ming Boateng  Clinic Number: [id number]  Diagnosis:   Encounter Diagnoses   Name Primary?    Weakness of both lower extremities     Gait instability     Debility      Physician: Sendy Dueñas DO  Treatment Orders: PT Eval and Treat  Past Medical History:   Diagnosis Date    CHF (congestive heart failure)     Diabetes mellitus     Hypertension     Stroke     mini speech difficulty, right sided weakness       Precautions: None  Visit # authorized: 12/50 on 7/19/2017  Authorization period: 12/31/17  Plan of care expiration: 7/19/17    Start time: 11:10 AM  Stop Time:  11:55 AM    Timed     Procedure  Min     Units    TE x 3 45 3                    Untimed     Procedure  Min  Units                      Subjective     Pt reports: "I am feeling good today."    Pain Scale: before treatment: 0/10 currently; after treatment: 0/10    Objective     7/19/17  Muscle Strength  MMT R L   Hip flexion 5/5 5/5   Hip abduction 5/5 5/5   Hip extension 5/5 5/5   Hip ER 5/5 5/5   Hip IR 5/5 5/5   Knee extension 5/5 5/5   Knee flexion 5/5 5/5   Ankle dorsiflexion 5/5 5/5   Ankle plantar flexion 5/5 5/5   Ankle inversion 5/5 5/5   Ankle eversion 5/5 5/5         Special tests:Balance: TUG: Independent of AD and took 13 seconds to complete                                       30 second Chair Stand Test: 10   Low  risk for falls      5/24/17  Muscle Strength  MMT R L   Hip flexion 4/5 4/5   Hip abduction 4/5 4/5   Hip extension 4/5 4/5   Hip ER 4/5 4/5   Hip IR 4/5 4/5   Knee extension 4/5 4/5   Knee flexion 4/5 4/5   Ankle dorsiflexion 4/5 4/5   Ankle plantar flexion 4/5 4/5   Ankle inversion 4/5 4/5   Ankle eversion 4/5 4/5         Special tests:Balance: TUG: used RW and took 26 seconds to complete                                       30 second Chair Stand Test: 0 --used his hands to push up form sitting  High risk for falls    TREATMENT  Therapeutic exercise: Ming received therapeutic exercises to develop ROM, strength and " "endurance for 45 minutes including:     Date  7/19/17 7/17/17 07/13/17 7/12/17 7/06/17 6/29/17 6/27/17 6/21/17 6/15/17 6/13/17 06/08/17 06/01/17   VISIT 13/50 12/50 11/50 10/50 9/50 8/50 7/50 6/50 5/50 4/50 3 / 50 2 / 50   FOTO  -- -- -- -- -- -- 5/5 4/5 3/5 2/5 1/5   POC 7/19/17 7/19/17 07/19/17 7/19/17 7/19/17 7/19/17 7/19/17 7/19/17 7/19/17 7/19/17 7/19/17 7/19/17   Face to face 8/13/17 8/13/17 08/13/17                           Supine               Glut Sets -- -- -- -- D/C 2 x 15 x 5" 2 x 10 x 5" 2 x 10 x 5" 2 x 10 x 5" 2 x 10 x 5"     Supine HSS w/strap 10 x 10" 10 x 10" 10 x 10" 10 x 10" 10 x 10" 10 x 10" 10 x 10" 10 x 10" 10 x 10" 10 x 10" 10 x 10" (B) 10 x 10" (B)   QS -- -- -- -- D/C 2 x 15 x 5" 2 x 15 x 5" 2 x 15 x 5" 2 x 15 x 5" 2 x 10 x 5" -- 10 x 5" (B)   SAQ 2 x 15 2# 2 x 15 2# 2 x 15 x 2# 2 x 15 x 2# 2 x 10 x 2# 2 x 10 x 1.5# 2 x 10 x 1.5# 2 x 10 x 1# 2 x 10 x 1# 2 x 15 (B) 2 x 15 (B) 2 x 10 (B)   SLR 2 x 15 2# 2 x 15 2# 2 x 15 x 2# 2 x 15 x 1.5# 2 x 10 x 1.5# 2 x 10 x 1# 2 x 10 x 1# 2 x 10 x 1# 3 x 10 (B) 2 x 10 (B) 2 x 10 (B) 2 x 10 (B)   Bridges 2 x 15 2 x 15 2 x 15 2 x 15 2 x 15 2 x 10 2 x 10 2 x 10 2 x 10 2 x 10 2 x 10    S/L Hip ABD -- -- -- -- --          S/L Hip ADD -- -- -- -- --          ABD Clams 2 x 15 BTB 2 x 15 BTB 2 x 15 BTB 2 x 15 BTB -- 2 x 15 BTB 2 x 15 BTB 2 x 10 BTB 2 x 15 GTB 2 x 10 GTB 2 x 10 GTB 2 x 10 RTB   Seated               Brittanie Hip Add -- -- -- -- D/C 2 x 15 x 3" 2 x 15 x 3" 2 x 15 x 3" 2 x 10 x 3" 2 x 10 x3" 2 x 10 x 3" 2 x 10 x 3"   LAQs 2 x 15 2# 2 x 15 2# 2 x 15 x 2# 2 x 10 x 2# 2 x 15 x 1.5# 2 x 10 x 1.5# 2 x 10 x 1# 2 x 10 x 1# 2 x 15 2 x 15 (B) 2 x 15 (B) 2 x 10 (B)   Seated Hip Flex 2 x 15 2# 2 x 15 2# 2 x 10 x 2# 2 x 10 x 2# 2 x 15 x 1.5# 2 x 10 x 1.5# 2 x 10 x 1# 2 x 10 x 1# 2 x 15 2 x 15 2 x 15 (B) 2 x 10 (B)   Hamstring Curl 2 x 15 GTB  2 x 15 GTB 2 x 15 GTB 2 x 10 GTB 2 x 10 GTB oot 2 x 15 RTB 2 x 15 RTB 2 x 10 RTB 2 x 10 RTB 2 x 10 RTB                 "   Standing               Heel Raises 2 x 15 2 x 15 3 x 10 2 x 10 2 x 10 oot 2 x 15 2 x 15 2 x 15 2 x 15 2 x 15 2 x 10   Toe Raises 2 x 15 2 x 15 3 x 10 2 x 10 2 x 10 oot oot 2 x 15 2 x 15 2 x 15 2 x 15 2 x 10   Mini Squats 2 x 15 2 x 15 3 x 10 2 x 10 2 x 10 1 x 10 1 x 10 2 x 10 2 x 10 1 x 10 --    Hip Flex -- -- -- -- -- -- -- -- -- -- --    Hip Ext -- --- -- -- -- -- -- -- -- -- --    Hip Abd 2 x 15 2 x 15 2 x 15 2 x 15 2 x 15 -- -- -- -- -- --    Side Stepping 15' x 4 15 ft x 4 15 ft x 4 oot 15 ft x 4 15 ft x 4 15 ft x 4        Heel-Toe Steps 15' x 4 15 ft x4 8 ft x 4 oot 15 ft x 4 15 ft x 4 15 ft x 4        Step Ups L1 x 20 L1 x 20 L1  2 x 10 oot 2 x 10                         Initials VK VK GWA 1/6 CSL/VK CSL/VK CSL/VK CSL/VK CSL/VK CSL/VK VK CSL/VK CSL/VK     FOTO Score:  74/100    Written Home Exercises Provided: Continue with current HEP  Pt demo good understanding of the education provided. Ming demonstrated good return demonstration of activities.     Pt. education:  · Posture reeducation, body mechanics, HEP,   · No spiritual or educational barriers to learning provided  · Pt has no cultural, educational or language barriers to learning provided.      Assessment     Reassessment for end of POC and for discharge.  Strength BLE now 5/5 range and balance test now place patient in low falls risk category. Patient ambulates independent of AD with normal gait pattern. Reviewed above exercise program for patient to continue on HEP and patient is independent with HEP.  Patient has met STG and LTG.  Goals:   Short Term Goals:  4 weeks MET  Increase strength 1/2 muscle grade  Patient will be able to perform transfers independently  Be able to perform HEP with minimal cueing required  Improve functional impairment of mobility to 50%    Long Term Goals: 8 weeks MET  Improve muscle strength with MMT to 4+/5 to 5/5  Restore ability to ambulate with normal gait pattern using necessary AD  Restore ability to stand for ADL  with good balance  Restore ability to perform sit to stand transfer independently  Restore ability to climb stairs in a safe manner with necessary AD  Restore ability to perform sit to supine and supine to sit transfers independently  Restore ability to perform ADL's independently   Improve functional impairment of mobility to 40%    Anticipated barriers to physical therapy: None  Pt's spiritual, cultural and educational needs considered and pt agreeable to plan of care and goals        Plan   Patient is discharged from physical therapy.    Elizabeth Santiago, PT

## 2017-07-20 ENCOUNTER — HOSPITAL ENCOUNTER (OUTPATIENT)
Facility: AMBULARY SURGERY CENTER | Age: 62
Discharge: HOME | End: 2017-07-20
Attending: OPHTHALMOLOGY | Admitting: OPHTHALMOLOGY
Payer: MEDICAID

## 2017-07-20 ENCOUNTER — ANESTHESIA (OUTPATIENT)
Dept: SURGERY | Facility: AMBULARY SURGERY CENTER | Age: 62
End: 2017-07-20
Payer: MEDICAID

## 2017-07-20 ENCOUNTER — OFFICE VISIT (OUTPATIENT)
Dept: FAMILY MEDICINE | Facility: HOSPITAL | Age: 62
End: 2017-07-20
Attending: FAMILY MEDICINE
Payer: MEDICAID

## 2017-07-20 VITALS
HEART RATE: 72 BPM | OXYGEN SATURATION: 97 % | HEIGHT: 67 IN | BODY MASS INDEX: 26.68 KG/M2 | SYSTOLIC BLOOD PRESSURE: 142 MMHG | WEIGHT: 170 LBS | DIASTOLIC BLOOD PRESSURE: 78 MMHG | RESPIRATION RATE: 20 BRPM | TEMPERATURE: 98 F

## 2017-07-20 VITALS
DIASTOLIC BLOOD PRESSURE: 80 MMHG | HEIGHT: 66 IN | BODY MASS INDEX: 27.6 KG/M2 | HEART RATE: 70 BPM | WEIGHT: 171.75 LBS | SYSTOLIC BLOOD PRESSURE: 120 MMHG

## 2017-07-20 DIAGNOSIS — Z79.4 TYPE 2 DIABETES MELLITUS WITH COMPLICATION, WITH LONG-TERM CURRENT USE OF INSULIN: ICD-10-CM

## 2017-07-20 DIAGNOSIS — I50.30 (HFPEF) HEART FAILURE WITH PRESERVED EJECTION FRACTION: ICD-10-CM

## 2017-07-20 DIAGNOSIS — H26.9 RIGHT CATARACT: ICD-10-CM

## 2017-07-20 DIAGNOSIS — E78.5 HYPERLIPIDEMIA ASSOCIATED WITH TYPE 2 DIABETES MELLITUS: Primary | ICD-10-CM

## 2017-07-20 DIAGNOSIS — E11.8 TYPE 2 DIABETES MELLITUS WITH COMPLICATION, WITH LONG-TERM CURRENT USE OF INSULIN: ICD-10-CM

## 2017-07-20 DIAGNOSIS — I10 ESSENTIAL HYPERTENSION: ICD-10-CM

## 2017-07-20 DIAGNOSIS — E11.69 HYPERLIPIDEMIA ASSOCIATED WITH TYPE 2 DIABETES MELLITUS: Primary | ICD-10-CM

## 2017-07-20 DIAGNOSIS — N18.4 CKD (CHRONIC KIDNEY DISEASE) STAGE 4, GFR 15-29 ML/MIN: ICD-10-CM

## 2017-07-20 LAB — POCT GLUCOSE: 147 MG/DL (ref 70–110)

## 2017-07-20 PROCEDURE — 99215 OFFICE O/P EST HI 40 MIN: CPT | Performed by: FAMILY MEDICINE

## 2017-07-20 PROCEDURE — D9220A PRA ANESTHESIA: Mod: CRNA,,, | Performed by: NURSE ANESTHETIST, CERTIFIED REGISTERED

## 2017-07-20 PROCEDURE — 66984 XCAPSL CTRC RMVL W/O ECP: CPT | Performed by: OPHTHALMOLOGY

## 2017-07-20 PROCEDURE — D9220A PRA ANESTHESIA: Mod: ANES,,, | Performed by: ANESTHESIOLOGY

## 2017-07-20 DEVICE — LENS 19.5: Type: IMPLANTABLE DEVICE | Site: EYE | Status: FUNCTIONAL

## 2017-07-20 RX ORDER — CYCLOPENTOLATE HYDROCHLORIDE 10 MG/ML
1 SOLUTION/ DROPS OPHTHALMIC
Status: COMPLETED | OUTPATIENT
Start: 2017-07-20 | End: 2017-07-20

## 2017-07-20 RX ORDER — LIDOCAINE HYDROCHLORIDE 10 MG/ML
INJECTION, SOLUTION EPIDURAL; INFILTRATION; INTRACAUDAL; PERINEURAL
Status: DISCONTINUED | OUTPATIENT
Start: 2017-07-20 | End: 2017-07-20 | Stop reason: HOSPADM

## 2017-07-20 RX ORDER — ONDANSETRON 2 MG/ML
INJECTION INTRAMUSCULAR; INTRAVENOUS
Status: DISCONTINUED
Start: 2017-07-20 | End: 2017-07-20 | Stop reason: HOSPADM

## 2017-07-20 RX ORDER — EPINEPHRINE 1 MG/ML
INJECTION, SOLUTION, CONCENTRATE INTRAVENOUS
Status: DISCONTINUED | OUTPATIENT
Start: 2017-07-20 | End: 2017-07-20 | Stop reason: HOSPADM

## 2017-07-20 RX ORDER — EPINEPHRINE 1 MG/ML
INJECTION, SOLUTION INTRACARDIAC; INTRAMUSCULAR; INTRAVENOUS; SUBCUTANEOUS
Status: DISCONTINUED
Start: 2017-07-20 | End: 2017-07-20 | Stop reason: HOSPADM

## 2017-07-20 RX ORDER — ONDANSETRON 2 MG/ML
4 INJECTION INTRAMUSCULAR; INTRAVENOUS DAILY PRN
Status: DISCONTINUED | OUTPATIENT
Start: 2017-07-20 | End: 2017-07-20 | Stop reason: HOSPADM

## 2017-07-20 RX ORDER — TROPICAMIDE 10 MG/ML
1 SOLUTION/ DROPS OPHTHALMIC
Status: DISCONTINUED | OUTPATIENT
Start: 2017-07-20 | End: 2017-07-20 | Stop reason: HOSPADM

## 2017-07-20 RX ORDER — POLYMYXIN B SULFATE AND TRIMETHOPRIM 1; 10000 MG/ML; [USP'U]/ML
SOLUTION OPHTHALMIC
Status: DISCONTINUED | OUTPATIENT
Start: 2017-07-20 | End: 2017-07-20 | Stop reason: HOSPADM

## 2017-07-20 RX ORDER — PROPARACAINE HYDROCHLORIDE 5 MG/ML
SOLUTION/ DROPS OPHTHALMIC
Status: DISCONTINUED
Start: 2017-07-20 | End: 2017-07-20 | Stop reason: HOSPADM

## 2017-07-20 RX ORDER — LIDOCAINE HYDROCHLORIDE 10 MG/ML
1 INJECTION, SOLUTION EPIDURAL; INFILTRATION; INTRACAUDAL; PERINEURAL ONCE
Status: COMPLETED | OUTPATIENT
Start: 2017-07-20 | End: 2017-07-20

## 2017-07-20 RX ORDER — MIDAZOLAM HYDROCHLORIDE 1 MG/ML
INJECTION INTRAMUSCULAR; INTRAVENOUS
Status: DISCONTINUED
Start: 2017-07-20 | End: 2017-07-20 | Stop reason: HOSPADM

## 2017-07-20 RX ORDER — ONDANSETRON 2 MG/ML
INJECTION INTRAMUSCULAR; INTRAVENOUS
Status: DISCONTINUED | OUTPATIENT
Start: 2017-07-20 | End: 2017-07-20

## 2017-07-20 RX ORDER — SODIUM CHLORIDE 0.9 % (FLUSH) 0.9 %
3 SYRINGE (ML) INJECTION
Status: DISCONTINUED | OUTPATIENT
Start: 2017-07-20 | End: 2017-07-20 | Stop reason: HOSPADM

## 2017-07-20 RX ORDER — CYCLOPENTOLATE HYDROCHLORIDE 10 MG/ML
1 SOLUTION/ DROPS OPHTHALMIC
Status: DISCONTINUED | OUTPATIENT
Start: 2017-07-20 | End: 2017-07-20 | Stop reason: HOSPADM

## 2017-07-20 RX ORDER — LIDOCAINE HYDROCHLORIDE 40 MG/ML
1 INJECTION, SOLUTION RETROBULBAR
Status: ACTIVE | OUTPATIENT
Start: 2017-07-20 | End: 2017-07-20

## 2017-07-20 RX ORDER — POLYMYXIN B SULFATE AND TRIMETHOPRIM 1; 10000 MG/ML; [USP'U]/ML
SOLUTION OPHTHALMIC
Status: DISCONTINUED
Start: 2017-07-20 | End: 2017-07-20 | Stop reason: HOSPADM

## 2017-07-20 RX ORDER — TROPICAMIDE 10 MG/ML
1 SOLUTION/ DROPS OPHTHALMIC
Status: COMPLETED | OUTPATIENT
Start: 2017-07-20 | End: 2017-07-20

## 2017-07-20 RX ORDER — LIDOCAINE HYDROCHLORIDE 10 MG/ML
INJECTION, SOLUTION EPIDURAL; INFILTRATION; INTRACAUDAL; PERINEURAL
Status: DISCONTINUED
Start: 2017-07-20 | End: 2017-07-20 | Stop reason: HOSPADM

## 2017-07-20 RX ORDER — PHENYLEPHRINE HYDROCHLORIDE 100 MG/ML
1 SOLUTION/ DROPS OPHTHALMIC
Status: DISCONTINUED | OUTPATIENT
Start: 2017-07-20 | End: 2017-07-20 | Stop reason: HOSPADM

## 2017-07-20 RX ORDER — LIDOCAINE HYDROCHLORIDE 40 MG/ML
INJECTION, SOLUTION RETROBULBAR
Status: DISCONTINUED
Start: 2017-07-20 | End: 2017-07-20 | Stop reason: WASHOUT

## 2017-07-20 RX ORDER — SODIUM CHLORIDE, SODIUM LACTATE, POTASSIUM CHLORIDE, CALCIUM CHLORIDE 600; 310; 30; 20 MG/100ML; MG/100ML; MG/100ML; MG/100ML
INJECTION, SOLUTION INTRAVENOUS CONTINUOUS
Status: DISCONTINUED | OUTPATIENT
Start: 2017-07-20 | End: 2017-07-20 | Stop reason: HOSPADM

## 2017-07-20 RX ORDER — PHENYLEPHRINE HYDROCHLORIDE 25 MG/ML
1 SOLUTION/ DROPS OPHTHALMIC
Status: COMPLETED | OUTPATIENT
Start: 2017-07-20 | End: 2017-07-20

## 2017-07-20 RX ORDER — MIDAZOLAM HYDROCHLORIDE 1 MG/ML
INJECTION, SOLUTION INTRAMUSCULAR; INTRAVENOUS
Status: DISCONTINUED | OUTPATIENT
Start: 2017-07-20 | End: 2017-07-20

## 2017-07-20 RX ORDER — PROPARACAINE HYDROCHLORIDE 5 MG/ML
1 SOLUTION/ DROPS OPHTHALMIC ONCE AS NEEDED
Status: COMPLETED | OUTPATIENT
Start: 2017-07-20 | End: 2017-07-20

## 2017-07-20 RX ADMIN — CYCLOPENTOLATE HYDROCHLORIDE 1 DROP: 10 SOLUTION/ DROPS OPHTHALMIC at 07:07

## 2017-07-20 RX ADMIN — LIDOCAINE HYDROCHLORIDE 0.2 ML: 10 INJECTION, SOLUTION EPIDURAL; INFILTRATION; INTRACAUDAL; PERINEURAL at 06:07

## 2017-07-20 RX ADMIN — PHENYLEPHRINE HYDROCHLORIDE 1 DROP: 25 SOLUTION/ DROPS OPHTHALMIC at 06:07

## 2017-07-20 RX ADMIN — MIDAZOLAM HYDROCHLORIDE 1 MG: 1 INJECTION, SOLUTION INTRAMUSCULAR; INTRAVENOUS at 07:07

## 2017-07-20 RX ADMIN — PHENYLEPHRINE HYDROCHLORIDE 1 DROP: 25 SOLUTION/ DROPS OPHTHALMIC at 07:07

## 2017-07-20 RX ADMIN — CYCLOPENTOLATE HYDROCHLORIDE 1 DROP: 10 SOLUTION/ DROPS OPHTHALMIC at 06:07

## 2017-07-20 RX ADMIN — TROPICAMIDE 1 DROP: 10 SOLUTION/ DROPS OPHTHALMIC at 06:07

## 2017-07-20 RX ADMIN — SODIUM CHLORIDE, SODIUM LACTATE, POTASSIUM CHLORIDE, CALCIUM CHLORIDE: 600; 310; 30; 20 INJECTION, SOLUTION INTRAVENOUS at 06:07

## 2017-07-20 RX ADMIN — PROPARACAINE HYDROCHLORIDE 1 DROP: 5 SOLUTION/ DROPS OPHTHALMIC at 06:07

## 2017-07-20 RX ADMIN — ONDANSETRON 4 MG: 2 INJECTION INTRAMUSCULAR; INTRAVENOUS at 07:07

## 2017-07-20 RX ADMIN — TROPICAMIDE 1 DROP: 10 SOLUTION/ DROPS OPHTHALMIC at 07:07

## 2017-07-20 NOTE — PROGRESS NOTES
Subjective:       Patient ID: Ming Boateng is a 62 y.o. male.    Chief Complaint: Follow-up    HPI   Patient is a 62 year old male with a history of HTN, HLD, CKD IV, HTN, HFpEF (DD, EF 65%), history of CVA x3 in the past presenting to clinic for DM follow up.  Niece is present during encounter providing information.  Patient with noted BS log of FBS ranges 130-180s.  Evening post prandial -370s range.  Per niece patient takes Lantus 10 units QHS PRN, does not have a set schedule for nightly lantus.  She also states she takes a SS of Novolog of 4-6 units TIDWM PRN.  Patient's last noted HbA1c 7.7.  Home BP log well controlled 130/80s range.  Advised patient on establishing set routine for insulin regimen.  Denies any symptoms of hypoglycemia.  Denies Cp/SOB/diaphoresis, weakness or fatigue.  Just completed PT for CVA rehab.    Of note: s/p right eye cataract surgery performed today.    Review of Systems   Constitutional: Negative for chills and fever.   HENT: Negative for sore throat.    Eyes: Negative for visual disturbance.   Respiratory: Negative for cough, shortness of breath and wheezing.    Cardiovascular: Negative for chest pain, palpitations and leg swelling.   Gastrointestinal: Negative for abdominal pain, constipation, diarrhea, nausea and vomiting.   Genitourinary: Negative for difficulty urinating.   Musculoskeletal: Negative for arthralgias and myalgias.   Neurological: Negative for dizziness and seizures.   Psychiatric/Behavioral: The patient is not nervous/anxious.          Objective:      Vitals:    07/20/17 1654   BP: 120/80   Pulse: 70     Physical Exam   Constitutional: He is oriented to person, place, and time. He appears well-developed and well-nourished.   HENT:   Head: Normocephalic and atraumatic.   Right eye with patch (s/p cataract surgery this AM)   Eyes: Conjunctivae and EOM are normal. Pupils are equal, round, and reactive to light.   Neck: Normal range of motion. No JVD present.    Cardiovascular: Normal rate, regular rhythm and normal heart sounds.  Exam reveals no gallop and no friction rub.    No murmur heard.  Pulmonary/Chest: Effort normal and breath sounds normal. No respiratory distress. He has no wheezes. He has no rales.   Abdominal: Soft. Bowel sounds are normal. He exhibits no distension. There is no tenderness.   Musculoskeletal: Normal range of motion.   Neurological: He is alert and oriented to person, place, and time. No cranial nerve deficit.   Skin: Skin is warm. No rash noted.   Psychiatric: He has a normal mood and affect. His behavior is normal. Judgment and thought content normal.       Assessment:       1. Hyperlipidemia associated with type 2 diabetes mellitus    2. Type 2 diabetes mellitus with complication, with long-term current use of insulin    3. (HFpEF) heart failure with preserved ejection fraction    4. CKD (chronic kidney disease) stage 4, GFR 15-29 ml/min    5. Essential hypertension        Plan:       Hyperlipidemia associated with type 2 diabetes mellitus  -Continue Pravachol    HTN  -BP well controlled: on Norvasc    Type 2 diabetes mellitus with complication, with long-term current use of insulin  -last noted HbA1c: 7.7  -advised patient on set insulin regimen to optimize BS control  -Lantus 5 untis BID, Novolog 5 units TIDWM  -strict BS log for next visit    (HFpEF) heart failure with preserved ejection fraction  -medically otimized, follows with Dr. Otoole  -continue Lasix, coreg, Losartan-HCTZ    CKD (chronic kidney disease) stage 4, GFR 15-29 ml/min  -stable, continue to monitor    Return in about 2 weeks (around 8/3/2017) with BS log.      Patient discussed with staff.    Julio Sarmiento MD  [facility],  3  7/20/2017  5:48 PM

## 2017-07-20 NOTE — BRIEF OP NOTE
Brief Operative Note  Ophthalmology Service      Date of Procedure: 7/20/2017     Attending Physician: Jody Garcia MD       Pre-Operative Diagnosis: Age-related nuclear cataract of right eye [H25.11]  Right cataract [H26.9]     Post-Operative Diagnosis: Same as pre-operative diagnosis    Treatments/Procedures: Cataract extraction w/ IOL OD    Intraoperative Findings: Cataract    Anesthesia: MAC with local lidocaine    Complications: None    Estimated Blood Loss: minimal    Specimens: None    -------------------------------------------------------------  Full dictated Operative Report to follow.  -------------------------------------------------------------

## 2017-07-20 NOTE — PLAN OF CARE
Stable, States ready to go home through interpretor, malaika po fluids, denies pain, ambulated to car after instructions given with Latvian interpretation.

## 2017-07-20 NOTE — OP NOTE
SURGEON: Jody Garcia MD    PRE-OPERATIVE DIAGNOSIS: Visually significant cataract, right eye    POST-OPERATIVE DIAGNOSIS: Visually significant cataract, right eye    DATE OF SURGERY: 7/20/2017    PROCEDURES: Phacoemulsification with intraocular lens, right eye    IMPLANT: [id number] (Model #), +19.5 Diopters    ANESTHESIA: MAC with topical lidocaine    COMPLICATIONS: None    ESTIMATED BLOOD LOSS: None    INDICATIONS:  The patient has a history of painless progressive visual loss and difficulty with activities of daily living secondary to cataract formation. After a thorough discussion of the risks, benefits and alternatives to cataract surgery, including, but not limited to, the rare risks of infection, retinal detachment, need for additional surgery, loss of vision and even loss of the eye, the patient voices good understanding and desires to proceed.    DESCRIPTION OF PROCEDURE:  The patients IOL calculations and lens selection were reviewed and confirmed. After verification and marking of the proper eye in the preop holding area, several sets of 1% Mydriacil, 2.5% phenylephrine, and 1% Cyclogyl drops were then placed. The patient was brought to the operating room in supine position where the eye was prepped and draped in standard sterile fashion using 5% betadine and a lid speculum placed. Topical 4% lidocaine was used in addition to the preoperative anesthesia. A paracentesis incision was created through which viscoelastic was used to fill the anterior chamber. A 2.4mm keratome blade was used to create a triplanar temporal clear corneal incision. A cystotome and Utrata forceps was then used to fashion a continuous curvilinear capsulorrhexis. Hydrodissection and hydrodelineation with BSS was performed using a hydrodissection cannula. Phacoemulsification of the nucleus was carried out with a divide and conquer technique, and all remaining epinuclear and cortical material was removed. The eye was reformed using  viscoelastic and the intraocular lens was implanted into the capsular bag. All remaining viscoelastic was removed from the eye. At the end of the case, the lens was well-centered and all wounds were found to be watertight. Drops of polytrim and pred forte were instilled and an eye shield placed over the eye. The patient tolerated the procedure well and was taken to the recovery area in stable condition. The patient was instructed to follow-up for routine post-operative care the following morning.

## 2017-07-20 NOTE — ANESTHESIA POSTPROCEDURE EVALUATION
"Anesthesia Post Evaluation    Patient: Ming Boateng    Procedure(s) Performed: Procedure(s) (LRB):  EXTRACTION-CATARACT-IOL (Right)    Final Anesthesia Type: general  Patient location during evaluation: PACU  Patient participation: Yes- Able to Participate  Level of consciousness: awake and alert and oriented  Post-procedure vital signs: reviewed and stable  Pain management: adequate  Airway patency: patent  PONV status at discharge: No PONV  Anesthetic complications: no      Cardiovascular status: blood pressure returned to baseline  Respiratory status: unassisted, spontaneous ventilation and room air  Hydration status: euvolemic  Follow-up not needed.        Visit Vitals  BP (!) 145/76   Pulse 73   Temp 36.6 °C (97.9 °F) (Oral)   Resp 20   Ht 5' 7" (1.702 m)   Wt 77.1 kg (170 lb)   SpO2 100%   BMI 26.63 kg/m²       Pain/Lesly Score: Pain Assessment Performed: Yes (7/20/2017  6:49 AM)  Presence of Pain: denies (7/20/2017  6:49 AM)      "

## 2017-07-20 NOTE — DISCHARGE INSTRUCTIONS
After Cataract Surgery    You may remove your shield on the day of surgery. You may see double and your vision may be blurry when the patch is first removed. If you are uncomfortable with these symptoms, you may leave  the shield on    Tape the eye sheild over your eye at bedtime and naptime for 7 days. Tape is provided in your kit.    Do not vigorously press or rub your eye after surgery for 6 weeks.    Clean around your eyelids regularly being careful not to press on the upper lid.    You may shower, bathe and shampoo but avoid getting water, soap or shampoo in your eye for 2 weeks.  No swimming for 2 weeks.    Resume normal activities tomorrow, but no lifting or bending for 1 week.  Rest today. Reading, writing or watching TV are fine.     No eye makeup for 1 week, no eye creams for 24 hrs. Caution using perfumes or colognes.    You may be sensitive to light and  may wear sunglasses provided when outdoors     Your glasses may not be the right strength for your eye after surgery.  You may wear your old glasses or go without.  You may find [employer] reading glasses helpful.    Symptoms you may experience the first day:  Blurry vision, Double Vision, Redish color to objects, Sensitivity to light, flickering light, Burning or scratchy feeling in eye.    Serious Problems that could arise that need immediate reporting to the Dr:     A sudden Decrease or loss of vision in either eye  Your vision is becoming worse instead of better each day  You develop a shadow in your vision  You see flashing lights  You have a continuous ache or increased pain not relieved by over the counter pain medication.    If you have any questions, please re read this carefully. If your  Question is still not answered call the office. [phone] . We are here to help you!    Emergency after office hours number is     Using eye drops:  Wash hands, shake drops well, tilt head back, open eye and look up, gently pull lower eyelid  down to form a pocket, place drop into the pocket formed, close eye    Be careful not to touch your eyelashes or finger to the tip of the bottle. Allow 5 m between each drop.    Prednisolone 1 drop 4x daily  Polymyxin 1 drop 4x daily  Bromfenac 1 drop 2 x daily

## 2017-07-20 NOTE — DISCHARGE SUMMARY
Discharge Summary  Ophthalmology Service      Admit Date: 7/20/2017     Discharge Date: 7/20/2017     Attending Physician: Jody Garcia MD     Discharged Condition: Good    Reason for Admission: Age-related nuclear cataract of right eye [H25.11]  Right cataract [H26.9]     Treatments/Procedures: Cataract Surgery (see dictated report for details).    Hospital Course: Stable, dictated    Consults: None    Significant Diagnostic Studies: None    Disposition: Home    Patient Instructions:   - Resume same diet as prior to surgery  - Resume activity as tolerated with no swimming for 1 week  - Keep eye clean and dry with eye shield on until follow up.  - Follow up in 1 day as previously scheduled for routine postoperative care    Patient Instructions:   Current Discharge Medication List      CONTINUE these medications which have NOT CHANGED    Details   amlodipine (NORVASC) 10 MG tablet Take 1 tablet (10 mg total) by mouth once daily.  Qty: 90 tablet, Refills: 0      aspirin 81 MG Chew Take 81 mg by mouth.    Associated Diagnoses: (HFpEF) heart failure with preserved ejection fraction      blood sugar diagnostic Strp 1 strip by Misc.(Non-Drug; Combo Route) route 3 (three) times daily with meals.  Qty: 100 each, Refills: 0      blood-glucose meter kit Use as instructed  Qty: 1 each, Refills: 0      calcium acetate (PHOSLO) 667 mg capsule Take 1 capsule (667 mg total) by mouth 3 (three) times daily with meals.  Qty: 270 capsule, Refills: 3      carvedilol (COREG) 12.5 MG tablet Take 0.5 tablets (6.25 mg total) by mouth 2 (two) times daily with meals.  Qty: 180 tablet, Refills: 0    Associated Diagnoses: (HFpEF) heart failure with preserved ejection fraction      cetirizine 10 mg chewable tablet Take 10 mg by mouth once daily.      furosemide (LASIX) 40 MG tablet Take 2 tablets (80 mg total) by mouth once daily.  Qty: 180 tablet, Refills: 0      garlic 1,000 mg Cap Take by mouth.    Associated Diagnoses: (HFpEF) heart  "failure with preserved ejection fraction      ibuprofen (ADVIL,MOTRIN) 600 MG tablet TK 1 T PO TID prn  Refills: 0    Associated Diagnoses: (HFpEF) heart failure with preserved ejection fraction      insulin glargine, TOUJEO, (TOUJEO) 300 unit/mL (1.5 mL) InPn pen Inject 15 Units into the skin every evening.  Qty: 4.5 mL, Refills: 0    Associated Diagnoses: Type 2 diabetes mellitus with complication, with long-term current use of insulin      insulin lispro (HUMALOG) 100 unit/mL injection Inject 6 Units into the skin 3 (three) times daily with meals.  Qty: 16.2 mL, Refills: 3    Associated Diagnoses: Type 2 diabetes mellitus with complication, with long-term current use of insulin      lancets (LANCETS,ULTRA THIN) Misc 1 lancet by Misc.(Non-Drug; Combo Route) route 3 (three) times daily with meals.  Qty: 100 each, Refills: 11      lancing device Misc 1 lancet by Misc.(Non-Drug; Combo Route) route 3 (three) times daily with meals.  Qty: 1 each, Refills: 11      losartan-hydrochlorothiazide 100-25 mg (HYZAAR) 100-25 mg per tablet Take 1 tablet by mouth. 50-12.5    Associated Diagnoses: (HFpEF) heart failure with preserved ejection fraction      pen needle, diabetic 29 gauge x 1/2" Ndle 1 pen by Misc.(Non-Drug; Combo Route) route 3 (three) times daily with meals.  Qty: 100 each, Refills: 11      pravastatin (PRAVACHOL) 20 MG tablet Take 1 tablet (20 mg total) by mouth once daily.  Qty: 90 tablet, Refills: 0      sertraline (ZOLOFT) 25 MG tablet Take 1 tablet (25 mg total) by mouth once daily.  Qty: 90 tablet, Refills: 0      sevelamer carbonate (RENVELA) 800 mg Tab Take 1 tablet (800 mg total) by mouth 3 (three) times daily with meals.  Qty: 42 tablet, Refills: 0         STOP taking these medications       hydrocodone-acetaminophen 7.5-325mg (NORCO) 7.5-325 mg per tablet Comments:   Reason for Stopping:                 Discharge Procedure Orders  Diet general     Lifting restrictions     Call MD for:  severe " uncontrolled pain     Leave dressing on - Keep it clean, dry, and intact until clinic visit

## 2017-07-20 NOTE — ANESTHESIA PREPROCEDURE EVALUATION
07/20/2017  Ming Boateng is a 62 y.o., male.    Anesthesia Evaluation    I have reviewed the Patient Summary Reports.    I have reviewed the Nursing Notes.   I have reviewed the Medications.     Review of Systems  Anesthesia Hx:  No problems with previous Anesthesia Denies Hx of Anesthetic complications    Social:  Non-Smoker    Cardiovascular:   Hypertension Denies Valvular problems/Murmurs.  Denies MI.   Denies CABG/stent.  Denies Dysrhythmias.  CHF hyperlipidemia    Pulmonary:   Denies COPD.  Denies Asthma.  Denies Recent URI.    Renal/:   Chronic Renal Disease, CRI    Hepatic/GI:   Denies GERD. Denies Liver Disease.    Neurological:   CVA, residual symptoms Denies Seizures.    Endocrine:   Diabetes, type 2, using insulin Denies Hypothyroidism.        Physical Exam  General:  Well nourished    Airway/Jaw/Neck:  Airway Findings: Mouth Opening: Normal Tongue: Normal  General Airway Assessment: Adult, Good  Mallampati: II  Improves to II with phonation.  TM Distance: 4-6 cm      Dental:  Dental Findings: In tact   Chest/Lungs:  Chest/Lungs Findings: Clear to auscultation, Normal Respiratory Rate     Heart/Vascular:  Heart Findings: Rate: Normal  Rhythm: Regular Rhythm  Sounds: Normal  Heart murmur: negative       Mental Status:  Mental Status Findings:  Cooperative, Alert and Oriented         Anesthesia Plan  Type of Anesthesia, risks & benefits discussed:  Anesthesia Type:  MAC  Patient's Preference:   Intra-op Monitoring Plan:   Intra-op Monitoring Plan Comments:   Post Op Pain Control Plan:   Post Op Pain Control Plan Comments:   Induction:   IV  Beta Blocker:  Patient is on a Beta-Blocker and has received one dose within the past 24 hours (No further documentation required).       Informed Consent: Patient understands risks and agrees with Anesthesia plan.  Questions answered. Anesthesia consent signed  with patient.  ASA Score: 3     Day of Surgery Review of History & Physical:    H&P update referred to the surgeon.         Ready For Surgery From Anesthesia Perspective.

## 2017-07-20 NOTE — TRANSFER OF CARE
"Anesthesia Transfer of Care Note    Patient: Ming Boateng    Procedure(s) Performed: Procedure(s) (LRB):  EXTRACTION-CATARACT-IOL (Right)    Patient location: PACU    Anesthesia Type: MAC    Transport from OR: Transported from OR on room air with adequate spontaneous ventilation    Post pain: adequate analgesia    Post assessment: no apparent anesthetic complications    Post vital signs: stable    Level of consciousness: awake, alert and oriented    Nausea/Vomiting: no nausea/vomiting    Complications: none    Transfer of care protocol was followed      Last vitals:   Visit Vitals  BP (!) 176/79 (BP Location: Right arm, Patient Position: Lying, BP Method: Automatic)   Pulse 72   Temp 36.6 °C (97.9 °F) (Oral)   Resp 18   Ht 5' 7" (1.702 m)   Wt 77.1 kg (170 lb)   SpO2 100%   BMI 26.63 kg/m²     "

## 2017-07-21 NOTE — PROGRESS NOTES
I assume primary medical responsibility for this patient, I have reviewed the case history, findings, diagnosis and treatment plan with the resident and agree that the care is reasonable and necessary. This service has been performed by a resident without the presence of a teaching physician under the primary care exception  Suzi Fulton  7/21/2017

## 2017-08-03 ENCOUNTER — OFFICE VISIT (OUTPATIENT)
Dept: FAMILY MEDICINE | Facility: HOSPITAL | Age: 62
End: 2017-08-03
Payer: MEDICAID

## 2017-08-03 VITALS
HEIGHT: 68 IN | WEIGHT: 170 LBS | SYSTOLIC BLOOD PRESSURE: 134 MMHG | DIASTOLIC BLOOD PRESSURE: 70 MMHG | HEART RATE: 67 BPM | BODY MASS INDEX: 25.76 KG/M2

## 2017-08-03 DIAGNOSIS — E11.8 TYPE 2 DIABETES MELLITUS WITH COMPLICATION, WITH LONG-TERM CURRENT USE OF INSULIN: Primary | ICD-10-CM

## 2017-08-03 DIAGNOSIS — Z79.4 TYPE 2 DIABETES MELLITUS WITH COMPLICATION, WITH LONG-TERM CURRENT USE OF INSULIN: Primary | ICD-10-CM

## 2017-08-03 PROCEDURE — 99214 OFFICE O/P EST MOD 30 MIN: CPT | Performed by: FAMILY MEDICINE

## 2017-08-03 RX ORDER — INSULIN GLARGINE 300 [IU]/ML
8 INJECTION, SOLUTION SUBCUTANEOUS 2 TIMES DAILY
Qty: 4.8 ML | Refills: 3 | Status: SHIPPED | OUTPATIENT
Start: 2017-08-03 | End: 2017-08-30 | Stop reason: SDUPTHER

## 2017-08-03 NOTE — PROGRESS NOTES
Subjective:       Patient ID: Ming Boateng is a 62 y.o. male.    Chief Complaint: Diabetes    HPI   Mr. Boateng is a 61 yo male with PMHx of HTN, HLD, HFpEF and DM presenting for follow up of DM. Patient reports being compliant with his insulin therapy. He reports taking Lantus 5 units BID and Novolog 5 units TIDWM. Patient states that his post prandial BG averages around 150-250. He does not monitor his FBG. Patient denies any hypoglycemic episodes.     Review of Systems   Constitutional: Negative for chills and fever.   Respiratory: Negative for cough, shortness of breath and wheezing.    Cardiovascular: Negative for chest pain.   Gastrointestinal: Negative for abdominal pain, nausea and vomiting.   Genitourinary: Negative for difficulty urinating.   Musculoskeletal: Negative for back pain and neck pain.   Neurological: Negative for dizziness and headaches.       Objective:      Vitals:    08/03/17 1557   BP: 134/70   Pulse: 67     Physical Exam   Constitutional: He is oriented to person, place, and time. No distress.   HENT:   Head: Normocephalic and atraumatic.   Cardiovascular: Normal rate and regular rhythm.    No murmur heard.  Pulmonary/Chest: Effort normal and breath sounds normal. No respiratory distress. He has no wheezes.   Abdominal: Soft. Bowel sounds are normal. He exhibits no distension. There is no tenderness.   Musculoskeletal: Normal range of motion. He exhibits no edema or tenderness.   Neurological: He is alert and oriented to person, place, and time.   Skin: He is not diaphoretic.       Assessment:       1. Type 2 diabetes mellitus with complication, with long-term current use of insulin        Plan:       Type 2 diabetes mellitus with complication, with long-term current use of insulin  - Advised patient to continue monitoring home BG including fasting and post prandial  -     blood sugar diagnostic Strp; 1 strip by Misc.(Non-Drug; Combo Route) route 4 (four) times daily.  Dispense: 360 strip;  Refill: 3  -     insulin glargine, TOUJEO, (TOUJEO) 300 unit/mL (1.5 mL) InPn pen; Inject 8 Units into the skin 2 (two) times daily.  Dispense: 4.8 mL; Refill: 3      Return in about 1 month (around 9/3/2017), or if symptoms worsen or fail to improve.

## 2017-08-04 NOTE — PROGRESS NOTES
I assume primary medical responsibility for this patient, I have reviewed the case history, findings, diagnosis and treatment plan with the resident and agree that the care is reasonable and necessary. This service has been performed by a resident without the presence of a teaching physician under the primary care exception  Suzi Fulton  8/4/2017

## 2017-08-07 RX ORDER — PRAVASTATIN SODIUM 20 MG/1
TABLET ORAL
Qty: 60 TABLET | Refills: 0 | Status: SHIPPED | OUTPATIENT
Start: 2017-08-07 | End: 2017-08-30 | Stop reason: SDUPTHER

## 2017-08-07 RX ORDER — SERTRALINE HYDROCHLORIDE 25 MG/1
TABLET, FILM COATED ORAL
Qty: 60 TABLET | Refills: 0 | Status: SHIPPED | OUTPATIENT
Start: 2017-08-07 | End: 2017-08-30 | Stop reason: SDUPTHER

## 2017-08-09 DIAGNOSIS — I50.30 (HFPEF) HEART FAILURE WITH PRESERVED EJECTION FRACTION: Primary | ICD-10-CM

## 2017-08-09 RX ORDER — NAPROXEN SODIUM 220 MG/1
81 TABLET, FILM COATED ORAL DAILY
Qty: 90 TABLET | Refills: 3 | Status: SHIPPED | OUTPATIENT
Start: 2017-08-09 | End: 2017-08-30 | Stop reason: SDUPTHER

## 2017-08-09 RX ORDER — FUROSEMIDE 40 MG/1
80 TABLET ORAL DAILY
Qty: 180 TABLET | Refills: 3 | Status: SHIPPED | OUTPATIENT
Start: 2017-08-09 | End: 2017-08-10 | Stop reason: SDUPTHER

## 2017-08-09 NOTE — TELEPHONE ENCOUNTER
Medications e-prescribed to pharmacy.    Calvin Elliott MD  \[facility]\"" Family Medicine HO 3  08/09/2017 12:08 PM

## 2017-08-10 DIAGNOSIS — I50.30 (HFPEF) HEART FAILURE WITH PRESERVED EJECTION FRACTION: Primary | ICD-10-CM

## 2017-08-10 RX ORDER — FUROSEMIDE 40 MG/1
80 TABLET ORAL DAILY
Qty: 180 TABLET | Refills: 3 | Status: SHIPPED | OUTPATIENT
Start: 2017-08-10 | End: 2017-08-30 | Stop reason: SDUPTHER

## 2017-08-16 ENCOUNTER — ANESTHESIA EVENT (OUTPATIENT)
Dept: SURGERY | Facility: AMBULARY SURGERY CENTER | Age: 62
End: 2017-08-16
Payer: MEDICAID

## 2017-08-17 ENCOUNTER — ANESTHESIA (OUTPATIENT)
Dept: SURGERY | Facility: AMBULARY SURGERY CENTER | Age: 62
End: 2017-08-17
Payer: MEDICAID

## 2017-08-17 ENCOUNTER — HOSPITAL ENCOUNTER (OUTPATIENT)
Facility: AMBULARY SURGERY CENTER | Age: 62
Discharge: HOME | End: 2017-08-17
Attending: OPHTHALMOLOGY | Admitting: OPHTHALMOLOGY
Payer: MEDICAID

## 2017-08-17 DIAGNOSIS — H26.9 LEFT CATARACT: ICD-10-CM

## 2017-08-17 LAB — POCT GLUCOSE: 176 MG/DL (ref 70–110)

## 2017-08-17 PROCEDURE — 66984 XCAPSL CTRC RMVL W/O ECP: CPT | Performed by: OPHTHALMOLOGY

## 2017-08-17 PROCEDURE — D9220A PRA ANESTHESIA: Mod: ANES,,, | Performed by: ANESTHESIOLOGY

## 2017-08-17 PROCEDURE — D9220A PRA ANESTHESIA: Mod: CRNA,,, | Performed by: NURSE ANESTHETIST, CERTIFIED REGISTERED

## 2017-08-17 DEVICE — LENS 18.5: Type: IMPLANTABLE DEVICE | Site: EYE | Status: FUNCTIONAL

## 2017-08-17 RX ORDER — ONDANSETRON 2 MG/ML
INJECTION INTRAMUSCULAR; INTRAVENOUS
Status: DISCONTINUED
Start: 2017-08-17 | End: 2017-08-17 | Stop reason: HOSPADM

## 2017-08-17 RX ORDER — CYCLOPENTOLATE HYDROCHLORIDE 10 MG/ML
1 SOLUTION/ DROPS OPHTHALMIC
Status: COMPLETED | OUTPATIENT
Start: 2017-08-17 | End: 2017-08-17

## 2017-08-17 RX ORDER — PHENYLEPHRINE HYDROCHLORIDE 25 MG/ML
1 SOLUTION/ DROPS OPHTHALMIC
Status: COMPLETED | OUTPATIENT
Start: 2017-08-17 | End: 2017-08-17

## 2017-08-17 RX ORDER — LIDOCAINE HYDROCHLORIDE 40 MG/ML
1 INJECTION, SOLUTION RETROBULBAR
Status: COMPLETED | OUTPATIENT
Start: 2017-08-17 | End: 2017-08-17

## 2017-08-17 RX ORDER — TROPICAMIDE 10 MG/ML
1 SOLUTION/ DROPS OPHTHALMIC
Status: DISCONTINUED | OUTPATIENT
Start: 2017-08-17 | End: 2017-08-17 | Stop reason: HOSPADM

## 2017-08-17 RX ORDER — TROPICAMIDE 10 MG/ML
1 SOLUTION/ DROPS OPHTHALMIC
Status: COMPLETED | OUTPATIENT
Start: 2017-08-17 | End: 2017-08-17

## 2017-08-17 RX ORDER — EPINEPHRINE 1 MG/ML
INJECTION, SOLUTION INTRACARDIAC; INTRAMUSCULAR; INTRAVENOUS; SUBCUTANEOUS
Status: DISCONTINUED | OUTPATIENT
Start: 2017-08-17 | End: 2017-08-17 | Stop reason: HOSPADM

## 2017-08-17 RX ORDER — ONDANSETRON 2 MG/ML
INJECTION INTRAMUSCULAR; INTRAVENOUS
Status: DISCONTINUED | OUTPATIENT
Start: 2017-08-17 | End: 2017-08-17

## 2017-08-17 RX ORDER — POLYMYXIN B SULFATE AND TRIMETHOPRIM 1; 10000 MG/ML; [USP'U]/ML
SOLUTION OPHTHALMIC
Status: DISCONTINUED | OUTPATIENT
Start: 2017-08-17 | End: 2017-08-17 | Stop reason: HOSPADM

## 2017-08-17 RX ORDER — PROPARACAINE HYDROCHLORIDE 5 MG/ML
SOLUTION/ DROPS OPHTHALMIC
Status: DISCONTINUED | OUTPATIENT
Start: 2017-08-17 | End: 2017-08-17 | Stop reason: HOSPADM

## 2017-08-17 RX ORDER — LIDOCAINE HYDROCHLORIDE 10 MG/ML
INJECTION, SOLUTION EPIDURAL; INFILTRATION; INTRACAUDAL; PERINEURAL
Status: DISCONTINUED | OUTPATIENT
Start: 2017-08-17 | End: 2017-08-17 | Stop reason: HOSPADM

## 2017-08-17 RX ORDER — SODIUM CHLORIDE 0.9 % (FLUSH) 0.9 %
3 SYRINGE (ML) INJECTION
Status: DISCONTINUED | OUTPATIENT
Start: 2017-08-17 | End: 2017-08-17 | Stop reason: HOSPADM

## 2017-08-17 RX ORDER — PHENYLEPHRINE HYDROCHLORIDE 100 MG/ML
1 SOLUTION/ DROPS OPHTHALMIC
Status: DISCONTINUED | OUTPATIENT
Start: 2017-08-17 | End: 2017-08-17 | Stop reason: HOSPADM

## 2017-08-17 RX ORDER — LIDOCAINE HYDROCHLORIDE 10 MG/ML
1 INJECTION, SOLUTION EPIDURAL; INFILTRATION; INTRACAUDAL; PERINEURAL ONCE
Status: COMPLETED | OUTPATIENT
Start: 2017-08-17 | End: 2017-08-17

## 2017-08-17 RX ORDER — PROPARACAINE HYDROCHLORIDE 5 MG/ML
1 SOLUTION/ DROPS OPHTHALMIC
Status: DISCONTINUED | OUTPATIENT
Start: 2017-08-17 | End: 2017-08-17 | Stop reason: HOSPADM

## 2017-08-17 RX ORDER — MIDAZOLAM HYDROCHLORIDE 1 MG/ML
INJECTION INTRAMUSCULAR; INTRAVENOUS
Status: DISCONTINUED
Start: 2017-08-17 | End: 2017-08-17 | Stop reason: HOSPADM

## 2017-08-17 RX ORDER — SODIUM CHLORIDE, SODIUM LACTATE, POTASSIUM CHLORIDE, CALCIUM CHLORIDE 600; 310; 30; 20 MG/100ML; MG/100ML; MG/100ML; MG/100ML
INJECTION, SOLUTION INTRAVENOUS CONTINUOUS
Status: DISCONTINUED | OUTPATIENT
Start: 2017-08-17 | End: 2017-08-17 | Stop reason: HOSPADM

## 2017-08-17 RX ORDER — MIDAZOLAM HYDROCHLORIDE 1 MG/ML
INJECTION, SOLUTION INTRAMUSCULAR; INTRAVENOUS
Status: DISCONTINUED | OUTPATIENT
Start: 2017-08-17 | End: 2017-08-17

## 2017-08-17 RX ORDER — CYCLOPENTOLATE HYDROCHLORIDE 10 MG/ML
1 SOLUTION/ DROPS OPHTHALMIC
Status: DISCONTINUED | OUTPATIENT
Start: 2017-08-17 | End: 2017-08-17 | Stop reason: HOSPADM

## 2017-08-17 RX ADMIN — PROPARACAINE HYDROCHLORIDE 1 DROP: 5 SOLUTION/ DROPS OPHTHALMIC at 07:08

## 2017-08-17 RX ADMIN — LIDOCAINE HYDROCHLORIDE 1 DROP: 40 INJECTION, SOLUTION RETROBULBAR at 07:08

## 2017-08-17 RX ADMIN — CYCLOPENTOLATE HYDROCHLORIDE 1 DROP: 10 SOLUTION/ DROPS OPHTHALMIC at 07:08

## 2017-08-17 RX ADMIN — LIDOCAINE HYDROCHLORIDE 0.2 ML: 10 INJECTION, SOLUTION EPIDURAL; INFILTRATION; INTRACAUDAL; PERINEURAL at 07:08

## 2017-08-17 RX ADMIN — PHENYLEPHRINE HYDROCHLORIDE 1 DROP: 25 SOLUTION/ DROPS OPHTHALMIC at 07:08

## 2017-08-17 RX ADMIN — TROPICAMIDE 1 DROP: 10 SOLUTION/ DROPS OPHTHALMIC at 07:08

## 2017-08-17 RX ADMIN — MIDAZOLAM HYDROCHLORIDE 2 MG: 1 INJECTION, SOLUTION INTRAMUSCULAR; INTRAVENOUS at 08:08

## 2017-08-17 RX ADMIN — SODIUM CHLORIDE, SODIUM LACTATE, POTASSIUM CHLORIDE, CALCIUM CHLORIDE: 600; 310; 30; 20 INJECTION, SOLUTION INTRAVENOUS at 07:08

## 2017-08-17 RX ADMIN — ONDANSETRON 4 MG: 2 INJECTION INTRAMUSCULAR; INTRAVENOUS at 08:08

## 2017-08-17 NOTE — OP NOTE
SURGEON: Jody Garcia MD    PRE-OPERATIVE DIAGNOSIS: Visually significant cataract, left eye    POST-OPERATIVE DIAGNOSIS: Visually significant cataract, left eye    DATE OF SURGERY: 8/17/2017    PROCEDURES: Phacoemulsification with intraocular lens, left eye    IMPLANT: [id number] (Model #), +18.5 Diopters    ANESTHESIA: MAC with topical lidocaine    COMPLICATIONS: None    ESTIMATED BLOOD LOSS: None    INDICATIONS:  The patient has a history of painless progressive visual loss and difficulty with activities of daily living secondary to cataract formation. After a thorough discussion of the risks, benefits and alternatives to cataract surgery, including, but not limited to, the rare risks of infection, retinal detachment, need for additional surgery, loss of vision and even loss of the eye, the patient voices good understanding and desires to proceed.    DESCRIPTION OF PROCEDURE:  The patients IOL calculations and lens selection were reviewed and confirmed. After verification and marking of the proper eye in the preop holding area, several sets of 1% Mydriacil, 2.5% phenylephrine, and 1% Cyclogyl drops were then placed. The patient was brought to the operating room in supine position where the eye was prepped and draped in standard sterile fashion using 5% betadine and a lid speculum placed. Topical 4% lidocaine was used in addition to the preoperative anesthesia. A paracentesis incision was created through which viscoelastic was used to fill the anterior chamber. A 2.4mm keratome blade was used to create a triplanar temporal clear corneal incision. A cystotome and Utrata forceps was then used to fashion a continuous curvilinear capsulorrhexis. Hydrodissection and hydrodelineation with BSS was performed using a hydrodissection cannula. Phacoemulsification of the nucleus was carried out with a divide and conquer technique, and all remaining epinuclear and cortical material was removed. The eye was reformed using  viscoelastic and the intraocular lens was implanted into the capsular bag. All remaining viscoelastic was removed from the eye. At the end of the case, the lens was well-centered and all wounds were found to be watertight. Drops of polytrim and pred forte were instilled and an eye shield placed over the eye. The patient tolerated the procedure well and was taken to the recovery area in stable condition. The patient was instructed to follow-up for routine post-operative care the following morning.

## 2017-08-17 NOTE — ANESTHESIA PREPROCEDURE EVALUATION
08/17/2017  Ming Boateng is a 62 y.o., male.    Pre-op Assessment    I have reviewed the Patient Summary Reports.     I have reviewed the Nursing Notes.   I have reviewed the Medications.     Review of Systems  Anesthesia Hx:  No problems with previous Anesthesia Denies Hx of Anesthetic complications  Denies Family Hx of Anesthesia complications.   Denies Personal Hx of Anesthesia complications.   Social:  Non-Smoker    Cardiovascular:   Hypertension Denies Valvular problems/Murmurs.  Denies MI.   Denies CABG/stent.  Denies Dysrhythmias.  CHF hyperlipidemia ECG has been reviewed.    Pulmonary:   Denies COPD.  Denies Asthma.  Denies Recent URI.    Renal/:   Chronic Renal Disease, CRI    Hepatic/GI:   Denies GERD. Denies Liver Disease.    Neurological:   CVA, residual symptoms Denies Seizures.    Endocrine:   Diabetes, type 2, using insulin Denies Hypothyroidism.        Physical Exam  General:  Well nourished    Airway/Jaw/Neck:  Airway Findings: Mouth Opening: Normal Tongue: Normal  General Airway Assessment: Adult, Good  Mallampati: II  Improves to II with phonation.  TM Distance: 4-6 cm      Dental:  Dental Findings: In tact   Chest/Lungs:  Chest/Lungs Findings: Clear to auscultation, Normal Respiratory Rate     Heart/Vascular:  Heart Findings: Rate: Normal  Rhythm: Regular Rhythm  Sounds: Normal  Heart murmur: negative       Mental Status:  Mental Status Findings:  Cooperative, Alert and Oriented         Anesthesia Plan  Type of Anesthesia, risks & benefits discussed:  Anesthesia Type:  MAC  Patient's Preference:   Intra-op Monitoring Plan:   Intra-op Monitoring Plan Comments:   Post Op Pain Control Plan:   Post Op Pain Control Plan Comments:   Induction:   IV  Beta Blocker:  Patient is on a Beta-Blocker and has received one dose within the past 24 hours (No further documentation required).       Informed  Consent: Patient understands risks and agrees with Anesthesia plan.  Questions answered. Anesthesia consent signed with patient.  ASA Score: 3     Day of Surgery Review of History & Physical:    H&P update referred to the surgeon.         Ready For Surgery From Anesthesia Perspective.

## 2017-08-17 NOTE — DISCHARGE INSTRUCTIONS
After Cataract Surgery    You may remove your shield on the day of surgery. You may see double and your vision may be blurry when the patch is first removed. If you are uncomfortable with these symptoms, you may leave  the shield on    Tape the eye sheild over your eye at bedtime and naptime for 7 days. Tape is provided in your kit.    Do not vigorously press or rub your eye after surgery for 6 weeks.    Clean around your eyelids regularly being careful not to press on the upper lid.    You may shower, bathe and shampoo but avoid getting water, soap or shampoo in your eye for 2 weeks.  No swimming for 2 weeks.    Resume normal activities tomorrow, but no lifting or bending for 1 week.  Rest today. Reading, writing or watching TV are fine.     No eye makeup for 1 week, no eye creams for 24 hrs. Caution using perfumes or colognes.    You may be sensitive to light and  may wear sunglasses provided when outdoors     Your glasses may not be the right strength for your eye after surgery.  You may wear your old glasses or go without.  You may find [employer] Private reading glasses helpful.    Symptoms you may experience the first day:  Blurry vision, Double Vision, Redish color to objects, Sensitivity to light, flickering light, Burning or scratchy feeling in eye.    Serious Problems that could arise that need immediate reporting to the Dr:     A sudden Decrease or loss of vision in either eye  Your vision is becoming worse instead of better each day  You develop a shadow in your vision  You see flashing lights  You have a continuous ache or increased pain not relieved by over the counter pain medication.      Using eye drops:  Wash hands, shake drops well, tilt head back, open eye and look up, gently pull lower eyelid down to form a pocket, place drop into the pocket formed, close eye    Be careful not to touch your eyelashes or finger to the tip of the bottle. Allow 5 m between each drop.    Prednisolone 1 drop 4x  daily  Ofloxacin 1 drop 4x daily  Ketorolac 1 drop 4 x daily

## 2017-08-17 NOTE — DISCHARGE SUMMARY
Discharge Summary  Ophthalmology Service      Admit Date: 8/17/2017     Discharge Date: 8/17/2017     Attending Physician: Jody Garcia MD     Discharged Condition: Good    Reason for Admission: Age-related nuclear cataract, left [H25.12]  Left cataract [H26.9]     Treatments/Procedures: Cataract Surgery (see dictated report for details).    Hospital Course: Stable, dictated    Consults: None    Significant Diagnostic Studies: None    Disposition: Home    Patient Instructions:   - Resume same diet as prior to surgery  - Resume activity as tolerated with no swimming for 1 week  - Keep eye clean and dry with eye shield on until follow up.  - Follow up in 1 day as previously scheduled for routine postoperative care    Patient Instructions:   Current Discharge Medication List      CONTINUE these medications which have NOT CHANGED    Details   carvedilol (COREG) 12.5 MG tablet Take 0.5 tablets (6.25 mg total) by mouth 2 (two) times daily with meals.  Qty: 180 tablet, Refills: 0    Associated Diagnoses: (HFpEF) heart failure with preserved ejection fraction      losartan-hydrochlorothiazide 100-25 mg (HYZAAR) 100-25 mg per tablet Take 1 tablet by mouth. 50-12.5    Associated Diagnoses: (HFpEF) heart failure with preserved ejection fraction      amlodipine (NORVASC) 10 MG tablet Take 1 tablet (10 mg total) by mouth once daily.  Qty: 90 tablet, Refills: 0      aspirin 81 MG Chew Take 1 tablet (81 mg total) by mouth once daily.  Qty: 90 tablet, Refills: 3    Associated Diagnoses: (HFpEF) heart failure with preserved ejection fraction      blood sugar diagnostic Strp 1 strip by Misc.(Non-Drug; Combo Route) route 4 (four) times daily.  Qty: 360 strip, Refills: 3    Associated Diagnoses: Type 2 diabetes mellitus with complication, with long-term current use of insulin      blood-glucose meter kit Use as instructed  Qty: 1 each, Refills: 0      calcium acetate (PHOSLO) 667 mg capsule Take 1 capsule (667 mg total) by mouth 3  "(three) times daily with meals.  Qty: 270 capsule, Refills: 3      cetirizine 10 mg chewable tablet Take 10 mg by mouth once daily.      furosemide (LASIX) 40 MG tablet Take 2 tablets (80 mg total) by mouth once daily.  Qty: 180 tablet, Refills: 3    Associated Diagnoses: (HFpEF) heart failure with preserved ejection fraction      garlic 1,000 mg Cap Take by mouth.    Associated Diagnoses: (HFpEF) heart failure with preserved ejection fraction      ibuprofen (ADVIL,MOTRIN) 600 MG tablet TK 1 T PO TID prn  Refills: 0    Associated Diagnoses: (HFpEF) heart failure with preserved ejection fraction      insulin glargine, TOUJEO, (TOUJEO) 300 unit/mL (1.5 mL) InPn pen Inject 8 Units into the skin 2 (two) times daily.  Qty: 4.8 mL, Refills: 3    Associated Diagnoses: Type 2 diabetes mellitus with complication, with long-term current use of insulin      insulin lispro (HUMALOG) 100 unit/mL injection Inject 6 Units into the skin 3 (three) times daily with meals.  Qty: 16.2 mL, Refills: 3    Associated Diagnoses: Type 2 diabetes mellitus with complication, with long-term current use of insulin      lancets (LANCETS,ULTRA THIN) Misc 1 lancet by Misc.(Non-Drug; Combo Route) route 3 (three) times daily with meals.  Qty: 100 each, Refills: 11      lancing device Misc 1 lancet by Misc.(Non-Drug; Combo Route) route 3 (three) times daily with meals.  Qty: 1 each, Refills: 11      pen needle, diabetic 29 gauge x 1/2" Ndle 1 pen by Misc.(Non-Drug; Combo Route) route 3 (three) times daily with meals.  Qty: 100 each, Refills: 11      pravastatin (PRAVACHOL) 20 MG tablet TAKE ONE TABLET BY MOUTH ONCE DAILY  Qty: 60 tablet, Refills: 0      sertraline (ZOLOFT) 25 MG tablet TAKE ONE TABLET BY MOUTH ONCE DAILY  Qty: 60 tablet, Refills: 0      sevelamer carbonate (RENVELA) 800 mg Tab Take 1 tablet (800 mg total) by mouth 3 (three) times daily with meals.  Qty: 42 tablet, Refills: 0               Discharge Procedure Orders  Diet general "     Lifting restrictions     Call MD for:  severe uncontrolled pain     Leave dressing on - Keep it clean, dry, and intact until clinic visit

## 2017-08-17 NOTE — ANESTHESIA POSTPROCEDURE EVALUATION
"Anesthesia Post Evaluation    Patient: Ming Boateng    Procedure(s) Performed: Procedure(s) (LRB):  EXTRACTION-CATARACT-IOL (Left)    Final Anesthesia Type: MAC  Patient location during evaluation: PACU  Patient participation: Yes- Able to Participate  Level of consciousness: awake and alert  Post-procedure vital signs: reviewed and stable  Pain management: adequate  Airway patency: patent  PONV status at discharge: No PONV  Anesthetic complications: no      Cardiovascular status: blood pressure returned to baseline  Respiratory status: unassisted  Hydration status: euvolemic  Follow-up not needed.        Visit Vitals  BP (!) 155/74   Pulse 62   Temp 36.6 °C (97.8 °F) (Axillary)   Resp 20   Ht 5' 8" (1.727 m)   Wt 76.7 kg (169 lb)   SpO2 100%   BMI 25.70 kg/m²       Pain/Lesly Score: Pain Assessment Performed: Yes (8/17/2017  7:08 AM)  Presence of Pain: denies (8/17/2017  7:08 AM)      "

## 2017-08-17 NOTE — BRIEF OP NOTE
Brief Operative Note  Ophthalmology Service      Date of Procedure: 8/17/2017     Attending Physician: Jody Garcia MD       Pre-Operative Diagnosis: Age-related nuclear cataract, left [H25.12]  Left cataract [H26.9]     Post-Operative Diagnosis: Same as pre-operative diagnosis    Treatments/Procedures: Cataract extraction w/ IOL OS    Intraoperative Findings: Cataract Left Eye    Anesthesia: MAC with local lidocaine    Complications: None    Estimated Blood Loss: minimal    Specimens: None    -------------------------------------------------------------  Full Operative Report to follow.  -------------------------------------------------------------

## 2017-08-17 NOTE — TRANSFER OF CARE
"Anesthesia Transfer of Care Note    Patient: Ming Boateng    Procedure(s) Performed: Procedure(s) (LRB):  EXTRACTION-CATARACT-IOL (Left)    Patient location: PACU    Anesthesia Type: MAC    Transport from OR: Transported from OR on room air with adequate spontaneous ventilation    Post pain: adequate analgesia    Post assessment: no apparent anesthetic complications and tolerated procedure well    Post vital signs: stable    Level of consciousness: awake, oriented and alert    Nausea/Vomiting: no nausea/vomiting    Complications: none    Transfer of care protocol was followed      Last vitals:   Visit Vitals  BP (!) 173/79 (BP Location: Right arm, Patient Position: Lying)   Pulse 66   Temp 36.6 °C (97.8 °F) (Axillary)   Resp 18   Ht 5' 8" (1.727 m)   Wt 76.7 kg (169 lb)   SpO2 100%   BMI 25.70 kg/m²     "

## 2017-08-17 NOTE — PLAN OF CARE
Stable, States ready to go home, malaika po fluids, denies pain, instructions through interpretor, to car per wc with RN and millicent

## 2017-08-18 VITALS
HEART RATE: 63 BPM | WEIGHT: 169 LBS | TEMPERATURE: 98 F | DIASTOLIC BLOOD PRESSURE: 72 MMHG | SYSTOLIC BLOOD PRESSURE: 153 MMHG | BODY MASS INDEX: 25.61 KG/M2 | HEIGHT: 68 IN | OXYGEN SATURATION: 100 % | RESPIRATION RATE: 20 BRPM

## 2017-08-30 ENCOUNTER — OFFICE VISIT (OUTPATIENT)
Dept: FAMILY MEDICINE | Facility: HOSPITAL | Age: 62
End: 2017-08-30
Attending: FAMILY MEDICINE
Payer: MEDICAID

## 2017-08-30 VITALS
WEIGHT: 170.63 LBS | SYSTOLIC BLOOD PRESSURE: 126 MMHG | HEIGHT: 68 IN | BODY MASS INDEX: 25.86 KG/M2 | HEART RATE: 63 BPM | DIASTOLIC BLOOD PRESSURE: 64 MMHG

## 2017-08-30 DIAGNOSIS — Z86.73 H/O: CVA (CEREBROVASCULAR ACCIDENT): ICD-10-CM

## 2017-08-30 DIAGNOSIS — E11.69 HYPERLIPIDEMIA ASSOCIATED WITH TYPE 2 DIABETES MELLITUS: Primary | ICD-10-CM

## 2017-08-30 DIAGNOSIS — Z23 IMMUNIZATION DUE: ICD-10-CM

## 2017-08-30 DIAGNOSIS — E78.5 HYPERLIPIDEMIA ASSOCIATED WITH TYPE 2 DIABETES MELLITUS: Primary | ICD-10-CM

## 2017-08-30 DIAGNOSIS — I10 ESSENTIAL HYPERTENSION: ICD-10-CM

## 2017-08-30 DIAGNOSIS — N18.4 STAGE 4 CHRONIC KIDNEY DISEASE: ICD-10-CM

## 2017-08-30 DIAGNOSIS — E11.59 HYPERTENSION ASSOCIATED WITH DIABETES: ICD-10-CM

## 2017-08-30 DIAGNOSIS — E11.8 TYPE 2 DIABETES MELLITUS WITH COMPLICATION, WITH LONG-TERM CURRENT USE OF INSULIN: ICD-10-CM

## 2017-08-30 DIAGNOSIS — Z79.4 TYPE 2 DIABETES MELLITUS WITH COMPLICATION, WITH LONG-TERM CURRENT USE OF INSULIN: ICD-10-CM

## 2017-08-30 DIAGNOSIS — I15.2 HYPERTENSION ASSOCIATED WITH DIABETES: ICD-10-CM

## 2017-08-30 DIAGNOSIS — F32.0 MILD SINGLE CURRENT EPISODE OF MAJOR DEPRESSIVE DISORDER: ICD-10-CM

## 2017-08-30 DIAGNOSIS — I50.30 (HFPEF) HEART FAILURE WITH PRESERVED EJECTION FRACTION: ICD-10-CM

## 2017-08-30 PROBLEM — I63.9 STROKE: Status: ACTIVE | Noted: 2017-08-30

## 2017-08-30 PROBLEM — H26.9 LEFT CATARACT: Status: RESOLVED | Noted: 2017-08-17 | Resolved: 2017-08-30

## 2017-08-30 PROBLEM — H26.9 RIGHT CATARACT: Status: RESOLVED | Noted: 2017-07-20 | Resolved: 2017-08-30

## 2017-08-30 PROCEDURE — 99214 OFFICE O/P EST MOD 30 MIN: CPT

## 2017-08-30 RX ORDER — CARVEDILOL 12.5 MG/1
6.25 TABLET ORAL 2 TIMES DAILY WITH MEALS
Qty: 180 TABLET | Refills: 3 | Status: SHIPPED | OUTPATIENT
Start: 2017-08-30 | End: 2018-06-05 | Stop reason: SDUPTHER

## 2017-08-30 RX ORDER — SERTRALINE HYDROCHLORIDE 25 MG/1
25 TABLET, FILM COATED ORAL DAILY
Qty: 90 TABLET | Refills: 3 | Status: SHIPPED | OUTPATIENT
Start: 2017-08-30 | End: 2019-02-19

## 2017-08-30 RX ORDER — LANCETS
1 EACH MISCELLANEOUS
Qty: 100 EACH | Refills: 11 | Status: SHIPPED | OUTPATIENT
Start: 2017-08-30 | End: 2019-06-03

## 2017-08-30 RX ORDER — NAPROXEN SODIUM 220 MG/1
81 TABLET, FILM COATED ORAL DAILY
Qty: 90 TABLET | Refills: 3 | Status: SHIPPED | OUTPATIENT
Start: 2017-08-30 | End: 2018-10-16 | Stop reason: SDUPTHER

## 2017-08-30 RX ORDER — PEN NEEDLE, DIABETIC 29 G X1/2"
1 NEEDLE, DISPOSABLE MISCELLANEOUS
Qty: 100 EACH | Refills: 11 | Status: SHIPPED | OUTPATIENT
Start: 2017-08-30 | End: 2018-01-30 | Stop reason: SDUPTHER

## 2017-08-30 RX ORDER — FUROSEMIDE 40 MG/1
80 TABLET ORAL DAILY
Qty: 180 TABLET | Refills: 3 | Status: SHIPPED | OUTPATIENT
Start: 2017-08-30 | End: 2018-09-04 | Stop reason: SDUPTHER

## 2017-08-30 RX ORDER — INSULIN GLARGINE 300 [IU]/ML
8 INJECTION, SOLUTION SUBCUTANEOUS 2 TIMES DAILY
Qty: 4.8 ML | Refills: 4 | Status: SHIPPED | OUTPATIENT
Start: 2017-08-30 | End: 2018-02-05 | Stop reason: SDUPTHER

## 2017-08-30 RX ORDER — INSULIN LISPRO 100 [IU]/ML
5 INJECTION, SOLUTION INTRAVENOUS; SUBCUTANEOUS
Qty: 13.5 ML | Refills: 3 | Status: SHIPPED | OUTPATIENT
Start: 2017-08-30 | End: 2017-12-14

## 2017-08-30 RX ORDER — PRAVASTATIN SODIUM 20 MG/1
20 TABLET ORAL DAILY
Qty: 90 TABLET | Refills: 3 | Status: SHIPPED | OUTPATIENT
Start: 2017-08-30 | End: 2017-10-09 | Stop reason: SDUPTHER

## 2017-08-30 RX ORDER — AMLODIPINE BESYLATE 10 MG/1
10 TABLET ORAL DAILY
Qty: 90 TABLET | Refills: 3 | Status: SHIPPED | OUTPATIENT
Start: 2017-08-30 | End: 2018-01-14 | Stop reason: SDUPTHER

## 2017-08-30 RX ORDER — LOSARTAN POTASSIUM AND HYDROCHLOROTHIAZIDE 25; 100 MG/1; MG/1
1 TABLET ORAL DAILY
Qty: 90 TABLET | Refills: 3 | Status: SHIPPED | OUTPATIENT
Start: 2017-08-30 | End: 2018-01-30 | Stop reason: SDUPTHER

## 2017-08-31 NOTE — PROGRESS NOTES
Subjective:       Patient ID: Ming Boateng is a 62 y.o. male.    Chief Complaint: Follow-up    HPI   63 y/o M with DM, HFpEF, HTN, HLD h/o CVA is here for refills on his medications.  Currently he denies any complaints.      DM: reports it's type 1 but records show type 2.  Uses 5 U glargine BID and 5 U aspart TID with meals.  Reports his glucose runs in the 160's daily.  Denies polyuria/polydipsia.  Last A1C 7.7 3 months ago  HTN: controlled with amlodipine, lisinopril/hctz  C/o cva: on statin and aspirin  HLD: statin  HFpEF: on carvedilol, ace inhibitor and lasix    Review of Systems   Constitutional: Negative for chills and diaphoresis.   HENT: Negative for congestion.    Respiratory: Negative for apnea and chest tightness.    Cardiovascular: Negative for chest pain and leg swelling.   Gastrointestinal: Negative for abdominal distention and abdominal pain.   Endocrine: Negative for polydipsia and polyphagia.   Genitourinary: Negative for urgency.   Musculoskeletal: Negative for arthralgias and back pain.   Neurological: Negative for light-headedness and headaches.   Hematological: Negative for adenopathy.       Objective:      Vitals:    08/30/17 1402   BP: 126/64   Pulse: 63     Physical Exam   Constitutional: He is oriented to person, place, and time. He appears well-developed and well-nourished.   HENT:   Head: Normocephalic and atraumatic.   Eyes: EOM are normal.   Neck: Normal range of motion.   Cardiovascular: Normal rate and regular rhythm.    Pulmonary/Chest: Effort normal and breath sounds normal.   Abdominal: Soft. Bowel sounds are normal.   Neurological: He is oriented to person, place, and time.       Assessment:       1. Hyperlipidemia associated with type 2 diabetes mellitus    2. Hypertension associated with diabetes    3. Essential hypertension    4. (HFpEF) heart failure with preserved ejection fraction    5. H/O: CVA (cerebrovascular accident)    6. Stage 4 chronic kidney disease    7. Type 2  "diabetes mellitus with complication, with long-term current use of insulin    8. Mild single current episode of major depressive disorder    9. Immunization due        Plan:       Hyperlipidemia associated with type 2 diabetes mellitus  -     pravastatin (PRAVACHOL) 20 MG tablet; Take 1 tablet (20 mg total) by mouth once daily.  Dispense: 90 tablet; Refill: 3    Hypertension associated with diabetes    -     amlodipine (NORVASC) 10 MG tablet; Take 1 tablet (10 mg total) by mouth once daily.  Dispense: 90 tablet; Refill: 3    (HFpEF) heart failure with preserved ejection fraction  -     losartan-hydrochlorothiazide 100-25 mg (HYZAAR) 100-25 mg per tablet; Take 1 tablet by mouth once daily. 50-12.5  Dispense: 90 tablet; Refill: 3  -     furosemide (LASIX) 40 MG tablet; Take 2 tablets (80 mg total) by mouth once daily.  Dispense: 180 tablet; Refill: 3  -     carvedilol (COREG) 12.5 MG tablet; Take 0.5 tablets (6.25 mg total) by mouth 2 (two) times daily with meals.  Dispense: 180 tablet; Refill: 3  -     aspirin 81 MG Chew; Take 1 tablet (81 mg total) by mouth once daily.  Dispense: 90 tablet; Refill: 3    H/O: CVA (cerebrovascular accident)  -aspirin, statin  Stage 4 chronic kidney disease  -monitor closely.  Avoid nephrotoxic meds    Type 2 diabetes mellitus with complication, with long-term current use of insulin  -     pen needle, diabetic 29 gauge x 1/2" Ndle; 1 pen by Misc.(Non-Drug; Combo Route) route 3 (three) times daily with meals.  Dispense: 100 each; Refill: 11  -     lancets (LANCETS,ULTRA THIN) Misc; 1 lancet by Misc.(Non-Drug; Combo Route) route 3 (three) times daily with meals.  Dispense: 100 each; Refill: 11  -     insulin lispro (HUMALOG) 100 unit/mL injection; Inject 5 Units into the skin 3 (three) times daily with meals.  Dispense: 13.5 mL; Refill: 3  -     insulin glargine, TOUJEO, (TOUJEO) 300 unit/mL (1.5 mL) InPn pen; Inject 5 Units into the skin 2 (two) times daily.  Dispense: 4.8 mL; Refill: " 4  -     Hemoglobin A1c; Future; Expected date: 08/30/2017  -     C-PEPTIDE; Future; Expected date: 08/30/2017    Mild single current episode of major depressive disorder  -     sertraline (ZOLOFT) 25 MG tablet; Take 1 tablet (25 mg total) by mouth once daily.  Dispense: 90 tablet; Refill: 3        No Follow-up on file.

## 2017-09-04 RX ORDER — LOSARTAN POTASSIUM AND HYDROCHLOROTHIAZIDE 12.5; 5 MG/1; MG/1
TABLET ORAL
Qty: 60 TABLET | Refills: 0 | Status: SHIPPED | OUTPATIENT
Start: 2017-09-04 | End: 2017-12-14 | Stop reason: DRUGHIGH

## 2017-09-14 ENCOUNTER — OFFICE VISIT (OUTPATIENT)
Dept: FAMILY MEDICINE | Facility: HOSPITAL | Age: 62
End: 2017-09-14
Payer: MEDICAID

## 2017-09-14 VITALS
HEART RATE: 64 BPM | HEIGHT: 68 IN | DIASTOLIC BLOOD PRESSURE: 64 MMHG | BODY MASS INDEX: 26.33 KG/M2 | SYSTOLIC BLOOD PRESSURE: 119 MMHG | WEIGHT: 173.75 LBS

## 2017-09-14 DIAGNOSIS — E11.8 TYPE 2 DIABETES MELLITUS WITH COMPLICATION, WITH LONG-TERM CURRENT USE OF INSULIN: Primary | ICD-10-CM

## 2017-09-14 DIAGNOSIS — Z79.4 TYPE 2 DIABETES MELLITUS WITH COMPLICATION, WITH LONG-TERM CURRENT USE OF INSULIN: Primary | ICD-10-CM

## 2017-09-14 PROCEDURE — 99214 OFFICE O/P EST MOD 30 MIN: CPT | Mod: 25

## 2017-09-14 PROCEDURE — 90670 PCV13 VACCINE IM: CPT

## 2017-09-14 NOTE — PROGRESS NOTES
Subjective:       Patient ID: Ming Boateng is a 62 y.o. male.    Chief Complaint: Follow-up    HPI   61 y/o M with a PMH of HFpEF, HLD, CKD, h/o CVA, HTN is here for f/u on his diabetes.  Accompanied by daughter.  Daughter concern that patient is not using his SS for DM.  He takes 8 U Glargine BID and 5 U aspart TID with meals.  Daughter concerned he's not eating a diabetic diet and that his meal sugars run in the 200's.  Patient denies polyuria and polydipsia.  A1C on 8/30/17 7.6.      Review of Systems   Constitutional: Negative for chills and fever.   HENT: Negative for congestion.    Respiratory: Negative for chest tightness.    Cardiovascular: Negative for chest pain.   Gastrointestinal: Negative for abdominal distention and abdominal pain.   Endocrine: Negative for polydipsia, polyphagia and polyuria.   Genitourinary: Negative for dysuria and frequency.   Musculoskeletal: Negative for arthralgias.   Neurological: Negative for facial asymmetry, light-headedness and headaches.   Psychiatric/Behavioral: Negative for behavioral problems.       Objective:      Vitals:    09/14/17 1605   BP: 119/64   Pulse: 64     Physical Exam   Constitutional: He is oriented to person, place, and time. He appears well-developed and well-nourished.   HENT:   Head: Normocephalic and atraumatic.   Neck: Normal range of motion. Neck supple.   Cardiovascular: Normal rate and regular rhythm.    Pulmonary/Chest: Effort normal.   Abdominal: Soft. Bowel sounds are normal.   Musculoskeletal: Normal range of motion. He exhibits no edema.   Neurological: He is alert and oriented to person, place, and time.   Skin: Skin is warm.       Assessment:       1. Type 2 diabetes mellitus with complication, with long-term current use of insulin        Plan:       Type 2 diabetes mellitus with complication, with long-term current use of insulin  -continue 8 U galrgine BID and restart SS.  F/u in 3 months for A1C    Return in about 3 months (around  12/14/2017).

## 2017-09-14 NOTE — PROGRESS NOTES
I assume primary medical responsibility for this patient, I have reviewed the case history, findings, diagnosis and treatment plan with the resident and agree that the care is reasonable and necessary. This service has been performed by a resident without the presence of a teaching physician under the primary care exception  Suzi Fulton  9/14/2017

## 2017-10-06 RX ORDER — PRAVASTATIN SODIUM 20 MG/1
TABLET ORAL
Qty: 60 TABLET | Refills: 0 | OUTPATIENT
Start: 2017-10-06

## 2017-10-06 RX ORDER — SERTRALINE HYDROCHLORIDE 25 MG/1
TABLET, FILM COATED ORAL
Qty: 60 TABLET | Refills: 0 | OUTPATIENT
Start: 2017-10-06

## 2017-10-09 DIAGNOSIS — E11.69 HYPERLIPIDEMIA ASSOCIATED WITH TYPE 2 DIABETES MELLITUS: Primary | ICD-10-CM

## 2017-10-09 DIAGNOSIS — E78.5 HYPERLIPIDEMIA ASSOCIATED WITH TYPE 2 DIABETES MELLITUS: Primary | ICD-10-CM

## 2017-10-09 RX ORDER — SERTRALINE HYDROCHLORIDE 25 MG/1
25 TABLET, FILM COATED ORAL DAILY
Qty: 60 TABLET | Refills: 0 | Status: SHIPPED | OUTPATIENT
Start: 2017-10-09 | End: 2017-12-14 | Stop reason: SDUPTHER

## 2017-10-09 RX ORDER — PRAVASTATIN SODIUM 20 MG/1
20 TABLET ORAL DAILY
Qty: 90 TABLET | Refills: 3 | Status: SHIPPED | OUTPATIENT
Start: 2017-10-09 | End: 2017-10-11 | Stop reason: SDUPTHER

## 2017-10-09 RX ORDER — PRAVASTATIN SODIUM 20 MG/1
20 TABLET ORAL DAILY
Qty: 60 TABLET | Refills: 0 | OUTPATIENT
Start: 2017-10-09

## 2017-10-09 NOTE — TELEPHONE ENCOUNTER
Medications e-prescribed to pharmacy. Zoloft was prescribed to [facility] in [facility].    Calvin Elliott MD  [facility] HO 3  10/09/2017 12:49 PM

## 2017-10-09 NOTE — TELEPHONE ENCOUNTER
Medication already e-prescribed to pharmacy.    Calvin Elliott MD  [facility]  10/09/2017 12:51 PM

## 2017-10-09 NOTE — TELEPHONE ENCOUNTER
----- Message from Tonie French sent at 10/9/2017 10:47 AM CDT -----  PT NEED REFILL ON HISsertraline (ZOLOFT) 25 MG tablet AND pravastatin (PRAVACHOL) 20 MG tablet AND ALSO SYRINGES FOR INSULIN.  PT NEED PRESCRIPTION SENT TO [facility] IN [place] ON [facility]

## 2017-10-11 DIAGNOSIS — E11.69 HYPERLIPIDEMIA ASSOCIATED WITH TYPE 2 DIABETES MELLITUS: ICD-10-CM

## 2017-10-11 DIAGNOSIS — E11.69 HYPERLIPIDEMIA ASSOCIATED WITH TYPE 2 DIABETES MELLITUS: Primary | ICD-10-CM

## 2017-10-11 DIAGNOSIS — E78.5 HYPERLIPIDEMIA ASSOCIATED WITH TYPE 2 DIABETES MELLITUS: ICD-10-CM

## 2017-10-11 DIAGNOSIS — E78.5 HYPERLIPIDEMIA ASSOCIATED WITH TYPE 2 DIABETES MELLITUS: Primary | ICD-10-CM

## 2017-10-11 RX ORDER — PRAVASTATIN SODIUM 20 MG/1
20 TABLET ORAL DAILY
Qty: 90 TABLET | Refills: 3 | Status: SHIPPED | OUTPATIENT
Start: 2017-10-11 | End: 2017-10-13 | Stop reason: SDUPTHER

## 2017-10-11 RX ORDER — PRAVASTATIN SODIUM 20 MG/1
20 TABLET ORAL DAILY
Qty: 90 TABLET | Refills: 3 | Status: SHIPPED | OUTPATIENT
Start: 2017-10-11 | End: 2017-10-11 | Stop reason: SDUPTHER

## 2017-10-11 NOTE — TELEPHONE ENCOUNTER
----- Message from Tonie French sent at 10/10/2017  3:14 PM CDT -----  PT CALL STILL WAITING ON HIS pravastatin (PRAVACHOL) 20 MG tablet, I SEE IT WAS PRINTED SO PHARMACY DID NOT GET IT.      Medication was printed, please e-prescribe!

## 2017-10-11 NOTE — TELEPHONE ENCOUNTER
----- Message from Keesha Leal MA sent at 10/10/2017 11:54 AM CDT -----  Patient states that all rx except for the PRAVACHOL were sent in.  Please re-send,  Please change patient's preferred pharmacy to [facility] in [place].  Thanks.

## 2017-10-13 RX ORDER — PRAVASTATIN SODIUM 20 MG/1
20 TABLET ORAL DAILY
Qty: 90 TABLET | Refills: 3 | Status: SHIPPED | OUTPATIENT
Start: 2017-10-13 | End: 2018-10-16 | Stop reason: SDUPTHER

## 2017-10-13 NOTE — TELEPHONE ENCOUNTER
Medication e-prescribed to pharmacy.    Calvin Elliott MD  [facility] HO 3  10/13/2017 8:45 AM

## 2017-12-14 ENCOUNTER — LAB VISIT (OUTPATIENT)
Dept: LAB | Facility: HOSPITAL | Age: 62
End: 2017-12-14
Attending: FAMILY MEDICINE
Payer: MEDICAID

## 2017-12-14 ENCOUNTER — OFFICE VISIT (OUTPATIENT)
Dept: FAMILY MEDICINE | Facility: HOSPITAL | Age: 62
End: 2017-12-14
Attending: FAMILY MEDICINE
Payer: MEDICAID

## 2017-12-14 VITALS
WEIGHT: 168 LBS | HEIGHT: 68 IN | DIASTOLIC BLOOD PRESSURE: 58 MMHG | SYSTOLIC BLOOD PRESSURE: 99 MMHG | BODY MASS INDEX: 25.46 KG/M2 | HEART RATE: 66 BPM

## 2017-12-14 DIAGNOSIS — E11.8 TYPE 2 DIABETES MELLITUS WITH COMPLICATION, WITH LONG-TERM CURRENT USE OF INSULIN: Primary | ICD-10-CM

## 2017-12-14 DIAGNOSIS — E11.8 TYPE 2 DIABETES MELLITUS WITH COMPLICATION, WITH LONG-TERM CURRENT USE OF INSULIN: ICD-10-CM

## 2017-12-14 DIAGNOSIS — Z79.4 TYPE 2 DIABETES MELLITUS WITH COMPLICATION, WITH LONG-TERM CURRENT USE OF INSULIN: Primary | ICD-10-CM

## 2017-12-14 DIAGNOSIS — Z79.4 TYPE 2 DIABETES MELLITUS WITH COMPLICATION, WITH LONG-TERM CURRENT USE OF INSULIN: ICD-10-CM

## 2017-12-14 LAB
ESTIMATED AVG GLUCOSE: 146 MG/DL
HBA1C MFR BLD HPLC: 6.7 %

## 2017-12-14 PROCEDURE — 83036 HEMOGLOBIN GLYCOSYLATED A1C: CPT

## 2017-12-14 PROCEDURE — 36415 COLL VENOUS BLD VENIPUNCTURE: CPT

## 2017-12-14 PROCEDURE — 99215 OFFICE O/P EST HI 40 MIN: CPT | Performed by: FAMILY MEDICINE

## 2017-12-14 RX ORDER — ERGOCALCIFEROL 1.25 MG/1
50000 CAPSULE ORAL
COMMUNITY
End: 2019-10-11 | Stop reason: ALTCHOICE

## 2017-12-14 RX ORDER — ALLOPURINOL 100 MG/1
100 TABLET ORAL DAILY
COMMUNITY
End: 2018-10-16 | Stop reason: SDUPTHER

## 2017-12-14 RX ORDER — INSULIN LISPRO 100 [IU]/ML
5 INJECTION, SOLUTION INTRAVENOUS; SUBCUTANEOUS
Qty: 13.5 ML | Refills: 3 | Status: SHIPPED | OUTPATIENT
Start: 2017-12-14 | End: 2018-02-05 | Stop reason: SDUPTHER

## 2017-12-14 NOTE — PROGRESS NOTES
Subjective:       Patient ID: Ming Boateng is a 62 y.o. male.    Chief Complaint: Allergies; Extremity Weakness; and Dizziness    HPI   Patient is a 62 y.o. Male with a past medical history of hypertension, hyperlipidemia, Type 2 DM, CHF, CVA, and stage CKD4 who presents to clinic for assessment of mid-day weakness and dizziness.  He and his daughter report that he experiences sudden dizziness on a daily basis between 11:00AM and 4:00PM that is relieved by rest. They report that his blood pressure and blood glucose are measured during these events but that the values are not abnormal.  They report that his current regimen for Diabetes treatment includes Toujeo 10 units BID and Humalog 5-10 units on a sliding scale. The patient and his daughter report that his fasting sugar measurements range between 160-200s and post-prandially sugars around 200s, but these measurements are taken while the patient is eating.  Due to his poor eyesight, he has difficulty seeing the numbers on his syringe, and requests a flexpen version of Humalog.  Hemoglobin A1C taken in August 2017 was 7.6%.  He endorses a 3 month history of numbness of his right hand and right side of his face. He denies shortness of breath, pillow orthopnea, chest pain, extremity edema, and palpitations.    Review of Systems   Constitutional: Negative for appetite change, chills, diaphoresis, fatigue, fever and unexpected weight change.   HENT: Negative for congestion, sore throat and trouble swallowing.    Eyes: Negative for photophobia and pain.   Respiratory: Negative for cough, chest tightness and shortness of breath.    Cardiovascular: Negative for chest pain and palpitations.   Gastrointestinal: Negative for abdominal distention, abdominal pain, anal bleeding, constipation, diarrhea, nausea and vomiting.   Endocrine: Negative for polydipsia, polyphagia and polyuria.   Genitourinary: Negative for difficulty urinating and dysuria.   Musculoskeletal: Negative for  arthralgias and gait problem.   Skin: Negative for color change, pallor and rash.   Neurological: Positive for dizziness, weakness, light-headedness and numbness. Negative for facial asymmetry and speech difficulty.   Psychiatric/Behavioral: Negative for agitation and behavioral problems.       Objective:      Vitals:    12/14/17 1120   BP: (!) 99/58   Pulse: 66     Physical Exam   Constitutional: He is oriented to person, place, and time. He appears well-developed and well-nourished. No distress.   HENT:   Head: Normocephalic and atraumatic.   Nose: Nose normal.   Mouth/Throat: Oropharynx is clear and moist.   Eyes: EOM are normal. Pupils are equal, round, and reactive to light.   Neck: Normal range of motion. Neck supple.   Cardiovascular: Normal rate, regular rhythm, normal heart sounds and intact distal pulses.  Exam reveals no gallop and no friction rub.    No murmur heard.  Pulmonary/Chest: Effort normal and breath sounds normal. No respiratory distress. He has no wheezes. He has no rales.   Abdominal: Soft. Bowel sounds are normal. He exhibits no distension and no mass. There is no tenderness. There is no guarding.   Musculoskeletal: Normal range of motion. He exhibits no edema.   Neurological: He is alert and oriented to person, place, and time. He displays normal reflexes. No cranial nerve deficit. Coordination normal.   Positive for decreased sensation in right hand and right side of face.   Skin: Skin is warm and dry. He is not diaphoretic.   Psychiatric: He has a normal mood and affect. His behavior is normal. Judgment and thought content normal.       Assessment:       1. Type 2 diabetes mellitus with complication, with long-term current use of insulin        Plan:       Type 2 diabetes mellitus with complication, with long-term current use of insulin  - Patient and his daughter were instructed on proper timing of blood glucose measurements and proper calibration of the device.  - Advised patient and  daughter to monitor BG upon waking up and 2 hours after each meal  - Instructed patient and daughter to increase night time Toujeo by 1 unit each night until fasting BG is at goal of 140  -     Hemoglobin A1c; Future; Expected date: 12/14/2017  -     insulin lispro (HUMALOG KWIKPEN) 100 unit/mL InPn pen; Inject 5 Units into the skin 3 (three) times daily with meals.  Dispense: 13.5 mL; Refill: 3      RTC in 1 month

## 2018-01-12 DIAGNOSIS — I10 ESSENTIAL HYPERTENSION: ICD-10-CM

## 2018-01-12 NOTE — TELEPHONE ENCOUNTER
----- Message from Keesha Lela MA sent at 1/12/2018  1:18 PM CST -----  Patient is out of his amlodipine.  Has an appointment next week.  Please send to pharmacy.  Thanks.

## 2018-01-14 RX ORDER — AMLODIPINE BESYLATE 10 MG/1
10 TABLET ORAL DAILY
Qty: 90 TABLET | Refills: 3 | Status: SHIPPED | OUTPATIENT
Start: 2018-01-14 | End: 2019-04-12 | Stop reason: HOSPADM

## 2018-01-30 DIAGNOSIS — Z79.4 TYPE 2 DIABETES MELLITUS WITH COMPLICATION, WITH LONG-TERM CURRENT USE OF INSULIN: Primary | ICD-10-CM

## 2018-01-30 DIAGNOSIS — I50.30 (HFPEF) HEART FAILURE WITH PRESERVED EJECTION FRACTION: ICD-10-CM

## 2018-01-30 DIAGNOSIS — E11.8 TYPE 2 DIABETES MELLITUS WITH COMPLICATION, WITH LONG-TERM CURRENT USE OF INSULIN: Primary | ICD-10-CM

## 2018-01-30 NOTE — TELEPHONE ENCOUNTER
"----- Message from Meagan Lu sent at 1/29/2018  9:14 AM CST -----  Pt need refills on his pen needle, diabetic 29 gauge x 1/2" Ndle  &  losartan-hydrochlorothiazide 100-25 mg (HYZAAR) 100-25 mg per tablet    [facility] [place].  "

## 2018-01-31 RX ORDER — LOSARTAN POTASSIUM AND HYDROCHLOROTHIAZIDE 25; 100 MG/1; MG/1
1 TABLET ORAL DAILY
Qty: 90 TABLET | Refills: 3 | Status: SHIPPED | OUTPATIENT
Start: 2018-01-31 | End: 2018-01-31 | Stop reason: SDUPTHER

## 2018-01-31 RX ORDER — PEN NEEDLE, DIABETIC 29 G X1/2"
1 NEEDLE, DISPOSABLE MISCELLANEOUS
Qty: 100 EACH | Refills: 11 | Status: SHIPPED | OUTPATIENT
Start: 2018-01-31 | End: 2018-01-31 | Stop reason: SDUPTHER

## 2018-01-31 RX ORDER — PEN NEEDLE, DIABETIC 29 G X1/2"
1 NEEDLE, DISPOSABLE MISCELLANEOUS
Qty: 100 EACH | Refills: 11 | Status: SHIPPED | OUTPATIENT
Start: 2018-01-31 | End: 2019-03-22 | Stop reason: SDUPTHER

## 2018-01-31 RX ORDER — LOSARTAN POTASSIUM AND HYDROCHLOROTHIAZIDE 25; 100 MG/1; MG/1
1 TABLET ORAL DAILY
Qty: 90 TABLET | Refills: 3 | Status: SHIPPED | OUTPATIENT
Start: 2018-01-31 | End: 2018-10-16 | Stop reason: SDUPTHER

## 2018-01-31 NOTE — TELEPHONE ENCOUNTER
Medications e-prescribed to pharmacy.    Calvin Elliott MD  [facility]  3  01/31/2018 8:39 AM   Other (Free Text): Recommended following up with ENT should episodic bleeding persist Note Text (......Xxx Chief Complaint.): This diagnosis correlates with the Detail Level: Detailed

## 2018-02-05 ENCOUNTER — HOSPITAL ENCOUNTER (OUTPATIENT)
Dept: RADIOLOGY | Facility: HOSPITAL | Age: 63
Discharge: HOME | End: 2018-02-05
Attending: FAMILY MEDICINE
Payer: MEDICAID

## 2018-02-05 ENCOUNTER — OFFICE VISIT (OUTPATIENT)
Dept: FAMILY MEDICINE | Facility: HOSPITAL | Age: 63
End: 2018-02-05
Attending: FAMILY MEDICINE
Payer: MEDICAID

## 2018-02-05 VITALS
SYSTOLIC BLOOD PRESSURE: 109 MMHG | HEART RATE: 66 BPM | HEIGHT: 68 IN | BODY MASS INDEX: 26.56 KG/M2 | DIASTOLIC BLOOD PRESSURE: 53 MMHG | WEIGHT: 175.25 LBS
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
## 2018-02-05 DIAGNOSIS — R07.89 LEFT-SIDED CHEST WALL PAIN: ICD-10-CM

## 2018-02-05 DIAGNOSIS — Z79.4 TYPE 2 DIABETES MELLITUS WITH COMPLICATION, WITH LONG-TERM CURRENT USE OF INSULIN: Primary | ICD-10-CM

## 2018-02-05 DIAGNOSIS — E11.8 TYPE 2 DIABETES MELLITUS WITH COMPLICATION, WITH LONG-TERM CURRENT USE OF INSULIN: Primary | ICD-10-CM

## 2018-02-05 PROCEDURE — 71046 X-RAY EXAM CHEST 2 VIEWS: CPT | Mod: TC,FY

## 2018-02-05 PROCEDURE — 71046 X-RAY EXAM CHEST 2 VIEWS: CPT | Mod: 26,,, | Performed by: RADIOLOGY

## 2018-02-05 PROCEDURE — 99215 OFFICE O/P EST HI 40 MIN: CPT | Performed by: FAMILY MEDICINE

## 2018-02-05 RX ORDER — DEXTROMETHORPHAN HYDROBROMIDE, GUAIFENESIN 5; 100 MG/5ML; MG/5ML
650 LIQUID ORAL 3 TIMES DAILY PRN
Qty: 60 TABLET | Refills: 0 | Status: SHIPPED | OUTPATIENT
Start: 2018-02-05 | End: 2019-07-02

## 2018-02-05 RX ORDER — INSULIN LISPRO 100 [IU]/ML
5 INJECTION, SOLUTION INTRAVENOUS; SUBCUTANEOUS
Qty: 13.5 ML | Refills: 3 | Status: SHIPPED | OUTPATIENT
Start: 2018-02-05 | End: 2018-06-05

## 2018-02-05 RX ORDER — INSULIN GLARGINE 300 [IU]/ML
13 INJECTION, SOLUTION SUBCUTANEOUS NIGHTLY
Qty: 3.9 ML | Refills: 3 | Status: SHIPPED | OUTPATIENT
Start: 2018-02-05 | End: 2018-09-06 | Stop reason: SDUPTHER

## 2018-02-05 RX ORDER — TRAMADOL HYDROCHLORIDE 50 MG/1
50 TABLET ORAL EVERY 12 HOURS PRN
Qty: 20 TABLET | Refills: 0 | Status: SHIPPED | OUTPATIENT
Start: 2018-02-05 | End: 2018-02-15

## 2018-02-05 NOTE — PROGRESS NOTES
Subjective:       Patient ID: Ming Boateng is a 62 y.o. male.    Chief Complaint: Diabetes and Fall    HPI   Mr. Boateng is a 63 yo male with PMHx of HTN, DM, CHF, CVA and stage 4 CKD presenting for follow up of DM. Patient reports compliance with his medications and denies any complains. He reports monitoring his home BG. Glucometer readings show FBG ranging from 150-220 and post parandial 180-200. Patient denies any hypoglycemic episodes at home.     Patient states that he fell 1 week ago. He states that he lost his balance while walking his dog. Patient denies any dizziness, SOB or palpitations prior to the fall. He denies any head injury or loss of consciousness. Patient states that he did not seek medical attention at the time. He reports left sided chest pain since the fall. Pain is described as sharp in nature with 9/10 intensity. Pain is worse with deep inspiration, coughing or sneezing.    Review of Systems   Constitutional: Negative for chills and fever.   Respiratory: Negative for cough, shortness of breath and wheezing.    Cardiovascular: Negative for chest pain.        Left sided rib pain   Gastrointestinal: Negative for abdominal pain, nausea and vomiting.   Genitourinary: Negative for difficulty urinating.   Musculoskeletal: Negative for back pain and neck pain.   Neurological: Negative for headaches.       Objective:      Vitals:    02/05/18 1318   BP: (!) 109/53   Pulse: 66     Physical Exam   Constitutional: He is oriented to person, place, and time. No distress.   HENT:   Head: Normocephalic and atraumatic.   Cardiovascular: Normal rate, regular rhythm and normal heart sounds.    No murmur heard.  Pulmonary/Chest: Effort normal and breath sounds normal. No respiratory distress. He has no wheezes. He exhibits tenderness (TTP over left 6th and 7th ribs).   Abdominal: Soft. Bowel sounds are normal. He exhibits no distension. There is no tenderness.   Musculoskeletal: Normal range of motion. He exhibits  no edema or tenderness.   ~1cm scab over left knee   Neurological: He is alert and oriented to person, place, and time.   Skin: He is not diaphoretic.       Assessment:       1. Type 2 diabetes mellitus with complication, with long-term current use of insulin    2. Fall, initial encounter    3. Left-sided chest wall pain        Plan:       Type 2 diabetes mellitus with complication, with long-term current use of insulin  - Last Hb A1c 6.7  - Advised patient to continue monitoring home BG  - Increase Toujeo to 13 units qHS  -     insulin lispro (HUMALOG KWIKPEN) 100 unit/mL InPn pen; Inject 5 Units into the skin 3 (three) times daily with meals.  Dispense: 13.5 mL; Refill: 3  -     insulin glargine, TOUJEO, (TOUJEO) 300 unit/mL (1.5 mL) InPn pen; Inject 13 Units into the skin every evening.  Dispense: 3.9 mL; Refill: 3    Fall, initial encounter  -     X-Ray Chest PA And Lateral; Future; Expected date: 02/05/2018    Left-sided chest wall pain  -     X-Ray Chest PA And Lateral; Future; Expected date: 02/05/2018  -     traMADol (ULTRAM) 50 mg tablet; Take 1 tablet (50 mg total) by mouth every 12 (twelve) hours as needed for Pain.  Dispense: 20 tablet; Refill: 0  -     acetaminophen (TYLENOL) 650 MG TbSR; Take 1 tablet (650 mg total) by mouth 3 (three) times daily as needed.  Dispense: 60 tablet; Refill: 0      Follow-up in about 1 month (around 3/5/2018), or if symptoms worsen or fail to improve.

## 2018-02-05 NOTE — PROGRESS NOTES
I assume primary medical responsibility for this patient, I have reviewed the case history, findings, diagnosis and treatment plan with the resident and agree that the care is reasonable and necessary. This service has been performed by a resident without the presence of a teaching physician under the primary care exception  Szui Fulton  2/5/2018

## 2018-02-07 ENCOUNTER — TELEPHONE (OUTPATIENT)
Dept: FAMILY MEDICINE | Facility: HOSPITAL | Age: 63
End: 2018-02-07

## 2018-02-07 NOTE — TELEPHONE ENCOUNTER
----- Message from Meagan Lu sent at 2/7/2018  9:38 AM CST -----  Pt hi's requesting his  xrays result please call pt asap.

## 2018-02-20 ENCOUNTER — OFFICE VISIT (OUTPATIENT)
Dept: FAMILY MEDICINE | Facility: HOSPITAL | Age: 63
End: 2018-02-20
Attending: FAMILY MEDICINE
Payer: MEDICAID

## 2018-02-20 VITALS
DIASTOLIC BLOOD PRESSURE: 60 MMHG | HEIGHT: 68 IN | WEIGHT: 169.31 LBS | HEART RATE: 65 BPM | BODY MASS INDEX: 25.66 KG/M2 | SYSTOLIC BLOOD PRESSURE: 98 MMHG

## 2018-02-20 DIAGNOSIS — R29.6 RECURRENT FALLS: Primary | ICD-10-CM

## 2018-02-20 DIAGNOSIS — Z86.73 H/O: CVA (CEREBROVASCULAR ACCIDENT): ICD-10-CM

## 2018-02-20 PROCEDURE — 99214 OFFICE O/P EST MOD 30 MIN: CPT | Performed by: FAMILY MEDICINE

## 2018-02-20 NOTE — PROGRESS NOTES
Subjective:       Patient ID: Ming Boateng is a 62 y.o. male.    Chief Complaint: Paperwork    HPI   Mr. Boateng is a 63 yo male with PMHx of HTN, HLD, DM, CKD stage 4, CVA and recurrent falls presenting for paperwork. Per daughter, patient is unable to care for himself and therefore she is requesting to be the patient's caretaker. She would like to have the paperwork completed in order to enroll in a state funded program in order to be her father's caretaker. Patient states that he is unable to perform ADLs and requires assistance with dressing, bathing, cooking and groceries. He states that he had an episode of fall last week while using the restroom. Patient reports head trauma at the time but denies any LOC. He denied any palpitations or chest pain prior to the fall. He denies seeking medical attention at the time. Patient denies any F/C, HA, SOB or chest pain.    Review of Systems   Constitutional: Negative for chills and fever.   Respiratory: Negative for cough, shortness of breath and wheezing.    Cardiovascular: Negative for chest pain.   Gastrointestinal: Negative for abdominal pain, nausea and vomiting.   Genitourinary: Negative for difficulty urinating.   Musculoskeletal: Negative for back pain and neck pain.   Neurological: Negative for headaches.       Objective:      Vitals:    02/20/18 1343   BP: 98/60   Pulse: 65     Physical Exam   Constitutional: He is oriented to person, place, and time. No distress.   Ambulating well with a walker   Cardiovascular: Normal rate, regular rhythm and normal heart sounds.    No murmur heard.  Pulmonary/Chest: Effort normal and breath sounds normal. No respiratory distress. He has no wheezes.   Abdominal: Soft. Bowel sounds are normal. He exhibits no distension. There is no tenderness.   Musculoskeletal: He exhibits no edema or tenderness.   Neurological: He is alert and oriented to person, place, and time.   Skin: He is not diaphoretic.       Assessment:       1.  Recurrent falls    2. H/O: CVA (cerebrovascular accident)        Plan:       Recurrent falls  - Advised patient to seek medical attention if he experiences episodes of falls and suffers injuries  - Counseled patient to ensure that there are no loose objects or rugs in the house  - Form for medical assistance completed and given to patient    H/O: CVA (cerebrovascular accident)      Follow-up in about 2 months (around 4/20/2018), or if symptoms worsen or fail to improve.

## 2018-03-05 DIAGNOSIS — E78.5 HYPERLIPIDEMIA ASSOCIATED WITH TYPE 2 DIABETES MELLITUS: ICD-10-CM

## 2018-03-05 DIAGNOSIS — E11.69 HYPERLIPIDEMIA ASSOCIATED WITH TYPE 2 DIABETES MELLITUS: ICD-10-CM

## 2018-03-26 ENCOUNTER — TELEPHONE (OUTPATIENT)
Dept: FAMILY MEDICINE | Facility: HOSPITAL | Age: 63
End: 2018-03-26

## 2018-03-26 NOTE — TELEPHONE ENCOUNTER
----- Message from Tonie French sent at 3/26/2018  9:58 AM CDT -----  PT DAUGHTER BEVERLEY CALL ASK IF DR HURTADO CAN GIVE HER A CALL ABOUT HER DAD LONG TERM HEALTH CARE PLEASE.

## 2018-04-21 NOTE — TELEPHONE ENCOUNTER
Attempted to call patient's daughter in regards to patient's home health order. Unable to speak with daughter and voicemail box full therefore unable to leave a message to call the clinic.    Calvin Elliott MD  [facility]  3  04/21/2018 4:24 PM

## 2018-04-25 ENCOUNTER — TELEPHONE (OUTPATIENT)
Dept: FAMILY MEDICINE | Facility: HOSPITAL | Age: 63
End: 2018-04-25

## 2018-04-25 DIAGNOSIS — N18.4 STAGE 4 CHRONIC KIDNEY DISEASE: Primary | ICD-10-CM

## 2018-04-25 RX ORDER — CALCIUM ACETATE 667 MG/1
667 TABLET ORAL
Qty: 270 TABLET | Refills: 3 | Status: SHIPPED | OUTPATIENT
Start: 2018-04-25 | End: 2018-10-17 | Stop reason: SDUPTHER

## 2018-04-25 NOTE — TELEPHONE ENCOUNTER
Phoslo 667mg capsule changed to Phoslo 667mg tablet due to insurance formulary change. Refills e-prescribed to pharmacy.    Calvin Elliott MD  [facility]  3  04/25/2018 4:42 PM

## 2018-04-25 NOTE — TELEPHONE ENCOUNTER
Insurance will cover calcium acetate 667mg if medication is changed from capsules to tablets.   Thanks!

## 2018-06-05 ENCOUNTER — TELEPHONE (OUTPATIENT)
Dept: FAMILY MEDICINE | Facility: HOSPITAL | Age: 63
End: 2018-06-05

## 2018-06-05 DIAGNOSIS — E11.8 TYPE 2 DIABETES MELLITUS WITH COMPLICATION, WITH LONG-TERM CURRENT USE OF INSULIN: Primary | ICD-10-CM

## 2018-06-05 DIAGNOSIS — I50.30 (HFPEF) HEART FAILURE WITH PRESERVED EJECTION FRACTION: Primary | ICD-10-CM

## 2018-06-05 DIAGNOSIS — Z79.4 TYPE 2 DIABETES MELLITUS WITH COMPLICATION, WITH LONG-TERM CURRENT USE OF INSULIN: Primary | ICD-10-CM

## 2018-06-05 RX ORDER — INSULIN LISPRO 100 [IU]/ML
5 INJECTION, SOLUTION INTRAVENOUS; SUBCUTANEOUS
Qty: 13.5 ML | Refills: 3 | Status: SHIPPED | OUTPATIENT
Start: 2018-06-05 | End: 2018-10-16 | Stop reason: SDUPTHER

## 2018-06-05 RX ORDER — CARVEDILOL 12.5 MG/1
6.25 TABLET ORAL 2 TIMES DAILY WITH MEALS
Qty: 90 TABLET | Refills: 3 | Status: SHIPPED | OUTPATIENT
Start: 2018-06-05 | End: 2019-05-03 | Stop reason: SDUPTHER

## 2018-06-05 NOTE — PROGRESS NOTES
Humalog pen not covered by patient's insurance. Switch to Humalog vial 5 units TIDWM.    Calvin Elliott MD  [facility]  3  06/05/2018 4:49 PM

## 2018-06-05 NOTE — TELEPHONE ENCOUNTER
----- Message from Ary Gilbert sent at 6/5/2018 11:38 AM CDT -----  INSURANCE DOES NOT COVER THE NOVOLOG, WANTS PRESCRIPTION WRITTEN FOR HUMALOG (VIAL)     JORDANA [phone]

## 2018-06-05 NOTE — TELEPHONE ENCOUNTER
Medication e-prescribed to pharmacy.    Calvin Elliott MD  [facility] 3  06/05/2018 3:10 PM

## 2018-06-13 DIAGNOSIS — Z79.4 TYPE 2 DIABETES MELLITUS WITH COMPLICATION, WITH LONG-TERM CURRENT USE OF INSULIN: ICD-10-CM

## 2018-06-13 DIAGNOSIS — E11.8 TYPE 2 DIABETES MELLITUS WITH COMPLICATION, WITH LONG-TERM CURRENT USE OF INSULIN: ICD-10-CM

## 2018-09-04 DIAGNOSIS — I50.30 (HFPEF) HEART FAILURE WITH PRESERVED EJECTION FRACTION: ICD-10-CM

## 2018-09-04 NOTE — TELEPHONE ENCOUNTER
----- Message from Adela Butcher sent at 9/4/2018  2:25 PM CDT -----  Katty from [facility] calling to ask about a request for furosemide (LASIX) 40 MG tablet. Please call [facility] at [phone]      Medication refill was printed not e-prescribed.

## 2018-09-05 RX ORDER — FUROSEMIDE 40 MG/1
80 TABLET ORAL DAILY
Qty: 180 TABLET | Refills: 0 | Status: SHIPPED | OUTPATIENT
Start: 2018-09-05 | End: 2018-12-04 | Stop reason: SDUPTHER

## 2018-09-06 DIAGNOSIS — Z79.4 TYPE 2 DIABETES MELLITUS WITH COMPLICATION, WITH LONG-TERM CURRENT USE OF INSULIN: ICD-10-CM

## 2018-09-06 DIAGNOSIS — E11.8 TYPE 2 DIABETES MELLITUS WITH COMPLICATION, WITH LONG-TERM CURRENT USE OF INSULIN: ICD-10-CM

## 2018-09-06 RX ORDER — INSULIN GLARGINE 300 [IU]/ML
13 INJECTION, SOLUTION SUBCUTANEOUS NIGHTLY
Qty: 3.9 ML | Refills: 0 | Status: SHIPPED | OUTPATIENT
Start: 2018-09-06 | End: 2018-10-16 | Stop reason: SDUPTHER

## 2018-09-06 NOTE — TELEPHONE ENCOUNTER
----- Message from Adela Butcher sent at 9/6/2018  1:43 PM CDT -----  Patient needs a refill on insulin glargine, TOUJEO, (TOUJEO) 300 unit/mL (1.5 mL) InPn pen. Please send to [email or web address]  [address] [place] [address]

## 2018-10-16 DIAGNOSIS — E78.5 HYPERLIPIDEMIA ASSOCIATED WITH TYPE 2 DIABETES MELLITUS: ICD-10-CM

## 2018-10-16 DIAGNOSIS — I50.30 (HFPEF) HEART FAILURE WITH PRESERVED EJECTION FRACTION: ICD-10-CM

## 2018-10-16 DIAGNOSIS — E11.8 TYPE 2 DIABETES MELLITUS WITH COMPLICATION, WITH LONG-TERM CURRENT USE OF INSULIN: ICD-10-CM

## 2018-10-16 DIAGNOSIS — N18.4 STAGE 4 CHRONIC KIDNEY DISEASE: ICD-10-CM

## 2018-10-16 DIAGNOSIS — Z79.4 TYPE 2 DIABETES MELLITUS WITH COMPLICATION, WITH LONG-TERM CURRENT USE OF INSULIN: ICD-10-CM

## 2018-10-16 DIAGNOSIS — E11.69 HYPERLIPIDEMIA ASSOCIATED WITH TYPE 2 DIABETES MELLITUS: ICD-10-CM

## 2018-10-16 RX ORDER — PRAVASTATIN SODIUM 20 MG/1
20 TABLET ORAL DAILY
Qty: 90 TABLET | Refills: 3 | Status: ON HOLD | OUTPATIENT
Start: 2018-10-16 | End: 2019-03-05 | Stop reason: HOSPADM

## 2018-10-16 RX ORDER — INSULIN GLARGINE 300 [IU]/ML
13 INJECTION, SOLUTION SUBCUTANEOUS NIGHTLY
Qty: 3.9 ML | Refills: 0 | Status: SHIPPED | OUTPATIENT
Start: 2018-10-16 | End: 2018-11-12 | Stop reason: SDUPTHER

## 2018-10-16 RX ORDER — NAPROXEN SODIUM 220 MG/1
81 TABLET, FILM COATED ORAL DAILY
Qty: 30 TABLET | Refills: 0 | Status: SHIPPED | OUTPATIENT
Start: 2018-10-16 | End: 2018-11-16 | Stop reason: SDUPTHER

## 2018-10-16 RX ORDER — ALLOPURINOL 100 MG/1
100 TABLET ORAL DAILY
Qty: 30 TABLET | Refills: 0 | Status: SHIPPED | OUTPATIENT
Start: 2018-10-16 | End: 2019-10-11 | Stop reason: SDUPTHER

## 2018-10-16 RX ORDER — LOSARTAN POTASSIUM AND HYDROCHLOROTHIAZIDE 25; 100 MG/1; MG/1
1 TABLET ORAL DAILY
Qty: 90 TABLET | Refills: 3 | Status: SHIPPED | OUTPATIENT
Start: 2018-10-16 | End: 2019-01-11 | Stop reason: ALTCHOICE

## 2018-10-16 RX ORDER — INSULIN LISPRO 100 [IU]/ML
5 INJECTION, SOLUTION INTRAVENOUS; SUBCUTANEOUS
Qty: 13.5 ML | Refills: 0 | Status: SHIPPED | OUTPATIENT
Start: 2018-10-16 | End: 2018-11-12 | Stop reason: ALTCHOICE

## 2018-10-16 NOTE — TELEPHONE ENCOUNTER
----- Message from Keesha Leal MA sent at 10/16/2018  9:39 AM CDT -----  Patient requesting refill on Humalog, calcium, pavan aspirin, losartin, pravastatin, tujeo solostar and  Allopurinol.  Please send to [facility].  Thanks.

## 2018-10-17 RX ORDER — CALCIUM ACETATE 667 MG/1
667 TABLET ORAL
Qty: 270 TABLET | Refills: 3 | Status: SHIPPED | OUTPATIENT
Start: 2018-10-17 | End: 2019-10-11 | Stop reason: SDUPTHER

## 2018-10-17 NOTE — TELEPHONE ENCOUNTER
----- Message from Keesha Leal MA sent at 10/17/2018 10:58 AM CDT -----  All refills went to pharmacy except for the calcium; please call pharmacy.  Thanks.      ----- Message -----  From: Keesha Leal MA  Sent: 10/16/2018   9:39 AM  To: [facility] Staff    Patient requesting refill on Humalog, calcium, pavan aspirin, losartin, pravastatin, tujeo solostar and  Allopurinol.  Please send to [facility].  Thanks.

## 2018-10-22 DIAGNOSIS — Z79.4 TYPE 2 DIABETES MELLITUS WITH COMPLICATION, WITH LONG-TERM CURRENT USE OF INSULIN: ICD-10-CM

## 2018-10-22 DIAGNOSIS — E11.8 TYPE 2 DIABETES MELLITUS WITH COMPLICATION, WITH LONG-TERM CURRENT USE OF INSULIN: ICD-10-CM

## 2018-10-22 NOTE — TELEPHONE ENCOUNTER
----- Message from Peter Clark sent at 10/19/2018  4:16 PM CDT -----  PT NEEDS REFILL INSULIN SYRINGES. PT IS OUT

## 2018-11-07 ENCOUNTER — TELEPHONE (OUTPATIENT)
Dept: FAMILY MEDICINE | Facility: HOSPITAL | Age: 63
End: 2018-11-07

## 2018-11-07 NOTE — TELEPHONE ENCOUNTER
----- Message from Peter Clark sent at 11/6/2018 11:34 AM CST -----  INSURANCE ISN'T COVERING HIS INSULIN NO MORE, CAN DR CONTACT INSURANCE TO PRESCRIBED A INSULIN, HUMALOG NEEDS TO BE INSULIN PIN NOT VEILED. INSURANCE CONTACT NUMBER [phone].

## 2018-11-09 ENCOUNTER — TELEPHONE (OUTPATIENT)
Dept: FAMILY MEDICINE | Facility: HOSPITAL | Age: 63
End: 2018-11-09

## 2018-11-09 NOTE — TELEPHONE ENCOUNTER
----- Message from Peter Clark sent at 11/9/2018 11:33 AM CST -----  PT INSURANCE [facility] IS CALLING ON BEHAVE OF THE PT FOR PT PRESCRIPTION ON ADMELOG BC IT IS COVER BY THE INSURANCE insulin lispro (HUMALOG) 100 unit/mL injection IS NOT. PT WILL BE OUT AFTER TODAY. PT HAVE BEEN CALLING AND DAUGHTER CAME IN PREVIOUSLY ABOUT THIS ISSUE. PLEASE CALL PT WHEN THIS REQUEST IS COMPLETE.

## 2018-11-10 ENCOUNTER — HOSPITAL ENCOUNTER (EMERGENCY)
Facility: HOSPITAL | Age: 63
Discharge: HOME | End: 2018-11-10
Attending: EMERGENCY MEDICINE
Payer: MEDICAID

## 2018-11-10 VITALS
WEIGHT: 169.31 LBS | RESPIRATION RATE: 16 BRPM | OXYGEN SATURATION: 100 % | BODY MASS INDEX: 25.66 KG/M2 | SYSTOLIC BLOOD PRESSURE: 153 MMHG | HEART RATE: 69 BPM | HEIGHT: 68 IN | TEMPERATURE: 98 F | DIASTOLIC BLOOD PRESSURE: 73 MMHG

## 2018-11-10 DIAGNOSIS — Z76.0 MEDICATION REFILL: Primary | ICD-10-CM

## 2018-11-10 LAB — POCT GLUCOSE: 203 MG/DL (ref 70–110)

## 2018-11-10 PROCEDURE — 99281 EMR DPT VST MAYX REQ PHY/QHP: CPT

## 2018-11-10 NOTE — ED TRIAGE NOTES
Pt presents to Ed and states he needs prescription for Admelog as Humalog is no longer covered by insurance. Pt states he has attempted to have the medication RX placed per his PCP but it has not been done yet.
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
## 2018-11-12 DIAGNOSIS — E11.8 TYPE 2 DIABETES MELLITUS WITH COMPLICATION, WITH LONG-TERM CURRENT USE OF INSULIN: Primary | ICD-10-CM

## 2018-11-12 DIAGNOSIS — Z79.4 TYPE 2 DIABETES MELLITUS WITH COMPLICATION, WITH LONG-TERM CURRENT USE OF INSULIN: Primary | ICD-10-CM

## 2018-11-12 RX ORDER — INSULIN GLARGINE 300 [IU]/ML
10 INJECTION, SOLUTION SUBCUTANEOUS 2 TIMES DAILY
Qty: 1.5 ML | Refills: 6 | Status: SHIPPED | OUTPATIENT
Start: 2018-11-12 | End: 2019-04-15 | Stop reason: CLARIF

## 2018-11-12 NOTE — PROGRESS NOTES
Spoke to patient's daughter about her medication refill.  She was able to obtain the refill via ED.  Patient was educated for next time, she can make an apt with our clinic for change in medication.  All questions were answered.

## 2018-11-16 DIAGNOSIS — I50.30 (HFPEF) HEART FAILURE WITH PRESERVED EJECTION FRACTION: ICD-10-CM

## 2018-11-16 NOTE — TELEPHONE ENCOUNTER
----- Message from Peter Clark sent at 11/15/2018 11:15 AM CST -----  PT NEEDS REFILL ON ASPRIN CHEWABLE TABLET.

## 2018-11-17 ENCOUNTER — HOSPITAL ENCOUNTER (EMERGENCY)
Facility: HOSPITAL | Age: 63
Discharge: HOME | End: 2018-11-17
Attending: EMERGENCY MEDICINE
Payer: MEDICAID

## 2018-11-17 VITALS
TEMPERATURE: 98 F | WEIGHT: 150 LBS | HEIGHT: 63 IN | SYSTOLIC BLOOD PRESSURE: 130 MMHG | HEART RATE: 73 BPM | OXYGEN SATURATION: 100 % | RESPIRATION RATE: 18 BRPM | DIASTOLIC BLOOD PRESSURE: 61 MMHG | BODY MASS INDEX: 26.58 KG/M2

## 2018-11-17 DIAGNOSIS — Z76.0 MEDICATION REFILL: Primary | ICD-10-CM

## 2018-11-17 DIAGNOSIS — R73.9 BLOOD GLUCOSE ELEVATED: ICD-10-CM

## 2018-11-17 LAB — GLUCOSE SERPL-MCNC: 178 MG/DL (ref 70–110)

## 2018-11-17 PROCEDURE — 82962 GLUCOSE BLOOD TEST: CPT

## 2018-11-17 PROCEDURE — 63600175 PHARM REV CODE 636 W HCPCS: Performed by: PHYSICIAN ASSISTANT

## 2018-11-17 PROCEDURE — 99283 EMERGENCY DEPT VISIT LOW MDM: CPT

## 2018-11-17 RX ORDER — INSULIN ASPART 100 [IU]/ML
7 INJECTION, SOLUTION INTRAVENOUS; SUBCUTANEOUS
Status: COMPLETED | OUTPATIENT
Start: 2018-11-17 | End: 2018-11-17

## 2018-11-17 RX ORDER — NAPROXEN SODIUM 220 MG/1
81 TABLET, FILM COATED ORAL DAILY
Qty: 30 TABLET | Refills: 3 | Status: SHIPPED | OUTPATIENT
Start: 2018-11-17 | End: 2019-02-19

## 2018-11-17 RX ADMIN — INSULIN ASPART 7 UNITS: 100 INJECTION, SOLUTION INTRAVENOUS; SUBCUTANEOUS at 05:11
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
## 2018-11-17 NOTE — ED TRIAGE NOTES
Pt presents to ED and reports he dropped his insulin and needs a  medication Refill . Pt states pharmacy will not refill it, last took it yesterday evening-7.

## 2018-11-18 LAB — POCT GLUCOSE: 178 MG/DL (ref 70–110)

## 2018-12-04 DIAGNOSIS — I50.30 (HFPEF) HEART FAILURE WITH PRESERVED EJECTION FRACTION: ICD-10-CM

## 2018-12-04 RX ORDER — FUROSEMIDE 40 MG/1
80 TABLET ORAL DAILY
Qty: 180 TABLET | Refills: 1 | Status: ON HOLD | OUTPATIENT
Start: 2018-12-04 | End: 2019-06-24 | Stop reason: HOSPADM

## 2018-12-04 NOTE — TELEPHONE ENCOUNTER
----- Message from Keesha Leal MA sent at 12/3/2018 10:56 AM CST -----  Contact: Nica Boateng, daughter  [phone]  Daughter called from number above requesting refill on Furosemide 40mg; [facility].  They have already requested by [facility].  Thanks.

## 2018-12-18 ENCOUNTER — OFFICE VISIT (OUTPATIENT)
Dept: URGENT CARE | Facility: CLINIC | Age: 63
End: 2018-12-18
Payer: MEDICAID

## 2018-12-18 VITALS
BODY MASS INDEX: 28.93 KG/M2 | DIASTOLIC BLOOD PRESSURE: 70 MMHG | HEART RATE: 88 BPM | RESPIRATION RATE: 18 BRPM | TEMPERATURE: 99 F | HEIGHT: 66 IN | OXYGEN SATURATION: 100 % | SYSTOLIC BLOOD PRESSURE: 140 MMHG | WEIGHT: 180 LBS

## 2018-12-18 DIAGNOSIS — J06.9 UPPER RESPIRATORY TRACT INFECTION, UNSPECIFIED TYPE: ICD-10-CM

## 2018-12-18 DIAGNOSIS — R05.9 COUGH: Primary | ICD-10-CM

## 2018-12-18 LAB
CTP QC/QA: YES
CTP QC/QA: YES
FLUAV AG NPH QL: NEGATIVE
FLUBV AG NPH QL: NEGATIVE
S PYO RRNA THROAT QL PROBE: NEGATIVE

## 2018-12-18 PROCEDURE — 99214 OFFICE O/P EST MOD 30 MIN: CPT | Mod: S$GLB,,, | Performed by: FAMILY MEDICINE

## 2018-12-18 PROCEDURE — 71046 X-RAY EXAM CHEST 2 VIEWS: CPT | Mod: FY,S$GLB,, | Performed by: RADIOLOGY

## 2018-12-18 PROCEDURE — 87880 STREP A ASSAY W/OPTIC: CPT | Mod: QW,S$GLB,, | Performed by: FAMILY MEDICINE

## 2018-12-18 PROCEDURE — 93010 ELECTROCARDIOGRAM REPORT: CPT | Mod: S$GLB,,, | Performed by: INTERNAL MEDICINE

## 2018-12-18 PROCEDURE — 93005 ELECTROCARDIOGRAM TRACING: CPT | Mod: S$GLB,,, | Performed by: FAMILY MEDICINE

## 2018-12-18 PROCEDURE — 87804 INFLUENZA ASSAY W/OPTIC: CPT | Mod: 59,QW,S$GLB, | Performed by: FAMILY MEDICINE

## 2018-12-18 RX ORDER — BENZONATATE 100 MG/1
200 CAPSULE ORAL 3 TIMES DAILY PRN
Qty: 60 CAPSULE | Refills: 1 | Status: SHIPPED | OUTPATIENT
Start: 2018-12-18 | End: 2019-02-19

## 2018-12-18 NOTE — PATIENT INSTRUCTIONS
If you were prescribed a narcotic or controlled medication, do not drive or operate heavy equipment or machinery while taking these medications.  You must understand that you've received an Urgent Care treatment only and that you may be released before all your medical problems are known or treated. You, the patient, will arrange for follow up care as instructed.  Follow up with your PCP or specialty clinic as directed in the next 1-2 weeks if not improved or as needed.  You can call [phone] to schedule an appointment with the appropriate provider.  If your condition worsens we recommend that you receive another evaluation at the emergency room immediately or contact your primary medical clinics after hours call service to discuss your concerns.  Please return here or go to the Emergency Department for any concerns or worsening of condition.      Viral Upper Respiratory Illness with Wheezing (Adult)  You have a viral upper respiratory illness (URI), which is another term for the common cold. When the infection causes a lot of irritation, the air passages can go into spasm. This causes wheezing and shortness of breath.    This illness is contagious during the first few days. It is spread through the air by coughing and sneezing. It may also be spread by direct contact (touching the sick person and then touching your own eyes, nose, or mouth). Frequent handwashing will decrease the risk.  Most viral illnesses go away within 7 to 10 days with rest and simple home remedies. Sometimes the illness may last for several weeks. Antibiotics will not kill a virus, and they are generally not prescribed for this condition.  Home care  · If symptoms are severe, rest at home for the first 2 to 3 days. When you resume activity, don't let yourself get too tired.  · Avoid being exposed to cigarette smoke (yours or others).  · You may use acetaminophen or ibuprofen to control pain and fever, unless another medicine was  prescribed. (Note: If you have chronic liver or kidney disease, have ever had a stomach ulcer or gastrointestinal bleeding, or are taking blood-thinning medicines, talk with your healthcare provider before using these medicines.) Aspirin should never be given to anyone under 18 years of age who is ill with a viral infection or fever. It may cause severe liver or brain damage.  · Your appetite may be poor, so a light diet is fine. Avoid dehydration by drinking 6 to 8 glasses of fluids per day (water, soft drinks, juices, tea, or soup). Extra fluids will help loosen secretions in the nose and lungs.  · Over-the-counter cold medicines will not shorten the length of time youre sick, but they may be helpful for the following symptoms: cough, sore throat, and nasal and sinus congestion. (Note: Do not use decongestants if you have high blood pressure.)  Follow-up care  Follow up with your healthcare provider, or as advised.  When to seek medical advice  Call your healthcare provider right away if any of these occur:  · Cough with lots of colored sputum (mucus)  · Severe headache; face, neck, or ear pain  · Difficulty swallowing due to throat pain  · Fever of 100.4ºF (38ºC) or higher, or as directed by your healthcare provider  Call 911, or get immediate medical care  Call emergency services right away if any of these occur:  · Chest pain, shortness of breath, worsening wheezing, or difficulty breathing  · Coughing up blood  · Inability to swallow due to throat pain  Date Last Reviewed: 9/13/2015  © 8223-7940 [employer]. [address] [place] [address], [place] [address]. All rights reserved. This information is not intended as a substitute for professional medical care. Always follow your healthcare professional's instructions.

## 2018-12-18 NOTE — PROGRESS NOTES
"Subjective:       Patient ID: Ming Boateng is a 63 y.o. male.    Vitals:  height is 5' 6" (1.676 m) and weight is 81.6 kg (180 lb). His temperature is 98.8 °F (37.1 °C). His blood pressure is 181/85 (abnormal) and his pulse is 88. His respiration is 18 and oxygen saturation is 100%.     Chief Complaint: URI  The EKG shows a normal, regular Sinus Rhythm, at a rate of 78, there are not ST Changes. There is a previous EKG for comparison. .  URI    This is a new problem. Episode onset: 3 Days. The problem has been gradually worsening. There has been no fever. Associated symptoms include congestion, coughing, sinus pain and wheezing. Pertinent negatives include no ear pain, nausea, rash, sore throat or vomiting. He has tried nothing for the symptoms.       Constitution: Negative for chills, sweating, fatigue and fever.   HENT: Positive for congestion and sinus pain. Negative for ear pain, sinus pressure, sore throat and voice change.    Neck: Negative for painful lymph nodes.   Eyes: Negative for eye redness.   Respiratory: Positive for chest tightness, cough, sputum production, shortness of breath and wheezing. Negative for bloody sputum, COPD, stridor and asthma.    Gastrointestinal: Negative for nausea and vomiting.   Musculoskeletal: Negative for muscle ache.   Skin: Negative for rash.   Allergic/Immunologic: Negative for seasonal allergies and asthma.   Hematologic/Lymphatic: Negative for swollen lymph nodes.       Objective:      Physical Exam   Constitutional: He is oriented to person, place, and time. He appears well-developed and well-nourished. He is cooperative.  Non-toxic appearance. He does not appear ill. No distress.   HENT:   Head: Normocephalic and atraumatic.   Right Ear: Hearing, tympanic membrane, external ear and ear canal normal.   Left Ear: Hearing, tympanic membrane, external ear and ear canal normal.   Nose: Nose normal. No mucosal edema, rhinorrhea or nasal deformity. No epistaxis. Right sinus " exhibits no maxillary sinus tenderness and no frontal sinus tenderness. Left sinus exhibits no maxillary sinus tenderness and no frontal sinus tenderness.   Mouth/Throat: Uvula is midline, oropharynx is clear and moist and mucous membranes are normal. No trismus in the jaw. Normal dentition. No uvula swelling. No posterior oropharyngeal erythema.   Eyes: Conjunctivae and lids are normal. No scleral icterus.   Sclera clear bilat   Neck: Trachea normal, full passive range of motion without pain and phonation normal. Neck supple.   Cardiovascular: Normal rate, regular rhythm, intact distal pulses and normal pulses.   Murmur (mild systolic) heard.  Pulmonary/Chest: Effort normal. No stridor. No respiratory distress. He has decreased breath sounds. He has no wheezes.   Abdominal: Soft. Normal appearance and bowel sounds are normal. He exhibits no distension. There is no tenderness.   Musculoskeletal: Normal range of motion. He exhibits no edema or deformity.   Neurological: He is alert and oriented to person, place, and time. He exhibits normal muscle tone. Coordination normal.   Skin: Skin is warm, dry and intact. Capillary refill takes less than 2 seconds. He is not diaphoretic. No pallor.   Psychiatric: He has a normal mood and affect. His speech is normal and behavior is normal. Judgment and thought content normal. Cognition and memory are normal.   Nursing note and vitals reviewed.      Assessment:       1. Cough    2. Upper respiratory tract infection, unspecified type        Plan:         Cough  -     POCT rapid strep A  -     POCT Influenza A/B  -     XR CHEST PA AND LATERAL; Future; Expected date: 12/18/2018  -     Ambulatory consult to [facility]  -     EKG 12-lead  -     benzonatate (TESSALON PERLES) 100 MG capsule; Take 2 capsules (200 mg total) by mouth 3 (three) times daily as needed for Cough.  Dispense: 60 capsule; Refill: 1    Upper respiratory tract infection, unspecified type      Recent Results  (from the past 48 hour(s))   POCT Influenza A/B    Collection Time: 12/18/18 12:15 PM   Result Value Ref Range    Rapid Influenza A Ag Negative Negative    Rapid Influenza B Ag Negative Negative     Acceptable Yes    POCT rapid strep A    Collection Time: 12/18/18 12:23 PM   Result Value Ref Range    Rapid Strep A Screen Negative Negative     Acceptable Yes    ]  Xr Chest Pa And Lateral    Result Date: 12/18/2018  EXAMINATION: XR CHEST PA AND LATERAL CLINICAL HISTORY: Cough TECHNIQUE: PA and lateral views of the chest were performed. COMPARISON: Non 02/05/2018 e FINDINGS: Heart size normal.  The lungs are clear.  No pleural effusion     See above Electronically signed by: Greg Rodriguez MD Date:    12/18/2018 Time:    12:26          Patient Instructions   If you were prescribed a narcotic or controlled medication, do not drive or operate heavy equipment or machinery while taking these medications.  You must understand that you've received an Urgent Care treatment only and that you may be released before all your medical problems are known or treated. You, the patient, will arrange for follow up care as instructed.  Follow up with your PCP or specialty clinic as directed in the next 1-2 weeks if not improved or as needed.  You can call [phone] to schedule an appointment with the appropriate provider.  If your condition worsens we recommend that you receive another evaluation at the emergency room immediately or contact your primary medical clinics after hours call service to discuss your concerns.  Please return here or go to the Emergency Department for any concerns or worsening of condition.      Viral Upper Respiratory Illness with Wheezing (Adult)  You have a viral upper respiratory illness (URI), which is another term for the common cold. When the infection causes a lot of irritation, the air passages can go into spasm. This causes wheezing and shortness of breath.    This  illness is contagious during the first few days. It is spread through the air by coughing and sneezing. It may also be spread by direct contact (touching the sick person and then touching your own eyes, nose, or mouth). Frequent handwashing will decrease the risk.  Most viral illnesses go away within 7 to 10 days with rest and simple home remedies. Sometimes the illness may last for several weeks. Antibiotics will not kill a virus, and they are generally not prescribed for this condition.  Home care  · If symptoms are severe, rest at home for the first 2 to 3 days. When you resume activity, don't let yourself get too tired.  · Avoid being exposed to cigarette smoke (yours or others).  · You may use acetaminophen or ibuprofen to control pain and fever, unless another medicine was prescribed. (Note: If you have chronic liver or kidney disease, have ever had a stomach ulcer or gastrointestinal bleeding, or are taking blood-thinning medicines, talk with your healthcare provider before using these medicines.) Aspirin should never be given to anyone under 18 years of age who is ill with a viral infection or fever. It may cause severe liver or brain damage.  · Your appetite may be poor, so a light diet is fine. Avoid dehydration by drinking 6 to 8 glasses of fluids per day (water, soft drinks, juices, tea, or soup). Extra fluids will help loosen secretions in the nose and lungs.  · Over-the-counter cold medicines will not shorten the length of time youre sick, but they may be helpful for the following symptoms: cough, sore throat, and nasal and sinus congestion. (Note: Do not use decongestants if you have high blood pressure.)  Follow-up care  Follow up with your healthcare provider, or as advised.  When to seek medical advice  Call your healthcare provider right away if any of these occur:  · Cough with lots of colored sputum (mucus)  · Severe headache; face, neck, or ear pain  · Difficulty swallowing due to throat  pain  · Fever of 100.4ºF (38ºC) or higher, or as directed by your healthcare provider  Call 911, or get immediate medical care  Call emergency services right away if any of these occur:  · Chest pain, shortness of breath, worsening wheezing, or difficulty breathing  · Coughing up blood  · Inability to swallow due to throat pain  Date Last Reviewed: 9/13/2015  © 3427-3424 [employer]. [address] [place] [address], [place] [address]. All rights reserved. This information is not intended as a substitute for professional medical care. Always follow your healthcare professional's instructions.

## 2018-12-25 ENCOUNTER — HOSPITAL ENCOUNTER (EMERGENCY)
Facility: HOSPITAL | Age: 63
Discharge: ANOTHER HOSPITAL | End: 2018-12-25
Attending: EMERGENCY MEDICINE
Payer: MEDICAID

## 2018-12-25 ENCOUNTER — HOSPITAL ENCOUNTER (OUTPATIENT)
Facility: HOSPITAL | Age: 63
Discharge: HOME | End: 2018-12-28
Attending: EMERGENCY MEDICINE | Admitting: HOSPITALIST
Payer: MEDICAID

## 2018-12-25 VITALS
RESPIRATION RATE: 15 BRPM | DIASTOLIC BLOOD PRESSURE: 69 MMHG | WEIGHT: 180 LBS | BODY MASS INDEX: 28.93 KG/M2 | OXYGEN SATURATION: 98 % | TEMPERATURE: 98 F | HEIGHT: 66 IN | HEART RATE: 61 BPM | SYSTOLIC BLOOD PRESSURE: 144 MMHG

## 2018-12-25 DIAGNOSIS — R29.6 RECURRENT FALLS: ICD-10-CM

## 2018-12-25 DIAGNOSIS — R63.8 DECREASED ORAL INTAKE: ICD-10-CM

## 2018-12-25 DIAGNOSIS — J05.10 EPIGLOTTITIS: Primary | ICD-10-CM

## 2018-12-25 DIAGNOSIS — J02.9 SORE THROAT: ICD-10-CM

## 2018-12-25 DIAGNOSIS — R13.10 DYSPHAGIA: ICD-10-CM

## 2018-12-25 DIAGNOSIS — R55 SYNCOPE: ICD-10-CM

## 2018-12-25 DIAGNOSIS — N18.4 STAGE 4 CHRONIC KIDNEY DISEASE: ICD-10-CM

## 2018-12-25 DIAGNOSIS — R13.10 DYSPHAGIA, UNSPECIFIED TYPE: Primary | ICD-10-CM

## 2018-12-25 LAB
ALBUMIN SERPL BCP-MCNC: 3.6 G/DL
ALP SERPL-CCNC: 89 U/L
ALT SERPL W/O P-5'-P-CCNC: 27 U/L
ANION GAP SERPL CALC-SCNC: 11 MMOL/L
AST SERPL-CCNC: 19 U/L
BASOPHILS # BLD AUTO: 0.03 K/UL
BASOPHILS NFR BLD: 0.3 %
BILIRUB SERPL-MCNC: 0.4 MG/DL
BNP SERPL-MCNC: 63 PG/ML
BUN SERPL-MCNC: 54 MG/DL
CALCIUM SERPL-MCNC: 9.2 MG/DL
CHLORIDE SERPL-SCNC: 104 MMOL/L
CO2 SERPL-SCNC: 23 MMOL/L
CREAT SERPL-MCNC: 2.5 MG/DL
DIFFERENTIAL METHOD: ABNORMAL
EOSINOPHIL # BLD AUTO: 0.3 K/UL
EOSINOPHIL NFR BLD: 2.9 %
ERYTHROCYTE [DISTWIDTH] IN BLOOD BY AUTOMATED COUNT: 13.6 %
EST. GFR  (AFRICAN AMERICAN): 30 ML/MIN/1.73 M^2
EST. GFR  (NON AFRICAN AMERICAN): 26 ML/MIN/1.73 M^2
GLUCOSE SERPL-MCNC: 151 MG/DL
HCT VFR BLD AUTO: 30.8 %
HGB BLD-MCNC: 10.3 G/DL
LYMPHOCYTES # BLD AUTO: 2.1 K/UL
LYMPHOCYTES NFR BLD: 20.6 %
MCH RBC QN AUTO: 27.2 PG
MCHC RBC AUTO-ENTMCNC: 33.4 G/DL
MCV RBC AUTO: 81 FL
MONOCYTES # BLD AUTO: 0.6 K/UL
MONOCYTES NFR BLD: 5.9 %
NEUTROPHILS # BLD AUTO: 7.1 K/UL
NEUTROPHILS NFR BLD: 70.3 %
PLATELET # BLD AUTO: 294 K/UL
PLATELET BLD QL SMEAR: ABNORMAL
PMV BLD AUTO: 9.1 FL
POCT GLUCOSE: 225 MG/DL (ref 70–110)
POTASSIUM SERPL-SCNC: 3.6 MMOL/L
PROT SERPL-MCNC: 7.1 G/DL
RBC # BLD AUTO: 3.79 M/UL
SODIUM SERPL-SCNC: 138 MMOL/L
TROPONIN I SERPL DL<=0.01 NG/ML-MCNC: 0.01 NG/ML
WBC # BLD AUTO: 10.14 K/UL

## 2018-12-25 PROCEDURE — 63600175 PHARM REV CODE 636 W HCPCS: Performed by: EMERGENCY MEDICINE

## 2018-12-25 PROCEDURE — 85025 COMPLETE CBC W/AUTO DIFF WBC: CPT

## 2018-12-25 PROCEDURE — 83880 ASSAY OF NATRIURETIC PEPTIDE: CPT

## 2018-12-25 PROCEDURE — 80053 COMPREHEN METABOLIC PANEL: CPT

## 2018-12-25 PROCEDURE — 84484 ASSAY OF TROPONIN QUANT: CPT

## 2018-12-25 PROCEDURE — 99285 EMERGENCY DEPT VISIT HI MDM: CPT | Mod: ,,, | Performed by: EMERGENCY MEDICINE

## 2018-12-25 PROCEDURE — 99220 PR INITIAL OBSERVATION CARE,LEVL III: CPT | Mod: ,,, | Performed by: PHYSICIAN ASSISTANT

## 2018-12-25 PROCEDURE — G0378 HOSPITAL OBSERVATION PER HR: HCPCS

## 2018-12-25 PROCEDURE — 99220 PR INITIAL OBSERVATION CARE,LEVL III: ICD-10-PCS | Mod: ,,, | Performed by: PHYSICIAN ASSISTANT

## 2018-12-25 PROCEDURE — 99285 PR EMERGENCY DEPT VISIT,LEVEL V: ICD-10-PCS | Mod: ,,, | Performed by: EMERGENCY MEDICINE

## 2018-12-25 PROCEDURE — 96375 TX/PRO/DX INJ NEW DRUG ADDON: CPT

## 2018-12-25 PROCEDURE — 63600175 PHARM REV CODE 636 W HCPCS: Performed by: PHYSICIAN ASSISTANT

## 2018-12-25 PROCEDURE — 99285 EMERGENCY DEPT VISIT HI MDM: CPT | Mod: 25

## 2018-12-25 PROCEDURE — 99291 CRITICAL CARE FIRST HOUR: CPT | Mod: 25

## 2018-12-25 PROCEDURE — 96365 THER/PROPH/DIAG IV INF INIT: CPT

## 2018-12-25 RX ORDER — PRAVASTATIN SODIUM 10 MG/1
20 TABLET ORAL DAILY
Status: DISCONTINUED | OUTPATIENT
Start: 2018-12-26 | End: 2018-12-28 | Stop reason: HOSPADM

## 2018-12-25 RX ORDER — CALCIUM ACETATE 667 MG/1
667 CAPSULE ORAL
Status: DISCONTINUED | OUTPATIENT
Start: 2018-12-26 | End: 2018-12-28 | Stop reason: HOSPADM

## 2018-12-25 RX ORDER — IPRATROPIUM BROMIDE AND ALBUTEROL SULFATE 2.5; .5 MG/3ML; MG/3ML
3 SOLUTION RESPIRATORY (INHALATION) EVERY 4 HOURS PRN
Status: DISCONTINUED | OUTPATIENT
Start: 2018-12-26 | End: 2018-12-28 | Stop reason: HOSPADM

## 2018-12-25 RX ORDER — AMLODIPINE BESYLATE 10 MG/1
10 TABLET ORAL DAILY
Status: DISCONTINUED | OUTPATIENT
Start: 2018-12-26 | End: 2018-12-28 | Stop reason: HOSPADM

## 2018-12-25 RX ORDER — CARVEDILOL 6.25 MG/1
6.25 TABLET ORAL 2 TIMES DAILY WITH MEALS
Status: DISCONTINUED | OUTPATIENT
Start: 2018-12-26 | End: 2018-12-28 | Stop reason: HOSPADM

## 2018-12-25 RX ORDER — PROMETHAZINE HYDROCHLORIDE 12.5 MG/1
12.5 TABLET ORAL EVERY 6 HOURS PRN
Status: DISCONTINUED | OUTPATIENT
Start: 2018-12-26 | End: 2018-12-28 | Stop reason: HOSPADM

## 2018-12-25 RX ORDER — ALLOPURINOL 100 MG/1
100 TABLET ORAL DAILY
Status: DISCONTINUED | OUTPATIENT
Start: 2018-12-26 | End: 2018-12-28 | Stop reason: HOSPADM

## 2018-12-25 RX ORDER — RAMELTEON 8 MG/1
8 TABLET ORAL NIGHTLY PRN
Status: DISCONTINUED | OUTPATIENT
Start: 2018-12-26 | End: 2018-12-28 | Stop reason: HOSPADM

## 2018-12-25 RX ORDER — METHYLPREDNISOLONE SOD SUCC 125 MG
125 VIAL (EA) INJECTION
Status: COMPLETED | OUTPATIENT
Start: 2018-12-25 | End: 2018-12-25

## 2018-12-25 RX ORDER — INSULIN ASPART 100 [IU]/ML
0-5 INJECTION, SOLUTION INTRAVENOUS; SUBCUTANEOUS EVERY 6 HOURS PRN
Status: DISCONTINUED | OUTPATIENT
Start: 2018-12-25 | End: 2018-12-28

## 2018-12-25 RX ORDER — SODIUM CHLORIDE 9 MG/ML
INJECTION, SOLUTION INTRAVENOUS CONTINUOUS
Status: ACTIVE | OUTPATIENT
Start: 2018-12-26 | End: 2018-12-26

## 2018-12-25 RX ORDER — ACETAMINOPHEN 325 MG/1
650 TABLET ORAL EVERY 4 HOURS PRN
Status: DISCONTINUED | OUTPATIENT
Start: 2018-12-26 | End: 2018-12-28 | Stop reason: HOSPADM

## 2018-12-25 RX ORDER — GLUCAGON 1 MG
1 KIT INJECTION
Status: DISCONTINUED | OUTPATIENT
Start: 2018-12-25 | End: 2018-12-28

## 2018-12-25 RX ORDER — LOSARTAN POTASSIUM 50 MG/1
100 TABLET ORAL DAILY
Status: DISCONTINUED | OUTPATIENT
Start: 2018-12-26 | End: 2018-12-28 | Stop reason: HOSPADM

## 2018-12-25 RX ORDER — CETIRIZINE HYDROCHLORIDE 10 MG/1
10 TABLET ORAL DAILY
Status: DISCONTINUED | OUTPATIENT
Start: 2018-12-26 | End: 2018-12-28 | Stop reason: HOSPADM

## 2018-12-25 RX ORDER — AMOXICILLIN 250 MG
1 CAPSULE ORAL 2 TIMES DAILY PRN
Status: DISCONTINUED | OUTPATIENT
Start: 2018-12-26 | End: 2018-12-28 | Stop reason: HOSPADM

## 2018-12-25 RX ORDER — SODIUM CHLORIDE 0.9 % (FLUSH) 0.9 %
5 SYRINGE (ML) INJECTION
Status: DISCONTINUED | OUTPATIENT
Start: 2018-12-26 | End: 2018-12-28 | Stop reason: HOSPADM

## 2018-12-25 RX ORDER — FUROSEMIDE 40 MG/1
40 TABLET ORAL DAILY
Status: DISCONTINUED | OUTPATIENT
Start: 2018-12-26 | End: 2018-12-27

## 2018-12-25 RX ORDER — ONDANSETRON 4 MG/1
4 TABLET, ORALLY DISINTEGRATING ORAL EVERY 8 HOURS PRN
Status: DISCONTINUED | OUTPATIENT
Start: 2018-12-26 | End: 2018-12-28 | Stop reason: HOSPADM

## 2018-12-25 RX ORDER — SERTRALINE HYDROCHLORIDE 25 MG/1
25 TABLET, FILM COATED ORAL DAILY
Status: DISCONTINUED | OUTPATIENT
Start: 2018-12-26 | End: 2018-12-28 | Stop reason: HOSPADM

## 2018-12-25 RX ADMIN — SODIUM CHLORIDE: 0.9 INJECTION, SOLUTION INTRAVENOUS at 11:12

## 2018-12-25 RX ADMIN — INSULIN ASPART 1 UNITS: 100 INJECTION, SOLUTION INTRAVENOUS; SUBCUTANEOUS at 11:12

## 2018-12-25 RX ADMIN — CEFTRIAXONE 2 G: 2 INJECTION, SOLUTION INTRAVENOUS at 02:12

## 2018-12-25 RX ADMIN — METHYLPREDNISOLONE SODIUM SUCCINATE 125 MG: 125 INJECTION, POWDER, FOR SOLUTION INTRAMUSCULAR; INTRAVENOUS at 02:12
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
## 2018-12-25 NOTE — ED NOTES
Spoke with Dr. Alford at this time about blood cultures before antibiotics ; states patient is afebrile and V/S are within Normal range blood cultures not needed he would like antibiotics to be hung..

## 2018-12-25 NOTE — ED NOTES
LOC: The patient is awake, alert and aware of environment with an appropriate affect, the patient is oriented x 3 and speaking appropriately. PERRLA present. Patient denies changes in sensation. Patient with lesser  to right hand than left s/p prior CVA.   APPEARANCE: Patient resting comfortably and in no acute distress, patient is clean and well groomed, patient's clothing is properly fastened.  SKIN: The skin is warm and dry, patient has normal skin turgor and moist mucus membranes, skin intact, no breakdown or brusing noted.  MUSCULOSKELETAL: Patient moving all extremities well, no obvious swelling or deformities noted.  RESPIRATORY: Airway is open and patent, respirations are spontaneous, patient has a normal effort and rate. Breath sounds are clear and equal bilaterally. Patient managing secretions effectively and tolerating RA at 99%.   CARDIAC: Normal heart sounds. No peripheral edema. Denies chest pain.  ABDOMEN: Soft and non tender to palpation, no distention noted. Bowel sounds present. Denies NVD.  HEENT: Patient with c/o sore throat x several days.
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
## 2018-12-26 ENCOUNTER — ANESTHESIA (OUTPATIENT)
Dept: ENDOSCOPY | Facility: HOSPITAL | Age: 63
End: 2018-12-26
Payer: MEDICAID

## 2018-12-26 ENCOUNTER — ANESTHESIA EVENT (OUTPATIENT)
Dept: ENDOSCOPY | Facility: HOSPITAL | Age: 63
End: 2018-12-26
Payer: MEDICAID

## 2018-12-26 LAB
ANION GAP SERPL CALC-SCNC: 13 MMOL/L
BASOPHILS # BLD AUTO: 0.02 K/UL
BASOPHILS NFR BLD: 0.2 %
BUN SERPL-MCNC: 61 MG/DL
CALCIUM SERPL-MCNC: 10 MG/DL
CHLORIDE SERPL-SCNC: 102 MMOL/L
CO2 SERPL-SCNC: 23 MMOL/L
CREAT SERPL-MCNC: 2.2 MG/DL
DIFFERENTIAL METHOD: ABNORMAL
EOSINOPHIL # BLD AUTO: 0 K/UL
EOSINOPHIL NFR BLD: 0 %
ERYTHROCYTE [DISTWIDTH] IN BLOOD BY AUTOMATED COUNT: 13.5 %
EST. GFR  (AFRICAN AMERICAN): 35.5 ML/MIN/1.73 M^2
EST. GFR  (NON AFRICAN AMERICAN): 30.7 ML/MIN/1.73 M^2
ESTIMATED AVG GLUCOSE: 148 MG/DL
GLUCOSE SERPL-MCNC: 251 MG/DL
HBA1C MFR BLD HPLC: 6.8 %
HCT VFR BLD AUTO: 33.6 %
HGB BLD-MCNC: 11.5 G/DL
IMM GRANULOCYTES # BLD AUTO: 0.09 K/UL
IMM GRANULOCYTES NFR BLD AUTO: 0.7 %
LYMPHOCYTES # BLD AUTO: 1.1 K/UL
LYMPHOCYTES NFR BLD: 8.8 %
MAGNESIUM SERPL-MCNC: 2.1 MG/DL
MCH RBC QN AUTO: 27.7 PG
MCHC RBC AUTO-ENTMCNC: 34.2 G/DL
MCV RBC AUTO: 81 FL
MONOCYTES # BLD AUTO: 0.1 K/UL
MONOCYTES NFR BLD: 0.9 %
NEUTROPHILS # BLD AUTO: 10.9 K/UL
NEUTROPHILS NFR BLD: 89.4 %
NRBC BLD-RTO: 0 /100 WBC
PHOSPHATE SERPL-MCNC: 3.9 MG/DL
PLATELET # BLD AUTO: 367 K/UL
PMV BLD AUTO: 9.7 FL
POCT GLUCOSE: 163 MG/DL (ref 70–110)
POCT GLUCOSE: 203 MG/DL (ref 70–110)
POCT GLUCOSE: 244 MG/DL (ref 70–110)
POCT GLUCOSE: 252 MG/DL (ref 70–110)
POCT GLUCOSE: 253 MG/DL (ref 70–110)
POTASSIUM SERPL-SCNC: 4 MMOL/L
RBC # BLD AUTO: 4.15 M/UL
SODIUM SERPL-SCNC: 138 MMOL/L
WBC # BLD AUTO: 12.19 K/UL

## 2018-12-26 PROCEDURE — 99203 OFFICE O/P NEW LOW 30 MIN: CPT | Mod: ,,, | Performed by: PHYSICIAN ASSISTANT

## 2018-12-26 PROCEDURE — 83036 HEMOGLOBIN GLYCOSYLATED A1C: CPT

## 2018-12-26 PROCEDURE — 37000008 HC ANESTHESIA 1ST 15 MINUTES: Performed by: INTERNAL MEDICINE

## 2018-12-26 PROCEDURE — 43239 EGD BIOPSY SINGLE/MULTIPLE: CPT | Mod: 59,,, | Performed by: INTERNAL MEDICINE

## 2018-12-26 PROCEDURE — 27201012 HC FORCEPS, HOT/COLD, DISP: Performed by: INTERNAL MEDICINE

## 2018-12-26 PROCEDURE — 37000009 HC ANESTHESIA EA ADD 15 MINS: Performed by: INTERNAL MEDICINE

## 2018-12-26 PROCEDURE — 88305 TISSUE SPECIMEN TO PATHOLOGY - SURGERY: ICD-10-PCS | Mod: 26,,, | Performed by: PATHOLOGY

## 2018-12-26 PROCEDURE — G0378 HOSPITAL OBSERVATION PER HR: HCPCS

## 2018-12-26 PROCEDURE — D9220A PRA ANESTHESIA: Mod: CRNA,,, | Performed by: NURSE ANESTHETIST, CERTIFIED REGISTERED

## 2018-12-26 PROCEDURE — 99203 PR OFFICE/OUTPT VISIT, NEW, LEVL III, 30-44 MIN: ICD-10-PCS | Mod: ,,, | Performed by: PHYSICIAN ASSISTANT

## 2018-12-26 PROCEDURE — 88305 TISSUE EXAM BY PATHOLOGIST: CPT | Performed by: PATHOLOGY

## 2018-12-26 PROCEDURE — 99204 OFFICE O/P NEW MOD 45 MIN: CPT | Mod: 25,,, | Performed by: INTERNAL MEDICINE

## 2018-12-26 PROCEDURE — 63600175 PHARM REV CODE 636 W HCPCS: Performed by: NURSE ANESTHETIST, CERTIFIED REGISTERED

## 2018-12-26 PROCEDURE — 43239 PR EGD, FLEX, W/BIOPSY, SGL/MULTI: ICD-10-PCS | Mod: 59,,, | Performed by: INTERNAL MEDICINE

## 2018-12-26 PROCEDURE — S5571 INSULIN DISPOS PEN 3 ML: HCPCS | Performed by: PHYSICIAN ASSISTANT

## 2018-12-26 PROCEDURE — 25000003 PHARM REV CODE 250: Performed by: PHYSICIAN ASSISTANT

## 2018-12-26 PROCEDURE — 99226 PR SUBSEQUENT OBSERVATION CARE,LEVEL III: CPT | Mod: ,,, | Performed by: PHYSICIAN ASSISTANT

## 2018-12-26 PROCEDURE — 36415 COLL VENOUS BLD VENIPUNCTURE: CPT

## 2018-12-26 PROCEDURE — 99204 PR OFFICE/OUTPT VISIT, NEW, LEVL IV, 45-59 MIN: ICD-10-PCS | Mod: 25,,, | Performed by: INTERNAL MEDICINE

## 2018-12-26 PROCEDURE — 63600175 PHARM REV CODE 636 W HCPCS: Performed by: PHYSICIAN ASSISTANT

## 2018-12-26 PROCEDURE — 83735 ASSAY OF MAGNESIUM: CPT

## 2018-12-26 PROCEDURE — 43248 EGD GUIDE WIRE INSERTION: CPT | Performed by: INTERNAL MEDICINE

## 2018-12-26 PROCEDURE — 00731 ANES UPR GI NDSC PX NOS: CPT | Performed by: INTERNAL MEDICINE

## 2018-12-26 PROCEDURE — 80048 BASIC METABOLIC PNL TOTAL CA: CPT

## 2018-12-26 PROCEDURE — 25000003 PHARM REV CODE 250: Performed by: NURSE ANESTHETIST, CERTIFIED REGISTERED

## 2018-12-26 PROCEDURE — D9220A PRA ANESTHESIA: Mod: ANES,,, | Performed by: ANESTHESIOLOGY

## 2018-12-26 PROCEDURE — 85025 COMPLETE CBC W/AUTO DIFF WBC: CPT

## 2018-12-26 PROCEDURE — 43248 EGD GUIDE WIRE INSERTION: CPT | Mod: ,,, | Performed by: INTERNAL MEDICINE

## 2018-12-26 PROCEDURE — 88305 TISSUE EXAM BY PATHOLOGIST: CPT | Mod: 26,,, | Performed by: PATHOLOGY

## 2018-12-26 PROCEDURE — 99226 PR SUBSEQUENT OBSERVATION CARE,LEVEL III: ICD-10-PCS | Mod: ,,, | Performed by: PHYSICIAN ASSISTANT

## 2018-12-26 PROCEDURE — 43239 EGD BIOPSY SINGLE/MULTIPLE: CPT | Performed by: INTERNAL MEDICINE

## 2018-12-26 PROCEDURE — 84100 ASSAY OF PHOSPHORUS: CPT

## 2018-12-26 PROCEDURE — 43248 PR EGD, FLEX, W/DILATION OVER GUIDEWIRE: ICD-10-PCS | Mod: ,,, | Performed by: INTERNAL MEDICINE

## 2018-12-26 PROCEDURE — 82962 GLUCOSE BLOOD TEST: CPT

## 2018-12-26 RX ORDER — FENTANYL CITRATE 50 UG/ML
25 INJECTION, SOLUTION INTRAMUSCULAR; INTRAVENOUS EVERY 5 MIN PRN
Status: DISCONTINUED | OUTPATIENT
Start: 2018-12-26 | End: 2018-12-28 | Stop reason: HOSPADM

## 2018-12-26 RX ORDER — ONDANSETRON 2 MG/ML
INJECTION INTRAMUSCULAR; INTRAVENOUS
Status: DISCONTINUED | OUTPATIENT
Start: 2018-12-26 | End: 2018-12-26

## 2018-12-26 RX ORDER — PROPOFOL 10 MG/ML
VIAL (ML) INTRAVENOUS
Status: DISCONTINUED | OUTPATIENT
Start: 2018-12-26 | End: 2018-12-26

## 2018-12-26 RX ORDER — SODIUM CHLORIDE 9 MG/ML
INJECTION, SOLUTION INTRAVENOUS CONTINUOUS PRN
Status: DISCONTINUED | OUTPATIENT
Start: 2018-12-26 | End: 2018-12-26

## 2018-12-26 RX ORDER — SUCCINYLCHOLINE CHLORIDE 20 MG/ML
INJECTION INTRAMUSCULAR; INTRAVENOUS
Status: DISCONTINUED | OUTPATIENT
Start: 2018-12-26 | End: 2018-12-26

## 2018-12-26 RX ORDER — SODIUM CHLORIDE 0.9 % (FLUSH) 0.9 %
3 SYRINGE (ML) INJECTION
Status: DISCONTINUED | OUTPATIENT
Start: 2018-12-26 | End: 2018-12-28 | Stop reason: HOSPADM

## 2018-12-26 RX ORDER — FENTANYL CITRATE 50 UG/ML
INJECTION, SOLUTION INTRAMUSCULAR; INTRAVENOUS
Status: DISCONTINUED | OUTPATIENT
Start: 2018-12-26 | End: 2018-12-26

## 2018-12-26 RX ORDER — LIDOCAINE HCL/PF 100 MG/5ML
SYRINGE (ML) INTRAVENOUS
Status: DISCONTINUED | OUTPATIENT
Start: 2018-12-26 | End: 2018-12-26

## 2018-12-26 RX ORDER — ROCURONIUM BROMIDE 10 MG/ML
INJECTION, SOLUTION INTRAVENOUS
Status: DISCONTINUED | OUTPATIENT
Start: 2018-12-26 | End: 2018-12-26

## 2018-12-26 RX ORDER — SODIUM CHLORIDE 9 MG/ML
INJECTION, SOLUTION INTRAVENOUS CONTINUOUS
Status: ACTIVE | OUTPATIENT
Start: 2018-12-26 | End: 2018-12-27

## 2018-12-26 RX ADMIN — FENTANYL CITRATE 100 MCG: 50 INJECTION, SOLUTION INTRAMUSCULAR; INTRAVENOUS at 12:12

## 2018-12-26 RX ADMIN — PROPOFOL 150 MG: 10 INJECTION, EMULSION INTRAVENOUS at 12:12

## 2018-12-26 RX ADMIN — SODIUM CHLORIDE: 0.9 INJECTION, SOLUTION INTRAVENOUS at 12:12

## 2018-12-26 RX ADMIN — ONDANSETRON 4 MG: 2 INJECTION INTRAMUSCULAR; INTRAVENOUS at 12:12

## 2018-12-26 RX ADMIN — INSULIN DETEMIR 7 UNITS: 100 INJECTION, SOLUTION SUBCUTANEOUS at 10:12

## 2018-12-26 RX ADMIN — SODIUM CHLORIDE: 0.9 INJECTION, SOLUTION INTRAVENOUS at 07:12

## 2018-12-26 RX ADMIN — LIDOCAINE HYDROCHLORIDE 100 MG: 20 INJECTION, SOLUTION INTRAVENOUS at 12:12

## 2018-12-26 RX ADMIN — INSULIN DETEMIR 7 UNITS: 100 INJECTION, SOLUTION SUBCUTANEOUS at 08:12

## 2018-12-26 RX ADMIN — PROPOFOL 30 MG: 10 INJECTION, EMULSION INTRAVENOUS at 12:12

## 2018-12-26 RX ADMIN — SUCCINYLCHOLINE CHLORIDE 120 MG: 20 INJECTION, SOLUTION INTRAMUSCULAR; INTRAVENOUS at 12:12

## 2018-12-26 RX ADMIN — INSULIN ASPART 3 UNITS: 100 INJECTION, SOLUTION INTRAVENOUS; SUBCUTANEOUS at 06:12

## 2018-12-26 RX ADMIN — ROCURONIUM BROMIDE 5 MG: 10 INJECTION, SOLUTION INTRAVENOUS at 12:12

## 2018-12-26 NOTE — ANESTHESIA PREPROCEDURE EVALUATION
12/26/2018  Ming Boateng is a 63 y.o., male.    Anesthesia Evaluation    I have reviewed the Patient Summary Reports.    I have reviewed the Nursing Notes.   I have reviewed the Medications.     Review of Systems  Anesthesia Hx:  History of prior surgery of interest to airway management or planning: Denies Family Hx of Anesthesia complications.   Denies Personal Hx of Anesthesia complications.   Hematology/Oncology:  Hematology Normal   Oncology Normal     EENT/Dental:EENT/Dental Normal   Cardiovascular:   Hypertension CHF    Pulmonary:  Pulmonary Normal    Renal/:   Chronic Renal Disease, CRI    Musculoskeletal:  Musculoskeletal Normal    Neurological:   CVA    Endocrine:   Diabetes, type 2    Dermatological:  Skin Normal    Psych:  Psychiatric Normal           Physical Exam  General:  Well nourished    Airway/Jaw/Neck:  Airway Findings: Mouth Opening: Normal Tongue: Normal  General Airway Assessment: Adult  Mallampati: III  Improves to III with phonation.  TM Distance: Normal, at least 6 cm     Eyes/Ears/Nose:  EYES/EARS/NOSE FINDINGS: Normal    Chest/Lungs:  Chest/Lungs Clear    Heart/Vascular:  Heart Findings: Normal       Mental Status:  Mental Status Findings: Normal        Anesthesia Plan  Type of Anesthesia, risks & benefits discussed:  Anesthesia Type:  general  Patient's Preference:   Intra-op Monitoring Plan: standard ASA monitors  Intra-op Monitoring Plan Comments:   Post Op Pain Control Plan:   Post Op Pain Control Plan Comments:   Induction:    Beta Blocker:  Patient is on a Beta-Blocker and has received one dose within the past 24 hours (No further documentation required).       Informed Consent: Patient understands risks and agrees with Anesthesia plan.  Questions answered. Anesthesia consent signed with patient.  ASA Score: 3     Day of Surgery Review of History & Physical:            Ready  For Surgery From Anesthesia Perspective.

## 2018-12-26 NOTE — HOSPITAL COURSE
Patient was admitted to observation for dysphagia. Speech consulted, will remain NPO until swallow study. Remains afebrile without leukocytosis. GI consulted. EGD revealed hiatal hernia, esophagitis, and gastritis. Biopsies taken. No cause for dysphagia found. Started on protonix 40 mg daily. Will follow up as outpatient for modified barium swallow. MRI brain with no acute stroke, but remote R midbrain infarct, and small vessel white matter disease. Vascular neurology consulted, no acute stroke to explain isolated dysphagia, signed off. Modified barium study performed, silent aspiration with straw only. Speech recommend normal solids, thin liquids, no straws, double swallow after each bite, and remain upright 30 minutes after eating. CT of neck with no abnormality.  Patient tolerating diet. Discharged with protonix, instructions on eating to avoid aspiration, and ambulatory referral to GI, PT, and speech. Spoke with daughter before discharge who verbalized understanding and translated to father. Follow up with PCP scheduled for 1/24/19. Patient to follow up with GI for pathology results and full esophogram if warranted. All questions answered. Will follow up with autoimmune work up.

## 2018-12-26 NOTE — ASSESSMENT & PLAN NOTE
- at baseline renal function (CrCl 20 on admission)  - check lytes  - c/w phoslo  - avoid nephrotoxic agents, renally dose medications

## 2018-12-26 NOTE — ASSESSMENT & PLAN NOTE
Last HgA1c 6.7, repeat 6.8  - POCT glucose 112  - Continue home glargine from 10U   - low dose SSI  - diabetic diet

## 2018-12-26 NOTE — ASSESSMENT & PLAN NOTE
Decreased oral intake  - Unclear etiology but may be related to h/o CVA (2017).   Neoplastic and neuromuscular disease on the differential.    - SLP consulted.  Appears to be oropharyngeal phase dysphagia.  - ENT evaluated patient but no evidence of epiglottitis per nasopharolaryngoscopy and no further intervention warranted.   - GI consulted:  - EGD with hiatal hernia, esophagitis, and gastritis. Bx taken. No cause for dysphagia found  - start protonix, outpatient follow up for complete barium swallow  - MRI with remote R midbrain infarct, small vessel white matter disease  - vascular surgery consulted, no acute stroke to explain acute dysphagia.   - Swallow study with aspiration with straw use.   - Speech recommends normal solids, thin liquids, absolutely no straw use. Double swallow with eat bite, maintain upright 45 minutes after meal.   - Esophogram unable to be performed as straw is used. Will follow up as outpatient.  - CCP, RF negative  - CT neck without abnormalities  - likely discharge tomorrow   - aspiration precautions.
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
## 2018-12-26 NOTE — ASSESSMENT & PLAN NOTE
Mark is a 64 y/o male who presented with subacute onset dysphagia for solids and liquids. ENT evaluation of oropharynx negative. Concern for possible esophageal stricture, but would be unusual to present this acute. We will plan to evaluate with EGD. If negative consider evaluation for neuromuscular etiology for dysphagia.    Please keep NPO for EGD evaluation.

## 2018-12-26 NOTE — TRANSFER OF CARE
"Anesthesia Transfer of Care Note    Patient: Ming Boateng    Procedure(s) Performed: Procedure(s) (LRB):  EGD (ESOPHAGOGASTRODUODENOSCOPY) (N/A)    Patient location: PACU    Anesthesia Type: general    Transport from OR: Transported from OR on 6-10 L/min O2 by face mask with adequate spontaneous ventilation    Post pain: adequate analgesia    Post assessment: no apparent anesthetic complications    Post vital signs: stable    Level of consciousness: sedated    Nausea/Vomiting: no nausea/vomiting    Complications: none    Transfer of care protocol was followed      Last vitals:   Visit Vitals  BP (!) 169/81 (BP Location: Left arm, Patient Position: Lying)   Pulse 89   Temp 36.7 °C (98 °F) (Oral)   Resp 16   Ht 5' 6" (1.676 m)   Wt 76.6 kg (168 lb 14 oz)   SpO2 100%   BMI 27.26 kg/m²     "

## 2018-12-26 NOTE — ASSESSMENT & PLAN NOTE
- at baseline renal function (CrCl 20 on admission)  - c/w phoslo  - avoid nephrotoxic agents, renally dose medications

## 2018-12-26 NOTE — ASSESSMENT & PLAN NOTE
Last HgA1c 6.7 (will recheck)  - reduced home glargine from 10 to 7U BID  - low dose SSI, glucose Q6 while NPO
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
## 2018-12-26 NOTE — ASSESSMENT & PLAN NOTE
Last HgA1c 6.7, repeat 6.8  - POCT glucose 253  - Increased glargine from 7U to 10U BID (home dose)  - low dose SSI, glucose Q6 while NPO

## 2018-12-26 NOTE — HPI
Mr. Boateng is a 62y/o male with past medical history of HFpEF, T2DM, HTN, HLD, CVA (2017), CKDIV, who presented with dysphagia to solids and liquids, now for a week.    Patient understands English, and we interviewed him with assistance of his daughter at bedside to help with interpretation.   The patient states that he was doing relatively well until about 1 month ago when he started to complain of some difficulty swallowing particularly to solids, along with worsening heartburns. He needed to drink water to help with food passing. For the past 4 days, he noted that he could not swallow any liquids or solids, and he chokes on his foods/liquids. He does not feel anything stuck in his esophagus. He does not have nausea or vomiting, and feels hungry. States that he chokes on food, and daughter notes that he almost choked on sprite [place]. He denies chest pain or abdominal pain. Denies history of this in the past. Reports an EGD in 2014 at [facility], that he was told was essentially normal.    ENT evaluated the patient with no findings to explain dysphagia, and no evidence of epiglottitis.

## 2018-12-26 NOTE — SUBJECTIVE & OBJECTIVE
Interval History:  Afebrile without leukocytosis. Complains of congestion and post nasal drip. Patient states he is hungry. Awaiting swallow study from speech before changing NPO status. GI consulted.     Review of Systems   Constitutional: Negative for chills, diaphoresis, fatigue and fever.   HENT: Positive for congestion, postnasal drip, sore throat and trouble swallowing.         Odynophagia   Respiratory: Negative for cough, chest tightness and shortness of breath.    Cardiovascular: Negative for chest pain and palpitations.   Gastrointestinal: Positive for constipation. Negative for abdominal distention, abdominal pain, diarrhea and nausea.   Genitourinary: Negative for dysuria and hematuria.   Musculoskeletal: Negative for arthralgias and myalgias.   Skin: Negative for color change and rash.   Neurological: Negative for weakness and light-headedness.   Psychiatric/Behavioral: Negative for agitation and confusion.     Objective:     Vital Signs (Most Recent):  Temp: 98.2 °F (36.8 °C) (12/26/18 0711)  Pulse: 93 (12/26/18 0711)  Resp: 18 (12/26/18 0711)  BP: (!) 161/76 (12/26/18 0711)  SpO2: 100 % (12/26/18 0711) Vital Signs (24h Range):  Temp:  [97.1 °F (36.2 °C)-98.6 °F (37 °C)] 98.2 °F (36.8 °C)  Pulse:  [59-93] 93  Resp:  [13-20] 18  SpO2:  [94 %-100 %] 100 %  BP: (119-175)/(58-82) 161/76     Weight: 76.6 kg (168 lb 14 oz)  Body mass index is 27.26 kg/m².  No intake or output data in the 24 hours ending 12/26/18 0850   Physical Exam   Constitutional: He is oriented to person, place, and time. He appears well-developed and well-nourished.   Spitting clear saliva into napkin during exam.    HENT:   Head: Normocephalic and atraumatic.   Mouth/Throat: No oropharyngeal exudate.   Edematous right tonsil   Eyes: EOM are normal. Pupils are equal, round, and reactive to light.   Neck: Normal range of motion. Neck supple.   Cardiovascular: Normal rate and regular rhythm.   Murmur heard.  Pulmonary/Chest: Effort normal  and breath sounds normal.   Abdominal: Soft. Bowel sounds are normal.   Musculoskeletal: Normal range of motion.   Lymphadenopathy:     He has no cervical adenopathy.   Neurological: He is alert and oriented to person, place, and time.   Skin: Skin is warm and dry. Capillary refill takes less than 2 seconds.   Psychiatric: He has a normal mood and affect. His behavior is normal.   Nursing note and vitals reviewed.      Significant Labs:   CBC:   Recent Labs   Lab 12/25/18  1212 12/26/18  0625   WBC 10.14 12.19   HGB 10.3* 11.5*   HCT 30.8* 33.6*    367*     CMP:   Recent Labs   Lab 12/25/18  1212 12/26/18  0625    138   K 3.6 4.0    102   CO2 23 23   * 251*   BUN 54* 61*   CREATININE 2.5* 2.2*   CALCIUM 9.2 10.0   PROT 7.1  --    ALBUMIN 3.6  --    BILITOT 0.4  --    ALKPHOS 89  --    AST 19  --    ALT 27  --    ANIONGAP 11 13   EGFRNONAA 26* 30.7*     Lactic Acid: No results for input(s): LACTATE in the last 48 hours.  POCT Glucose:   Recent Labs   Lab 12/25/18  2320 12/26/18  0554   POCTGLUCOSE 225* 253*       Significant Imaging: I have reviewed all pertinent imaging results/findings within the past 24 hours.

## 2018-12-26 NOTE — [employer] PATIENT INSTRUCTIONS
Discharge Summary/Instructions after an Endoscopic Procedure  Patient Name: Ming Boateng  Patient MRN: [id number]  Patient YOB: 1955 Wednesday, December 26, 2018  Eliecer Claros MD  RESTRICTIONS:  During your procedure today, you received medications for sedation.  These   medications may affect your judgment, balance and coordination.  Therefore,   for 24 hours, you have the following restrictions:   - DO NOT drive a car, operate machinery, make legal/financial decisions,   sign important papers or drink alcohol.    ACTIVITY:  Today: no heavy lifting, straining or running due to procedural   sedation/anesthesia.  The following day: return to full activity including work.  DIET:  Eat and drink normally unless instructed otherwise.     TREATMENT FOR COMMON SIDE EFFECTS:  - Mild abdominal pain, nausea, belching, bloating or excessive gas:  rest,   eat lightly and use a heating pad.  - Sore Throat: treat with throat lozenges and/or gargle with warm salt   water.  - Because air was used during the procedure, expelling large amounts of air   from your rectum or belching is normal.  - If a bowel prep was taken, you may not have a bowel movement for 1-3 days.    This is normal.  SYMPTOMS TO WATCH FOR AND REPORT TO YOUR PHYSICIAN:  1. Abdominal pain or bloating, other than gas cramps.  2. Chest pain.  3. Back pain.  4. Signs of infection such as: chills or fever occurring within 24 hours   after the procedure.  5. Rectal bleeding, which would show as bright red, maroon, or black stools.   (A tablespoon of blood from the rectum is not serious, especially if   hemorrhoids are present.)  6. Vomiting.  7. Weakness or dizziness.  GO DIRECTLY TO THE NEAREST EMERGENCY ROOM IF YOU HAVE ANY OF THE FOLLOWING:      Difficulty breathing              Chills and/or fever over 101 F   Persistent vomiting and/or vomiting blood   Severe abdominal pain   Severe chest pain   Black, tarry stools   Bleeding- more than one  tablespoon   Any other symptom or condition that you feel may need urgent attention  Your doctor recommends these additional instructions:  If any biopsies were taken, your doctors clinic will contact you in 1 to 2   weeks with any results.  - Discharge patient to home (ambulatory).   - Resume previous diet.   - Continue present medications.   - Use Protonix (pantoprazole) 40 mg PO daily.   - Perform a modified barium swallow at appointment to be scheduled.  For questions, problems or results please call your physician - Eliecer Claros MD at Work:  [phone].  [facility], EMERGENCY ROOM PHONE NUMBER: [phone]  IF A COMPLICATION OR EMERGENCY SITUATION ARISES AND YOU ARE UNABLE TO REACH   YOUR PHYSICIAN - GO DIRECTLY TO THE EMERGENCY ROOM.  Eliecer Claros MD  12/26/2018 12:59:58 PM  This report has been verified and signed electronically.  [employer]
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
## 2018-12-26 NOTE — ASSESSMENT & PLAN NOTE
2D echo cfd 2017 shows EF 65, no WMAs, but grade 3 DD  - c/w ARB when able to tolerate PO  - reduced lasix 80 mg to 40 mg PO daily with recent reduced PO intake  - euvolemic on exam, on RA  - strict I/O, daily weights, fluid-restriced diet (when able to tolerate)

## 2018-12-26 NOTE — ASSESSMENT & PLAN NOTE
Reported CVA in 2017 with minimal speech difficulty and R sided weakness (no imaging/documentation here to confirm); reports no current deficits  - appropriate and oriented x4 on admission  - delirium precautions, neuro checks  - c/w statin, ASA

## 2018-12-26 NOTE — ASSESSMENT & PLAN NOTE
Decreased oral intake  - Unclear etiology but may be related to h/o CVA (2017).   Neoplastic and neuromuscular disease on the differential.    - SLP consulted.  Appears to be oropharyngeal phase dysphagia.  - GI consulted and appreciate recs  - ENT evaluated patient but no evidence of epiglottitis per nasopharolaryngoscopy and no further intervention warranted.   - Strict NPO  - Hold PO medications for now and give slow mIVF  - Able to protect airway and remove secretions without difficulty  - aspiration precautions.

## 2018-12-26 NOTE — DISCHARGE INSTRUCTIONS
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
## 2018-12-26 NOTE — HPI
"Ming Boateng is a 63 y.o. male who  has a past medical history of HFpEF, T2DM, HTN, HLD, CVA (2017), CKDIV who presents to the ED as a transfer from [facility] with complaints of a sore throat, dry cough, heartburn and halitosis x2 weeks with new onset severe dysphagia x3 days.  Patient able to speak English but is ESL and daughter helps to confirm story.  Mark seen x1 week ago at urgent care, dx with Viral URI (rapid strep and rapid flu negative), and given tessalon perles for symptoms.  Patient reports difficulty swallowing food and liquids x3 days with associated choking almost immediately after swallowing. He c/o odynophagia intermittently with swallowing. Sometimes he becomes SOB from coughing.  Last night, he drank some sprite, started to cough, turned blue and momentarily "passed out" (per daughter).  Patient denies any SOB at rest, orthopnea, HA, chest pain, vomiting, nausea, fever, swelling of extremities, or any other concerning symptoms. Daughter does note that he has been consuming a high sodium diet.  She also reports episodes of BUE tremors that occur every other day x2 years.  Patient denies hx of cancer, smoking, changes in speech, neuromuscular disease, changes in weight,  dry eyes/skin, globus sensation.  Reports last EGD in 2010 at [facility] which was normal.     Of note, went to the OSH as last night he choked while eating.  He was transferred to [facility] for findings concerning for epiglottitis on the neck XR.  Received solumedrol and ceftriaxone prior to arrival.  Failed a swallow test prior to arrival.     In ED, intake labs appear baseline.  Afebrile, no leukocytosis, 95% on RA.  ENT consulted and recommends admission for swallow test and GI consult-->No epiglottitis.   "

## 2018-12-26 NOTE — PLAN OF CARE
Problem: Adult Inpatient Plan of Care  Goal: Plan of Care Review  Outcome: Outcome(s) achieved Date Met: 12/26/18  Patient, as well as daughter,  have been educated and instructed regarding his plan of care for this hospitalization.  Both verbalize understanding and have no questions at this time.

## 2018-12-26 NOTE — ASSESSMENT & PLAN NOTE
64 yo male with PMH of CHF, HTN, Stroke, and DM presenting as a transfer from OSH to rule out epiglottis.  No evidence of epiglottis on laryngeal exam. Additionally, patient is afebrile, normal white count, no breathing issues, aseptic appearing. However, patient does have acute onset severe dysphagia for which he is unable to tolerate any PO intake without having coughing spell concerning for aspiration.     -No acute surgical intervention per ENT/HNS  -No evidence of epiglottitis  -Given severe dysphagia and inability to tolerate any PO, recommend IM admission for workup  -May benefit from GI eval and swallow study by SLP  -Given prior stroke history, please rule out other acute causes of dysphagia  -Medical management per primary team  -Page with questions/concerns

## 2018-12-26 NOTE — PT/OT/SLP PROGRESS
Speech Language Pathology      Ming Boateng   OBS 05/OBS 05A    MRN: [id number]    SLP orders received. Patient not seen today secondary to Unavailable (Comment)(Upon SLP attempt to evaluate x2 this service date, pt unavailable. [facility] for EGD at 1230 and [facility] for MRI at 1551. ). If pt medically stable and available, will follow-up 12/27/18.     VICTORINA Mendez, CCC-SLP  [phone]  12/26/2018

## 2018-12-26 NOTE — PROGRESS NOTES
"[facility] Hospital Medicine  Progress Note    Patient Name: Ming Boateng  MRN: [id number]  Patient Class: OP- Observation   Admission Date: 12/25/2018  Length of Stay: 0 days  Attending Physician: Waleska Bender MD  Primary Care Provider: John Serrano MD    [facility] Medicine Team: [facility] HOSP MED F Claudia Russo PA-C    Subjective:     Principal Problem:Dysphagia    HPI:  Ming Boateng is a 63 y.o. male who  has a past medical history of HFpEF, T2DM, HTN, HLD, CVA (2017), CKDIV who presents to the ED as a transfer from [facility] with complaints of a sore throat, dry cough, heartburn and halitosis x2 weeks with new onset severe dysphagia x3 days.  Patient able to speak English but is ESL and daughter helps to confirm story.  Pateitn seen x1 week ago at urgent care, dx with Viral URI (rapid strep and rapid flu negative), and given tessalon perles for symptoms.  Patient reports difficulty swallowing food and liquids x3 days with associated choking almost immediately after swallowing. He c/o odynophagia intermittently with swallowing. Sometimes he becomes SOB from coughing.  Last night, he drank some sprite, started to cough, turned blue and momentarily "passed out" (per daughter).  Patient denies any SOB at rest, orthopnea, HA, chest pain, vomiting, nausea, fever, swelling of extremities, or any other concerning symptoms. Daughter does note that he has been consuming a high sodium diet.  She also reports episodes of BUE tremors that occur every other day x2 years.  Patient denies hx of cancer, smoking, changes in speech, neuromuscular disease, changes in weight,  dry eyes/skin, globus sensation.  Reports last EGD in 2010 at [facility] which was normal.     Of note, went to the OSH as last night he choked while eating.  He was transferred to [facility] for findings concerning for epiglottitis on the neck XR.  Received solumedrol and ceftriaxone prior to arrival.  Failed a swallow test prior to arrival. "     In ED, intake labs appear baseline.  Afebrile, no leukocytosis, 95% on RA.  ENT consulted and recommends admission for swallow test and GI consult-->No epiglottitis.      Hospital Course:  No notes on file    Interval History:  Afebrile without leukocytosis. Complains of congestion and post nasal drip. Patient states he is hungry. Awaiting swallow study from speech before changing NPO status. GI consulted.     Review of Systems   Constitutional: Negative for chills, diaphoresis, fatigue and fever.   HENT: Positive for congestion, postnasal drip, sore throat and trouble swallowing.         Odynophagia   Respiratory: Negative for cough, chest tightness and shortness of breath.    Cardiovascular: Negative for chest pain and palpitations.   Gastrointestinal: Positive for constipation. Negative for abdominal distention, abdominal pain, diarrhea and nausea.   Genitourinary: Negative for dysuria and hematuria.   Musculoskeletal: Negative for arthralgias and myalgias.   Skin: Negative for color change and rash.   Neurological: Negative for weakness and light-headedness.   Psychiatric/Behavioral: Negative for agitation and confusion.     Objective:     Vital Signs (Most Recent):  Temp: 98.2 °F (36.8 °C) (12/26/18 0711)  Pulse: 93 (12/26/18 0711)  Resp: 18 (12/26/18 0711)  BP: (!) 161/76 (12/26/18 0711)  SpO2: 100 % (12/26/18 0711) Vital Signs (24h Range):  Temp:  [97.1 °F (36.2 °C)-98.6 °F (37 °C)] 98.2 °F (36.8 °C)  Pulse:  [59-93] 93  Resp:  [13-20] 18  SpO2:  [94 %-100 %] 100 %  BP: (119-175)/(58-82) 161/76     Weight: 76.6 kg (168 lb 14 oz)  Body mass index is 27.26 kg/m².  No intake or output data in the 24 hours ending 12/26/18 0850   Physical Exam   Constitutional: He is oriented to person, place, and time. He appears well-developed and well-nourished.   Spitting clear saliva into napkin during exam.    HENT:   Head: Normocephalic and atraumatic.   Mouth/Throat: No oropharyngeal exudate.   Edematous right tonsil    Eyes: EOM are normal. Pupils are equal, round, and reactive to light.   Neck: Normal range of motion. Neck supple.   Cardiovascular: Normal rate and regular rhythm.   Murmur heard.  Pulmonary/Chest: Effort normal and breath sounds normal.   Abdominal: Soft. Bowel sounds are normal.   Musculoskeletal: Normal range of motion.   Lymphadenopathy:     He has no cervical adenopathy.   Neurological: He is alert and oriented to person, place, and time.   Skin: Skin is warm and dry. Capillary refill takes less than 2 seconds.   Psychiatric: He has a normal mood and affect. His behavior is normal.   Nursing note and vitals reviewed.      Significant Labs:   CBC:   Recent Labs   Lab 12/25/18  1212 12/26/18  0625   WBC 10.14 12.19   HGB 10.3* 11.5*   HCT 30.8* 33.6*    367*     CMP:   Recent Labs   Lab 12/25/18  1212 12/26/18  0625    138   K 3.6 4.0    102   CO2 23 23   * 251*   BUN 54* 61*   CREATININE 2.5* 2.2*   CALCIUM 9.2 10.0   PROT 7.1  --    ALBUMIN 3.6  --    BILITOT 0.4  --    ALKPHOS 89  --    AST 19  --    ALT 27  --    ANIONGAP 11 13   EGFRNONAA 26* 30.7*     Lactic Acid: No results for input(s): LACTATE in the last 48 hours.  POCT Glucose:   Recent Labs   Lab 12/25/18  2320 12/26/18  0554   POCTGLUCOSE 225* 253*       Significant Imaging: I have reviewed all pertinent imaging results/findings within the past 24 hours.    Assessment/Plan:      * Dysphagia    Decreased oral intake  - Unclear etiology but may be related to h/o CVA (2017).   Neoplastic and neuromuscular disease on the differential.    - SLP consulted.  Appears to be oropharyngeal phase dysphagia.  - GI consulted and appreciate recs  - ENT evaluated patient but no evidence of epiglottitis per nasopharolaryngoscopy and no further intervention warranted.   - Strict NPO  - Hold PO medications for now and give slow mIVF  - Able to protect airway and remove secretions without difficulty  - aspiration precautions.     Recurrent  falls    - deconditioning vs underlying neuromuscular disease  - fall precautions, PT/OT     H/O: CVA (cerebrovascular accident)    Reported CVA in 2017 with minimal speech difficulty and R sided weakness (no imaging/documentation here to confirm); reports no current deficits  - appropriate and oriented x4 on admission  - delirium precautions, neuro checks  - c/w statin, ASA     Stage 4 chronic kidney disease    - at baseline renal function (CrCl 20 on admission)  - check lytes  - c/w phoslo  - avoid nephrotoxic agents, renally dose medications     Hypertension associated with diabetes    Stable 140s-160s  - c/w losartan 100 mg daily, amlodipine 10 mg daily, coreg 6.25 mg BID  - hold HCTZ to prevent dehydration     Type 2 diabetes mellitus with complication, with long-term current use of insulin    Last HgA1c 6.7, repeat 6.8  - POCT glucose 253  - Decreased home glargine from 10U to 7U BID  - low dose SSI, glucose Q6 while NPO     (HFpEF) heart failure with preserved ejection fraction    2D echo cfd 2017 shows EF 65, no WMAs, but grade 3 DD  - c/w ARB when able to tolerate PO  - reduced lasix 80 mg to 40 mg PO daily with recent reduced PO intake  - euvolemic on exam, on RA  - strict I/O, daily weights, fluid-restriced diet (when able to tolerate)       VTE Risk Mitigation (From admission, onward)        Ordered     IP VTE LOW RISK PATIENT  Once      12/25/18 2303     Place MOHSEN hose  Until discontinued      12/25/18 2303     Place sequential compression device  Until discontinued      12/25/18 2303              Claudia Russo PA-C  Department of Hospital Medicine   [facility]

## 2018-12-26 NOTE — ED NOTES
Adult Physical Assessment  LOC: Ming Boateng, 63 y.o. male verified via two identifiers.  The patient is awake, alert, oriented and speaking appropriately at this time.  APPEARANCE: Patient resting comfortably and appears to be in no acute distress at this time. Patient is clean and well groomed, patient's clothing is properly fastened.  SKIN:The skin is warm and dry, color consistent with ethnicity, patient has normal skin turgor and moist mucus membranes, skin intact, no breakdown or brusing noted.  MUSCULOSKELETAL: Patient moving all extremities well, no obvious swelling or deformities noted.  RESPIRATORY: Airway is open and patent, respirations are spontaneous, patient has a normal effort and rate, no accessory muscle use noted.  CARDIAC: Patient has a normal rate and rhythm, no periphreal edema noted in any extremity, capillary refill < 3 seconds in all extremities  ABDOMEN: Soft and non tender to palpation, no abdominal distention noted. Bowel sounds present in all four quadrants.  NEUROLOGIC: Eyes open spontaneously, behavior appropriate to situation, follows commands, facial expression symmetrical, bilateral hand grasp equal and even, purposeful motor response noted, normal sensation in all extremities when touched with a finger.  EENT: PT stated throat is sore, no erythema noted

## 2018-12-26 NOTE — ASSESSMENT & PLAN NOTE
2D echo cfd 2017 shows EF 65, no WMAs, but grade 3 DD  - c/w ARB when able to tolerate PO  - reduced lasix 80 mg to 40 mg PO daily with recent reduced PO intake, will increase tomorrow  - euvolemic on exam, on RA  - strict I/O, daily weights, fluid-restriced diet (when able to tolerate)
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
## 2018-12-26 NOTE — ASSESSMENT & PLAN NOTE
- deconditioning vs underlying neuromuscular disease  - fall precautions  - PT recommends continued skilled acute PT 3X a week at discharge

## 2018-12-26 NOTE — ASSESSMENT & PLAN NOTE
Stable 140s-160s  - c/w losartan 100 mg daily, amlodipine 10 mg daily, coreg 6.25 mg BID  - hold HCTZ to prevent dehydration

## 2018-12-26 NOTE — NURSING
1155 --  Patient and daughter off the floor for his scheduled EGD.    1400 -- Patient returned from PACU to room 5. No complaints or needs at this time.  Will continue to monitor.  Called MRI and let them know that they can come get him for their studies.

## 2018-12-26 NOTE — HPI
64 yo male with PMH of CHF, HTN, Stroke, and DM presenting as a transfer from OSH for 4 days of acute onset dysphagia and abnormal neck plain film findings.  Of note, patient does not speak english well, exam done with assistance of family/daughter/caretaker at bedside and .  The patient was in his usual state of well being until about 4 days ago when he started to experience severe dysphagia to all oral intake. He reports severe coughing and feeling of aspiration to both solids and liquids.  He has been unable to eat.  He has coughing spell after each attempt to eat.  He was seen at an OSH today, on lateral plain film, there was concern for epiglottic thickening. [place] [facility] [place].  He denies fevers, voice changes, neck pain, breathing concerns, stridor.   He is afebrile with a normal white count. He otherwise fells at his usual state of health.  Denies new onset weakness, numbness/tingling.

## 2018-12-26 NOTE — PLAN OF CARE
12/26/18 1100   Discharge Assessment   Assessment Type Discharge Planning Assessment   Confirmed/corrected address and phone number on facesheet? Yes   Assessment information obtained from? Patient;Caregiver  (daughter, Nica Boateng ([phone]))   Expected Length of Stay (days) 3   Communicated expected length of stay with patient/caregiver yes   Prior to hospitilization cognitive status: Alert/Oriented   Prior to hospitalization functional status: Assistive Equipment   Current Functional Status: Needs Assistance   Lives With child(renae), adult;grandchild(renae)  (adult daughter, Nica Boateng ([phone]) & 1 y.o. grandchild)   Able to Return to Prior Arrangements yes   Is patient able to care for self after discharge? No   Patient's perception of discharge disposition home or selfcare   Readmission Within the Last 30 Days no previous admission in last 30 days   Patient currently being followed by outpatient case management? No   Patient currently receives any other outside agency services? No   Equipment Currently Used at Home cane, straight;glucometer;other (see comments)  (BP machine)   Do you have any problems affording any of your prescribed medications? No   Is the patient taking medications as prescribed? yes   Does the patient have transportation home? Yes   Transportation Anticipated family or friend will provide  (daughter)   Does the patient receive services at the [facility]? No   Discharge Plan A Home with family   Discharge Plan B Home Health   Patient/Family in Agreement with Plan yes     Dx: dysphagia  Consult: ENT, GI, & PT/OT/SLP  Pharm: [facility]  Hosp f/u appt: Dr. John Serrano (PCP) on 1/24/19 at 1100    Patient scheduled to have an EGD done today.

## 2018-12-26 NOTE — NURSING TRANSFER
Nursing Transfer Note      12/26/2018     Transfer To: [facility] 5 From [facility] 36    Transfer via stretcher    Transfer with cardiac monitoring. daughter    Transported by pct    Medicines sent: non3    Chart send with patient: Yes    Notified: BOY Huerta    Patient reassessed at: 12/26/18 at 1345     Upon arrival to floor: cardiac monitor applied, patient oriented to room, call bell in reach and bed in lowest position

## 2018-12-27 LAB
ANION GAP SERPL CALC-SCNC: 7 MMOL/L
BASOPHILS # BLD AUTO: 0.04 K/UL
BASOPHILS NFR BLD: 0.3 %
BUN SERPL-MCNC: 52 MG/DL
CALCIUM SERPL-MCNC: 9.9 MG/DL
CCP AB SER IA-ACNC: <0.5 U/ML
CHLORIDE SERPL-SCNC: 109 MMOL/L
CO2 SERPL-SCNC: 26 MMOL/L
CREAT SERPL-MCNC: 1.8 MG/DL
DIFFERENTIAL METHOD: ABNORMAL
EOSINOPHIL # BLD AUTO: 0 K/UL
EOSINOPHIL NFR BLD: 0.2 %
ERYTHROCYTE [DISTWIDTH] IN BLOOD BY AUTOMATED COUNT: 13.8 %
EST. GFR  (AFRICAN AMERICAN): 45.3 ML/MIN/1.73 M^2
EST. GFR  (NON AFRICAN AMERICAN): 39.2 ML/MIN/1.73 M^2
GLUCOSE SERPL-MCNC: 112 MG/DL
HCT VFR BLD AUTO: 32.6 %
HGB BLD-MCNC: 10.8 G/DL
IMM GRANULOCYTES # BLD AUTO: 0.05 K/UL
IMM GRANULOCYTES NFR BLD AUTO: 0.4 %
LYMPHOCYTES # BLD AUTO: 1.9 K/UL
LYMPHOCYTES NFR BLD: 14.2 %
MCH RBC QN AUTO: 26.7 PG
MCHC RBC AUTO-ENTMCNC: 33.1 G/DL
MCV RBC AUTO: 81 FL
MONOCYTES # BLD AUTO: 0.9 K/UL
MONOCYTES NFR BLD: 6.6 %
NEUTROPHILS # BLD AUTO: 10.2 K/UL
NEUTROPHILS NFR BLD: 78.3 %
NRBC BLD-RTO: 0 /100 WBC
PLATELET # BLD AUTO: 350 K/UL
PMV BLD AUTO: 10 FL
POCT GLUCOSE: 112 MG/DL (ref 70–110)
POCT GLUCOSE: 180 MG/DL (ref 70–110)
POCT GLUCOSE: 293 MG/DL (ref 70–110)
POTASSIUM SERPL-SCNC: 3.9 MMOL/L
RBC # BLD AUTO: 4.04 M/UL
RHEUMATOID FACT SERPL-ACNC: <10 IU/ML
SODIUM SERPL-SCNC: 142 MMOL/L
WBC # BLD AUTO: 13.01 K/UL

## 2018-12-27 PROCEDURE — 92610 EVALUATE SWALLOWING FUNCTION: CPT | Mod: 59

## 2018-12-27 PROCEDURE — 86038 ANTINUCLEAR ANTIBODIES: CPT

## 2018-12-27 PROCEDURE — 99226 PR SUBSEQUENT OBSERVATION CARE,LEVEL III: CPT | Mod: ,,, | Performed by: PHYSICIAN ASSISTANT

## 2018-12-27 PROCEDURE — 99226 PR SUBSEQUENT OBSERVATION CARE,LEVEL III: ICD-10-PCS | Mod: ,,, | Performed by: PHYSICIAN ASSISTANT

## 2018-12-27 PROCEDURE — 97161 PT EVAL LOW COMPLEX 20 MIN: CPT

## 2018-12-27 PROCEDURE — 25000003 PHARM REV CODE 250: Performed by: PHYSICIAN ASSISTANT

## 2018-12-27 PROCEDURE — 82962 GLUCOSE BLOOD TEST: CPT

## 2018-12-27 PROCEDURE — 85025 COMPLETE CBC W/AUTO DIFF WBC: CPT

## 2018-12-27 PROCEDURE — 36415 COLL VENOUS BLD VENIPUNCTURE: CPT

## 2018-12-27 PROCEDURE — 86200 CCP ANTIBODY: CPT

## 2018-12-27 PROCEDURE — G8997 SWALLOW GOAL STATUS: HCPCS | Mod: CL

## 2018-12-27 PROCEDURE — G0378 HOSPITAL OBSERVATION PER HR: HCPCS

## 2018-12-27 PROCEDURE — 92611 MOTION FLUOROSCOPY/SWALLOW: CPT

## 2018-12-27 PROCEDURE — 80048 BASIC METABOLIC PNL TOTAL CA: CPT

## 2018-12-27 PROCEDURE — 86225 DNA ANTIBODY NATIVE: CPT

## 2018-12-27 PROCEDURE — 86235 NUCLEAR ANTIGEN ANTIBODY: CPT

## 2018-12-27 PROCEDURE — 86431 RHEUMATOID FACTOR QUANT: CPT

## 2018-12-27 PROCEDURE — G8979 MOBILITY GOAL STATUS: HCPCS | Mod: CI

## 2018-12-27 PROCEDURE — G8978 MOBILITY CURRENT STATUS: HCPCS | Mod: CJ

## 2018-12-27 PROCEDURE — G8996 SWALLOW CURRENT STATUS: HCPCS | Mod: CM

## 2018-12-27 RX ORDER — PANTOPRAZOLE SODIUM 40 MG/1
40 TABLET, DELAYED RELEASE ORAL DAILY
Status: DISCONTINUED | OUTPATIENT
Start: 2018-12-27 | End: 2018-12-28 | Stop reason: HOSPADM

## 2018-12-27 RX ORDER — FUROSEMIDE 40 MG/1
80 TABLET ORAL DAILY
Status: DISCONTINUED | OUTPATIENT
Start: 2018-12-28 | End: 2018-12-28 | Stop reason: HOSPADM

## 2018-12-27 RX ADMIN — ALLOPURINOL 100 MG: 100 TABLET ORAL at 08:12

## 2018-12-27 RX ADMIN — PANTOPRAZOLE SODIUM 40 MG: 40 TABLET, DELAYED RELEASE ORAL at 05:12

## 2018-12-27 RX ADMIN — AMLODIPINE BESYLATE 10 MG: 10 TABLET ORAL at 08:12

## 2018-12-27 RX ADMIN — PRAVASTATIN SODIUM 20 MG: 10 TABLET ORAL at 08:12

## 2018-12-27 RX ADMIN — CALCIUM ACETATE 667 MG: 667 CAPSULE ORAL at 08:12

## 2018-12-27 RX ADMIN — CALCIUM ACETATE 667 MG: 667 CAPSULE ORAL at 11:12

## 2018-12-27 RX ADMIN — LOSARTAN POTASSIUM 100 MG: 50 TABLET ORAL at 08:12

## 2018-12-27 RX ADMIN — CALCIUM ACETATE 667 MG: 667 CAPSULE ORAL at 04:12

## 2018-12-27 RX ADMIN — INSULIN ASPART 1 UNITS: 100 INJECTION, SOLUTION INTRAVENOUS; SUBCUTANEOUS at 09:12

## 2018-12-27 RX ADMIN — FUROSEMIDE 40 MG: 40 TABLET ORAL at 08:12

## 2018-12-27 RX ADMIN — CARVEDILOL 6.25 MG: 6.25 TABLET, FILM COATED ORAL at 04:12

## 2018-12-27 RX ADMIN — CARVEDILOL 6.25 MG: 6.25 TABLET, FILM COATED ORAL at 08:12

## 2018-12-27 NOTE — PLAN OF CARE
Problem: Physical Therapy Goal  Goal: Physical Therapy Goal  Goals to be met by: 2019     Patient will increase functional independence with mobility by performin. Gait  x 150 feet with [place]  2. Ascend/descend 12 stair with Right Handrails Supervision  3. Lower extremity exercise program x15 reps per handout, with independence    Outcome: Ongoing (interventions implemented as appropriate)  Eval completed and POC established    David Carrera DPT  2018
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
## 2018-12-27 NOTE — ASSESSMENT & PLAN NOTE
Ming Boateng is a 63 y.o. male admitted for 4 day history of dysphagia. He was [place] [facility] [place] with findings concerning for epiglottitis. Patient seen by GI and EGD negative for abnormalities. Patient also seen by ENT and nasopharolaryngoscopy negative for abnormalities. He failed swallow evaluation at OSH. SLP evaluation pending. MRI and MRA negative for significant abnormalities. No evidence of focal neurologic deficit on exam. Very unlikely that patient would have an MRI negative stroke causing dysphagia only. Therefore, low suspicion for cerebrovascular abnormality as etiology for patient's symptoms. Discussed patient with staff, will follow up tomorrow.

## 2018-12-27 NOTE — PLAN OF CARE
Problem: SLP Goal  Goal: SLP Goal  Speech Language Pathology Goals  Goals expected to be met by 1/3  1. Pt will tolerate regular consistency solids and thin liquids, NO straws, with no overt clinical signs aspiration.   2. Pt will complete dysphagia exercises x10 each with good ability to strengthen the swallowing musculature.        Outcome: Ongoing (interventions implemented as appropriate)    MBSS complete. Recommend regular consistency solids and thin liquids, NO straws. Double swallow per bolus, completed prior to provision of subsequent bolus. Remain upright for 20-30 min post PO intake.     VICTORINA Mendez, CCC-SLP  [phone]  12/27/2018
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
## 2018-12-27 NOTE — PROGRESS NOTES
"[facility] Hospital Medicine  Progress Note    Patient Name: Ming Boateng  MRN: [id number]  Patient Class: OP- Observation   Admission Date: 12/25/2018  Length of Stay: 0 days  Attending Physician: Waleska Bender MD  Primary Care Provider: John Serrano MD    [facility] Medicine Team: [facility] HOSP MED F Claudia Russo PA-C    Subjective:     Principal Problem:Dysphagia    HPI:  Ming Boateng is a 63 y.o. male who  has a past medical history of HFpEF, T2DM, HTN, HLD, CVA (2017), CKDIV who presents to the ED as a transfer from [facility] with complaints of a sore throat, dry cough, heartburn and halitosis x2 weeks with new onset severe dysphagia x3 days.  Patient able to speak English but is ESL and daughter helps to confirm story.  Pateitn seen x1 week ago at urgent care, dx with Viral URI (rapid strep and rapid flu negative), and given tessalon perles for symptoms.  Patient reports difficulty swallowing food and liquids x3 days with associated choking almost immediately after swallowing. He c/o odynophagia intermittently with swallowing. Sometimes he becomes SOB from coughing.  Last night, he drank some sprite, started to cough, turned blue and momentarily "passed out" (per daughter).  Patient denies any SOB at rest, orthopnea, HA, chest pain, vomiting, nausea, fever, swelling of extremities, or any other concerning symptoms. Daughter does note that he has been consuming a high sodium diet.  She also reports episodes of BUE tremors that occur every other day x2 years.  Patient denies hx of cancer, smoking, changes in speech, neuromuscular disease, changes in weight,  dry eyes/skin, globus sensation.  Reports last EGD in 2010 at [facility] which was normal.     Of note, went to the OSH as last night he choked while eating.  He was transferred to [facility] for findings concerning for epiglottitis on the neck XR.  Received solumedrol and ceftriaxone prior to arrival.  Failed a swallow test prior to arrival. "     In ED, intake labs appear baseline.  Afebrile, no leukocytosis, 95% on RA.  ENT consulted and recommends admission for swallow test and GI consult-->No epiglottitis.      Hospital Course:  Patient was admitted to observation for dysphagia. Speech consulted, will remain NPO until swallow study. Remains afebrile without leukocytosis. GI consulted. EGD revealed hiatal hernia, esophagitis, and gastritis. Biopsies taken. No cause for dysphagia found. Started on protonix 40 mg daily. Will follow up as outpatient for modified barium swallow. MRI brain with no acute stroke, but remote R midbrain infarct, and small vessel white matter disease. Vascular neurology consulted, no acute stroke to explain isolated dysphagia, signed off. Modified barium study performed, silent aspiration with straw only. Speech recommend normal solids, thin liquids, no straws, double swallow after each bite, and remain upright 45 minutes after eating. CT of neck with no abnormality.  If tolerates diet, patient to be discharged tomorrow and will follow up as outpatient for esophogram.     Interval History: Patient underwent modified barium study per speech, with silent aspiration with straw. Complete esophogram not performed at this time due to necessity of straw. Speech recommends no straws, normal solids, thin liquids, double swallow. CT neck with no abnormality. Vascular surgery with no evidence of acute stroke to explain acute dysphagia. Autoimmune work up started.    Review of Systems   Constitutional: Negative for chills and fever.   HENT: Positive for congestion, postnasal drip and trouble swallowing. Negative for drooling and sore throat.    Respiratory: Negative for cough, chest tightness and shortness of breath.    Cardiovascular: Negative for chest pain and palpitations.   Gastrointestinal: Negative for abdominal distention and abdominal pain.   Genitourinary: Negative for dysuria and hematuria.   Musculoskeletal: Negative for  arthralgias and myalgias.   Skin: Negative for color change and rash.   Neurological: Negative for weakness and light-headedness.   Psychiatric/Behavioral: Negative for agitation and confusion.     Objective:     Vital Signs (Most Recent):  Temp: 96.2 °F (35.7 °C) (12/27/18 1625)  Pulse: 69 (12/27/18 1625)  Resp: 18 (12/27/18 1625)  BP: (!) 145/70 (12/27/18 1625)  SpO2: 97 % (12/27/18 1625) Vital Signs (24h Range):  Temp:  [96.2 °F (35.7 °C)-97.4 °F (36.3 °C)] 96.2 °F (35.7 °C)  Pulse:  [62-75] 69  Resp:  [18] 18  SpO2:  [97 %-98 %] 97 %  BP: (140-180)/(67-81) 145/70     Weight: 76.5 kg (168 lb 10.4 oz)  Body mass index is 27.22 kg/m².    Intake/Output Summary (Last 24 hours) at 12/27/2018 1656  Last data filed at 12/27/2018 0454  Gross per 24 hour   Intake 495 ml   Output --   Net 495 ml      Physical Exam   Constitutional: He is oriented to person, place, and time. He appears well-developed and well-nourished.   HENT:   Head: Normocephalic and atraumatic.   Mouth/Throat: No oropharyngeal exudate.   Eyes: EOM are normal. Pupils are equal, round, and reactive to light.   Neck: Normal range of motion. Neck supple.   Cardiovascular: Normal rate and regular rhythm.   Murmur heard.  Pulmonary/Chest: Effort normal and breath sounds normal.   Abdominal: Soft. Bowel sounds are normal.   Musculoskeletal: Normal range of motion.   Lymphadenopathy:     He has no cervical adenopathy.   Neurological: He is alert and oriented to person, place, and time.   Skin: Skin is warm and dry. Capillary refill takes less than 2 seconds.   Psychiatric: He has a normal mood and affect. His behavior is normal.   Nursing note and vitals reviewed.      Significant Labs:   CBC:   Recent Labs   Lab 12/26/18  0625 12/27/18  0408   WBC 12.19 13.01*   HGB 11.5* 10.8*   HCT 33.6* 32.6*   * 350     CMP:   Recent Labs   Lab 12/26/18  0625 12/27/18  0408    142   K 4.0 3.9    109   CO2 23 26   * 112*   BUN 61* 52*   CREATININE  2.2* 1.8*   CALCIUM 10.0 9.9   ANIONGAP 13 7*   EGFRNONAA 30.7* 39.2*     POCT Glucose:   Recent Labs   Lab 12/26/18  2321 12/27/18  0550 12/27/18  1627   POCTGLUCOSE 163* 112* 180*       Significant Imaging: I have reviewed all pertinent imaging results/findings within the past 24 hours.    Assessment/Plan:      * Dysphagia    Decreased oral intake  - Unclear etiology but may be related to h/o CVA (2017).   Neoplastic and neuromuscular disease on the differential.    - SLP consulted.  Appears to be oropharyngeal phase dysphagia.  - ENT evaluated patient but no evidence of epiglottitis per nasopharolaryngoscopy and no further intervention warranted.   - GI consulted:  - EGD with hiatal hernia, esophagitis, and gastritis. Bx taken. No cause for dysphagia found  - start protonix, outpatient follow up for complete barium swallow  - MRI with remote R midbrain infarct, small vessel white matter disease  - vascular surgery consulted, no acute stroke to explain acute dysphagia.   - Swallow study with aspiration with straw use.   - Speech recommends normal solids, thin liquids, absolutely no straw use. Double swallow with eat bite, maintain upright 45 minutes after meal.   - Esophogram unable to be performed as straw is used. Will follow up as outpatient.  - CCP, RF negative  - CT neck without abnormalities  - likely discharge tomorrow   - aspiration precautions.     Recurrent falls    - deconditioning vs underlying neuromuscular disease  - fall precautions  - PT recommends continued skilled acute PT 3X a week at discharge     H/O: CVA (cerebrovascular accident)    Reported CVA in 2017 with minimal speech difficulty and R sided weakness (no imaging/documentation here to confirm); reports no current deficits  - appropriate and oriented x4 on admission  - delirium precautions, neuro checks  - c/w statin, ASA     Stage 4 chronic kidney disease    - at baseline renal function (CrCl 20 on admission)  - c/w phoslo  - avoid  nephrotoxic agents, renally dose medications     Hypertension associated with diabetes    Stable 140s-160s  - c/w losartan 100 mg daily, amlodipine 10 mg daily, coreg 6.25 mg BID  - hold HCTZ to prevent dehydration     Type 2 diabetes mellitus with complication, with long-term current use of insulin    Last HgA1c 6.7, repeat 6.8  - POCT glucose 112  - Continue home glargine from 10U   - low dose SSI  - diabetic diet     (HFpEF) heart failure with preserved ejection fraction    2D echo cfd 2017 shows EF 65, no WMAs, but grade 3 DD  - c/w ARB when able to tolerate PO  - reduced lasix 80 mg to 40 mg PO daily with recent reduced PO intake, will increase tomorrow  - euvolemic on exam, on RA  - strict I/O, daily weights, fluid-restriced diet (when able to tolerate)       VTE Risk Mitigation (From admission, onward)        Ordered     IP VTE LOW RISK PATIENT  Once      12/25/18 2303     Place MOHSEN hose  Until discontinued      12/25/18 2303     Place sequential compression device  Until discontinued      12/25/18 2303              Claudia Russo PA-C  Department of Hospital Medicine   [facility]

## 2018-12-27 NOTE — PLAN OF CARE
Problem: Swallowing Impairment  Goal: Improved Swallowing Without Aspiration  Outcome: Ongoing (interventions implemented as appropriate)  Bedside swallow study completed.  Delayed coughing noted following puree/solids.  Rec modified barium swallow study to further assess swallow safety with ice chips and sips of thin for pleasure when pt alert and upright.  Stop if overt s/s aspiration noted.  VICTORINA Pritchett, CCC/SLP  12/27/2018
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
## 2018-12-27 NOTE — PLAN OF CARE
Problem: Fall Injury Risk  Goal: Absence of Fall and Fall-Related Injury    Intervention: Identify and Manage Contributors to Fall Injury Risk  No falls this shift.
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
## 2018-12-27 NOTE — PROCEDURES
Modified Barium Swallow    Patient Name:  Ming Boateng   OBS 05/OBS 05A    MRN:  [id number]    Recommendations:     Recommendations:                General Recommendations:  Dysphagia therapy  Diet recommendations:  Regular, Thin   Aspiration Precautions:   · NO straws  · Fully awake and alert for PO intake  · Fully upright position for PO intake  · Small bites/ sips  · Slow rate of eating/ drinking  · 1 bite/ sip @ a time  · Double swallow per bolus, prior to provision of subsequent bolus   · Refrain from talking prior to swallow completion   · Remain upright for 20-30 min post PO intake  · Discontinue PO upon: coughing, throat clearing, choking, wet vocal quality, wet breath sounds, watery eyes, reddened facial features  General Precautions: Standard, fall, aspiration  Communication strategies:  go to room if call light pushed    Referral     Reason for Referral  Patient was referred for a Modified Barium Swallow Study to assess the efficiency of his/her swallow function, rule out aspiration and make recommendations regarding safe dietary consistencies, effective compensatory strategies, and safe eating environment.     Diagnosis: Dysphagia     History:     Past Medical History:   Diagnosis Date    CHF (congestive heart failure)     Diabetes mellitus     Hypertension     Renal disorder     CKD    Stroke     mini speech difficulty, right sided weakness       Objective:     Current Respiratory Status: 12/27/18    Alert: yes    Cooperative: yes    Follows Directions: inconsistent, appeared as if 2/2 language barrier- per review of pt's medical chart, pt ESL.     Visualization  · Patient was seen in the lateral view    Oral Peripheral Examination  · Oral Musculature: facial asymmetry present  · Dentition: present and adequate  · Secretion Management: adequate  · Mucosal Quality: adequate  · Mandibular Strength and Mobility: WFL  · Oral Labial Strength and Mobility: impaired retraction(on L, pt with h/o stroke, denied  L facial weakness)  · Lingual Strength and Mobility: WFL  · Volitional Cough: WFL  · Volitional Swallow: effortful though + hyolaryngeal rise  · Voice Prior to PO Intake: clear    Consistencies Assessed  · Thin liquid- tsp x1, cup sip x2, cup sip with chin tuck upon swallow x1, straw sip x1  · Nectar thick liquid- cup sip x2  · Pureed apple sauce- tsp x1  · Regular consistency solid saltine cracker- bite x1    Oral Preparation/Oral Phase  · Adequate bolus acceptance without anterior loss  · Timely initiation of bolus manipulation   · Timely mastication of regular consistency solid  · Timely A-P transit  · With regular consistency solid, dec'd bolus control and formation characterized by premature spillage to the oropharynx   · Dec'd BOT retraction with mod-sev BOT residue     Pharyngeal Phase   · With thin- and nectar thick- liquid and pureed solid, timely swallow initiation with initial swallow per bolus   · With regular consistency solid, pooling to the vallecula with delayed swallow initiation   · Dec'd pharyngeal contraction   · Dec'd hyolaryngeal excursion and elevation and epiglottic inversion   · Thin liquid- No penetration/ aspiration with cup sips. Silent aspiration during the swallow with straw sip with unappreciated sensory response.   · Moderate vallecula residue and trace-mild pyriform residue post swallow  · Mod-sev BOT residue observed to pool to the level of the vallecula with spillover to the pyriform followed by delayed initiation of spontaneous double swallow per bolus   · Delayed completion of spontaneous double swallow per bolus beneficial to eliminate BOT residue, reduce vallecula residue to trace, and eliminate pyriform residue   · Nectar thick liquid- No penetration/ aspiration.   · Mod-sev vallecula residue and trace-mild pyriform residue post swallow  · Mod-sev BOT residue observed to pool to the level of the vallecula   · Spontaneous double swallow per bolus unappreciated  · Cued  volitional double swallow per bolus beneficial to eliminate BOT residue, reduce vallecula residue to trace, and eliminate pyriform residue   · Pureed solid- No penetration/ aspiration.   · Mod-sev BOT residue observed to pool to the level of the vallecula followed by delayed initiation of spontaneous double swallow per bolus   · Spontaneous double swallow per bolus beneficial to eliminate BOT residue   · Regular consistency solid- No penetration/ aspiration.   · Mild-mod vallecula residue reduced to trace and mod-sev BOT residue eliminated with spontaneous double swallow per bolus   · Spontaneous throat clear noted x1 throughout study in the absence of penetration/ aspiration     Cervical Esophageal Phase  · Thin- and nectar thick- liquid residue at the level of the UES concerning for dec'd UES relaxation for bolus acceptance   · Retrograde flow unappreciated     Assessment:     Impressions  · Pt with mild oropharyngeal dysphagia characterized by dec'd BOT retraction and pharyngeal constriction, without penetration/ aspiration of thin liquid via cup sips and regular consistency solids.     Prognosis: Good with ongoing dysphagia therapy and consistent and strict implementation of all aspiration precautions listed above     Plan  SLP to continue to follow pt to monitor diet tolerance, and to provide ongoing education and dysphagia therapy     Education  Education provided to pt re: results of MBSS, aspiration precautions listed above, and ongoing SLP POC.     Goals:   Multidisciplinary Problems     SLP Goals        Problem: SLP Goal    Goal Priority Disciplines Outcome   SLP Goal     SLP Ongoing (interventions implemented as appropriate)   Description:  Speech Language Pathology Goals  Goals expected to be met by 1/3  1. Pt will tolerate regular consistency solids and thin liquids, NO straws, with no overt clinical signs aspiration.   2. Pt will complete dysphagia exercises x10 each with good ability to strengthen the  swallowing musculature.                        Plan:     · Patient to be seen:  Therapy Frequency: 4 x/week   · Plan of Care expires:  01/25/19  · Plan of Care reviewed with:  patient        Discharge recommendations:  other (see comments)(OP ST)     Time Tracking:   SLP Treatment Date:   12/27/18  Speech Start Time:  1514  Speech Stop Time:  1539     Speech Total Time (min):  25 min    VICTORINA Mendez, CCC-SLP  [phone]  12/27/2018

## 2018-12-27 NOTE — SUBJECTIVE & OBJECTIVE
Interval History: Patient underwent modified barium study per speech, with silent aspiration with straw. Complete esophogram not performed at this time due to necessity of straw. Speech recommends no straws, normal solids, thin liquids, double swallow. CT neck with no abnormality. Vascular surgery with no evidence of acute stroke to explain acute dysphagia. Autoimmune work up started.    Review of Systems   Constitutional: Negative for chills and fever.   HENT: Positive for congestion, postnasal drip and trouble swallowing. Negative for drooling and sore throat.    Respiratory: Negative for cough, chest tightness and shortness of breath.    Cardiovascular: Negative for chest pain and palpitations.   Gastrointestinal: Negative for abdominal distention and abdominal pain.   Genitourinary: Negative for dysuria and hematuria.   Musculoskeletal: Negative for arthralgias and myalgias.   Skin: Negative for color change and rash.   Neurological: Negative for weakness and light-headedness.   Psychiatric/Behavioral: Negative for agitation and confusion.     Objective:     Vital Signs (Most Recent):  Temp: 96.2 °F (35.7 °C) (12/27/18 1625)  Pulse: 69 (12/27/18 1625)  Resp: 18 (12/27/18 1625)  BP: (!) 145/70 (12/27/18 1625)  SpO2: 97 % (12/27/18 1625) Vital Signs (24h Range):  Temp:  [96.2 °F (35.7 °C)-97.4 °F (36.3 °C)] 96.2 °F (35.7 °C)  Pulse:  [62-75] 69  Resp:  [18] 18  SpO2:  [97 %-98 %] 97 %  BP: (140-180)/(67-81) 145/70     Weight: 76.5 kg (168 lb 10.4 oz)  Body mass index is 27.22 kg/m².    Intake/Output Summary (Last 24 hours) at 12/27/2018 1656  Last data filed at 12/27/2018 0454  Gross per 24 hour   Intake 495 ml   Output --   Net 495 ml      Physical Exam   Constitutional: He is oriented to person, place, and time. He appears well-developed and well-nourished.   HENT:   Head: Normocephalic and atraumatic.   Mouth/Throat: No oropharyngeal exudate.   Eyes: EOM are normal. Pupils are equal, round, and reactive to  light.   Neck: Normal range of motion. Neck supple.   Cardiovascular: Normal rate and regular rhythm.   Murmur heard.  Pulmonary/Chest: Effort normal and breath sounds normal.   Abdominal: Soft. Bowel sounds are normal.   Musculoskeletal: Normal range of motion.   Lymphadenopathy:     He has no cervical adenopathy.   Neurological: He is alert and oriented to person, place, and time.   Skin: Skin is warm and dry. Capillary refill takes less than 2 seconds.   Psychiatric: He has a normal mood and affect. His behavior is normal.   Nursing note and vitals reviewed.      Significant Labs:   CBC:   Recent Labs   Lab 12/26/18  0625 12/27/18  0408   WBC 12.19 13.01*   HGB 11.5* 10.8*   HCT 33.6* 32.6*   * 350     CMP:   Recent Labs   Lab 12/26/18  0625 12/27/18  0408    142   K 4.0 3.9    109   CO2 23 26   * 112*   BUN 61* 52*   CREATININE 2.2* 1.8*   CALCIUM 10.0 9.9   ANIONGAP 13 7*   EGFRNONAA 30.7* 39.2*     POCT Glucose:   Recent Labs   Lab 12/26/18  2321 12/27/18  0550 12/27/18  1627   POCTGLUCOSE 163* 112* 180*       Significant Imaging: I have reviewed all pertinent imaging results/findings within the past 24 hours.

## 2018-12-27 NOTE — HPI
Ming Boateng is a 63 y.o. male with PMHx of HF, DM, CKD stage 4, and prior stroke with no residual deficits who was admitted to hospital for dysphagia x 4 days. Patient reports choking when swallowing both solids and liquids. Patient was transferred from [facility] due to XR neck with evidence of possible epiglottitis. He also had a recent viral URI 1 week ago. Primary team consulted GI and ENT. Patient's EGD negative and ENT scope negative. Patient denies any other complaints. Patient denies history of dysphagia with previous stroke.

## 2018-12-28 VITALS
RESPIRATION RATE: 16 BRPM | OXYGEN SATURATION: 97 % | WEIGHT: 165.88 LBS | TEMPERATURE: 97 F | BODY MASS INDEX: 26.66 KG/M2 | DIASTOLIC BLOOD PRESSURE: 80 MMHG | HEIGHT: 66 IN | SYSTOLIC BLOOD PRESSURE: 166 MMHG | HEART RATE: 73 BPM

## 2018-12-28 LAB
ANA SER QL IF: NORMAL
ANION GAP SERPL CALC-SCNC: 11 MMOL/L
BASOPHILS # BLD AUTO: 0.07 K/UL
BASOPHILS NFR BLD: 0.9 %
BUN SERPL-MCNC: 47 MG/DL
CALCIUM SERPL-MCNC: 9.7 MG/DL
CHLORIDE SERPL-SCNC: 104 MMOL/L
CO2 SERPL-SCNC: 26 MMOL/L
CREAT SERPL-MCNC: 1.8 MG/DL
DIFFERENTIAL METHOD: ABNORMAL
DSDNA AB SER-ACNC: NORMAL [IU]/ML
EOSINOPHIL # BLD AUTO: 0.2 K/UL
EOSINOPHIL NFR BLD: 2.4 %
ERYTHROCYTE [DISTWIDTH] IN BLOOD BY AUTOMATED COUNT: 13.6 %
EST. GFR  (AFRICAN AMERICAN): 45.3 ML/MIN/1.73 M^2
EST. GFR  (NON AFRICAN AMERICAN): 39.2 ML/MIN/1.73 M^2
GLUCOSE SERPL-MCNC: 230 MG/DL
HCT VFR BLD AUTO: 35 %
HGB BLD-MCNC: 11.6 G/DL
IMM GRANULOCYTES # BLD AUTO: 0.01 K/UL
IMM GRANULOCYTES NFR BLD AUTO: 0.1 %
LYMPHOCYTES # BLD AUTO: 1.7 K/UL
LYMPHOCYTES NFR BLD: 22.4 %
MCH RBC QN AUTO: 27.8 PG
MCHC RBC AUTO-ENTMCNC: 33.1 G/DL
MCV RBC AUTO: 84 FL
MONOCYTES # BLD AUTO: 0.5 K/UL
MONOCYTES NFR BLD: 6.2 %
NEUTROPHILS # BLD AUTO: 5 K/UL
NEUTROPHILS NFR BLD: 68 %
NRBC BLD-RTO: 0 /100 WBC
PLATELET # BLD AUTO: 376 K/UL
PMV BLD AUTO: 10 FL
POCT GLUCOSE: 202 MG/DL (ref 70–110)
POCT GLUCOSE: 211 MG/DL (ref 70–110)
POTASSIUM SERPL-SCNC: 4.1 MMOL/L
RBC # BLD AUTO: 4.18 M/UL
SODIUM SERPL-SCNC: 141 MMOL/L
WBC # BLD AUTO: 7.42 K/UL

## 2018-12-28 PROCEDURE — 99226 PR SUBSEQUENT OBSERVATION CARE,LEVEL III: ICD-10-PCS | Mod: ,,, | Performed by: PHYSICIAN ASSISTANT

## 2018-12-28 PROCEDURE — 63600175 PHARM REV CODE 636 W HCPCS: Performed by: PHYSICIAN ASSISTANT

## 2018-12-28 PROCEDURE — 25000003 PHARM REV CODE 250: Performed by: PHYSICIAN ASSISTANT

## 2018-12-28 PROCEDURE — G8978 MOBILITY CURRENT STATUS: HCPCS | Mod: CJ

## 2018-12-28 PROCEDURE — 36415 COLL VENOUS BLD VENIPUNCTURE: CPT

## 2018-12-28 PROCEDURE — G8980 MOBILITY D/C STATUS: HCPCS | Mod: CJ

## 2018-12-28 PROCEDURE — 82962 GLUCOSE BLOOD TEST: CPT

## 2018-12-28 PROCEDURE — G8988 SELF CARE GOAL STATUS: HCPCS | Mod: CH

## 2018-12-28 PROCEDURE — G8989 SELF CARE D/C STATUS: HCPCS | Mod: CH

## 2018-12-28 PROCEDURE — G0378 HOSPITAL OBSERVATION PER HR: HCPCS

## 2018-12-28 PROCEDURE — G8987 SELF CARE CURRENT STATUS: HCPCS | Mod: CH

## 2018-12-28 PROCEDURE — 85025 COMPLETE CBC W/AUTO DIFF WBC: CPT

## 2018-12-28 PROCEDURE — 80048 BASIC METABOLIC PNL TOTAL CA: CPT

## 2018-12-28 PROCEDURE — 99226 PR SUBSEQUENT OBSERVATION CARE,LEVEL III: CPT | Mod: ,,, | Performed by: PHYSICIAN ASSISTANT

## 2018-12-28 PROCEDURE — 97165 OT EVAL LOW COMPLEX 30 MIN: CPT

## 2018-12-28 PROCEDURE — G8979 MOBILITY GOAL STATUS: HCPCS | Mod: CI

## 2018-12-28 RX ORDER — INSULIN ASPART 100 [IU]/ML
1-10 INJECTION, SOLUTION INTRAVENOUS; SUBCUTANEOUS
Status: DISCONTINUED | OUTPATIENT
Start: 2018-12-28 | End: 2018-12-28 | Stop reason: HOSPADM

## 2018-12-28 RX ORDER — IBUPROFEN 200 MG
16 TABLET ORAL
Status: DISCONTINUED | OUTPATIENT
Start: 2018-12-28 | End: 2018-12-28 | Stop reason: HOSPADM

## 2018-12-28 RX ORDER — PANTOPRAZOLE SODIUM 40 MG/1
40 TABLET, DELAYED RELEASE ORAL DAILY
Qty: 30 TABLET | Refills: 4 | Status: SHIPPED | OUTPATIENT
Start: 2018-12-29 | End: 2019-03-03

## 2018-12-28 RX ADMIN — AMLODIPINE BESYLATE 10 MG: 10 TABLET ORAL at 09:12

## 2018-12-28 RX ADMIN — LOSARTAN POTASSIUM 100 MG: 50 TABLET ORAL at 09:12

## 2018-12-28 RX ADMIN — SERTRALINE HYDROCHLORIDE 25 MG: 25 TABLET ORAL at 09:12

## 2018-12-28 RX ADMIN — CARVEDILOL 6.25 MG: 6.25 TABLET, FILM COATED ORAL at 09:12

## 2018-12-28 RX ADMIN — CETIRIZINE HYDROCHLORIDE 10 MG: 10 TABLET, FILM COATED ORAL at 09:12

## 2018-12-28 RX ADMIN — CALCIUM ACETATE 667 MG: 667 CAPSULE ORAL at 09:12

## 2018-12-28 RX ADMIN — CALCIUM ACETATE 667 MG: 667 CAPSULE ORAL at 01:12

## 2018-12-28 RX ADMIN — FUROSEMIDE 80 MG: 40 TABLET ORAL at 09:12

## 2018-12-28 RX ADMIN — INSULIN ASPART 4 UNITS: 100 INJECTION, SOLUTION INTRAVENOUS; SUBCUTANEOUS at 09:12

## 2018-12-28 RX ADMIN — ALLOPURINOL 100 MG: 100 TABLET ORAL at 09:12

## 2018-12-28 RX ADMIN — PRAVASTATIN SODIUM 20 MG: 10 TABLET ORAL at 09:12

## 2018-12-28 RX ADMIN — PANTOPRAZOLE SODIUM 40 MG: 40 TABLET, DELAYED RELEASE ORAL at 09:12

## 2018-12-28 RX ADMIN — INSULIN ASPART 4 UNITS: 100 INJECTION, SOLUTION INTRAVENOUS; SUBCUTANEOUS at 12:12

## 2018-12-28 NOTE — PLAN OF CARE
Problem: Adult Inpatient Plan of Care  Goal: Plan of Care Review  Outcome: Ongoing (interventions implemented as appropriate)  Pt is alert and oriented. Pt complained of no pain this shift. Pt vitals have been stable. Pt is on tele and has been running sinus khloe to sinus rhythm. Pt has been up ambulating to the restroom. Will continue to monitor, will endorse to oncoming nurse.

## 2018-12-28 NOTE — ASSESSMENT & PLAN NOTE
Last HgA1c 6.7, repeat 6.8  - Continue home glargine from 10U   - low dose SSI  - diabetic diet  - resume home medication at discharge

## 2018-12-28 NOTE — NURSING
Received a call from tele stating pt had a crazy rhythm around 1948. Went in to check on pt, pt had gotten up to the restroom. Pt was not in pain or showed any s/s related to heart issues. Will continue monitor.

## 2018-12-28 NOTE — ASSESSMENT & PLAN NOTE
Stable 140s-160s  - c/w losartan 100 mg daily, amlodipine 10 mg daily, coreg 6.25 mg BID  - hold HCTZ to prevent dehydration, restarted on discharge

## 2018-12-28 NOTE — PLAN OF CARE
Patient resting quietly in bed when CM rounded. No family present. CM informed the patient & daughter, Nica Boateng ([phone]), via phone of plan to discharge home today & of message sent to the GI clinic requesting in a hospital follow up appointment in 2-4 weeks. GI clinic to call Nica with appointment date & time. Nica stated that she will provide transportation at time of discharge today. CM informed the patient's nurse, Rosalind ([phone]), of above. Will continue to follow.

## 2018-12-28 NOTE — ASSESSMENT & PLAN NOTE
- deconditioning vs underlying neuromuscular disease  - fall precautions  - PT recommends continued skilled acute PT 3X a week at discharge  - ambulatory referral to PT given
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
## 2018-12-28 NOTE — DISCHARGE SUMMARY
"[facility] Hospital Medicine  Discharge Summary      Patient Name: Ming Boateng  MRN: [id number]  Admission Date: 12/25/2018  Hospital Length of Stay: 0 days  Discharge Date and Time: 12/28/2018  1:45 PM  Attending Physician: Waleska Bender MD   Discharging Provider: Claudia Russo PA-C  Primary Care Provider: John Serrano MD  [facility] Medicine Team: [facility] [place] Claudia Russo PA-C    HPI:   Ming Boateng is a 63 y.o. male who  has a past medical history of HFpEF, T2DM, HTN, HLD, CVA (2017), CKDIV who presents to the ED as a transfer from [facility] with complaints of a sore throat, dry cough, heartburn and halitosis x2 weeks with new onset severe dysphagia x3 days.  Patient able to speak English but is ESL and daughter helps to confirm story.  Pateitn seen x1 week ago at urgent care, dx with Viral URI (rapid strep and rapid flu negative), and given tessalon perles for symptoms.  Patient reports difficulty swallowing food and liquids x3 days with associated choking almost immediately after swallowing. He c/o odynophagia intermittently with swallowing. Sometimes he becomes SOB from coughing.  Last night, he drank some sprite, started to cough, turned blue and momentarily "passed out" (per daughter).  Patient denies any SOB at rest, orthopnea, HA, chest pain, vomiting, nausea, fever, swelling of extremities, or any other concerning symptoms. Daughter does note that he has been consuming a high sodium diet.  She also reports episodes of BUE tremors that occur every other day x2 years.  Patient denies hx of cancer, smoking, changes in speech, neuromuscular disease, changes in weight,  dry eyes/skin, globus sensation.  Reports last EGD in 2010 at [facility] which was normal.     Of note, went to the OSH as last night he choked while eating.  [place] [facility] [place] concerning for epiglottitis on the neck XR.  Received solumedrol and ceftriaxone prior to arrival.  Failed a swallow " test prior to arrival.     In ED, intake labs appear baseline.  Afebrile, no leukocytosis, 95% on RA.  ENT consulted and recommends admission for swallow test and GI consult-->No epiglottitis.      Procedure(s) (LRB):  EGD (ESOPHAGOGASTRODUODENOSCOPY) (N/A)      Hospital Course:   Patient was admitted to observation for dysphagia. Speech consulted, will remain NPO until swallow study. Remains afebrile without leukocytosis. GI consulted. EGD revealed hiatal hernia, esophagitis, and gastritis. Biopsies taken. No cause for dysphagia found. Started on protonix 40 mg daily. Will follow up as outpatient for modified barium swallow. MRI brain with no acute stroke, but remote R midbrain infarct, and small vessel white matter disease. Vascular neurology consulted, no acute stroke to explain isolated dysphagia, signed off. Modified barium study performed, silent aspiration with straw only. Speech recommend normal solids, thin liquids, no straws, double swallow after each bite, and remain upright 30 minutes after eating. CT of neck with no abnormality.  Patient tolerating diet. Discharged with protonix, instructions on eating to avoid aspiration, and ambulatory referral to GI, PT, and speech. Spoke with daughter before discharge who verbalized understanding and translated to father. Follow up with PCP scheduled for 1/24/19. Patient to follow up with GI for pathology results and full esophogram if warranted. All questions answered. Will follow up with autoimmune work up.     Consults:   Consults (From admission, onward)        Status Ordering Provider     Inpatient consult to ENT  Once     Provider:  (Not yet assigned)    Completed TANA ROSALES     Inpatient consult to Gastroenterology  Once     Provider:  (Not yet assigned)    Completed BOBY BETH     Inpatient consult to Vascular (Stroke) Neurology  Once     Provider:  (Not yet assigned)    Completed BOBY BETH          * Dysphagia    Decreased oral  intake  - Unclear etiology but may be related to h/o CVA (2017).   Neoplastic and neuromuscular disease on the differential.    - SLP consulted.  Appears to be oropharyngeal phase dysphagia.  - ENT evaluated patient but no evidence of epiglottitis per nasopharolaryngoscopy and no further intervention warranted.   - GI consulted:  - EGD with hiatal hernia, esophagitis, and gastritis. Bx taken. No cause for dysphagia found  - start protonix, outpatient follow up for complete barium swallow  - MRI with remote R midbrain infarct, small vessel white matter disease  - vascular surgery consulted, no acute stroke to explain acute dysphagia.   - Swallow study with aspiration with straw use.   - Speech recommends normal solids, thin liquids, absolutely no straw use. Double swallow with eat bite, maintain upright 45 minutes after meal.   - Esophogram unable to be performed as straw is used. Will follow up as outpatient.  - CCP, RF negative  - CT neck without abnormalities  - tolerating diet  - ambulatory referral to speech given  - ambulatory referral to GI given to follow up as outpatient. Discharged with protonix     Recurrent falls    - deconditioning vs underlying neuromuscular disease  - fall precautions  - PT recommends continued skilled acute PT 3X a week at discharge  - ambulatory referral to PT given     H/O: CVA (cerebrovascular accident)    Reported CVA in 2017 with minimal speech difficulty and R sided weakness (no imaging/documentation here to confirm); reports no current deficits  - appropriate and oriented x4 on admission  - delirium precautions, neuro checks  - c/w statin, ASA     Stage 4 chronic kidney disease    - at baseline renal function (CrCl 20 on admission)  - c/w phoslo  - avoid nephrotoxic agents, renally dose medications     Hypertension associated with diabetes    Stable 140s-160s  - c/w losartan 100 mg daily, amlodipine 10 mg daily, coreg 6.25 mg BID  - hold HCTZ to prevent dehydration, restarted  on discharge     Type 2 diabetes mellitus with complication, with long-term current use of insulin    Last HgA1c 6.7, repeat 6.8  - Continue home glargine from 10U   - low dose SSI  - diabetic diet  - resume home medication at discharge     (HFpEF) heart failure with preserved ejection fraction    2D echo cfd 2017 shows EF 65, no WMAs, but grade 3 DD  - c/w ARB, lasix 80 mg  - euvolemic on exam, on RA  - strict I/O, daily weights, fluid-restriced diet (when able to tolerate)       Final Active Diagnoses:    Diagnosis Date Noted POA    PRINCIPAL PROBLEM:  Dysphagia [R13.10] 12/25/2018 Yes    Decreased oral intake [R63.8] 12/25/2018 Yes    Recurrent falls [R29.6] 02/20/2018 Not Applicable    H/O: CVA (cerebrovascular accident) [Z86.73] 08/30/2017 Not Applicable    Stage 4 chronic kidney disease [N18.4] 06/16/2017 Yes    Hypertension associated with diabetes [E11.59, I10]  Yes    Type 2 diabetes mellitus with complication, with long-term current use of insulin [E11.8, Z79.4] 06/14/2017 Not Applicable    (HFpEF) heart failure with preserved ejection fraction [I50.30] 05/19/2017 Yes      Problems Resolved During this Admission:       Discharged Condition: good    Disposition: Home or Self Care    Follow Up:  Follow-up Information     John Serrano MD On 1/24/2019.    Specialty:  Family Medicine  Why:  at 11:00 AM  Contact information:  [address]  [address] [id number] [phone]                 Patient Instructions:      Ambulatory Referral to Physical/Occupational Therapy   Referral Priority: Routine Referral Type: Physical Medicine   Referral Reason: Specialty Services Required   Requested Specialty: Physical Therapy   Number of Visits Requested: 1     Ambulatory Referral to Speech Therapy   Referral Priority: Routine Referral Type: Speech Therapy   Referral Reason: Specialty Services Required   Requested Specialty: Speech Pathology   Number of Visits Requested: 1     Diet renal   Scheduling  Instructions: Renal, cardiac diet with thin liquids  Absolutely no straws  Double swallow after each bolus, clear throat in between bites, stay upright after meals for 30 minutes. Avoid talking while chewing and take small bites.     Notify your health care provider if you experience any of the following:  difficulty breathing or increased cough     Notify your health care provider if you experience any of the following:  increased confusion or weakness     Activity as tolerated       Significant Diagnostic Studies: Labs:   CMP   Recent Labs   Lab 12/27/18  0408 12/28/18  0400    141   K 3.9 4.1    104   CO2 26 26   * 230*   BUN 52* 47*   CREATININE 1.8* 1.8*   CALCIUM 9.9 9.7   ANIONGAP 7* 11   ESTGFRAFRICA 45.3* 45.3*   EGFRNONAA 39.2* 39.2*   , CBC   Recent Labs   Lab 12/27/18  0408 12/28/18  0400   WBC 13.01* 7.42   HGB 10.8* 11.6*   HCT 32.6* 35.0*    376*   , Troponin   Recent Labs   Lab 12/25/18  1212   TROPONINI 0.010    and A1C:   Recent Labs   Lab 12/26/18  0625   HGBA1C 6.8*     MRA Brain without contrast   Order: [id number]   Status:  Final result   Visible to patient:  Yes (Patient Portal) Next appt:  01/11/2019 at 01:45 PM in Outpatient Rehab (Rina Santiago, AMALIA)   Details     Reading Physician Reading Date Result Priority   Lincoln Gaytan MD 12/26/2018    Sanjay Hoskins MD 12/26/2018       Narrative     EXAMINATION:  MRI BRAIN WITHOUT CONTRAST; MRA BRAIN WITHOUT CONTRAST    CLINICAL HISTORY:  Neuro deficit(s), subacute; Headache, chronic, new features or neuro deficit    TECHNIQUE:  Multiplanar multisequence MR imaging of the brain was performed without contrast. Noncontrast 3D time-of-flight intracranial MR angiography was performed through the [place] [facility] [place] with MIP reformatting.    COMPARISON:  Head CT 06/17/2016.    FINDINGS:  Intracranial Compartment:    The ventricles and sulci are normal in size for age without evidence of hydrocephalus.  Note is made that there is a  cavum septum pellucidum and cavum vergae.  No extra-axial blood or fluid collections.    There are scattered foci of increased T2/FLAIR signal within the periventricular white matter.  Findings are nonspecific, however favored to reflect chronic microvascular ischemic change.  There is also focus increased T2/FLAIR signal which extends into the right cerebral peduncle, mid brain, and juanita.  No mass lesion, acute hemorrhage, edema, or acute infarct.    Intracranial Arteries:    The intracranial ICAs are within normal limits.  The anterior cerebral arteries are within normal limits.  The middle cerebral arteries are unremarkable. There appears to be a fetal origin of the left PCA. There is a dominant right vertebral artery.    No focal high-grade stenosis, occlusion, or aneurysm.    Skull/Extracranial Contents (limited evaluation): Bone marrow signal intensity is normal.  There are postoperative changes in the orbits.      Impression       Extensive T2/FLAIR signal hyperintensities within the supratentorial brain.  The findings may either represent sequela of chronic small vessel ischemic disease are some form of vasculitis.    No evidence of vessel occlusion or intracranial vascular abnormality.    Electronically signed by resident: Lincoln Gaytan  Date: 12/26/2018  Time: 17:38    Electronically signed by: Sanjay Hoskins MD  Date: 12/26/2018  Time: 18:01             Last Resulted: 12/26/18 18:01           MRI Brain Without Contrast   Order: [id number]   Status:  Final result   Visible to patient:  Yes (Patient Portal) Next appt:  01/03/2019 at 01:00 PM in Outpatient Rehab (VICTORINA Tucker, CCC-SLP)   Details     Reading Physician Reading Date Result Priority   Lincoln Gaytan MD 12/26/2018    Sanjay Hoskins MD 12/26/2018       Narrative     EXAMINATION:  MRI BRAIN WITHOUT CONTRAST; MRA BRAIN WITHOUT CONTRAST    CLINICAL HISTORY:  Neuro deficit(s), subacute; Headache, chronic, new features or neuro  deficit    TECHNIQUE:  Multiplanar multisequence MR imaging of the brain was performed without contrast. Noncontrast 3D time-of-flight intracranial MR angiography was performed through the [place] [facility] [place] with MIP reformatting.    COMPARISON:  Head CT 06/17/2016.    FINDINGS:  Intracranial Compartment:    The ventricles and sulci are normal in size for age without evidence of hydrocephalus.  Note is made that there is a cavum septum pellucidum and cavum vergae.  No extra-axial blood or fluid collections.    There are scattered foci of increased T2/FLAIR signal within the periventricular white matter.  Findings are nonspecific, however favored to reflect chronic microvascular ischemic change.  There is also focus increased T2/FLAIR signal which extends into the right cerebral peduncle, mid brain, and juanita.  No mass lesion, acute hemorrhage, edema, or acute infarct.    Intracranial Arteries:    The intracranial ICAs are within normal limits.  The anterior cerebral arteries are within normal limits.  The middle cerebral arteries are unremarkable. There appears to be a fetal origin of the left PCA. There is a dominant right vertebral artery.    No focal high-grade stenosis, occlusion, or aneurysm.    Skull/Extracranial Contents (limited evaluation): Bone marrow signal intensity is normal.  There are postoperative changes in the orbits.      Impression       Extensive T2/FLAIR signal hyperintensities within the supratentorial brain.  The findings may either represent sequela of chronic small vessel ischemic disease are some form of vasculitis.    No evidence of vessel occlusion or intracranial vascular abnormality.    Electronically signed by resident: Lincoln Gaytan  Date: 12/26/2018  Time: 17:38    Electronically signed by: Sanjay Hoskins MD  Date: 12/26/2018  Time: 18:01             Last Resulted: 12/26/18 18:01           CT Neck Chest Without Contrast (XPD)   Order: [id number]   Status:  Final result   Visible to  patient:  No (Not Released) Next appt:  01/03/2019 at 01:00 PM in Outpatient Rehab (VICTORINA Tucker, CCC-SLP)   Details     Reading Physician Reading Date Result Priority   Frederick Rinaldi MD 12/27/2018    Benny Urias MD 12/27/2018       Narrative     EXAMINATION:  CT NECK CHEST WITHOUT CONTRAST (XPD)    CLINICAL HISTORY:  Disorders of esophagus in diseases classified elsewhere;dysphagia;    TECHNIQUE:  Low dose axial images, coronal and sagittal reformations were obtained from the skull base to the lung bases without intravenous contrast.    COMPARISON:  Soft tissue neck radiograph 12/25/2018.    FINDINGS:  Orbits, paranasal sinuses, and skull base: Normal.    Nasopharynx: Normal.    Suprahyoid neck: Normal oropharynx, oral cavity, parapharyngeal space, and retropharyngeal space.    Infrahyoid neck: Normal larynx, hypopharynx, and supraglottis.    Thyroid: Normal.    Vascular structures: Within normal limits on this noncontrast examination.    Lungs: Normal    Mediastinum: Heart is normal in size.  No pericardial effusions.  Abundant coronary artery atherosclerosis.  No mediastinal or hilar lymphadenopathy.    Upper abdomen: Esophagus is normal in course, caliber and contour without abnormal wall thickening or focal mass lesion.  Remaining visualized upper abdominal contents is within normal limits.    Osseous structures: No acute fracture.  No suspicious lytic or blastic osseous process.      Impression       CT evaluation of the neck chest reveals no significant abnormality, specifically no imaging findings to explain reported symptoms of dysphagia.    Abundant coronary artery atherosclerosis.    Electronically signed by resident: Benny Urias  Date: 12/27/2018  Time: 11:37    Electronically signed by: Frederick Rinaldi MD  Date: 12/27/2018  Time: 13:17             Last Resulted: 12/27/18 13:17           Fl Modified Barium Swallow Speech   Order: [id number]   Status:  Final result   Visible to patient:  No (Not  Released) Next appt:  01/03/2019 at 01:00 PM in Outpatient Rehab (VICTORINA Tucker, CCC-SLP)   Details     Reading Physician Reading Date Result Priority   Tobin Roberto III, MD 12/27/2018    Jhonatan Echeverria MD 12/27/2018       Narrative     EXAMINATION:  FL MODIFIED BARIUM SWALLOW SPEECH STUDY    CLINICAL HISTORY:  dysphagia, recommended by speech;    TECHNIQUE:  Video fluoroscopic swallowing examination was performed in conjunction with the speech pathology department.  Barium containing liquid and/or solid food substances were used to assess swallowing.    Fluoroscopy Time: 3.0    COMPARISON:  Cervical spine radiograph 07/28/2012, CT neck chest without contrast 12/27/2018      Impression       Transit: Normal oral transit time. Minimal vallecular and piriform stasis of contrast.    Penetration/Aspiration: Silent subglottic aspiration occurred with administration of thin barium substance via a straw.    Miscellaneous: N/A.    See speech pathology report for further details.    Electronically signed by resident: Jhonatan Echeverria  Date: 12/27/2018  Time: 15:50    Electronically signed by: Tobin Roberto MD  Date: 12/27/2018  Time: 15:54             Last Resulted: 12/27/18 15:54             Pending Diagnostic Studies:     Procedure Component Value Units Date/Time    BHAVANI [[id number]] Collected:  12/27/18 1308    Order Status:  Sent Lab Status:  In process Updated:  12/27/18 1317    Specimen:  Blood     Anti-DNA antibody, double-stranded [[id number]] Collected:  12/27/18 1308    Order Status:  Sent Lab Status:  In process Updated:  12/27/18 1317    Specimen:  Blood     Anti-scleroderma antibody [[id number]] Collected:  12/27/18 1308    Order Status:  Sent Lab Status:  In process Updated:  12/27/18 1317    Specimen:  Blood     FL Esophagram Complete [[id number]]     Order Status:  Sent Lab Status:  No result          Medications:  Reconciled Home Medications:      Medication List      START taking these medications   "  pantoprazole 40 MG tablet  Commonly known as:  PROTONIX  Take 1 tablet (40 mg total) by mouth once daily.  Start taking on:  12/29/2018        CHANGE how you take these medications    allopurinol 100 MG tablet  Commonly known as:  ZYLOPRIM  Take 1 tablet (100 mg total) by mouth once daily.  What changed:  how much to take        CONTINUE taking these medications    acetaminophen 650 MG Tbsr  Commonly known as:  TYLENOL  Take 1 tablet (650 mg total) by mouth 3 (three) times daily as needed.     amLODIPine 10 MG tablet  Commonly known as:  NORVASC  Take 1 tablet (10 mg total) by mouth once daily.     aspirin 81 MG Chew  Take 1 tablet (81 mg total) by mouth once daily. for 365 doses     benzonatate 100 MG capsule  Commonly known as:  TESSALON PERLES  Take 2 capsules (200 mg total) by mouth 3 (three) times daily as needed for Cough.     blood sugar diagnostic Strp  1 strip by Misc.(Non-Drug; Combo Route) route 4 (four) times daily.     blood-glucose meter kit  Use as instructed     calcium acetate 667 mg tablet  Commonly known as:  PHOSLO  Take 1 tablet by mouth 3 (three) times daily with meals.     carvedilol 12.5 MG tablet  Commonly known as:  COREG  Take 0.5 tablets (6.25 mg total) by mouth 2 (two) times daily with meals.     cetirizine 10 mg chewable tablet  Take 10 mg by mouth once daily.     furosemide 40 MG tablet  Commonly known as:  LASIX  Take 2 tablets (80 mg total) by mouth once daily.     garlic 1,000 mg Cap  Take by mouth.     insulin glargine (TOUJEO) 300 unit/mL (1.5 mL) Inpn pen  Commonly known as:  TOUJEO  Inject 10 Units into the skin 2 (two) times daily.     insulin syringe-needle,dispos. 0.3 mL 30 gauge x 5/16" Syrg  1 each by Misc.(Non-Drug; Combo Route) route 3 (three) times daily.     lancets Misc  Commonly known as:  LANCETS,ULTRA THIN  1 lancet by Misc.(Non-Drug; Combo Route) route 3 (three) times daily with meals.     losartan-hydrochlorothiazide 100-25 mg 100-25 mg per tablet  Commonly " "known as:  HYZAAR  Take 1 tablet by mouth once daily. 50-12.5     pen needle, diabetic 29 gauge x 1/2" Ndle  1 pen by Misc.(Non-Drug; Combo Route) route 3 (three) times daily with meals.     pravastatin 20 MG tablet  Commonly known as:  PRAVACHOL  Take 1 tablet (20 mg total) by mouth once daily.     sertraline 25 MG tablet  Commonly known as:  ZOLOFT  Take 1 tablet (25 mg total) by mouth once daily.     sevelamer carbonate 800 mg Tab  Commonly known as:  RENVELA  Take 1 tablet (800 mg total) by mouth 3 (three) times daily with meals.     VITAMIN D2 50,000 unit Cap  Generic drug:  ergocalciferol  Take 50,000 Units by mouth every 7 days.            Indwelling Lines/Drains at time of discharge:   Lines/Drains/Airways          None          Time spent on the discharge of patient: 36 minutes  Patient was seen and examined on the date of discharge and determined to be suitable for discharge.         Claudia Russo PA-C  Department of Hospital Medicine  [facility]  "

## 2018-12-28 NOTE — ASSESSMENT & PLAN NOTE
2D echo cfd 2017 shows EF 65, no WMAs, but grade 3 DD  - c/w ARB, lasix 80 mg  - euvolemic on exam, on RA  - strict I/O, daily weights, fluid-restriced diet (when able to tolerate)

## 2018-12-28 NOTE — ASSESSMENT & PLAN NOTE
Decreased oral intake  - Unclear etiology but may be related to h/o CVA (2017).   Neoplastic and neuromuscular disease on the differential.    - SLP consulted.  Appears to be oropharyngeal phase dysphagia.  - ENT evaluated patient but no evidence of epiglottitis per nasopharolaryngoscopy and no further intervention warranted.   - GI consulted:  - EGD with hiatal hernia, esophagitis, and gastritis. Bx taken. No cause for dysphagia found  - start protonix, outpatient follow up for complete barium swallow  - MRI with remote R midbrain infarct, small vessel white matter disease  - vascular surgery consulted, no acute stroke to explain acute dysphagia.   - Swallow study with aspiration with straw use.   - Speech recommends normal solids, thin liquids, absolutely no straw use. Double swallow with eat bite, maintain upright 45 minutes after meal.   - Esophogram unable to be performed as straw is used. Will follow up as outpatient.  - CCP, RF negative  - CT neck without abnormalities  - tolerating diet  - ambulatory referral to speech given  - ambulatory referral to GI given to follow up as outpatient. Discharged with protonix

## 2018-12-28 NOTE — PLAN OF CARE
Problem: Occupational Therapy Goal  Goal: Occupational Therapy Goal  Pt is currently performing ADLs, functional mobility & t/fs at baseline and displays age-appropriate strength, endurance & balance. OT services are not recommended at this time and patient is safe to D/C home.  Outcome: Outcome(s) achieved Date Met: 12/28/18  Pt at functional baseline. D/c home with no OT needs.   Sabiha newman, OT  12/28/2018

## 2018-12-30 NOTE — ANESTHESIA POSTPROCEDURE EVALUATION
"Anesthesia Post Evaluation    Patient: Ming Boateng    Procedure(s) Performed: Procedure(s) (LRB):  EGD (ESOPHAGOGASTRODUODENOSCOPY) (N/A)    Final Anesthesia Type: general  Patient location during evaluation: PACU  Patient participation: Yes- Able to Participate  Level of consciousness: awake and alert and oriented  Post-procedure vital signs: reviewed and stable  Pain management: adequate  Airway patency: patent  PONV status at discharge: No PONV  Anesthetic complications: no      Cardiovascular status: blood pressure returned to baseline  Respiratory status: unassisted  Hydration status: euvolemic  Follow-up not needed.        Visit Vitals  BP (!) 166/80 (BP Location: Left arm, Patient Position: Lying)   Pulse 73   Temp 36.1 °C (97 °F) (Oral)   Resp 16   Ht 5' 6" (1.676 m)   Wt 75.3 kg (165 lb 14.3 oz)   SpO2 97%   BMI 26.78 kg/m²       Pain/Lesly Score: No Data Recorded      "

## 2018-12-31 ENCOUNTER — TELEPHONE (OUTPATIENT)
Dept: GASTROENTEROLOGY | Facility: CLINIC | Age: 63
End: 2018-12-31

## 2018-12-31 LAB — ENA SCL70 AB SER-ACNC: 4 UNITS

## 2018-12-31 NOTE — TELEPHONE ENCOUNTER
Spoke with caregiver.  Aware patient is scheduled to see  on 2/4 for 4:00.  Will mail confirmation.

## 2018-12-31 NOTE — TELEPHONE ENCOUNTER
----- Message from Eliecer Claros MD sent at 12/31/2018  1:25 PM CST -----  Contact: self [phone]  He can f/u with Dr Navarrete  ----- Message -----  From: Jazz Flanagan MA  Sent: 12/31/2018  10:39 AM  To: MD Steve Harmon, do you need to see him back in the clinic?  I did see in your procedure note a recommendation for a modified barium swallow.  ----- Message -----  From: Mariza Ha  Sent: 12/28/2018  11:10 AM  To: Harlan GARCIA Staff    Patient Requesting Appointment.     Reason for appt.: Post-op that Dr. Claros did on 12/26  When is the first available appointment? Unable to access  Communication Preference: self [phone]  Additional Information: Pt has medicaid insurance

## 2019-01-03 ENCOUNTER — CLINICAL SUPPORT (OUTPATIENT)
Dept: REHABILITATION | Facility: HOSPITAL | Age: 64
End: 2019-01-03
Payer: MEDICAID

## 2019-01-03 DIAGNOSIS — R13.12 OROPHARYNGEAL DYSPHAGIA: ICD-10-CM

## 2019-01-03 PROCEDURE — 92610 EVALUATE SWALLOWING FUNCTION: CPT | Mod: PO

## 2019-01-03 NOTE — PT/OT/SLP DISCHARGE
Physical Therapy Discharge Summary    Name: Ming Boateng  MRN: [id number]   Principal Problem: Dysphagia     Patient Discharged from acute Physical Therapy on 2018.  Please refer to prior PT noted date on 2018 for functional status.     Assessment:     Patient was discharged unexpectedly.  Information required to complete an accurate discharge summary is unknown.  Refer to therapy initial evaluation and last progress note for initial and most recent functional status and goal achievement.  Recommendations made may be found in medical record.    Objective:     GOALS:   Multidisciplinary Problems     Physical Therapy Goals        Problem: Physical Therapy Goal    Goal Priority Disciplines Outcome Goal Variances Interventions   Physical Therapy Goal     PT, PT/OT Ongoing (interventions implemented as appropriate)     Description:  Goals to be met by: 2019     Patient will increase functional independence with mobility by performin. Gait  x 150 feet with [place]  2. Ascend/descend 12 stair with Right Handrails Supervision  3. Lower extremity exercise program x15 reps per handout, with independence                      Reasons for Discontinuation of Therapy Services  Transfer to alternate level of care.      Plan:     Patient Discharged to: Home no PT services needed.    David Carrera, PT  1/3/2019

## 2019-01-03 NOTE — PATIENT INSTRUCTIONS
Aspiration Precautions:    Tips for safe eating and drinking:   · Clean your mouth before and after meals.  Make sure your mouth is clean and moist before starting your meal. When you are finished, clean your mouth again. Make sure there is no food around the teeth or stuck in your cheeks.   · Be alert. Eat, drink, and take your medications only when you are alert and paying attention.   · Reduce distractions. Turn off the TV and reduce distractions while you are eating and drinking.  · Sit upright. Sit fully upright for your meals and to take your medications. It's best to eat and drink while sitting in a chair, unless your healthcare provider gave you other positioning instructions.   · Stay upright. Stay fully upright for at least 30 minutes after a meal. You may also need to avoid eating 1-2 hours before bed.   · Stay active.  Exercise as directed by your healthcare provider. Staying active helps your lungs stay clear.  · Keep your lungs clear. Ask your doctor to recommend other therapy or devices to help you with the strength of your cough and the overall clearance of your lungs.     Copyright [employer] 2017    Swallowing Precautions during meals:  1. Small bites/sips (1teaspoon/forkful)  2. Alternate solids and liquids.  3. Swallow before next bite/sip.  4. NO straws  5. Swallow 2x per bite  6. Pills one at a time

## 2019-01-03 NOTE — PLAN OF CARE
Outpatient Neurological Rehabilitation  Speech and Language Therapy Evaluation    Date: 1/3/2019   Start Time:  1300  Stop Time:  1345    Name: Ming Boateng   MRN: [id number]    Therapy Diagnosis:   Encounter Diagnosis   Name Primary?    Oropharyngeal dysphagia     Physician: Claudia Russo PA-C  Physician Orders: ST evaluate and treat  Medical Diagnosis from Referral: Dsyphagia , unspecified type R13.10 (ICD-10-CM)    Visit #/Visits authorized: 1/ 1  Date of Evaluation:  1/3/2019   Insurance Authorization Period: 12/28/18 to 12/28/19   Plan of Care Expiration:   1/3/2019 to 1/3/19 (evaluation only)     Time In: 1300  Time Out: 1345  Total Billable Time: 45 minutes  Procedure Min.   Swallowing and Oral Function Evaluation    45               Precautions:Standard and swallowing precautions  Subjective   Date of Onset: About 2 weeks ago  History of Current Condition:   Ming Boateng arrived with his daughter Nica, who is his primary caregiver. She provided all pertinent information. Patient speaks mostly Polish and understand little English. Pt and his daughter reported choking and difficulty swallowing about two weeks ago. Since that time, his swallowing has improved and is back to baseline. Reportedly he is taking antireflux medication in addition to his other medications.HIs daughter reported that he had two previous strokes in 2016.    Past Medical History: Ming Boateng  has a past medical history of CHF (congestive heart failure), Diabetes mellitus, Hypertension, Renal disorder, and Stroke.  Ming Boateng  has a past surgical history that includes arm surgery (Left); Eye surgery (Bilateral); Eye surgery (Right, 07/2017); and Esophagogastroduodenoscopy (N/A, 12/26/2018).  Medical Hx and Allergies:  Ming has a current medication list which includes the following prescription(s): acetaminophen, allopurinol, amlodipine, aspirin, benzonatate, blood sugar diagnostic, blood-glucose meter, calcium acetate, carvedilol,  "cetirizine, ergocalciferol, furosemide, garlic, insulin glargine (toujeo), insulin syringe-needle,dispos., lancets, losartan-hydrochlorothiazide 100-25 mg, pantoprazole, pen needle, diabetic, pravastatin, sertraline, and sevelamer carbonate.   Review of patient's allergies indicates:   Allergen Reactions    Cayenne Anaphylaxis     Throat swells up     Cayenne pepper      Imaging: MRI scan of the brain (12/26/18): "Extensive T2/FLAIR signal hyperintensities within the supratentorial brain.  The findings may either represent sequela of chronic small vessel ischemic disease are some form of vasculitis. No evidence of vessel occlusion or intracranial vascular abnormality. "  Prior Therapy:  Speech therapy while in the hospital  Social History:  Ming Boateng lives with his daughter and grand daughter.   Occupation: unknown   Prior Level of Function: independent prior to his strokes   Current Level of Function: supervision due to cognitive deficits (decreased memory)  Pain:   0/10  Pain Location / Description: n/a  Nutrition:  Regular consistency and thin liquids; diabetic and low salt diet  Patient's Therapy Goals:  None stated  Objective     Clinical Swallow Exam/ Etelvina Mechanism Exam:  Oral Motor Exam:  Oral Musculature: WFL  Structure Abnormalities: none  Dentition: WFL  Secretion Management: WFL  Mucosal Quality: WFL  Mandibular Strength and Mobility: WFL   Rest: WFL   Lateralization: WFL  Protrusion: WFL  Retraction:  WFL  Involuntary Movement: none  Oral Labial Strength and Mobility: WFL  Rest: WFL   Pucker: WFL  Retraction: WFL  Alternating Pucker / Retraction: WFL  Involuntary Movement: absent   Lingual Strength and Mobility: mild reduced strength and ROM, however pt needed cues to follow directions  Rest: WFL  Protrusion: mild reduced strength and ROM  Retraction: WFL  Lateralization: mild reduced strength and ROM  Involuntary Movement: present, mild tremors noted  Velar Elevation: mild reduced strength and " "ROM  Rest: WFL  Symmetry: WFL  Elevation: mild reduced strength and ROM  Sustained Elevation: mild reduced strength and ROM  Involuntary Movement: absent   Buccal Strength and Mobility: WFL  Volitional Cough: WFL  Volitional Swallow: WFL  Voice Prior to PO Intake: clear    Trigeminal Nerve (CN V): WFL    Facial Nerve (CN VII): WFL    Glossopharyngeal Nerve (IX) and Vagus (X): mild reduced velar muscle strength and ROM indicative of possible nerve impairment    Hypoglossal Nerve (XII): mild reduced lingual muscle strength and ROM indicative of possible nerve impairment    Facial, Glossopharyngeal, and Vagus Nerves: see above    [place] Swallow Protocol:  PASSED  [place] Swallow Protocol dictates pt remain NPO if fail screener; (Coltr et al. 2014) however, as objective swallow assessment is not available for greater than a week, pt will remain on current diet until objective assessment is completed unless otherwise indicated.     Clinical Swallow Examination:   Pt presented with:   THIN:-3 oz water test, self regulated bottle sip of water x2    PUREE:- tsp bites of pudding x2    DENTAL SOFT:- DNT    SOLID: -bite of larry cracker x1     DESCRIPTION: Oral phase: Good lip seal around the bottle with no anterior loss. No residue or pocketing observed or reported. Good mastication of the cracker. Pharyngeal phase: Good hyolaryngeal elevation palpated with a timely swallow response. One delayed throat clear noted but no overt s/s of aspiration.  Vocal quality remained clear throughout the session. Swallowing precautions such as small bites/sips, double swallows, alternate solids and liquids, swallow before next bite, and no straws were discussed and written for patient.     Modified Barium Swallow Study (12/28/18): "Mild oropharyngeal dysphagia characterized by dec'd BOT retraction and pharyngeal constriction, without penetration/aspiration of thin liquid via cup sips and regular consistency solids." Aspiration precautions were " recommended with follow up dysphagia therapy. However the patient and his daughter reported improvement in his swallowing to baseline since the MBSS.    MYRON NOMS (National Outcome Measure System):   Severity Modifier for Medicare G-Code:  Swallowing  Current status:  FCM:  LEVEL 7: The individual's ability to eat independently is not limited by swallow function.  Swallowing would be safe and efficient for all consistencies. Compensatory strategies are effectively used when needed.   Projected status:  FCM:   LEVEL 7: The individual's ability to eat independently is not limited by swallow function.  Swallowing would be safe and efficient for all consistencies. Compensatory strategies are effectively used when needed.    Discharge status:  FCM:   LEVEL 7: The individual's ability to eat independently is not limited by swallow function.  Swallowing would be safe and efficient for all consistencies. Compensatory strategies are effectively used when needed.      Treatment   Treatment Time In: n/a  Treatment Time Out: n/a  Total Treatment Time: n/a  n/a    Education: role of SLP, aspiration precautions, memory strategies and course of medical disease affect on therapy diagnosis  was discussed with pt. Patient and family members expressed understanding.     Home Program: n/a  Assessment   Ming is a 63 y.o. male referred to outpatient SpeechTherapy with a medical diagnosis of dysphagia. Pt presents with mild oropharyngeal dysphagia which has improved to baseline per pt's daughter. Occasional coughing when taking bigger bites/sips. He needs verbal and visual cues to remind him of the swallowing precautions due to his short term memory deficits.   Demonstrates impairments including limitations as described in the problem list. Positive prognostic factors include good family support. Negative prognostic factors include history of multiple strokes and memory deficits. Barriers to progress include memory deficits. Skilled,  outpatient neurological rehabilitation speech therapy is not warranted at this time since patient's swallowing is back to baseline.    Rehab Potential: fair to good  Pt's spiritual, cultural and educational needs considered and pt agreeable to plan of care and goals.    Plan   Plan of Care Certification: 1/3/2019  to 1/3/19  Recommended Treatment Plan:  Speech therapy is not warranted at this time. Swallowing precautions were provided as verbal and written cues. Patient and his daughter were encouraged to seek medical intervention if symptoms re occur or get worse.    Other Recommendations: none    Therapist's Name:   VICTORINA Tucker, CCC-SLP     Date: 1/3/2019

## 2019-01-11 DIAGNOSIS — I15.2 HYPERTENSION ASSOCIATED WITH DIABETES: Primary | ICD-10-CM

## 2019-01-11 DIAGNOSIS — E11.59 HYPERTENSION ASSOCIATED WITH DIABETES: Primary | ICD-10-CM

## 2019-01-11 RX ORDER — LOSARTAN POTASSIUM 100 MG/1
100 TABLET ORAL DAILY
Qty: 90 TABLET | Refills: 3 | Status: ON HOLD | OUTPATIENT
Start: 2019-01-11 | End: 2019-06-24 | Stop reason: SDUPTHER

## 2019-02-19 ENCOUNTER — OFFICE VISIT (OUTPATIENT)
Dept: FAMILY MEDICINE | Facility: HOSPITAL | Age: 64
End: 2019-02-19
Attending: FAMILY MEDICINE
Payer: MEDICAID

## 2019-02-19 VITALS
HEART RATE: 71 BPM | WEIGHT: 172.38 LBS | SYSTOLIC BLOOD PRESSURE: 102 MMHG | TEMPERATURE: 98 F | BODY MASS INDEX: 26.13 KG/M2 | HEIGHT: 68 IN | DIASTOLIC BLOOD PRESSURE: 56 MMHG

## 2019-02-19 DIAGNOSIS — J06.9 UPPER RESPIRATORY TRACT INFECTION, UNSPECIFIED TYPE: Primary | ICD-10-CM

## 2019-02-19 DIAGNOSIS — Z86.73 H/O: CVA (CEREBROVASCULAR ACCIDENT): ICD-10-CM

## 2019-02-19 DIAGNOSIS — I50.30 (HFPEF) HEART FAILURE WITH PRESERVED EJECTION FRACTION: ICD-10-CM

## 2019-02-19 DIAGNOSIS — E11.69 HYPERLIPIDEMIA ASSOCIATED WITH TYPE 2 DIABETES MELLITUS: ICD-10-CM

## 2019-02-19 DIAGNOSIS — E11.59 HYPERTENSION ASSOCIATED WITH DIABETES: ICD-10-CM

## 2019-02-19 DIAGNOSIS — E78.5 HYPERLIPIDEMIA ASSOCIATED WITH TYPE 2 DIABETES MELLITUS: ICD-10-CM

## 2019-02-19 DIAGNOSIS — E11.21 DIABETIC NEPHROPATHY ASSOCIATED WITH TYPE 2 DIABETES MELLITUS: ICD-10-CM

## 2019-02-19 DIAGNOSIS — N18.4 STAGE 4 CHRONIC KIDNEY DISEASE: ICD-10-CM

## 2019-02-19 DIAGNOSIS — I15.2 HYPERTENSION ASSOCIATED WITH DIABETES: ICD-10-CM

## 2019-02-19 PROCEDURE — 90471 IMMUNIZATION ADMIN: CPT

## 2019-02-19 PROCEDURE — 99214 OFFICE O/P EST MOD 30 MIN: CPT | Mod: 25

## 2019-02-19 RX ORDER — AZITHROMYCIN 250 MG/1
TABLET, FILM COATED ORAL
Qty: 6 TABLET | Refills: 0 | Status: SHIPPED | OUTPATIENT
Start: 2019-02-19 | End: 2019-03-03

## 2019-02-19 RX ORDER — INSULIN LISPRO 100 U/ML
INJECTION, SOLUTION INTRAVENOUS; SUBCUTANEOUS
Refills: 0 | COMMUNITY
Start: 2019-02-13 | End: 2019-06-14 | Stop reason: CLARIF

## 2019-02-19 RX ORDER — CODEINE PHOSPHATE AND GUAIFENESIN 10; 100 MG/5ML; MG/5ML
5 SOLUTION ORAL 3 TIMES DAILY PRN
Qty: 118 ML | Refills: 0 | Status: SHIPPED | OUTPATIENT
Start: 2019-02-19 | End: 2019-03-01

## 2019-02-20 NOTE — PROGRESS NOTES
I have reviewed the notes, assessments, and/or procedures performed by Dr. Serrano, I concur with her/his documentation of Ming Boateng.

## 2019-02-20 NOTE — PROGRESS NOTES
Subjective:       Patient ID: Ming Boateng is a 63 y.o. male.    Chief Complaint:  Cough    HPI   62 y/o M with a PMH DM2, HTN, HFpEF, CKD4 here with cough, malaise, chills, sore throat, stuffy nose and SOB with ambulation X 10 days.  He had his flu shot in December (not on record).  Denies fever, chills, n/v/d.  Cough keeps him awake at night.  Cough is dry.  He's eating okay.     Review of Systems   Constitutional: Positive for chills. Negative for activity change and appetite change.   HENT: Positive for congestion, sneezing and sore throat.    Respiratory: Positive for cough and shortness of breath. Negative for apnea, chest tightness and wheezing.    Cardiovascular: Negative for chest pain and leg swelling.   Gastrointestinal: Negative for abdominal distention and abdominal pain.   Genitourinary: Negative for difficulty urinating and dysuria.   Musculoskeletal: Negative for arthralgias and back pain.   Skin: Negative for color change and pallor.   Neurological: Negative for light-headedness and headaches.   Psychiatric/Behavioral: Negative for agitation and behavioral problems.       Objective:      Vitals:    02/19/19 1554   BP: (!) 102/56   Pulse: 71   Temp: 97.8 °F (36.6 °C)     Physical Exam   Constitutional: He is oriented to person, place, and time. He appears well-developed and well-nourished.   HENT:   Head: Atraumatic.   Nose: Mucosal edema present.   Mouth/Throat: Mucous membranes are normal. Posterior oropharyngeal edema and posterior oropharyngeal erythema present. No oropharyngeal exudate.   Eyes: EOM are normal.   Neck: Normal range of motion. Neck supple.   Cardiovascular: Normal rate and regular rhythm.   Pulmonary/Chest: Effort normal and breath sounds normal. No respiratory distress.   Abdominal: Soft. Bowel sounds are normal.   Musculoskeletal: Normal range of motion. He exhibits no edema.   Neurological: He is alert and oriented to person, place, and time.   Skin: Skin is warm and dry.    Psychiatric: He has a normal mood and affect. His behavior is normal.       Assessment:       1. Upper respiratory tract infection, unspecified type    2. H/O: CVA (cerebrovascular accident)    3. Hyperlipidemia associated with type 2 diabetes mellitus    4. (HFpEF) heart failure with preserved ejection fraction    5. Stage 4 chronic kidney disease    6. Diabetic nephropathy associated with type 2 diabetes mellitus    7. Hypertension associated with diabetes        Plan:     Upper respiratory tract infection, unspecified type  -     azithromycin (Z-RONNI) 250 MG tablet; Take 2 tablets on day 1.  Then take 1 tablet daily X 4 days.  Dispense: 6 tablet; Refill: 0  -     guaifenesin-codeine 100-10 mg/5 ml (TUSSI-ORGANIDIN NR)  mg/5 mL syrup; Take 5 mLs by mouth 3 (three) times daily as needed for Cough.  Dispense: 118 mL; Refill: 0    H/O: CVA (cerebrovascular accident)  *diabetes control, bp control, aspirin/statin  Hyperlipidemia associated with type 2 diabetes mellitus  -statin  (HFpEF) heart failure with preserved ejection fraction  -coreg, amlodipine, lasix  Stage 4 chronic kidney disease  -f/u with nephrology, avoid nephrotoxic meds  Diabetic nephropathy associated with type 2 diabetes mellitus  -f/u nephrology   Hypertension associated with diabetes  -amlodipine    -Imm due   -     (In Office Administered) Pneumococcal Polysaccharide Vaccine (23 Valent) (SQ/IM)    F/u 3 months or PRN

## 2019-03-03 ENCOUNTER — HOSPITAL ENCOUNTER (INPATIENT)
Facility: HOSPITAL | Age: 64
LOS: 4 days | Discharge: NURSING, REHAB OR LONG-TERM CARE | DRG: 065 | End: 2019-03-07
Attending: EMERGENCY MEDICINE | Admitting: FAMILY MEDICINE
Payer: MEDICAID

## 2019-03-03 DIAGNOSIS — Z86.73 H/O: CVA (CEREBROVASCULAR ACCIDENT): Primary | ICD-10-CM

## 2019-03-03 DIAGNOSIS — I63.511 ACUTE RIGHT MCA STROKE: ICD-10-CM

## 2019-03-03 DIAGNOSIS — I63.9 STROKE: ICD-10-CM

## 2019-03-03 PROBLEM — R53.1 LEFT-SIDED WEAKNESS: Status: ACTIVE | Noted: 2019-03-03

## 2019-03-03 LAB
25(OH)D3+25(OH)D2 SERPL-MCNC: 30 NG/ML
ALBUMIN SERPL BCP-MCNC: 4 G/DL
ALP SERPL-CCNC: 91 U/L
ALT SERPL W/O P-5'-P-CCNC: 40 U/L
AMPHET+METHAMPHET UR QL: NEGATIVE
ANION GAP SERPL CALC-SCNC: 10 MMOL/L
AST SERPL-CCNC: 24 U/L
BACTERIA #/AREA URNS HPF: NORMAL /HPF
BARBITURATES UR QL SCN>200 NG/ML: NEGATIVE
BASOPHILS # BLD AUTO: 0.06 K/UL
BASOPHILS NFR BLD: 0.7 %
BENZODIAZ UR QL SCN>200 NG/ML: NEGATIVE
BILIRUB SERPL-MCNC: 0.4 MG/DL
BILIRUB UR QL STRIP: NEGATIVE
BILIRUB UR QL STRIP: NEGATIVE
BNP SERPL-MCNC: 27 PG/ML
BUN SERPL-MCNC: 58 MG/DL
BZE UR QL SCN: NEGATIVE
CALCIUM SERPL-MCNC: 10.2 MG/DL
CANNABINOIDS UR QL SCN: NEGATIVE
CHLORIDE SERPL-SCNC: 103 MMOL/L
CHOLEST SERPL-MCNC: 162 MG/DL
CHOLEST/HDLC SERPL: 4.6 {RATIO}
CLARITY UR: CLEAR
CLARITY UR: CLEAR
CO2 SERPL-SCNC: 27 MMOL/L
COLOR UR: YELLOW
COLOR UR: YELLOW
CREAT SERPL-MCNC: 2.1 MG/DL (ref 0.5–1.4)
CREAT SERPL-MCNC: 2.2 MG/DL
CREAT UR-MCNC: 59 MG/DL
DIFFERENTIAL METHOD: ABNORMAL
EOSINOPHIL # BLD AUTO: 0.3 K/UL
EOSINOPHIL NFR BLD: 3.1 %
ERYTHROCYTE [DISTWIDTH] IN BLOOD BY AUTOMATED COUNT: 14.4 %
EST. GFR  (AFRICAN AMERICAN): 36 ML/MIN/1.73 M^2
EST. GFR  (NON AFRICAN AMERICAN): 31 ML/MIN/1.73 M^2
ESTIMATED AVG GLUCOSE: 157 MG/DL
ETHANOL SERPL-MCNC: <10 MG/DL
GLUCOSE SERPL-MCNC: 206 MG/DL
GLUCOSE UR QL STRIP: NEGATIVE
GLUCOSE UR QL STRIP: NEGATIVE
HBA1C MFR BLD HPLC: 7.1 %
HCT VFR BLD AUTO: 34.2 %
HDLC SERPL-MCNC: 35 MG/DL
HDLC SERPL: 21.6 %
HGB BLD-MCNC: 11.2 G/DL
HGB UR QL STRIP: NEGATIVE
HGB UR QL STRIP: NEGATIVE
HYALINE CASTS #/AREA URNS LPF: 0 /LPF
INR PPP: 1
KETONES UR QL STRIP: NEGATIVE
KETONES UR QL STRIP: NEGATIVE
LDLC SERPL CALC-MCNC: 92.6 MG/DL
LEUKOCYTE ESTERASE UR QL STRIP: NEGATIVE
LEUKOCYTE ESTERASE UR QL STRIP: NEGATIVE
LYMPHOCYTES # BLD AUTO: 1.6 K/UL
LYMPHOCYTES NFR BLD: 18.8 %
MCH RBC QN AUTO: 27.1 PG
MCHC RBC AUTO-ENTMCNC: 32.7 G/DL
MCV RBC AUTO: 83 FL
METHADONE UR QL SCN>300 NG/ML: NEGATIVE
MICROSCOPIC COMMENT: NORMAL
MONOCYTES # BLD AUTO: 0.5 K/UL
MONOCYTES NFR BLD: 6.1 %
NEUTROPHILS # BLD AUTO: 6.1 K/UL
NEUTROPHILS NFR BLD: 71 %
NITRITE UR QL STRIP: NEGATIVE
NITRITE UR QL STRIP: NEGATIVE
NONHDLC SERPL-MCNC: 127 MG/DL
OPIATES UR QL SCN: NEGATIVE
PCP UR QL SCN>25 NG/ML: NEGATIVE
PH UR STRIP: 7 [PH] (ref 5–8)
PH UR STRIP: 7 [PH] (ref 5–8)
PLATELET # BLD AUTO: 272 K/UL
PMV BLD AUTO: 9.7 FL
POC PTINR: 1.1 (ref 0.9–1.2)
POC PTWBT: 13.5 SEC (ref 9.7–14.3)
POCT GLUCOSE: 137 MG/DL (ref 70–110)
POCT GLUCOSE: 219 MG/DL (ref 70–110)
POTASSIUM SERPL-SCNC: 3.7 MMOL/L
PROT SERPL-MCNC: 7.9 G/DL
PROT UR QL STRIP: ABNORMAL
PROT UR QL STRIP: ABNORMAL
PROTHROMBIN TIME: 10.6 SEC
RBC # BLD AUTO: 4.13 M/UL
RBC #/AREA URNS HPF: 2 /HPF (ref 0–4)
SAMPLE: ABNORMAL
SAMPLE: NORMAL
SODIUM SERPL-SCNC: 140 MMOL/L
SP GR UR STRIP: <=1.005 (ref 1–1.03)
SP GR UR STRIP: <=1.005 (ref 1–1.03)
TOXICOLOGY INFORMATION: NORMAL
TRIGL SERPL-MCNC: 172 MG/DL
TSH SERPL DL<=0.005 MIU/L-ACNC: 1.66 UIU/ML
URN SPEC COLLECT METH UR: ABNORMAL
URN SPEC COLLECT METH UR: ABNORMAL
UROBILINOGEN UR STRIP-ACNC: NEGATIVE EU/DL
UROBILINOGEN UR STRIP-ACNC: NEGATIVE EU/DL
WBC # BLD AUTO: 8.66 K/UL
WBC #/AREA URNS HPF: 0 /HPF (ref 0–5)

## 2019-03-03 PROCEDURE — 86431 RHEUMATOID FACTOR QUANT: CPT

## 2019-03-03 PROCEDURE — 86592 SYPHILIS TEST NON-TREP QUAL: CPT

## 2019-03-03 PROCEDURE — 82746 ASSAY OF FOLIC ACID SERUM: CPT

## 2019-03-03 PROCEDURE — 85025 COMPLETE CBC W/AUTO DIFF WBC: CPT

## 2019-03-03 PROCEDURE — 85610 PROTHROMBIN TIME: CPT

## 2019-03-03 PROCEDURE — 11000001 HC ACUTE MED/SURG PRIVATE ROOM

## 2019-03-03 PROCEDURE — 80320 DRUG SCREEN QUANTALCOHOLS: CPT

## 2019-03-03 PROCEDURE — S5010 5% DEXTROSE AND 0.45% SALINE: HCPCS | Performed by: STUDENT IN AN ORGANIZED HEALTH CARE EDUCATION/TRAINING PROGRAM

## 2019-03-03 PROCEDURE — 99285 EMERGENCY DEPT VISIT HI MDM: CPT

## 2019-03-03 PROCEDURE — 93010 ELECTROCARDIOGRAM REPORT: CPT | Mod: ,,, | Performed by: INTERNAL MEDICINE

## 2019-03-03 PROCEDURE — 84443 ASSAY THYROID STIM HORMONE: CPT

## 2019-03-03 PROCEDURE — 93010 EKG 12-LEAD: ICD-10-PCS | Mod: ,,, | Performed by: INTERNAL MEDICINE

## 2019-03-03 PROCEDURE — 80307 DRUG TEST PRSMV CHEM ANLYZR: CPT

## 2019-03-03 PROCEDURE — 80053 COMPREHEN METABOLIC PANEL: CPT

## 2019-03-03 PROCEDURE — 82962 GLUCOSE BLOOD TEST: CPT

## 2019-03-03 PROCEDURE — 99214 PR OFFICE/OUTPT VISIT, EST, LEVL IV, 30-39 MIN: ICD-10-PCS | Mod: GT,,, | Performed by: PSYCHIATRY & NEUROLOGY

## 2019-03-03 PROCEDURE — 25000003 PHARM REV CODE 250: Performed by: EMERGENCY MEDICINE

## 2019-03-03 PROCEDURE — 25000003 PHARM REV CODE 250: Performed by: STUDENT IN AN ORGANIZED HEALTH CARE EDUCATION/TRAINING PROGRAM

## 2019-03-03 PROCEDURE — 83036 HEMOGLOBIN GLYCOSYLATED A1C: CPT

## 2019-03-03 PROCEDURE — 86255 FLUORESCENT ANTIBODY SCREEN: CPT

## 2019-03-03 PROCEDURE — 93005 ELECTROCARDIOGRAM TRACING: CPT

## 2019-03-03 PROCEDURE — 86038 ANTINUCLEAR ANTIBODIES: CPT

## 2019-03-03 PROCEDURE — 81000 URINALYSIS NONAUTO W/SCOPE: CPT | Mod: 59

## 2019-03-03 PROCEDURE — 80061 LIPID PANEL: CPT

## 2019-03-03 PROCEDURE — 83921 ORGANIC ACID SINGLE QUANT: CPT

## 2019-03-03 PROCEDURE — 99214 OFFICE O/P EST MOD 30 MIN: CPT | Mod: GT,,, | Performed by: PSYCHIATRY & NEUROLOGY

## 2019-03-03 PROCEDURE — 82565 ASSAY OF CREATININE: CPT

## 2019-03-03 PROCEDURE — A4216 STERILE WATER/SALINE, 10 ML: HCPCS | Performed by: STUDENT IN AN ORGANIZED HEALTH CARE EDUCATION/TRAINING PROGRAM

## 2019-03-03 PROCEDURE — 82306 VITAMIN D 25 HYDROXY: CPT

## 2019-03-03 PROCEDURE — 83880 ASSAY OF NATRIURETIC PEPTIDE: CPT

## 2019-03-03 PROCEDURE — 86703 HIV-1/HIV-2 1 RESULT ANTBDY: CPT

## 2019-03-03 PROCEDURE — 80074 ACUTE HEPATITIS PANEL: CPT

## 2019-03-03 PROCEDURE — S0028 INJECTION, FAMOTIDINE, 20 MG: HCPCS | Performed by: STUDENT IN AN ORGANIZED HEALTH CARE EDUCATION/TRAINING PROGRAM

## 2019-03-03 RX ORDER — CALCIUM ACETATE 667 MG/1
667 CAPSULE ORAL
Status: DISCONTINUED | OUTPATIENT
Start: 2019-03-03 | End: 2019-03-07 | Stop reason: HOSPADM

## 2019-03-03 RX ORDER — ERGOCALCIFEROL 1.25 MG/1
50000 CAPSULE ORAL
Status: DISCONTINUED | OUTPATIENT
Start: 2019-03-04 | End: 2019-03-07 | Stop reason: HOSPADM

## 2019-03-03 RX ORDER — SODIUM CHLORIDE 0.9 % (FLUSH) 0.9 %
3 SYRINGE (ML) INJECTION EVERY 8 HOURS
Status: DISCONTINUED | OUTPATIENT
Start: 2019-03-03 | End: 2019-03-03

## 2019-03-03 RX ORDER — INSULIN ASPART 100 [IU]/ML
0-5 INJECTION, SOLUTION INTRAVENOUS; SUBCUTANEOUS
Status: DISCONTINUED | OUTPATIENT
Start: 2019-03-04 | End: 2019-03-07 | Stop reason: HOSPADM

## 2019-03-03 RX ORDER — GLUCAGON 1 MG
1 KIT INJECTION
Status: DISCONTINUED | OUTPATIENT
Start: 2019-03-03 | End: 2019-03-07 | Stop reason: HOSPADM

## 2019-03-03 RX ORDER — FAMOTIDINE 20 MG/1
20 TABLET, FILM COATED ORAL 2 TIMES DAILY
Status: DISCONTINUED | OUTPATIENT
Start: 2019-03-03 | End: 2019-03-03

## 2019-03-03 RX ORDER — ALLOPURINOL 100 MG/1
100 TABLET ORAL DAILY
Status: DISCONTINUED | OUTPATIENT
Start: 2019-03-04 | End: 2019-03-07 | Stop reason: HOSPADM

## 2019-03-03 RX ORDER — CLOPIDOGREL BISULFATE 75 MG/1
75 TABLET ORAL DAILY
Status: DISCONTINUED | OUTPATIENT
Start: 2019-03-04 | End: 2019-03-07 | Stop reason: HOSPADM

## 2019-03-03 RX ORDER — ASPIRIN 325 MG
325 TABLET, DELAYED RELEASE (ENTERIC COATED) ORAL
Status: COMPLETED | OUTPATIENT
Start: 2019-03-03 | End: 2019-03-03

## 2019-03-03 RX ORDER — DEXTROSE MONOHYDRATE AND SODIUM CHLORIDE 5; .45 G/100ML; G/100ML
INJECTION, SOLUTION INTRAVENOUS CONTINUOUS
Status: ACTIVE | OUTPATIENT
Start: 2019-03-03 | End: 2019-03-04

## 2019-03-03 RX ORDER — CLOPIDOGREL BISULFATE 75 MG/1
300 TABLET ORAL
Status: COMPLETED | OUTPATIENT
Start: 2019-03-03 | End: 2019-03-03

## 2019-03-03 RX ORDER — ACETAMINOPHEN 325 MG/1
650 TABLET ORAL EVERY 8 HOURS PRN
Status: DISCONTINUED | OUTPATIENT
Start: 2019-03-03 | End: 2019-03-03

## 2019-03-03 RX ORDER — ASPIRIN 325 MG
325 TABLET, DELAYED RELEASE (ENTERIC COATED) ORAL DAILY
Status: DISCONTINUED | OUTPATIENT
Start: 2019-03-04 | End: 2019-03-06

## 2019-03-03 RX ORDER — ACETAMINOPHEN 650 MG/1
650 SUPPOSITORY RECTAL EVERY 6 HOURS PRN
Status: DISCONTINUED | OUTPATIENT
Start: 2019-03-03 | End: 2019-03-07 | Stop reason: HOSPADM

## 2019-03-03 RX ORDER — LABETALOL HYDROCHLORIDE 5 MG/ML
10 INJECTION, SOLUTION INTRAVENOUS EVERY 6 HOURS PRN
Status: DISCONTINUED | OUTPATIENT
Start: 2019-03-03 | End: 2019-03-07 | Stop reason: HOSPADM

## 2019-03-03 RX ORDER — ATORVASTATIN CALCIUM 40 MG/1
40 TABLET, FILM COATED ORAL DAILY
Status: DISCONTINUED | OUTPATIENT
Start: 2019-03-04 | End: 2019-03-05

## 2019-03-03 RX ORDER — FAMOTIDINE 10 MG/ML
20 INJECTION INTRAVENOUS DAILY
Status: DISCONTINUED | OUTPATIENT
Start: 2019-03-03 | End: 2019-03-07

## 2019-03-03 RX ADMIN — ASPIRIN 325 MG: 325 TABLET, COATED ORAL at 04:03

## 2019-03-03 RX ADMIN — CLOPIDOGREL 300 MG: 75 TABLET, FILM COATED ORAL at 04:03

## 2019-03-03 RX ADMIN — SODIUM CHLORIDE, PRESERVATIVE FREE 3 ML: 5 INJECTION INTRAVENOUS at 10:03

## 2019-03-03 RX ADMIN — FAMOTIDINE 20 MG: 10 INJECTION, SOLUTION INTRAVENOUS at 10:03

## 2019-03-03 RX ADMIN — DEXTROSE AND SODIUM CHLORIDE: 5; .45 INJECTION, SOLUTION INTRAVENOUS at 10:03

## 2019-03-03 NOTE — SUBJECTIVE & OBJECTIVE
Woke up with symptoms?: yes  Last known normal: Last Known Normal Date: 03/02/19 Last Known Normal Time: 2200    Recent bleeding noted: no  Does the patient take any Blood Thinners? no  Medications: No relevant medications      Past Medical History: hypertension, diabetes and stroke    Past Surgical History: no relevant surgical history    Family History: no relevant history    Social History: no smoking, no drinking, no drugs    Allergies: Cayenne  Cayenne Pepper No relevant allergies    Review of Systems   Constitutional: Negative for fever.   HENT: Negative for nosebleeds and trouble swallowing.    Respiratory: Negative for shortness of breath.    Cardiovascular: Negative for chest pain and palpitations.   Gastrointestinal: Negative for blood in stool.   Genitourinary: Negative for dysuria.   Neurological: Negative for speech difficulty and headaches.     Objective:   Vitals: There were no vitals taken for this visit.     CT READ: Yes  No hemmorhage. No mass effect. No early infarct signs.     Physical Exam   Constitutional:   Appears inattentive   HENT:   Head: Normocephalic and atraumatic.   Eyes: EOM are normal. Pupils are equal, round, and reactive to light.   Neck: Normal range of motion.   Pulmonary/Chest: Effort normal.   Neuro: inattentive, oriented to year but not month, follows commands, able to name several objects on stroke card, L facial droop at rest, L arm drift.

## 2019-03-03 NOTE — CONSULTS
[facility]  Vascular Neurology  Comprehensive Stroke Center  Tele-Consultation Note      Consults    Consulting Provider: Spoke Physician:: Kailee Melvin  Current Providers  No providers found    Patient Location: [facility]  Spoke hospital nurse at bedside with patient assisting consultant.     Patient information was obtained from relative(s).       Assessment/Plan:     STROKE DOCUMENTATION     Acute Stroke Times:   Acute Stroke Times   Last Known Normal Date: 03/02/19  Last Known Normal Time: 2200  Stroke Team Called Date: 03/03/19  Stroke Team Called Time: 1255  Stroke Team Arrival Date: 03/03/19  Stroke Team Arrival Time: 1300  CT Interpretation Time: 1310    NIH Scale:  1a. Level of Consciousness: 1-->Not alert, but arousable by minor stimulation to obey, answer, or respond  1b. LOC Questions: 1-->Answers one question correctly  1c. LOC Commands: 0-->Performs both tasks correctly  2. Best Gaze: 0-->Normal  3. Visual: 0-->No visual loss  4. Facial Palsy: 2-->Partial paralysis (total or near-total paralysis of lower face)  5a. Motor Arm, Left: 1-->Drift, limb holds 90 (or 45) degrees, but drifts down before full 10 seconds, does not hit bed or other support  5b. Motor Arm, Right: 0-->No drift, limb holds 90 (or 45) degrees for full 10 secs  6a. Motor Leg, Left: 0-->No drift, leg holds 30 degree position for full 5 secs  6b. Motor Leg, Right: 0-->No drift, leg holds 30 degree position for full 5 secs  7. Limb Ataxia: 0-->Absent  8. Sensory: 0-->Normal, no sensory loss  9. Best Language: 1-->Mild-to-moderate aphasia, some obvious loss of fluency or facility of comprehension, without significant limitation on ideas expressed or form of expression. Reduction of speech and/or comprehension, however, makes conversation. . . (see row details)  10. Dysarthria: 1-->Mild-to-moderate dysarthria, patient slurs at least some words and, at worst, can be  understood with some difficulty  11. Extinction and Inattention (formerly Neglect): 0-->No abnormality  Total (NIH Stroke Scale): 7     Modified [place]    Seng Coma Scale:    ABCD2 Score:    [id number] Score:   HAS -BLED Score:   ICH Score:   Hunt & Gibson Classification:       Diagnoses:   Left-sided weakness    64 y/o man with prior stroke with mild residual L sided facial droop and weakness, presenting with worsening of those symptoms today.  Not in window for tPA.  Concerned this may represent a new lacunar stroke syndrome, or possibly recrudescence of prior stroke symptoms in setting of infection, intoxication, or metabolic derangement.    - Admit for MRI/MRA and stroke workup if necessary.    - If MRI + stroke, would start on DAPT, high intensity statin. Allow patient to autohypertense.  TTE.  - Infectious/metabolic workup.         Blood pressure (!) 127/54, pulse 76, resp. rate (!) 21, SpO2 99 %.  Alteplase Eligible?: No  Alteplase Recommendation: Alteplase not recommended due to Outside of treatment window   Possible Interventional Revascularization Candidate? Yes , pending MRA    Disposition Recommendation: admit to inpatient      Subjective:     History of Present Illness:  64 y/o man with prior stroke (2016, mild residual L hemiparesis), presenting with slurred speech and facial droop.  Daughter last saw the patient normal last night at 10 pm.  He slept in this morning, and when she next saw him around noon he had L facial droop, was slurring his speech, had L sided weakness.  He does not currently take an antiplatelets or anticoagulation for stroke prevention.      Woke up with symptoms?: yes  Last known normal: Last Known Normal Date: 03/02/19 Last Known Normal Time: 2200    Recent bleeding noted: no  Does the patient take any Blood Thinners? no  Medications: No relevant medications      Past Medical History: hypertension, diabetes and stroke    Past Surgical History: no relevant surgical  history    Family History: no relevant history    Social History: no smoking, no drinking, no drugs    Allergies: Cayenne  Cayenne Pepper No relevant allergies    Review of Systems   Constitutional: Negative for fever.   HENT: Negative for nosebleeds and trouble swallowing.    Respiratory: Negative for shortness of breath.    Cardiovascular: Negative for chest pain and palpitations.   Gastrointestinal: Negative for blood in stool.   Genitourinary: Negative for dysuria.   Neurological: Negative for speech difficulty and headaches.     Objective:   Vitals: There were no vitals taken for this visit.     CT READ: Yes  No hemmorhage. No mass effect. No early infarct signs.     Physical Exam   Constitutional:   Appears inattentive   HENT:   Head: Normocephalic and atraumatic.   Eyes: EOM are normal. Pupils are equal, round, and reactive to light.   Neck: Normal range of motion.   Pulmonary/Chest: Effort normal.   Neuro: inattentive, oriented to year but not month, follows commands, able to name several objects on stroke card, L facial droop at rest, L arm drift.          Recommended the emergency room physician to have a brief discussion with the patient and/or family if available regarding the risks and benefits of treatment, and to briefly document the occurrence of that discussion in his clinical encounter note.     The attending portion of this evaluation, treatment, and documentation was performed per Kailee Stubbs MD via audiovisual.    Billing code:  (non-intervention mild to moderate stroke, TIA, some mimics)    · This patient has a critical neurological condition/illness, with some potential for high morbidity and mortality.  · There is a moderate probability for acute neurological change leading to clinical and possibly life-threatening deterioration requiring highest level of physician preparedness for urgent intervention.  · Care was coordinated with other physicians involved in the patient's  care.  · Radiologic studies and laboratory data were reviewed and interpreted, and plan of care was re-assessed based on the results.  · Diagnosis, treatment options and prognosis may have been discussed with the patient and/or family members or caregiver.      In your opinion, this was a: Tier 2    Consult End Time: 1:32 PM     Kailee Stubbs MD  [facility]  Vascular Neurology   [facility]

## 2019-03-03 NOTE — HPI
64 y/o man with prior stroke (2016, mild residual L hemiparesis), presenting with slurred speech and facial droop.  Daughter last saw the patient normal last night at 10 pm.  He slept in this morning, and when she next saw him around noon he had L facial droop, was slurring his speech, had L sided weakness.  He does not currently take an antiplatelets or anticoagulation for stroke prevention.

## 2019-03-03 NOTE — ED NOTES
APPEARANCE: Alert, confused to date and time and in no acute distress.  CARDIAC: Normal rate and rhythm, no murmur heard.   PERIPHERAL VASCULAR: peripheral pulses present. Normal cap refill. No edema. Warm to touch.    RESPIRATORY:Normal rate and effort, breath sounds clear bilaterally throughout chest. Respirations are equal and unlabored no obvious signs of distress.  GASTRO: soft, bowel sounds normal, no tenderness, no abdominal distention.  MUSC: Limited ROM due to weakness. No bony tenderness or soft tissue tenderness. Prior surgery to left arm, left hand slightly contracted. Facial drooping noted to left side of face, able to raise eyebrows.   SKIN: Skin is warm and dry, normal skin turgor, mucous membranes moist.  EYE: PERRL, both eyes: pupils brisk and reactive to light. Normal size.  ENT: EARS: no obvious drainage. NOSE: no active bleeding.   Daughter reports pt was found sitting on his bed, confused. Daughter at bedside.

## 2019-03-03 NOTE — ASSESSMENT & PLAN NOTE
62 y/o man with prior stroke with mild residual L sided facial droop and weakness, presenting with worsening of those symptoms today.  Not in window for tPA.  Concerned this may represent a new lacunar stroke syndrome, or possibly recrudescence of prior stroke symptoms in setting of infection, intoxication, or metabolic derangement.    - Admit for MRI/MRA and stroke workup if necessary.    - If MRI + stroke, would start on DAPT, high intensity statin. Allow patient to autohypertense.  TTE.  - Infectious/metabolic workup.
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
## 2019-03-04 PROBLEM — I63.511 ACUTE RIGHT MCA STROKE: Status: ACTIVE | Noted: 2019-03-03

## 2019-03-04 LAB
ALBUMIN SERPL BCP-MCNC: 4 G/DL
ALP SERPL-CCNC: 89 U/L
ALT SERPL W/O P-5'-P-CCNC: 34 U/L
ANA SER QL IF: NORMAL
ANION GAP SERPL CALC-SCNC: 9 MMOL/L
APTT BLDCRRT: 29 SEC
AST SERPL-CCNC: 23 U/L
BASOPHILS # BLD AUTO: 0.06 K/UL
BASOPHILS NFR BLD: 0.9 %
BILIRUB SERPL-MCNC: 0.5 MG/DL
BUN SERPL-MCNC: 54 MG/DL
CALCIUM SERPL-MCNC: 10.2 MG/DL
CHLORIDE SERPL-SCNC: 104 MMOL/L
CK MB SERPL-MCNC: 0.7 NG/ML
CK MB SERPL-RTO: 0.3 %
CK SERPL-CCNC: 264 U/L
CO2 SERPL-SCNC: 28 MMOL/L
CREAT SERPL-MCNC: 2.3 MG/DL
CRP SERPL-MCNC: 5.4 MG/L
DIFFERENTIAL METHOD: ABNORMAL
EOSINOPHIL # BLD AUTO: 0.3 K/UL
EOSINOPHIL NFR BLD: 4 %
ERYTHROCYTE [DISTWIDTH] IN BLOOD BY AUTOMATED COUNT: 14.3 %
ERYTHROCYTE [SEDIMENTATION RATE] IN BLOOD BY WESTERGREN METHOD: 66 MM/HR
EST. GFR  (AFRICAN AMERICAN): 34 ML/MIN/1.73 M^2
EST. GFR  (NON AFRICAN AMERICAN): 29 ML/MIN/1.73 M^2
FOLATE SERPL-MCNC: 11.6 NG/ML
GLUCOSE SERPL-MCNC: 172 MG/DL
HAV IGM SERPL QL IA: NEGATIVE
HBV CORE IGM SERPL QL IA: NEGATIVE
HBV SURFACE AG SERPL QL IA: NEGATIVE
HCT VFR BLD AUTO: 34.9 %
HCV AB SERPL QL IA: NEGATIVE
HGB BLD-MCNC: 11.5 G/DL
HIV 1+2 AB+HIV1 P24 AG SERPL QL IA: NEGATIVE
INR PPP: 1
LYMPHOCYTES # BLD AUTO: 1.6 K/UL
LYMPHOCYTES NFR BLD: 23 %
MAGNESIUM SERPL-MCNC: 2.1 MG/DL
MCH RBC QN AUTO: 27.1 PG
MCHC RBC AUTO-ENTMCNC: 33 G/DL
MCV RBC AUTO: 82 FL
MONOCYTES # BLD AUTO: 0.5 K/UL
MONOCYTES NFR BLD: 7.7 %
NEUTROPHILS # BLD AUTO: 4.5 K/UL
NEUTROPHILS NFR BLD: 64.3 %
PHOSPHATE SERPL-MCNC: 3.2 MG/DL
PLATELET # BLD AUTO: 272 K/UL
PMV BLD AUTO: 9.7 FL
POCT GLUCOSE: 181 MG/DL (ref 70–110)
POCT GLUCOSE: 187 MG/DL (ref 70–110)
POCT GLUCOSE: 214 MG/DL (ref 70–110)
POTASSIUM SERPL-SCNC: 3.7 MMOL/L
PROT SERPL-MCNC: 8 G/DL
PROTHROMBIN TIME: 10.7 SEC
RBC # BLD AUTO: 4.24 M/UL
RHEUMATOID FACT SERPL-ACNC: <10 IU/ML
RPR SER QL: NORMAL
SODIUM SERPL-SCNC: 141 MMOL/L
TROPONIN I SERPL DL<=0.01 NG/ML-MCNC: <0.006 NG/ML
WBC # BLD AUTO: 7.01 K/UL

## 2019-03-04 PROCEDURE — 63600175 PHARM REV CODE 636 W HCPCS: Performed by: STUDENT IN AN ORGANIZED HEALTH CARE EDUCATION/TRAINING PROGRAM

## 2019-03-04 PROCEDURE — 85652 RBC SED RATE AUTOMATED: CPT

## 2019-03-04 PROCEDURE — 97802 MEDICAL NUTRITION INDIV IN: CPT

## 2019-03-04 PROCEDURE — 11000001 HC ACUTE MED/SURG PRIVATE ROOM

## 2019-03-04 PROCEDURE — 86140 C-REACTIVE PROTEIN: CPT

## 2019-03-04 PROCEDURE — 85610 PROTHROMBIN TIME: CPT

## 2019-03-04 PROCEDURE — 85025 COMPLETE CBC W/AUTO DIFF WBC: CPT

## 2019-03-04 PROCEDURE — 82550 ASSAY OF CK (CPK): CPT

## 2019-03-04 PROCEDURE — S0028 INJECTION, FAMOTIDINE, 20 MG: HCPCS | Performed by: STUDENT IN AN ORGANIZED HEALTH CARE EDUCATION/TRAINING PROGRAM

## 2019-03-04 PROCEDURE — 80053 COMPREHEN METABOLIC PANEL: CPT

## 2019-03-04 PROCEDURE — 97161 PT EVAL LOW COMPLEX 20 MIN: CPT

## 2019-03-04 PROCEDURE — 25000003 PHARM REV CODE 250: Performed by: STUDENT IN AN ORGANIZED HEALTH CARE EDUCATION/TRAINING PROGRAM

## 2019-03-04 PROCEDURE — 84100 ASSAY OF PHOSPHORUS: CPT

## 2019-03-04 PROCEDURE — 92610 EVALUATE SWALLOWING FUNCTION: CPT

## 2019-03-04 PROCEDURE — 94761 N-INVAS EAR/PLS OXIMETRY MLT: CPT

## 2019-03-04 PROCEDURE — 84484 ASSAY OF TROPONIN QUANT: CPT

## 2019-03-04 PROCEDURE — 85730 THROMBOPLASTIN TIME PARTIAL: CPT

## 2019-03-04 PROCEDURE — 97116 GAIT TRAINING THERAPY: CPT

## 2019-03-04 PROCEDURE — 82553 CREATINE MB FRACTION: CPT

## 2019-03-04 PROCEDURE — 36415 COLL VENOUS BLD VENIPUNCTURE: CPT

## 2019-03-04 PROCEDURE — 83735 ASSAY OF MAGNESIUM: CPT

## 2019-03-04 PROCEDURE — 97165 OT EVAL LOW COMPLEX 30 MIN: CPT

## 2019-03-04 RX ORDER — AMLODIPINE BESYLATE 5 MG/1
5 TABLET ORAL DAILY
Status: DISCONTINUED | OUTPATIENT
Start: 2019-03-05 | End: 2019-03-05

## 2019-03-04 RX ORDER — CARVEDILOL 6.25 MG/1
6.25 TABLET ORAL 2 TIMES DAILY WITH MEALS
Status: DISCONTINUED | OUTPATIENT
Start: 2019-03-04 | End: 2019-03-05

## 2019-03-04 RX ORDER — LOSARTAN POTASSIUM 50 MG/1
100 TABLET ORAL DAILY
Status: DISCONTINUED | OUTPATIENT
Start: 2019-03-05 | End: 2019-03-05

## 2019-03-04 RX ORDER — FUROSEMIDE 40 MG/1
80 TABLET ORAL DAILY
Status: DISCONTINUED | OUTPATIENT
Start: 2019-03-05 | End: 2019-03-07 | Stop reason: HOSPADM

## 2019-03-04 RX ORDER — HEPARIN SODIUM 5000 [USP'U]/ML
5000 INJECTION, SOLUTION INTRAVENOUS; SUBCUTANEOUS EVERY 8 HOURS
Status: DISCONTINUED | OUTPATIENT
Start: 2019-03-04 | End: 2019-03-07 | Stop reason: HOSPADM

## 2019-03-04 RX ADMIN — CARVEDILOL 6.25 MG: 6.25 TABLET, FILM COATED ORAL at 08:03

## 2019-03-04 RX ADMIN — CLOPIDOGREL BISULFATE 75 MG: 75 TABLET, FILM COATED ORAL at 11:03

## 2019-03-04 RX ADMIN — ERGOCALCIFEROL 50000 UNITS: 1.25 CAPSULE ORAL at 11:03

## 2019-03-04 RX ADMIN — INSULIN ASPART 2 UNITS: 100 INJECTION, SOLUTION INTRAVENOUS; SUBCUTANEOUS at 05:03

## 2019-03-04 RX ADMIN — CALCIUM ACETATE 667 MG: 667 CAPSULE ORAL at 11:03

## 2019-03-04 RX ADMIN — ASPIRIN 325 MG: 325 TABLET, COATED ORAL at 11:03

## 2019-03-04 RX ADMIN — ATORVASTATIN CALCIUM 40 MG: 40 TABLET, FILM COATED ORAL at 11:03

## 2019-03-04 RX ADMIN — ALLOPURINOL 100 MG: 100 TABLET ORAL at 11:03

## 2019-03-04 RX ADMIN — HEPARIN SODIUM 5000 UNITS: 5000 INJECTION, SOLUTION INTRAVENOUS; SUBCUTANEOUS at 08:03

## 2019-03-04 RX ADMIN — FAMOTIDINE 20 MG: 10 INJECTION, SOLUTION INTRAVENOUS at 11:03

## 2019-03-04 RX ADMIN — CALCIUM ACETATE 667 MG: 667 CAPSULE ORAL at 05:03

## 2019-03-04 NOTE — PLAN OF CARE
This  put name on white board and explained blue discharge folder to patient. Discharge planning brochure and/or business card given to patient.  Patient verbalized understanding.    TN met with patient. Mitali was the  on the Ze[employer]. Her number is [phone]. Pt states that prior to arrival he was living at home with daughter and granddaughter. States that he has a rolling walker and straight cane for assistance. Pt was informed on PT/OT recommending IPR. He states that he just wants to go somewhere close to home. Pt allowed time for questions. Will monitor needs.      03/04/19 0327   Discharge Assessment   Assessment Type Discharge Planning Assessment   Confirmed/corrected address and phone number on facesheet? Yes   Assessment information obtained from? Patient;Medical Record   Expected Length of Stay (days) 3   Communicated expected length of stay with patient/caregiver yes   Prior to hospitilization cognitive status: Alert/Oriented   Prior to hospitalization functional status: Assistive Equipment   Current cognitive status: Alert/Oriented   Current Functional Status: Assistive Equipment   Facility Arrived From: ED   Lives With grandchild(renae);child(renae), adult   Able to Return to Prior Arrangements no   Is patient able to care for self after discharge? No   Who are your caregiver(s) and their phone number(s)? Nica (daughter) [phone]   Patient's perception of discharge disposition rehab facility   Readmission Within the Last 30 Days no previous admission in last 30 days   Patient currently being followed by outpatient case management? No   Patient currently receives any other outside agency services? No   Equipment Currently Used at Home cane, straight;walker, rolling   Do you have any problems affording any of your prescribed medications? No   Is the patient taking medications as prescribed? yes   Does the patient have transportation home? Yes   Transportation Anticipated agency   Does  the patient receive services at the [facility]? No   Discharge Plan A Rehab   Discharge Plan B Skilled Nursing Facility   DME Needed Upon Discharge  none   Patient/Family in Agreement with Plan yes
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
## 2019-03-04 NOTE — PLAN OF CARE
Problem: Adult Inpatient Plan of Care  Goal: Plan of Care Review  Outcome: Ongoing (interventions implemented as appropriate)  Patient Mr. Boateng is awake, alert and oriented X 4. Responds to verbal conversation with little delay. Vital signs are stable. Neurological assessment carried on every 4 hours. No major changes over night. Left side of the body has mild weakness. Remains NPO. For MRI, US carotid Bilateral and for TTE today. On Tele monitor shows NSR. Will continue to monitor Patient.

## 2019-03-04 NOTE — PLAN OF CARE
Problem: SLP Goal  Goal: SLP Goal  Short Term Goals:  1. Pt will participate in swallow eval to determine safest diet level with no audible aspiration signs.   2. Pt will tolerate dental soft diet with no audible aspiration signs per bedside observations.   3. Pt will perform simple OMEx with fair assist to increased oral motor strength and coordination.   **more goals to follow pending progress   Outcome: Ongoing (interventions implemented as appropriate)  3/4: Clinical swallow eval completed this am, pt presents with no audible s/s of dysphagia at bedside. Pt safe for initiation of dental soft tray and thin liquids. Pt with some broken English at bedside. He is able to answer questions with good accuracy during eval. Will f/u with diet. MD aware of diet recs. Pt may benefit from rehab upon acute DC.  VICTORINA Marie, CCC-SLP  Speech Pathologist 3/4/2019
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
## 2019-03-04 NOTE — CONSULTS
"  [facility]  Adult Nutrition  Consult Note    SUMMARY     Recommendations    1. Monitor tolerance of Mechanical Soft diet.   Goals: Pt to tolerate po diet.   Nutrition Goal Status: new  Communication of RD Recs: (POC)    Reason for Assessment    Reason For Assessment: consult  Diagnosis: stroke/CVA(AMS, extremity weakness)  Relevant Medical History: CHF, DM, HTN, CKD, Stroke  General Information Comments: Visited with pt in room. Pt with AMS unable to answer questions clearly. SLP reports to signs of dysphagia, recs dental soft with thin liquids 2/2 AMS. NFPE completed, pt appears to be nourished.   Nutrition Discharge Planning: Too soon to determine    Nutrition Risk Screen    Nutrition Risk Screen: other (see comments)(NPO)    Nutrition/Diet History    Spiritual, Cultural Beliefs, Nondenominational Practices, Values that Affect Care: no    Anthropometrics    Temp: 97.8 °F (36.6 °C)  Height Method: Stated  Height: 5' 8" (172.7 cm)  Height (inches): 68 in  Weight Method: Bed Scale  Weight: 77.5 kg (170 lb 13.7 oz)  Weight (lb): 170.86 lb  Ideal Body Weight (IBW), Male: 154 lb  % Ideal Body Weight, Male (lb): 115.24 lb  BMI (Calculated): 27  BMI Grade: 25 - 29.9 - overweight     Lab/Procedures/Meds    Pertinent Labs Reviewed: reviewed  Pertinent Labs Comments: BUN 54H, Crea 2.3H, GFR 29L, Glu 172H, POCT 187H, A1C 7.1H  Pertinent Medications Reviewed: reviewed  Pertinent Medications Comments: statin, famotidine, insulin    Estimated/Assessed Needs    Weight Used For Calorie Calculations: 77.5 kg (170 lb 13.7 oz)  Energy Calorie Requirements (kcal): 1930 kcal/d  Energy Need Method: [place]-St Jeor(x1.25)  Protein Requirements: 62 gm/d  Weight Used For Protein Calculations: 77.5 kg (170 lb 13.7 oz)  Fluid Requirements (mL): 1 mL/kcal or per MD  Estimated Fluid Requirement Method: RDA Method  RDA Method (mL): 1930  CHO Requirement: 241 gm/d      Nutrition Prescription Ordered    Current Diet Order: Mechanical " Soft Level 5    Evaluation of Received Nutrient/Fluid Intake    Comments: LBM 3/2  % Intake of Estimated Energy Needs: 0 - 25 %  % Meal Intake: NPO    Nutrition Risk    Level of Risk/Frequency of Follow-up: (f/u 1x/weekly)     Assessment and Plan    Nutrition Problem  Inadequate oral intake    Related to (etiology):   AMS    Signs and Symptoms (as evidenced by):   NPO status    Interventions:  Collaboration with other providers    Nutrition Diagnosis Status:   Resolved    Monitor and Evaluation    Food and Nutrient Intake: energy intake  Food and Nutrient Adminstration: diet order  Knowledge/Beliefs/Attitudes: food and nutrition knowledge/skill  Physical Activity and Function: nutrition-related ADLs and IADLs  Anthropometric Measurements: weight, weight change  Biochemical Data, Medical Tests and Procedures: electrolyte and renal panel, glucose/endocrine profile  Nutrition-Focused Physical Findings: overall appearance     Malnutrition Assessment    Subcutaneous Fat Loss (Final Summary): well nourished  Muscle Loss Evaluation (Final Summary): well nourished      Nutrition Follow-Up    RD Follow-up?: Yes

## 2019-03-04 NOTE — PLAN OF CARE
Problem: Occupational Therapy Goal  Goal: Occupational Therapy Goal  Goals to be met by: 4/4/19     Patient will increase functional independence with ADLs by performing:    LE Dressing with Supervision.  Grooming while standing with Supervision.  Toileting from toilet with Supervision for hygiene and clothing management.   Supine to sit with Supervision.  Step transfer with Supervision  Toilet transfer to toilet with Supervision.  Increased functional strength to WFL for self care skills and functional mobility.  Upper extremity exercise program x10 reps per handout, with independence.     Outcome: Ongoing (interventions implemented as appropriate)  Despite patient's co-morbidities, including DM2,HTN, CHF, patient was living in community setting functioning at mod I/independnet level for ADLs, and mobility prior to this event.  There is an expectation of returning to prior level of function to maintain independence thus avoiding readmission.  Patient's clinical condition meets full Inpatient Rehab (IPR) criteria; including the ability to actively participate in 3 hours of therapy.  A lower level of care(SNF) cannot provide the interdisciplinary treatment approach needed. Pt demonstrates LUE/LE weakness, impaired visual field L, impaired FMC/GMC decreasing independence at this time.

## 2019-03-04 NOTE — PLAN OF CARE
VN cued into pt's room for introduction. VN informed pt that VN would be working along side bedside nurse and PCT throughout shift. Level of present pain assessed. At present no distress noted. Thoroughly discussed today's plan of care with patient. Also discussed with patient that he will have a mechanical soft diet lunch. Discussed with patient High fall risk protocol and interventions that have been initiated and cont be in place for safety. Patient verbalized clear understanding and cooperation using teach back method. Bed alarm presently activated and in use. Will cont to be available to patient and intervene prn.

## 2019-03-04 NOTE — PLAN OF CARE
VN cued into patients room for introduction and admission questions. VN role explained and pts daughter verbalized understanding. Pt is primarily Japanese speaking - daughter served as . Daughter did accept  on behalf of pt when no family is at the bedside. Admission questions completed. Pt resting in bed in no acute distress. VN contacted Dr. Goode for STAT MRI orders - per andres Lance for MRI and US to be done in the morning. Bedside nurse aware. Neuro consult to be called in the morning as well. All questions answered. Will continue to monitor and intervene PRN.
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
## 2019-03-04 NOTE — PLAN OF CARE
Recommendations    1. Monitor tolerance of Mechanical Soft diet.   Goals: Pt to tolerate po diet.   Nutrition Goal Status: new

## 2019-03-04 NOTE — PLAN OF CARE
Problem: Physical Therapy Goal  Goal: Physical Therapy Goal  Goals to be met by: 19     Patient will increase functional independence with mobility by performin. Sit to stand transfer with Stand-by Assistance  2. Bed to chair transfer with Stand-by Assistance using appropriate AD  3. Gait  x 100 feet with Stand-by Assistance using appropriate AD  4. Stand for 5 minutes with Stand-by Assistance using appropriate AD    Outcome: Ongoing (interventions implemented as appropriate)  PT evaluation completed, note to follow. Pt presents with L sided weakness, decreased attention to L side, and impaired coordination leading to pt requiring mod A for ambulation. Despite patient's co-morbidities, including prior stroke, patient was living in community setting functioning at supervision/mod I level for ADLs, and mobility prior to this event.  There is an expectation of returning to prior level of function to maintain independence thus avoiding readmission.  Patient's clinical condition meets full Inpatient Rehab (IPR) criteria; including the ability to actively participate in 3 hours of therapy.  A lower level of care(SNF) cannot provide the interdisciplinary treatment approach needed.
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
## 2019-03-05 LAB
ALBUMIN SERPL BCP-MCNC: 4 G/DL
ALP SERPL-CCNC: 99 U/L
ALT SERPL W/O P-5'-P-CCNC: 32 U/L
ANION GAP SERPL CALC-SCNC: 8 MMOL/L
AST SERPL-CCNC: 21 U/L
BASOPHILS # BLD AUTO: 0.07 K/UL
BASOPHILS NFR BLD: 0.9 %
BILIRUB SERPL-MCNC: 0.5 MG/DL
BUN SERPL-MCNC: 59 MG/DL
CALCIUM SERPL-MCNC: 10.4 MG/DL
CHLORIDE SERPL-SCNC: 104 MMOL/L
CO2 SERPL-SCNC: 27 MMOL/L
CREAT SERPL-MCNC: 2.6 MG/DL
DIFFERENTIAL METHOD: ABNORMAL
EOSINOPHIL # BLD AUTO: 0.4 K/UL
EOSINOPHIL NFR BLD: 4.7 %
ERYTHROCYTE [DISTWIDTH] IN BLOOD BY AUTOMATED COUNT: 14.3 %
EST. GFR  (AFRICAN AMERICAN): 29 ML/MIN/1.73 M^2
EST. GFR  (NON AFRICAN AMERICAN): 25 ML/MIN/1.73 M^2
GLUCOSE SERPL-MCNC: 212 MG/DL
HCT VFR BLD AUTO: 35.4 %
HGB BLD-MCNC: 11.6 G/DL
LYMPHOCYTES # BLD AUTO: 2 K/UL
LYMPHOCYTES NFR BLD: 24.9 %
MAGNESIUM SERPL-MCNC: 2.2 MG/DL
MCH RBC QN AUTO: 26.9 PG
MCHC RBC AUTO-ENTMCNC: 32.8 G/DL
MCV RBC AUTO: 82 FL
MONOCYTES # BLD AUTO: 0.7 K/UL
MONOCYTES NFR BLD: 8.8 %
NEUTROPHILS # BLD AUTO: 5 K/UL
NEUTROPHILS NFR BLD: 60.6 %
PHOSPHATE SERPL-MCNC: 3.7 MG/DL
PLATELET # BLD AUTO: 304 K/UL
PMV BLD AUTO: 10.4 FL
POCT GLUCOSE: 178 MG/DL (ref 70–110)
POCT GLUCOSE: 193 MG/DL (ref 70–110)
POCT GLUCOSE: 206 MG/DL (ref 70–110)
POCT GLUCOSE: 219 MG/DL (ref 70–110)
POCT GLUCOSE: 232 MG/DL (ref 70–110)
POTASSIUM SERPL-SCNC: 3.9 MMOL/L
PROT SERPL-MCNC: 8.3 G/DL
RBC # BLD AUTO: 4.31 M/UL
SODIUM SERPL-SCNC: 139 MMOL/L
WBC # BLD AUTO: 8.16 K/UL

## 2019-03-05 PROCEDURE — 85025 COMPLETE CBC W/AUTO DIFF WBC: CPT

## 2019-03-05 PROCEDURE — 94761 N-INVAS EAR/PLS OXIMETRY MLT: CPT

## 2019-03-05 PROCEDURE — 11000001 HC ACUTE MED/SURG PRIVATE ROOM

## 2019-03-05 PROCEDURE — 25000003 PHARM REV CODE 250: Performed by: STUDENT IN AN ORGANIZED HEALTH CARE EDUCATION/TRAINING PROGRAM

## 2019-03-05 PROCEDURE — 63600175 PHARM REV CODE 636 W HCPCS: Performed by: STUDENT IN AN ORGANIZED HEALTH CARE EDUCATION/TRAINING PROGRAM

## 2019-03-05 PROCEDURE — 80053 COMPREHEN METABOLIC PANEL: CPT

## 2019-03-05 PROCEDURE — 84100 ASSAY OF PHOSPHORUS: CPT

## 2019-03-05 PROCEDURE — 83735 ASSAY OF MAGNESIUM: CPT

## 2019-03-05 PROCEDURE — S0028 INJECTION, FAMOTIDINE, 20 MG: HCPCS | Performed by: STUDENT IN AN ORGANIZED HEALTH CARE EDUCATION/TRAINING PROGRAM

## 2019-03-05 PROCEDURE — 97535 SELF CARE MNGMENT TRAINING: CPT

## 2019-03-05 PROCEDURE — 97530 THERAPEUTIC ACTIVITIES: CPT

## 2019-03-05 PROCEDURE — 36415 COLL VENOUS BLD VENIPUNCTURE: CPT

## 2019-03-05 RX ORDER — LOSARTAN POTASSIUM 50 MG/1
100 TABLET ORAL DAILY
Status: DISCONTINUED | OUTPATIENT
Start: 2019-03-05 | End: 2019-03-07 | Stop reason: HOSPADM

## 2019-03-05 RX ORDER — CARVEDILOL 6.25 MG/1
6.25 TABLET ORAL 2 TIMES DAILY WITH MEALS
Status: DISCONTINUED | OUTPATIENT
Start: 2019-03-05 | End: 2019-03-07 | Stop reason: HOSPADM

## 2019-03-05 RX ORDER — AMLODIPINE BESYLATE 5 MG/1
10 TABLET ORAL DAILY
Status: DISCONTINUED | OUTPATIENT
Start: 2019-03-05 | End: 2019-03-05

## 2019-03-05 RX ORDER — AMLODIPINE BESYLATE 5 MG/1
5 TABLET ORAL DAILY
Status: DISCONTINUED | OUTPATIENT
Start: 2019-03-05 | End: 2019-03-05

## 2019-03-05 RX ORDER — CARVEDILOL 6.25 MG/1
6.25 TABLET ORAL 2 TIMES DAILY WITH MEALS
Status: DISCONTINUED | OUTPATIENT
Start: 2019-03-05 | End: 2019-03-05

## 2019-03-05 RX ORDER — ATORVASTATIN CALCIUM 40 MG
40 TABLET ORAL DAILY
Qty: 90 TABLET | Refills: 6 | Status: SHIPPED | OUTPATIENT
Start: 2019-03-05 | End: 2019-03-07 | Stop reason: SDUPTHER

## 2019-03-05 RX ORDER — NAPROXEN SODIUM 220 MG/1
81 TABLET, FILM COATED ORAL DAILY
Qty: 90 TABLET | Refills: 3 | Status: ON HOLD | OUTPATIENT
Start: 2019-03-05 | End: 2019-06-23 | Stop reason: ALTCHOICE

## 2019-03-05 RX ORDER — CLOPIDOGREL BISULFATE 75 MG/1
75 TABLET ORAL DAILY
Qty: 30 TABLET | Refills: 11 | Status: SHIPPED | OUTPATIENT
Start: 2019-03-06 | End: 2019-03-07

## 2019-03-05 RX ORDER — ATORVASTATIN CALCIUM 40 MG/1
80 TABLET, FILM COATED ORAL DAILY
Status: DISCONTINUED | OUTPATIENT
Start: 2019-03-05 | End: 2019-03-07 | Stop reason: HOSPADM

## 2019-03-05 RX ORDER — AMLODIPINE BESYLATE 5 MG/1
5 TABLET ORAL DAILY
Status: DISCONTINUED | OUTPATIENT
Start: 2019-03-05 | End: 2019-03-06

## 2019-03-05 RX ORDER — CARVEDILOL 3.12 MG/1
3.12 TABLET ORAL 2 TIMES DAILY
Status: DISCONTINUED | OUTPATIENT
Start: 2019-03-05 | End: 2019-03-05

## 2019-03-05 RX ORDER — CARVEDILOL 6.25 MG/1
6.25 TABLET ORAL 2 TIMES DAILY
Status: DISCONTINUED | OUTPATIENT
Start: 2019-03-05 | End: 2019-03-05

## 2019-03-05 RX ADMIN — CLOPIDOGREL BISULFATE 75 MG: 75 TABLET, FILM COATED ORAL at 08:03

## 2019-03-05 RX ADMIN — HEPARIN SODIUM 5000 UNITS: 5000 INJECTION, SOLUTION INTRAVENOUS; SUBCUTANEOUS at 02:03

## 2019-03-05 RX ADMIN — AMLODIPINE BESYLATE 5 MG: 5 TABLET ORAL at 11:03

## 2019-03-05 RX ADMIN — CALCIUM ACETATE 667 MG: 667 CAPSULE ORAL at 05:03

## 2019-03-05 RX ADMIN — FUROSEMIDE 80 MG: 40 TABLET ORAL at 08:03

## 2019-03-05 RX ADMIN — CALCIUM ACETATE 667 MG: 667 CAPSULE ORAL at 11:03

## 2019-03-05 RX ADMIN — LOSARTAN POTASSIUM 100 MG: 50 TABLET, FILM COATED ORAL at 11:03

## 2019-03-05 RX ADMIN — HEPARIN SODIUM 5000 UNITS: 5000 INJECTION, SOLUTION INTRAVENOUS; SUBCUTANEOUS at 06:03

## 2019-03-05 RX ADMIN — ALLOPURINOL 100 MG: 100 TABLET ORAL at 08:03

## 2019-03-05 RX ADMIN — CARVEDILOL 6.25 MG: 6.25 TABLET, FILM COATED ORAL at 08:03

## 2019-03-05 RX ADMIN — ATORVASTATIN CALCIUM 80 MG: 40 TABLET, FILM COATED ORAL at 08:03

## 2019-03-05 RX ADMIN — INSULIN ASPART 2 UNITS: 100 INJECTION, SOLUTION INTRAVENOUS; SUBCUTANEOUS at 06:03

## 2019-03-05 RX ADMIN — ASPIRIN 325 MG: 325 TABLET, COATED ORAL at 08:03

## 2019-03-05 RX ADMIN — CARVEDILOL 6.25 MG: 6.25 TABLET, FILM COATED ORAL at 09:03

## 2019-03-05 RX ADMIN — CALCIUM ACETATE 667 MG: 667 CAPSULE ORAL at 08:03

## 2019-03-05 RX ADMIN — HEPARIN SODIUM 5000 UNITS: 5000 INJECTION, SOLUTION INTRAVENOUS; SUBCUTANEOUS at 09:03

## 2019-03-05 RX ADMIN — INSULIN ASPART 2 UNITS: 100 INJECTION, SOLUTION INTRAVENOUS; SUBCUTANEOUS at 11:03

## 2019-03-05 RX ADMIN — FAMOTIDINE 20 MG: 10 INJECTION, SOLUTION INTRAVENOUS at 08:03

## 2019-03-05 NOTE — PT/OT/SLP PROGRESS
Occupational Therapy   Treatment    Name: Ming Boateng  MRN: [id number]  Admitting Diagnosis:  Acute right MCA stroke       Recommendations:     Discharge Recommendations: rehabilitation facility  Discharge Equipment Recommendations:  (Defer to IPR)  Barriers to discharge:  Inaccessible home environment    Assessment:   Patient with decreased attention to L side throughout session, especially during ambulation. Patient with difficulty comprehending simple commands in English, so WHALEY spoke Afghan with patient and appeared to comprehend slightly better. Patient is a prime IPR candidate. Despite patient's co-morbidities, patient was living in community setting functioning at Mod I level for ADLs, and mobility prior to this event.  There is an expectation of returning to prior level of function to maintain independence thus avoiding readmission.  Patient's clinical condition meets full Inpatient Rehab (IPR) criteria; including the ability to actively participate in 3 hours of therapy.  A lower level of care(SNF) cannot provide the interdisciplinary treatment approach needed.     Ming Boateng is a 63 y.o. male with a medical diagnosis of Acute right MCA stroke. Performance deficits affecting function are weakness, impaired endurance, impaired self care skills, impaired functional mobilty, impaired cognition, impaired balance, visual deficits, decreased upper extremity function, decreased lower extremity function, decreased coordination, decreased safety awareness, impaired fine motor, impaired coordination, decreased ROM, impaired joint extensibility, gait instability.     Rehab Prognosis:  Good; patient would benefit from acute skilled OT services to address these deficits and reach maximum level of function.       Plan:     Patient to be seen 5 x/week to address the above listed problems via self-care/home management, therapeutic activities, therapeutic exercises  · Plan of Care Expires: 04/04/19  · Plan of Care Reviewed  with: patient, daughter    Subjective     Pain/Comfort:  · Pain Rating 1: 8/10  · Location - Orientation 1: upper  · Location 1: back  · Pain Addressed 1: Reposition, Distraction, Cessation of Activity    Objective:     Communicated with: nurseAby prior to session.  Patient found HOB elevated with bed alarm, telemetry(Veronika-Sys tele-monitor) upon OT entry to room.    General Precautions: Standard, fall   Orthopedic Precautions:N/A   Braces: N/A     Bed Mobility:    · Patient completed Scooting/Bridging with minimum assistance  · Patient completed Supine to Sit with minimum assistance, moderate assistance and with side rail  · Patient completed Sit to Supine with minimum assistance and with leg lift     Functional Mobility/Transfers:  · Patient completed Sit <> Stand Transfer with minimum assistance  with  rolling walker     Activities of Daily Living:  · Lower Body Dressing: maximal assistance socks    [facility] 6 Click ADL: 15    Treatment & Education:  Patient required increased VCs to follow commands (initially english, but switched to Kiswahili with improved command following noted). Patient uncoordinated when attempting to sit up on the EOB and scooting forward. Patient attempting to stand abruptly, but with VCs able to stand with correct technique. Patient ambulated out in charles using RW with CGA mainly, but required min (A) to maneuver RW to turn to the L. Patient with increased difficulty attending to L side to find room; required max VCs and RW mgmt. Patient maintained stance in room while PCT performed complete linen change after patient spilled water prior to OT session. Daughter with questions re: IPR; education provided and questions answered.     Patient left HOB elevated with all lines intact, call button in reach, bed alarm on, nurse notified and daughter presentEducation:      GOALS:   Multidisciplinary Problems     Occupational Therapy Goals        Problem: Occupational Therapy Goal    Goal Priority  Disciplines Outcome Interventions   Occupational Therapy Goal     OT, PT/OT Ongoing (interventions implemented as appropriate)    Description:  Goals to be met by: 4/4/19     Patient will increase functional independence with ADLs by performing:    LE Dressing with Supervision.  Grooming while standing with Supervision.  Toileting from toilet with Supervision for hygiene and clothing management.   Supine to sit with Supervision.  Step transfer with Supervision  Toilet transfer to toilet with Supervision.  Increased functional strength to WFL for self care skills and functional mobility.  Upper extremity exercise program x10 reps per handout, with independence.                      Time Tracking:     OT Date of Treatment: 03/05/19  OT Start Time: 1050  OT Stop Time: 1120  OT Total Time (min): 30 min    Billable Minutes:Self Care/Home Management 10  Therapeutic Activity 20    JOHNATHAN Murillo  3/5/2019

## 2019-03-05 NOTE — NURSING
VN rounding: VN cued into patient's room to complete evening rounding. Patient lying in bed resting without difficulties. At this time patient denies any questions, concerns and needs. Will cont to be available to patient and intervene prn.

## 2019-03-05 NOTE — PT/OT/SLP PROGRESS
Physical Therapy      Patient Name:  Ming Boateng   MRN:  [id number]    Patient not seen today secondary to (pt's daughter requested to allow pt to stay asleep and resume tomorrow). Will follow-up tomorrow.    Brant Cruz, PTA

## 2019-03-05 NOTE — PROGRESS NOTES
The Sw met with the pt and gave him the list of contracted IPR's in network with his insurnce plan for [facility] Medicaid to choose his prefrences. [facility] and [facility] are his choices. The pt thanked the Sw and states he will review the list and make his preferences.

## 2019-03-05 NOTE — PLAN OF CARE
Problem: Occupational Therapy Goal  Goal: Occupational Therapy Goal  Goals to be met by: 4/4/19     Patient will increase functional independence with ADLs by performing:    LE Dressing with Supervision.  Grooming while standing with Supervision.  Toileting from toilet with Supervision for hygiene and clothing management.   Supine to sit with Supervision.  Step transfer with Supervision  Toilet transfer to toilet with Supervision.  Increased functional strength to WFL for self care skills and functional mobility.  Upper extremity exercise program x10 reps per handout, with independence.     Outcome: Ongoing (interventions implemented as appropriate)  Patient with decreased attention to L side throughout session, especially during ambulation. Patient with difficulty comprehending simple commands in English, so WHALEY spoke Angolan with patient and appeared to comprehend slightly better. Patient is a prime IPR candidate. Despite patient's co-morbidities, patient was living in community setting functioning at Mod I level for ADLs, and mobility prior to this event.  There is an expectation of returning to prior level of function to maintain independence thus avoiding readmission.  Patient's clinical condition meets full Inpatient Rehab (IPR) criteria; including the ability to actively participate in 3 hours of therapy.  A lower level of care(SNF) cannot provide the interdisciplinary treatment approach needed.

## 2019-03-05 NOTE — PLAN OF CARE
Problem: Fall Injury Risk  Goal: Absence of Fall and Fall-Related Injury  Outcome: Ongoing (interventions implemented as appropriate)       Problem: Adult Inpatient Plan of Care  Goal: Plan of Care Review  Outcome: Ongoing (interventions implemented as appropriate)  Patient AAOx4. Patient tolerating dysphagia soft diet. Skin intact. Patient free from falls. Patients Left upper and lower extremity have full range of motion. Safety maintained, bed alarm placed.     Problem: Infection  Goal: Infection Symptom Resolution  Outcome: Ongoing (interventions implemented as appropriate)       Problem: Skin Injury Risk Increased  Goal: Skin Health and Integrity  Outcome: Ongoing (interventions implemented as appropriate)
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
## 2019-03-05 NOTE — PLAN OF CARE
VN cued into pt's room for introduction. VN informed pt and daughter that VN would be working along side bedside nurse and PCT throughout shift. Level of present pain assessed. At present no distress noted. Thoroughly discussed with patient and daughter today's plan of care and pending discharge. Discussed with patient High fall risk protocol and interventions that have been initiated and cont be in place for safety. Patient verbalized clear understanding and cooperation using teach back method. Bed alarm presently activated and in use. Will cont to be available to patient and intervene prn.

## 2019-03-06 LAB
ALBUMIN SERPL BCP-MCNC: 3.9 G/DL
ALP SERPL-CCNC: 91 U/L
ALT SERPL W/O P-5'-P-CCNC: 23 U/L
AMPHETAMINES SERPL QL: NEGATIVE
ANCA AB TITR SER IF: NORMAL TITER
ANION GAP SERPL CALC-SCNC: 10 MMOL/L
AORTIC ROOT ANNULUS: 3.23 CM
AORTIC VALVE CUSP SEPERATION: 1.8 CM
AST SERPL-CCNC: 19 U/L
AV INDEX (PROSTH): 0.97
AV MEAN GRADIENT: 1.75 MMHG
AV PEAK GRADIENT: 3.69 MMHG
AV VALVE AREA: 3.89 CM2
AV VELOCITY RATIO: 0.88
BARBITURATES SERPL QL SCN: NEGATIVE
BASOPHILS # BLD AUTO: 0.07 K/UL
BASOPHILS NFR BLD: 1 %
BENZODIAZ SERPL QL SCN: NEGATIVE
BILIRUB SERPL-MCNC: 0.5 MG/DL
BSA FOR ECHO PROCEDURE: 1.97 M2
BUN SERPL-MCNC: 54 MG/DL
BZE SERPL QL: NEGATIVE
CALCIUM SERPL-MCNC: 10.1 MG/DL
CARBOXYTHC SERPL QL SCN: NEGATIVE
CHLORIDE SERPL-SCNC: 104 MMOL/L
CO2 SERPL-SCNC: 27 MMOL/L
CREAT SERPL-MCNC: 2.5 MG/DL
CV ECHO LV RWT: 0.51 CM
DIFFERENTIAL METHOD: ABNORMAL
DOP CALC AO PEAK VEL: 0.96 M/S
DOP CALC AO VTI: 15.92 CM
DOP CALC LVOT AREA: 4.01 CM2
DOP CALC LVOT DIAMETER: 2.26 CM
DOP CALC LVOT PEAK VEL: 0.85 M/S
DOP CALC LVOT STROKE VOLUME: 61.87 CM3
DOP CALCLVOT PEAK VEL VTI: 15.43 CM
E WAVE DECELERATION TIME: 226.07 MSEC
E/A RATIO: 0.63
ECHO LV POSTERIOR WALL: 1.05 CM (ref 0.6–1.1)
EOSINOPHIL # BLD AUTO: 0.4 K/UL
EOSINOPHIL NFR BLD: 5.2 %
ERYTHROCYTE [DISTWIDTH] IN BLOOD BY AUTOMATED COUNT: 14.2 %
EST. GFR  (AFRICAN AMERICAN): 30 ML/MIN/1.73 M^2
EST. GFR  (NON AFRICAN AMERICAN): 26 ML/MIN/1.73 M^2
ETHANOL SERPL QL SCN: NEGATIVE
FRACTIONAL SHORTENING: 39 % (ref 28–44)
GLUCOSE SERPL-MCNC: 191 MG/DL
HCT VFR BLD AUTO: 34.2 %
HGB BLD-MCNC: 11.2 G/DL
INTERVENTRICULAR SEPTUM: 1.03 CM (ref 0.6–1.1)
IVRT: 0.09 MSEC
LA MAJOR: 4.24 CM
LA MINOR: 5.04 CM
LA WIDTH: 3.1 CM
LEFT ATRIUM SIZE: 4.1 CM
LEFT ATRIUM VOLUME INDEX: 26.1 ML/M2
LEFT ATRIUM VOLUME: 49.76 CM3
LEFT INTERNAL DIMENSION IN SYSTOLE: 2.52 CM (ref 2.1–4)
LEFT VENTRICLE DIASTOLIC VOLUME INDEX: 40.04 ML/M2
LEFT VENTRICLE DIASTOLIC VOLUME: 76.29 ML
LEFT VENTRICLE MASS INDEX: 74.7 G/M2
LEFT VENTRICLE SYSTOLIC VOLUME INDEX: 11.9 ML/M2
LEFT VENTRICLE SYSTOLIC VOLUME: 22.75 ML
LEFT VENTRICULAR INTERNAL DIMENSION IN DIASTOLE: 4.15 CM (ref 3.5–6)
LEFT VENTRICULAR MASS: 142.32 G
LYMPHOCYTES # BLD AUTO: 1.9 K/UL
LYMPHOCYTES NFR BLD: 26.2 %
MAGNESIUM SERPL-MCNC: 2.1 MG/DL
MCH RBC QN AUTO: 26.9 PG
MCHC RBC AUTO-ENTMCNC: 32.7 G/DL
MCV RBC AUTO: 82 FL
METHADONE SERPL QL SCN: NEGATIVE
MONOCYTES # BLD AUTO: 0.6 K/UL
MONOCYTES NFR BLD: 8.2 %
MV PEAK A VEL: 1.07 M/S
MV PEAK E VEL: 0.67 M/S
NEUTROPHILS # BLD AUTO: 4.4 K/UL
NEUTROPHILS NFR BLD: 59.3 %
OPIATES SERPL QL SCN: NEGATIVE
P-ANCA TITR SER IF: NORMAL TITER
PCP SERPL QL SCN: NEGATIVE
PHOSPHATE SERPL-MCNC: 3.9 MG/DL
PLATELET # BLD AUTO: 279 K/UL
PMV BLD AUTO: 10.3 FL
POCT GLUCOSE: 168 MG/DL (ref 70–110)
POCT GLUCOSE: 185 MG/DL (ref 70–110)
POCT GLUCOSE: 188 MG/DL (ref 70–110)
POCT GLUCOSE: 242 MG/DL (ref 70–110)
POTASSIUM SERPL-SCNC: 3.8 MMOL/L
PROPOXYPH SERPL QL: NEGATIVE
PROT SERPL-MCNC: 7.7 G/DL
PV PEAK VELOCITY: 0.84 CM/S
RA MAJOR: 3.95 CM
RA PRESSURE: 3 MMHG
RBC # BLD AUTO: 4.16 M/UL
RIGHT VENTRICULAR END-DIASTOLIC DIMENSION: 3.09 CM
SODIUM SERPL-SCNC: 141 MMOL/L
WBC # BLD AUTO: 7.33 K/UL

## 2019-03-06 PROCEDURE — 11000001 HC ACUTE MED/SURG PRIVATE ROOM

## 2019-03-06 PROCEDURE — S0028 INJECTION, FAMOTIDINE, 20 MG: HCPCS | Performed by: STUDENT IN AN ORGANIZED HEALTH CARE EDUCATION/TRAINING PROGRAM

## 2019-03-06 PROCEDURE — 63600175 PHARM REV CODE 636 W HCPCS: Performed by: STUDENT IN AN ORGANIZED HEALTH CARE EDUCATION/TRAINING PROGRAM

## 2019-03-06 PROCEDURE — 97110 THERAPEUTIC EXERCISES: CPT

## 2019-03-06 PROCEDURE — 83735 ASSAY OF MAGNESIUM: CPT

## 2019-03-06 PROCEDURE — 36415 COLL VENOUS BLD VENIPUNCTURE: CPT

## 2019-03-06 PROCEDURE — 92523 SPEECH SOUND LANG COMPREHEN: CPT

## 2019-03-06 PROCEDURE — 85025 COMPLETE CBC W/AUTO DIFF WBC: CPT

## 2019-03-06 PROCEDURE — 80053 COMPREHEN METABOLIC PANEL: CPT

## 2019-03-06 PROCEDURE — 84100 ASSAY OF PHOSPHORUS: CPT

## 2019-03-06 PROCEDURE — 94761 N-INVAS EAR/PLS OXIMETRY MLT: CPT

## 2019-03-06 PROCEDURE — 25000003 PHARM REV CODE 250: Performed by: STUDENT IN AN ORGANIZED HEALTH CARE EDUCATION/TRAINING PROGRAM

## 2019-03-06 PROCEDURE — 92526 ORAL FUNCTION THERAPY: CPT

## 2019-03-06 PROCEDURE — 97535 SELF CARE MNGMENT TRAINING: CPT

## 2019-03-06 PROCEDURE — 97116 GAIT TRAINING THERAPY: CPT

## 2019-03-06 PROCEDURE — 97530 THERAPEUTIC ACTIVITIES: CPT

## 2019-03-06 RX ORDER — ASPIRIN 81 MG/1
81 TABLET ORAL DAILY
Status: DISCONTINUED | OUTPATIENT
Start: 2019-03-07 | End: 2019-03-07 | Stop reason: HOSPADM

## 2019-03-06 RX ORDER — AMLODIPINE BESYLATE 5 MG/1
10 TABLET ORAL DAILY
Status: DISCONTINUED | OUTPATIENT
Start: 2019-03-06 | End: 2019-03-07 | Stop reason: HOSPADM

## 2019-03-06 RX ADMIN — CLOPIDOGREL BISULFATE 75 MG: 75 TABLET, FILM COATED ORAL at 09:03

## 2019-03-06 RX ADMIN — ALLOPURINOL 100 MG: 100 TABLET ORAL at 09:03

## 2019-03-06 RX ADMIN — FUROSEMIDE 80 MG: 40 TABLET ORAL at 09:03

## 2019-03-06 RX ADMIN — CALCIUM ACETATE 667 MG: 667 CAPSULE ORAL at 09:03

## 2019-03-06 RX ADMIN — ATORVASTATIN CALCIUM 80 MG: 40 TABLET, FILM COATED ORAL at 09:03

## 2019-03-06 RX ADMIN — LOSARTAN POTASSIUM 100 MG: 50 TABLET, FILM COATED ORAL at 09:03

## 2019-03-06 RX ADMIN — CALCIUM ACETATE 667 MG: 667 CAPSULE ORAL at 12:03

## 2019-03-06 RX ADMIN — HEPARIN SODIUM 5000 UNITS: 5000 INJECTION, SOLUTION INTRAVENOUS; SUBCUTANEOUS at 02:03

## 2019-03-06 RX ADMIN — HEPARIN SODIUM 5000 UNITS: 5000 INJECTION, SOLUTION INTRAVENOUS; SUBCUTANEOUS at 06:03

## 2019-03-06 RX ADMIN — CARVEDILOL 6.25 MG: 6.25 TABLET, FILM COATED ORAL at 09:03

## 2019-03-06 RX ADMIN — HEPARIN SODIUM 5000 UNITS: 5000 INJECTION, SOLUTION INTRAVENOUS; SUBCUTANEOUS at 08:03

## 2019-03-06 RX ADMIN — CARVEDILOL 6.25 MG: 6.25 TABLET, FILM COATED ORAL at 05:03

## 2019-03-06 RX ADMIN — AMLODIPINE BESYLATE 5 MG: 5 TABLET ORAL at 09:03

## 2019-03-06 RX ADMIN — INSULIN ASPART 2 UNITS: 100 INJECTION, SOLUTION INTRAVENOUS; SUBCUTANEOUS at 12:03

## 2019-03-06 RX ADMIN — CALCIUM ACETATE 667 MG: 667 CAPSULE ORAL at 05:03

## 2019-03-06 RX ADMIN — FAMOTIDINE 20 MG: 10 INJECTION, SOLUTION INTRAVENOUS at 09:03

## 2019-03-06 RX ADMIN — ASPIRIN 325 MG: 325 TABLET, COATED ORAL at 09:03

## 2019-03-06 NOTE — PT/OT/SLP PROGRESS
"Occupational Therapy   Treatment    Name: Ming Boateng  MRN: [id number]  Admitting Diagnosis:  Acute right MCA stroke       Recommendations:     Discharge Recommendations: rehabilitation facility  Discharge Equipment Recommendations:  (Defer to IPR)  Barriers to discharge:  Inaccessible home environment    Assessment:   Patient with impaired balance and safety awareness throughout session. Requires max VCs and redirection, as he was perseverating on walking. Patient with decreased awareness and use of LUE this date. There is an expectation of returning to prior level of function to maintain independence thus avoiding readmission.  Patient's clinical condition meets full Inpatient Rehab (IPR) criteria; including the ability to actively participate in 3 hours of therapy.  A lower level of care(SNF) cannot provide the interdisciplinary treatment approach needed.     Ming Boateng is a 63 y.o. male with a medical diagnosis of Acute right MCA stroke. Performance deficits affecting function are weakness, impaired endurance, impaired self care skills, impaired functional mobilty, gait instability, impaired balance, visual deficits, impaired cognition, decreased upper extremity function, decreased lower extremity function, decreased coordination, decreased safety awareness, decreased ROM, impaired fine motor, impaired joint extensibility.     Rehab Prognosis:  Good and Fair; patient would benefit from acute skilled OT services to address these deficits and reach maximum level of function.       Plan:     Patient to be seen 5 x/week to address the above listed problems via self-care/home management, therapeutic activities, therapeutic exercises  · Plan of Care Expires: 04/04/19  · Plan of Care Reviewed with: patient    Subjective   "If I fall, then you will just pick me back up again" -- spoken in German re: unsafe sitting EOB  Pain/Comfort:  · Pain Rating 1: 0/10  · Pain Rating Post-Intervention 1: 0/10    Objective: " "    Communicated with: nurseLeelee prior to session.  Patient found HOB elevated with bed alarm, telemetry(Veronika-Sys tele-monitor) upon OT entry to room.    General Precautions: Standard, fall   Orthopedic Precautions:N/A   Braces: N/A     Bed Mobility:    · Patient completed Scooting/Bridging with contact guard assistance  · Patient completed Supine to Sit with minimum assistance  · Patient completed Sit to Supine with minimum assistance     Functional Mobility/Transfers:  · Patient completed Sit <> Stand Transfer with minimum assistance and moderate assistance  with  rolling walker     Activities of Daily Living:  · Grooming: minimum assistance      [facility] 6 Click ADL: 14    Treatment & Education:  Patient with bed mob as noted above. Increased VCs required throughout 2* poor attention to task and patient making inappropriate conversation/remarks. Patient leaning forward to pull RW to him to stand /s gait belt donned or when therapist ready. Patient stood EOB /c RW and difficulty maintaining L hand on handle. Perseverating on walking, but demonstrated increased L lateral lean in stance. Patient returned to EOB for oral care task. Patient again perseverating on walking and began to push table to stand up. Patient able to scoot laterally to HOB. Patient initially refusing to return supine and stated he wanted to walk. When told it would be unsafe to remain EOB and that he could fall, patient reported "if I fall then you will just pick me back up". Patient persuaded to return to supine. Total (A) of 2 via drawsheet to HOB. Nsg present for meds and confirmed that he has been making inappropriate comments this date.     Patient left HOB elevated with all lines intact, call button in reach, bed alarm on and nurse presentEducation:      GOALS:   Multidisciplinary Problems     Occupational Therapy Goals        Problem: Occupational Therapy Goal    Goal Priority Disciplines Outcome Interventions   Occupational Therapy Goal "     OT, PT/OT Ongoing (interventions implemented as appropriate)    Description:  Goals to be met by: 4/4/19     Patient will increase functional independence with ADLs by performing:    LE Dressing with Supervision.  Grooming while standing with Supervision.  Toileting from toilet with Supervision for hygiene and clothing management.   Supine to sit with Supervision.  Step transfer with Supervision  Toilet transfer to toilet with Supervision.  Increased functional strength to WFL for self care skills and functional mobility.  Upper extremity exercise program x10 reps per handout, with independence.                      Time Tracking:     OT Date of Treatment: 03/06/19  OT Start Time: 1419  OT Stop Time: 1450  OT Total Time (min): 31 min    Billable Minutes:Self Care/Home Management 15  Therapeutic Activity 16    JUDD Murillo/MIKI  3/6/2019

## 2019-03-06 NOTE — PLAN OF CARE
Problem: Physical Therapy Goal  Goal: Physical Therapy Goal  Goals to be met by: 19     Patient will increase functional independence with mobility by performin. Sit to stand transfer with Stand-by Assistance  2. Bed to chair transfer with Stand-by Assistance using appropriate AD  3. Gait  x 100 feet with Stand-by Assistance using appropriate AD  4. Stand for 5 minutes with Stand-by Assistance using appropriate AD     Outcome: Ongoing (interventions implemented as appropriate)  Goals ongoing
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
## 2019-03-06 NOTE — PLAN OF CARE
03/06/19 1036   Post-Acute Status   Post-Acute Authorization Placement   Post-Acute Placement Status Referrals Sent     Per daughter preference referral sent [facility], [facility], [facility], and [facility] via [employer].

## 2019-03-06 NOTE — PLAN OF CARE
Problem: Adult Inpatient Plan of Care  Goal: Plan of Care Review  Outcome: Ongoing (interventions implemented as appropriate)  Patient remains free from falls, bed alarm in use. Blood glucose checks AC/ 1 unit given per SSI. Heparin given as ordered every 8 hours. On telemetry SR. Some assistance needed with urinal.

## 2019-03-06 NOTE — PT/OT/SLP PROGRESS
Physical Therapy Treatment    Patient Name:  Ming Boateng   MRN:  [id number]    Recommendations:     Discharge Recommendations:  rehabilitation facility   Discharge Equipment Recommendations: (defer to IPR)   Barriers to discharge: decreased mobility and endurance    Assessment:     Ming Boateng is a 63 y.o. male admitted with a medical diagnosis of Acute right MCA stroke.  He presents with the following impairments/functional limitations:  weakness, impaired endurance, impaired functional mobilty, gait instability, impaired balance, decreased upper extremity function, decreased lower extremity function, decreased safety awareness, decreased ROM, impaired coordination, impaired fine motor, impaired joint extensibility,nsg translated to initiate rx,pt required increased assistance today with mobility and increased lateral lean to L,pt will benefit from continuing PT services upon discharge.    Rehab Prognosis: Good; patient would benefit from acute skilled PT services to address these deficits and reach maximum level of function.    Recent Surgery: * No surgery found *      Plan:     During this hospitalization, patient to be seen 6 x/week to address the identified rehab impairments via gait training, therapeutic activities, therapeutic exercises, neuromuscular re-education and progress toward the following goals:    · Plan of Care Expires:  04/04/19    Subjective     Chief Complaint: n/a  Patient/Family Comments/goals: no c/o's pain as translated by nsg  Pain/Comfort:  · Pain Rating 1: (no c/o's)      Objective:     Communicated with nsg prior to session.  Patient found all lines intact, bed alarm on and nsg notified bed alarm, telemetry  upon PT entry to room.     General Precautions: Standard, fall   Orthopedic Precautions:N/A   Braces: N/A     Functional Mobility:  · Bed Mobility:     · Supine to Sit: moderate assistance  · Sit to Supine: moderate assistance  · Transfers:     · Sit to Stand:  moderate assistance with  rolling walker  · Gait: amb ~3-4 steps with RW and Mod A,pt was not safe to increase gait and also incteased lateral lean to L  · Balance: poor standing balance      AM-PAC 6 CLICK MOBILITY  Turning over in bed (including adjusting bedclothes, sheets and blankets)?: 3  Sitting down on and standing up from a chair with arms (e.g., wheelchair, bedside commode, etc.): 2  Moving from lying on back to sitting on the side of the bed?: 2  Moving to and from a bed to a chair (including a wheelchair)?: 2  Need to walk in hospital room?: 2  Climbing 3-5 steps with a railing?: 1  Basic Mobility Total Score: 12       Therapeutic Activities and Exercises: le supine ex's X 10-12 reps inc: hs,abd.slr       Patient left supine with all lines intact, call button in reach, bed alarm on and nsg notified..    GOALS: see general POC  Multidisciplinary Problems     Physical Therapy Goals        Problem: Physical Therapy Goal    Goal Priority Disciplines Outcome Goal Variances Interventions   Physical Therapy Goal     PT, PT/OT Ongoing (interventions implemented as appropriate)     Description:  Goals to be met by: 19     Patient will increase functional independence with mobility by performin. Sit to stand transfer with Stand-by Assistance  2. Bed to chair transfer with Stand-by Assistance using appropriate AD  3. Gait  x 100 feet with Stand-by Assistance using appropriate AD  4. Stand for 5 minutes with Stand-by Assistance using appropriate AD                      Time Tracking:     PT Received On: 19  PT Start Time: 1354     PT Stop Time: 1419  PT Total Time (min): 25 min     Billable Minutes: Gait Training 15 and Therapeutic Exercise 10    Treatment Type: Treatment  PT/PTA: PTA     PTA Visit Number: 1     Brant Cruz PTA  2019

## 2019-03-06 NOTE — PT/OT/SLP PROGRESS
Occupational Therapy  Visit Attempt    Patient Name:  Ming Boateng   MRN:  [id number]    Patient not seen today secondary to FREEDOM in cardiology. Will follow-up later, as time permits.     JOHNATHAN Murillo  3/6/2019

## 2019-03-06 NOTE — PLAN OF CARE
Problem: Occupational Therapy Goal  Goal: Occupational Therapy Goal  Goals to be met by: 4/4/19     Patient will increase functional independence with ADLs by performing:    LE Dressing with Supervision.  Grooming while standing with Supervision.  Toileting from toilet with Supervision for hygiene and clothing management.   Supine to sit with Supervision.  Step transfer with Supervision  Toilet transfer to toilet with Supervision.  Increased functional strength to WFL for self care skills and functional mobility.  Upper extremity exercise program x10 reps per handout, with independence.     Outcome: Ongoing (interventions implemented as appropriate)  Patient with impaired balance and safety awareness throughout session. Requires max VCs and redirection, as he was perseverating on walking. Patient with decreased awareness and use of LUE this date. There is an expectation of returning to prior level of function to maintain independence thus avoiding readmission.  Patient's clinical condition meets full Inpatient Rehab (IPR) criteria; including the ability to actively participate in 3 hours of therapy.  A lower level of care(SNF) cannot provide the interdisciplinary treatment approach needed.

## 2019-03-06 NOTE — PLAN OF CARE
S: Chart reviewed, patient seen today at bedside feeding himself lunch. Progressing, no new complaints today, pending inpatient rehab placement.    NIHSS 10    Neurological Exam  Thin older man, sitting up in bed, eating lunch, fully oriented, following requests  EOMI, PERRL, left hemianopsia, left facial droop, tongue with minimal leftward deviation  5/5 motor strength rUE vs 4+/5 biceps/triceps LUE  5/5 motor strength rLE vs 3/5 hip flexion, 4/5 knee flexion/extension, 3/5 dorsi/plantar flexion LLE  Sensation to light touch decreased Left arm, leg  Extinction to bilateral sensory stimulation present  Some mild left spatial neglect/limb apraxia noted  DTRs 3/4 L v R  FTN LUE w/ dysmetria/ataxia  No aphasia  Significant dysarthria  Flat affect      Acute RMCA CVA  - echo completed; no obvious significant cardiac dysfunction; holter monitor as OP  - continue normotension  - recommend improved glycemic control/increased SSI while inpatient; BG<180  - Recommend switching  mg-> 81 mg; continue clopidogrel 75 mg qd  - continue atorvastatin 40 mg qhs   - encourage participation with Ot/Pt; patient noted to be spilling his food today due to spatial neglect  - agree with IPR placement to optimize post-CVA functional ability  - follow up with PCP 2-3 weeks post discharge  - follow up with vascular neurology 2-3 weeks post discharge      Ryan Vasques MD  Neurology

## 2019-03-06 NOTE — PLAN OF CARE
Problem: SLP Goal  Goal: SLP Goal  Updated Short Term Goals:  1. Pt will participate in swallow eval to determine safest diet level with no audible aspiration signs. MET 3/6  2. Pt will tolerate dental soft diet with no audible aspiration signs per bedside observations. MET 3/6  3. Pt will perform simple OMEx with fair assist to increased oral motor strength and coordination. Ongoing  4. Pt will respond to personal self questions with 85% acc, min cues  5. Pt will complete naming items in native language with mod cues.   6. Pt will attend to items on R side of room with min redirection cues.   7. Pt will repeat complex phrases and sentences  to increase speech clarity with mod cues.     Outcome: Ongoing (interventions implemented as appropriate)  Pt with good participation in ST session, met with daughter and discussed ST goals and updated POC.

## 2019-03-06 NOTE — PLAN OF CARE
03/06/19 1716   Post-Acute Status   Post-Acute Authorization Placement   Post-Acute Placement Status Pending Payor Review     Accepted at [facility] pending insurance authorization
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
## 2019-03-07 ENCOUNTER — TELEPHONE (OUTPATIENT)
Dept: FAMILY MEDICINE | Facility: HOSPITAL | Age: 64
End: 2019-03-07

## 2019-03-07 VITALS
TEMPERATURE: 98 F | SYSTOLIC BLOOD PRESSURE: 149 MMHG | HEART RATE: 69 BPM | OXYGEN SATURATION: 98 % | HEIGHT: 68 IN | WEIGHT: 162.69 LBS | BODY MASS INDEX: 24.66 KG/M2 | DIASTOLIC BLOOD PRESSURE: 72 MMHG | RESPIRATION RATE: 18 BRPM

## 2019-03-07 LAB
ALBUMIN SERPL BCP-MCNC: 4.1 G/DL
ALP SERPL-CCNC: 106 U/L
ALT SERPL W/O P-5'-P-CCNC: 20 U/L
ANION GAP SERPL CALC-SCNC: 12 MMOL/L
AST SERPL-CCNC: 19 U/L
BASOPHILS # BLD AUTO: 0.04 K/UL
BASOPHILS NFR BLD: 0.5 %
BILIRUB SERPL-MCNC: 0.4 MG/DL
BUN SERPL-MCNC: 57 MG/DL
CALCIUM SERPL-MCNC: 10.8 MG/DL
CHLORIDE SERPL-SCNC: 104 MMOL/L
CO2 SERPL-SCNC: 25 MMOL/L
CREAT SERPL-MCNC: 2.6 MG/DL
DIFFERENTIAL METHOD: ABNORMAL
EOSINOPHIL # BLD AUTO: 0.4 K/UL
EOSINOPHIL NFR BLD: 4.2 %
ERYTHROCYTE [DISTWIDTH] IN BLOOD BY AUTOMATED COUNT: 14.1 %
EST. GFR  (AFRICAN AMERICAN): 29 ML/MIN/1.73 M^2
EST. GFR  (NON AFRICAN AMERICAN): 25 ML/MIN/1.73 M^2
GLUCOSE SERPL-MCNC: 211 MG/DL
HCT VFR BLD AUTO: 36.2 %
HGB BLD-MCNC: 11.9 G/DL
LYMPHOCYTES # BLD AUTO: 2.2 K/UL
LYMPHOCYTES NFR BLD: 25.9 %
MAGNESIUM SERPL-MCNC: 2 MG/DL
MCH RBC QN AUTO: 27 PG
MCHC RBC AUTO-ENTMCNC: 32.9 G/DL
MCV RBC AUTO: 82 FL
METHYLMALONATE SERPL-SCNC: 0.27 UMOL/L
MONOCYTES # BLD AUTO: 0.5 K/UL
MONOCYTES NFR BLD: 6.1 %
NEUTROPHILS # BLD AUTO: 5.4 K/UL
NEUTROPHILS NFR BLD: 63.2 %
PHOSPHATE SERPL-MCNC: 3.9 MG/DL
PLATELET # BLD AUTO: 290 K/UL
PMV BLD AUTO: 10.2 FL
POCT GLUCOSE: 199 MG/DL (ref 70–110)
POCT GLUCOSE: 227 MG/DL (ref 70–110)
POCT GLUCOSE: 269 MG/DL (ref 70–110)
POCT GLUCOSE: 285 MG/DL (ref 70–110)
POTASSIUM SERPL-SCNC: 3.9 MMOL/L
PROT SERPL-MCNC: 8.4 G/DL
RBC # BLD AUTO: 4.41 M/UL
SODIUM SERPL-SCNC: 141 MMOL/L
WBC # BLD AUTO: 8.48 K/UL

## 2019-03-07 PROCEDURE — 25000003 PHARM REV CODE 250: Performed by: STUDENT IN AN ORGANIZED HEALTH CARE EDUCATION/TRAINING PROGRAM

## 2019-03-07 PROCEDURE — 94761 N-INVAS EAR/PLS OXIMETRY MLT: CPT

## 2019-03-07 PROCEDURE — 25000003 PHARM REV CODE 250

## 2019-03-07 PROCEDURE — 63600175 PHARM REV CODE 636 W HCPCS: Performed by: STUDENT IN AN ORGANIZED HEALTH CARE EDUCATION/TRAINING PROGRAM

## 2019-03-07 PROCEDURE — S0028 INJECTION, FAMOTIDINE, 20 MG: HCPCS | Performed by: STUDENT IN AN ORGANIZED HEALTH CARE EDUCATION/TRAINING PROGRAM

## 2019-03-07 PROCEDURE — 84100 ASSAY OF PHOSPHORUS: CPT

## 2019-03-07 PROCEDURE — 97530 THERAPEUTIC ACTIVITIES: CPT

## 2019-03-07 PROCEDURE — 36415 COLL VENOUS BLD VENIPUNCTURE: CPT

## 2019-03-07 PROCEDURE — 80053 COMPREHEN METABOLIC PANEL: CPT

## 2019-03-07 PROCEDURE — 85025 COMPLETE CBC W/AUTO DIFF WBC: CPT

## 2019-03-07 PROCEDURE — 83735 ASSAY OF MAGNESIUM: CPT

## 2019-03-07 PROCEDURE — 97116 GAIT TRAINING THERAPY: CPT

## 2019-03-07 RX ORDER — ASPIRIN 81 MG/1
81 TABLET ORAL DAILY
Refills: 0 | Status: ON HOLD
Start: 2019-03-08 | End: 2019-04-12 | Stop reason: HOSPADM

## 2019-03-07 RX ORDER — ATORVASTATIN CALCIUM 40 MG
40 TABLET ORAL DAILY
Qty: 90 TABLET | Refills: 3
Start: 2019-03-07 | End: 2019-03-11

## 2019-03-07 RX ORDER — FAMOTIDINE 20 MG/1
20 TABLET, FILM COATED ORAL DAILY
Qty: 30 TABLET | Refills: 11 | Status: SHIPPED | OUTPATIENT
Start: 2019-03-08 | End: 2019-07-02

## 2019-03-07 RX ORDER — CLOPIDOGREL BISULFATE 75 MG/1
75 TABLET ORAL DAILY
Qty: 30 TABLET | Refills: 11
Start: 2019-03-07 | End: 2019-10-11 | Stop reason: SDUPTHER

## 2019-03-07 RX ORDER — FAMOTIDINE 20 MG/1
20 TABLET, FILM COATED ORAL DAILY
Status: DISCONTINUED | OUTPATIENT
Start: 2019-03-08 | End: 2019-03-07 | Stop reason: HOSPADM

## 2019-03-07 RX ADMIN — INSULIN ASPART 2 UNITS: 100 INJECTION, SOLUTION INTRAVENOUS; SUBCUTANEOUS at 07:03

## 2019-03-07 RX ADMIN — AMLODIPINE BESYLATE 10 MG: 5 TABLET ORAL at 08:03

## 2019-03-07 RX ADMIN — INSULIN ASPART 3 UNITS: 100 INJECTION, SOLUTION INTRAVENOUS; SUBCUTANEOUS at 06:03

## 2019-03-07 RX ADMIN — CLOPIDOGREL BISULFATE 75 MG: 75 TABLET, FILM COATED ORAL at 08:03

## 2019-03-07 RX ADMIN — CALCIUM ACETATE 667 MG: 667 CAPSULE ORAL at 12:03

## 2019-03-07 RX ADMIN — CARVEDILOL 6.25 MG: 6.25 TABLET, FILM COATED ORAL at 06:03

## 2019-03-07 RX ADMIN — CALCIUM ACETATE 667 MG: 667 CAPSULE ORAL at 08:03

## 2019-03-07 RX ADMIN — INSULIN ASPART 2 UNITS: 100 INJECTION, SOLUTION INTRAVENOUS; SUBCUTANEOUS at 12:03

## 2019-03-07 RX ADMIN — LOSARTAN POTASSIUM 100 MG: 50 TABLET, FILM COATED ORAL at 08:03

## 2019-03-07 RX ADMIN — CARVEDILOL 6.25 MG: 6.25 TABLET, FILM COATED ORAL at 08:03

## 2019-03-07 RX ADMIN — HEPARIN SODIUM 5000 UNITS: 5000 INJECTION, SOLUTION INTRAVENOUS; SUBCUTANEOUS at 06:03

## 2019-03-07 RX ADMIN — CALCIUM ACETATE 667 MG: 667 CAPSULE ORAL at 06:03

## 2019-03-07 RX ADMIN — ALLOPURINOL 100 MG: 100 TABLET ORAL at 08:03

## 2019-03-07 RX ADMIN — HEPARIN SODIUM 5000 UNITS: 5000 INJECTION, SOLUTION INTRAVENOUS; SUBCUTANEOUS at 02:03

## 2019-03-07 RX ADMIN — FUROSEMIDE 80 MG: 40 TABLET ORAL at 08:03

## 2019-03-07 RX ADMIN — ASPIRIN 81 MG: 81 TABLET, COATED ORAL at 08:03

## 2019-03-07 RX ADMIN — FAMOTIDINE 20 MG: 10 INJECTION, SOLUTION INTRAVENOUS at 08:03

## 2019-03-07 RX ADMIN — ATORVASTATIN CALCIUM 80 MG: 40 TABLET, FILM COATED ORAL at 08:03

## 2019-03-07 NOTE — PLAN OF CARE
Problem: Adult Inpatient Plan of Care  Goal: Plan of Care Review  Outcome: Ongoing (interventions implemented as appropriate)  Plan of care discussed with patient using language line. Patient appears somewhat confused. He is alert and oriented to self and place. Patient had incontinence episode. He was assisted to bed side commode. Bed changed, patient cleaned and provided with fresh gown. Adult brief was also placed. Neuro checks performed patient showing left side effected weakness. IV flushed for maintained function. Blood sugar and telemetry monitored. Patient encouraged to use call light to voice needs. Will continue to monitor.

## 2019-03-07 NOTE — PLAN OF CARE
[facility]    FACILITY TRANSFER ORDERS      Patient Name: Ming Boateng  YOB: 1955    PCP: John Serrano MD   PCP Address: [address] / [place] [address]  PCP Phone Number: [phone]  PCP Fax: [phone]    Encounter Date: 03/07/2019    Admit to: [facility]    Vital Signs:  Routine    Diagnoses:   Active Hospital Problems    Diagnosis  POA    *Acute right MCA stroke [I63.511]  Unknown    Left-sided weakness [R53.1]  Unknown    Diabetic nephropathy associated with type 2 diabetes mellitus [E11.21]  Yes    Recurrent falls [R29.6]  Not Applicable    H/O: CVA (cerebrovascular accident) [Z86.73]  Not Applicable     mini speech difficulty, right sided weakness      Stage 4 chronic kidney disease [N18.4]  Yes    Hypertension associated with diabetes [E11.59, I10]  Yes    (HFpEF) heart failure with preserved ejection fraction [I50.30]  Yes    Essential hypertension [I10]  Yes    Hyperlipidemia [E78.5]  Yes      Resolved Hospital Problems   No resolved problems to display.       Allergies:  Review of patient's allergies indicates:   Allergen Reactions    Cayenne Anaphylaxis     Throat swells up     Cayenne pepper        Diet: cardiac diet, diabetic diet: 2000 calorie, renal diet and soft diet    Activities: Activity as tolerated    Nursing:  NA        CONSULTS:    Physical Therapy to evaluate and treat. , Occupational Therapy to evaluate and treat., Speech Therapy to evaluate and treat for Language, Swallowing and Cognition. and  to evaluate for community resources/long-range planning.    MISCELLANEOUS CARE:  Diabetes Care:   SN to perform and educate Diabetic management with blood glucose monitoring:    WOUND CARE ORDERS  None    Medications: Review discharge medications with patient and family and provide education.      Current Discharge Medication List      START taking these medications    Details   aspirin (ECOTRIN) 81 MG EC tablet  Take 1 tablet (81 mg total) by mouth once daily.  Refills: 0      aspirin 81 MG Chew Take 1 tablet (81 mg total) by mouth once daily.  Qty: 90 tablet, Refills: 3      clopidogrel (PLAVIX) 75 mg tablet Take 1 tablet (75 mg total) by mouth once daily.  Qty: 30 tablet, Refills: 11      famotidine (PEPCID) 20 MG tablet Take 1 tablet (20 mg total) by mouth once daily.  Qty: 30 tablet, Refills: 11      LIPITOR 40 mg tablet Take 1 tablet (40 mg total) by mouth once daily.  Qty: 90 tablet, Refills: 3         CONTINUE these medications which have NOT CHANGED    Details   acetaminophen (TYLENOL) 650 MG TbSR Take 1 tablet (650 mg total) by mouth 3 (three) times daily as needed.  Qty: 60 tablet, Refills: 0    Associated Diagnoses: Left-sided chest wall pain      ADMELOG U-100 INSULIN LISPRO 100 unit/mL injection INJECT 7 UNITS UNDER THE SKIN TID  Refills: 0      allopurinol (ZYLOPRIM) 100 MG tablet Take 1 tablet (100 mg total) by mouth once daily.  Qty: 30 tablet, Refills: 0      amLODIPine (NORVASC) 10 MG tablet Take 1 tablet (10 mg total) by mouth once daily.  Qty: 90 tablet, Refills: 3    Associated Diagnoses: Essential hypertension      blood sugar diagnostic Strp 1 strip by Misc.(Non-Drug; Combo Route) route 4 (four) times daily.  Qty: 360 strip, Refills: 3    Associated Diagnoses: Type 2 diabetes mellitus with complication, with long-term current use of insulin      blood-glucose meter kit Use as instructed  Qty: 1 each, Refills: 0      calcium acetate (PHOSLO) 667 mg tablet Take 1 tablet by mouth 3 (three) times daily with meals.  Qty: 270 tablet, Refills: 3    Associated Diagnoses: Stage 4 chronic kidney disease      carvedilol (COREG) 12.5 MG tablet Take 0.5 tablets (6.25 mg total) by mouth 2 (two) times daily with meals.  Qty: 90 tablet, Refills: 3    Associated Diagnoses: (HFpEF) heart failure with preserved ejection fraction      ergocalciferol (VITAMIN D2) 50,000 unit Cap Take 50,000 Units by mouth every 7 days.     "  furosemide (LASIX) 40 MG tablet Take 2 tablets (80 mg total) by mouth once daily.  Qty: 180 tablet, Refills: 1    Associated Diagnoses: (HFpEF) heart failure with preserved ejection fraction      insulin glargine, TOUJEO, (TOUJEO) 300 unit/mL (1.5 mL) InPn pen Inject 10 Units into the skin 2 (two) times daily.  Qty: 1.5 mL, Refills: 6    Associated Diagnoses: Type 2 diabetes mellitus with complication, with long-term current use of insulin      insulin syringe-needle,dispos. 0.3 mL 30 gauge x 5/16" Syrg 1 each by Misc.(Non-Drug; Combo Route) route 3 (three) times daily.  Qty: 270 Syringe, Refills: 3    Associated Diagnoses: Type 2 diabetes mellitus with complication, with long-term current use of insulin      lancets (LANCETS,ULTRA THIN) Misc 1 lancet by Misc.(Non-Drug; Combo Route) route 3 (three) times daily with meals.  Qty: 100 each, Refills: 11    Associated Diagnoses: Type 2 diabetes mellitus with complication, with long-term current use of insulin      losartan (COZAAR) 100 MG tablet Take 1 tablet (100 mg total) by mouth once daily.  Qty: 90 tablet, Refills: 3    Associated Diagnoses: Hypertension associated with diabetes      pen needle, diabetic 29 gauge x 1/2" Ndle 1 pen by Misc.(Non-Drug; Combo Route) route 3 (three) times daily with meals.  Qty: 100 each, Refills: 11    Associated Diagnoses: Type 2 diabetes mellitus with complication, with long-term current use of insulin         STOP taking these medications       azithromycin (Z-RONNI) 250 MG tablet Comments:   Reason for Stopping:         pantoprazole (PROTONIX) 40 MG tablet Comments:   Reason for Stopping:         pravastatin (PRAVACHOL) 20 MG tablet Comments:   Reason for Stopping:                    _________________________________  Ana Goode MD  03/07/2019        "

## 2019-03-07 NOTE — PLAN OF CARE
Cued into patient's room.  Permission received per patient to turn camera to view patient.  Introduced as VN for night shift that will be working with floor nurse and nursing assistant.  Patient speaks mostly Yi but does speak some broken English.  Educated patient on VN's role in patient care. Plan of care reviewed with patient. Education per flowsheet.  Opportunity given for questions and questions answered.  No complaints.  Instructed to call for assistance.  Will cont to monitor.

## 2019-03-07 NOTE — DISCHARGE SUMMARY
[facility]  Discharge Summary      Admit Date: 3/3/2019    Discharge Date and Time:  3/7/19    Attending Physician: Dr. Yaroshevsky, Severyn    Reason for Admission: Stroke    Procedures Performed: * No surgery found *    Hospital Course:  The patient is a 64 YO [place] speaking male w/ pmhx of HTN, DM2, CVA, CKD, HLD presents with right facial droop, confusion and LE weakness.    At noon, he was dioriented ( unable to recall, or keep asking same questions), his speech was not slurred, but seem very confused. He said he had 3 falls when he was changing his position and became dizzy, felt his legs gave up on him, hit the back of his head, but no LOS. [facility] fix his bp meds. Daughter said he was last seen normal at 10pm last night.      Daughter said his right sided face was more droopy more than his chronic drooping, improved in the ED.   Last 2 wks he was dx'd URI and completed the course of abx.      Denies drinking etoh, cigarette smoking and rec drug use.    Neurology consulted for medical management of patient with right facial droop, BL LE weakness.  Patient arrived past the TPA window with Head CT negative for acute bleeding.  ASA, plavix, Lipitor started in ED.       Pt symptoms resolved the following day (3/7).  Pt stated that he felt better with resolution of leg weakness. No other symptoms.    PT/OT recommends in-patient rehab per patient objective physical activity status.    Brain MRI w/o contrast:   1. Scatter areas of acute infarction involving the right MCA distribution.  No superimposed hemorrhage.  No midline shift.  2. Age advanced chronic microvascular ischemic changes and generalized cerebral volume loss.    Brain MRA w/o contrast:  Focal high-grade stenosis at the proximal right M2/inferior branch.  No occlusion or aneurysm.    BL carotid US: No evidence of a hemodynamically significant carotid bifurcation  stenosis.    TTE:  Normal left ventricular systolic function. The estimated ejection fraction is 55%.  Normal central venous pressure (3 mm Hg). Concentric left ventricular remodeling. Grade I (mild) left ventricular diastolic dysfunction consistent with impaired relaxation. Normal right ventricular systolic function. Mild left atrial enlargement.    EKG: Sinus rhythm with 1st degree A-V block.  Otherwise normal ECG when compared with ECG of 18-DEC-2018 12:43, No significant change was found.    Utox Negative.       Consults: neurology    64 y/o man w/ hx of CVA x 2, HTN, DMII, HLD, non compliance admitted to medicine service for acute CVA in the R-MCA/PCA distribution, associated with left sided weakness and visual field deficits. Found to have high grade M2 stenosis on MRA brain, not compliant with antiplatelet therapy following previous CVA.    Significant Diagnostic Studies: Labs:   BMP:   Recent Labs   Lab 03/07/19  0445   *      K 3.9      CO2 25   BUN 57*   CREATININE 2.6*   CALCIUM 10.8*   MG 2.0   , CMP   Recent Labs   Lab 03/07/19  0445      K 3.9      CO2 25   *   BUN 57*   CREATININE 2.6*   CALCIUM 10.8*   PROT 8.4   ALBUMIN 4.1   BILITOT 0.4   ALKPHOS 106   AST 19   ALT 20   ANIONGAP 12   ESTGFRAFRICA 29*   EGFRNONAA 25*    and CBC   Recent Labs   Lab 03/07/19  0446   WBC 8.48   HGB 11.9*   HCT 36.2*          Final Diagnoses:    Principal Problem: Acute right MCA stroke   Secondary Diagnoses:   Active Hospital Problems    Diagnosis  POA    *Acute right MCA stroke [I63.511]  Unknown    Left-sided weakness [R53.1]  Unknown    Diabetic nephropathy associated with type 2 diabetes mellitus [E11.21]  Yes    Recurrent falls [R29.6]  Not Applicable    H/O: CVA (cerebrovascular accident) [Z86.73]  Not Applicable     mini speech difficulty, right sided weakness      Stage 4 chronic kidney disease [N18.4]  Yes    Hypertension associated with diabetes [E11.59,  I10]  Yes    (HFpEF) heart failure with preserved ejection fraction [I50.30]  Yes    Essential hypertension [I10]  Yes    Hyperlipidemia [E78.5]  Yes      Resolved Hospital Problems   No resolved problems to display.       Discharged Condition: good    Disposition: In-pt rehab    Follow Up/Patient Instructions:   Continue DAP for now and at discharge (ASA 81 mg + Plavix 75 mg qd) indefinitely  - Continue Atorvastatin 40 mg qhs   - f/u withNeurology 2-3 weeks post discharge   -Holter monitor ordered upon discharge    Medications:  Reconciled Home Medications:      Medication List      START taking these medications         * aspirin 81 MG EC tablet  Commonly known as:  ECOTRIN  Take 1 tablet (81 mg total) by mouth once daily.     clopidogrel 75 mg tablet  Commonly known as:  PLAVIX  Take 1 tablet (75 mg total) by mouth once daily.     famotidine 20 MG tablet  Commonly known as:  PEPCID  Take 1 tablet (20 mg total) by mouth once daily.     LIPITOR 40 MG tablet  Generic drug:  atorvastatin  Take 1 tablet (40 mg total) by mouth once daily.         * This list has 2 medication(s) that are the same as other medications prescribed for you. Read the directions carefully, and ask your doctor or other care provider to review them with you.            CHANGE how you take these medications    allopurinol 100 MG tablet  Commonly known as:  ZYLOPRIM  Take 1 tablet (100 mg total) by mouth once daily.  What changed:  how much to take     amLODIPine 10 MG tablet  Commonly known as:  NORVASC  Take 1 tablet (10 mg total) by mouth once daily.  What changed:  how much to take     losartan 100 MG tablet  Commonly known as:  COZAAR  Take 1 tablet (100 mg total) by mouth once daily.  What changed:  how much to take        CONTINUE taking these medications    acetaminophen 650 MG Tbsr  Commonly known as:  TYLENOL  Take 1 tablet (650 mg total) by mouth 3 (three) times daily as needed.     ADMELOG U-100 INSULIN LISPRO 100 unit/mL  "injection  Generic drug:  insulin lispro  INJECT 7 UNITS UNDER THE SKIN TID     blood sugar diagnostic Strp  1 strip by Misc.(Non-Drug; Combo Route) route 4 (four) times daily.     blood-glucose meter kit  Use as instructed     calcium acetate 667 mg tablet  Commonly known as:  PHOSLO  Take 1 tablet by mouth 3 (three) times daily with meals.     carvedilol 12.5 MG tablet  Commonly known as:  COREG  Take 0.5 tablets (6.25 mg total) by mouth 2 (two) times daily with meals.     furosemide 40 MG tablet  Commonly known as:  LASIX  Take 2 tablets (80 mg total) by mouth once daily.     insulin glargine (TOUJEO) 300 unit/mL (1.5 mL) Inpn pen  Commonly known as:  TOUJEO  Inject 10 Units into the skin 2 (two) times daily.     insulin syringe-needle,dispos. 0.3 mL 30 gauge x 5/16" Syrg  1 each by Misc.(Non-Drug; Combo Route) route 3 (three) times daily.     lancets Misc  Commonly known as:  LANCETS,ULTRA THIN  1 lancet by Misc.(Non-Drug; Combo Route) route 3 (three) times daily with meals.     pen needle, diabetic 29 gauge x 1/2" Ndle  1 pen by Misc.(Non-Drug; Combo Route) route 3 (three) times daily with meals.     VITAMIN D2 50,000 unit Cap  Generic drug:  ergocalciferol  Take 50,000 Units by mouth every 7 days.        STOP taking these medications    azithromycin 250 MG tablet  Commonly known as:  Z-RONNI     pantoprazole 40 MG tablet  Commonly known as:  PROTONIX     pravastatin 20 MG tablet  Commonly known as:  PRAVACHOL          Discharge Procedure Orders   Ambulatory referral to Neurology   Referral Priority: Routine Referral Type: Consultation   Referral Reason: Specialty Services Required   Referred to Provider: SIMON DESAI Requested Specialty: Neurology   Number of Visits Requested: 1     Diet diabetic     Diet Cardiac     Diet renal     Notify your health care provider if you experience any of the following:  temperature >100.4     Notify your health care provider if you experience any of the following:  " persistent nausea and vomiting or diarrhea     Notify your health care provider if you experience any of the following:  severe uncontrolled pain     Notify your health care provider if you experience any of the following:  redness, tenderness, or signs of infection (pain, swelling, redness, odor or green/yellow discharge around incision site)     Notify your health care provider if you experience any of the following:  difficulty breathing or increased cough     Notify your health care provider if you experience any of the following:  severe persistent headache     Notify your health care provider if you experience any of the following:  worsening rash     Notify your health care provider if you experience any of the following:  persistent dizziness, light-headedness, or visual disturbances     Notify your health care provider if you experience any of the following:  increased confusion or weakness     Cardiac event monitor (30 day)   Standing Status: Future Standing Exp. Date: 03/05/20   Scheduling Instructions: Stroke, worrying about thrombotic events.     Order Specific Question Answer Comments   Cardiac Event Monitor Looping Recorder      Activity as tolerated     Follow-up Information     Janet Gao MD. Schedule an appointment as soon as possible for a visit in 3 days.    Specialty:  Neurology  Why:  Hospital followup  Contact information:  [address]  [address]  [facility] [id number] [phone]             [facility] On 3/7/2019.    Specialties:  Rehab Facility, LTAC  Why:  Rehab  Contact information:  [facility] [facility] [address] [phone]                 33 minutes were spent on this discharge summary.

## 2019-03-07 NOTE — PLAN OF CARE
03/07/19 1728   Final Note   Assessment Type Final Discharge Note   Anticipated Discharge Disposition Rehab   What phone number can be called within the next 1-3 days to see how you are doing after discharge? [phone]   Hospital Follow Up  Appt(s) scheduled? Yes   Discharge plans and expectations educations in teach back method with documentation complete? Yes   Right Care Referral Info   Post Acute Recommendation IRF   Referral Type (Inpatient Rehab)   Facility Name ([facility])   [place] ([facility] ([place], [place])

## 2019-03-07 NOTE — PLAN OF CARE
03/07/19 1349   Post-Acute Status   Post-Acute Authorization Placement   Post-Acute Placement Status (!) Delay between Facility and Payor     [facility]ization [facility] ([facility] [place]. Daughter's preference is [facility].   informed [place] at [facility] regarding daughter's preference.  [place] reported she will inform the , Iza [phone] to cancel authorization for [facility].     made phone contact with Greg at [facility] giving him the  name and contact number to obtain authorization.

## 2019-03-07 NOTE — PLAN OF CARE
03/07/19 1132   Post-Acute Status   Post-Acute Authorization Placement   Post-Acute Placement Status Pending Payor Review      made phone contact with [facility] admit coordinator, Greg to check status of referral.  Greg reported admission is pending insurance authorization and review of transfer facility orders.     made phone contact with daughter, Nica Boateng [phone] updating her regarding status of discharge to [facility].  Daughter informed admission to [facility] is pending insurance authorization. Se will be notified when insurance has given authorization.

## 2019-03-07 NOTE — PLAN OF CARE
Problem: Occupational Therapy Goal  Goal: Occupational Therapy Goal  Goals to be met by: 4/4/19     Patient will increase functional independence with ADLs by performing:    LE Dressing with Supervision.  Grooming while standing with Supervision.  Toileting from toilet with Supervision for hygiene and clothing management.   Supine to sit with Supervision.  Step transfer with Supervision  Toilet transfer to toilet with Supervision.  Increased functional strength to WFL for self care skills and functional mobility.  Upper extremity exercise program x10 reps per handout, with independence.     Outcome: Ongoing (interventions implemented as appropriate)  Pt reporting increased fatigue this date. Poor safety awareness throughout session and increased L lateral leaning/dragging of LLE with ambulation and continued axs. Cont OT POC- IP rehab at d/c

## 2019-03-07 NOTE — PT/OT/SLP PROGRESS
Occupational Therapy   Treatment    Name: Ming Boateng  MRN: [id number]  Admitting Diagnosis:  Acute right MCA stroke       Recommendations:     Discharge Recommendations: rehabilitation facility  Discharge Equipment Recommendations:  (per rehab)  Barriers to discharge:  Inaccessible home environment    Assessment:     Ming Boateng is a 63 y.o. male with a medical diagnosis of Acute right MCA stroke.  He presents with L sided deficits from CVA affecting performance in all ADLs and functional mobility. Performance deficits affecting function are weakness, impaired self care skills, impaired balance, impaired functional mobilty, impaired endurance, gait instability, visual deficits, impaired cognition, decreased lower extremity function, decreased upper extremity function, impaired coordination, decreased ROM, decreased safety awareness, decreased coordination, impaired fine motor.     Pt reporting increased fatigue this date. Poor safety awareness throughout session and increased L lateral leaning/dragging of LLE with ambulation and continued axs. Cont OT POC- IP rehab at d/c     Rehab Prognosis:  Good; patient would benefit from acute skilled OT services to address these deficits and reach maximum level of function.       Plan:     Patient to be seen 5 x/week to address the above listed problems via self-care/home management, therapeutic exercises, therapeutic activities  · Plan of Care Expires: 04/04/19  · Plan of Care Reviewed with: patient    Subjective     Pain/Comfort:  · Pain Rating 1: 0/10    Objective:     Communicated with: nsg prior to session.   General Precautions: Standard, fall   Orthopedic Precautions:N/A   Braces: N/A     Occupational Performance: /nurse present to speak with pt prior beginning session; pt declining need for  at this time and states he is ok with speaking english. Reports he is tired this date    Bed Mobility:    · Patient completed Scooting/Bridging with  contact guard assistance  · Patient completed Supine to Sit with contact guard assistance  · Patient completed Sit to Supine with moderate assistance     Functional Mobility/Transfers:  · Patient completed Sit <> Stand Transfer with minimum assistance  with  rolling walker   · Functional Mobility: Min A of 2 initially for ambulation; with increased distance, pt requiring mod A of 2 due to L lateral leaning and dragging of LLE      Activities of Daily Living:  · Feeding:  stand by assistance drink sips of water from cup utilizing R hand   · Upper Body Dressing: maximal assistance and total assistance brice/doff gown as a robe       [facility] 6 Click ADL: 14    Treatment & Education:  Pt reporting fatigue, however, agreeable to session; declining need for  services at this time   Pt with impulsive behaviors this date as well as poor command following;  Making inappropriate comments to therapists during session as well. Pt performing all skills haphazardly this date   No L lateral lean noted during session, however, with further/increased ambulation trials, pt requiring increased assist with significant L lean and dragging L foot   Requires constant redirection to tasks during session this date  Pt appears with decreased command following during session, but unaware if this was language barrier during session, or pt being impulsive   UBD max/total A to brice/doff gown as robe- decreased command following for instructions on adaptive dsg technique, pulling at any part of gown he could   Sat EOB to perform AROM/AAROM to LUE chest press, shoulder flexion ( in available range), and digit flex/ext   Attempted to perform L hand FMC with opening and closing twist off containers, but pt not following commands to utilize L hand   Performed EOB seated scooting to HOB CGA with good use of LUE pushing   Supine LE therex performed with PTA       Patient left supine with all lines intact, call button in reach, bed alarm on and  nsg notifiedEducation:      GOALS:   Multidisciplinary Problems     Occupational Therapy Goals        Problem: Occupational Therapy Goal    Goal Priority Disciplines Outcome Interventions   Occupational Therapy Goal     OT, PT/OT Ongoing (interventions implemented as appropriate)    Description:  Goals to be met by: 4/4/19     Patient will increase functional independence with ADLs by performing:    LE Dressing with Supervision.  Grooming while standing with Supervision.  Toileting from toilet with Supervision for hygiene and clothing management.   Supine to sit with Supervision.  Step transfer with Supervision  Toilet transfer to toilet with Supervision.  Increased functional strength to WFL for self care skills and functional mobility.  Upper extremity exercise program x10 reps per handout, with independence.                      Time Tracking:     OT Date of Treatment: 03/07/19  OT Start Time: 1125  OT Stop Time: 1149  OT Total Time (min): 24 min    Billable Minutes:Therapeutic Activity 12 co treatment with PT     Anna Harris OT  3/7/2019

## 2019-03-07 NOTE — TELEPHONE ENCOUNTER
----- Message from Adela Butcher sent at 3/4/2019  2:15 PM CST -----  Contact: [facility] [phone]  Needs to speak to Dr in regard to the patient not having a scooter and he has had a lot of falls in the past month. Please call [facility] to discuss further action

## 2019-03-07 NOTE — PROGRESS NOTES
Pharmacist Intervention IV to PO Note    Ming Boateng is a 63 y.o. male being treated with IV medication famotidine    Patient Data:    Vital Signs (Most Recent):  Temp: 98.5 °F (36.9 °C) (03/07/19 1112)  Pulse: 71 (03/07/19 1149)  Resp: 18 (03/07/19 1112)  BP: 124/66 (03/07/19 1112)  SpO2: 97 % (03/07/19 1132) Vital Signs (72h Range):  Temp:  [96.3 °F (35.7 °C)-99.2 °F (37.3 °C)]   Pulse:  [60-86]   Resp:  [16-20]   BP: (124-171)/(65-84)   SpO2:  [92 %-100 %]      CBC:  Recent Labs   Lab 03/05/19  0512 03/06/19  0550 03/07/19  0446   WBC 8.16 7.33 8.48   RBC 4.31* 4.16* 4.41*   HGB 11.6* 11.2* 11.9*   HCT 35.4* 34.2* 36.2*    279 290   MCV 82 82 82   MCH 26.9* 26.9* 27.0   MCHC 32.8 32.7 32.9     CMP:     Recent Labs   Lab 03/05/19  0512 03/06/19  0550 03/07/19  0445   * 191* 211*   CALCIUM 10.4 10.1 10.8*   ALBUMIN 4.0 3.9 4.1   PROT 8.3 7.7 8.4    141 141   K 3.9 3.8 3.9   CO2 27 27 25    104 104   BUN 59* 54* 57*   CREATININE 2.6* 2.5* 2.6*   ALKPHOS 99 91 106   ALT 32 23 20   AST 21 19 19   BILITOT 0.5 0.5 0.4       Dietary Orders:  Diet Orders            Diet Dysphagia Mechanical Soft (IDDSI Level 5): Dysphagia 2 (Mechanical Soft Ground) starting at 03/04 1151            Based on the following criteria, this patient qualifies for intravenous to oral conversion:  [x] The patients gastrointestinal tract is functioning (tolerating medications via oral or enteral route for 24 hours and tolerating food or enteral feeds for 24 hours).  [x] The patient is hemodynamically stable for 24 hours (heart rate <100 beats per minute, systolic blood pressure >99 mm Hg, and respiratory rate <20 breaths per minute).  [x] The patient shows clinical improvement (afebrile for at least 24 hours and white blood cell count downtrending or normalized). Additionally, the patient must be non-neutropenic (absolute neutrophil count >500 cells/mm3).  [x] For antimicrobials, the patient has received IV therapy for at  least 24 hours.    IV medication famotidine 20mg daily will be changed to oral medication famotidine 20mg daily    Pharmacist's Name: Belle Carbajal  Pharmacist's Extension: [phone]

## 2019-03-07 NOTE — PT/OT/SLP PROGRESS
Physical Therapy Treatment    Patient Name:  Ming Boateng   MRN:  [id number]    Recommendations:     Discharge Recommendations:  rehabilitation facility   Discharge Equipment Recommendations: (defer to IPR)   Barriers to discharge: decreased mobility,endurance and strength    Assessment:     Ming Boateng is a 63 y.o. male admitted with a medical diagnosis of Acute right MCA stroke.  He presents with the following impairments/functional limitations:  weakness, impaired endurance, impaired functional mobilty, gait instability, impaired balance, impaired cognition, decreased upper extremity function, decreased lower extremity function, decreased safety awareness, decreased ROM, impaired coordination, impaired joint extensibility,pt with improved status from yesterday,pt initially required Min A X 2 with gait using RW and required Mod A X 2 with increased distance as well as lateral lean to L and foot drag on L,pt will benefit from continuing PT services upon discharge.    Rehab Prognosis: Good; patient would benefit from acute skilled PT services to address these deficits and reach maximum level of function.    Recent Surgery: * No surgery found *      Plan:     During this hospitalization, patient to be seen 6 x/week to address the identified rehab impairments via gait training, therapeutic activities, therapeutic exercises and progress toward the following goals:    · Plan of Care Expires:  04/04/19    Subjective     Chief Complaint: n/a  Patient/Family Comments/goals: pt agreeable to rx  Pain/Comfort:  · Pain Rating 1: 0/10      Objective:     Communicated with nsg prior to session.  Patient found all lines intact, call button in reach, bed alarm on and nsg notified bed alarm, telemetry  upon PT entry to room.     General Precautions: Standard, fall   Orthopedic Precautions:N/A   Braces: N/A     Functional Mobility:  · Bed Mobility:     · Supine to Sit: contact guard assistance  · Sit to Supine: minimum assistance and  moderate assistance  · Transfers:     · Sit to Stand:  minimum assistance with rolling walker  · Gait: amb ~45' with RW and Min A X 2 increasing to Mod A X 2  · Balance: fair standing balance initially with RW      AM-PAC 6 CLICK MOBILITY  Turning over in bed (including adjusting bedclothes, sheets and blankets)?: 3  Sitting down on and standing up from a chair with arms (e.g., wheelchair, bedside commode, etc.): 3  Moving from lying on back to sitting on the side of the bed?: 3  Moving to and from a bed to a chair (including a wheelchair)?: 3  Need to walk in hospital room?: 2  Climbing 3-5 steps with a railing?: 1  Basic Mobility Total Score: 15       Therapeutic Activities and Exercises: le supine ex's X 10-12 reps inc: ap,hs,abd/add,slr       Patient left supine with all lines intact, call button in reach, bed alarm on and nsg notified..    GOALS: see general POC  Multidisciplinary Problems     Physical Therapy Goals        Problem: Physical Therapy Goal    Goal Priority Disciplines Outcome Goal Variances Interventions   Physical Therapy Goal     PT, PT/OT Ongoing (interventions implemented as appropriate)     Description:  Goals to be met by: 19     Patient will increase functional independence with mobility by performin. Sit to stand transfer with Stand-by Assistance  2. Bed to chair transfer with Stand-by Assistance using appropriate AD  3. Gait  x 100 feet with Stand-by Assistance using appropriate AD  4. Stand for 5 minutes with Stand-by Assistance using appropriate AD                      Time Tracking:     PT Received On: 19  PT Start Time: 1125     PT Stop Time: 1149  PT Total Time (min): 24 min     Billable Minutes: Gait Training 12 and Total Time 24    Treatment Type: Treatment  PT/PTA: PTA     PTA Visit Number: 2     Brant Cruz, PTA  2019

## 2019-03-07 NOTE — PLAN OF CARE
03/07/19 1709   Post-Acute Status   Post-Acute Authorization Placement   Post-Acute Placement Status Authorization Obtained      received phone call from [facility] admit coordinator, Greg Tirado.  He reported that insurance has give authorization for patient to admit.  Greg reported patient will admit to room 250 and [facility] nurse can call report to [phone].  He request transportation be arranged for 5:00pm.    Ambulance transportation scheduled for 5:00pm via patient flow center.     met with daughter, Nica Boateng at bedside informing her patient is ready for discharge to [facility].  Also, ambulance has been arranged for 5:00pm transport.  Daughter agreed with plan.     received phone call from Greg with [facility] requesting that transportation time be changed to 6:00pm.     made phone contact with Evi at [phone] informing her transportation time need to be changed from 5:00pm to 6:00pm.      Bedside nurse, Maddy informed patient is ready for discharge.  She can call report to the number located of ambulance packet.  Also, ambulance is scheduled for 6:00pm.

## 2019-03-08 NOTE — PT/OT/SLP DISCHARGE
Physical Therapy Discharge Summary    Name: Ming Boateng  MRN: [id number]   Principal Problem: Acute right MCA stroke     Patient Discharged from acute Physical Therapy on 3/7/2019.  Please refer to prior PT noted date on 3/7/2019 for functional status.     Assessment:     Patient appropriate for care in another setting.    Objective:     GOALS:   Multidisciplinary Problems     Physical Therapy Goals        Problem: Physical Therapy Goal    Goal Priority Disciplines Outcome Goal Variances Interventions   Physical Therapy Goal     PT, PT/OT Ongoing (interventions implemented as appropriate)     Description:  Goals to be met by: 19     Patient will increase functional independence with mobility by performin. Sit to stand transfer with Stand-by Assistance  2. Bed to chair transfer with Stand-by Assistance using appropriate AD  3. Gait  x 100 feet with Stand-by Assistance using appropriate AD  4. Stand for 5 minutes with Stand-by Assistance using appropriate AD                      Reasons for Discontinuation of Therapy Services  Transfer to alternate level of care.      Plan:     Patient Discharged to: Inpatient Rehab.    David Esparza, PT  3/8/2019
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
## 2019-03-11 RX ORDER — ATORVASTATIN CALCIUM 40 MG/1
40 TABLET, FILM COATED ORAL DAILY
Qty: 90 TABLET | Refills: 3 | Status: ON HOLD | OUTPATIENT
Start: 2019-03-11 | End: 2019-08-28 | Stop reason: HOSPADM

## 2019-03-15 ENCOUNTER — TELEPHONE (OUTPATIENT)
Dept: FAMILY MEDICINE | Facility: HOSPITAL | Age: 64
End: 2019-03-15

## 2019-03-15 NOTE — TELEPHONE ENCOUNTER
----- Message from Keesha Leal MA sent at 3/15/2019 10:58 AM CDT -----  Contact: [facility]  Sivan; [phone]  Please call [facility] to discuss other insulin options other than Tuejeo. Thanks.

## 2019-03-22 DIAGNOSIS — E11.8 TYPE 2 DIABETES MELLITUS WITH COMPLICATION, WITH LONG-TERM CURRENT USE OF INSULIN: ICD-10-CM

## 2019-03-22 DIAGNOSIS — Z79.4 TYPE 2 DIABETES MELLITUS WITH COMPLICATION, WITH LONG-TERM CURRENT USE OF INSULIN: ICD-10-CM

## 2019-03-22 RX ORDER — PEN NEEDLE, DIABETIC 29 G X1/2"
1 NEEDLE, DISPOSABLE MISCELLANEOUS
Qty: 100 EACH | Refills: 11 | Status: SHIPPED | OUTPATIENT
Start: 2019-03-22 | End: 2019-04-22

## 2019-03-22 NOTE — TELEPHONE ENCOUNTER
"----- Message from Adela Butcher sent at 3/22/2019 10:30 AM CDT -----  Pt needs a refill for pen needle, diabetic 29 gauge x 1/2" Ndle  "

## 2019-03-25 DIAGNOSIS — I69.359 HEMIPLEGIA AND HEMIPARESIS FOLLOWING CEREBRAL INFARCTION AFFECTING UNSPECIFIED SIDE: Primary | ICD-10-CM

## 2019-04-08 ENCOUNTER — CLINICAL SUPPORT (OUTPATIENT)
Dept: REHABILITATION | Facility: HOSPITAL | Age: 64
End: 2019-04-08
Attending: PSYCHIATRY & NEUROLOGY
Payer: MEDICARE

## 2019-04-08 DIAGNOSIS — R26.89 DECREASED MOBILITY: ICD-10-CM

## 2019-04-08 DIAGNOSIS — R26.9 GAIT ABNORMALITY: ICD-10-CM

## 2019-04-08 PROCEDURE — 97162 PT EVAL MOD COMPLEX 30 MIN: CPT | Mod: PN

## 2019-04-08 NOTE — PLAN OF CARE
"[facility]  Physical Therapy Initial Evaluation    Name: Ming Boateng  Clinic Number: [id number]    Therapy Diagnosis:   Encounter Diagnoses   Name Primary?    Decreased mobility     Gait abnormality      Physician: Khoobehi, Kaveh D., MD    Physician Orders: PT Eval and Treat  Medical Diagnosis from Referral: I69.359 (ICD-10-CM) - Hemiplegia and hemiparesis following cerebral infarction affecting unspecified side  Evaluation Date: 4/8/2019     Authorization Period Expiration: 4/9/19  Plan of Care Expiration: 6/7/19  Visit # / Visits authorized: 1/1    Time In: 11:45 AM  Time Out: 12:30 PM  Total Billable Time: 45 minutes    Precautions: Standard, Fall, CHF and L side weakness    Subjective     Date of onset: 3/3/19  History of current condition - Ming reports: Via daughter: Pt sustained third stroke in early March. His left side was "not working" on the day of the stroke and he was confused. Daughter drove him to the hospital as soon as she noticed these symptoms. Was in the hospital for 1 week and in rehab for 2 weeks following that. He was inconsistent with his medicine (including asprin) after the second stroke. Uses a RW for short distances and he is able to get up the stairs to the bathroom for a shower every two days with assistance. Daughter reports that he was independent following his second stroke and has needed assistance with fine motor tasks and upper level ADLs since this most recent injury (pulling pants up, cooking, cleaning). He reported in a wheelchair and was able to complete session and ambulate to his car using a RW. His daughter is his primary care giver and is a full time students.     Medical History:   Past Medical History:   Diagnosis Date    CHF (congestive heart failure)     Diabetes mellitus     Hypertension     Renal disorder     CKD    Stroke     mini speech difficulty, right sided weakness       Surgical History:   Ming Boateng  has a past surgical " history that includes arm surgery (Left); Eye surgery (Bilateral); Eye surgery (Right, 2017); and Esophagogastroduodenoscopy (N/A, 2018).    Medications:   Ming has a current medication list which includes the following prescription(s): acetaminophen, admelog u-100 insulin lispro, allopurinol, amlodipine, aspirin, aspirin, atorvastatin, blood sugar diagnostic, blood-glucose meter, calcium acetate, carvedilol, clopidogrel, ergocalciferol, famotidine, furosemide, insulin glargine (toujeo), insulin syringe-needle,dispos., lancets, losartan, and pen needle, diabetic.    Allergies:   Review of patient's allergies indicates:   Allergen Reactions    Cayenne Anaphylaxis     Throat swells up     Cayenne pepper         Imaging, MRI studies:   1. Scatter areas of acute infarction involving the right MCA distribution.  No superimposed hemorrhage.  No midline shift.  2. Age advanced chronic microvascular ischemic changes and generalized cerebral volume loss.    Prior Therapy: Summer 2017 for CHF  Social History: Multi-level home with bathroom upstairs lives with their daughter and grandchild  Occupation: Disabled  Prior Level of Function: Independent  Current Level of Function: Needs assistance with stair ascent/descent. High level ADLs are difficult. Cannot drive.     Pain:  Current 0/10, worst 0/10, best 0/10     Pts goals: Improved functional independence    Objective     - Command followin%   - Speech: Can understand english but only speaks in Finnish. Daughter reports that he was bilingual prior to injury.    Mental status: alert, oriented to person, place, and time, normal mood, behavior, speech, dress, motor activity, and thought processes  Behavior:  calm, cooperative and adequate rapport can be established  Attention Span and Concentration:  Normal    Dominant hand:  right     Posture Alignment :slouched posture    Sensation: Light Touch: Not tested           Proprioception: Not tested    Tone: 0 - No  increase in muscle tone    Visual/Auditory: denies changes     Coordination:   - fine motor: See OT eval  - UE coordination: Grossly impaired    - LE coordination:  Grossly intact    ROM:   UPPER EXTREMITY--AROM/PROM  (R) UE: WFLs  (L) UE: WFLs           RANGE OF MOTION--LOWER EXTREMITIES  (R) LE Hip: normal   Knee: normal   Ankle: normal    (L) LE: Hip: normal   Knee: normal   Ankle: normal    Upper Extremity Strength  Right UE: Grossly 4+/5  Left UE: Grossly 4-/5    Lower Extremity Strength   RLE LLE   Hip Flexion: 5/5 4-/5   Hip Extension:  NT NT   Hip Abduction: 5/5 4-/5   Hip Adduction: NT NT   Knee Extension: 5/5 5/5   Knee Flexion: 5/5 4-/5   Ankle Dorsiflexion: 4/5 4/5   Ankle Plantarflexion: 4/5 4/5   Ankle Inversion: NT NT   Ankle Eversion: NT NT     Gait Assessment:   - AD used: RW  - Assistance: SBA  - Distance: See Tinetti  - Stairs: Not tested    Gait Analysis:  Deviations noted: Gait shows little deviation when using RW. Gait without RW was not tested    TINETTI BALANCE ASSESSMENT TOOL    BALANCE SECTION  1.Sitting Balance:   Steady; safe = 1  2.Rises from chair :  Able, uses arms to help = 1  3.Attempts to arise :  Able to arise, 1 attempt = 2  4.Immediate standing Balance (first 5 seconds) : Steady but uses walker or other support = 1  5.Standing balance: Narrow stance without support = 2  6.Nudged : Steady = 2  7.Eyes closed: Steady = 1  8.Turning 360 degrees:  Continuous = 1 and Unsteady (grabs, staggers) = 0  9.Sitting Down : Safe, smooth motion = 2    Balance Score:  13/16    GAIT SECTION  10.Initiation of Gait (Immediately after told to go.): No hesitancy = 1  11.Step length and height :  Step through R=1 and Step through L=1  12.Foot Clearance :  B foot clears floor=2  13.Step symmetry: Right & Left step length appear equal = 1  14.Step continuity : Steps appear continuous = 1  15.Path: Mild/moderate deviation or uses walking aid = 1  16.Trunk : No sway, no flexion, no use of arms, and no  use of walking aid = 2  17.Walking Time: Heels amost touching while walking = 1    Gait Score: 10/12  Balance score:13/16  Total Score=Balance + Gait Score: 23/28     Risk Indicators:    Tinetti Tool Score   Risk of Falls   19-23   Moderate    5x sit to stand: 36 sec with UE use  2 min walk: 130 ft with RW  TUG with AD: 47 sec with RW    Balance Assessment:    Sitting balance (static,dynamic): See Tinetti  Standing balance (static, dynamic): See Tinetti    Functional Mobility (Bed mobility, transfers)  Bed mobility: Mod I  Supine to sit: Mod I  Sit to supine: Mod I  Rolling: Mod I  Transfers to bed: Mod I  Sit to stand:  Mod I  Stand pivot:  Mod I  Stairs: NT    CMS Impairment/Limitation/Restriction for FOTO Cerebrovascular Disorders Survey    Therapist reviewed FOTO scores for Ming Boateng on 4/8/2019.   FOTO documents entered into [employer] - see Media section.    Limitation Score: 70%  Category: Mobility       TREATMENT     Home Exercises and Patient Education Provided    Education provided:   - Limit wheelchair use to long distance / community ambulation    Written Home Exercises Provided: N/A.  Exercises were reviewed and Ming was able to demonstrate them prior to the end of the session.  Ming demonstrated N/A understanding of the education provided.     See EMR under Media for exercises provided N/A.    Assessment     Ming is a 63 y.o. male referred to outpatient Physical Therapy with a medical diagnosis of CVA. Pt presents with weakness, impaired ADLs, impaired balance, impaired mobility. These impairments limit his ability to performs self-care activities, home and community ambulation, and full community participation. He will benefit from skilled PT to address impairments with strength, balance, and mobility.     Pt prognosis is Good.   Pt will benefit from skilled outpatient Physical Therapy to address the deficits stated above and in the chart below, provide pt/family education, and to maximize pt's level of  independence.     Plan of care discussed with patient: Yes  Pt's spiritual, cultural and educational needs considered and patient is agreeable to the plan of care and goals as stated below:     Anticipated Barriers for therapy: Transportation    Medical Necessity is demonstrated by the following  History  Co-morbidities and personal factors that may impact the plan of care Co-morbidities:   diabetes, history of CVA and HTN    Personal Factors:   Language barrier, decreased cognition     moderate   Examination  Body Structures and Functions, activity limitations and participation restrictions that may impact the plan of care Body Regions:   back  lower extremities  upper extremities  trunk    Body Systems:    gross symmetry  ROM  strength  gross coordinated movement  balance  gait  transfers  transitions  motor control  motor learning    Participation Restrictions:   Self care  Home ambulation  Community ambulation  Community participation    Activity limitations:   Learning and applying knowledge  solving problems    General Tasks and Commands  undertaking multiple tasks    Communication  communicating with/receiving spoken language    Mobility  fine hand use (grasping/picking up)  using transportation (bus, train, plane, car)  driving (bike, car, motorcycle)    Self care  washing oneself (bathing, drying, washing hands)  caring for body parts (brushing teeth, shaving, grooming)  toileting  dressing  eating  drinking  looking after one's health    Domestic Life  shopping  cooking  doing house work (cleaning house, washing dishes, laundry)  assisting others    Interactions/Relationships  complex interpersonal interactions    Life Areas  employment    Community and Social Life  community life  recreation and leisure  Restorationist and spirituality         moderate   Clinical Presentation evolving clinical presentation with changing clinical characteristics moderate   Decision Making/ Complexity Score: moderate      Goals:  Short Term Goals (4 Weeks):   1.  Independent with initial HEP.  2.  Pt will perform nu-step at level 3.0 x 10 minutes without rests breaks going at least 60 step/min or greater.  3. Pt will be able to perform TUG in 35 secs WITH use of AD demonstrating overall improved functional mobility.   4. Pt will performed sit to stand x 5 B UE support in 26 seconds without LOB backward or posterior LE hooking for functional and safe transfers.   5.  Pt will score greater than or equal to 25/28 on the Tinetti test/assessment WITH use of AD demonstrating overall improved functional mobility and balance and reduce fall risk.   6. Pt will walk < than or equal to 180 ft on the 2 minute walk test indoor WITH AD with 0 LOB and minimal gait deviations for improved safety in home ambulation and safety.     Long Term Goals (8 Weeks):   1. Pt will be able to perform TUG in 25 secs WITH use of AD demonstrating overall improved functional mobility.   2. Pt will performed sit to stand x 5 WITH UE support in 20 seconds without LOB backward or posterior LE hooking for functional and safe transfers.   3.  Pt will score greater than or equal to 27/28 on the Tinetti test/assessment WITH use of AD demonstrating overall improved functional mobility and balance and reduce fall risk.   4.  Pt will score greater than or equal to 22/24 on the DGI test/assessment WITH use of AD demonstrating overall improved functional mobility and balance and reduce fall risk.   5.  Pt will walk < than or equal to 600 ft on the 6 minute walk test with AD and 0 LOB and minimal gait deviations for improved community ambulation.  6. Pt will have 0 falls from start of PT sessions.   7. Pt will have MMT score of 4+/5 in all major ms groups in [facility].    Plan     Plan of care Certification: 4/8/2019 to 6/7/19.    Outpatient Physical Therapy 2 times weekly for 8 weeks to include the following interventions: Gait Training, Manual Therapy, Neuromuscular Re-ed,  Patient Education, Self Care, Therapeutic Activites and Therapeutic Exercise.     Livier العراقي, PT     I certify that I was present in the room directing the student in service delivery and guiding them using my skilled judgment. As the co-signing therapist I have reviewed the students documentation and am responsible for the treatment, assessment, and plan.

## 2019-04-09 PROBLEM — R26.9 GAIT ABNORMALITY: Status: ACTIVE | Noted: 2019-04-09

## 2019-04-09 PROBLEM — R26.89 DECREASED MOBILITY: Status: ACTIVE | Noted: 2019-04-09

## 2019-04-11 ENCOUNTER — HOSPITAL ENCOUNTER (OUTPATIENT)
Facility: HOSPITAL | Age: 64
Discharge: HOME | End: 2019-04-12
Attending: EMERGENCY MEDICINE | Admitting: FAMILY MEDICINE
Payer: MEDICAID

## 2019-04-11 DIAGNOSIS — G45.9 TIA (TRANSIENT ISCHEMIC ATTACK): Primary | ICD-10-CM

## 2019-04-11 DIAGNOSIS — R53.1 LEFT-SIDED WEAKNESS: ICD-10-CM

## 2019-04-11 DIAGNOSIS — R42 DIZZINESS: ICD-10-CM

## 2019-04-11 PROBLEM — J06.9 UPPER RESPIRATORY TRACT INFECTION: Status: RESOLVED | Noted: 2019-02-19 | Resolved: 2019-04-11

## 2019-04-11 LAB
ALBUMIN SERPL BCP-MCNC: 4.3 G/DL (ref 3.5–5.2)
ALP SERPL-CCNC: 114 U/L (ref 55–135)
ALT SERPL W/O P-5'-P-CCNC: 17 U/L (ref 10–44)
ANION GAP SERPL CALC-SCNC: 13 MMOL/L (ref 8–16)
AST SERPL-CCNC: 21 U/L (ref 10–40)
BASOPHILS # BLD AUTO: 0.05 K/UL (ref 0–0.2)
BASOPHILS NFR BLD: 0.5 % (ref 0–1.9)
BILIRUB SERPL-MCNC: 0.5 MG/DL (ref 0.1–1)
BUN SERPL-MCNC: 87 MG/DL (ref 8–23)
CALCIUM SERPL-MCNC: 10.2 MG/DL (ref 8.7–10.5)
CHLORIDE SERPL-SCNC: 101 MMOL/L (ref 95–110)
CO2 SERPL-SCNC: 24 MMOL/L (ref 23–29)
CREAT SERPL-MCNC: 2.8 MG/DL (ref 0.5–1.4)
DIFFERENTIAL METHOD: ABNORMAL
EOSINOPHIL # BLD AUTO: 0.3 K/UL (ref 0–0.5)
EOSINOPHIL NFR BLD: 3 % (ref 0–8)
ERYTHROCYTE [DISTWIDTH] IN BLOOD BY AUTOMATED COUNT: 14.6 % (ref 11.5–14.5)
EST. GFR  (AFRICAN AMERICAN): 27 ML/MIN/1.73 M^2
EST. GFR  (NON AFRICAN AMERICAN): 23 ML/MIN/1.73 M^2
GLUCOSE SERPL-MCNC: 148 MG/DL (ref 70–110)
HCT VFR BLD AUTO: 34.2 % (ref 40–54)
HGB BLD-MCNC: 11.7 G/DL (ref 14–18)
LYMPHOCYTES # BLD AUTO: 1.5 K/UL (ref 1–4.8)
LYMPHOCYTES NFR BLD: 14.5 % (ref 18–48)
MCH RBC QN AUTO: 27.8 PG (ref 27–31)
MCHC RBC AUTO-ENTMCNC: 34.2 G/DL (ref 32–36)
MCV RBC AUTO: 81 FL (ref 82–98)
MONOCYTES # BLD AUTO: 1.1 K/UL (ref 0.3–1)
MONOCYTES NFR BLD: 10.3 % (ref 4–15)
NEUTROPHILS # BLD AUTO: 7.3 K/UL (ref 1.8–7.7)
NEUTROPHILS NFR BLD: 71.7 % (ref 38–73)
PLATELET # BLD AUTO: 326 K/UL (ref 150–350)
PMV BLD AUTO: 10.6 FL (ref 9.2–12.9)
POTASSIUM SERPL-SCNC: 3.5 MMOL/L (ref 3.5–5.1)
PROT SERPL-MCNC: 8.4 G/DL (ref 6–8.4)
RBC # BLD AUTO: 4.21 M/UL (ref 4.6–6.2)
SODIUM SERPL-SCNC: 138 MMOL/L (ref 136–145)
TROPONIN I SERPL DL<=0.01 NG/ML-MCNC: 0.02 NG/ML (ref 0–0.03)
WBC # BLD AUTO: 10.23 K/UL (ref 3.9–12.7)

## 2019-04-11 PROCEDURE — 99285 EMERGENCY DEPT VISIT HI MDM: CPT | Mod: 25

## 2019-04-11 PROCEDURE — G0378 HOSPITAL OBSERVATION PER HR: HCPCS

## 2019-04-11 PROCEDURE — 84484 ASSAY OF TROPONIN QUANT: CPT

## 2019-04-11 PROCEDURE — S5571 INSULIN DISPOS PEN 3 ML: HCPCS | Performed by: STUDENT IN AN ORGANIZED HEALTH CARE EDUCATION/TRAINING PROGRAM

## 2019-04-11 PROCEDURE — 25000003 PHARM REV CODE 250: Performed by: STUDENT IN AN ORGANIZED HEALTH CARE EDUCATION/TRAINING PROGRAM

## 2019-04-11 PROCEDURE — A4216 STERILE WATER/SALINE, 10 ML: HCPCS | Performed by: STUDENT IN AN ORGANIZED HEALTH CARE EDUCATION/TRAINING PROGRAM

## 2019-04-11 PROCEDURE — 93010 EKG 12-LEAD: ICD-10-PCS | Mod: ,,, | Performed by: INTERNAL MEDICINE

## 2019-04-11 PROCEDURE — 93010 ELECTROCARDIOGRAM REPORT: CPT | Mod: ,,, | Performed by: INTERNAL MEDICINE

## 2019-04-11 PROCEDURE — 63600175 PHARM REV CODE 636 W HCPCS: Performed by: STUDENT IN AN ORGANIZED HEALTH CARE EDUCATION/TRAINING PROGRAM

## 2019-04-11 PROCEDURE — 80053 COMPREHEN METABOLIC PANEL: CPT

## 2019-04-11 PROCEDURE — 94761 N-INVAS EAR/PLS OXIMETRY MLT: CPT

## 2019-04-11 PROCEDURE — 96365 THER/PROPH/DIAG IV INF INIT: CPT

## 2019-04-11 PROCEDURE — 96366 THER/PROPH/DIAG IV INF ADDON: CPT

## 2019-04-11 PROCEDURE — 96372 THER/PROPH/DIAG INJ SC/IM: CPT | Mod: 59

## 2019-04-11 PROCEDURE — 93005 ELECTROCARDIOGRAM TRACING: CPT

## 2019-04-11 PROCEDURE — 85025 COMPLETE CBC W/AUTO DIFF WBC: CPT

## 2019-04-11 RX ORDER — CLOPIDOGREL BISULFATE 75 MG/1
75 TABLET ORAL DAILY
Status: DISCONTINUED | OUTPATIENT
Start: 2019-04-12 | End: 2019-04-12 | Stop reason: HOSPADM

## 2019-04-11 RX ORDER — ATORVASTATIN CALCIUM 40 MG/1
40 TABLET, FILM COATED ORAL DAILY
Status: DISCONTINUED | OUTPATIENT
Start: 2019-04-11 | End: 2019-04-12 | Stop reason: HOSPADM

## 2019-04-11 RX ORDER — AMLODIPINE BESYLATE 10 MG/1
10 TABLET ORAL DAILY
COMMUNITY
End: 2019-05-03 | Stop reason: SDUPTHER

## 2019-04-11 RX ORDER — ENOXAPARIN SODIUM 100 MG/ML
30 INJECTION SUBCUTANEOUS EVERY 24 HOURS
Status: DISCONTINUED | OUTPATIENT
Start: 2019-04-11 | End: 2019-04-12

## 2019-04-11 RX ORDER — DEXTROSE MONOHYDRATE, SODIUM CHLORIDE, AND POTASSIUM CHLORIDE 50; 1.49; 4.5 G/1000ML; G/1000ML; G/1000ML
INJECTION, SOLUTION INTRAVENOUS CONTINUOUS
Status: DISCONTINUED | OUTPATIENT
Start: 2019-04-11 | End: 2019-04-12 | Stop reason: HOSPADM

## 2019-04-11 RX ORDER — FAMOTIDINE 20 MG/1
20 TABLET, FILM COATED ORAL DAILY
Status: DISCONTINUED | OUTPATIENT
Start: 2019-04-12 | End: 2019-04-12 | Stop reason: HOSPADM

## 2019-04-11 RX ORDER — SODIUM CHLORIDE 0.9 % (FLUSH) 0.9 %
3 SYRINGE (ML) INJECTION EVERY 8 HOURS
Status: DISCONTINUED | OUTPATIENT
Start: 2019-04-11 | End: 2019-04-12 | Stop reason: HOSPADM

## 2019-04-11 RX ORDER — LABETALOL HYDROCHLORIDE 5 MG/ML
10 INJECTION, SOLUTION INTRAVENOUS EVERY 4 HOURS PRN
Status: DISCONTINUED | OUTPATIENT
Start: 2019-04-11 | End: 2019-04-12 | Stop reason: HOSPADM

## 2019-04-11 RX ORDER — ASPIRIN 81 MG/1
81 TABLET ORAL DAILY
Status: DISCONTINUED | OUTPATIENT
Start: 2019-04-12 | End: 2019-04-12 | Stop reason: HOSPADM

## 2019-04-11 RX ORDER — FUROSEMIDE 40 MG/1
80 TABLET ORAL DAILY
Status: DISCONTINUED | OUTPATIENT
Start: 2019-04-12 | End: 2019-04-12 | Stop reason: HOSPADM

## 2019-04-11 RX ORDER — ALLOPURINOL 100 MG/1
100 TABLET ORAL DAILY
Status: DISCONTINUED | OUTPATIENT
Start: 2019-04-12 | End: 2019-04-12 | Stop reason: HOSPADM

## 2019-04-11 RX ORDER — ERGOCALCIFEROL 1.25 MG/1
50000 CAPSULE ORAL
Status: DISCONTINUED | OUTPATIENT
Start: 2019-04-18 | End: 2019-04-12 | Stop reason: HOSPADM

## 2019-04-11 RX ADMIN — Medication 3 ML: at 09:04

## 2019-04-11 RX ADMIN — ENOXAPARIN SODIUM 30 MG: 100 INJECTION SUBCUTANEOUS at 06:04

## 2019-04-11 RX ADMIN — DEXTROSE, SODIUM CHLORIDE, AND POTASSIUM CHLORIDE: 5; .45; .15 INJECTION INTRAVENOUS at 06:04

## 2019-04-11 RX ADMIN — INSULIN DETEMIR 10 UNITS: 100 INJECTION, SOLUTION SUBCUTANEOUS at 09:04

## 2019-04-11 RX ADMIN — ATORVASTATIN CALCIUM 40 MG: 40 TABLET, FILM COATED ORAL at 06:04

## 2019-04-11 NOTE — ED NOTES
Pt updated on plan of care. Neurologist will be consulted to assist in determining the appropriate final disposition for patient. Pt and family verbalizes understanding. No acute distress noted at this time. [place] provided to patient. Fall precautions remain. Will continue to monitor closely.

## 2019-04-11 NOTE — ED NOTES
Pt to Room 23 with c/o dizziness that began this morning. Pt states he only feels dizzy when he stands, pt denies any weakness. Pt has left sided weakness/deficit and facial droop from previous stroke. Family states patient has had multiple strokes in the past and is unsure if that is his current problem. Pt denies any nausea, vomiting, diarrhea, constipation, headache, chest pain, SOB, or vision changes. Pt states he has intermittent right leg pain but no current pain. Pt also states he had intermittent confusion but currently at baseline. Pt is AAO x 3. Respirations even and unlabored, lung sounds clear and equal bilaterally. Skin warm and dry to touch, no swelling noted. Abdomen soft and non-distended, no guarding or tenderness noted. BS present x 4. No acute distress noted. Family at bedside. Fall precautions initiated. Will continue to monitor closely. Cardiac monitor and pulse ox applied.

## 2019-04-11 NOTE — ED NOTES
Orthostatic blood pressures    Lying (1144)  131/59 BP  79 HR    Sitting (1146)  97/61 BP  80 HR    Standing (1148)  102/54 BP  83 HR    JONNY Alford MD notified of BP readings.
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
## 2019-04-11 NOTE — ED NOTES
Pt updated on plan of care and impending admission. Pt and family verbalizes understanding. No acute distress noted. VSS. Will continue to monitor closely.
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
## 2019-04-12 VITALS
HEIGHT: 68 IN | WEIGHT: 165 LBS | BODY MASS INDEX: 25.01 KG/M2 | RESPIRATION RATE: 18 BRPM | SYSTOLIC BLOOD PRESSURE: 137 MMHG | DIASTOLIC BLOOD PRESSURE: 65 MMHG | OXYGEN SATURATION: 97 % | HEART RATE: 67 BPM | TEMPERATURE: 98 F

## 2019-04-12 LAB
ALBUMIN SERPL BCP-MCNC: 3.6 G/DL (ref 3.5–5.2)
ALP SERPL-CCNC: 91 U/L (ref 55–135)
ALT SERPL W/O P-5'-P-CCNC: 15 U/L (ref 10–44)
ANION GAP SERPL CALC-SCNC: 7 MMOL/L (ref 8–16)
APTT BLDCRRT: 30.1 SEC (ref 21–32)
AST SERPL-CCNC: 18 U/L (ref 10–40)
BASOPHILS # BLD AUTO: 0.03 K/UL (ref 0–0.2)
BASOPHILS NFR BLD: 0.5 % (ref 0–1.9)
BILIRUB SERPL-MCNC: 0.4 MG/DL (ref 0.1–1)
BUN SERPL-MCNC: 72 MG/DL (ref 8–23)
CALCIUM SERPL-MCNC: 9.4 MG/DL (ref 8.7–10.5)
CHLORIDE SERPL-SCNC: 103 MMOL/L (ref 95–110)
CHOLEST SERPL-MCNC: 128 MG/DL (ref 120–199)
CHOLEST/HDLC SERPL: 3.3 {RATIO} (ref 2–5)
CO2 SERPL-SCNC: 27 MMOL/L (ref 23–29)
CREAT SERPL-MCNC: 2.2 MG/DL (ref 0.5–1.4)
DIFFERENTIAL METHOD: ABNORMAL
EOSINOPHIL # BLD AUTO: 0.4 K/UL (ref 0–0.5)
EOSINOPHIL NFR BLD: 5.5 % (ref 0–8)
ERYTHROCYTE [DISTWIDTH] IN BLOOD BY AUTOMATED COUNT: 14.1 % (ref 11.5–14.5)
EST. GFR  (AFRICAN AMERICAN): 36 ML/MIN/1.73 M^2
EST. GFR  (NON AFRICAN AMERICAN): 31 ML/MIN/1.73 M^2
ETHANOL SERPL-MCNC: <10 MG/DL
GLUCOSE SERPL-MCNC: 182 MG/DL (ref 70–110)
HCT VFR BLD AUTO: 29.9 % (ref 40–54)
HDLC SERPL-MCNC: 39 MG/DL (ref 40–75)
HDLC SERPL: 30.5 % (ref 20–50)
HGB BLD-MCNC: 10.1 G/DL (ref 14–18)
INR PPP: 1.1 (ref 0.8–1.2)
LDLC SERPL CALC-MCNC: 62.6 MG/DL (ref 63–159)
LYMPHOCYTES # BLD AUTO: 1.7 K/UL (ref 1–4.8)
LYMPHOCYTES NFR BLD: 26.2 % (ref 18–48)
MAGNESIUM SERPL-MCNC: 2.2 MG/DL (ref 1.6–2.6)
MAGNESIUM SERPL-MCNC: 2.5 MG/DL (ref 1.6–2.6)
MCH RBC QN AUTO: 27.3 PG (ref 27–31)
MCHC RBC AUTO-ENTMCNC: 33.8 G/DL (ref 32–36)
MCV RBC AUTO: 81 FL (ref 82–98)
MONOCYTES # BLD AUTO: 0.7 K/UL (ref 0.3–1)
MONOCYTES NFR BLD: 11.2 % (ref 4–15)
NEUTROPHILS # BLD AUTO: 3.6 K/UL (ref 1.8–7.7)
NEUTROPHILS NFR BLD: 56.4 % (ref 38–73)
NONHDLC SERPL-MCNC: 89 MG/DL
PHOSPHATE SERPL-MCNC: 2.5 MG/DL (ref 2.7–4.5)
PHOSPHATE SERPL-MCNC: 3 MG/DL (ref 2.7–4.5)
PLATELET # BLD AUTO: 274 K/UL (ref 150–350)
PMV BLD AUTO: 9.4 FL (ref 9.2–12.9)
POCT GLUCOSE: 204 MG/DL (ref 70–110)
POTASSIUM SERPL-SCNC: 3.7 MMOL/L (ref 3.5–5.1)
PROT SERPL-MCNC: 7.2 G/DL (ref 6–8.4)
PROTHROMBIN TIME: 10.9 SEC (ref 9–12.5)
RBC # BLD AUTO: 3.7 M/UL (ref 4.6–6.2)
RPR SER QL: NORMAL
SODIUM SERPL-SCNC: 137 MMOL/L (ref 136–145)
TRIGL SERPL-MCNC: 132 MG/DL (ref 30–150)
TROPONIN I SERPL DL<=0.01 NG/ML-MCNC: 0.01 NG/ML (ref 0–0.03)
TSH SERPL DL<=0.005 MIU/L-ACNC: 0.86 UIU/ML (ref 0.4–4)
TSH SERPL DL<=0.005 MIU/L-ACNC: 0.86 UIU/ML (ref 0.4–4)
VIT B12 SERPL-MCNC: 1022 PG/ML (ref 210–950)
WBC # BLD AUTO: 6.34 K/UL (ref 3.9–12.7)

## 2019-04-12 PROCEDURE — 92610 EVALUATE SWALLOWING FUNCTION: CPT

## 2019-04-12 PROCEDURE — 36415 COLL VENOUS BLD VENIPUNCTURE: CPT

## 2019-04-12 PROCEDURE — 97535 SELF CARE MNGMENT TRAINING: CPT

## 2019-04-12 PROCEDURE — 84443 ASSAY THYROID STIM HORMONE: CPT

## 2019-04-12 PROCEDURE — 86592 SYPHILIS TEST NON-TREP QUAL: CPT

## 2019-04-12 PROCEDURE — 83735 ASSAY OF MAGNESIUM: CPT

## 2019-04-12 PROCEDURE — 85610 PROTHROMBIN TIME: CPT

## 2019-04-12 PROCEDURE — 96372 THER/PROPH/DIAG INJ SC/IM: CPT

## 2019-04-12 PROCEDURE — G0378 HOSPITAL OBSERVATION PER HR: HCPCS

## 2019-04-12 PROCEDURE — 80061 LIPID PANEL: CPT

## 2019-04-12 PROCEDURE — 84100 ASSAY OF PHOSPHORUS: CPT | Mod: 91

## 2019-04-12 PROCEDURE — 94761 N-INVAS EAR/PLS OXIMETRY MLT: CPT

## 2019-04-12 PROCEDURE — 85730 THROMBOPLASTIN TIME PARTIAL: CPT

## 2019-04-12 PROCEDURE — 84484 ASSAY OF TROPONIN QUANT: CPT

## 2019-04-12 PROCEDURE — 97535 SELF CARE MNGMENT TRAINING: CPT | Mod: 59

## 2019-04-12 PROCEDURE — 84100 ASSAY OF PHOSPHORUS: CPT

## 2019-04-12 PROCEDURE — 85025 COMPLETE CBC W/AUTO DIFF WBC: CPT

## 2019-04-12 PROCEDURE — 97165 OT EVAL LOW COMPLEX 30 MIN: CPT

## 2019-04-12 PROCEDURE — 82607 VITAMIN B-12: CPT

## 2019-04-12 PROCEDURE — 96366 THER/PROPH/DIAG IV INF ADDON: CPT

## 2019-04-12 PROCEDURE — 80320 DRUG SCREEN QUANTALCOHOLS: CPT

## 2019-04-12 PROCEDURE — 80053 COMPREHEN METABOLIC PANEL: CPT

## 2019-04-12 PROCEDURE — 83735 ASSAY OF MAGNESIUM: CPT | Mod: 91

## 2019-04-12 PROCEDURE — A4216 STERILE WATER/SALINE, 10 ML: HCPCS | Performed by: STUDENT IN AN ORGANIZED HEALTH CARE EDUCATION/TRAINING PROGRAM

## 2019-04-12 PROCEDURE — 63600175 PHARM REV CODE 636 W HCPCS: Performed by: STUDENT IN AN ORGANIZED HEALTH CARE EDUCATION/TRAINING PROGRAM

## 2019-04-12 PROCEDURE — 97161 PT EVAL LOW COMPLEX 20 MIN: CPT

## 2019-04-12 PROCEDURE — 97116 GAIT TRAINING THERAPY: CPT

## 2019-04-12 PROCEDURE — 25000003 PHARM REV CODE 250: Performed by: STUDENT IN AN ORGANIZED HEALTH CARE EDUCATION/TRAINING PROGRAM

## 2019-04-12 PROCEDURE — 84207 ASSAY OF VITAMIN B-6: CPT

## 2019-04-12 PROCEDURE — 84425 ASSAY OF VITAMIN B-1: CPT

## 2019-04-12 RX ORDER — ENOXAPARIN SODIUM 100 MG/ML
40 INJECTION SUBCUTANEOUS EVERY 24 HOURS
Status: DISCONTINUED | OUTPATIENT
Start: 2019-04-12 | End: 2019-04-12 | Stop reason: HOSPADM

## 2019-04-12 RX ORDER — DEXTROSE 50 % IN WATER (D50W) INTRAVENOUS SYRINGE
12.5
Status: DISCONTINUED | OUTPATIENT
Start: 2019-04-12 | End: 2019-04-12 | Stop reason: HOSPADM

## 2019-04-12 RX ORDER — GLUCAGON 1 MG
1 KIT INJECTION
Status: DISCONTINUED | OUTPATIENT
Start: 2019-04-12 | End: 2019-04-12 | Stop reason: HOSPADM

## 2019-04-12 RX ORDER — DEXTROSE 50 % IN WATER (D50W) INTRAVENOUS SYRINGE
25
Status: DISCONTINUED | OUTPATIENT
Start: 2019-04-12 | End: 2019-04-12 | Stop reason: HOSPADM

## 2019-04-12 RX ORDER — INSULIN ASPART 100 [IU]/ML
0-5 INJECTION, SOLUTION INTRAVENOUS; SUBCUTANEOUS
Status: DISCONTINUED | OUTPATIENT
Start: 2019-04-12 | End: 2019-04-12 | Stop reason: HOSPADM

## 2019-04-12 RX ORDER — IBUPROFEN 200 MG
16 TABLET ORAL
Status: DISCONTINUED | OUTPATIENT
Start: 2019-04-12 | End: 2019-04-12 | Stop reason: HOSPADM

## 2019-04-12 RX ORDER — IBUPROFEN 200 MG
24 TABLET ORAL
Status: DISCONTINUED | OUTPATIENT
Start: 2019-04-12 | End: 2019-04-12 | Stop reason: HOSPADM

## 2019-04-12 RX ADMIN — CLOPIDOGREL 75 MG: 75 TABLET, FILM COATED ORAL at 09:04

## 2019-04-12 RX ADMIN — ASPIRIN 81 MG: 81 TABLET, COATED ORAL at 09:04

## 2019-04-12 RX ADMIN — DEXTROSE, SODIUM CHLORIDE, AND POTASSIUM CHLORIDE: 5; .45; .15 INJECTION INTRAVENOUS at 05:04

## 2019-04-12 RX ADMIN — INSULIN ASPART 2 UNITS: 100 INJECTION, SOLUTION INTRAVENOUS; SUBCUTANEOUS at 06:04

## 2019-04-12 RX ADMIN — FAMOTIDINE 20 MG: 20 TABLET, FILM COATED ORAL at 09:04

## 2019-04-12 RX ADMIN — ALLOPURINOL 100 MG: 100 TABLET ORAL at 09:04

## 2019-04-12 RX ADMIN — Medication 3 ML: at 06:04

## 2019-04-12 RX ADMIN — FUROSEMIDE 80 MG: 40 TABLET ORAL at 09:04

## 2019-04-12 RX ADMIN — ATORVASTATIN CALCIUM 40 MG: 40 TABLET, FILM COATED ORAL at 09:04

## 2019-04-12 NOTE — PLAN OF CARE
Problem: Physical Therapy Goal  Goal: Physical Therapy Goal  Goals to be met by: DC     Patient will increase functional independence with mobility by performin. Bed to chair transfer with Modified [place] using Rolling Walker  2. Gait  x300 feet with Modified [place] using Rolling Walker.     Outcome: Ongoing (interventions implemented as appropriate)  Pt is near baseline; demonstrates mildly impaired insights and safety awareness c/ functional mobility deficits.   DC Recommendations: Outpatient PT; Has recommended DME: [facility].
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
## 2019-04-12 NOTE — DISCHARGE INSTRUCTIONS
follow up with Dr. Janet Gao at [facility], please have patient's family call at [phone] to set up a follow up appointment

## 2019-04-12 NOTE — PLAN OF CARE
Problem: SLP Goal  Goal: SLP Goal  Outcome: Outcome(s) achieved Date Met: 04/12/19  Clinical swallow eval completed this date, pt may continue on [facility] soft diet and thin liquids, strict adherence to swallow precautions this date. Extensive education with dtr re: swallow precautions, implementation of cup swallows, double swallows,bite size management to ensure tolerance of PO diet. Pt's speech production remains at baseline at this time.

## 2019-04-12 NOTE — PLAN OF CARE
Problem: Adult Inpatient Plan of Care  Goal: Plan of Care Review  Reviewed plan of care with pt., understanding verbalized.  Will monitor pt. Throughout shift.

## 2019-04-12 NOTE — PLAN OF CARE
Pt lives with daughter, Nica Boateng who provides assistance with ADLs as needed.Pt has Q Cane, and RW. No HH. Daughter provides transportation to [facility] and will provide transportation home on discharge    Future Appointments   Date Time Provider Department Center   4/23/2019  9:40 AM John Serrano MD [facility]       Pt denies any needs or concerns at this time. Discharge planning brochure and business card provided. CM numbers written on white board. Pt encouraged to call with any questions or needs. CM will continue to follow patient throughout the transitions of care, and assist with any discharge needs.       04/12/19 1200   Discharge Assessment   Assessment Type Discharge Planning Assessment   Confirmed/corrected address and phone number on facesheet? Yes   Assessment information obtained from? Caregiver  (pt's daughter: Nica Boateng [phone])   Expected Length of Stay (days) 2   Communicated expected length of stay with patient/caregiver yes   Prior to hospitilization cognitive status: Alert/Oriented   Prior to hospitalization functional status: Needs Assistance;Partially Dependent   Current cognitive status: Alert/Oriented   Current Functional Status: Needs Assistance;Partially Dependent   Facility Arrived From: (home)   Lives With child(renae), adult  (daughter: Nica Boateng [phone])   Able to Return to Prior Arrangements yes   Is patient able to care for self after discharge? No   Who are your caregiver(s) and their phone number(s)? daughter: Nica Boateng [phone]   Patient's perception of discharge disposition home health   Readmission Within the Last 30 Days other (see comments)  (last admitted: [facility], IP- Stroke 3/3--3/7/19 D/C to [facility])   Patient currently being followed by outpatient case management? No   Patient currently receives any other outside agency services? No   Equipment Currently Used at Home cane, quad;walker, rolling   Do you have any problems  affording any of your prescribed medications? No   Is the patient taking medications as prescribed? yes   Does the patient have transportation home? Yes   Transportation Anticipated family or friend will provide   Dialysis Name and Scheduled days N/A   Does the patient receive services at the [facility]? No   Discharge Plan A Home Health   Discharge Plan B Home with family   DME Needed Upon Discharge  none   Patient/Family in Agreement with Plan yes     Susu Norwood RN Transitional Navigator  [phone]

## 2019-04-12 NOTE — PROGRESS NOTES
Pharmacist Renal Dose Adjustment Note    Ming Boateng is a 63 y.o. male being treated with the medication Lovenox    Patient Data:    Vital Signs (Most Recent):  Temp: 97.6 °F (36.4 °C) (04/12/19 1156)  Pulse: 74 (04/12/19 1202)  Resp: 18 (04/12/19 1156)  BP: (!) 142/97 (04/12/19 1156)  SpO2: 99 % (04/12/19 0739)   Vital Signs (72h Range):  Temp:  [96.9 °F (36.1 °C)-99.3 °F (37.4 °C)]   Pulse:  [67-88]   Resp:  [13-20]   BP: (102-166)/(54-97)   SpO2:  [96 %-100 %]      Recent Labs   Lab 04/11/19  1107 04/12/19  0755   CREATININE 2.8* 2.2*     Serum creatinine: 2.2 mg/dL (H) 04/12/19 0755  Estimated creatinine clearance: 33.3 mL/min (A)    Medication: Lovenox dose: 30mg frequency: Q24H will be changed to medication: Lovenox dose: 40mg frequency: Q24H    Pharmacist's Name: Maddie Billings  Pharmacist's Extension: [phone]
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
## 2019-04-12 NOTE — PLAN OF CARE
Problem: Occupational Therapy Goal  Goal: Occupational Therapy Goal  Goals to be met by: 05/12     Patient will increase functional independence with ADLs by performing:    LE Dressing with Minimal Assistance.  Grooming while standing at sink with Modified [place].  Toileting from toilet with Modified [place] for hygiene and clothing management.   Toilet transfer to toilet with Modified [place].  Increased functional strength to WFL for ADLs.    Outcome: Ongoing (interventions implemented as appropriate)  Pt found in supine & agreeable to OT eval/tx this AM. Pt's bilingual dgtr in room & declined free  services. Pt lives w/ dgtr in 2SH w/ 0STE; bedroom & t/s combo on 2nd level. PLOF: MI via RW w/ G/H, bath on shower chair, toileting, self feed, bed mob, but req's A w/ UB/LBD, standing tub t/f's & amb on stairs. Currently, pt perf the following: sup-->EOB w/ S-SBA; standing via RW w/ SB-CGA; amb short dist w/in room via RW w/ CGA; G/H standing at sink w/ SBA. Pt returned to EOB. Edu/tx re: HEP, general safety techs & rec 24hr S/A of pt upon d/c home. Pt/dgtr verbalized understanding.     Pt demo'ed impulsivity, decreased safety/insight into deficits & STM throughout tx. Pt presents w/ decreased overall endurance/conditioning, balance/mobility & coordination/cognition w/ subsequent decline in (I)/safety w/ BADLs, fxnl mobility & fxnl t/f's. OT 5x/wk to increase phys/fxnl status & maximize potential to achieve established goals for d/c-->out pt OT/PT & no DME needs.

## 2019-04-12 NOTE — PROGRESS NOTES
Progress Note    Admit Date: 4/11/2019   LOS: 0 days     SUBJECTIVE:       The patient was seen and examined this morning at the bedside. Patient reports no acute issues overnight. Family not present. He reported no dizzyness, weakness, or slurred speech overnight. Denies f/c/n/v/d/c/cp/palpitations/freq/urgency/dysuria    Continuous Infusions:   dextrose 5 % and 0.45 % NaCl with KCl 20 mEq 100 mL/hr at 04/12/19 0538     Scheduled Meds:   allopurinol  100 mg Oral Daily    aspirin  81 mg Oral Daily    atorvastatin  40 mg Oral Daily    clopidogrel  75 mg Oral Daily    enoxaparin  30 mg Subcutaneous Daily    [START ON 4/18/2019] ergocalciferol  50,000 Units Oral Q7 Days    famotidine  20 mg Oral Daily    furosemide  80 mg Oral Daily    insulin detemir U-100  10 Units Subcutaneous QHS    sodium chloride 0.9%  3 mL Intravenous Q8H     PRN Meds:dextrose 50 % in water (D50W), dextrose 50 % in water (D50W), glucagon (human recombinant), glucose, glucose, insulin aspart U-100, labetalol    Review of patient's allergies indicates:   Allergen Reactions    Cayenne Anaphylaxis     Throat swells up     Cayenne pepper        OBJECTIVE:     Vital Signs (Most Recent)  Temp: 97.5 °F (36.4 °C) (04/12/19 0516)  Pulse: 69 (04/12/19 0516)  Resp: 18 (04/12/19 0516)  BP: (!) 142/60 (04/12/19 0516)  SpO2: 99 % (04/12/19 0739)    Vital Signs Range (Last 24H):  Temp:  [96.9 °F (36.1 °C)-99.3 °F (37.4 °C)]   Pulse:  [69-88]   Resp:  [13-20]   BP: (102-142)/(54-86)   SpO2:  [96 %-100 %]     I & O (Last 24H):    Intake/Output Summary (Last 24 hours) at 4/12/2019 0751  Last data filed at 4/11/2019 2300  Gross per 24 hour   Intake 240 ml   Output --   Net 240 ml     Ventilator Data (Last 24H):          Physical Exam:  Constitutional: Patient is oriented to person, place, and time.   HENT: Head: Normocephalic   Eyes: Conjunctivae and EOM are normal. Pupils are equal, round, and reactive to light. Neck: Normal range of motion. Neck  supple. No JVD present. No tracheal deviation present.   Cardiovascular: Normal rate, regular rhythm, normal heart sounds and intact distal pulses.  Exam reveals no gallop and no friction rub.  No murmur heard.  Pulmonary/Chest: Effort normal and breath sounds normal. No respiratory distress.   Abdominal: Soft. Bowel sounds are normal. Patient exhibits no distension and no mass. There is no tenderness.   Musculoskeletal: Normal range of motion. Patient exhibits no edema or tenderness.   Skin: Skin is warm and dry. No rash noted. No erythema. No pallor.   Psychiatric: Patient has a normal mood and affect. Behavior is normal. Judgment and thought content normal.      Mentation: A+O x 4  Cranial nerves: PERRLA, EOMI, left facial droop, no sensory deficits, tongue protrudes slightly to left, gag intact with uvula at midline, good shoulder shrug  Motor: 4/5 strength in LUE and LLE   Sensory: gross sensory  Cerebellar: mild dysmetria on left  Gait: deferred     Lines/Drains:       Peripheral IV - Single Lumen 04/11/19 18 G Right Forearm (Active)   Site Assessment Clean;Dry;Intact;No redness;No swelling 4/11/2019 11:07 AM   Line Status Blood return noted;Flushed;Saline locked 4/11/2019 11:07 AM   Dressing Intervention New dressing 4/11/2019 11:07 AM   Number of days: 1       Recent Labs     04/11/19  1107 04/12/19  0022   WBC 10.23  --    HGB 11.7*  --    HCT 34.2*  --      --      --    K 3.5  --      --    CO2 24  --    BUN 87*  --    CREATININE 2.8*  --    BILITOT 0.5  --    AST 21  --    ALT 17  --    ALKPHOS 114  --    CALCIUM 10.2  --    ALBUMIN 4.3  --    PROT 8.4  --    MG  --  2.5   PHOS  --  3.0         ASSESSMENT/PLAN:       Assessment/Plan      1. TIA  2. DM2  3. HTN  4. CKD  5. HLD  6. GERD  7. HFpEF     TIA   CT head is negative for any acute hemorrhagic event  Anti-platelet therapy: ASA, plavix   Atorvastatin  Labs: HgA1C, Lipid Panel, TSH, Vitamin B-12, B6, Thiamine, Folate,  RPR  Neurology consulted  admit to tele  Q4 nuero checks  CTA not ordered due to renal disease   Carotid Doppler in march negative  Old echo negative for PFO  PT/OT/ST evaluation  NPO pending speech evaluation   Elevate the head of the bed with aspiration precautions  Fall precautions  hold home blood pressure medications to allow for permissive hypertension    IV hydralazine PRN for SBP >220 or DBP >120Will provide stroke education prior to discharge  MRI head showed Evolving subacute/chronic infarcts within the right cerebral hemisphere with possible superimposed acute component within the right corona radiata      DM2  A1c 6.8   Continue home meds   Detemir 10 qhs   SSI     HLD  Statin     HTN  Permissive HTN      GERD  On famotidine.     CKD   Avoid nephrotoxic agents.  Cr around baseline   Stable      HFpEF w/ DD  Continue home lasix   Echo 3/6/19 EF 55%   Gentle fluids      Prophylaxis enoxaparin, SCD     Dispo: MRI showed Evolving subacute/chronic infarcts within the right cerebral hemisphere with possible superimposed acute component within the right corona radiata , neurology follow up, PT/OT      Raleigh Coleman MD  Feel free to secure chat me (Ctrl+Alt+5) for any questions   04/12/2019

## 2019-04-12 NOTE — DISCHARGE SUMMARY
Discharge Summary      Admit Date: 4/11/2019    Discharge Date and Time: 04/12/2019    Attending Physician: Severyn Yaroshevsky, MD     Discharge Physician: Raleigh Coleman MD      Principal Diagnoses: TIA (transient ischemic attack)  The primary encounter diagnosis was TIA (transient ischemic attack). Diagnoses of Dizziness and Left-sided weakness were also pertinent to this visit.    Discharged Condition: stable    Hospital Course:     63 y.o. 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 The last known normal time was last night at 9 pm when he went to bed per family member. According to family member at around 2 am the patient was dizzy, confused, and had left arm and leg weakness worse than his baseline with more weakness to his left leg. No slurred speech. He does have a left facial droop per old stroke and this was not worse. Per old stroke he is weaker on his left arm and left leg but he can walk on his own with a walker. He was confused and he then went back to bed. No falls or head trauma. He has a history of falls. He does have blurry vision. The family member then noticed when he woke back up this morning he was confused and not totally oriented but his left weakness improved and close to baseline. He was taking an ASA, statin, and plavix at home. MRI of the head showed an evolving subacute/chronic infarcts within the right cerebral hemisphere with possible superimposed acute component within the right corona radiata and an MRA showed stable focal area of high-grade stenosis at the proximal right M2/inferior branch with no occlusion or aneurysm. Neurology was consulted and he was diagnosed with a TIA. He had a previous negative echo for a shunt. PT felt that he would benefit from outpatient PT therapy. They recommended that he continue his ASA, plavix, and  statin. He will follow up with  a Neurologist outpatient along with his PCP.     Neuro exam on discharge     Cranial Nerves:  Visual fields were intact to confrontation, no RAPD, no anisocoria, EOM intact bilaterally, V1-V3 with good sensation to light touch, left facial droop, hearing grossly intact, palate, and tongue midline. Good shoulder rug.      Motor:  4/5 on LUE extensors, and LLE flexors  Rest of muscles groups were 5/5.  Normal bulk and tone.      DTRs:  3+ LUE: Biceps, triceps, brachioradialis  2+ RUE: Biceps, triceps, brachioradialis  1+ bilateral patellar  Absent ankle reflexes     Sensation:  Intact to light touch through out.     Cerebellar:  Good finger to nose.    Consults: IP CONSULT TO NEUROLOGY  IP CONSULT TO REGISTERED DIETITIAN/NUTRITIONIST  IP CONSULT TO SOCIAL WORK/CASE MANAGEMENT  IP CONSULT TO SOCIAL WORK/CASE MANAGEMENT    Disposition: Home or Self Care    Patient Instructions:   Current Discharge Medication List      CONTINUE these medications which have NOT CHANGED    Details   ADMELOG U-100 INSULIN LISPRO 100 unit/mL injection INJECT 7 UNITS UNDER THE SKIN TID  Refills: 0      allopurinol (ZYLOPRIM) 100 MG tablet Take 1 tablet (100 mg total) by mouth once daily.  Qty: 30 tablet, Refills: 0      amLODIPine (NORVASC) 10 MG tablet Take 10 mg by mouth once daily.      aspirin 81 MG Chew Take 1 tablet (81 mg total) by mouth once daily.  Qty: 90 tablet, Refills: 3      atorvastatin (LIPITOR) 40 MG tablet Take 1 tablet (40 mg total) by mouth once daily.  Qty: 90 tablet, Refills: 3      blood sugar diagnostic Strp 1 strip by Misc.(Non-Drug; Combo Route) route 4 (four) times daily.  Qty: 360 strip, Refills: 3    Associated Diagnoses: Type 2 diabetes mellitus with complication, with long-term current use of insulin      calcium acetate (PHOSLO) 667 mg tablet Take 1 tablet by mouth 3 (three) times daily with meals.  Qty: 270 tablet, Refills: 3    Associated Diagnoses: Stage 4 chronic  "kidney disease      carvedilol (COREG) 12.5 MG tablet Take 0.5 tablets (6.25 mg total) by mouth 2 (two) times daily with meals.  Qty: 90 tablet, Refills: 3    Associated Diagnoses: (HFpEF) heart failure with preserved ejection fraction      clopidogrel (PLAVIX) 75 mg tablet Take 1 tablet (75 mg total) by mouth once daily.  Qty: 30 tablet, Refills: 11      ergocalciferol (VITAMIN D2) 50,000 unit Cap Take 50,000 Units by mouth every 7 days.      famotidine (PEPCID) 20 MG tablet Take 1 tablet (20 mg total) by mouth once daily.  Qty: 30 tablet, Refills: 11      furosemide (LASIX) 40 MG tablet Take 2 tablets (80 mg total) by mouth once daily.  Qty: 180 tablet, Refills: 1    Associated Diagnoses: (HFpEF) heart failure with preserved ejection fraction      insulin detemir U-100 (LEVEMIR) 100 unit/mL injection Inject 10 Units into the skin every evening.       insulin syringe-needle,dispos. 0.3 mL 30 gauge x 5/16" Syrg 1 each by Misc.(Non-Drug; Combo Route) route 3 (three) times daily.  Qty: 270 Syringe, Refills: 3    Associated Diagnoses: Type 2 diabetes mellitus with complication, with long-term current use of insulin      lancets (LANCETS,ULTRA THIN) Misc 1 lancet by Misc.(Non-Drug; Combo Route) route 3 (three) times daily with meals.  Qty: 100 each, Refills: 11    Associated Diagnoses: Type 2 diabetes mellitus with complication, with long-term current use of insulin      losartan (COZAAR) 100 MG tablet Take 1 tablet (100 mg total) by mouth once daily.  Qty: 90 tablet, Refills: 3    Associated Diagnoses: Hypertension associated with diabetes      pen needle, diabetic 29 gauge x 1/2" Ndle 1 pen by Misc.(Non-Drug; Combo Route) route 3 (three) times daily with meals.  Qty: 100 each, Refills: 11    Associated Diagnoses: Type 2 diabetes mellitus with complication, with long-term current use of insulin      acetaminophen (TYLENOL) 650 MG TbSR Take 1 tablet (650 mg total) by mouth 3 (three) times daily as needed.  Qty: 60 " tablet, Refills: 0    Associated Diagnoses: Left-sided chest wall pain      blood-glucose meter kit Use as instructed  Qty: 1 each, Refills: 0      insulin glargine, TOUJEO, (TOUJEO) 300 unit/mL (1.5 mL) InPn pen Inject 10 Units into the skin 2 (two) times daily.  Qty: 1.5 mL, Refills: 6    Associated Diagnoses: Type 2 diabetes mellitus with complication, with long-term current use of insulin         STOP taking these medications       aspirin (ECOTRIN) 81 MG EC tablet Comments:   Reason for Stopping:               Discharge Procedure Orders   Ambulatory referral to Neurology   Referral Priority: Routine Referral Type: Consultation   Referral Reason: Specialty Services Required   Referred to Provider: SIMON DESAI Requested Specialty: Neurology   Number of Visits Requested: 1     Ambulatory referral to Home Health   Referral Priority: Routine Referral Type: Home Health   Referral Reason: Specialty Services Required   Requested Specialty: Home Health Services   Number of Visits Requested: 1     Diet diabetic     Notify your health care provider if you experience any of the following:  temperature >100.4     Notify your health care provider if you experience any of the following:  persistent nausea and vomiting or diarrhea     Notify your health care provider if you experience any of the following:  severe uncontrolled pain     Notify your health care provider if you experience any of the following:  difficulty breathing or increased cough     Notify your health care provider if you experience any of the following:  redness, tenderness, or signs of infection (pain, swelling, redness, odor or green/yellow discharge around incision site)     Notify your health care provider if you experience any of the following:  persistent dizziness, light-headedness, or visual disturbances     Notify your health care provider if you experience any of the following:  worsening rash     Notify your health care provider if you  experience any of the following:  severe persistent headache     Notify your health care provider if you experience any of the following:  increased confusion or weakness     Holter Monitor - 3-14 Day Adult   Standing Status: Future Standing Exp. Date: 04/12/20     Activity as tolerated     33 minutes was spent on this discharge summary.   Raleigh Coleman MD   04/12/2019  11:14 AM
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
## 2019-04-12 NOTE — PROGRESS NOTES
TN sent HH orders to:    [facility]- Declined  [facility]- Declined  [facility]- Declined  [facility] Declined  [facility]- Declined  [facility]- Declined

## 2019-04-12 NOTE — PLAN OF CARE
HH: SN/PT/OT/Aid ordered, Medicaid will not pay for PT/OT, pt's daughter Nica preferred the in home SN/Aide instead of the outpatient PT/OT    No DME ordered    Future Appointments   Date Time Provider Department Center   4/23/2019  9:40 AM John Serrano MD [facility]       Pt's nurse will go over medications/signs and symptoms prior to discharge       04/12/19 1257   Final Note   Assessment Type Final Discharge Note   Anticipated Discharge Disposition Home-Health  ([facility], SN/Aide only)   What phone number can be called within the next 1-3 days to see how you are doing after discharge? [phone]  (daughter cell- Nica)   Right Care Referral Info   Post Acute Recommendation Home-care     Susu Norwood, RN Transitional Navigator  [phone]

## 2019-04-12 NOTE — PROGRESS NOTES
Ming Boateng #[id number] (CSN: [id number]) (63 y.o. M) (Adm: 04/11/19)   [facility] [id number]   PCP     LENCHO LITTLEJOHN   Date of Birth     1955   Demographics     Address: Home Phone: Work Phone: Mobile Phone:     [address]  [place] [id number] [phone]    SSN: Insurance: Marital Status: Gnosticist:      MEDICAID  [place]    Admission Dx      Dizziness    Left-sided weakness   Chief Complaint     Complaint Comment   Dizziness complains of feeling like the room is spinning since waking up this AM. denies n/v, HA, blurred vision or any other complaints   Documents Filed to Patient     Power of  Living Will Clinical Unknown Study Attachment Consent Form ABN Waiver After Visit Summary Lab Result Scan Code Status Patient Portal Status   Not on File Not on File Not on File Not on File Filed Not on File Filed Not on File FULL [Updated on 04/11/19 1828] Active   Auth/Cert Information      Open Auth/Cert for Hospital Account [id number]      Admission Information     Attending Provider Admitting Provider Admission Type Admission Date/Time   Severyn Yaroshevsky, MD Severyn Yaroshevsky, MD Emergency 04/11/19  1030   Discharge Date Hospital Service Auth/Cert Status Service Area    Emergency Medicine Incomplete hospitals   Unit Room/Bed Admission Status    [facility] TELEMETRY UNIT K471/K471 A Admission (Confirmed)    Hospital Account     Name Acct ID Class Status Primary Coverage   Ming Boateng [id number] OP- Observation Open MEDICAID - [facility]AN [facility] ([place] MEDICAID)   Guarantor Account (for Hospital Account #[id number])     Name Relation to Pt Service Area Active? Acct Type   Ming Boateng Self OHSSA Yes Personal/Family   Address Phone     [address] [place] [address] [phone](H)     Coverage Information (for Hospital Account #[id number])     F/O Payor/Plan Precert #   MEDICAID/[employer] [facility] PLAN [facility] ([place] MEDICAID)     Subscriber Subscriber #   Ming Boateng [id number]   Address Phone   [address] [address]  [place] [address] [phone]   Emergency Contact Information     Name: Kilo Nica Relationship: Daughter   Address:     City:  State:  Zip:  Phone:     Business phone:

## 2019-04-13 NOTE — NURSING
Pt awake and alert. Pt cont to be NSR on tele with HR in the 70's. No ectopy noted. Denies any pain. No distress noted. IV removed from right FA 18g, cath tip intact. Tele monitor removed. Discharged instructions given to pt. All questions answered' verbalized understanding.

## 2019-04-15 DIAGNOSIS — Z79.4 TYPE 2 DIABETES MELLITUS WITH COMPLICATION, WITH LONG-TERM CURRENT USE OF INSULIN: Primary | ICD-10-CM

## 2019-04-15 DIAGNOSIS — E11.8 TYPE 2 DIABETES MELLITUS WITH COMPLICATION, WITH LONG-TERM CURRENT USE OF INSULIN: Primary | ICD-10-CM

## 2019-04-16 LAB
PYRIDOXAL SERPL-MCNC: 9 UG/L (ref 5–50)
VIT B1 BLD-MCNC: 50 UG/L (ref 38–122)

## 2019-04-22 ENCOUNTER — OFFICE VISIT (OUTPATIENT)
Dept: URGENT CARE | Facility: CLINIC | Age: 64
End: 2019-04-22
Payer: MEDICAID

## 2019-04-22 VITALS
RESPIRATION RATE: 18 BRPM | SYSTOLIC BLOOD PRESSURE: 136 MMHG | WEIGHT: 165 LBS | OXYGEN SATURATION: 100 % | HEIGHT: 68 IN | BODY MASS INDEX: 25.01 KG/M2 | HEART RATE: 65 BPM | TEMPERATURE: 98 F | DIASTOLIC BLOOD PRESSURE: 67 MMHG

## 2019-04-22 DIAGNOSIS — R05.9 COUGH: Primary | ICD-10-CM

## 2019-04-22 PROCEDURE — 99214 PR OFFICE/OUTPT VISIT, EST, LEVL IV, 30-39 MIN: ICD-10-PCS | Mod: S$GLB,,, | Performed by: PHYSICIAN ASSISTANT

## 2019-04-22 PROCEDURE — 71046 X-RAY EXAM CHEST 2 VIEWS: CPT | Mod: FY,S$GLB,, | Performed by: RADIOLOGY

## 2019-04-22 PROCEDURE — 71046 XR CHEST PA AND LATERAL: ICD-10-PCS | Mod: FY,S$GLB,, | Performed by: RADIOLOGY

## 2019-04-22 PROCEDURE — 99214 OFFICE O/P EST MOD 30 MIN: CPT | Mod: S$GLB,,, | Performed by: PHYSICIAN ASSISTANT

## 2019-04-22 RX ORDER — ATORVASTATIN CALCIUM 40 MG/1
40 TABLET, FILM COATED ORAL
COMMUNITY
End: 2019-04-22

## 2019-04-22 RX ORDER — FUROSEMIDE 40 MG/1
40 TABLET ORAL
COMMUNITY
Start: 2017-06-28 | End: 2019-04-22

## 2019-04-22 RX ORDER — CARVEDILOL 6.25 MG/1
TABLET ORAL
COMMUNITY
Start: 2019-03-24 | End: 2019-05-03

## 2019-04-22 RX ORDER — LORATADINE 10 MG/1
TABLET ORAL
Status: ON HOLD | COMMUNITY
Start: 2019-04-14 | End: 2019-06-23 | Stop reason: CLARIF

## 2019-04-22 RX ORDER — LOSARTAN POTASSIUM AND HYDROCHLOROTHIAZIDE 25; 100 MG/1; MG/1
1 TABLET ORAL
COMMUNITY
End: 2019-04-22

## 2019-04-22 RX ORDER — BENZONATATE 100 MG/1
100 CAPSULE ORAL 3 TIMES DAILY PRN
Qty: 30 CAPSULE | Refills: 0 | Status: ON HOLD | OUTPATIENT
Start: 2019-04-22 | End: 2019-06-23 | Stop reason: CLARIF

## 2019-04-22 RX ORDER — PANTOPRAZOLE SODIUM 40 MG/1
TABLET, DELAYED RELEASE ORAL
COMMUNITY
Start: 2019-03-27 | End: 2019-04-22

## 2019-04-22 NOTE — LETTER
April 22, 2019      [facility]  [address] [place] [address]  Phone: [phone]  Fax: [phone]       Patient: Ming Boateng   YOB: 1955  Date of Visit: 04/22/2019    To Whom It May Concern:    Jessica Boateng  was at [facility] on 04/22/2019. He may return to work/school on 04/23/2019 with no restrictions. If you have any questions or concerns, or if I can be of further assistance, please do not hesitate to contact me. Please excuse caregiver for 04/22/2019 as well.    Sincerely,    Shasha Tate PA-C

## 2019-04-22 NOTE — PATIENT INSTRUCTIONS
Take tessalon perles as prescribed as needed.  Follow up with your primary care provider.  We will contact you if primary care provider has any other suggestions.    Please follow up with your primary care provider within 2-5 days if your signs and symptoms have not resolved or worsen.     If your condition worsens or fails to improve we recommend that you receive another evaluation at the emergency room immediately or contact your primary medical clinic to discuss your concerns.   You must understand that you have received an Urgent Care treatment only and that you may be released before all of your medical problems are known or treated. You, the patient, will arrange for follow up care as instructed.

## 2019-04-22 NOTE — PROGRESS NOTES
"Subjective:       Patient ID: Ming Boateng is a 63 y.o. male.    Vitals:  height is 5' 8" (1.727 m) and weight is 74.8 kg (165 lb). His temperature is 98.1 °F (36.7 °C). His blood pressure is 136/67 and his pulse is 65. His respiration is 18 and oxygen saturation is 100%.     Chief Complaint: Cough    Cough   This is a new problem. The current episode started 1 to 4 weeks ago. The problem has been unchanged. The problem occurs every few minutes. The cough is productive of sputum. Pertinent negatives include no chills, ear pain, eye redness, fever, hemoptysis, myalgias, rash, sore throat, shortness of breath or wheezing. Nothing aggravates the symptoms. He has tried OTC cough suppressant for the symptoms. The treatment provided no relief.       Constitution: Negative for chills, sweating, fatigue and fever.   HENT: Negative for ear pain, congestion, sinus pain, sinus pressure, sore throat and voice change.    Neck: Negative for painful lymph nodes.   Eyes: Negative for eye redness.   Respiratory: Positive for cough and sputum production. Negative for chest tightness, bloody sputum, COPD, shortness of breath, stridor, wheezing and asthma.    Gastrointestinal: Negative for nausea and vomiting.   Musculoskeletal: Negative for muscle ache.   Skin: Negative for rash and erythema.   Allergic/Immunologic: Negative for seasonal allergies and asthma.   Hematologic/Lymphatic: Negative for swollen lymph nodes.       Objective:      Physical Exam   Constitutional: He is oriented to person, place, and time. Vital signs are normal. He appears well-developed and well-nourished. He does not appear ill. No distress.   HENT:   Head: Normocephalic and atraumatic.   Right Ear: External ear normal.   Left Ear: External ear normal.   Nose: Nose normal.   Eyes: Conjunctivae, EOM and lids are normal. Right eye exhibits no discharge. Left eye exhibits no discharge.   Neck: Normal range of motion. Neck supple.   Cardiovascular: Normal rate, " regular rhythm and normal heart sounds. Exam reveals no gallop and no friction rub.   No murmur heard.  Pulmonary/Chest: Effort normal and breath sounds normal. No stridor. No respiratory distress. He has no decreased breath sounds. He has no wheezes. He has no rhonchi. He has no rales.   Persistent cough in clinic.   Musculoskeletal: Normal range of motion.   Neurological: He is alert and oriented to person, place, and time.   Skin: Skin is warm and dry. No rash noted. He is not diaphoretic. No erythema. No pallor.   Psychiatric: He has a normal mood and affect. His behavior is normal.   Nursing note and vitals reviewed.      Assessment:       1. Cough        Xr Chest Pa And Lateral    Result Date: 4/22/2019  EXAMINATION: XR CHEST PA AND LATERAL CLINICAL HISTORY: Cough TECHNIQUE: PA and lateral views of the chest were performed. COMPARISON: 04/11/2019 FINDINGS: Evaluation of the lower lobes is limited secondary to breathing motion.The lungs are clear, with normal appearance of pulmonary vasculature and no pleural effusion or pneumothorax. The cardiac silhouette is normal in size. The hilar and mediastinal contours are unremarkable. The osseous and soft tissue structures are normal.     Normal exam. Electronically signed by: Rosanna Cosme MD Date:   04/22/2019 Time:12:46    Plan:         Patient was recently hospitalized for a stroke and has several other co morbidities. Discussed patient with Dr. Anderson. Contacted patient's PCP - PCP had already left office, so nurse sent message to PCP for him to contact me to discuss. PCP never called to discuss. Called patient's daughter at 7:52 PM tonight. ER precautions were discussed at length with patient and his daughter.     Cough  -     XR CHEST PA AND LATERAL; Future; Expected date: 04/22/2019  -     benzonatate (TESSALON PERLES) 100 MG capsule; Take 1 capsule (100 mg total) by mouth 3 (three) times daily as needed for Cough.  Dispense: 30 capsule; Refill:  0      Patient Instructions   Take tessalon perles as prescribed as needed.  Follow up with your primary care provider.  We will contact you if primary care provider has any other suggestions.    Please follow up with your primary care provider within 2-5 days if your signs and symptoms have not resolved or worsen.     If your condition worsens or fails to improve we recommend that you receive another evaluation at the emergency room immediately or contact your primary medical clinic to discuss your concerns.   You must understand that you have received an Urgent Care treatment only and that you may be released before all of your medical problems are known or treated. You, the patient, will arrange for follow up care as instructed.

## 2019-04-23 ENCOUNTER — TELEPHONE (OUTPATIENT)
Dept: FAMILY MEDICINE | Facility: HOSPITAL | Age: 64
End: 2019-04-23

## 2019-04-23 NOTE — TELEPHONE ENCOUNTER
----- Message from Adela Butcher sent at 4/22/2019  1:02 PM CDT -----  [facility]  Calling to get some advise from  Please call [phone] Please call ASAP Pt in the office

## 2019-04-29 ENCOUNTER — CLINICAL SUPPORT (OUTPATIENT)
Dept: REHABILITATION | Facility: HOSPITAL | Age: 64
End: 2019-04-29
Attending: PSYCHIATRY & NEUROLOGY
Payer: MEDICARE

## 2019-04-29 DIAGNOSIS — R13.12 OROPHARYNGEAL DYSPHAGIA: ICD-10-CM

## 2019-04-29 DIAGNOSIS — R26.89 DECREASED MOBILITY: ICD-10-CM

## 2019-04-29 DIAGNOSIS — R26.9 GAIT ABNORMALITY: ICD-10-CM

## 2019-04-29 PROCEDURE — 92610 EVALUATE SWALLOWING FUNCTION: CPT | Mod: PN

## 2019-04-29 PROCEDURE — 97110 THERAPEUTIC EXERCISES: CPT | Mod: PN

## 2019-04-29 NOTE — PROGRESS NOTES
Physical Therapy Daily Treatment Note     Name: Ming Boateng  Clinic Number: [id number]    Therapy Diagnosis:   Encounter Diagnoses   Name Primary?    Decreased mobility     Gait abnormality      Physician: Khoobehi, Kaveh D., MD    Visit Date: 4/29/2019      Physician Orders: PT Eval and Treat  Medical Diagnosis from Referral: I69.359 (ICD-10-CM) - Hemiplegia and hemiparesis following cerebral infarction affecting unspecified side  Evaluation Date: 4/8/2019      Authorization Period Expiration: 4/9/19  Plan of Care Expiration: 6/7/19  Visit # / Visits authorized: 2/50     Time In: 8:00 AM  Time Out: 8:45 PM  Total Billable Time: 45 minutes     Precautions: Standard, Fall, CHF and L side weakness, recent TIA    Check BP during session if symptomatic    Subjective     Pt reports: he has been spending most of his time on the couch at home.  His dtr stated that she is still trying to contact someone in neurology for a follow-up with no response.  He will be compliant with home exercise program once issued next week.  Response to previous treatment: assess next session  Functional change: none    Pain: 0/10  Location: left side        Objective     BP: start of session 150/76  BP at middle session after report of dizziness: 122/62    Ming participated in neuromuscular re-education activities to improve: Balance, Coordination, Kinesthetic, Proprioception and Posture for 45 minutes. The following activities were included:    Neuro re-ed and endurance training with B UE/LE extremities for reciprocal motion of all limbs on sci-fit x 10 min at level 3 at > or equal to 50 spm w/o rest.      PT performed the following stretches to increase AROM and PROM in pt's B side: B hip rotation stretches, B hamstring stretches, B adductor stretch.  Pt performed scoot transfer to EOM and PT provided tactile and visual cues with mirrors and pt performed sit to stand transfers without AD x 10 with cues to increase B LE wt bearing f/b B wt  shifts x B f/b pre-gait training with B stance and B swing with faciltiation from PT to promote increased use of B LE in standing.   Home Exercises Provided and Patient Education Provided     Education provided:   - need for neurologist for tx for anti-spasticity    Written Home Exercises Provided: no    Assessment     Pt has severe spasticity in B LEs and he is limited by cognition as well.  He would continue to benefit from flexibility and PROM and NMR for transfers and pre-gait    Ming is progressing well towards his goals.   Pt prognosis is Good.     Pt will continue to benefit from skilled outpatient physical therapy to address the deficits listed in the problem list box on initial evaluation, provide pt/family education and to maximize pt's level of independence in the home and community environment.     Pt's spiritual, cultural and educational needs considered and pt agreeable to plan of care and goals.     Anticipated barriers to physical therapy: spasticity, confusion, sedentary routine, recent TIA    Goals:  Short Term Goals (4 Weeks):   1.  Independent with initial HEP.  2.  Pt will perform nu-step at level 3.0 x 10 minutes without rests breaks going at least 60 step/min or greater.  3. Pt will be able to perform TUG in 35 secs WITH use of AD demonstrating overall improved functional mobility.   4. Pt will performed sit to stand x 5 B UE support in 26 seconds without LOB backward or posterior LE hooking for functional and safe transfers.   5.  Pt will score greater than or equal to 25/28 on the Tinetti test/assessment WITH use of AD demonstrating overall improved functional mobility and balance and reduce fall risk.   6. Pt will walk < than or equal to 180 ft on the 2 minute walk test indoor WITH AD with 0 LOB and minimal gait deviations for improved safety in home ambulation and safety.      Long Term Goals (8 Weeks):   1. Pt will be able to perform TUG in 25 secs WITH use of AD demonstrating overall  improved functional mobility.   2. Pt will performed sit to stand x 5 WITH UE support in 20 seconds without LOB backward or posterior LE hooking for functional and safe transfers.   3.  Pt will score greater than or equal to 27/28 on the Tinetti test/assessment WITH use of AD demonstrating overall improved functional mobility and balance and reduce fall risk.   4.  Pt will score greater than or equal to 22/24 on the DGI test/assessment WITH use of AD demonstrating overall improved functional mobility and balance and reduce fall risk.   5.  Pt will walk < than or equal to 600 ft on the 6 minute walk test with AD and 0 LOB and minimal gait deviations for improved community ambulation.  6. Pt will have 0 falls from start of PT sessions.   7. Pt will have MMT score of 4+/5 in all major ms groups in [facility].     Plan     Flexibility, PROM, progression in transitions.    Livier العراقي, PT

## 2019-04-29 NOTE — PLAN OF CARE
Outpatient Neurological Rehabilitation  Speech and Language Therapy Evaluation    Date: 4/29/2019     Name: Ming Boateng   MRN: [id number]    Therapy Diagnosis:   Encounter Diagnosis   Name Primary?    Oropharyngeal dysphagia       Physician: Keith Harkins, *  Physician Orders: speech therapy evaluate and treat  Medical Diagnosis: G45.9 (ICD-10-CM) - TIA (transient ischemic attack)     Visit # / Visits Authorized:  1 / 1   Date of Evaluation:  4/29/2019   Insurance Authorization Period: 04/12/2019 to 04/11/2020   Plan of Care Certification:    4/29/2019 to 5/31/19     Time In:1020   Time Out: 1100   Total Billable Time: 40  Procedure Min.   Swallow and Oral Function Evaluation   40 minutes         Precautions:Standard, fall, aspiration  Subjective   Date of Onset: Three strokes starting in 2016. TIA last week.   History of Current Condition:   Pt's son reports three strokes since 2016 and a TIA last week.  Memory, speech, swallowing, and voice has reportedly declined with each subsequent stroke.   Past Medical History: Ming Boateng  has a past medical history of CHF (congestive heart failure), Diabetes mellitus, Diabetes mellitus, type 2, Hypertension, Renal disorder, and Stroke.  Ming Boateng  has a past surgical history that includes arm surgery (Left); Eye surgery (Bilateral); Eye surgery (Right, 07/2017); and Esophagogastroduodenoscopy (N/A, 12/26/2018).  Medical Hx and Allergies: Ming has a current medication list which includes the following prescription(s): acetaminophen, admelog u-100 insulin lispro, allopurinol, amlodipine, aspirin, atorvastatin, benzonatate, blood sugar diagnostic, blood-glucose meter, calcium acetate, carvedilol, carvedilol, clopidogrel, ergocalciferol, famotidine, furosemide, insulin detemir u-100, insulin syringe-needle,dispos., lancets, loratadine, and losartan.   Review of patient's allergies indicates:   Allergen Reactions    [place] Anaphylaxis     Throat swells up     Cayenne  "pepper      Imaging: Modified Barium Swallow on 12/28/18. "Mild oropharyngeal dysphagia characterized by decreased BOT retraction and pharyngeal constriction, without penetration/aspiration of thin liquid via cup sips and regular consistency solids. Pt and his daughter reported improvement in his swallowing to baseline since the MBSS.   Diet recommendations:  Regular, Thin    Aspiration Precautions:   · NO straws  · Fully awake and alert for PO intake  · Fully upright position for PO intake  · Small bites/ sips  · Slow rate of eating/ drinking  · 1 bite/ sip @ a time  · Double swallow per bolus, prior to provision of subsequent bolus   · Refrain from talking prior to swallow completion   · Remain upright for 20-30 min post PO intake  · Discontinue PO upon: coughing, throat clearing, choking, wet vocal quality, wet breath sounds, watery eyes, reddened facial features"      Prior Therapy:  Speech therapy in the hospital  Social History:  Lives with his daughter in [place].   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   Current Level of Function: Mild oropharyngeal dysphagia.  Pain:   0/10  Pain Location / Description: n/a  Nutrition:  Regular and thin liquid diet at home  Patient/family Therapy Goals:  Improvement with swallowing and memory  Objective   Formal Assessment:  Clinical Swallow Exam/ Etelvina Mechanism Exam:  Oral Motor Exam:  Mandibular Strength and Mobility - Trigeminal Nerve (CNV) Rest: Fair   Lateralization: adequate  Protrusion: adequate  Retraction:  adequate  Involuntary Movement: absent   Oral Labial Strength and Mobility -Facial Nerve (CN VII)  Rest: Fair   Lateralization: adequate  Protrusion: adequate  Retraction:  adequate  Involuntary Movement: absent    Lingual Strength and Mobility- Hypoglossal Nerve (CN XII)  Rest: WFL   Lateralization: adequate  Protrusion: adequate  Retraction:  " adequate  Involuntary Movement: absent     Velar Elevation - Glossopharyngeal Nerve (CN IX) and Vagus (CN X) Rest: WFL   Lateralization: adequate  Protrusion: adequate  Retraction:  adequate  Involuntary Movement: absent    Buccal Strength and Mobility - Facial Nerve (CN VII)  Adequate, left sided facial droop   Facial Sensation and Movement - Facial Nerve (CN VII) Symmetrical at rest: left sided facial droop  Wrinkle forehead: yes  Able to close eyes tightly: yes  Taste to Anterior 2/3 of Tongue: yes     Structure Abnormalities: facial asymmetry present, left sided weakness   Dentition: present and adequate   Secretion Management: adequate  Mucosal Quality: adequate  Volitional Cough: not assessed  Volitional Swallow: adequate elevation detected via finger palpation  Voice Prior to PO Intake: Clear vocal quality noted before and after phonation of prolonged vowel /ah/    [place] Swallow Protocol:  Passed  [place] Swallow Protocol dictates pt remain NPO if fail screener; (Coltr et al. 2014) however, as objective swallow assessment is not available for greater than a week, pt will remain on current diet until objective assessment is completed unless otherwise indicated.     Clinical Swallow Examination:   Pt presented with:   THIN:-3 oz water test, self regulated cup sip of water x 5     PUREE:- tsp bites of pudding x 2    DENTAL SOFT:- tsp bites of peaches x 4    SOLID: -bite of larry cracker x 2      DESCRIPTION:  Clinical swallow evaluation completed. Po trials were self-administered with guidance from therapist. Pt required cues to take smaller bites of peaches. Mild oropharyngeal dysphagia c/b coughing post soft solid and solid consistencies.  Pt independently alternating bites/sips.  Pt needed a liquid wash to clear residue post solids. No anterior spillage noted. Adequate bolus formation.  Adequate A-P transport.  No significant oral residue.  Adequate swallow timing. No changes in vocal quality post po trials. No  watery eyes. Pt was observed to take large bites of soft solid and solid consistencies.  Cues for smaller bites provided.  Pt's son reported frequent large bites at home. Mild aspiration risk. Diet recommendations: dental soft and thin liquid with aspiration precautions as follows: small bites/sips, alternate bites/sips, supervision with meals to ensure small bites/sips, upright for meals, remain upright 30 minutes post meals.     Description:  Auditory Comprehension: Appeared to be WFL in conversation.  Pt is Samoan speaking but understands English adequately. Son present to translate.     Written Expression: Not assessed.     Cognition: Awake and alert. Cooperative. Oriented to name, , age, month, year, day of the week. Difficulty with date. Pt reports that his memory is adequate. However, daughter and son report memory deficits. Memory to be further tested next session.    Motor Speech/Fluency/Voice:  Mild dysphonia c/b decreased volume, mild breathiness, mild strain.  No dysfluencies noted throughout. Increased rate of speech in conversation.     Hearing / Vision: Visual deficit post stroke, more prominent left side. Unable to read.     [place] National Outcome Measures System (NOMS):   NOMS Swallowing  LEVEL 5: Swallowing is safe with minimal diet restriction and/or occasionally requires minimal cueing to use compensatory strategies. The individual may occasionally self-cue. All nutrition and hydration needs are met by mouth at mealtime    Treatment   Treatment Time In: n/a  Treatment Time Out: n/a  Total Treatment Time: n/a  no treatment performed 2/2 time to complete evaluation.    Education: Plan of Care, role of SLP in care, aspiration precautions , course of medical disease affect on therapy diagnosis , scheduling/ cancellation policy and insurance limitations / visit limit  was discussed with pt. Patient and family members expressed understanding.     Home Program: n/a. Plan to establish home program  next session.   Assessment     Ming presents to [facility] [place] s/p medical diagnosis of TIA (transient ischemic attack). Demonstrates impairments including limitations as described in the problem list.He presents with Mild oropharyngeal dysphagia c/b coughing post soft solid and solid consistencies.  Positive prognostic factors include age, family support. Negative prognostic factors include three strokes. No barriers to therapy identified. Patient will benefit from skilled, outpatient neurological rehabilitation speech therapy.    Rehab Potential: good  Pt's spiritual, cultural and educational needs considered and pt agreeable to plan of care and goals.    Short Term Goals (2 weeks):   1. Pt will complete further memory testing to assess short-term and long-term memory skills.  2. Pt will complete labial oral motor exercises with min A to improve speech intelligibility, facial droop, and strengthen oral musculature.  3. Pt will recall and utilize aspiration precautions to decrease s/s of oropharyngeal dysphagia and increase swallow safety.  Goals to be added post MBSS and further memory testing.     Long Term Goals (4 weeks):   1. Pt will use appropriate memory strategies to schedule and recall weekly activities, express needs and recall names to maintain safety and participate socially in functional living environment.   2. Pt will utilize compensatory strategies with optimum safety and efficiency of swallowing function on PO intake without overt s/s of aspiration for the highest diet level.       Plan     Plan of Care Certification Period: 4/29/2019  to 5/31/19    Recommended Treatment Plan:  Patient will participate in the [facility] neurological rehabilitation program for speech therapy 2 times per week for 4 weeks to address his  Swallow deficits, to educate patient and their family, and to participate in a home exercise program.    Other Recommendations:   1. MBSS to evaluate anatomy and  physiology of the oropharyngeal swallow, to determine effectiveness of rehabilitation strategies, and to determine diet consistency and intervention recommendations.    Therapist's Name:   Kalpana Vang CCC-SLP   4/29/2019

## 2019-05-02 ENCOUNTER — CLINICAL SUPPORT (OUTPATIENT)
Dept: REHABILITATION | Facility: HOSPITAL | Age: 64
End: 2019-05-02
Attending: PSYCHIATRY & NEUROLOGY
Payer: MEDICARE

## 2019-05-02 DIAGNOSIS — Z78.9 DECREASED ACTIVITIES OF DAILY LIVING (ADL): Primary | ICD-10-CM

## 2019-05-02 DIAGNOSIS — M62.81 MUSCLE WEAKNESS: ICD-10-CM

## 2019-05-02 DIAGNOSIS — R27.8 DECREASED COORDINATION: ICD-10-CM

## 2019-05-02 PROCEDURE — 97110 THERAPEUTIC EXERCISES: CPT | Mod: PN

## 2019-05-02 PROCEDURE — 97112 NEUROMUSCULAR REEDUCATION: CPT | Mod: PN

## 2019-05-02 PROCEDURE — 97166 OT EVAL MOD COMPLEX 45 MIN: CPT | Mod: PN

## 2019-05-02 NOTE — PATIENT INSTRUCTIONS
Strengthening (Resistive Putty)        Squeeze putty using thumb and all fingers.  Repeat __30__ times. Do ___2-3_ sessions per day.      Copyright © [facility]. All rights reserved.       Roll putty back and forth, being sure to use all fingertips.  Repeat __10__ times. Do ___2-3_ sessions per day.    Lateral Pinch Strengthening (Resistive Putty)        Squeeze between thumb and side of each finger in turn.  Repeat __30__ times. Do __2-3 sessions per day.    Copyright © [facility]. All rights reserved.   MP Flexion (Resistive Putty)        Bending only at large knuckles, press putty down against thumb. Keep fingertips straight.  Repeat ___30_ times. Do ___2-3_ sessions per day.    Copyright © I. All rights reserved.   Palmar Pinch Strengthening (Resistive Putty)      Pinch putty between thumb and each fingertip in turn.  Repeat __30__ times. Do __2-3__ sessions per day.  Extension (Assistive Putty)      Copyright © I. All rights reserved.         COIN FLIP    Place various coins on a table and flip each coin with your affected hand.

## 2019-05-03 ENCOUNTER — OFFICE VISIT (OUTPATIENT)
Dept: FAMILY MEDICINE | Facility: HOSPITAL | Age: 64
End: 2019-05-03
Attending: SPECIALIST
Payer: MEDICARE

## 2019-05-03 VITALS
HEART RATE: 66 BPM | SYSTOLIC BLOOD PRESSURE: 98 MMHG | DIASTOLIC BLOOD PRESSURE: 53 MMHG | HEIGHT: 68 IN | BODY MASS INDEX: 25.23 KG/M2 | WEIGHT: 166.44 LBS

## 2019-05-03 DIAGNOSIS — E11.59 HYPERTENSION ASSOCIATED WITH DIABETES: ICD-10-CM

## 2019-05-03 DIAGNOSIS — I15.2 HYPERTENSION ASSOCIATED WITH DIABETES: ICD-10-CM

## 2019-05-03 DIAGNOSIS — I50.30 (HFPEF) HEART FAILURE WITH PRESERVED EJECTION FRACTION: ICD-10-CM

## 2019-05-03 DIAGNOSIS — N18.4 STAGE 4 CHRONIC KIDNEY DISEASE: ICD-10-CM

## 2019-05-03 DIAGNOSIS — Z79.4 TYPE 2 DIABETES MELLITUS WITH COMPLICATION, WITH LONG-TERM CURRENT USE OF INSULIN: ICD-10-CM

## 2019-05-03 DIAGNOSIS — E11.8 TYPE 2 DIABETES MELLITUS WITH COMPLICATION, WITH LONG-TERM CURRENT USE OF INSULIN: ICD-10-CM

## 2019-05-03 DIAGNOSIS — Z86.73 H/O: CVA (CEREBROVASCULAR ACCIDENT): ICD-10-CM

## 2019-05-03 DIAGNOSIS — R13.12 OROPHARYNGEAL DYSPHAGIA: Primary | ICD-10-CM

## 2019-05-03 PROBLEM — M62.81 MUSCLE WEAKNESS: Status: RESOLVED | Noted: 2019-05-02 | Resolved: 2019-05-03

## 2019-05-03 PROBLEM — R27.8 DECREASED COORDINATION: Status: RESOLVED | Noted: 2019-05-02 | Resolved: 2019-05-03

## 2019-05-03 PROCEDURE — 99214 OFFICE O/P EST MOD 30 MIN: CPT | Performed by: FAMILY MEDICINE

## 2019-05-03 RX ORDER — AMLODIPINE BESYLATE 5 MG/1
5 TABLET ORAL DAILY
Qty: 90 TABLET | Refills: 1 | Status: SHIPPED | OUTPATIENT
Start: 2019-05-03 | End: 2019-06-23 | Stop reason: ALTCHOICE

## 2019-05-03 RX ORDER — CARVEDILOL 3.12 MG/1
3.12 TABLET ORAL 2 TIMES DAILY WITH MEALS
Qty: 60 TABLET | Refills: 5 | Status: ON HOLD | OUTPATIENT
Start: 2019-05-03 | End: 2019-06-24 | Stop reason: HOSPADM

## 2019-05-03 NOTE — PLAN OF CARE
[facility]  Initial Neurological Evaluation     Date: 5/2/2019  Patient: Ming Boateng  Chart Number: [id number]    Therapy Diagnosis:   Encounter Diagnoses   Name Primary?    Muscle weakness     Decreased coordination      Physician: Keith Harkins, *    Physician Orders: Eval and tx  Medical Diagnosis: G45.9 (ICD-10-CM) - TIA (transient ischemic attack)  Onset Date:  First stroke 3/3/19 second TIA on 4/11/19  Evaluation Date: 5/2/2019  Plan of Care Expiration Period: 6/28/19  Insurance Authorization period Expiration: 12/31/19  Date of Return to MD: NA  Visit # / Visits Authortized: 1 / 1  FOTO: CVA UE survey/78% limitation     Time In:5:00 pm   Time Out: 5:45 pm  Total Billable (one on one) Time: 45 minutes    Precautions: Standard, Diabetes, blood thinners and Fall    Subjective     History of Current Condition: Has had previous CVAs x 3 with this one in march being most recent and debilitating. Pt was able to ambulate without a walker and manage steps at home IND.     Involved Side: L sided weakness  Dominant Side: Right  Date of Onset: 3/3/19 with a recent TIA on 4/11/19  Surgical Procedure: None  Imaging: MRI studies, CT scan films   Previous Therapy: Yes for PT and ST services.     Patient's Goals for Therapy: Increase L hand strength and functional use for dressing and bathing.     Pain:  Pain Related Behaviors Observed: no   Functional Pain Scale Rating 0-10:   0/10 on average  0/10 at best  0/10 at worst  Location: NA  Description: none  Aggravating Factors: no pain   Easing Factors: no pain     Occupation:   when he worked but retired now.   Working presently: Retired  Duties: None now    Functional Limitations/Social History:    Prior Level of Function: IND without AD  Current Level of Function: Mod - Max A     Home/Living environment : lives with their family and lives with their daughter  Home Access: 1 step into the house and 2 story  access, 1st floor mostly but goes up the stairs to shower mostly.   DME: standard walker, bedside commode, shower chair and wheelchair     Leisure: Driving and Walking to the [employer].     Past Medical History/Physical Systems Review:   Ming Boateng  has a past medical history of CHF (congestive heart failure), Diabetes mellitus, Diabetes mellitus, type 2, Hypertension, Renal disorder, and Stroke.    Ming Boateng  has a past surgical history that includes arm surgery (Left); Eye surgery (Bilateral); Eye surgery (Right, 07/2017); and Esophagogastroduodenoscopy (N/A, 12/26/2018).    Ming has a current medication list which includes the following prescription(s): acetaminophen, admelog u-100 insulin lispro, allopurinol, amlodipine, aspirin, atorvastatin, benzonatate, blood sugar diagnostic, blood-glucose meter, calcium acetate, carvedilol, carvedilol, clopidogrel, ergocalciferol, famotidine, furosemide, insulin detemir u-100, insulin syringe-needle,dispos., lancets, loratadine, and losartan.    Review of patient's allergies indicates:   Allergen Reactions    Cayenne Anaphylaxis     Throat swells up     Cayenne pepper         Other:     Objective     Cognitive Exam:  Oriented: Person, Place and Time  Behaviors: normal, cooperative  Follows Commands/attention: Follows two-step commands  Communication: clear/fluent word finding problems.   Memory: Impaired STM,   Safety awareness/insight to disability: aware of diagnosis, treatment, and prognosis  Coping skills/emotional control: Depressed after the larger CVA/     Visual/Perceptual:  Tracking: impaired supposed to be wearing glasses.   Saccades: intact  Acuity: impaired  R/L discrimination: impaired  Visual field: impaired  Motor Planning Praxis: impaired  Comments:     Physical Exam:  Postural examination/scapula alignment: Rounded shoulder, Head forward, Affected scapula elevated, Affected scapula depressed, Affected scapula adducted and Slouched  posture  Joint integrity: Hard end feel  Skin integrity: Scar on L volar aspect of forearm from daughter stating childhood injury. States the thumb has always been limited and stiff also.   Edema: none   Palpation: NA      Joint Evaluation  AROM  5/2/2019 PROM   5/2/2019 AROM  5/2/2019 PROM   5/2/2019    Right Right Left Left   Shoulder flex 0-180 WNL  115   Shoulder Abd 0-180 WNL WNL 55 70   Shoulder ER 0-90 WNL WNL unable 35   Shoulder IR 0-90 WNL WNL L4 45   Shoulder Extension 0-80 WNL WNL 55 60   Shoulder Horizontal adduction 0-90 WN WNL 25 WNL   Elbow flex/ext 0-150 WNL WNL WNL WNL   Wrist flex 0-80 WNL WNL WNL WNL   Wrist ext 0-70 WNL WNL WNL WNL   Supination 0-80 WNL WNL 60 70   Pronation 0-80 WNL WNL WNL WNL   UD WNL WNL unable unabl   RD WNL WNL unable unable     Fist: tight      Strength 5/2/2019 5/2/2019   **within available ROM** Right Left   Shoulder flex 3+/5 2+/5   Shoulder abd 3+/5 2+/5   Shoulder ER 3+/5 2+/5   Shoulder IR 3+/5 2+/5   Shoulder Extension 3+/5 2+/5   Shoulder Horizontal adduction 3+/5 3/5   Elbow flex 4-/5 4-/5   Elbow ext 4-/5 3-/5   Wrist flex 4-/5 3-/5   Wrist ext 4-/5 3-/5   Supination 4-/5 3-/5   Pronation 4-/5 3-/5   UD 4-/5 3-/5   RD 4-/5 3-/5      Strength: (DAHIANA Dynamometer in lbs.) Average 3 trials, Position II:     5/2/2019 5/2/2019    Right Left   Rung II 45# 25#     Pinch Strength (Measured in psi)     5/2/2019 5/2/2019    Right Left   Key Pinch 10 psi 8 psi   3pt Pinch 7 psi 5 psi   2pt Pinch 6 psi 3 psi     Fine Motor Coordination: 9 Hole Peg Test  9 Peg Test Right Left   Removed 9/9 dropped 3 9/9   Replaced 9/9 9/9   Time 1:37 sec 2:42 sec       Gross motor coordination:   - MESHA: Impaired  - Finger to Nose: Impaired unable to follow commands  - Finger Flicks: impaired    Tone:  Modified Riky Scale:   2 More marked increase in muscle tone through most of the ROM, but affected part(s) easily moved    Comments:     Sensation:  Ming  reports no change in  sensation.   Light touch: bilateral intact  Sharp/Dull: bilateral intact  Kinesthesia: leftimpaired  Proprioception: left impaired  Temperature: bilateral intact    Balance:   Static Sit - GOOD-: Takes MODERATE challenges from all directions but inconsistently  Dynamic sit- GOOD-: Takes MODERATE challenges from all directions but inconsistently  Static Stand - FAIR+: Able to take MINIMAL challenges from all directions  Dynamic stand - FAIR-: Maintains without assist but inconsistent   Pt was unable to sit upright on the mat the whole session without requesting to lie down. Pt BP assessed with 125/66 and HR 55. Pt provided with water states feeling dizzy but daughter states he had been laying down all day.       Endurance Deficit: severe                    Functional Status      Functional Mobility:  Bed mobility: Min A  Roll to left: Min A  Roll to right: Min A  Supine to sit: Min A  Sit to supine: Min A  Transfers to bed: Min A  Transfers to toilet: Min A  Car transfers: Min A  Wheelchair mobility: I    ADL's:  Feeding: I  Grooming: Min A  Hygiene: Min A  UB Dressing: Mod A  LB Dressing: Mod A  Toileting: Mod A  Bathing: Max A    IADL's:  Homecare: Max A  Cooking: D  Laundry: D  Yard work: D  Use of telephone: Max A  Money management: Max A  Medication management: Max A  Handwriting:Min A  Technology Use:Mod A    Comments:      CMS Impairment/Limitation/Restriction for FOTO UE  Survey    Therapist reviewed FOTO scores for Ming Boateng on 5/2/2019.   FOTO documents entered into [employer] - see Media section.    Limitation Score: 79%  Category: Self Care    Current : CL = least 60% but < 80% impaired, limited or restricted  Goal: CJ = at least 20% but < 40% impaired, limited or restricted  Discharge:          Treatment     Treatment Time In: 0  Treatment Time Out: 0  Total Treatment time separate from Evaluation time:15 min     Ming received therapeutic exercises to develop strength, endurance and ROM for 15 minutes  including:  HEP review with hand out in place. Provided written and demonstrated instructions for peach t putty exercises.     Home Exercises and Patient Education Provided    Education provided:   -role of OT, goals for OT, scheduling/cancellations, insurance limitations with patient.  -Additional Education provided: Pt provided with written instructions, demonstration and education of HEP with pt demonstrating and verbalizing understanding of appropriate movements and techniques to increase strength, ROM and activity tolerance for increased IND.      Written Home Exercises Provided: yes.  Exercises were reviewed and Ming was able to demonstrate them prior to the end of the session.    Ming demonstrated fair  understanding of the education provided.     See EMR under Patient Instructions for exercises provided 5/2/2019.    Assessment     Ming Boateng is a 63 y.o. male referred to outpatient occupational therapy and presents with a medical diagnosis of multiple CVAs and TIAs with L sided weakness, resulting in decreased range of motion, decreased muscle strength, impaired function and decreased work ability and demonstrates limitations as described in the chart below. Following medical record review it is determined that pt will benefit from occupational therapy services in order to maximize pain free and/or functional use of left UE.    Pt prognosis is Fair due to  Complexity of pt and recurrent condition. Pt has had 3 major CVAs with this being his 4th and a recent TIA this month. Little to no carryover at home with pt daughter stating he lays around at home. Pt did not seem very motivated with initial eval.   Pt will benefit from skilled outpatient Occupational Therapy to address the deficits stated above and in the chart below, provide pt/family education, and to maximize pt's level of independence.     Plan of care discussed with patient: Yes  Pt's spiritual, cultural and educational needs considered and patient  is agreeable to the plan of care and goals as stated below:     Anticipated Barriers for therapy: Language barrier, decreased motivation, Recurrent conditions and poor carryover.     Medical Necessity is demonstrated by the following  Profile and History Assessment of Occupational Performance Level of Clinical Decision Making Complexity Score   Occupational Profile:   Ming Boateng is a 63 y.o. male who lives with their daughter and is currently unemployed and retired as . Ming Boateng has difficulty with  bathing, grooming and dressing  driving/transportation management, shopping, phone/computer use and housework/household chores  affecting his/her daily functional abilities. His/her main goal for therapy is in crease L hand use and strength with functional IND increase. .     Comorbidities:   depression, diabetes, history of CVA, history of TBI, HTN and level of undertstanding of current condition    Medical and Therapy History Review:   Expanded               Performance Deficits    Physical:  Joint Mobility  Joint Stability  Muscle Power/Strength  Muscle Endurance  Control of Voluntary Movement   Strength  Pinch Strength  Gross Motor Coordination  Fine Motor Coordination  Visual Functions  Proprioception Functions    Cognitive:  Attention  Sequencing  Communication  Memory  Safety Awareness/Insight to Disability    Psychosocial:    Habits  Routines  Rituals     Clinical Decision Making:  moderate    Assessment Process:  Problem-Focused Assessments    Modification/Need for Assistance:  Minimal-Moderate Modifications/Assistance    Intervention Selection:  Several Treatment Options       moderate  Based on PMHX, co morbidities , data from assessments and functional level of assistance required with task and clinical presentation directly impacting function.       The following goals were discussed with the patient and patient is in agreement with them as to be addressed in the treatment plan.      Goals:  Short Term Goals:  1) Initiate Hep   2) Pt to increase LUE  strength by 5 # in order to A in self care task of feeding by 4 weeks.   3) Pt to increase Barthel Index Score by 5 points increasing self care IND by 4 weeks.   4) Pt to increase L UE AROM by 10 degrees in order to A in UB dressing by 4 weeks.  5) Patient will be able to achieve less than or equal to 50% on the FOTO, demonstrating overall improved functional ability with upper extremity. (self-care category)    Long Term Goals:  1) Pt to be IND with HEP in order to maintain ROM and strength needed for self care IND by d.c.  2) Pt to increase L UE  strength to WNL as compared to unaffected extremity in order to open items for self feeding by d.c.  3) Pt to increase Barthel Index Score by 10 points increasing self care IND at home by d.c.  4) Pt to increase L UE AROM to WFL in order to A in UB dressing by d/c.  5) Patient will be able to achieve less than or equal to 25% on the FOTO, demonstrating overall improved functional ability with upper extremity. (self-care category)      Plan   Certification Period/Plan of care expiration: 5/2/2019 to 6/28/19.    Outpatient Occupational Therapy 2 times weekly for 8 weeks to include the following interventions: Manual Therapy, Neuromuscular Re-ed, Orthotic Management and Training, Paraffin, Patient Education, Self Care, Therapeutic Activites and Therapeutic Exercise.    Johann Mendez, OT

## 2019-05-03 NOTE — PROGRESS NOTES
Subjective:       Patient ID: Ming Boateng is a 63 y.o. male.    Chief Complaint: Hospital Follow Up    64 yo male with PMH HFpEF, T2DM, HTN, HLD, multiple CVA with residual left arm and left leg weakness, CKD IV, recurrent falls, and gout presents to the clinic for hospital follow-up. Has been doing well overall. Had a cough since leaving the hospital but improved after mucinex, no cough currently. He has fallen three times since the hospital, still doing PT/OT/speech twice a week at [facility]. Daughter states his bed is upstairs and he was slipping on the carpet. Hasn't fallen since sleeping downstairs. Mental status has been stable, can converse, continues to take all his medications. Has had a history of orthostatic hypotension. Uses a walker at home, in wheelchair for long distances.     BS's: nothing over 200, evenings occasionally <100. Stopped trujeo at night. Takes admelog 5-6 units TID and 10 units trujeo in the AM.     States he just switched over to medicare. States they were set-up with Dr. Goa but she left.     Doesn't smoke or drink.     Review of Systems   Constitutional: Negative for chills and fever.   HENT: Negative for congestion and sore throat.        Objective:      Vitals:    05/03/19 0927   BP: (!) 98/53   Pulse: 66     Physical Exam   Constitutional: He is oriented to person, place, and time. He appears well-developed and well-nourished. No distress.   BMI Readings from Last 3 Encounters:  04/22/19 : 25.09 kg/m²  04/11/19 : 25.09 kg/m²  03/07/19 : 24.74 kg/m²       HENT:   Head: Normocephalic and atraumatic.   Right Ear: External ear normal.   Left Ear: External ear normal.   Mouth/Throat: No oropharyngeal exudate.   Eyes: Conjunctivae and EOM are normal. Right eye exhibits no discharge. Left eye exhibits no discharge.   Neck: Normal range of motion. Neck supple. No tracheal deviation present.   Cardiovascular: Normal rate, regular rhythm and normal heart sounds. Exam reveals no gallop  and no friction rub.   No murmur heard.  Pulmonary/Chest: Effort normal and breath sounds normal. No respiratory distress.   Abdominal: Soft. He exhibits no distension. There is no tenderness.   Musculoskeletal: He exhibits no edema or deformity.   Foot exam: pulses intact but weak, good sensation, has relatively good hygeine but hematoma under L big toe.    Neurological: He is alert and oriented to person, place, and time.   R facial droop, stable; slightly decreased strength on L extremities, stable since discharge.    Skin: Skin is warm and dry. He is not diaphoretic.   Psychiatric: He has a normal mood and affect. His behavior is normal.   Nursing note and vitals reviewed.      Assessment:       1. Oropharyngeal dysphagia    2. H/O: CVA (cerebrovascular accident)    3. Type 2 diabetes mellitus with complication, with long-term current use of insulin    4. Hypertension associated with diabetes    5. (HFpEF) heart failure with preserved ejection fraction    6. Stage 4 chronic kidney disease    7. (HFpEF) heart failure with preserved ejection fraction        Plan:       Oropharyngeal dysphagia  Comments:  -stable, c/w speech evaluation and treatment  - c.w ASA, plavix, statin   Orders:  -     Ambulatory referral to Neurology    H/O: CVA (cerebrovascular accident)  Comments:  -stable, c/w asa, plavix , statin  - c/w neurology f/u   Orders:  -     Ambulatory referral to Neurology    Type 2 diabetes mellitus with complication, with long-term current use of insulin  Comments:  - A1C 7.1 on 03/03/19, c/w lantus 20 qhs, repeat 06/2019  Orders:  -     Ambulatory referral to Podiatry    Hypertension associated with diabetes  Comments:  - bp 98/53 at this time, well-controllwed  - on lasix 80mg daily, coreg12.5 dialy, coozar 50mg, amlodipine 10mg  Orders:  -     amLODIPine (NORVASC) 5 MG tablet; Take 1 tablet (5 mg total) by mouth once daily.  Dispense: 90 tablet; Refill: 1  -     carvedilol (COREG) 3.125 MG tablet; Take 1  tablet (3.125 mg total) by mouth 2 (two) times daily with meals.  Dispense: 60 tablet; Refill: 5    (HFpEF) heart failure with preserved ejection fraction  Comments:  - echo on 03/06/19, grade I HFpEF  - no history of HFrEF in our records  - c/w current   Orders:  -     amLODIPine (NORVASC) 5 MG tablet; Take 1 tablet (5 mg total) by mouth once daily.  Dispense: 90 tablet; Refill: 1  -     carvedilol (COREG) 3.125 MG tablet; Take 1 tablet (3.125 mg total) by mouth 2 (two) times daily with meals.  Dispense: 60 tablet; Refill: 5    Stage 4 chronic kidney disease  Comments:  - GFR 31 on 04/12/2019, stable urination  - likely 2/2 HTN and DM, repeat labs around 07/2019    (HFpEF) heart failure with preserved ejection fraction  -     amLODIPine (NORVASC) 5 MG tablet; Take 1 tablet (5 mg total) by mouth once daily.  Dispense: 90 tablet; Refill: 1  -     carvedilol (COREG) 3.125 MG tablet; Take 1 tablet (3.125 mg total) by mouth 2 (two) times daily with meals.  Dispense: 60 tablet; Refill: 5    Decreased coreg to 3.125 BID and amlodipine to 5mg daily given borderline low HR and BP and falls. Will monitor closely. Will set-up neurology f/u, podiatry f/u and holter monitor with [facility] referral coordinator . All questions answered.     Follow up in about 3 months (around 8/3/2019).

## 2019-05-10 ENCOUNTER — OFFICE VISIT (OUTPATIENT)
Dept: NEUROLOGY | Facility: CLINIC | Age: 64
End: 2019-05-10
Payer: MEDICARE

## 2019-05-10 VITALS
HEART RATE: 67 BPM | SYSTOLIC BLOOD PRESSURE: 105 MMHG | BODY MASS INDEX: 24.7 KG/M2 | WEIGHT: 162.94 LBS | HEIGHT: 68 IN | DIASTOLIC BLOOD PRESSURE: 61 MMHG

## 2019-05-10 DIAGNOSIS — I10 HYPERTENSION, UNSPECIFIED TYPE: ICD-10-CM

## 2019-05-10 DIAGNOSIS — I69.30 HISTORY OF CVA WITH RESIDUAL DEFICIT: Primary | ICD-10-CM

## 2019-05-10 DIAGNOSIS — E78.5 HYPERLIPIDEMIA, UNSPECIFIED HYPERLIPIDEMIA TYPE: ICD-10-CM

## 2019-05-10 DIAGNOSIS — I66.01 MIDDLE CEREBRAL ARTERY STENOSIS, RIGHT: ICD-10-CM

## 2019-05-10 DIAGNOSIS — E11.8 TYPE 2 DIABETES MELLITUS WITH COMPLICATION, UNSPECIFIED WHETHER LONG TERM INSULIN USE: ICD-10-CM

## 2019-05-10 PROCEDURE — 3078F PR MOST RECENT DIASTOLIC BLOOD PRESSURE < 80 MM HG: ICD-10-PCS | Mod: CPTII,S$GLB,, | Performed by: PSYCHIATRY & NEUROLOGY

## 2019-05-10 PROCEDURE — 3008F PR BODY MASS INDEX (BMI) DOCUMENTED: ICD-10-PCS | Mod: CPTII,S$GLB,, | Performed by: PSYCHIATRY & NEUROLOGY

## 2019-05-10 PROCEDURE — 3074F SYST BP LT 130 MM HG: CPT | Mod: CPTII,S$GLB,, | Performed by: PSYCHIATRY & NEUROLOGY

## 2019-05-10 PROCEDURE — 99999 PR PBB SHADOW E&M-EST. PATIENT-LVL III: CPT | Mod: PBBFAC,,, | Performed by: PSYCHIATRY & NEUROLOGY

## 2019-05-10 PROCEDURE — 3008F BODY MASS INDEX DOCD: CPT | Mod: CPTII,S$GLB,, | Performed by: PSYCHIATRY & NEUROLOGY

## 2019-05-10 PROCEDURE — 99213 OFFICE O/P EST LOW 20 MIN: CPT | Mod: S$GLB,,, | Performed by: PSYCHIATRY & NEUROLOGY

## 2019-05-10 PROCEDURE — 3045F PR MOST RECENT HEMOGLOBIN A1C LEVEL 7.0-9.0%: ICD-10-PCS | Mod: CPTII,S$GLB,, | Performed by: PSYCHIATRY & NEUROLOGY

## 2019-05-10 PROCEDURE — 3045F PR MOST RECENT HEMOGLOBIN A1C LEVEL 7.0-9.0%: CPT | Mod: CPTII,S$GLB,, | Performed by: PSYCHIATRY & NEUROLOGY

## 2019-05-10 PROCEDURE — 3074F PR MOST RECENT SYSTOLIC BLOOD PRESSURE < 130 MM HG: ICD-10-PCS | Mod: CPTII,S$GLB,, | Performed by: PSYCHIATRY & NEUROLOGY

## 2019-05-10 PROCEDURE — 99213 PR OFFICE/OUTPT VISIT, EST, LEVL III, 20-29 MIN: ICD-10-PCS | Mod: S$GLB,,, | Performed by: PSYCHIATRY & NEUROLOGY

## 2019-05-10 PROCEDURE — 99999 PR PBB SHADOW E&M-EST. PATIENT-LVL III: ICD-10-PCS | Mod: PBBFAC,,, | Performed by: PSYCHIATRY & NEUROLOGY

## 2019-05-10 PROCEDURE — 3078F DIAST BP <80 MM HG: CPT | Mod: CPTII,S$GLB,, | Performed by: PSYCHIATRY & NEUROLOGY
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
## 2019-05-10 NOTE — LETTER
May 11, 2019      Keith Harkins MD  [address]  [address]  [place] [address]           [facility] Neurology  [address] [address]  Phone: [phone]  Fax: [phone]          Patient: Ming Boateng   MR Number: [id number]   YOB: 1955   Date of Visit: 5/10/2019       Dear Dr. Keith Harkins:    Thank you for referring Ming Boateng to me for evaluation. Attached you will find relevant portions of my assessment and plan of care.    If you have questions, please do not hesitate to call me. I look forward to following Ming Boateng along with you.    Sincerely,    Levi Silva MD    Enclosure  CC:  No Recipients    If you would like to receive this communication electronically, please contact [email or web address] or [phone] to request more information on [employer] access.    For providers and/or their staff who would like to refer a patient to [facility], please contact us through our one-stop-shop provider referral line, [facility], at [phone].    If you feel you have received this communication in error or would no longer like to receive these types of communications, please e-mail [email or web address]

## 2019-05-13 ENCOUNTER — CLINICAL SUPPORT (OUTPATIENT)
Dept: REHABILITATION | Facility: HOSPITAL | Age: 64
End: 2019-05-13
Attending: PSYCHIATRY & NEUROLOGY
Payer: MEDICARE

## 2019-05-13 DIAGNOSIS — R27.8 DECREASED COORDINATION: ICD-10-CM

## 2019-05-13 DIAGNOSIS — R26.9 GAIT ABNORMALITY: ICD-10-CM

## 2019-05-13 DIAGNOSIS — Z74.09 IMPAIRED MOBILITY AND ACTIVITIES OF DAILY LIVING: ICD-10-CM

## 2019-05-13 DIAGNOSIS — R13.12 OROPHARYNGEAL DYSPHAGIA: ICD-10-CM

## 2019-05-13 DIAGNOSIS — R26.89 DECREASED MOBILITY: ICD-10-CM

## 2019-05-13 DIAGNOSIS — M62.81 MUSCLE WEAKNESS: Primary | ICD-10-CM

## 2019-05-13 DIAGNOSIS — Z78.9 IMPAIRED MOBILITY AND ACTIVITIES OF DAILY LIVING: ICD-10-CM

## 2019-05-13 PROCEDURE — 97127 HC THERAPEUTIC INTVTN, COGN FUNCTION - ST: CPT | Mod: PN,59

## 2019-05-13 PROCEDURE — 97530 THERAPEUTIC ACTIVITIES: CPT | Mod: PN,59

## 2019-05-13 PROCEDURE — 92526 ORAL FUNCTION THERAPY: CPT | Mod: PN

## 2019-05-13 PROCEDURE — 97112 NEUROMUSCULAR REEDUCATION: CPT | Mod: PN

## 2019-05-13 PROCEDURE — 97110 THERAPEUTIC EXERCISES: CPT | Mod: PN

## 2019-05-13 PROCEDURE — 97110 THERAPEUTIC EXERCISES: CPT | Mod: PN,59

## 2019-05-13 NOTE — PROGRESS NOTES
Outpatient Neurological Rehabilitation   Speech and Language Therapy Daily Note  Date:  5/13/2019     Name: Ming Boateng   MRN: [id number]   Therapy Diagnosis:   Encounter Diagnosis   Name Primary?    Oropharyngeal dysphagia    Physician: Keith Harkins, *  Physician Orders: speech therapy evaluate and treat  Medical Diagnosis: G45.9 (ICD-10-CM) - TIA (transient ischemic attack)     Visit #/ Visits Authorized: 2  Date of Evaluation:  4/29/19  Insurance Authorization Period: 04/12/2019 to 04/11/2020     Plan of Care Expiration Date:    5/31/19  Visits Cancelled: 0  Visits No Show: 0    Time In:  1100  Time Out:  1145  Total Billable Time: 45 minutes     G-Code 2 / 10   Eval Level 5 Swallowing   Update      Precautions: Standard and Fall    Subjective:   Pt reports: Pt pleasant. No complaints.  present.   He was compliant to home exercise program. N/a   Response to previous treatment: No significant change.    Pain Scale:  0/10 on VAS currently.   Pain Location: n/a  Objective:   TIMED  Procedure Min.             UNTIMED  Procedure Min.   Dysphagia Therapy  15 minutes   Cognitive Communication Therapy  30 minutes   Total Timed Units: 2  Total Untimed Units: 1  Charges Billed/# of units: 3    Short Term Goals: (4 weeks) Current Progress:   1. Pt will complete further memory testing to assess short-term and long-term memory skills.    Progressing/ Not Met 5/13/2019   -The following were completed in Mongolian with the help of a :   -Recent memory x 8 with 75% acc Independently   -Remote memory x 6 with 83% acc Independently  -Temporal orientation x 12 with 58% acc Independently   -Short story retell with supervision  -Repeat 2-3 numbers with 100% acc  -Repeat 4 numbers with 60% acc  -Repeat 2 words with 75% acc  -Repeat 3 words with 100% acc  -Repeat 4 words with max A  -Answer questions from a short story x 10 with 70% acc  -Counted 1-10 Independently   -Difficulty with ABC's   2. Pt will complete  labial oral motor exercises with min A to improve speech intelligibility, facial droop, and strengthen oral musculature.    Progressing/ Not Met 5/13/2019   Open and close lips x 40  Pucker and smile x 40  Pucker lateralization (side to side) x 40  Lip Plosion x 40   3. Pt will recall and utilize aspiration precautions to decrease s/s of oropharyngeal dysphagia and increase swallow safety.    Progressing/ Not Met 5/13/2019   -Introduced.   -Small bites/sips  -Alternating bites/sips   4. Pt will recall and utilize external memory aids with min A to improve his memory skills in everyday life.      New goal    5. Pt will recall and write letters of the [place] alphabet with 80% acc given min A to improve automatic speech tasks and written expression.      New goal   6. Pt will answer recent memory, remote memory, and temporal memory questions with 85% acc given min A to improve orientation skills.    New goal   7. Pt will answer questions from a short story with 80% acc given min A to improve immediate recall skills.    New goal     Swallow re-assessment: Pt readily accepted po trials with no overt signs/symptoms of aspiration. Soft solid x 3, Solid x 3, thin liquid x 3. No coughing, throat clearing, changes in vocal quality, globus sensation. Pt denies difficulty swallowing. Adequate rate of consumption. Adequate A-P transport. No significant oral residue. No anterior spillage. No significant aspiration risk. Recommend regular and thin liquid diet.     Patient Education/Response:   Educated on goals and plan of care. Pt reported understanding.      Written Home Exercises Provided: oral motor exercises x 20 each  Exercises were reviewed and Ming was able to demonstrate them prior to the end of the session.  Ming demonstrated good  understanding of the education provided.     Assessment:   Ming is progressing well towards his goals. Completed informal memory assessment today. Pt reported difficulty with short term  memory.  Current goals remain appropriate. Goals to be updated as necessary.   Pt prognosis is Good. Pt will continue to benefit from skilled outpatient speech and language therapy to address the deficits listed in the problem list on initial evaluation, provide pt/family education and to maximize pt's level of independence in the home and community environment.     Medical necessity is demonstrated by the following IMPAIRMENTS:  Pt's memory deficits impair his ability to function safely in his everyday environment.   Barriers to Therapy: language  Pt's spiritual, cultural and educational needs considered and pt agreeable to plan of care and goals.    Plan:   Continue POC with focus on memory and oral motor exercises.   Plan to complete informal reading and writing assessment.   Updated POC due 5/31/19.    Kalpana Vang CCC-SLP   5/13/2019

## 2019-05-13 NOTE — PROGRESS NOTES
"  Physical Therapy Daily Treatment Note     Name: Ming Boateng  Clinic Number: [id number]    Therapy Diagnosis:   Encounter Diagnoses   Name Primary?    Decreased mobility     Gait abnormality      Physician: Keith Harkins, *    Visit Date: 5/13/2019      Physician Orders: PT Eval and Treat  Medical Diagnosis from Referral: I69.359 (ICD-10-CM) - Hemiplegia and hemiparesis following cerebral infarction affecting unspecified side  Evaluation Date: 4/8/2019      Authorization Period Expiration: 4/9/19  Plan of Care Expiration: 6/7/19  Visit # / Visits authorized: 3/50     Time In: 12:30 PM  Time Out: 1:15 PM  Total Billable Time: 45 minutes     Precautions: Standard, Fall, CHF and L side weakness, recent TIA    Check BP during session if symptomatic    Subjective     Pt reports: no complaints of pain, but expresses he has Left LE/ UE weakness. Pt agreeable to PT session.  He will be compliant with home exercise program once issued next week.  Response to previous treatment: assess next session  Functional change: none    Pain: 0/10  Location: left side        Objective     Ming participated in therapeutic exercise to improve ROM and strengthening.   -Manual gastroc st  20"x3 B  -Manual HSS st  20"x3 B   -Manual Piriformis st  20"x3 B  -lumbar bridges  2x10   -SLR    2x10 B  -Hip add iso   5"x20 w/ yellow ball  -LAQ    2x10 B  -Hip flex   2x10 B seated  -Sit to stand   x15    Ming participated in neuromuscular re-education activities to improve: Balance, Coordination, Kinesthetic, Proprioception and Posture for 45 minutes. The following activities were included:    Neuro re-ed and endurance training with B UE/LE extremities for reciprocal motion of all limbs on sci-fit x 10 min at level 3 at > or equal to 50 spm w/o rest.      Home Exercises Provided and Patient Education Provided     Education provided:   - encouraged safety with use of RW w/ Pt and daughter.     Written Home Exercises Provided: no    Assessment "     Pt tolerated session with no reports of increased pain. He experienced no symptoms of Dizziness today. No LOB exhibited with ambulation today.   Ming is progressing well towards his goals.   Pt prognosis is Good.     Pt will continue to benefit from skilled outpatient physical therapy to address the deficits listed in the problem list box on initial evaluation, provide pt/family education and to maximize pt's level of independence in the home and community environment.     Pt's spiritual, cultural and educational needs considered and pt agreeable to plan of care and goals.     Anticipated barriers to physical therapy: spasticity, confusion, sedentary routine, recent TIA    Goals:  Short Term Goals (4 Weeks):   1.  Independent with initial HEP.  2.  Pt will perform nu-step at level 3.0 x 10 minutes without rests breaks going at least 60 step/min or greater.  3. Pt will be able to perform TUG in 35 secs WITH use of AD demonstrating overall improved functional mobility.   4. Pt will performed sit to stand x 5 B UE support in 26 seconds without LOB backward or posterior LE hooking for functional and safe transfers.   5.  Pt will score greater than or equal to 25/28 on the Tinetti test/assessment WITH use of AD demonstrating overall improved functional mobility and balance and reduce fall risk.   6. Pt will walk < than or equal to 180 ft on the 2 minute walk test indoor WITH AD with 0 LOB and minimal gait deviations for improved safety in home ambulation and safety.      Long Term Goals (8 Weeks):   1. Pt will be able to perform TUG in 25 secs WITH use of AD demonstrating overall improved functional mobility.   2. Pt will performed sit to stand x 5 WITH UE support in 20 seconds without LOB backward or posterior LE hooking for functional and safe transfers.   3.  Pt will score greater than or equal to 27/28 on the Tinetti test/assessment WITH use of AD demonstrating overall improved functional mobility and balance and  reduce fall risk.   4.  Pt will score greater than or equal to 22/24 on the DGI test/assessment WITH use of AD demonstrating overall improved functional mobility and balance and reduce fall risk.   5.  Pt will walk < than or equal to 600 ft on the 6 minute walk test with AD and 0 LOB and minimal gait deviations for improved community ambulation.  6. Pt will have 0 falls from start of PT sessions.   7. Pt will have MMT score of 4+/5 in all major ms groups in [facility].     Plan     Flexibility, PROM, progression in transitions.    Philip Sawyer, PTA

## 2019-05-13 NOTE — PROGRESS NOTES
"  Occupational Therapy Daily Treatment Note     Date: 5/13/2019  Patient: Ming Boateng  Chart Number: [id number]     Therapy Diagnosis:        Encounter Diagnoses   Name Primary?    Muscle weakness      Decreased coordination        Physician: Keith Harkins, *     Physician Orders: Eval and tx  Medical Diagnosis: G45.9 (ICD-10-CM) - TIA (transient ischemic attack)  Onset Date:  First stroke 3/3/19 second TIA on 4/11/19  Evaluation Date: 5/2/2019  Plan of Care Expiration Period: 6/28/19  Insurance Authorization period Expiration: 12/31/19  Date of Return to MD: NA  Visit # / Visits Authortized: 2 / 1  FOTO: CVA UE survey/78% limitation      Time In:11:45 am   Time Out: 12:30 pm  Total Billable (one on one) Time: 45 minutes 2 TE 1 NMR  Todays Amount:99.26  Total Amount 218.96     Precautions: Standard, Diabetes, blood thinners and Fall      Subjective     Pt reports: "Pt states feeling fine no pain via "  he was compliant with home exercise program given last session.   Response to previous treatment: increased hand use  Functional change: Improved hand use    Pain: 0/10  Location: left arms and hands      Objective       Ming received therapeutic exercises for 30 minutes including:  -Pt completed the following exercises below in order to increase ROM, strength and tolerance of affected UE in order to increase IND and functional use:    Exercises Date:5/13/2019     LUE Visit #2    RPM 6 min 3 min forwards 3 backwards   Red t putty  Mold    Roll  Pinch  PVC punch out X 30        PROM (L/R) Shoulder FLEX/ABD/IR/ER 10x   Supine dowel Flex   3  2/15   Arm circles,     NDT large circles  Isolated FF L  2/15    2/10 CW CCW   0 #  2/10                           Ming participated in dynamic functional therapeutic activities to improve functional performance for 10  minutes, including:  -  FM challenge  Untying knots X 7               Ming participated in neuromuscular re-education activities to " improve: Balance, Coordination, Sense, Proprioception and Posture for 5 minutes. The following activities were included:  -  WB through red t putty  Hand paddled for LLPS to fingers X 5 min            Home Exercises and Education Provided     Education provided:   - HEP previous session.   - Progress towards goals     Written Home Exercises Provided: Patient instructed to cont prior HEP.  Exercises were reviewed and Ming was able to demonstrate them prior to the end of the session.  Ming demonstrated fair  understanding of the HEP provided.   .   See EMR under Patient Instructions for exercises provided prior visit.        Assessment     Pt would continue to benefit from skilled OT. Pt tolerated first after session well with  present. Seemed to be a lot stronger than last session and more alert but PT states with conversation pt confused. He did well with OT session focusing on strengthening the hand and shoulder. Good GM control and strength noted at the shoulder. Minor confusion with directions of where to transfer during session. Pt required min cues after UBE due to pt picking up walker and ambulating the wrong way. Progress to more resistance next session.       Ming is progressing well towards his goals and there are no updates to goals at this time. Pt prognosis is Good.     Pt will continue to benefit from skilled outpatient occupational therapy to address the deficits listed in the problem list on initial evaluation provide pt/family education and to maximize pt's level of independence in the home and community environment.     Anticipated barriers to occupational therapy: communication, confusion    Pt's spiritual, cultural and educational needs considered and pt agreeable to plan of care and goals.    Goals:  Short Term Goals:  1) Initiate Hep MET   2) Pt to increase LUE  strength by 5 # in order to A in self care task of feeding by 4 weeks. Progressing 5/13/2019  3) Pt to increase Barthel  Index Score by 5 points increasing self care IND by 4 weeks. Progressing 5/13/2019  4) Pt to increase L UE AROM by 10 degrees in order to A in UB dressing by 4 weeks. Progressing 5/13/2019  5) Patient will be able to achieve less than or equal to 50% on the FOTO, demonstrating overall improved functional ability with upper extremity. (self-care category) Progressing 5/13/2019     Long Term Goals:  1) Pt to be IND with HEP in order to maintain ROM and strength needed for self care IND by d.c. Progressing 5/13/2019  2) Pt to increase L UE  strength to WNL as compared to unaffected extremity in order to open items for self feeding by d.c. Progressing 5/13/2019  3) Pt to increase Barthel Index Score by 10 points increasing self care IND at home by d.c. Progressing 5/13/2019  4) Pt to increase L UE AROM to WFL in order to A in UB dressing by d/c. Progressing 5/13/2019  5) Patient will be able to achieve less than or equal to 25% on the FOTO, demonstrating overall improved functional ability with upper extremity. (self-care category) Progressing 5/13/2019      Plan   Continue per initial POC.   Updates/Grading for next session: Progress as tolerated.       Johann Mendez, OT

## 2019-05-16 DIAGNOSIS — E11.8 TYPE 2 DIABETES MELLITUS WITH COMPLICATION, WITH LONG-TERM CURRENT USE OF INSULIN: Primary | ICD-10-CM

## 2019-05-16 DIAGNOSIS — G45.9 TIA (TRANSIENT ISCHEMIC ATTACK): ICD-10-CM

## 2019-05-16 DIAGNOSIS — Z79.4 TYPE 2 DIABETES MELLITUS WITH COMPLICATION, WITH LONG-TERM CURRENT USE OF INSULIN: Primary | ICD-10-CM

## 2019-05-16 NOTE — PROGRESS NOTES
"  Occupational Therapy Daily Treatment Note     Date: 5/16/2019  Patient: Ming Boateng  Chart Number: [id number]     Therapy Diagnosis:        Encounter Diagnoses   Name Primary?    Muscle weakness      Decreased coordination        Physician: Keith Harkins, *     Physician Orders: Eval and tx  Medical Diagnosis: G45.9 (ICD-10-CM) - TIA (transient ischemic attack)  Onset Date:  First stroke 3/3/19 second TIA on 4/11/19  Evaluation Date: 5/2/2019  Plan of Care Expiration Period: 6/28/19  Insurance Authorization period Expiration: 12/31/19  Date of Return to MD: NA  Visit # / Visits Authortized: 2 / 12  FOTO: CVA UE survey/78% limitation      Time In:11:45 am   Time Out: 12:30 pm  Total Billable (one on one) Time: 45 minutes 2 TE 1 TA  Todays Amount: 100.54  Total Amount 318.96     Precautions: Standard, Diabetes, blood thinners and Fall      Subjective     Pt reports: "Pt states feeling fine no pain via  and that he does his exercises with the putty 3 times a day"  he was compliant with home exercise program given last session.   Response to previous treatment: increased hand use  Functional change: Improved hand use    Pain: 0/10  Location: left arms and hands      Objective       Ming received therapeutic exercises for 30 minutes including:  -Pt completed the following exercises below in order to increase ROM, strength and tolerance of affected UE in order to increase IND and functional use:    Exercises Date:5/16/2019     LUE Visit #3    RPM 6 min 3 min forwards 3 backwards   Red t putty  Mold    Roll  Pinch  PVC punch out X 30    Green t bar X 30 behzad and tod     PROM (L/R) Shoulder FLEX/ABD/IR/ER 10x   Supine dowel Flex   3  2/15   Arm circles,     NDT large circles  Isolated FF L  2/15  NOT PERFORMED THIS SESSION  2/10 CW CCW   0 #  2/10  NOT PERFORMED THIS SESSION   Sidelying  Abduction  External Rotation   1/10  1/10                     Ming participated in dynamic functional " therapeutic activities to improve functional performance for 10  minutes, including:  -  FM challenge  Untying knots X 7   Bolts  X 5  NOT PERFORMED THIS SESSION   Coins in hand manip X 3/4 tub   NOT PERFORMED THIS TREATMENT               Home Exercises and Education Provided     Education provided:   - HEP previous session.   - Progress towards goals     Written Home Exercises Provided: Patient instructed to cont prior HEP.  Exercises were reviewed and Ming was able to demonstrate them prior to the end of the session.  Ming demonstrated fair  understanding of the HEP provided.     See EMR under Patient Instructions for exercises provided prior visit.        Assessment     Pt would continue to benefit from skilled OT. Pt tolerated session fair. Easily distracted via  report with conversations out of context during exercises. He did well with new shoulder exercises and updated HEP today with red ressitive band provided. Pt still limited with balance and safety in the gym but is progressing well.  Pt required min cues after UBE due to pt picking up walker and ambulating the wrong way. Progress to more resistance next session.       Ming is progressing well towards his goals and there are no updates to goals at this time. Pt prognosis is Good.     Pt will continue to benefit from skilled outpatient occupational therapy to address the deficits listed in the problem list on initial evaluation provide pt/family education and to maximize pt's level of independence in the home and community environment.     Anticipated barriers to occupational therapy: communication, confusion    Pt's spiritual, cultural and educational needs considered and pt agreeable to plan of care and goals.    Goals:  Short Term Goals:  1) Initiate Hep MET   2) Pt to increase LUE  strength by 5 # in order to A in self care task of feeding by 4 weeks. Progressing 5/17/2019  3) Pt to increase Barthel Index Score by 5 points increasing self  care IND by 4 weeks. Progressing 5/17/2019  4) Pt to increase L UE AROM by 10 degrees in order to A in UB dressing by 4 weeks. Progressing 5/17/2019  5) Patient will be able to achieve less than or equal to 50% on the FOTO, demonstrating overall improved functional ability with upper extremity. (self-care category) Progressing 5/17/2019     Long Term Goals:  1) Pt to be IND with HEP in order to maintain ROM and strength needed for self care IND by d.c. Progressing 5/17/2019  2) Pt to increase L UE  strength to WNL as compared to unaffected extremity in order to open items for self feeding by d.c. Progressing 5/17/2019  3) Pt to increase Barthel Index Score by 10 points increasing self care IND at home by d.c. Progressing 5/17/2019  4) Pt to increase L UE AROM to WFL in order to A in UB dressing by d/c. Progressing 5/17/2019  5) Patient will be able to achieve less than or equal to 25% on the FOTO, demonstrating overall improved functional ability with upper extremity. (self-care category) Progressing 5/17/2019      Plan   Continue per initial POC.   Updates/Grading for next session: Progress as tolerated.       Johann Mendez, OT

## 2019-05-17 ENCOUNTER — CLINICAL SUPPORT (OUTPATIENT)
Dept: REHABILITATION | Facility: HOSPITAL | Age: 64
End: 2019-05-17
Attending: PSYCHIATRY & NEUROLOGY
Payer: MEDICARE

## 2019-05-17 DIAGNOSIS — M25.60 STIFFNESS IN JOINT: ICD-10-CM

## 2019-05-17 DIAGNOSIS — M62.81 MUSCLE WEAKNESS: Primary | ICD-10-CM

## 2019-05-17 DIAGNOSIS — Z74.09 IMPAIRED MOBILITY AND ACTIVITIES OF DAILY LIVING: ICD-10-CM

## 2019-05-17 DIAGNOSIS — R26.89 DECREASED MOBILITY: ICD-10-CM

## 2019-05-17 DIAGNOSIS — Z78.9 IMPAIRED MOBILITY AND ACTIVITIES OF DAILY LIVING: ICD-10-CM

## 2019-05-17 DIAGNOSIS — R26.9 GAIT ABNORMALITY: ICD-10-CM

## 2019-05-17 DIAGNOSIS — R13.12 OROPHARYNGEAL DYSPHAGIA: ICD-10-CM

## 2019-05-17 PROCEDURE — 97110 THERAPEUTIC EXERCISES: CPT | Mod: PN

## 2019-05-17 PROCEDURE — 97110 THERAPEUTIC EXERCISES: CPT | Mod: PN,59

## 2019-05-17 PROCEDURE — 97112 NEUROMUSCULAR REEDUCATION: CPT | Mod: PN

## 2019-05-17 PROCEDURE — 97127 HC THERAPEUTIC INTVTN, COGN FUNCTION - ST: CPT | Mod: PN

## 2019-05-17 PROCEDURE — 97530 THERAPEUTIC ACTIVITIES: CPT | Mod: PN,59

## 2019-05-17 PROCEDURE — 92526 ORAL FUNCTION THERAPY: CPT | Mod: PN

## 2019-05-17 NOTE — PROGRESS NOTES
[facility]   Speech and Language Therapy Daily Note  Date:  5/17/2019     Name: Ming Boateng   MRN: [id number]   Therapy Diagnosis:   Encounter Diagnosis   Name Primary?    Oropharyngeal dysphagia    Physician: Keith Harkins, *  Physician Orders: speech therapy evaluate and treat  Medical Diagnosis: G45.9 (ICD-10-CM) - TIA (transient ischemic attack)     Visit #/ Visits Authorized: 3  Date of Evaluation:  4/29/19  Insurance Authorization Period: 04/12/2019 to 04/11/2020     Plan of Care Expiration Date:    5/31/19  Visits Cancelled: 0  Visits No Show: 0    Time In:  1230  Time Out:  1300  Total Billable Time: 30 minutes     G-Code 3 / 10   Eval Level 5 Swallowing   Update      Precautions: Standard and Fall    Subjective:   Pt reports: Pt pleasant. No complaints.  present for 30 minutes. Session halted due to limited  availability.   He was compliant to home exercise program. N/a   Response to previous treatment: No significant change.    Pain Scale:  0/10 on VAS currently.   Pain Location: n/a  Objective:   TIMED  Procedure Min.             UNTIMED  Procedure Min.   Dysphagia Therapy  15 minutes   Cognitive Communication Therapy  15 minutes   Total Timed Units: 2  Total Untimed Units: 1  Charges Billed/# of units: 3    Short Term Goals: (4 weeks) Current Progress:   2. Pt will complete labial oral motor exercises with min A to improve speech intelligibility, facial droop, and strengthen oral musculature.    Progressing/ Not Met 5/17/2019   Open and close lips x 40  Pucker and smile x 40  Pucker lateralization (side to side) x 40  Lip Plosion x 40    -mod-max A during oral motor exercises for redirection to continue reps     3. Pt will recall and utilize aspiration precautions to decrease s/s of oropharyngeal dysphagia and increase swallow safety.    Progressing/ Not Met 5/17/2019   -Reviewed strategies  -Small bites/sips  -Alternating bites/sips   4. Pt will  recall and utilize external memory aids with min A to improve his memory skills in everyday life.      -Not addressed this session.    5. Pt will recall and write letters of the [place] alphabet with 80% acc given min A to improve automatic speech tasks and written expression.      -Not addressed this session.   6. Pt will answer recent memory, remote memory, and temporal memory questions with 85% acc given min A to improve orientation skills.    - x 10 with 80% acc Independently with min A   7. Pt will answer questions from a short story with 80% acc given min A to improve immediate recall skills.    - x 7 questions with 88% acc Independently with min A     GOALS MET:  1. Pt will complete further memory testing to assess short-term and long-term memory skills. GOAL MET 5/13/19    Patient Education/Response:   Educated on goals and plan of care. Pt reported understanding.      Written Home Exercises Provided: oral motor exercises x 20 each; Short Paragraph Comprehension with Corresponding Questions from [facility] 2: Cognitive Rehab ([place]an), Unit 3: Memory Visual and Auditory, Copyright 2007 [employer]   Exercises were reviewed and Ming was able to demonstrate them prior to the end of the session.  Ming demonstrated good  understanding of the education provided.     Assessment:   Ming is progressing well towards his goals. Difficulty following directions to complete oral motor exercises. Current goals remain appropriate. Goals to be updated as necessary. Session was ended early, due to  originally not being scheduled for therapy session.  that was already at the facility was able to help for 30 minutes.   Pt prognosis is Good. Pt will continue to benefit from skilled outpatient speech and language therapy to address the deficits listed in the problem list on initial evaluation, provide pt/family education and to maximize pt's level of independence in the home and community environment.  "    Medical necessity is demonstrated by the following IMPAIRMENTS:  Pt's memory deficits impair his ability to function safely in his everyday environment.   Barriers to Therapy: language  Pt's spiritual, cultural and educational needs considered and pt agreeable to plan of care and goals.    Plan:   Continue POC with focus on memory and oral motor exercises.   Updated POC due 5/31/19.    YENIFER Garrett.    Clinician    "I, Kalpana Vang, certify that I was present in the room directing the student and service delivery and guiding them using my skilled judgement. As the co-signing therapist, I have reviewed the student's documentation, and am responsible for the treatment, assessment and plan."     Kalpana Vang, ADITI-SLP   5/17/2019       "

## 2019-05-17 NOTE — PATIENT INSTRUCTIONS
ROM: Flexion - Wand (Supine)        Lie on back holding wand. Raise arms over head.   Repeat 15 times per set. Do 2 sets per session. Do 3-5 sessions per day.     [email or web address]     Abduction (Side-Lying)        Lie on left side. Raise arm above head. Keep palm forward.  Repeat 15 times per set. Do 2 sets per session. Do 3-5 sessions per day.     [email or web address]     Copyright © [employer]. All rights reserved.    External Rotation: Side-Lying (Dumbbell)        Lie with neck supported, left elbow bent to 90°, forearm across stomach. Raise forearm, keeping elbow at side.  Repeat ___10_ times per set. Do __3__ sets per session. Do __2__ sessions per week. Use _0___ lb weight.      Copyright © [employer]. All rights reserved.   Lat Pull Down        Face anchor with knees slightly flexed. Palms down, pull arms down to sides.  Repeat _15_ times per set. Do _2_ sets per session. Do _10_ sessions per week.  [place] Height: Over Head     [email or web address]     Copyright © [employer]. All rights reserved.       Copyright © [employer]. All rights reserved.  Low Row: Thumbs Up        Face anchor, medium to wide stance. Thumbs up, pull arms back, squeezing shoulder blades together.   Repeat 15 times per set. Do 2 sets per session. Do 3-5 sessions per week.  [place] Height: Waist     [email or web address]         Copyright © [employer]. All rights reserved.   Arm: Internal Rotation        Stand facing tubing at elbow height, elbow at side, forearm forward. Pull forearm across body, keeping elbow at side. Move slowly and deliberately.  Repeat _10-15___ times. Do _2-3___ sessions per day.    Copyright © [employer]. All rights reserved.

## 2019-05-17 NOTE — PROGRESS NOTES
"  Physical Therapy Daily Treatment Note     Name: Ming Boateng  Clinic Number: [id number]    Therapy Diagnosis:   Encounter Diagnoses   Name Primary?    Decreased mobility     Gait abnormality      Physician: Khoobehi, Kaveh D., MD    Visit Date: 5/17/2019      Physician Orders: PT Eval and Treat  Medical Diagnosis from Referral: I69.359 (ICD-10-CM) - Hemiplegia and hemiparesis following cerebral infarction affecting unspecified side  Evaluation Date: 4/8/2019      Authorization Period Expiration: 4/9/19  Plan of Care Expiration: 6/7/19  Visit # / Visits authorized: 4/50   FOTO: 4/10  Cost: 64.79  Total: 337.67  Time In: 11:15 AM  Time Out: 11:45 AM  Total Billable Time: 45 minutes       Precautions: Standard, Fall, CHF and L side weakness, recent TIA    Check BP during session if symptomatic    Subjective     Pt reports:pt agreeable to PT session. He reports no complaints of pain upon arrival.   He will be compliant with home exercise program once issued next week.  Response to previous treatment: assess next session  Functional change: none    Pain: 0/10  Location: left side        Objective     Ming participated in therapeutic exercise to improve ROM and strengthening. x22 minutes  -Manual gastroc st  20"x3 B  -Manual HSS st  20"x3 B   -Manual Piriformis st  20"x3 B  -lumbar bridges  2x10   -SLR    2x10 B  -Hip add iso   5"x20 w/ yellow ball  -LAQ    NOT PERFORMED TODAY  -Hip flex   NOT PERFORMED TODAY  -Sit to stand   x15    Ming participated in neuromuscular re-education activities to improve: Balance, Coordination, Kinesthetic, Proprioception and Posture for 8 minutes. The following activities were included:    Neuro re-ed and endurance training with B UE/LE extremities for reciprocal motion of all limbs on sci-fit x 8 min at level 3 at > or equal to 50 spm w/o rest.      Home Exercises Provided and Patient Education Provided     Education provided:   - encouraged safety with use of RW w/ Pt and daughter. "     Written Home Exercises Provided: no    Assessment     Pt tolerated session with no reports of pain. Session limited due to pt's late arrival. Completed all therex with verbal/ tactile instructions on technique and safety.     Ming is progressing well towards his goals.   Pt prognosis is Good.     Pt will continue to benefit from skilled outpatient physical therapy to address the deficits listed in the problem list box on initial evaluation, provide pt/family education and to maximize pt's level of independence in the home and community environment.     Pt's spiritual, cultural and educational needs considered and pt agreeable to plan of care and goals.     Anticipated barriers to physical therapy: spasticity, confusion, sedentary routine, recent TIA    Goals:  Short Term Goals (4 Weeks):   1.  Independent with initial HEP.  2.  Pt will perform nu-step at level 3.0 x 10 minutes without rests breaks going at least 60 step/min or greater.  3. Pt will be able to perform TUG in 35 secs WITH use of AD demonstrating overall improved functional mobility.   4. Pt will performed sit to stand x 5 B UE support in 26 seconds without LOB backward or posterior LE hooking for functional and safe transfers.   5.  Pt will score greater than or equal to 25/28 on the Tinetti test/assessment WITH use of AD demonstrating overall improved functional mobility and balance and reduce fall risk.   6. Pt will walk < than or equal to 180 ft on the 2 minute walk test indoor WITH AD with 0 LOB and minimal gait deviations for improved safety in home ambulation and safety.      Long Term Goals (8 Weeks):   1. Pt will be able to perform TUG in 25 secs WITH use of AD demonstrating overall improved functional mobility.   2. Pt will performed sit to stand x 5 WITH UE support in 20 seconds without LOB backward or posterior LE hooking for functional and safe transfers.   3.  Pt will score greater than or equal to 27/28 on the Tinetti test/assessment  WITH use of AD demonstrating overall improved functional mobility and balance and reduce fall risk.   4.  Pt will score greater than or equal to 22/24 on the DGI test/assessment WITH use of AD demonstrating overall improved functional mobility and balance and reduce fall risk.   5.  Pt will walk < than or equal to 600 ft on the 6 minute walk test with AD and 0 LOB and minimal gait deviations for improved community ambulation.  6. Pt will have 0 falls from start of PT sessions.   7. Pt will have MMT score of 4+/5 in all major ms groups in [facility].     Plan     Flexibility, PROM, progression in transitions.    Philip Sawyer, PTA

## 2019-05-24 ENCOUNTER — CLINICAL SUPPORT (OUTPATIENT)
Dept: REHABILITATION | Facility: HOSPITAL | Age: 64
End: 2019-05-24
Attending: PSYCHIATRY & NEUROLOGY
Payer: MEDICARE

## 2019-05-24 DIAGNOSIS — R13.12 OROPHARYNGEAL DYSPHAGIA: ICD-10-CM

## 2019-05-24 DIAGNOSIS — M62.81 MUSCLE WEAKNESS: Primary | ICD-10-CM

## 2019-05-24 DIAGNOSIS — R27.8 DECREASED COORDINATION: ICD-10-CM

## 2019-05-24 DIAGNOSIS — Z74.09 IMPAIRED MOBILITY AND ACTIVITIES OF DAILY LIVING: ICD-10-CM

## 2019-05-24 DIAGNOSIS — Z78.9 IMPAIRED MOBILITY AND ACTIVITIES OF DAILY LIVING: ICD-10-CM

## 2019-05-24 PROCEDURE — 97140 MANUAL THERAPY 1/> REGIONS: CPT | Mod: PN

## 2019-05-24 PROCEDURE — 97127 HC THERAPEUTIC INTVTN, COGN FUNCTION - ST: CPT | Mod: PN

## 2019-05-24 PROCEDURE — 97110 THERAPEUTIC EXERCISES: CPT | Mod: PN

## 2019-05-24 PROCEDURE — 97530 THERAPEUTIC ACTIVITIES: CPT | Mod: PN

## 2019-05-24 NOTE — PROGRESS NOTES
Outpatient Neurological Rehabilitation   Speech and Language Therapy Daily Note  Date:  5/24/2019     Name: Ming Boateng   MRN: [id number]   Therapy Diagnosis:   Encounter Diagnosis   Name Primary?    Oropharyngeal dysphagia    Physician: Keith Harkins, *  Physician Orders: speech therapy evaluate and treat  Medical Diagnosis: G45.9 (ICD-10-CM) - TIA (transient ischemic attack)     Visit #/ Visits Authorized: 4  Date of Evaluation:  4/29/19  Insurance Authorization Period: 04/12/2019 to 04/11/2020     Plan of Care Expiration Date:    5/31/19  Visits Cancelled: 0  Visits No Show: 0    Time In:  1145  Time Out:  1230  Total Billable Time: 45 minutes     G-Code 4 / 10   Eval Level 5 Swallowing   Update      Precautions: Standard and Fall    Subjective:   Pt reports: Pt pleasant. No complaints.  He was compliant to home exercise program. Pt reported doing his strategies at home. Pt also reported that he has been chewing gum.    Response to previous treatment: No significant change.    Pain Scale:  0/10 on VAS currently.   Pain Location: n/a  Objective:   TIMED  Procedure Min.             UNTIMED  Procedure Min.       Cognitive Communication Therapy  45 minutes   Total Timed Units: 3  Total Untimed Units: 0  Charges Billed/# of units: 3    Short Term Goals: (4 weeks) Current Progress:   2. Pt will complete labial oral motor exercises with min A to improve speech intelligibility, facial droop, and strengthen oral musculature.    Progressing/ Not Met 5/24/2019   -Not addressed this session due to inappropriate behavior during labial exercises.    3. Pt will recall and utilize aspiration precautions to decrease s/s of oropharyngeal dysphagia and increase swallow safety.    Progressing/ Not Met 5/24/2019   -Reviewed strategies  -Reported he is chewing his food well; swallows small amounts  -Small bites/sips  -Alternating bites/sips   4. Pt will recall and utilize external memory aids with min A to improve his memory  skills in everyday life.      -His daughter is asking him questions of the date, his birthday, etc.  -His daughter assist him with reminding of appointments and medications.   5. Pt will recall and write letters of the Bulgarian alphabet with 80% acc given min A to improve automatic speech tasks and written expression.     GOAL MET x 1      - x 29 letters with 90% acc Independently  -pt wrote the letters out of order       6. Pt will answer recent memory, remote memory, and temporal memory questions with 85% acc given min A to improve orientation skills.     GOAL MET x 1   - x 14 with 86% acc Independently with min A; min 93% verbal A   7. Pt will answer questions from a short story with 80% acc given min A to improve immediate recall skills.     GOAL MET x 1    -Utilized cues of visualization and repetition of story aloud x 2    -yes/no questions corresponding to story x 5 with 100% acc Independently; self-correction x 1  -story comprehension questions x 5 with 80% acc Independently; min 100% verbal A     8. Pt will recall and utilize memory strategies with 80% acc Independently.    -AVER in Armenian (Associate, Visualize, Write, Repeat)  -Introduced and reviewed.     Pt wrote sentences to dictation x 3 with 100% acc Independently    GOALS MET:  1. Pt will complete further memory testing to assess short-term and long-term memory skills. GOAL MET 5/13/19    Patient Education/Response:   Educated on goals and plan of care. Pt reported understanding.      Written Home Exercises Provided: oral motor exercises x 20 each; Short Paragraph Comprehension with Corresponding Questions from [facility] 2: Cognitive Rehab (Bulgarian), Unit 3: Memory Visual and Auditory, Copyright 2007 [employer] Autobiographical Orientation Worksheet.   Exercises were reviewed and Ming was able to demonstrate them prior to the end of the session.  Ming demonstrated good  understanding of the education provided.     Assessment:   Ming is progressing  "well towards his goals. Met 3 goals during this therapy session. Current goals remain appropriate. Goals to be updated as necessary.   Pt prognosis is Good. Pt will continue to benefit from skilled outpatient speech and language therapy to address the deficits listed in the problem list on initial evaluation, provide pt/family education and to maximize pt's level of independence in the home and community environment.     Medical necessity is demonstrated by the following IMPAIRMENTS:  Pt's memory deficits impair his ability to function safely in his everyday environment.   Barriers to Therapy: language  Pt's spiritual, cultural and educational needs considered and pt agreeable to plan of care and goals.    Plan:   Continue POC with focus on memory and oral motor exercises. Pt would like to take the GRE and get certified in  work like he did in [place]; however, pt is having difficulty with memory.  Pt to bring his book to therapy to practice memory strategies together.   Updated POC due 5/31/19.    YENIFER Garrett.    Clinician    "I, Kalpana Vang, certify that I was present in the room directing the student and service delivery and guiding them using my skilled judgement. As the co-signing therapist, I have reviewed the student's documentation, and am responsible for the treatment, assessment and plan."     Kalpana Vang, ADITI-SLP   5/24/2019     "
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
## 2019-05-29 ENCOUNTER — OFFICE VISIT (OUTPATIENT)
Dept: PODIATRY | Facility: CLINIC | Age: 64
End: 2019-05-29
Payer: MEDICARE

## 2019-05-29 VITALS
SYSTOLIC BLOOD PRESSURE: 115 MMHG | WEIGHT: 162 LBS | HEIGHT: 68 IN | BODY MASS INDEX: 24.55 KG/M2 | HEART RATE: 75 BPM | DIASTOLIC BLOOD PRESSURE: 64 MMHG

## 2019-05-29 DIAGNOSIS — N18.4 STAGE 4 CHRONIC KIDNEY DISEASE: ICD-10-CM

## 2019-05-29 DIAGNOSIS — Z79.4 TYPE 2 DIABETES MELLITUS WITH COMPLICATION, WITH LONG-TERM CURRENT USE OF INSULIN: Primary | ICD-10-CM

## 2019-05-29 DIAGNOSIS — Q84.3 ANONYCHIA: ICD-10-CM

## 2019-05-29 DIAGNOSIS — Z86.73 H/O: CVA (CEREBROVASCULAR ACCIDENT): ICD-10-CM

## 2019-05-29 DIAGNOSIS — G45.9 TIA (TRANSIENT ISCHEMIC ATTACK): ICD-10-CM

## 2019-05-29 DIAGNOSIS — E11.8 TYPE 2 DIABETES MELLITUS WITH COMPLICATION, WITH LONG-TERM CURRENT USE OF INSULIN: Primary | ICD-10-CM

## 2019-05-29 PROCEDURE — 3074F PR MOST RECENT SYSTOLIC BLOOD PRESSURE < 130 MM HG: ICD-10-PCS | Mod: CPTII,S$GLB,, | Performed by: PODIATRIST

## 2019-05-29 PROCEDURE — 3045F PR MOST RECENT HEMOGLOBIN A1C LEVEL 7.0-9.0%: CPT | Mod: CPTII,S$GLB,, | Performed by: PODIATRIST

## 2019-05-29 PROCEDURE — 3008F PR BODY MASS INDEX (BMI) DOCUMENTED: ICD-10-PCS | Mod: CPTII,S$GLB,, | Performed by: PODIATRIST

## 2019-05-29 PROCEDURE — 99204 PR OFFICE/OUTPT VISIT, NEW, LEVL IV, 45-59 MIN: ICD-10-PCS | Mod: S$GLB,,, | Performed by: PODIATRIST

## 2019-05-29 PROCEDURE — 99999 PR PBB SHADOW E&M-EST. PATIENT-LVL IV: CPT | Mod: PBBFAC,,, | Performed by: PODIATRIST

## 2019-05-29 PROCEDURE — 3074F SYST BP LT 130 MM HG: CPT | Mod: CPTII,S$GLB,, | Performed by: PODIATRIST

## 2019-05-29 PROCEDURE — 99999 PR PBB SHADOW E&M-EST. PATIENT-LVL IV: ICD-10-PCS | Mod: PBBFAC,,, | Performed by: PODIATRIST

## 2019-05-29 PROCEDURE — 99204 OFFICE O/P NEW MOD 45 MIN: CPT | Mod: S$GLB,,, | Performed by: PODIATRIST

## 2019-05-29 PROCEDURE — 3045F PR MOST RECENT HEMOGLOBIN A1C LEVEL 7.0-9.0%: ICD-10-PCS | Mod: CPTII,S$GLB,, | Performed by: PODIATRIST

## 2019-05-29 PROCEDURE — 3078F PR MOST RECENT DIASTOLIC BLOOD PRESSURE < 80 MM HG: ICD-10-PCS | Mod: CPTII,S$GLB,, | Performed by: PODIATRIST

## 2019-05-29 PROCEDURE — 3078F DIAST BP <80 MM HG: CPT | Mod: CPTII,S$GLB,, | Performed by: PODIATRIST

## 2019-05-29 PROCEDURE — 3008F BODY MASS INDEX DOCD: CPT | Mod: CPTII,S$GLB,, | Performed by: PODIATRIST

## 2019-05-29 NOTE — LETTER
May 29, 2019      Keith Harkins MD  [address]  [address]  [place] [facility] [address]           [facility]  [address]  [facility] [facility] [address]  Phone: [phone]  Fax: [phone]          Patient: Ming Boateng   MR Number: [id number]   YOB: 1955   Date of Visit: 5/29/2019       Dear Dr. Keith Harkins:    Thank you for referring Ming Boateng to me for evaluation. Attached you will find relevant portions of my assessment and plan of care.    If you have questions, please do not hesitate to call me. I look forward to following Ming Boateng along with you.    Sincerely,    Angela Valerio, NELSON    Enclosure  CC:  No Recipients    If you would like to receive this communication electronically, please contact [email or web address] or [phone] to request more information on [employer] Link access.    For providers and/or their staff who would like to refer a patient to [facility], please contact us through our one-stop-shop provider referral line, [facility], [place], at [phone].    If you feel you have received this communication in error or would no longer like to receive these types of communications, please e-mail [email or web address]
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
## 2019-05-30 ENCOUNTER — CLINICAL SUPPORT (OUTPATIENT)
Dept: REHABILITATION | Facility: HOSPITAL | Age: 64
End: 2019-05-30
Attending: PSYCHIATRY & NEUROLOGY
Payer: MEDICARE

## 2019-05-30 DIAGNOSIS — R13.12 OROPHARYNGEAL DYSPHAGIA: ICD-10-CM

## 2019-05-30 DIAGNOSIS — R26.9 GAIT ABNORMALITY: ICD-10-CM

## 2019-05-30 DIAGNOSIS — R26.89 DECREASED MOBILITY: ICD-10-CM

## 2019-05-30 DIAGNOSIS — R53.1 LEFT-SIDED WEAKNESS: ICD-10-CM

## 2019-05-30 PROCEDURE — 97530 THERAPEUTIC ACTIVITIES: CPT | Mod: PN,59

## 2019-05-30 PROCEDURE — 97140 MANUAL THERAPY 1/> REGIONS: CPT | Mod: PN

## 2019-05-30 PROCEDURE — 97127 HC THERAPEUTIC INTVTN, COGN FUNCTION - ST: CPT | Mod: PN

## 2019-05-30 PROCEDURE — 97112 NEUROMUSCULAR REEDUCATION: CPT | Mod: PN

## 2019-05-30 PROCEDURE — 97110 THERAPEUTIC EXERCISES: CPT | Mod: PN

## 2019-05-30 NOTE — PROGRESS NOTES
Outpatient Neurological Rehabilitation   Speech and Language Therapy Daily Note  Date:  5/30/2019     Name: Ming Boateng   MRN: [id number]   Therapy Diagnosis:   Encounter Diagnosis   Name Primary?    Oropharyngeal dysphagia    Physician: Keith Harkins, *  Physician Orders: speech therapy evaluate and treat  Medical Diagnosis: G45.9 (ICD-10-CM) - TIA (transient ischemic attack)     Visit #/ Visits Authorized: 5  Date of Evaluation:  4/29/19  Insurance Authorization Period: 04/12/2019 to 04/11/2020     Plan of Care Expiration Date:    5/31/19  Visits Cancelled: 0  Visits No Show: 0    Time In:  1145  Time Out:  1230  Total Billable Time: 45 minutes     G-Code 4 / 10   Eval Level 5 Swallowing   Update      Precautions: Standard and Fall    Subjective:   Pt reports: Pt pleasant. No complaints.  He was compliant to home exercise program. Pt reported doing his strategies at home. Pt also reported that he has been chewing gum.    Response to previous treatment: No significant change.    Pain Scale:  0/10 on VAS currently.   Pain Location: n/a  Objective:   TIMED  Procedure Min.             UNTIMED  Procedure Min.       Cognitive Communication Therapy  45 minutes   Total Timed Units: 3  Total Untimed Units: 0  Charges Billed/# of units: 3    Short Term Goals: (4 weeks) Current Progress:     2. Pt will complete labial oral motor exercises with min A to improve speech intelligibility, facial droop, and strengthen oral musculature.    Progressing/ Not Met 5/30/2019   -Not addressed this session   -Pt is familiar with these exercises and can complete them at home with family.   3. Pt will recall and utilize aspiration precautions to decrease s/s of oropharyngeal dysphagia and increase swallow safety.    Progressing/ Not Met 5/30/2019   -Reviewed strategies  -Reported he is chewing his food well; swallows small amounts  -Small bites/sips  -Alternating bites/sips  -daughter reported that she must continuously cue him to take  small bites      4. Pt will recall and utilize external memory aids with min A to improve his memory skills in everyday life.      -His daughter is asking him questions of the date, his birthday, etc.  -His daughter assist him with reminding of appointments and medications.   5. Pt will recall and write letters of the [place] alphabet with 80% acc given min A to improve automatic speech tasks and written expression... discontinue   - x 30 letters with 63% acc Independently  -pt wrote the letters out of order  -pt able to write words in sentences      6. Pt will answer recent memory, remote memory, and temporal memory questions with 85% acc given min A to improve orientation skills.     GOAL MET x 1   -Not addressed this session.    7. Pt will answer questions from a short story with 80% acc given min A to improve immediate recall skills.     GOAL MET x 2   -Utilized cues of visualization and repetition of story aloud     -story recall x 14 with 93% acc Independently     8. Pt will recall and utilize memory strategies with 80% acc Independently.    -AVER in [place]o (Associate, Visualize, Write, Repeat)  -0/4 Independently      Discussed GRE goal. Pt's daughter will rent and bring a GRE book to therapy.     GOALS MET:  1. Pt will complete further memory testing to assess short-term and long-term memory skills. GOAL MET 5/13/19    Patient Education/Response:   Educated on goals and plan of care. Pt reported understanding.      Written Home Exercises Provided: oral motor exercises x 20 each; Short Paragraph Comprehension with Corresponding Questions from [facility] 2: Cognitive Rehab ([place]), Unit 3: Memory Visual and Auditory, Copyright 2007 [employer] Autobiographical Orientation Worksheet.   Exercises were reviewed and Ming was able to demonstrate them prior to the end of the session.  Ming demonstrated good  understanding of the education provided.     Assessment:   Ming is progressing well towards his goals.  "Met goal for answering questions from a short story x 2 sessions. Daughter and PT brought up concerns for following directions, attention, sequencing, and initiating a task; plan to assess next session. Current goals remain appropriate. Goals to be updated as necessary.   Pt prognosis is Good. Pt will continue to benefit from skilled outpatient speech and language therapy to address the deficits listed in the problem list on initial evaluation, provide pt/family education and to maximize pt's level of independence in the home and community environment.     Medical necessity is demonstrated by the following IMPAIRMENTS:  Pt's memory deficits impair his ability to function safely in his everyday environment.   Barriers to Therapy: language  Pt's spiritual, cultural and educational needs considered and pt agreeable to plan of care and goals.    Plan:   Assess written and verbal following directions. Assess attention, sequencing, and intiating a task. Continue oral motor exercises at home.   Updated POC due 5/31/19.    YENIFER Garrett.    Clinician    "I, Kalpana Vang, certify that I was present in the room directing the student and service delivery and guiding them using my skilled judgement. As the co-signing therapist, I have reviewed the student's documentation, and am responsible for the treatment, assessment and plan."     Kalpana Vang, CCC-SLP   5/30/2019   "

## 2019-05-30 NOTE — PROGRESS NOTES
"  Occupational Therapy Daily Treatment Note     Date: 5/30/2019  Patient: Ming Boateng  Chart Number: [id number]     Therapy Diagnosis:        Encounter Diagnoses   Name Primary?    Muscle weakness      Decreased coordination        Physician: Keith Harkins, *     Physician Orders: Eval and tx  Medical Diagnosis: G45.9 (ICD-10-CM) - TIA (transient ischemic attack)  Onset Date:  First stroke 3/3/19 second TIA on 4/11/19  Evaluation Date: 5/2/2019  Plan of Care Expiration Period: 6/28/19  Insurance Authorization period Expiration: 12/31/19  Date of Return to MD: SILVIA  Visit # / Visits Authortized: 5 / 12  FOTO: CVA UE survey/78% limitation      Time In:1:07 pm   Time Out: 2:00 pm  Total Billable (one on one) Time: 53 minutes 1 TE 1 MT 1 TA 1 NMR  Todays Amount: 132.15  Total Amount 548.79     Precautions: Standard, Diabetes, blood thinners and Fall      Subjective     Pt reports: " reports no pain upon entry to therapy." Minor pain in shoulder muscle after session due to fatigue.    he was compliant with home exercise program given last session.   Response to previous treatment: increased hand use  Functional change: Improved hand use    Pain: 0/10  Location: left arms and hands      Objective     Mt: Pt recieved manual therapy consisting of PROM in all planes, joint mobilization (grades I-II) with gentle oscillations at acromioclavicular and glenohumeral joint along with myofascial release and STM to surrounding musculature (biceps, pects, deltoids, traps, triceps etc.) to decrease stiffness and pain with movements. 13 minutes     Ming received therapeutic exercises for 20 minutes including:  -Pt completed the following exercises below in order to increase ROM, strength and tolerance of affected UE in order to increase IND and functional use:    Exercises Date:5/30/2019     LUE Visit #5    RPM 6 min 3 min forwards 3 backwards   green t putty  Mold    Roll  Pinch  PVC punch out X 30    Green t " bar X 30 smileys and frowns     PROM (L/R) Shoulder FLEX/ABD/IR/ER 10x   Supine   Shoulder Flexion  Chest Press   3 #  2/10   Supine Pec stretch  Open arm stretch with bolster between shoulder blades   3/30 sec       Ming participated in neuromuscular re-education activities to improve: Coordination, Sense and Proprioception for 10 minutes. The following activities were included:    NDT circles  2 ways  X 10 CW and CCW            Ming Boateng  received therapeutic activities to develop GM/FM coordination, balance, activity tolerance and strength in order to increase ADL/IADL independence with replicated home management/self care tasks, for 10 minutes including:    Standing/reaching challenge  9 cones from waste height to overhead using L affected extremity dropped one       Home Exercises and Education Provided     Education provided:   - HEP previous session.   - Progress towards goals.    Written Home Exercises Provided: Patient instructed to cont prior HEP.  Exercises were reviewed and Ming was able to demonstrate them prior to the end of the session.  Ming demonstrated fair  understanding of the HEP provided.     See EMR under Patient Instructions for exercises provided prior visit.        Assessment   Pt tolerated tx well. More focus on LUE strength and NMR specifically GM coordination and functional use. He tolerated MT and PROM better today with less pain in the shoulder. He continues to improve ROM and strength in the hand. Still limited with compensatory use in the hand which may be from previous old injury. Better adherence during therapy session with less distraction and conversation during session. Still limited due to weakness and decreased proprioception when using the LUE but progressing fairly well.  Limited FF, abduction, ER and ER with abduction due to pain in pects and deltoids. Next session to continue to focus on PROM and MT with supine strengthening.      Ming is progressing well towards his  goals and there are no updates to goals at this time. Pt prognosis is Good.     Pt will continue to benefit from skilled outpatient occupational therapy to address the deficits listed in the problem list on initial evaluation provide pt/family education and to maximize pt's level of independence in the home and community environment.     Anticipated barriers to occupational therapy: communication, confusion    Pt's spiritual, cultural and educational needs considered and pt agreeable to plan of care and goals.    Goals:  Short Term Goals:  1) Initiate Hep MET   2) Pt to increase LUE  strength by 5 # in order to A in self care task of feeding by 4 weeks. Progressing 5/30/2019  3) Pt to increase Barthel Index Score by 5 points increasing self care IND by 4 weeks. Progressing 5/30/2019  4) Pt to increase L UE AROM by 10 degrees in order to A in UB dressing by 4 weeks. Progressing 5/30/2019  5) Patient will be able to achieve less than or equal to 50% on the FOTO, demonstrating overall improved functional ability with upper extremity. (self-care category) Progressing 5/30/2019     Long Term Goals:  1) Pt to be IND with HEP in order to maintain ROM and strength needed for self care IND by d.c. Progressing 5/30/2019  2) Pt to increase L UE  strength to WNL as compared to unaffected extremity in order to open items for self feeding by d.c. Progressing 5/30/2019  3) Pt to increase Barthel Index Score by 10 points increasing self care IND at home by d.c. Progressing 5/30/2019  4) Pt to increase L UE AROM to WFL in order to A in UB dressing by d/c. Progressing 5/30/2019  5) Patient will be able to achieve less than or equal to 25% on the FOTO, demonstrating overall improved functional ability with upper extremity. (self-care category) Progressing 5/30/2019      Plan   Continue per initial POC.   Updates/Grading for next session: Progress as tolerated. Next session to continue to focus on PROM and MT with supine  strengthening.        Johann Mendez, OT

## 2019-05-30 NOTE — PROGRESS NOTES
"  Physical Therapy Daily Treatment Note     Name: Ming Boateng  Clinic Number: [id number]    Therapy Diagnosis:   Encounter Diagnoses   Name Primary?    Decreased mobility     Gait abnormality      Physician: Khoobehi, Kaveh D., MD    Visit Date: 5/30/2019    Physician Orders: PT Eval and Treat  Medical Diagnosis from Referral: I69.359 (ICD-10-CM) - Hemiplegia and hemiparesis following cerebral infarction affecting unspecified side  Evaluation Date: 4/8/2019      Authorization Period Expiration: 4/9/19  Plan of Care Expiration: 6/7/19  Visit # / Visits authorized: 5/50   FOTO: 5/10 FOTO done  Cost: 99.26  Total: 437.00  Time In: 11:45 AM  Time Out: 12:30 AM  Total Billable Time: 45 minutes (2 TE, 1NMR)       Precautions: Standard, Fall, CHF and L side weakness, recent TIA    Check BP during session if symptomatic    Subjective     Pt reports:pt agreeable to PT session. He reports no complaints of pain upon arrival.   He will be compliant with home exercise program once PT has a opportunity to do family training with dtr on exercise.  Response to previous treatment: nothing negative noted or reported  Functional change: improved transfer    Pain: 0/10  Location: left side        Objective     Ming participated in therapeutic exercise to improve ROM and strengthening. x22 minutes  -Manual gastroc st  20"x3 B  -Manual HSS st  20"x3 B   -Manual Piriformis st  20"x3 B  -lumbar bridges  2x10   -SLR    2x10 B  -Sit to stand   2 x 10    Ming participated in neuromuscular re-education activities to improve: Balance, Coordination, Kinesthetic, Proprioception and Posture for 15 minutes. The following activities were included:    Neuro re-ed and endurance training with B UE/LE extremities for reciprocal motion of all limbs on sci-fit x 15 min at level 3 at > or equal to 50 spm w/o rest.      Home Exercises Provided and Patient Education NOT Provided     Education provided:   - encouraged safety with use of RW w/ Pt and daughter. "     Written Home Exercises Provided: no    Assessment     Pt lack initiation and attention and this greatly impacts the efficiency of his sessions.  He very much seems to be able to execute simple commands when told but he still lacks ability to independently execute tasks without being told step by step the process in which to complete it.  I feel that ADLs at home are still require at least 50% assist of dtr and max cues for many tasks.    Ming is progressing well towards his goals.   Pt prognosis is Good.     Pt will continue to benefit from skilled outpatient physical therapy to address the deficits listed in the problem list box on initial evaluation, provide pt/family education and to maximize pt's level of independence in the home and community environment.     Pt's spiritual, cultural and educational needs considered and pt agreeable to plan of care and goals.     Anticipated barriers to physical therapy: spasticity, confusion, sedentary routine, recent TIA    Goals:  Short Term Goals (4 Weeks):   1.  Independent with initial HEP. Not issued at this time  2.  Pt will perform nu-step at level 3.0 x 10 minutes without rests breaks going at least 60 step/min or greater. MET  3. Pt will be able to perform TUG in 35 secs WITH use of AD demonstrating overall improved functional mobility.  (PROGRESSING, NOT MET)  4. Pt will performed sit to stand x 5 B UE support in 26 seconds without LOB backward or posterior LE hooking for functional and safe transfers. (PROGRESSING, NOT MET)  5.  Pt will score greater than or equal to 25/28 on the Tinetti test/assessment WITH use of AD demonstrating overall improved functional mobility and balance and reduce fall risk. (PROGRESSING, NOT MET)  6. Pt will walk < than or equal to 180 ft on the 2 minute walk test indoor WITH AD with 0 LOB and minimal gait deviations for improved safety in home ambulation and safety. (PROGRESSING, NOT MET)     Long Term Goals (8 Weeks):   1. Pt will  be able to perform TUG in 25 secs WITH use of AD demonstrating overall improved functional mobility. (PROGRESSING, NOT MET)  2. Pt will performed sit to stand x 5 WITH UE support in 20 seconds without LOB backward or posterior LE hooking for functional and safe transfers. (PROGRESSING, NOT MET)  3.  Pt will score greater than or equal to 27/28 on the Tinetti test/assessment WITH use of AD demonstrating overall improved functional mobility and balance and reduce fall risk. (PROGRESSING, NOT MET)  4.  Pt will score greater than or equal to 22/24 on the DGI test/assessment WITH use of AD demonstrating overall improved functional mobility and balance and reduce fall risk. (PROGRESSING, NOT MET)  5.  Pt will walk < than or equal to 600 ft on the 6 minute walk test with AD and 0 LOB and minimal gait deviations for improved community ambulation.(PROGRESSING, NOT MET)  6. Pt will have 0 falls from start of PT sessions. (PROGRESSING, NOT MET)  7. Pt will have MMT score of 4+/5 in all major ms groups in [facility].(PROGRESSING, NOT MET)     Plan     Flexibility, PROM, progression standing low level balance.    Livier العراقي, PT

## 2019-05-31 ENCOUNTER — CLINICAL SUPPORT (OUTPATIENT)
Dept: REHABILITATION | Facility: HOSPITAL | Age: 64
End: 2019-05-31
Attending: PSYCHIATRY & NEUROLOGY
Payer: MEDICARE

## 2019-05-31 DIAGNOSIS — R26.9 GAIT ABNORMALITY: ICD-10-CM

## 2019-05-31 DIAGNOSIS — R53.1 LEFT-SIDED WEAKNESS: ICD-10-CM

## 2019-05-31 DIAGNOSIS — R26.89 DECREASED MOBILITY: ICD-10-CM

## 2019-05-31 DIAGNOSIS — R13.12 OROPHARYNGEAL DYSPHAGIA: ICD-10-CM

## 2019-05-31 PROCEDURE — 97110 THERAPEUTIC EXERCISES: CPT | Mod: PN

## 2019-05-31 PROCEDURE — 97127 HC THERAPEUTIC INTVTN, COGN FUNCTION - ST: CPT | Mod: PN

## 2019-05-31 PROCEDURE — 97140 MANUAL THERAPY 1/> REGIONS: CPT | Mod: PN

## 2019-05-31 PROCEDURE — 97112 NEUROMUSCULAR REEDUCATION: CPT | Mod: PN

## 2019-05-31 NOTE — PROGRESS NOTES
"  Physical Therapy Daily Treatment Note     Name: Ming Boateng  Clinic Number: [id number]    Therapy Diagnosis:   Encounter Diagnoses   Name Primary?    Decreased mobility     Gait abnormality      Physician: Khoobehi, Kaveh D., MD    Visit Date: 5/31/2019    Physician Orders: PT Eval and Treat  Medical Diagnosis from Referral: I69.359 (ICD-10-CM) - Hemiplegia and hemiparesis following cerebral infarction affecting unspecified side  Evaluation Date: 4/8/2019      Authorization Period Expiration: 4/9/19  Plan of Care Expiration: 6/7/19  Visit # / Visits authorized: 6/50   FOTO: 6/10  Cost: 95.11  Total: 532.11  Time In: 10:15 AM  Time Out: 11:00 AM  Total Billable Time: 45 minutes (2 TE, 1NMR)       Precautions: Standard, Fall, CHF and L side weakness, recent TIA    Check BP during session if symptomatic    Subjective     Pt reports:pt agreeable to PT session. He reports no complaints of pain upon arrival.   He will be compliant with home exercise program once PT has a opportunity to do family training with dtr on exercise.  Response to previous treatment: nothing negative noted or reported  Functional change: improved transfer    Pain: 0/10  Location: left side        Objective     Ming participated in therapeutic exercise to improve ROM and strengthening. 30 minutes  -Manual gastroc st  20"x3 B  -Manual HSS st  20"x3 B   -Manual Piriformis st  20"x3 B  -lumbar bridges  2x10   -SLR    2x10 B  -Sit to stand   2 x 10    STANDING  -Hip flex   2x10 B //   -mini squats   2x10 B //  -hip abduction   2x10 B //  -ham curls    2x10 B //    Ming participated in neuromuscular re-education activities to improve: Balance, Coordination, Kinesthetic, Proprioception and Posture for 15 minutes. The following activities were included:    Neuro re-ed and endurance training with B UE/LE extremities for reciprocal motion of all limbs on sci-fit x 10 min at level 3 at > or equal to 50 spm w/o rest.      Home Exercises Provided and Patient " "Education NOT Provided     Education provided:   - encouraged safety with use of RW     Written Home Exercises Provided: next session    Assessment     Pt ambulates with RW in gym, but requires VC's for direction and safety. Pt also tends to "flop" into chairs without using hands for safety. No reports of pain post treatment. Pt is a fall risk, requires SBA for safety with ambulation. Included standing activities today in //bars.     Ming is progressing well towards his goals.   Pt prognosis is Good.     Pt will continue to benefit from skilled outpatient physical therapy to address the deficits listed in the problem list box on initial evaluation, provide pt/family education and to maximize pt's level of independence in the home and community environment.     Pt's spiritual, cultural and educational needs considered and pt agreeable to plan of care and goals.     Anticipated barriers to physical therapy: spasticity, confusion, sedentary routine, recent TIA    Goals:  Short Term Goals (4 Weeks):   1.  Independent with initial HEP. Not issued at this time  2.  Pt will perform nu-step at level 3.0 x 10 minutes without rests breaks going at least 60 step/min or greater. MET  3. Pt will be able to perform TUG in 35 secs WITH use of AD demonstrating overall improved functional mobility.  (PROGRESSING, NOT MET)  4. Pt will performed sit to stand x 5 B UE support in 26 seconds without LOB backward or posterior LE hooking for functional and safe transfers. (PROGRESSING, NOT MET)  5.  Pt will score greater than or equal to 25/28 on the Tinetti test/assessment WITH use of AD demonstrating overall improved functional mobility and balance and reduce fall risk. (PROGRESSING, NOT MET)  6. Pt will walk < than or equal to 180 ft on the 2 minute walk test indoor WITH AD with 0 LOB and minimal gait deviations for improved safety in home ambulation and safety. (PROGRESSING, NOT MET)     Long Term Goals (8 Weeks):   1. Pt will be able " to perform TUG in 25 secs WITH use of AD demonstrating overall improved functional mobility. (PROGRESSING, NOT MET)  2. Pt will performed sit to stand x 5 WITH UE support in 20 seconds without LOB backward or posterior LE hooking for functional and safe transfers. (PROGRESSING, NOT MET)  3.  Pt will score greater than or equal to 27/28 on the Tinetti test/assessment WITH use of AD demonstrating overall improved functional mobility and balance and reduce fall risk. (PROGRESSING, NOT MET)  4.  Pt will score greater than or equal to 22/24 on the DGI test/assessment WITH use of AD demonstrating overall improved functional mobility and balance and reduce fall risk. (PROGRESSING, NOT MET)  5.  Pt will walk < than or equal to 600 ft on the 6 minute walk test with AD and 0 LOB and minimal gait deviations for improved community ambulation.(PROGRESSING, NOT MET)  6. Pt will have 0 falls from start of PT sessions. (PROGRESSING, NOT MET)  7. Pt will have MMT score of 4+/5 in all major ms groups in [facility].(PROGRESSING, NOT MET)     Plan   Cont to progress PT as appropriate.   Flexibility, PROM, progression standing low level balance.    Philip Sawyer, PTA

## 2019-05-31 NOTE — PROGRESS NOTES
Outpatient Neurological Rehabilitation   Speech and Language Therapy Daily Note  Date:  5/31/2019     Name: Ming Boateng   MRN: [id number]   Therapy Diagnosis:   Encounter Diagnosis   Name Primary?    Oropharyngeal dysphagia    Physician: Keith Harkins, *  Physician Orders: speech therapy evaluate and treat  Medical Diagnosis: G45.9 (ICD-10-CM) - TIA (transient ischemic attack)     Visit #/ Visits Authorized: 6  Date of Evaluation:  4/29/19  Insurance Authorization Period: 04/12/2019 to 04/11/2020     Plan of Care Expiration Date:    5/31/19  Visits Cancelled: 0  Visits No Show: 0    Time In:  1145  Time Out:  1230  Total Billable Time: 45 minutes     G-Code 4 / 10   Eval Level 5 Swallowing   Update      Precautions: Standard and Fall    Subjective:   Pt reports: Pt pleasant. No complaints.  He was compliant to home exercise program. Pt reported doing his strategies at home. Pt also reported that he has been chewing gum.    Response to previous treatment: No significant change.    Pain Scale:  0/10 on VAS currently.   Pain Location: n/a  Objective:   TIMED  Procedure Min.             UNTIMED  Procedure Min.       Cognitive Communication Therapy  45 minutes   Total Timed Units: 3  Total Untimed Units: 0  Charges Billed/# of units: 3  Short Term Goals: (4 weeks) Current Progress:      2. Pt will complete labial oral motor exercises with min A to improve speech intelligibility, facial droop, and strengthen oral musculature.     Goal met 5/31/2019  -Not addressed this session   -Pt is familiar with these exercises and can complete them at home with family.   3. Pt will recall and utilize aspiration precautions to decrease s/s of oropharyngeal dysphagia and increase swallow safety.    Goal met 5/31/2019   -Reviewed strategies  -Reported he is chewing his food well; swallows small amounts  -Small bites/sips  -Alternating bites/sips  -daughter reported that she must continuously cue him to take small bites       4. Pt  will recall and utilize external memory aids with min A to improve his memory skills in everyday life.      Goal met 5/31/2019     -Reportedly utilizing at home.   5. Pt will recall and write letters of the [place] alphabet with 80% acc given min A to improve automatic speech tasks and written expression... discontinue    -discontinue  -pt has difficulty writing the alphabet, however, he is able to write sentences, therefore not concerned with writing alphabet   6. Pt will answer recent memory, remote memory, and temporal memory questions with 85% acc given min A to improve orientation skills.      Goal met 5/31/2019     -90% acc   7. Pt will answer questions from a short story with 80% acc given min A to improve immediate recall skills.      GOAL MET    -Utilized cues of visualization and repetition of story aloud     -story recall x 14 with 90% acc Independently      8. Pt will recall and utilize memory strategies with 80% acc Independently.    Progressing, not met 5/31/2019     -AVER in [place] (Associate, Visualize, Write, Repeat)  -0/4 Independently          Assessment of new tasks:   Following verbal directions: simple 1 step with 90% acc  Two step directions verbally with 88% acc  Following written directions with 0% acc Independently; 75% acc mod A.   Pt reported this task was not difficult. Pt does not appear to be aware of his deficits.     Attention to tasks:   Sorting cards with multiple initial repetitions of instructions from therapist and . One re-direction during task.  Increased time to complete task.   Visual cancellation tasks with increase size of letters with 7/9 acc.   List steps of doing the laundry with great detail.     Will develop new goals.   Based on today's assessment. I think that Ming Boateng taking the GED would be very difficult due to his difficulty following written directions.     GOALS MET:  1. Pt will complete further memory testing to assess short-term and long-term  memory skills. GOAL MET 5/13/19    Patient Education/Response:   Educated on goals and plan of care. Pt reported understanding.      Written Home Exercises Provided: oral motor exercises x 20 each; Short Paragraph Comprehension with Corresponding Questions from [facility] 2: Cognitive Rehab ([place]), Unit 3: Memory Visual and Auditory, Copyright 2007 [employer] Autobiographical Orientation Worksheet.   Exercises were reviewed and Ming was able to demonstrate them prior to the end of the session.  Ming demonstrated good  understanding of the education provided.     Assessment:   Ming is progressing well towards his goals. Assessed sequencing, following written and verbal directions, attention to detail. Will develop new goals.   Pt prognosis is Good. Pt will continue to benefit from skilled outpatient speech and language therapy to address the deficits listed in the problem list on initial evaluation, provide pt/family education and to maximize pt's level of independence in the home and community environment.     Medical necessity is demonstrated by the following IMPAIRMENTS:  Pt's memory deficits impair his ability to function safely in his everyday environment.   Barriers to Therapy: language  Pt's spiritual, cultural and educational needs considered and pt agreeable to plan of care and goals.    Plan:   Updated POC due today    Kalpana Vang CCC-SLP   5/31/2019

## 2019-05-31 NOTE — PROGRESS NOTES
"  Occupational Therapy Daily Treatment Note     Date: 5/31/2019  Patient: Ming Boateng  Chart Number: [id number]     Therapy Diagnosis:        Encounter Diagnoses   Name Primary?    Muscle weakness      Decreased coordination        Physician: Keith Harkins, *     Physician Orders: Eval and tx  Medical Diagnosis: G45.9 (ICD-10-CM) - TIA (transient ischemic attack)  Onset Date:  First stroke 3/3/19 second TIA on 4/11/19  Evaluation Date: 5/2/2019  Plan of Care Expiration Period: 6/28/19  Insurance Authorization period Expiration: 12/31/19  Date of Return to MD: NA  Visit # / Visits Authortized: 6 / 12  FOTO: CVA UE survey/78% limitation      Time In:10:53 am   Time Out: 11:31 am  Total Billable (one on one) Time: 38 minutes 2 TE 1 MT  Todays Amount: 88.10  Total Amount 636.88     Precautions: Standard, Diabetes, blood thinners and Fall      Subjective     Pt reports: "PTA states pt feeling ok" Pt had a "near miss" during session when sitting to chair with OT in position to prevent pt from missing chair. PTA assessed pt pain afterwards with pt stating he was ok   he was compliant with home exercise program given last session.   Response to previous treatment: increased hand use  Functional change: Improved hand use    Pain: 0/10  Location: left arms and hands      Objective     Mt: Pt recieved manual therapy consisting of PROM in all planes, joint mobilization (grades I-II) with gentle oscillations at acromioclavicular and glenohumeral joint along with myofascial release and STM to surrounding musculature (biceps, pects, deltoids, traps, triceps etc.) to decrease stiffness and pain with movements. 10 minutes     Ming received therapeutic exercises for 28 minutes including:  -Pt completed the following exercises below in order to increase ROM, strength and tolerance of affected UE in order to increase IND and functional use:    Exercises Date:5/31/2019     LUE Visit #6    RPM 6 min 3 min forwards 3 " backwards           PROM (L/R) Shoulder FLEX/ABD/IR/ER 10x   Supine   Shoulder Flexion  Chest Press   3 #  2/10   Supine Pec stretch   Open arm stretch with bolster between shoulder blades   3/30 sec   Pulleys 3 min    NDT circles  2 ways  X 10 CW and CCW             Home Exercises and Education Provided     Education provided:   - HEP previous session.   - Progress towards goals.    Written Home Exercises Provided: Patient instructed to cont prior HEP.  Exercises were reviewed and Ming was able to demonstrate them prior to the end of the session.  Ming demonstrated fair  understanding of the HEP provided.     See EMR under Patient Instructions for exercises provided prior visit.        Assessment   Pt tolerated tx fair today. He seemed fatigued. Near miss with pt losing balance into chair when attempting to sit for pulley exercises. OT quyen to catch pt and prevent him from falling to the ground. PTA  assessed pt with pt stating he was ok just lost his balance. He tolerated exercises well with no resistance added due to pt being seen yesterday. Tolerated new movements and band exercises well with no pain only muscle fatigue.   Responded better to MT today but required more cues to relax arm and not move during PROM and MT.   Limited FF, abduction, ER and ER with abduction due to pain in pects and deltoids. Next session to continue to focus on PROM and MT with supine strengthening.      Ming is progressing well towards his goals and there are no updates to goals at this time. Pt prognosis is Good.     Pt will continue to benefit from skilled outpatient occupational therapy to address the deficits listed in the problem list on initial evaluation provide pt/family education and to maximize pt's level of independence in the home and community environment.     Anticipated barriers to occupational therapy: communication, confusion    Pt's spiritual, cultural and educational needs considered and pt agreeable to  plan of care and goals.    Goals:  Short Term Goals:  1) Initiate Hep MET   2) Pt to increase LUE  strength by 5 # in order to A in self care task of feeding by 4 weeks. Progressing 5/31/2019  3) Pt to increase Barthel Index Score by 5 points increasing self care IND by 4 weeks. Progressing 5/31/2019  4) Pt to increase L UE AROM by 10 degrees in order to A in UB dressing by 4 weeks. Progressing 5/31/2019  5) Patient will be able to achieve less than or equal to 50% on the FOTO, demonstrating overall improved functional ability with upper extremity. (self-care category) Progressing 5/31/2019     Long Term Goals:  1) Pt to be IND with HEP in order to maintain ROM and strength needed for self care IND by d.c. Progressing 5/31/2019  2) Pt to increase L UE  strength to WNL as compared to unaffected extremity in order to open items for self feeding by d.c. Progressing 5/31/2019  3) Pt to increase Barthel Index Score by 10 points increasing self care IND at home by d.c. Progressing 5/31/2019  4) Pt to increase L UE AROM to WFL in order to A in UB dressing by d/c. Progressing 5/31/2019  5) Patient will be able to achieve less than or equal to 25% on the FOTO, demonstrating overall improved functional ability with upper extremity. (self-care category) Progressing 5/31/2019      Plan   Continue per initial POC.   Updates/Grading for next session: Progress as tolerated. Next session to continue to focus on PROM and MT with supine strengthening.        Johann Mendez, OT

## 2019-06-03 DIAGNOSIS — E11.8 TYPE 2 DIABETES MELLITUS WITH COMPLICATION, WITH LONG-TERM CURRENT USE OF INSULIN: Primary | ICD-10-CM

## 2019-06-03 DIAGNOSIS — Z79.4 TYPE 2 DIABETES MELLITUS WITH COMPLICATION, WITH LONG-TERM CURRENT USE OF INSULIN: Primary | ICD-10-CM

## 2019-06-03 RX ORDER — LANCETS
1 EACH MISCELLANEOUS 4 TIMES DAILY
Qty: 100 EACH | Refills: 11 | Status: SHIPPED | OUTPATIENT
Start: 2019-06-03 | End: 2019-10-11 | Stop reason: SDUPTHER

## 2019-06-03 RX ORDER — ISOPROPYL ALCOHOL 70 ML/100ML
1 SWAB TOPICAL
Qty: 100 EACH | Refills: 11 | Status: SHIPPED | OUTPATIENT
Start: 2019-06-03 | End: 2019-10-11 | Stop reason: SDUPTHER

## 2019-06-05 ENCOUNTER — CLINICAL SUPPORT (OUTPATIENT)
Dept: REHABILITATION | Facility: HOSPITAL | Age: 64
End: 2019-06-05
Attending: PSYCHIATRY & NEUROLOGY
Payer: MEDICARE

## 2019-06-05 DIAGNOSIS — R53.1 LEFT-SIDED WEAKNESS: ICD-10-CM

## 2019-06-05 DIAGNOSIS — R26.89 DECREASED MOBILITY: ICD-10-CM

## 2019-06-05 DIAGNOSIS — R26.9 GAIT ABNORMALITY: ICD-10-CM

## 2019-06-05 DIAGNOSIS — R13.12 OROPHARYNGEAL DYSPHAGIA: ICD-10-CM

## 2019-06-05 PROCEDURE — 97140 MANUAL THERAPY 1/> REGIONS: CPT | Mod: PN

## 2019-06-05 PROCEDURE — 97127 HC THERAPEUTIC INTVTN, COGN FUNCTION - ST: CPT | Mod: PN

## 2019-06-05 PROCEDURE — 97110 THERAPEUTIC EXERCISES: CPT | Mod: PN

## 2019-06-05 NOTE — PROGRESS NOTES
Outpatient Neurological Rehabilitation   Speech and Language Therapy Daily Note  Date:  6/5/2019     Name: Ming Boateng   MRN: [id number]   Therapy Diagnosis:   Encounter Diagnosis   Name Primary?    Oropharyngeal dysphagia    Physician: Keith Harkins, *  Physician Orders: speech therapy evaluate and treat  Medical Diagnosis: G45.9 (ICD-10-CM) - TIA (transient ischemic attack)     Visit #/ Visits Authorized: 7  Date of Evaluation:  4/29/19  Insurance Authorization Period: 04/12/2019 to 04/11/2020     Plan of Care Expiration Date:  7/26/19  Visits Cancelled: 0  Visits No Show: 0    Time In:  1100  Time Out:  1145  Total Billable Time: 45 minutes     G-Code 7 / 10   Eval Level 5 Swallowing   Update      Precautions: Standard and Fall    Subjective:   Pt reports: Pt pleasant. No complaints.  present.   He was compliant to home exercise program. Pt reported doing his strategies at home.  Response to previous treatment: No significant change.    Pain Scale:  0/10 on VAS currently.   Pain Location: n/a  Objective:   TIMED  Procedure Min.             UNTIMED  Procedure Min.       Cognitive Communication Therapy  45 minutes   Total Timed Units: 3  Total Untimed Units: 0  Charges Billed/# of units: 3       Short Term Goals: (4 weeks) Current Progress:      1. Pt will complete visual scanning task with 85% acc given min A to improve attention to detail.  -Not addressed this session.    2. Pt will follow 2-3 step verbal directions with 85% acc given min A to improve auditory comprehension.  -Not addressed this session.    3. Pt will follow simple written directions with 85% acc given min A to improve reading comprehension.  X 10 with 50% acc Independently; 100% acc mod A   4. Pt will sequence 4 events with 85% acc given min A to improve organizational skills.  0% acc Independently; worksheet with max A   5. Pt will participate in sustained attention and divided attention tasks with 85% acc given min A to improve  attention to detail in everyday tasks. -sustained attention: counting 26 cards in 1 minute and 2 seconds  -note pt counted 2 more than accurate number  -divided attention: opposites with 90% acc  Separate into 3 stacks with redirection x 1   8. Pt will recall and utilize memory strategies with 80% acc Independently.     -AVER in [place] (Associate, Visualize, Write, Repeat)  -0/4 Independently    Note increased time to complete all tasks.    Patient Education/Response:   Educated on goals and plan of care. Pt reported understanding.      Written Home Exercises Provided: oral motor exercises x 20 each.  Exercises were reviewed and Ming was able to demonstrate them prior to the end of the session.  Ming demonstrated good  understanding of the education provided.     Assessment:   Ming is progressing well towards his goals. Initiated new goals. Increased time to complete all tasks. Pt prognosis is Good. Pt will continue to benefit from skilled outpatient speech and language therapy to address the deficits listed in the problem list on initial evaluation, provide pt/family education and to maximize pt's level of independence in the home and community environment.     Medical necessity is demonstrated by the following IMPAIRMENTS:  Pt's memory deficits impair his ability to function safely in his everyday environment.   Barriers to Therapy: language  Pt's spiritual, cultural and educational needs considered and pt agreeable to plan of care and goals.    Plan:   Continue with POC to address sequencing, following directions, and attention.   Updated POC due 7/26/19.    Kalpana Vang CCC-SLP   6/5/2019

## 2019-06-05 NOTE — PLAN OF CARE
Outpatient Neurological Rehabilitation Therapy  Updated POC     Date: 5/31/2019   Name: Ming Boateng  Clinic Number: [id number]    Therapy Diagnosis:   Encounter Diagnosis   Name Primary?    Oropharyngeal dysphagia      Physician: Keith Harkins, *    Physician Orders: speech therapy evaluate and treat  Medical Diagnosis: G45.9 (ICD-10-CM) - TIA (transient ischemic attack)     Visit #/ Visits Authorized:  6  Evaluation Date: 4/29/19  Insurance Authorization Period: 4/29/19 to 5/31/19  Plan of Care Expiration:    5/31/19  New POC Certification Period:  5/31/19 to 7/26/19    Total Visits Received: 6    Precautions:Standard and Fall     Subjective     Update: Pt's daughter/caregiver and physical therapist reports concern for pt's attention and following directions.    Objective     Update: see follow up note dated 5/31/2019    Assessment     Update: Ming Boateng presents to [facility] s/p medical diagnosis of TIA. Demonstrates impairments including limitations as described in the problem list. Positive prognostic factors include age, family support. Negative prognostic factors include three strokes. He presents with cognitive communication deficits  c/b difficulty following verbal and written directions, sequencing, difficulty with sustained and divided attention.  Barriers to therapy include language. Patient will benefit from skilled, outpatient neurological rehabilitation speech therapy.    Rehab Potential: good     Education: Plan of Care, role of SLP in care, memory strategies, attention shifting strategies and scheduling/ cancellation policy     Previous Short Term Goals Status: 4 weeks  1. Pt will complete further memory testing to assess short-term and long-term memory skills.    2. Pt will complete labial oral motor exercises with min A to improve speech intelligibility, facial droop, and strengthen oral musculature.  GOAL MET 5/31/19  3. Pt will recall and utilize aspiration  precautions to decrease s/s of oropharyngeal dysphagia and increase swallow safety.GOAL MET 5/31/19  4. Pt will recall and utilize external memory aids with min A to improve his memory skills in everyday life. GOAL MET 5/31/19  5. Pt will recall and write letters of the Wol[place] alphabet with 80% acc given min A to improve automatic speech tasks and written expression.GOAL MET 5/31/19  6. Pt will answer recent memory, remote memory, and temporal memory questions with 85% acc given min A to improve orientation skills. GOAL MET 5/31/19  7. Pt will answer questions from a short story with 80% acc given min A to improve immediate recall skills.    GOAL MET 5/31/19    New Short Term Goals: 4 weeks  8. Pt will recall and utilize memory strategies with 80% acc Independently. Progressing, not met 5/31/2019   1. Pt will complete attention tasks with 85% acc given min A to improve attention to detail in everyday tasks.  2. Pt will follow 2-3 step verbal directions with 85% acc given min A to improve auditory comprehension.   3. Pt will follow simple written directions with 85% acc given min A to improve reading comprehension.   4. Pt will sequence 4 events with 85% acc given min A to improve organizational skills.   5. Pt will participate in sustained attention and divided attention tasks with 85% acc given min A to improve attention to detail in everyday tasks.     Long Term Goal Status:  8 weeks  1. Pt will use appropriate memory strategies to schedule and recall weekly activities, express needs and recall names to maintain safety and participate socially in functional living environment.   2. Pt will utilize compensatory strategies with optimum safety and efficiency of swallowing function on PO intake without overt s/s of aspiration for the highest diet level. GOAL MET 5/31/19  3. Pt will develop functional attention skills to effectively attend to and communicate in simple daily living tasks in functional living  environment.      Reasons for Recertification of Therapy: Pt has met 7 goals. Five new goals established to address attention, sequencing, and following directions. Pt would benefit from ongoing skilled speech therapy sessions to address cognitive communication skills.       Plan     Updated Certification Period: 5/31/2019 to 7/26/19  Recommended Treatment Plan: Patient will participate in the [facility] neurological rehabilitation program for speech therapy 2 times per week to address his  Cognition deficits, to educate patient and their family, and to participate in a home exercise program.     Other recommendations: none at this time     Therapist's Name:  Kalpana Vang CCC-SLP   5/31/2019      I CERTIFY THE NEED FOR THESE SERVICES FURNISHED UNDER THIS PLAN OF TREATMENT AND WHILE UNDER MY CARE    Physician's comments:     Physician's Name:

## 2019-06-05 NOTE — PROGRESS NOTES
"  Physical Therapy Daily Treatment Note     Name: Ming Boateng  Clinic Number: [id number]    Therapy Diagnosis:   Encounter Diagnoses   Name Primary?    Decreased mobility     Gait abnormality      Physician: Khoobehi, Kaveh D., MD    Visit Date: 6/5/2019    Physician Orders: PT Eval and Treat  Medical Diagnosis from Referral: I69.359 (ICD-10-CM) - Hemiplegia and hemiparesis following cerebral infarction affecting unspecified side  Evaluation Date: 4/8/2019      Authorization Period Expiration: 4/9/19  Plan of Care Expiration: 6/7/19  Visit # / Visits authorized: 7/50   FOTO: 7/10  Cost: 60.64  Total: 592.75  Time In: 11:45 AM  Time Out: 12:20 AM  Total Billable Time: 35 minutes (2 TE)       Precautions: Standard, Fall, CHF and L side weakness, recent TIA    Check BP during session if symptomatic, Pt symptomatic today during mid therapy standing    Subjective     Pt reports:pt agreeable to PT session. "I have been feeling weak"  He will be compliant with home exercise program once PT has a opportunity to do family training with dtr on exercise.  Response to previous treatment: nothing reported by pt  Functional change: improved transfer    Pain: 0/10  Location: left side        Objective     Ming participated in therapeutic exercise to improve ROM and strengthening. 30 minutes  -Manual gastroc st  20"x3 B seated  -Manual HSS st  20"x3 B seated  -Manual Piriformis st  Not performed today   -lumbar bridges  Not performed today  -SLR    2x10 B  -Sit to stand   2 x 10  BLOOD PRESSURE ASSESSED AT MID SESSION: 102/60 BP    STANDING  -Hip flex   2x10 B //   -mini squats   2x10 B //  -hip abduction   2x10 B //  -ham curls    2x10 B //    Ming participated in neuromuscular re-education activities to improve: Balance, Coordination, Kinesthetic, Proprioception and Posture for 5 minutes. The following activities were included:    Neuro re-ed and endurance training with B UE/LE extremities for reciprocal motion of all limbs on " "sci-fit x 5 min at level 3 at > or equal to 50 spm w/o rest.     END SESSION BP ASSESSED: 106/60      Home Exercises Provided and Patient Education NOT Provided     Education provided:   - encouraged safety with use of RW     Written Home Exercises Provided: next session    Assessment     Pt quiet today, limited session due to pt experiencing hypotension. Accommodated session to pt's tolerance  Spoke with pt's daughter who reports that pt has been experiencing low pressure for "a couple of days" when monitored. She has already made an Nephrology appointment for tomorrow 6/6/19. She reports she has been searching the Internet for home remedies.   Pt's daughter advised to call MD upon arriving home to inform MD about low BP.     Ming is progressing well towards his goals.   Pt prognosis is Good.     Pt will continue to benefit from skilled outpatient physical therapy to address the deficits listed in the problem list box on initial evaluation, provide pt/family education and to maximize pt's level of independence in the home and community environment.     Pt's spiritual, cultural and educational needs considered and pt agreeable to plan of care and goals.     Anticipated barriers to physical therapy: spasticity, confusion, sedentary routine, recent TIA    Goals:  Short Term Goals (4 Weeks):   1.  Independent with initial HEP. Not issued at this time  2.  Pt will perform nu-step at level 3.0 x 10 minutes without rests breaks going at least 60 step/min or greater. MET  3. Pt will be able to perform TUG in 35 secs WITH use of AD demonstrating overall improved functional mobility.  (PROGRESSING, NOT MET)  4. Pt will performed sit to stand x 5 B UE support in 26 seconds without LOB backward or posterior LE hooking for functional and safe transfers. (PROGRESSING, NOT MET)  5.  Pt will score greater than or equal to 25/28 on the Tinetti test/assessment WITH use of AD demonstrating overall improved functional mobility and " balance and reduce fall risk. (PROGRESSING, NOT MET)  6. Pt will walk < than or equal to 180 ft on the 2 minute walk test indoor WITH AD with 0 LOB and minimal gait deviations for improved safety in home ambulation and safety. (PROGRESSING, NOT MET)     Long Term Goals (8 Weeks):   1. Pt will be able to perform TUG in 25 secs WITH use of AD demonstrating overall improved functional mobility. (PROGRESSING, NOT MET)  2. Pt will performed sit to stand x 5 WITH UE support in 20 seconds without LOB backward or posterior LE hooking for functional and safe transfers. (PROGRESSING, NOT MET)  3.  Pt will score greater than or equal to 27/28 on the Tinetti test/assessment WITH use of AD demonstrating overall improved functional mobility and balance and reduce fall risk. (PROGRESSING, NOT MET)  4.  Pt will score greater than or equal to 22/24 on the DGI test/assessment WITH use of AD demonstrating overall improved functional mobility and balance and reduce fall risk. (PROGRESSING, NOT MET)  5.  Pt will walk < than or equal to 600 ft on the 6 minute walk test with AD and 0 LOB and minimal gait deviations for improved community ambulation.(PROGRESSING, NOT MET)  6. Pt will have 0 falls from start of PT sessions. (PROGRESSING, NOT MET)  7. Pt will have MMT score of 4+/5 in all major ms groups in [facility].(PROGRESSING, NOT MET)     Plan   Cont to progress PT as appropriate.   Flexibility, PROM, progression standing low level balance.    Philip Sawyer, MELISA

## 2019-06-05 NOTE — PROGRESS NOTES
"  Occupational Therapy Daily Treatment Note     Date: 6/5/2019  Patient: Ming Boateng  Chart Number: [id number]     Therapy Diagnosis:        Encounter Diagnoses   Name Primary?    Muscle weakness      Decreased coordination        Physician: Keith Harkins, *     Physician Orders: Eval and tx  Medical Diagnosis: G45.9 (ICD-10-CM) - TIA (transient ischemic attack)  Onset Date:  First stroke 3/3/19 second TIA on 4/11/19  Evaluation Date: 5/2/2019  Plan of Care Expiration Period: 6/28/19  Insurance Authorization period Expiration: 12/31/19  Date of Return to MD: NA  Visit # / Visits Authortized: 7 / 12  FOTO: CVA UE survey/78% limitation      Time In:10:15  am   Time Out: 11:00 am  Total Billable (one on one) Time:  minutes 2 TE 1 MT  Todays Amount: 88.10  Total Amount 724.98     Precautions: Standard, Diabetes, blood thinners and Fall      Subjective     Pt reports: "Per  pt states having low BP the past few days" Pt states going to see his cardiologist for this and possibly manipulating his medications.   he was compliant with home exercise program given last session.   Response to previous treatment: increased hand use  Functional change: Improved hand use    Pain: 0/10  Location: left arms and hands      Objective     Mt: Pt recieved manual therapy consisting of PROM in all planes, joint mobilization (grades I-II) with gentle oscillations at acromioclavicular and glenohumeral joint along with myofascial release and STM to surrounding musculature (biceps, pects, deltoids, traps, triceps etc.) to decrease stiffness and pain with movements. 10 minutes     Ming received therapeutic exercises for 30 minutes including:  -Pt completed the following exercises below in order to increase ROM, strength and tolerance of affected UE in order to increase IND and functional use:    Exercises Date:6/5/2019     LUE Visit #7    RPM 6 min 3 min forwards 3 backwards   Hand  Putty exercise  Mold    Roll "   pinch Green   X 30    Green t bar [place] frowns  X 30    PROM (L/R) Shoulder FLEX/ABD/IR/ER 10x   Supine   Shoulder Flexion  Chest Press   3 #  2/10   Supine Pec stretch   Open arm stretch with bolster between shoulder blades   3/30 sec   Pulleys 3 min    NDT circles  2 ways  X 10 CW and CCW            Ming Boateng  received therapeutic activities to develop GM/FM coordination, balance, activity tolerance and strength in order to increase ADL/IADL independence with replicated home management/self care tasks, for 5 minutes including:  Coins in hand manipulation       Home Exercises and Education Provided     Education provided:   - HEP previous session.   - Progress towards goals.    Written Home Exercises Provided: Patient instructed to cont prior HEP.  Exercises were reviewed and Ming was able to demonstrate them prior to the end of the session.  Ming demonstrated fair  understanding of the HEP provided.     See EMR under Patient Instructions for exercises provided prior visit.        Assessment   Pt tolerated tx better today. Decreased fatigue today with session. Good tolerance to established and new exercises. Increased ROM overhead and less pain noted. More strength with overhead supine movements. Next session to continue to focus on PROM and MT with supine strengthening.      Ming is progressing well towards his goals and there are no updates to goals at this time. Pt prognosis is Good.     Pt will continue to benefit from skilled outpatient occupational therapy to address the deficits listed in the problem list on initial evaluation provide pt/family education and to maximize pt's level of independence in the home and community environment.     Anticipated barriers to occupational therapy: communication, confusion    Pt's spiritual, cultural and educational needs considered and pt agreeable to plan of care and goals.    Goals:  Short Term Goals:  1) Initiate Hep MET   2) Pt to increase LUE  strength by 5  # in order to A in self care task of feeding by 4 weeks. Progressing 6/5/2019  3) Pt to increase Barthel Index Score by 5 points increasing self care IND by 4 weeks. Progressing 6/5/2019  4) Pt to increase L UE AROM by 10 degrees in order to A in UB dressing by 4 weeks. Progressing 6/5/2019  5) Patient will be able to achieve less than or equal to 50% on the FOTO, demonstrating overall improved functional ability with upper extremity. (self-care category) Progressing 6/5/2019     Long Term Goals:  1) Pt to be IND with HEP in order to maintain ROM and strength needed for self care IND by d.c. Progressing 6/5/2019  2) Pt to increase L UE  strength to WNL as compared to unaffected extremity in order to open items for self feeding by d.c. Progressing 6/5/2019  3) Pt to increase Barthel Index Score by 10 points increasing self care IND at home by d.c. Progressing 6/5/2019  4) Pt to increase L UE AROM to WFL in order to A in UB dressing by d/c. Progressing 6/5/2019  5) Patient will be able to achieve less than or equal to 25% on the FOTO, demonstrating overall improved functional ability with upper extremity. (self-care category) Progressing 6/5/2019      Plan   Continue per initial POC.   Updates/Grading for next session: Progress as tolerated. Next session to continue to focus on PROM and MT with supine strengthening.        Johann Mendez, OT

## 2019-06-07 ENCOUNTER — CLINICAL SUPPORT (OUTPATIENT)
Dept: REHABILITATION | Facility: HOSPITAL | Age: 64
End: 2019-06-07
Attending: PSYCHIATRY & NEUROLOGY
Payer: MEDICARE

## 2019-06-07 DIAGNOSIS — R26.9 GAIT ABNORMALITY: ICD-10-CM

## 2019-06-07 DIAGNOSIS — R26.89 DECREASED MOBILITY: ICD-10-CM

## 2019-06-07 DIAGNOSIS — R13.12 OROPHARYNGEAL DYSPHAGIA: ICD-10-CM

## 2019-06-07 PROCEDURE — 97127 HC THERAPEUTIC INTVTN, COGN FUNCTION - ST: CPT | Mod: PN

## 2019-06-07 PROCEDURE — 97110 THERAPEUTIC EXERCISES: CPT | Mod: PN

## 2019-06-07 NOTE — PROGRESS NOTES
"  Physical Therapy Daily Treatment Note     Name: Ming Boateng  Clinic Number: [id number]    Therapy Diagnosis:   Encounter Diagnoses   Name Primary?    Decreased mobility     Gait abnormality      Physician: Khoobehi, Kaveh D., MD    Visit Date: 6/7/2019    Physician Orders: PT Eval and Treat  Medical Diagnosis from Referral: I69.359 (ICD-10-CM) - Hemiplegia and hemiparesis following cerebral infarction affecting unspecified side  Evaluation Date: 4/8/2019      Authorization Period Expiration: 4/9/19  Plan of Care Expiration: 6/7/19  Visit # / Visits authorized: 8/50   FOTO: 8/10  Cost: 90.96  Total: 683.71  Time In: 11:05 AM  Time Out: 11:45 AM  Total Billable Time: 40 minutes (3 TE)       Precautions: Standard, Fall, CHF and L side weakness, recent TIA    Check BP during session if symptomatic    Subjective     Pt reports:pt agreeable to PT session." I feel a little better". Pt pleasant and conversating. Spoke to pt's daughter who reports nephrology appointment went well and his blood pressure medicine as been " adjusted" to prevent hypotensive episodes.   He will be compliant with home exercise program once PT has a opportunity to do family training with dtr on exercise.  Response to previous treatment: feeling better  Functional change: improved transfer, improved BP    Pain: 0/10  Location: left side     Objective     Ming participated in therapeutic exercise to improve ROM and strengthening. 40 minutes  -Manual gastroc st  20"x3 B supine   -Manual HSS st  20"x3 B supine  -Manual Piriformis st  20"x3 B supine   -lumbar bridges  2x10 B  -SLR    2x10 B  -SAQ    2x15 B 2 #  -clamshells   2 x 10 w RTB  -hip adduction iso  W/ yellow ball 5"x20  -seated LAQ   2x10 B    Vitals:assessed at initial session:  102/58 BP (seated), HR: 73 bpm, O2 sats 98%  Vitals assessed at mid session :  122/72 BP (supine), HR: 71 bpm , O2 sats: 99%       Home Exercises Provided and Patient Education NOT Provided     Education provided:   - " "encouraged safety with use of RW     Written Home Exercises Provided: next session    Assessment     Pt quiet today, limited session due to pt experiencing hypotension. Accommodated session to pt's tolerance  Spoke with pt's daughter who reports that pt has been experiencing low pressure for "a couple of days" when monitored. She has already made an Nephrology appointment for tomorrow 6/6/19. She reports she has been searching the Internet for home remedies.   Pt's daughter advised to call MD upon arriving home to inform MD about low BP.     Ming is progressing well towards his goals.   Pt prognosis is Good.     Pt will continue to benefit from skilled outpatient physical therapy to address the deficits listed in the problem list box on initial evaluation, provide pt/family education and to maximize pt's level of independence in the home and community environment.     Pt's spiritual, cultural and educational needs considered and pt agreeable to plan of care and goals.     Anticipated barriers to physical therapy: spasticity, confusion, sedentary routine, recent TIA    Goals:  Short Term Goals (4 Weeks):   1.  Independent with initial HEP. Not issued at this time  2.  Pt will perform nu-step at level 3.0 x 10 minutes without rests breaks going at least 60 step/min or greater. MET  3. Pt will be able to perform TUG in 35 secs WITH use of AD demonstrating overall improved functional mobility.  (PROGRESSING, NOT MET)  4. Pt will performed sit to stand x 5 B UE support in 26 seconds without LOB backward or posterior LE hooking for functional and safe transfers. (PROGRESSING, NOT MET)  5.  Pt will score greater than or equal to 25/28 on the Tinetti test/assessment WITH use of AD demonstrating overall improved functional mobility and balance and reduce fall risk. (PROGRESSING, NOT MET)  6. Pt will walk < than or equal to 180 ft on the 2 minute walk test indoor WITH AD with 0 LOB and minimal gait deviations for improved " safety in home ambulation and safety. (PROGRESSING, NOT MET)     Long Term Goals (8 Weeks):   1. Pt will be able to perform TUG in 25 secs WITH use of AD demonstrating overall improved functional mobility. (PROGRESSING, NOT MET)  2. Pt will performed sit to stand x 5 WITH UE support in 20 seconds without LOB backward or posterior LE hooking for functional and safe transfers. (PROGRESSING, NOT MET)  3.  Pt will score greater than or equal to 27/28 on the Tinetti test/assessment WITH use of AD demonstrating overall improved functional mobility and balance and reduce fall risk. (PROGRESSING, NOT MET)  4.  Pt will score greater than or equal to 22/24 on the DGI test/assessment WITH use of AD demonstrating overall improved functional mobility and balance and reduce fall risk. (PROGRESSING, NOT MET)  5.  Pt will walk < than or equal to 600 ft on the 6 minute walk test with AD and 0 LOB and minimal gait deviations for improved community ambulation.(PROGRESSING, NOT MET)  6. Pt will have 0 falls from start of PT sessions. (PROGRESSING, NOT MET)  7. Pt will have MMT score of 4+/5 in all major ms groups in [facility].(PROGRESSING, NOT MET)     Plan   Cont to progress PT as appropriate.   Flexibility, PROM, progression standing low level balance.    Philip Sawyer, MELISA

## 2019-06-07 NOTE — PROGRESS NOTES
Outpatient Neurological Rehabilitation   Speech and Language Therapy Daily Note  Date:  6/7/2019     Name: Ming Boateng   MRN: [id number]   Therapy Diagnosis:   Encounter Diagnosis   Name Primary?    Oropharyngeal dysphagia    Physician: Keith Harkins, *  Physician Orders: speech therapy evaluate and treat  Medical Diagnosis: G45.9 (ICD-10-CM) - TIA (transient ischemic attack)     Visit #/ Visits Authorized: 8  Date of Evaluation:  4/29/19  Insurance Authorization Period: 04/12/2019 to 04/11/2020     Plan of Care Expiration Date:  7/26/19  Visits Cancelled: 0  Visits No Show: 0    Time In:  1100  Time Out:  1145  Total Billable Time: 45 minutes     G-Code 8 / 10   Eval Level 5 Swallowing   Update      Precautions: Standard and Fall    Subjective:   Pt reports: Pt pleasant. No complaints.  present.   He was compliant to home exercise program. Pt reported doing his strategies at home.  Response to previous treatment: No significant change.    Pain Scale:  0/10 on VAS currently.   Pain Location: n/a  Objective:   TIMED  Procedure Min.             UNTIMED  Procedure Min.       Cognitive Communication Therapy  45 minutes   Total Timed Units: 3  Total Untimed Units: 0  Charges Billed/# of units: 3       Short Term Goals: (4 weeks) Current Progress:      1. Pt will complete visual scanning task with 85% acc given min A to improve attention to detail.  -14/17 acc   2. Pt will follow 2-3 step verbal directions with 85% acc given min A to improve auditory comprehension.  -Not addressed this session.    3. Pt will follow simple written directions with 85% acc given min A to improve reading comprehension.  X 10 with 60% acc Independently; 90% acc mod A   4. Pt will sequence 4 events with 85% acc given min A to improve organizational skills.  15/16 acc when presented verbally   5. Pt will participate in sustained attention and divided attention tasks with 85% acc given min A to improve attention to detail in  everyday tasks. -divided attention (five stack) with 23/25 acc; redirection x 2   8. Pt will recall and utilize memory strategies with 80% acc Independently.     -AVER in Mong[place]ian (Associate, Visualize, Write, Repeat)  -0/4 Independently    Note increased time to complete all tasks.    Patient Education/Response:   Educated on goals and plan of care. Pt reported understanding.      Written Home Exercises Provided: oral motor exercises x 20 each.  Exercises were reviewed and Ming was able to demonstrate them prior to the end of the session.  Ming demonstrated good  understanding of the education provided.     Assessment:   Ming is progressing well towards his goals. Improvement noted following written directions. Improvement noted in sequencing when presented verbally. Pt prognosis is Good. Pt will continue to benefit from skilled outpatient speech and language therapy to address the deficits listed in the problem list on initial evaluation, provide pt/family education and to maximize pt's level of independence in the home and community environment.     Medical necessity is demonstrated by the following IMPAIRMENTS:  Pt's memory deficits impair his ability to function safely in his everyday environment.   Barriers to Therapy: language  Pt's spiritual, cultural and educational needs considered and pt agreeable to plan of care and goals.    Plan:   Continue with POC to address sequencing, following directions, and attention.   Updated POC due 7/26/19.    Kalpana Vang CCC-SLP   6/7/2019

## 2019-06-10 ENCOUNTER — TELEPHONE (OUTPATIENT)
Dept: FAMILY MEDICINE | Facility: HOSPITAL | Age: 64
End: 2019-06-10

## 2019-06-10 ENCOUNTER — CLINICAL SUPPORT (OUTPATIENT)
Dept: REHABILITATION | Facility: HOSPITAL | Age: 64
End: 2019-06-10
Attending: PSYCHIATRY & NEUROLOGY
Payer: MEDICARE

## 2019-06-10 DIAGNOSIS — R53.1 LEFT-SIDED WEAKNESS: ICD-10-CM

## 2019-06-10 DIAGNOSIS — R26.89 DECREASED MOBILITY: ICD-10-CM

## 2019-06-10 DIAGNOSIS — R26.9 GAIT ABNORMALITY: ICD-10-CM

## 2019-06-10 PROCEDURE — 97140 MANUAL THERAPY 1/> REGIONS: CPT | Mod: PN

## 2019-06-10 PROCEDURE — 97110 THERAPEUTIC EXERCISES: CPT | Mod: PN

## 2019-06-10 NOTE — PROGRESS NOTES
"  Occupational Therapy Daily Treatment Note     Date: 6/10/2019  Patient: Ming Boateng  Chart Number: [id number]     Therapy Diagnosis:        Encounter Diagnoses   Name Primary?    Muscle weakness      Decreased coordination        Physician: Keith Harkins  Physician Orders: Eval and tx  Medical Diagnosis: G45.9 (ICD-10-CM) - TIA (transient ischemic attack)  Onset Date:  First stroke 3/3/19 second TIA on 4/11/19  Evaluation Date: 5/2/2019  Plan of Care Expiration Period: 6/28/19  Insurance Authorization period Expiration: 12/31/19  Date of Return to MD: SILVIA  Visit # / Visits Authortized: 8 / 12  FOTO: CVA UE survey/78% limitation      Time In:11:03  am   Time Out: 11:45 am  Total Billable (one on one) Time: 43 minutes 2 TE 1 MT  Todays Amount: 88.10  Total Amount 813.08     Precautions: Standard, Diabetes, blood thinners and Fall    Subjective     Pt reports: "Per  pt states being tired due to low blood pressure."  he was compliant with home exercise program given last session.   Response to previous treatment: increased hand use  Functional change: Improved hand use    Pain: 0/10  Location: left arms and hands      Objective     Mt: Pt recieved manual therapy consisting of PROM in all planes, joint mobilization (grades I-II) with gentle oscillations at acromioclavicular and glenohumeral joint along with myofascial release and STM to surrounding musculature (biceps, pects, deltoids, traps, triceps etc.) to decrease stiffness and pain with movements. 13 minutes     Ming received therapeutic exercises for 30 minutes including:  -Pt completed the following exercises below in order to increase ROM, strength and tolerance of affected UE in order to increase IND and functional use:    Exercises Date:6/10/2019     LUE Visit #7       Hand  Putty exercise  Mold    Roll   pinch Green   X 30    Green t bar [place] frowns  X 30    PROM (L/R) Shoulder FLEX/ABD/IR/ER 10x   Supine   Shoulder Flexion  Chest Press "   3 #  2/10   Supine Pec stretch   Open arm stretch with bolster between shoulder blades   3/30 sec   Pulleys 3 min    NDT circles  2 ways  X 10 CW and CCW           Home Exercises and Education Provided     Education provided:   - HEP previous session.   - Progress towards goals.    Written Home Exercises Provided: Patient instructed to cont prior HEP.  Exercises were reviewed and Ming was able to demonstrate them prior to the end of the session.  Ming demonstrated fair  understanding of the HEP provided.     See EMR under Patient Instructions for exercises provided prior visit.        Assessment   Pt reporting fatigue today due to low blood pressure; noted moving slower than usual with transitions/mobility. Pt tolerated shoulder strengthening exercises well with slight fatigue reported after last set of reps. Pt having trouble relaxing during MT; pain reported and restricted ROM seen with abduction and shoulder flexion due to tightness in pec muscle insertion. Pec muscle stretch and STM done to relieve tightness. Needed mod verbal cueing and visual/physical cueing during NDT circles (control of voluntary movement of shoulder). Next session to continue to focus on PROM, fine motor skills, and MT with supine strengthening.     Ming is progressing well towards his goals and there are no updates to goals at this time. Pt prognosis is Good.     Pt will continue to benefit from skilled outpatient occupational therapy to address the deficits listed in the problem list on initial evaluation provide pt/family education and to maximize pt's level of independence in the home and community environment.     Anticipated barriers to occupational therapy: communication, confusion    Pt's spiritual, cultural and educational needs considered and pt agreeable to plan of care and goals.    Goals:  Short Term Goals:  1) Initiate Hep MET   2) Pt to increase LUE  strength by 5 # in order to A in self care task of feeding by 4 weeks.  Progressing 6/10/2019  3) Pt to increase Barthel Index Score by 5 points increasing self care IND by 4 weeks. Progressing 6/10/2019  4) Pt to increase L UE AROM by 10 degrees in order to A in UB dressing by 4 weeks. Progressing 6/10/2019  5) Patient will be able to achieve less than or equal to 50% on the FOTO, demonstrating overall improved functional ability with upper extremity. (self-care category) Progressing 6/10/2019     Long Term Goals:  1) Pt to be IND with HEP in order to maintain ROM and strength needed for self care IND by d.c. Progressing 6/10/2019  2) Pt to increase L UE  strength to WNL as compared to unaffected extremity in order to open items for self feeding by d.c. Progressing 6/10/2019  3) Pt to increase Barthel Index Score by 10 points increasing self care IND at home by d.c. Progressing 6/10/2019  4) Pt to increase L UE AROM to WFL in order to A in UB dressing by d/c. Progressing 6/10/2019  5) Patient will be able to achieve less than or equal to 25% on the FOTO, demonstrating overall improved functional ability with upper extremity. (self-care category) Progressing 6/10/2019      Plan   Continue per initial POC.   Updates/Grading for next session: Progress as tolerated. Next session to continue to focus on PROM, fine motor skills, and MT with supine strengthening.    Tx methods: shoulder strengthening exercises, fine motor activities, MT, pec muscle stretching    RADHA Gao

## 2019-06-10 NOTE — PROGRESS NOTES
"  Physical Therapy Daily Treatment Note     Name: Ming Boateng  Clinic Number: [id number]    Therapy Diagnosis:   Encounter Diagnoses   Name Primary?    Decreased mobility     Gait abnormality      Physician: Khoobehi, Kaveh D., MD    Visit Date: 6/10/2019    Physician Orders: PT Eval and Treat  Medical Diagnosis from Referral: I69.359 (ICD-10-CM) - Hemiplegia and hemiparesis following cerebral infarction affecting unspecified side  Evaluation Date: 4/8/2019      Authorization Period Expiration: 4/9/19  Plan of Care Expiration: 6/7/19  Visit # / Visits authorized: 9/50   FOTO: 9/10  Cost: 90.96  Total: 774.67  Time In: 10:15 AM  Time Out: 11:00 AM  Total Billable Time: 40 minutes (3 TE)       Precautions: Standard, Fall, CHF and L side weakness, recent TIA    Check BP during session if symptomatic    Subjective     Pt reports:pt agreeable to PT session. Spoke to pt's daughter who reports pt had an episodes of low Blood Pressure today. Pt alert and agreeable to PT session.    He will be compliant with home exercise program once PT has a opportunity to do family training with dtr on exercise.  Response to previous treatment: feeling better  Functional change: improved transfer, improved BP    Pain: 0/10  Location: left side     Objective     Ming participated in therapeutic exercise to improve ROM and strengthening. 40 minutes  -Manual gastroc st  20"x3 B supine   -Manual HSS st  20"x3 B supine  -Manual Piriformis st  20"x3 B supine   -lumbar bridges  2x10 B  -SLR    2x10 B  -SAQ    2x15 B 2 #  -clamshells   2 x 10 w RTB  -hip adduction iso  W/ yellow ball 5"x20  -seated LAQ   2x10 B    Vitals:assessed at initial session:  119/72 BP (seated), HR: 66 bpm, O2 sats 99%  Vitals assessed at mid session :  118/58 BP (supine), HR: 73 bpm , O2 sats: 99%  Vitals assessed at end session:   98/58 BP (seated), HR:71 bpm , O2 sats: 98%     Home Exercises Provided and Patient Education NOT Provided     Education provided:   - " encouraged safety with use of RW   - encouraged blood pressure monitoring   - notify MD if blood pressure continues to drop.    Written Home Exercises Provided:   Next session  Assessment     Pt BP WNL for initial session today. Pt asymptomatic and pleasant throughout session. Able complete modified supine/ seated activities today with no adverse reactions.      Ming is progressing well towards his goals.   Pt prognosis is Good.     Pt will continue to benefit from skilled outpatient physical therapy to address the deficits listed in the problem list box on initial evaluation, provide pt/family education and to maximize pt's level of independence in the home and community environment.     Pt's spiritual, cultural and educational needs considered and pt agreeable to plan of care and goals.     Anticipated barriers to physical therapy: spasticity, confusion, sedentary routine, recent TIA    Goals:  Short Term Goals (4 Weeks):   1.  Independent with initial HEP. Not issued at this time  2.  Pt will perform nu-step at level 3.0 x 10 minutes without rests breaks going at least 60 step/min or greater. MET  3. Pt will be able to perform TUG in 35 secs WITH use of AD demonstrating overall improved functional mobility.  (PROGRESSING, NOT MET)  4. Pt will performed sit to stand x 5 B UE support in 26 seconds without LOB backward or posterior LE hooking for functional and safe transfers. (PROGRESSING, NOT MET)  5.  Pt will score greater than or equal to 25/28 on the Tinetti test/assessment WITH use of AD demonstrating overall improved functional mobility and balance and reduce fall risk. (PROGRESSING, NOT MET)  6. Pt will walk < than or equal to 180 ft on the 2 minute walk test indoor WITH AD with 0 LOB and minimal gait deviations for improved safety in home ambulation and safety. (PROGRESSING, NOT MET)     Long Term Goals (8 Weeks):   1. Pt will be able to perform TUG in 25 secs WITH use of AD demonstrating overall improved  functional mobility. (PROGRESSING, NOT MET)  2. Pt will performed sit to stand x 5 WITH UE support in 20 seconds without LOB backward or posterior LE hooking for functional and safe transfers. (PROGRESSING, NOT MET)  3.  Pt will score greater than or equal to 27/28 on the Tinetti test/assessment WITH use of AD demonstrating overall improved functional mobility and balance and reduce fall risk. (PROGRESSING, NOT MET)  4.  Pt will score greater than or equal to 22/24 on the DGI test/assessment WITH use of AD demonstrating overall improved functional mobility and balance and reduce fall risk. (PROGRESSING, NOT MET)  5.  Pt will walk < than or equal to 600 ft on the 6 minute walk test with AD and 0 LOB and minimal gait deviations for improved community ambulation.(PROGRESSING, NOT MET)  6. Pt will have 0 falls from start of PT sessions. (PROGRESSING, NOT MET)  7. Pt will have MMT score of 4+/5 in all major ms groups in [facility].(PROGRESSING, NOT MET)     Plan   Cont to progress PT as appropriate. MONITOR  BP  Flexibility, PROM, progression standing low level balance.    Philip Sawyer, PTA

## 2019-06-10 NOTE — TELEPHONE ENCOUNTER
----- Message from Adela Butcher sent at 6/10/2019 12:37 PM CDT -----  PT needs a referral to a dietician. Daughter spoke to [employer] and was told to ask his primary care . Please call pt back

## 2019-06-12 ENCOUNTER — CLINICAL SUPPORT (OUTPATIENT)
Dept: REHABILITATION | Facility: HOSPITAL | Age: 64
End: 2019-06-12
Attending: PSYCHIATRY & NEUROLOGY
Payer: MEDICARE

## 2019-06-12 DIAGNOSIS — R13.12 OROPHARYNGEAL DYSPHAGIA: ICD-10-CM

## 2019-06-12 DIAGNOSIS — R53.1 LEFT-SIDED WEAKNESS: ICD-10-CM

## 2019-06-12 DIAGNOSIS — R26.9 GAIT ABNORMALITY: ICD-10-CM

## 2019-06-12 DIAGNOSIS — R26.89 DECREASED MOBILITY: ICD-10-CM

## 2019-06-12 PROCEDURE — 97116 GAIT TRAINING THERAPY: CPT | Mod: PN

## 2019-06-12 PROCEDURE — 97127 HC THERAPEUTIC INTVTN, COGN FUNCTION - ST: CPT | Mod: PN

## 2019-06-12 PROCEDURE — 97110 THERAPEUTIC EXERCISES: CPT | Mod: PN

## 2019-06-12 PROCEDURE — 97112 NEUROMUSCULAR REEDUCATION: CPT | Mod: PN

## 2019-06-12 NOTE — PLAN OF CARE
"  Outpatient Therapy Updated Plan of Care     Visit Date: 6/12/2019  Name: Ming Boateng  Clinic Number: [id number]    Therapy Diagnosis:   Encounter Diagnoses   Name Primary?    Decreased mobility     Gait abnormality      Physician: Khoobehi, Kaveh D., MD    Physician Orders: PT Eval and Treat  Medical Diagnosis from Referral: I69.359 (ICD-10-CM) - Hemiplegia and hemiparesis following cerebral infarction affecting unspecified side  Evaluation Date: 4/8/2019     Total Visits Received: 8  Cancelled Visits: <3  No Show Visits: 0    Current Certification Period:  4/8/19 to 6/7/19  Precautions:  standard  Visits from Evaluation Date:  10  Functional Level Prior to Evaluation:  See initial eval     Subjective     Update: see tx note    Objective     Update:   Lower Extremity Strength    RLE LLE   Hip Flexion: 5/5 4/5   Hip Extension:  NT NT   Hip Abduction: 5/5 4-/5   Hip Adduction: NT NT   Knee Extension: 5/5 4/5   Knee Flexion: 5/5 4/5   Ankle Dorsiflexion: 4/5 4/5   Ankle Plantarflexion: 4/5 4/5   Ankle Inversion: NT NT   Ankle Eversion: NT NT      Gait Assessment:   - AD used: RW  - Assistance: SBA  - Distance: See Tinetti  - Stairs: Not tested     Gait Analysis:  Deviations noted: Gait shows little deviation when using RW. Gait without RW was not tested     TINETTI BALANCE ASSESSMENT TOOL  BALANCE SECTION  1.Sitting Balance:   Steady; safe = 1  2.Rises from chair :  Able, uses arms to help = 1  3.Attempts to arise :  Able to arise, 1 attempt = 2  4.Immediate standing Balance (first 5 seconds) : Steady but uses walker or other support = 1  5.Standing balance: Steady but wide stance (medal heel>4" apart) & uses cane or other support = 1  6.Nudged : Steady = 2  7.Eyes closed: Steady = 1  8.Turning 360 degrees:  Discontinuous steps = 0 and Steady = 1  9.Sitting Down : Uses arms or not a smooth motion = 1  Balance Score:  11/16    GAIT SECTION  10.Initiation of Gait (Immediately after told to go.): No hesitancy = " 1  11.Step length and height :  Step through R=1 and Step through L=1  12.Foot Clearance :  B foot clears floor=2  13.Step symmetry: Right & Left step length appear equal = 1  14.Step continuity : Steps appear continuous = 1  15.Path: Marked deviation = 0  16.Trunk : No sway, but flexion of knees or back or spreads arm out while walking = 1  17.Walking Time: Heels amost touching while walking = 1    Gait Score: 9/12  Balance score:11/16  Total Score=Balance + Gait Score: 20/28     Total Score=Balance + Gait Score: 23/28 on eval      5x sit to stand: 28 sec with UE use (36 secs on eval with UE use)  2 min walk: 220 ft with RW 0 LOB (130 ft with RW on eval)  6 minute walk test: 745 ft without RW  TUG with AD: 29 sec with RW, (47 secs on eval)    Assessment     Update: Pt is a 64 year old male s/p CVA and then TIA after initial eval with mild decline in function in the month of may after TIA.  Pt cognitive impairments and decreased initiation and language barrier is limiting his progression in mobility.  He has made progress in gait speed and transfer.    Goals:  Short Term Goals (4 Weeks):   1.  Independent with initial HEP. Not issued at this time  2.  Pt will perform nu-step at level 3.0 x 10 minutes without rests breaks going at least 60 step/min or greater. MET  3. Pt will be able to perform TUG in 35 secs WITH use of AD demonstrating overall improved functional mobility.  MET 29 secs  4. Pt will performed sit to stand x 5 B UE support in 26 seconds without LOB backward or posterior LE hooking for functional and safe transfers. (PROGRESSING, NOT MET) 28 secs  5.  Pt will score greater than or equal to 25/28 on the Tinetti test/assessment WITH use of AD demonstrating overall improved functional mobility and balance and reduce fall risk. (PROGRESSING, NOT MET)  6. Pt will walk < than or equal to 180 ft on the 2 minute walk test indoor WITH AD with 0 LOB and minimal gait deviations for improved safety in home  ambulation and safety. MET     Long Term Goals (8 Weeks):   1. Pt will be able to perform TUG in 25 secs WITH use of AD demonstrating overall improved functional mobility. (PROGRESSING, NOT MET)  2. Pt will performed sit to stand x 5 WITH UE support in 20 seconds without LOB backward or posterior LE hooking for functional and safe transfers. (PROGRESSING, NOT MET)  3.  Pt will score greater than or equal to 27/28 on the Tinetti test/assessment WITH use of AD demonstrating overall improved functional mobility and balance and reduce fall risk. (PROGRESSING, NOT MET)  4.  Pt will score greater than or equal to 22/24 on the DGI test/assessment WITH use of AD demonstrating overall improved functional mobility and balance and reduce fall risk. (PROGRESSING, NOT MET)  5.  Pt will walk < than or equal to 600 ft on the 6 minute walk test with AD and 0 LOB and minimal gait deviations for improved community ambulation. MET  6. Pt will have 0 falls from start of PT sessions.  (PROGRESSING, NOT MET)  7. Pt will have MMT score of 4+/5 in all major ms groups in [facility].(PROGRESSING, NOT MET)    Long Term Goal Status:   continue per initial plan of care.  Reasons for Recertification of Therapy: exp of current care plan    Plan     Updated Certification Period: 6/12/2019 to 8/9/19  Recommended Treatment Plan: 2 times per week for 8 weeks: Gait Training, Manual Therapy, Moist Heat/ Ice, Neuromuscular Re-ed, Patient Education, Self Care, Therapeutic Activites and Therapeutic Exercise  Other Recommendations: continue SLP    Livier العراقي, PT  6/12/2019      I CERTIFY THE NEED FOR THESE SERVICES FURNISHED UNDER THIS PLAN OF TREATMENT AND WHILE UNDER MY CARE    Physician's comments:        Physician's Signature: ___________________________________________________

## 2019-06-12 NOTE — PROGRESS NOTES
Outpatient Neurological Rehabilitation   Speech and Language Therapy Daily Note  Date:  6/12/2019     Name: Ming Boateng   MRN: [id number]   Therapy Diagnosis:   Encounter Diagnosis   Name Primary?    Oropharyngeal dysphagia    Physician: Keith Harkins, *  Physician Orders: speech therapy evaluate and treat  Medical Diagnosis: G45.9 (ICD-10-CM) - TIA (transient ischemic attack)     Visit #/ Visits Authorized: 9  Date of Evaluation:  4/29/19  Insurance Authorization Period: 04/12/2019 to 04/11/2020     Plan of Care Expiration Date:  7/26/19  Visits Cancelled: 0  Visits No Show: 0    Time In:  1100  Time Out:  1145  Total Billable Time: 45 minutes     G-Code 9 / 10   Eval Level 5 Swallowing   Update      Precautions: Standard and Fall    Subjective:   Pt reports: Pt pleasant. No complaints.  present.   He was not compliant to home exercise program. Pt reported misplacing his home program assignment before completing.   Response to previous treatment: No significant change.    Pain Scale:  0/10 on VAS currently.   Pain Location: n/a  Objective:   TIMED  Procedure Min.             UNTIMED  Procedure Min.       Cognitive Communication Therapy  45 minutes   Total Timed Units: 3  Total Untimed Units: 0  Charges Billed/# of units: 3       Short Term Goals: (4 weeks) Current Progress:      1. Pt will complete visual scanning task with 85% acc given min A to improve attention to detail.  -17/23, 73% acc Independently 1st attempt; 100% acc with multiple scans   2. Pt will follow 2-3 step verbal directions with 85% acc given min A to improve auditory comprehension.  -Not addressed this session.    3. Pt will follow simple written directions with 85% acc given min A to improve reading comprehension.  X 19 with 37% acc Independently; 79% acc mod A   4. Pt will sequence 4 events with 85% acc given min A to improve organizational skills.  25% acc Independently; 75% acc mod-max A   5. Pt will participate in sustained  "attention and divided attention tasks with 85% acc given min A to improve attention to detail in everyday tasks. -Not addressed this session.   8. Pt will recall and utilize memory strategies with 80% acc Independently.     -AVER in [place] (Associate, Visualize, Write, Repeat)  -0/4 Independently    Note increased time to complete all tasks.    Patient Education/Response:   Educated on goals and plan of care. Pt reported understanding.      Written Home Exercises Provided: oral motor exercises x 20 each.  Exercises were reviewed and Ming was able to demonstrate them prior to the end of the session.  Ming demonstrated good  understanding of the education provided.     Assessment:   Ming is progressing well towards his goals. Decrease noted following written directions. Decrease noted in sequencing.  Decrease in visual scanning.  Pt prognosis is Good. Pt will continue to benefit from skilled outpatient speech and language therapy to address the deficits listed in the problem list on initial evaluation, provide pt/family education and to maximize pt's level of independence in the home and community environment.     Medical necessity is demonstrated by the following IMPAIRMENTS:  Pt's memory deficits impair his ability to function safely in his everyday environment.   Barriers to Therapy: language  Pt's spiritual, cultural and educational needs considered and pt agreeable to plan of care and goals.    Plan:   Continue with POC to address sequencing, following directions, and attention. Incorporate visual cues during sequencing task.   Updated POC due 7/26/19.    SALOMON Dumont.S.    Clinician    "I, Kalpana Vang, certify that I was present in the room directing the student and service delivery and guiding them using my skilled judgement. As the co-signing therapist, I have reviewed the student's documentation, and am responsible for the treatment, assessment and plan."     Kalpana Vang, CCC-SLP "   6/12/2019

## 2019-06-12 NOTE — PROGRESS NOTES
Physical Therapy Daily Treatment Note     Name: Ming Boateng  Clinic Number: [id number]    Therapy Diagnosis:   Encounter Diagnoses   Name Primary?    Decreased mobility     Gait abnormality      Physician: Khoobehi, Kaveh D., MD    Visit Date: 6/12/2019    Physician Orders: PT Eval and Treat  Medical Diagnosis from Referral: I69.359 (ICD-10-CM) - Hemiplegia and hemiparesis following cerebral infarction affecting unspecified side  Evaluation Date: 4/8/2019      Authorization Period Expiration: 7/10/19  Plan of Care Expiration: 8/9/19  Visit # / Visits authorized: 8/12  FOTO: 10/10 done  Gcode: 10/10 done    Cost: 94.77  Total: 870.00  Time In: 10:25 AM  Time Out: 11:05 AM  Total Billable Time: 40 minutes (1 gait, 1NMR, 1TE)    Precautions: Standard, Fall, CHF and L side weakness, recent TIA    Check BP during session if symptomatic    Subjective     Pt reports: no new complaints. Pt's dtr did not escort him it today.  .He will be compliant with home exercise program once PT has a opportunity to do family training with dtr on exercise.  Response to previous treatment: mild improvement in path finding  Functional change: faster more coordinated gait.    Pain: 0/10  Location: left side     Objective     PT performed objective measure to assess gait x 10', to assess neuromuscular control x 20' and to assess function and strength x 10'.     Lower Extremity Strength    RLE LLE   Hip Flexion: 5/5 4/5   Hip Extension:  NT NT   Hip Abduction: 5/5 4-/5   Hip Adduction: NT NT   Knee Extension: 5/5 4/5   Knee Flexion: 5/5 4/5   Ankle Dorsiflexion: 4/5 4/5   Ankle Plantarflexion: 4/5 4/5   Ankle Inversion: NT NT   Ankle Eversion: NT NT      Gait Assessment:   - AD used: RW  - Assistance: SBA  - Distance: See Tinetti  - Stairs: Not tested     Gait Analysis:  Deviations noted: Gait shows little deviation when using RW. Gait without RW was not tested     TINETTI BALANCE ASSESSMENT TOOL  BALANCE SECTION  1.Sitting Balance:   Steady;  "safe = 1  2.Rises from chair :  Able, uses arms to help = 1  3.Attempts to arise :  Able to arise, 1 attempt = 2  4.Immediate standing Balance (first 5 seconds) : Steady but uses walker or other support = 1  5.Standing balance: Steady but wide stance (medal heel>4" apart) & uses cane or other support = 1  6.Nudged : Steady = 2  7.Eyes closed: Steady = 1  8.Turning 360 degrees:  Discontinuous steps = 0 and Steady = 1  9.Sitting Down : Uses arms or not a smooth motion = 1  Balance Score:  11/16    GAIT SECTION  10.Initiation of Gait (Immediately after told to go.): No hesitancy = 1  11.Step length and height :  Step through R=1 and Step through L=1  12.Foot Clearance :  B foot clears floor=2  13.Step symmetry: Right & Left step length appear equal = 1  14.Step continuity : Steps appear continuous = 1  15.Path: Marked deviation = 0  16.Trunk : No sway, but flexion of knees or back or spreads arm out while walking = 1  17.Walking Time: Heels amost touching while walking = 1    Gait Score: 9/12  Balance score:11/16  Total Score=Balance + Gait Score: 20/28     Total Score=Balance + Gait Score: 23/28 on eval      5x sit to stand: 28 sec with UE use (36 secs on eval with UE use)  2 min walk: 220 ft with RW 0 LOB (130 ft with RW on eval)  6 minute walk test: 745 ft without RW  TUG with AD: 29 sec with RW, (47 secs on eval)    Vitals:assessed at initial session:  /66  HR 70 bpm     Home Exercises Provided and Patient Education NOT Provided     Education provided:   - encouraged safety with use of RW   - encouraged blood pressure monitoring   - notify MD if blood pressure continues to drop.    Pt is unable to perform HEP due to cognitive deficits     Assessment     See updated POC    Ming is progressing well towards his goals.   Pt prognosis is Good.     Pt will continue to benefit from skilled outpatient physical therapy to address the deficits listed in the problem list box on initial evaluation, provide pt/family " education and to maximize pt's level of independence in the home and community environment.     Pt's spiritual, cultural and educational needs considered and pt agreeable to plan of care and goals.     Anticipated barriers to physical therapy: spasticity, confusion, sedentary routine, recent TIA    Goals:  Short Term Goals (4 Weeks):   1.  Independent with initial HEP. Not issued at this time  2.  Pt will perform nu-step at level 3.0 x 10 minutes without rests breaks going at least 60 step/min or greater. MET  3. Pt will be able to perform TUG in 35 secs WITH use of AD demonstrating overall improved functional mobility.  MET 29 secs  4. Pt will performed sit to stand x 5 B UE support in 26 seconds without LOB backward or posterior LE hooking for functional and safe transfers. (PROGRESSING, NOT MET) 28 secs  5.  Pt will score greater than or equal to 25/28 on the Tinetti test/assessment WITH use of AD demonstrating overall improved functional mobility and balance and reduce fall risk. (PROGRESSING, NOT MET)  6. Pt will walk < than or equal to 180 ft on the 2 minute walk test indoor WITH AD with 0 LOB and minimal gait deviations for improved safety in home ambulation and safety. MET     Long Term Goals (8 Weeks):   1. Pt will be able to perform TUG in 25 secs WITH use of AD demonstrating overall improved functional mobility. (PROGRESSING, NOT MET)  2. Pt will performed sit to stand x 5 WITH UE support in 20 seconds without LOB backward or posterior LE hooking for functional and safe transfers. (PROGRESSING, NOT MET)  3.  Pt will score greater than or equal to 27/28 on the Tinetti test/assessment WITH use of AD demonstrating overall improved functional mobility and balance and reduce fall risk. (PROGRESSING, NOT MET)  4.  Pt will score greater than or equal to 22/24 on the DGI test/assessment WITH use of AD demonstrating overall improved functional mobility and balance and reduce fall risk. (PROGRESSING, NOT MET)  5.   Pt will walk < than or equal to 600 ft on the 6 minute walk test with AD and 0 LOB and minimal gait deviations for improved community ambulation. MET  6. Pt will have 0 falls from start of PT sessions.  (PROGRESSING, NOT MET)  7. Pt will have MMT score of 4+/5 in all major ms groups in [facility].(PROGRESSING, NOT MET)     Plan   Cont to progress PT as appropriate. MONITOR  BP  Flexibility, PROM, progression standing low level balance.    Livier العراقي, PT

## 2019-06-12 NOTE — PROGRESS NOTES
"  Occupational Therapy Daily Treatment Note     Date: 6/12/2019  Patient: Ming Boateng  Chart Number: [id number]     Therapy Diagnosis:   1. Left-sided weakness     Physician: Keith Harkins    Physician Orders: Eval and tx  Medical Diagnosis: G45.9 (ICD-10-CM) - TIA (transient ischemic attack)  Onset Date:  First stroke 3/3/19 second TIA on 4/11/19  Evaluation Date: 5/2/2019  Plan of Care Expiration Period: 6/28/19  Insurance Authorization period Expiration: 12/31/19  Date of Return to MD: SILVIA  Visit # / Visits Authortized: 10 / 12  FOTO: CVA UE survey/30% limitation in hand function      Time In:11:46  am   Time Out: 12:25 pm  Total Billable (one on one) Time: 39 minutes 3 TE   Todays Amount: 88.10  Total Amount 813.08     Precautions: Standard, Diabetes, blood thinners and Fall    Subjective     Pt reports: "per , pt feeling tired from previous therapy sessions right before this session."  he was compliant with home exercise program given last session.   Response to previous treatment: Good  Functional change: no change reported     Pain: 0/10  Location: left arms and hands      Objective     Ming received therapeutic exercises for 39 minutes including:  -Pt completed the following exercises below in order to increase ROM, strength and tolerance of affected UE in order to increase IND and functional use:    Exercises Date:6/12/2019     LUE Visit #10    RPM x3 min forward, x 3 min backwards   Hand  Putty exercise  Mold    Roll   pinch Green   X 30            Pulleys 3 min              Reassessment included in TE time:   Strength: (DAHIANA Dynamometer in lbs.) Average 3 trials, Position II:       5/2/2019 6/12/2019     Left Left   Rung II 25# 29, 39, 34#  34#      Pinch Strength (Measured in psi)       5/2/2019 6/12/2019     Left Left   Key Pinch 8 psi 10 psi   3pt Pinch 5 psi 7 psi   2pt Pinch 3 psi 4 psi      Fine Motor Coordination: 9 Hole Peg Test  9 Peg Test Left  5/2/2019 " Left  6/12/2019   Removed 9/9 9/9   Replaced 9/9 9/9  Dropped 5   Time 2:42 sec 2:13 sec       Home Exercises and Education Provided     Education provided:   - HEP previous session.   - Progress towards goals.    Written Home Exercises Provided: Patient instructed to cont prior HEP.  Exercises were reviewed and Ming was able to demonstrate them prior to the end of the session.  Ming demonstrated fair  understanding of the HEP provided.     See EMR under Patient Instructions for exercises provided prior visit.        Assessment   Reassessment done today. Pt gained 9# of  strength in his L hand and increased all pinch strengths. Pt completed 9 hole peg test 29 seconds faster but dropped 5 pegs during test. Independently completed t putty exercises. Pt needed extended amount of time to complete FOTO. Pt moving slower than normal during transitions/mobility and needing extended time to understand instructions of tasks/reassessment. Next session to continue to focus on PROM, fine motor skills, and MT with supine strengthening.     Ming is progressing well towards his goals and there are no updates to goals at this time. Pt prognosis is Good.     Pt will continue to benefit from skilled outpatient occupational therapy to address the deficits listed in the problem list on initial evaluation provide pt/family education and to maximize pt's level of independence in the home and community environment.     Anticipated barriers to occupational therapy: communication, confusion    Pt's spiritual, cultural and educational needs considered and pt agreeable to plan of care and goals.    Goals:  Short Term Goals:  1) Initiate Hep MET   2) Pt to increase LUE  strength by 5 # in order to A in self care task of feeding by 4 weeks. MET 6/12/2019  3) Pt to increase Barthel Index Score by 5 points increasing self care IND by 4 weeks. Progressing 6/12/2019  4) Pt to increase L UE AROM by 10 degrees in order to A in UB dressing by  4 weeks. Progressing 6/12/2019  5) Patient will be able to achieve less than or equal to 50% on the FOTO, demonstrating overall improved functional ability with upper extremity. (self-care category) MET 6/12/2019   Long Term Goals:  1) Pt to be IND with HEP in order to maintain ROM and strength needed for self care IND by [place] Progressing 6/12/2019  2) Pt to increase L UE  strength to WNL as compared to unaffected extremity in order to open items for self feeding by [place] Progressing 6/12/2019  3) Pt to increase Barthel Index Score by 10 points increasing self care IND at home by [place] Progressing 6/12/2019  4) Pt to increase L UE AROM to WFL in order to A in UB dressing by d/cMary Progressing 6/12/2019  5) Patient will be able to achieve less than or equal to 25% on the FOTO, demonstrating overall improved functional ability with upper extremity. (self-care category) Progressing 6/12/2019    Plan   Continue per initial POC.   Updates/Grading for next session: Progress as tolerated. Next session to continue to focus on PROM, fine motor skills, and MT with supine strengthening.    Tx methods: shoulder strengthening exercises, fine motor activities, MT, pec muscle stretching    RADHA Gao

## 2019-06-14 ENCOUNTER — DOCUMENTATION ONLY (OUTPATIENT)
Dept: FAMILY MEDICINE | Facility: HOSPITAL | Age: 64
End: 2019-06-14

## 2019-06-14 DIAGNOSIS — Z79.4 TYPE 2 DIABETES MELLITUS WITH COMPLICATION, WITH LONG-TERM CURRENT USE OF INSULIN: ICD-10-CM

## 2019-06-14 DIAGNOSIS — E11.22 CKD STAGE 4 DUE TO TYPE 2 DIABETES MELLITUS: Primary | ICD-10-CM

## 2019-06-14 DIAGNOSIS — E11.8 TYPE 2 DIABETES MELLITUS WITH COMPLICATION, WITH LONG-TERM CURRENT USE OF INSULIN: ICD-10-CM

## 2019-06-14 DIAGNOSIS — N18.4 CKD STAGE 4 DUE TO TYPE 2 DIABETES MELLITUS: Primary | ICD-10-CM

## 2019-06-14 RX ORDER — PEN NEEDLE, DIABETIC 29 G X1/2"
NEEDLE, DISPOSABLE MISCELLANEOUS
Qty: 360 EACH | Refills: 3 | Status: SHIPPED | OUTPATIENT
Start: 2019-06-14 | End: 2019-10-11 | Stop reason: SDUPTHER

## 2019-06-14 RX ORDER — INSULIN ASPART 100 [IU]/ML
7 INJECTION, SOLUTION INTRAVENOUS; SUBCUTANEOUS
Qty: 18.9 ML | Refills: 3 | Status: SHIPPED | OUTPATIENT
Start: 2019-06-14 | End: 2019-10-11 | Stop reason: SDUPTHER

## 2019-06-17 ENCOUNTER — CLINICAL SUPPORT (OUTPATIENT)
Dept: REHABILITATION | Facility: HOSPITAL | Age: 64
End: 2019-06-17
Attending: PSYCHIATRY & NEUROLOGY
Payer: MEDICARE

## 2019-06-17 DIAGNOSIS — R53.1 LEFT-SIDED WEAKNESS: ICD-10-CM

## 2019-06-17 DIAGNOSIS — R26.9 GAIT ABNORMALITY: ICD-10-CM

## 2019-06-17 DIAGNOSIS — R26.89 DECREASED MOBILITY: ICD-10-CM

## 2019-06-17 DIAGNOSIS — R13.12 OROPHARYNGEAL DYSPHAGIA: ICD-10-CM

## 2019-06-17 PROCEDURE — 97110 THERAPEUTIC EXERCISES: CPT | Mod: PN

## 2019-06-17 PROCEDURE — 97127 HC THERAPEUTIC INTVTN, COGN FUNCTION - ST: CPT | Mod: PN

## 2019-06-17 PROCEDURE — G8996 SWALLOW CURRENT STATUS: HCPCS | Mod: CI,PN

## 2019-06-17 PROCEDURE — 97112 NEUROMUSCULAR REEDUCATION: CPT | Mod: PN

## 2019-06-17 PROCEDURE — G8998 SWALLOW D/C STATUS: HCPCS | Mod: CI,PN

## 2019-06-17 NOTE — PROGRESS NOTES
"  Occupational Therapy Daily Treatment Note     Date: 6/17/2019  Patient: Ming Boateng  Chart Number: [id number]     Therapy Diagnosis:   1. Left-sided weakness        Physician: Keith Harkins    Physician Orders: Eval and tx  Medical Diagnosis: G45.9 (ICD-10-CM) - TIA (transient ischemic attack)  Onset Date:  First stroke 3/3/19 second TIA on 4/11/19  Evaluation Date: 5/2/2019  Plan of Care Expiration Period: 6/28/19  Insurance Authorization period Expiration: 12/31/19  Date of Return to MD: NA  Visit # / Visits Authortized: 11 / 12  FOTO: CVA UE survey/30% limitation in hand function      Time In:11:46  am   Time Out: 12:30 pm  Total Billable (one on one) Time: 39 minutes 3 TE   Todays Amount: 88.10  Total Amount 901.18     Precautions: Standard, Diabetes, blood thinners and Fall    Subjective     Pt reports: "per , pt feeling better that he has gotten stronger with therapy and is ok to d/c next visit. ."  he was compliant with home exercise program given last session.   Response to previous treatment: Good  Functional change: no change reported     Pain: 0/10  Location: left arms and hands      Objective     Ming received therapeutic exercises for 39 minutes including:  -Pt completed the following exercises below in order to increase ROM, strength and tolerance of affected UE in order to increase IND and functional use:    Exercises Date:6/17/2019     LUE Visit #10    RPM x3 min forward, x 3 min backwards   Hand  Putty exercise  Mold    Roll   pinch Green   X 30        Shoulder    Supine pect stretch with bolster 3/30"   Chest press  Shoulder flexion 4# dowel  2/15   Seated bicep curls 4# dowel  2/15         Home Exercises and Education Provided     Education provided:   - HEP previous session.   - Progress towards goals.    Written Home Exercises Provided: Patient instructed to cont prior HEP.  Exercises were reviewed and Ming was able to demonstrate them prior to the end of the session.  " Ming demonstrated fair  understanding of the HEP provided.     See EMR under Patient Instructions for exercises provided prior visit.        Assessment   Pt tolerated session well. No increased pain. He tolerated established exercises well. Verbalized understanding to d/c next session via pt  report. He has made good gains with skilled services. Next session to focus on upgrading HEP.   Ming is progressing well towards his goals and there are no updates to goals at this time. Pt prognosis is Good.     Pt will continue to benefit from skilled outpatient occupational therapy to address the deficits listed in the problem list on initial evaluation provide pt/family education and to maximize pt's level of independence in the home and community environment.     Anticipated barriers to occupational therapy: communication, confusion    Pt's spiritual, cultural and educational needs considered and pt agreeable to plan of care and goals.    Goals:  Short Term Goals:  1) Initiate Hep MET   2) Pt to increase LUE  strength by 5 # in order to A in self care task of feeding by 4 weeks. MET 6/12/2019  3) Pt to increase Barthel Index Score by 5 points increasing self care IND by 4 weeks. Progressing 6/17/2019  4) Pt to increase L UE AROM by 10 degrees in order to A in UB dressing by 4 weeks. Progressing 6/17/2019  5) Patient will be able to achieve less than or equal to 50% on the FOTO, demonstrating overall improved functional ability with upper extremity. (self-care category) MET 6/12/2019   Long Term Goals:  1) Pt to be IND with HEP in order to maintain ROM and strength needed for self care IND by d.c. Progressing 6/17/2019  2) Pt to increase L UE  strength to WNL as compared to unaffected extremity in order to open items for self feeding by [email or web address]. Progressing 6/17/2019  3) Pt to increase Barthel Index Score by 10 points increasing self care IND at home by [email or web address] Progressing 6/17/2019  4) Pt to increase L UE AROM  to WFL in order to A in UB dressing by d/c. Progressing 6/17/2019  5) Patient will be able to achieve less than or equal to 25% on the FOTO, demonstrating overall improved functional ability with upper extremity. (self-care category) Progressing 6/17/2019    Plan   Continue per initial POC.   Updates/Grading for next session: Progress as tolerated. Next session to continue to focus on PROM, fine motor skills, and MT with supine strengthening.    Tx methods: shoulder strengthening exercises, fine motor activities, MT, pec muscle stretching    Johann Mendez, OT

## 2019-06-17 NOTE — PROGRESS NOTES
"  Physical Therapy Daily Treatment Note     Name: Ming Boateng  Clinic Number: [id number]    Therapy Diagnosis:   Encounter Diagnoses   Name Primary?    Decreased mobility     Gait abnormality      Physician: Khoobehi, Kaveh D., MD    Visit Date: 6/17/2019    Physician Orders: PT Eval and Treat  Medical Diagnosis from Referral: I69.359 (ICD-10-CM) - Hemiplegia and hemiparesis following cerebral infarction affecting unspecified side  Evaluation Date: 4/8/2019      Authorization Period Expiration: 7/10/19  Plan of Care Expiration: 8/9/19  Visit # / Visits authorized: 9/12  FOTO: 1/10   Gcode: 1/10     Cost: 95.11  Total: 965.11  Time In: 11:05 AM  Time Out: 11:45 AM  Total Billable Time: 40 minutes (1NMR, 2TE)    Precautions: Standard, Fall, CHF and L side weakness, recent TIA    Check BP during session if symptomatic    Subjective     Pt reports: daughter reports pt's blood pressure has not dropped since his last session. Pt agreeable to PT session.   .He will be compliant with home exercise program once PT has a opportunity to do family training with dtr on exercise.  Response to previous treatment: no adverse reactions  Functional change: increased alertness today,  more coordinated gait.    Pain: 0/10  Location: left side     Objective     Ming completed therapeutic exercise for B LE stregnthening and AROM:   -Steam boats    2x10 B   -step ups    2x10 B Blue step in //  -heel raises   2x10 B in //  -mini squats   2x10 B in // bars  -LAQ    2x10 B   -hip adductions iso  5"x20 w/ yellow ball  -Hip abduction   Seated w/ GTB 2x15 B  Home Exercises Provided and Patient Education NOT Provided     Education provided:   - encouraged safety with use of RW   - encouraged blood pressure monitoring        Pt is unable to perform HEP due to cognitive deficits     Assessment     Pt completed session with no symptoms of Low BP, we seemed more alert and more cooperative today throughout session.     Ming is progressing well towards " his goals.   Pt prognosis is Good.     Pt will continue to benefit from skilled outpatient physical therapy to address the deficits listed in the problem list box on initial evaluation, provide pt/family education and to maximize pt's level of independence in the home and community environment.     Pt's spiritual, cultural and educational needs considered and pt agreeable to plan of care and goals.     Anticipated barriers to physical therapy: spasticity, confusion, sedentary routine, recent TIA    Goals:  Short Term Goals (4 Weeks):   1.  Independent with initial HEP. Not issued at this time  2.  Pt will perform nu-step at level 3.0 x 10 minutes without rests breaks going at least 60 step/min or greater. MET  3. Pt will be able to perform TUG in 35 secs WITH use of AD demonstrating overall improved functional mobility.  MET 29 secs  4. Pt will performed sit to stand x 5 B UE support in 26 seconds without LOB backward or posterior LE hooking for functional and safe transfers. (PROGRESSING, NOT MET) 28 secs  5.  Pt will score greater than or equal to 25/28 on the Tinetti test/assessment WITH use of AD demonstrating overall improved functional mobility and balance and reduce fall risk. (PROGRESSING, NOT MET)  6. Pt will walk < than or equal to 180 ft on the 2 minute walk test indoor WITH AD with 0 LOB and minimal gait deviations for improved safety in home ambulation and safety. MET     Long Term Goals (8 Weeks):   1. Pt will be able to perform TUG in 25 secs WITH use of AD demonstrating overall improved functional mobility. (PROGRESSING, NOT MET)  2. Pt will performed sit to stand x 5 WITH UE support in 20 seconds without LOB backward or posterior LE hooking for functional and safe transfers. (PROGRESSING, NOT MET)  3.  Pt will score greater than or equal to 27/28 on the Tinetti test/assessment WITH use of AD demonstrating overall improved functional mobility and balance and reduce fall risk. (PROGRESSING, NOT  MET)  4.  Pt will score greater than or equal to 22/24 on the DGI test/assessment WITH use of AD demonstrating overall improved functional mobility and balance and reduce fall risk. (PROGRESSING, NOT MET)  5.  Pt will walk < than or equal to 600 ft on the 6 minute walk test with AD and 0 LOB and minimal gait deviations for improved community ambulation. MET  6. Pt will have 0 falls from start of PT sessions.  (PROGRESSING, NOT MET)  7. Pt will have MMT score of 4+/5 in all major ms groups in [facility].(PROGRESSING, NOT MET)     Plan   Cont to progress PT as appropriate.   Flexibility, PROM, progression standing low level balance.    Philip Sawyer, PTA

## 2019-06-17 NOTE — PROGRESS NOTES
Outpatient Neurological Rehabilitation   Speech and Language Therapy Daily Note  Date:  6/17/2019     Name: Ming Boateng   MRN: [id number]   Therapy Diagnosis:   Encounter Diagnosis   Name Primary?    Oropharyngeal dysphagia    Physician: Keith Harkins, *  Physician Orders: speech therapy evaluate and treat  Medical Diagnosis: G45.9 (ICD-10-CM) - TIA (transient ischemic attack)     Visit #/ Visits Authorized: 10  Date of Evaluation:  4/29/19  Insurance Authorization Period: 04/12/2019 to 04/11/2020     Plan of Care Expiration Date:  7/26/19  Visits Cancelled: 0  Visits No Show: 0    Time In:  1230  Time Out:  1315  Total Billable Time: 45 minutes     G-Code 10 / 10   Eval Level 5 Swallowing   Update Level 6 Swallowing     Precautions: Standard and Fall    Subjective:   Pt reports: Pt pleasant. No complaints. , Greg, present.   He was not compliant to home exercise program.   Response to previous treatment: No significant change.    Pain Scale:  0/10 on VAS currently.   Pain Location: n/a  Objective:   TIMED  Procedure Min.             UNTIMED  Procedure Min.       Cognitive Communication Therapy  45 minutes   Total Timed Units: 3  Total Untimed Units: 0  Charges Billed/# of units: 3       Short Term Goals: (4 weeks) Current Progress:      1. Pt will complete visual scanning task with 85% acc given min A to improve attention to detail.  -80% acc% acc Independently 1st attempt; 100% acc with multiple scans   2. Pt will follow 2-3 step verbal directions with 85% acc given min A to improve auditory comprehension.  -min-mod A   3. Pt will follow simple written directions with 85% acc given min A to improve reading comprehension.  -min-mod A   4. Pt will sequence 4 events with 85% acc given min A to improve organizational skills.  -81% acc mod A   5. Pt will participate in sustained attention and divided attention tasks with 85% acc given min A to improve attention to detail in everyday tasks. -Not addressed  this session.   8. Pt will recall and utilize memory strategies with 80% acc Independently.     -AVER in [place] (Associate, Visualize, Write, Repeat)  -0/4 Independently    Note increased time to complete all tasks.    Patient Education/Response:   Educated on goals and plan of care. Pt reported understanding.      Written Home Exercises Provided: oral motor exercises x 20 each.  [facility]: page 176 Sequencing in [place].  Exercises were reviewed and Ming was able to demonstrate them prior to the end of the session.  Ming demonstrated good  understanding of the education provided.     Assessment:   Ming is progressing well towards his goals. Increase noted in sequencing.  Increase in visual scanning.  Pt prognosis is Good. Pt will continue to benefit from skilled outpatient speech and language therapy to address the deficits listed in the problem list on initial evaluation, provide pt/family education and to maximize pt's level of independence in the home and community environment.     Medical necessity is demonstrated by the following IMPAIRMENTS:  Pt's memory deficits impair his ability to function safely in his everyday environment.   Barriers to Therapy: language  Pt's spiritual, cultural and educational needs considered and pt agreeable to plan of care and goals.    Swallowing  Current status:  FCM: LEVEL 6: Swallowing is safe, and the individual eats and drinks independently and may rarely require minimal cueing. The individual usually self-cues when difficulty occurs. May need to avoid specific food items (e.g., popcorn and nuts), or require additional time (due to dysphagia). -  CI at least 1% but less than 20% impaired, limited or restricted  Discharge status:  FCM: LEVEL 6: Swallowing is safe, and the individual eats and drinks independently and may rarely require minimal cueing. The individual usually self-cues when difficulty occurs. May need to avoid specific food items (e.g., popcorn and nuts), or  require additional time (due to dysphagia).  - CI at least 1% but less than 20% impaired, limited or restricted    Attention Drop next session  Current status: FCM:  LEVEL 3: The individual maintains attention over time to complete simple living tasks of short duration with consistent maximal cueing in the absence of distracting stimuli.  - CL at least 60% < 80% impaired, limited or restricted  Projected status:  FCM:  LEVEL 4: The individual maintains attention during simple living tasks of multiple steps and long duration within a minimally distracting environment with consistent minimal cueing   - {CK at least 40% < 60% impaired, limited or restricted      Plan:   Continue with POC to address sequencing, following directions, and attention. Incorporate visual cues during sequencing task.   Updated POC due 7/26/19.    Kalpana Vang CCC-SLP   6/17/2019

## 2019-06-21 ENCOUNTER — CLINICAL SUPPORT (OUTPATIENT)
Dept: REHABILITATION | Facility: HOSPITAL | Age: 64
End: 2019-06-21
Attending: PSYCHIATRY & NEUROLOGY
Payer: MEDICARE

## 2019-06-21 ENCOUNTER — TELEPHONE (OUTPATIENT)
Dept: REHABILITATION | Facility: HOSPITAL | Age: 64
End: 2019-06-21

## 2019-06-21 DIAGNOSIS — R26.89 DECREASED MOBILITY: ICD-10-CM

## 2019-06-21 DIAGNOSIS — R26.9 GAIT ABNORMALITY: ICD-10-CM

## 2019-06-21 DIAGNOSIS — R53.1 LEFT-SIDED WEAKNESS: ICD-10-CM

## 2019-06-21 DIAGNOSIS — R13.12 OROPHARYNGEAL DYSPHAGIA: ICD-10-CM

## 2019-06-21 PROCEDURE — 97127 HC THERAPEUTIC INTVTN, COGN FUNCTION - ST: CPT | Mod: PN

## 2019-06-21 PROCEDURE — G9166 ATTEN GOAL STATUS: HCPCS | Mod: CK,PN

## 2019-06-21 PROCEDURE — G8979 MOBILITY GOAL STATUS: HCPCS | Mod: CK,PN

## 2019-06-21 PROCEDURE — G8989 SELF CARE D/C STATUS: HCPCS | Mod: CJ,PN

## 2019-06-21 PROCEDURE — G8987 SELF CARE CURRENT STATUS: HCPCS | Mod: CJ,PN

## 2019-06-21 PROCEDURE — G8978 MOBILITY CURRENT STATUS: HCPCS | Mod: CK,PN

## 2019-06-21 PROCEDURE — G9165 ATTEN CURRENT STATUS: HCPCS | Mod: CL,PN

## 2019-06-21 PROCEDURE — 97110 THERAPEUTIC EXERCISES: CPT | Mod: PN

## 2019-06-21 PROCEDURE — G8988 SELF CARE GOAL STATUS: HCPCS | Mod: CI,PN

## 2019-06-21 PROCEDURE — 97116 GAIT TRAINING THERAPY: CPT | Mod: PN

## 2019-06-21 PROCEDURE — 97112 NEUROMUSCULAR REEDUCATION: CPT | Mod: PN

## 2019-06-21 NOTE — PROGRESS NOTES
Physical Therapy Daily Treatment Note     Name: Ming Boateng  Clinic Number: [id number]    Therapy Diagnosis:   Encounter Diagnoses   Name Primary?    Decreased mobility     Gait abnormality      Physician: Khoobehi, Kaveh D., MD    Visit Date: 6/21/2019    Physician Orders: PT Eval and Treat  Medical Diagnosis from Referral: I69.359 (ICD-10-CM) - Hemiplegia and hemiparesis following cerebral infarction affecting unspecified side  Evaluation Date: 4/8/2019      Authorization Period Expiration: 7/10/19  Plan of Care Expiration: 8/9/19  Visit # / Visits authorized: 10/12  FOTO: 1/10 done  Gcode: 1/10     Cost: 99.11  Total: 1065.22  Time In: 1015 AM  Time Out: 11:00 AM  Total Billable Time: 45 minutes (1NMR, 1TE, 1 gait)    Precautions: Standard, Fall, CHF and L side weakness, recent TIA    Check BP during session if symptomatic    Subjective     Pt reports: daughter reports pt's blood pressure has not dropped since his last session. Pt agreeable to PT session.   .He will be compliant with home exercise program once PT has a opportunity to do family training with dtr on exercise.  Response to previous treatment: no adverse reactions  Functional change: increased alertness today,  more coordinated gait.    Pain: 0/10  Location: left side     Objective     Ming performed gait training x 8' 250 ft without AD with Natalie for directional cues and to prevent sway and lateral lean and LOB.  Pt has decreased awareness of LOB and is not able to self-correct but has to instructed to correct posture during LOB.      Ming performed neuromuscular re-education x 25' consisting of:    Neuro re-ed and endurance training with B UE/LE extremities for reciprocal motion of  limbs on sci-fit x 10 min at level 3 at > or equal to 50 spm w/ or w/o rest.      Step fan placed in front pt on floor with 5 steps and pt in SLS x 8' with L stepping on command to different step heights and locaions to promote L LE coordination and movement f/b SLS x 8'  with L LE to promote L LE WBing with tactile and verbal cues to prevent hyperextension/buckling.    Ming completed therapeutic exercise for B LE stregnthening and AROM 18:   -Steam boats    2x10 B   -step ups    2x10 B Blue step in //  -heel raises   2x10 B in //  -mini squats   2x10 B in // bars    Home Exercises Provided and Patient Education NOT Provided     Education provided:   - encouraged safety with use of RW   - encouraged blood pressure monitoring        Pt is unable to perform HEP due to cognitive deficits     Assessment     Pt still has trouble following simple commands and his is impulsive with decreased insight into deficits.      Ming is progressing well towards his goals.   Pt prognosis is Good.     Pt will continue to benefit from skilled outpatient physical therapy to address the deficits listed in the problem list box on initial evaluation, provide pt/family education and to maximize pt's level of independence in the home and community environment.     Pt's spiritual, cultural and educational needs considered and pt agreeable to plan of care and goals.     Anticipated barriers to physical therapy: spasticity, confusion, sedentary routine, recent TIA    Goals:  Short Term Goals (4 Weeks):   1.  Independent with initial HEP. Not issued at this time  2.  Pt will perform nu-step at level 3.0 x 10 minutes without rests breaks going at least 60 step/min or greater. MET  3. Pt will be able to perform TUG in 35 secs WITH use of AD demonstrating overall improved functional mobility.  MET 29 secs  4. Pt will performed sit to stand x 5 B UE support in 26 seconds without LOB backward or posterior LE hooking for functional and safe transfers. (PROGRESSING, NOT MET) 28 secs  5.  Pt will score greater than or equal to 25/28 on the Tinetti test/assessment WITH use of AD demonstrating overall improved functional mobility and balance and reduce fall risk. (PROGRESSING, NOT MET)  6. Pt will walk < than or equal to  180 ft on the 2 minute walk test indoor WITH AD with 0 LOB and minimal gait deviations for improved safety in home ambulation and safety. MET     Long Term Goals (8 Weeks):   1. Pt will be able to perform TUG in 25 secs WITH use of AD demonstrating overall improved functional mobility. (PROGRESSING, NOT MET)  2. Pt will performed sit to stand x 5 WITH UE support in 20 seconds without LOB backward or posterior LE hooking for functional and safe transfers. (PROGRESSING, NOT MET)  3.  Pt will score greater than or equal to 27/28 on the Tinetti test/assessment WITH use of AD demonstrating overall improved functional mobility and balance and reduce fall risk. (PROGRESSING, NOT MET)  4.  Pt will score greater than or equal to 22/24 on the DGI test/assessment WITH use of AD demonstrating overall improved functional mobility and balance and reduce fall risk. (PROGRESSING, NOT MET)  5.  Pt will walk < than or equal to 600 ft on the 6 minute walk test with AD and 0 LOB and minimal gait deviations for improved community ambulation. MET  6. Pt will have 0 falls from start of PT sessions.  (PROGRESSING, NOT MET)  7. Pt will have MMT score of 4+/5 in all major ms groups in [facility].(PROGRESSING, NOT MET)     Plan   Cont to progress PT as appropriate.   Flexibility, PROM, progression standing low level balance.    Livier العراقي, PT

## 2019-06-21 NOTE — PROGRESS NOTES
"  Occupational Therapy Daily Treatment/ Discharge Note     Date: 6/21/2019  Patient: Ming Boateng  Chart Number: [id number]     Therapy Diagnosis:   1. Left-sided weakness        Physician: Keith Harkins    Physician Orders: Eval and tx  Medical Diagnosis: G45.9 (ICD-10-CM) - TIA (transient ischemic attack)  Onset Date:  First stroke 3/3/19 second TIA on 4/11/19  Evaluation Date: 5/2/2019  Plan of Care Expiration Period: 6/28/19  Insurance Authorization period Expiration: 12/31/19  Date of Return to MD: SILVIA  Visit # / Visits Authortized: 12 / 12  FOTO: CVA UE survey/30% limitation in hand function      Time In:11:00  am   Time Out: 11:45 pm  Total Billable (one on one) Time: 45 minutes 3 TE   Todays Amount: 90.96  Total Amount 992.04     Precautions: Standard, Diabetes, blood thinners and Fall    Subjective     Pt reports: "per , pt feeling good no pain."  he was compliant with home exercise program given last session.   Response to previous treatment: Good  Functional change: no change reported     Pain: 0/10  Location: left arms and hands      Objective     Ming received therapeutic exercises for 45 minutes including: Measurements included in TE charge:  -Pt completed the following exercises below in order to increase ROM, strength and tolerance of affected UE in order to increase IND and functional use:    Exercises Date:6/21/2019     LUE Visit #12    RPM x3 min forward, x 3 min backwards   Shoulder    PROM all planes  X 10              Strength: (DAHIANA Dynamometer in lbs.) Average 3 trials, Position II:       5/2/2019 6/12/2019 Left   6/21/2019       Left Left    Rung II 25# 29, 39, 34#  34# 35,39,30  34#        Pinch Strength (Measured in psi)       5/2/2019 6/12/2019 6/21/2019       Left Left Left   Key Pinch 8 psi 10 psi 10   3pt Pinch 5 psi 7 psi 8   2pt Pinch 3 psi 4 psi 5      Fine Motor Coordination: 9 Hole Peg Test  9 Peg Test Left  5/2/2019 Left  6/12/2019 Left  6/21/2019   "   Removed 9/9 9/9 9/9    Replaced 9/9 9/9  Dropped 5 9/9 dropped  5    Time 2:42 sec 2:13 sec 2:56      Shoulder L Date:6/21/2019       AROM PROM   Flx 110 145   Ext 47 65   Abd 70 98   ER 28 45   IR S4 90   ER @ 90 Behind head 20                 Home Exercises and Education Provided     Education provided:   - HEP previous session.   - Progress towards goals.    Written Home Exercises Provided: Patient instructed to cont prior HEP.  Exercises were reviewed and Ming was able to demonstrate them prior to the end of the session.  Ming demonstrated fair  understanding of the HEP provided.     See EMR under Patient Instructions for exercises provided prior visit.        Assessment   Pt tolerated session fair. He will d/c tooday with 12/12 visits. He achieved all STGs with therapy servies but unable to meet LTGs. He improved A/PROM of the shoulder. The hand continues to be limited with weakness and FM coordination secondary to old injury. Still having pain in the shoulder to a certain point of stretching. Educated to continue hand strengthening, FM and shoulder exercises at home to maintain gains made with therapy services. .   Ming is progressing well towards his goals and there are no updates to goals at this time. Pt prognosis is Good. Improved FOTO from 90% limitation to 30% limitation.     Pt will continue to benefit from skilled outpatient occupational therapy to address the deficits listed in the problem list on initial evaluation provide pt/family education and to maximize pt's level of independence in the home and community environment.     Anticipated barriers to occupational therapy: communication, confusion    Pt's spiritual, cultural and educational needs considered and pt agreeable to plan of care and goals.    Goals:  Short Term Goals:   1) Initiate Hep MET   2) Pt to increase LUE  strength by 5 # in order to A in self care task of feeding by 4 weeks. MET 6/12/2019  3) Pt to increase Barthel Index  Score by 5 points increasing self care IND by 4 weeks. MET 6/21/2019  4) Pt to increase L UE AROM by 10 degrees in order to A in UB dressing by 4 weeks. MET 6/21/2019  5) Patient will be able to achieve less than or equal to 50% on the FOTO, demonstrating overall improved functional ability with upper extremity. (self-care category) MET 6/12/2019   Long Term Goals:  1) Pt to be IND with HEP in order to maintain ROM and strength needed for self care IND by d.c. Not met 6/21/2019  2) Pt to increase L UE  strength to WNL as compared to unaffected extremity in order to open items for self feeding by d.c. Not met 6/21/2019  3) Pt to increase Barthel Index Score by 10 points increasing self care IND at home by d.c. Not met 6/21/2019  4) Pt to increase L UE AROM to WFL in order to A in UB dressing by d/c NM.6/21/2019  5) Patient will be able to achieve less than or equal to 25% on the FOTO, demonstrating overall improved functional ability with upper extremity. (self-care category) Not met 6/21/2019    Plan   D/c from skilled services today with all 5 STG met at this time. Unable to meet LTGs  Johann Mendez OT

## 2019-06-21 NOTE — PROGRESS NOTES
Outpatient Neurological Rehabilitation   Speech and Language Therapy Daily Note  Date:  6/21/2019     Name: Ming Boateng   MRN: [id number]   Therapy Diagnosis:   Encounter Diagnosis   Name Primary?    Oropharyngeal dysphagia    Physician: Keith Harkins, *  Physician Orders: speech therapy evaluate and treat  Medical Diagnosis: G45.9 (ICD-10-CM) - TIA (transient ischemic attack)     Visit #/ Visits Authorized: 11  Date of Evaluation:  4/29/19  Insurance Authorization Period: 04/12/2019 to 04/11/2020     Plan of Care Expiration Date:  7/26/19  Visits Cancelled: 0  Visits No Show: 0    Time In:  1145  Time Out:  1220  Total Billable Time: 35 minutes     G-Code 10 / 10   Eval Level 5 Swallowing   Update Level 6 Swallowing   Update Level 3 attention     Precautions: Standard and Fall    Subjective:   Pt reports: Pt expressed and appeared tired, falling asleep during session. Requested to end early. , Abbey, present.   He was not compliant to home exercise program.   Response to previous treatment: No significant change.    Pain Scale:  0/10 on VAS currently.   Pain Location: n/a  Objective:   TIMED  Procedure Min.             UNTIMED  Procedure Min.       Cognitive Communication Therapy  45 minutes   Total Timed Units: 3  Total Untimed Units: 0  Charges Billed/# of units: 3       Short Term Goals: (4 weeks) Current Progress:      1. Pt will complete visual scanning task with 85% acc given min A to improve attention to detail.  -100% acc Independently 1st attempt; 80% acc with multiple scans on second task and increased time to complete    2. Pt will follow 2-3 step verbal directions with 85% acc given min A to improve auditory comprehension.  -1 step directions x 4 with 100% acc Independently  -2 step directions x 5 with 100% acc min to mod A   -3 step directions x 3 with 66 % acc; Pt started actions before directions given completely    3. Pt will follow simple written directions with 85% acc given min A  to improve reading comprehension.  - x 5 with 80% acc given min A   4. Pt will sequence 4 events with 85% acc given min A to improve organizational skills.  -Using photos- verbal description due to visual deficit   X 2 with 50% acc min A    5. Pt will participate in sustained attention and divided attention tasks with 85% acc given min A to improve attention to detail in everyday tasks. -Not addressed this session.   8. Pt will recall and utilize memory strategies with 80% acc Independently.     -AVER in [place] (Associate, Visualize, Write, Repeat)  -2/4 Independently   Note increased time to complete all tasks.    Patient Education/Response:   Educated on goals and plan of care. Pt reported understanding. Pt reports he will get glasses next week.     Written Home Exercises Provided: oral motor exercises x 20 each.    Exercises were reviewed and Ming was able to demonstrate them prior to the end of the session.  Ming demonstrated good  understanding of the education provided.     Assessment:   Ming is progressing well towards his goals. Pt's visual deficits impair his ability to participate in session tasks. Pt reports he is getting glasses next week. Improvement noted in recalling memory strategies. Also noted improvement in following written directions. Pt prognosis is Good. Pt will continue to benefit from skilled outpatient speech and language therapy to address the deficits listed in the problem list on initial evaluation, provide pt/family education and to maximize pt's level of independence in the home and community environment.     Medical necessity is demonstrated by the following IMPAIRMENTS:  Pt's memory deficits impair his ability to function safely in his everyday environment.   Barriers to Therapy: language  Pt's spiritual, cultural and educational needs considered and pt agreeable to plan of care and goals.    Swallowing  Current status:  FCM: LEVEL 6: Swallowing is safe, and the individual eats and  "drinks independently and may rarely require minimal cueing. The individual usually self-cues when difficulty occurs. May need to avoid specific food items (e.g., popcorn and nuts), or require additional time (due to dysphagia). -  CI at least 1% but less than 20% impaired, limited or restricted  Discharge status:  FCM: LEVEL 6: Swallowing is safe, and the individual eats and drinks independently and may rarely require minimal cueing. The individual usually self-cues when difficulty occurs. May need to avoid specific food items (e.g., popcorn and nuts), or require additional time (due to dysphagia).  - CI at least 1% but less than 20% impaired, limited or restricted    Attention Drop next session  Current status: FCM:  LEVEL 3: The individual maintains attention over time to complete simple living tasks of short duration with consistent maximal cueing in the absence of distracting stimuli.  - CL at least 60% < 80% impaired, limited or restricted  Projected status:  FCM:  LEVEL 4: The individual maintains attention during simple living tasks of multiple steps and long duration within a minimally distracting environment with consistent minimal cueing   - {CK at least 40% < 60% impaired, limited or restricted      Plan:   Continue with POC to address sequencing, following directions, and attention. Incorporate visual cues during sequencing task.   Updated POC due 7/26/19.    LEONARDO Dumont.    Clinician    "I, Kalpana Vang, certify that I was present in the room directing the student and service delivery and guiding them using my skilled judgement. As the co-signing therapist, I have reviewed the student's documentation, and am responsible for the treatment, assessment and plan."     Kaplana Vang, CCC-SLP   6/21/2019   "

## 2019-06-23 ENCOUNTER — HOSPITAL ENCOUNTER (OUTPATIENT)
Facility: HOSPITAL | Age: 64
LOS: 1 days | Discharge: HOME | End: 2019-06-24
Attending: EMERGENCY MEDICINE | Admitting: INTERNAL MEDICINE
Payer: MEDICARE

## 2019-06-23 DIAGNOSIS — N18.4 STAGE 4 CHRONIC KIDNEY DISEASE: ICD-10-CM

## 2019-06-23 DIAGNOSIS — R55 NEAR SYNCOPE: ICD-10-CM

## 2019-06-23 DIAGNOSIS — I50.30 (HFPEF) HEART FAILURE WITH PRESERVED EJECTION FRACTION: ICD-10-CM

## 2019-06-23 DIAGNOSIS — I63.9 STROKE: ICD-10-CM

## 2019-06-23 DIAGNOSIS — R42 DIZZINESS: Primary | ICD-10-CM

## 2019-06-23 DIAGNOSIS — I15.2 HYPERTENSION ASSOCIATED WITH DIABETES: ICD-10-CM

## 2019-06-23 DIAGNOSIS — E78.2 MIXED HYPERLIPIDEMIA: ICD-10-CM

## 2019-06-23 DIAGNOSIS — Z79.4 TYPE 2 DIABETES MELLITUS WITH COMPLICATION, WITH LONG-TERM CURRENT USE OF INSULIN: ICD-10-CM

## 2019-06-23 DIAGNOSIS — I63.9 CVA (CEREBRAL VASCULAR ACCIDENT): ICD-10-CM

## 2019-06-23 DIAGNOSIS — E11.59 HYPERTENSION ASSOCIATED WITH DIABETES: ICD-10-CM

## 2019-06-23 DIAGNOSIS — I95.1 ORTHOSTATIC HYPOTENSION: ICD-10-CM

## 2019-06-23 DIAGNOSIS — E11.8 TYPE 2 DIABETES MELLITUS WITH COMPLICATION, WITH LONG-TERM CURRENT USE OF INSULIN: ICD-10-CM

## 2019-06-23 DIAGNOSIS — I10 ESSENTIAL HYPERTENSION: ICD-10-CM

## 2019-06-23 DIAGNOSIS — Z86.73 HISTORY OF ISCHEMIC RIGHT MCA STROKE: ICD-10-CM

## 2019-06-23 LAB
ALBUMIN SERPL BCP-MCNC: 3.9 G/DL (ref 3.5–5.2)
ALP SERPL-CCNC: 94 U/L (ref 55–135)
ALT SERPL W/O P-5'-P-CCNC: 23 U/L (ref 10–44)
ANION GAP SERPL CALC-SCNC: 11 MMOL/L (ref 8–16)
AST SERPL-CCNC: 23 U/L (ref 10–40)
BACTERIA #/AREA URNS HPF: NORMAL /HPF
BASOPHILS # BLD AUTO: 0.06 K/UL (ref 0–0.2)
BASOPHILS NFR BLD: 0.8 % (ref 0–1.9)
BILIRUB SERPL-MCNC: 0.4 MG/DL (ref 0.1–1)
BILIRUB UR QL STRIP: NEGATIVE
BUN SERPL-MCNC: 42 MG/DL (ref 8–23)
CALCIUM SERPL-MCNC: 10 MG/DL (ref 8.7–10.5)
CHLORIDE SERPL-SCNC: 104 MMOL/L (ref 95–110)
CHOLEST SERPL-MCNC: 98 MG/DL (ref 120–199)
CHOLEST/HDLC SERPL: 3.1 {RATIO} (ref 2–5)
CLARITY UR: CLEAR
CO2 SERPL-SCNC: 23 MMOL/L (ref 23–29)
COLOR UR: YELLOW
CREAT SERPL-MCNC: 2.1 MG/DL (ref 0.5–1.4)
CREAT SERPL-MCNC: 2.2 MG/DL (ref 0.5–1.4)
DIFFERENTIAL METHOD: ABNORMAL
EOSINOPHIL # BLD AUTO: 0.4 K/UL (ref 0–0.5)
EOSINOPHIL NFR BLD: 5.2 % (ref 0–8)
ERYTHROCYTE [DISTWIDTH] IN BLOOD BY AUTOMATED COUNT: 14.3 % (ref 11.5–14.5)
EST. GFR  (AFRICAN AMERICAN): 36 ML/MIN/1.73 M^2
EST. GFR  (NON AFRICAN AMERICAN): 31 ML/MIN/1.73 M^2
ESTIMATED AVG GLUCOSE: 140 MG/DL (ref 68–131)
FIO2: 21
FIO2: 21
GLUCOSE SERPL-MCNC: 112 MG/DL (ref 70–110)
GLUCOSE UR QL STRIP: NEGATIVE
HBA1C MFR BLD HPLC: 6.5 % (ref 4–5.6)
HCT VFR BLD AUTO: 33.5 % (ref 40–54)
HDLC SERPL-MCNC: 32 MG/DL (ref 40–75)
HDLC SERPL: 32.7 % (ref 20–50)
HGB BLD-MCNC: 11.2 G/DL (ref 14–18)
HGB UR QL STRIP: NEGATIVE
HYALINE CASTS #/AREA URNS LPF: 0 /LPF
INR PPP: 1 (ref 0.8–1.2)
KETONES UR QL STRIP: NEGATIVE
LDLC SERPL CALC-MCNC: 45.4 MG/DL (ref 63–159)
LEUKOCYTE ESTERASE UR QL STRIP: NEGATIVE
LYMPHOCYTES # BLD AUTO: 2 K/UL (ref 1–4.8)
LYMPHOCYTES NFR BLD: 27.8 % (ref 18–48)
MAGNESIUM SERPL-MCNC: 1.9 MG/DL (ref 1.6–2.6)
MCH RBC QN AUTO: 27.6 PG (ref 27–31)
MCHC RBC AUTO-ENTMCNC: 33.4 G/DL (ref 32–36)
MCV RBC AUTO: 83 FL (ref 82–98)
MICROSCOPIC COMMENT: NORMAL
MONOCYTES # BLD AUTO: 0.5 K/UL (ref 0.3–1)
MONOCYTES NFR BLD: 7.3 % (ref 4–15)
NEUTROPHILS # BLD AUTO: 4.2 K/UL (ref 1.8–7.7)
NEUTROPHILS NFR BLD: 58.8 % (ref 38–73)
NITRITE UR QL STRIP: NEGATIVE
NONHDLC SERPL-MCNC: 66 MG/DL
PH UR STRIP: 7 [PH] (ref 5–8)
PLATELET # BLD AUTO: 265 K/UL (ref 150–350)
PMV BLD AUTO: 9.8 FL (ref 9.2–12.9)
POC PTINR: 1.2 (ref 0.9–1.2)
POC PTWBT: 14.5 SEC (ref 9.7–14.3)
POCT GLUCOSE: 110 MG/DL (ref 70–110)
POCT GLUCOSE: 136 MG/DL (ref 70–110)
POCT GLUCOSE: 186 MG/DL (ref 70–110)
POCT GLUCOSE: 190 MG/DL (ref 70–110)
POTASSIUM SERPL-SCNC: 3.8 MMOL/L (ref 3.5–5.1)
PROT SERPL-MCNC: 7.5 G/DL (ref 6–8.4)
PROT UR QL STRIP: ABNORMAL
PROTHROMBIN TIME: 10.7 SEC (ref 9–12.5)
RBC # BLD AUTO: 4.06 M/UL (ref 4.6–6.2)
RBC #/AREA URNS HPF: 0 /HPF (ref 0–4)
SAMPLE: ABNORMAL
SAMPLE: ABNORMAL
SODIUM SERPL-SCNC: 138 MMOL/L (ref 136–145)
SP GR UR STRIP: <=1.005 (ref 1–1.03)
SQUAMOUS #/AREA URNS HPF: 5 /HPF
TRIGL SERPL-MCNC: 103 MG/DL (ref 30–150)
TROPONIN I SERPL DL<=0.01 NG/ML-MCNC: 0.01 NG/ML (ref 0–0.03)
TROPONIN I SERPL DL<=0.01 NG/ML-MCNC: 0.01 NG/ML (ref 0–0.03)
TSH SERPL DL<=0.005 MIU/L-ACNC: 2.46 UIU/ML (ref 0.4–4)
URN SPEC COLLECT METH UR: ABNORMAL
UROBILINOGEN UR STRIP-ACNC: NEGATIVE EU/DL
WBC # BLD AUTO: 7.11 K/UL (ref 3.9–12.7)
WBC #/AREA URNS HPF: 2 /HPF (ref 0–5)

## 2019-06-23 PROCEDURE — 80061 LIPID PANEL: CPT

## 2019-06-23 PROCEDURE — 85025 COMPLETE CBC W/AUTO DIFF WBC: CPT

## 2019-06-23 PROCEDURE — 25000003 PHARM REV CODE 250: Performed by: STUDENT IN AN ORGANIZED HEALTH CARE EDUCATION/TRAINING PROGRAM

## 2019-06-23 PROCEDURE — 82565 ASSAY OF CREATININE: CPT

## 2019-06-23 PROCEDURE — 36415 COLL VENOUS BLD VENIPUNCTURE: CPT

## 2019-06-23 PROCEDURE — 85610 PROTHROMBIN TIME: CPT

## 2019-06-23 PROCEDURE — 83735 ASSAY OF MAGNESIUM: CPT

## 2019-06-23 PROCEDURE — G0378 HOSPITAL OBSERVATION PER HR: HCPCS

## 2019-06-23 PROCEDURE — 93005 ELECTROCARDIOGRAM TRACING: CPT

## 2019-06-23 PROCEDURE — 99285 EMERGENCY DEPT VISIT HI MDM: CPT | Mod: 25

## 2019-06-23 PROCEDURE — 82962 GLUCOSE BLOOD TEST: CPT

## 2019-06-23 PROCEDURE — G0425 INPT/ED TELECONSULT30: HCPCS | Mod: GT,G0,, | Performed by: PSYCHIATRY & NEUROLOGY

## 2019-06-23 PROCEDURE — 80053 COMPREHEN METABOLIC PANEL: CPT

## 2019-06-23 PROCEDURE — 84484 ASSAY OF TROPONIN QUANT: CPT

## 2019-06-23 PROCEDURE — 81000 URINALYSIS NONAUTO W/SCOPE: CPT

## 2019-06-23 PROCEDURE — 63600175 PHARM REV CODE 636 W HCPCS: Performed by: STUDENT IN AN ORGANIZED HEALTH CARE EDUCATION/TRAINING PROGRAM

## 2019-06-23 PROCEDURE — G0425 PR INPT TELEHEALTH CONSULT 30M: ICD-10-PCS | Mod: GT,G0,, | Performed by: PSYCHIATRY & NEUROLOGY

## 2019-06-23 PROCEDURE — 25000003 PHARM REV CODE 250: Performed by: EMERGENCY MEDICINE

## 2019-06-23 PROCEDURE — 97165 OT EVAL LOW COMPLEX 30 MIN: CPT

## 2019-06-23 PROCEDURE — 83036 HEMOGLOBIN GLYCOSYLATED A1C: CPT

## 2019-06-23 PROCEDURE — 96372 THER/PROPH/DIAG INJ SC/IM: CPT

## 2019-06-23 PROCEDURE — 93010 EKG 12-LEAD: ICD-10-PCS | Mod: ,,, | Performed by: INTERNAL MEDICINE

## 2019-06-23 PROCEDURE — 84443 ASSAY THYROID STIM HORMONE: CPT

## 2019-06-23 PROCEDURE — 93010 ELECTROCARDIOGRAM REPORT: CPT | Mod: ,,, | Performed by: INTERNAL MEDICINE

## 2019-06-23 PROCEDURE — 97161 PT EVAL LOW COMPLEX 20 MIN: CPT

## 2019-06-23 PROCEDURE — 92610 EVALUATE SWALLOWING FUNCTION: CPT

## 2019-06-23 RX ORDER — INSULIN ASPART 100 [IU]/ML
0-5 INJECTION, SOLUTION INTRAVENOUS; SUBCUTANEOUS EVERY 6 HOURS PRN
Status: DISCONTINUED | OUTPATIENT
Start: 2019-06-23 | End: 2019-06-24 | Stop reason: HOSPADM

## 2019-06-23 RX ORDER — CALCIUM ACETATE 667 MG/1
667 CAPSULE ORAL
Status: DISCONTINUED | OUTPATIENT
Start: 2019-06-23 | End: 2019-06-24 | Stop reason: HOSPADM

## 2019-06-23 RX ORDER — FAMOTIDINE 20 MG/1
20 TABLET, FILM COATED ORAL DAILY
Status: DISCONTINUED | OUTPATIENT
Start: 2019-06-23 | End: 2019-06-24 | Stop reason: HOSPADM

## 2019-06-23 RX ORDER — CLOPIDOGREL BISULFATE 75 MG/1
75 TABLET ORAL DAILY
Status: DISCONTINUED | OUTPATIENT
Start: 2019-06-23 | End: 2019-06-24 | Stop reason: HOSPADM

## 2019-06-23 RX ORDER — SODIUM CHLORIDE 0.9 % (FLUSH) 0.9 %
10 SYRINGE (ML) INJECTION
Status: DISCONTINUED | OUTPATIENT
Start: 2019-06-23 | End: 2019-06-24 | Stop reason: HOSPADM

## 2019-06-23 RX ORDER — HEPARIN SODIUM 5000 [USP'U]/ML
5000 INJECTION, SOLUTION INTRAVENOUS; SUBCUTANEOUS EVERY 8 HOURS
Status: DISCONTINUED | OUTPATIENT
Start: 2019-06-23 | End: 2019-06-24 | Stop reason: HOSPADM

## 2019-06-23 RX ORDER — ALLOPURINOL 100 MG/1
200 TABLET ORAL DAILY
Status: DISCONTINUED | OUTPATIENT
Start: 2019-06-24 | End: 2019-06-24 | Stop reason: HOSPADM

## 2019-06-23 RX ORDER — ASPIRIN 325 MG
325 TABLET ORAL
Status: COMPLETED | OUTPATIENT
Start: 2019-06-23 | End: 2019-06-23

## 2019-06-23 RX ORDER — NAPROXEN SODIUM 220 MG/1
81 TABLET, FILM COATED ORAL DAILY
Status: DISCONTINUED | OUTPATIENT
Start: 2019-06-24 | End: 2019-06-23

## 2019-06-23 RX ORDER — ATORVASTATIN CALCIUM 40 MG/1
40 TABLET, FILM COATED ORAL DAILY
Status: DISCONTINUED | OUTPATIENT
Start: 2019-06-23 | End: 2019-06-24 | Stop reason: HOSPADM

## 2019-06-23 RX ORDER — GLUCAGON 1 MG
1 KIT INJECTION
Status: DISCONTINUED | OUTPATIENT
Start: 2019-06-23 | End: 2019-06-24 | Stop reason: HOSPADM

## 2019-06-23 RX ADMIN — CLOPIDOGREL BISULFATE 75 MG: 75 TABLET ORAL at 10:06

## 2019-06-23 RX ADMIN — ATORVASTATIN CALCIUM 40 MG: 40 TABLET, FILM COATED ORAL at 10:06

## 2019-06-23 RX ADMIN — CALCIUM ACETATE 667 MG: 667 CAPSULE ORAL at 05:06

## 2019-06-23 RX ADMIN — FAMOTIDINE 20 MG: 20 TABLET, FILM COATED ORAL at 10:06

## 2019-06-23 RX ADMIN — HEPARIN SODIUM 5000 UNITS: 5000 INJECTION, SOLUTION INTRAVENOUS; SUBCUTANEOUS at 09:06

## 2019-06-23 RX ADMIN — ASPIRIN 325 MG ORAL TABLET 325 MG: 325 PILL ORAL at 09:06

## 2019-06-23 NOTE — PLAN OF CARE
Problem: SLP Goal  Goal: SLP Goal  SLP Short Term Goals:  1. Patient will tolerate mechanical soft diet w/ thin liquids with no overt s/s of aspiration.   Outcome: Ongoing (interventions implemented as appropriate)  6/23/2019: Swallow study completed this PM. Pt has baseline dysarthria and cognitive-linguistic deficits from previous CVA. Pt consumed thin liquids, puree consistencies, and regular solids without overt s/s of aspiration or airway threat. Prolonged mastication and slow oral transit time noted with regular solids. RECS: [facility] soft diet with thin liquids--strict adherence to standard swallow precautions. ST will f/u to ensure safety with diet advancement.  JOANNE Kohli. CCC-SLP  Speech-Language Pathologist
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
## 2019-06-23 NOTE — ASSESSMENT & PLAN NOTE
"Multiple prior infarcts, chronic medical conditions.  Describes dizziness as lightheadedness, with recent adjustment to BP medications for hypotension (taken off amlodipine)    Can not completely exclude recurrent stroke but suspect recrudescence   VAN negative, not candidate for IV tPA  - recommend MRI brain in ED to confirm / exclude new infarct. Maintain plavix for now.   - MRA head to assess degree of MCA stenosis   - orthostatic measurements  - assess for degree of neuropathy (diabetic, multiple chronic medical conditions) causing gait instability, proprioceptive loss that could also contribute to "dizziness"   "

## 2019-06-23 NOTE — ED TRIAGE NOTES
Patient arrived to ER via EMS, syncopal episode this morning at home. Daughter states patient c/o of dizziness and weakness yesterday evening at 1700. She heard him fall this morning at 0600, went into bedroom and while placing patient back in bed he had a syncopal episode. Now alert and oriented

## 2019-06-23 NOTE — PLAN OF CARE
Problem: Physical Therapy Goal  Goal: Physical Therapy Goal  Goals to be met by: 2019     Patient will increase functional independence with mobility by performin. Supine to sit with Modified [place]  2. Sit to stand transfer with Modified [place]  3. Bed to chair transfer with Modified [place] using Rolling Walker  4. Gait  x 150 feet with Modified [place] using Rolling Walker.     Outcome: Ongoing (interventions implemented as appropriate)  Recommend Out patient PT and OT  No DME needs apparent
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
## 2019-06-23 NOTE — CONSULTS
[facility]  Vascular Neurology  Comprehensive Stroke Center  Tele-Consultation Note      Consults    Consulting Provider: SATISH NEVILLE  Current Providers  No providers found    Patient Location:  [facility] EMERGENCY DEPARTMENT Emergency Department  Spoke hospital nurse at bedside with patient assisting consultant.     Patient information was obtained from patient and relative(s).         Assessment/Plan:     STROKE DOCUMENTATION     Acute Stroke Times:   Acute Stroke Times   Last Known Normal Date: 06/22/19  Last Known Normal Time: 2200  Symptom Onset Date: 06/22/19  Stroke Team Arrival Date: 06/23/19  Stroke Team Arrival Time: 0717  CT Interpretation Time: 0720  Decision to Treat Time for Alteplase: 0736    NIH Scale:  Interval: baseline  1a. Level of Consciousness: 0-->Alert, keenly responsive  1b. LOC Questions: 0-->Answers both questions correctly  1c. LOC Commands: 0-->Performs both tasks correctly  2. Best Gaze: 0-->Normal  3. Visual: 2-->Complete hemianopia(left)  4. Facial Palsy: 2-->Partial paralysis (total or near-total paralysis of lower face)(left lower)  5a. Motor Arm, Left: 0-->No drift, limb holds 90 (or 45) degrees for full 10 secs  5b. Motor Arm, Right: 0-->No drift, limb holds 90 (or 45) degrees for full 10 secs  6a. Motor Leg, Left: 0-->No drift, leg holds 30 degree position for full 5 secs  6b. Motor Leg, Right: 0-->No drift, leg holds 30 degree position for full 5 secs  7. Limb Ataxia: 0-->Absent  8. Sensory: 0-->Normal, no sensory loss  9. Best Language: 0-->No aphasia, normal  10. Dysarthria: 0-->Normal  11. Extinction and Inattention (formerly Neglect): 0-->No abnormality  Total (NIH Stroke Scale): 4     Modified [place]    Manson Coma Scale:    ABCD2 Score:    [id number] Score:   HAS -BLED Score:   ICH Score:   Hunt & Gibson Classification:       Diagnoses:   Dizziness  Multiple prior infarcts, chronic medical conditions.  Describes dizziness as  "lightheadedness, with recent adjustment to BP medications for hypotension (taken off amlodipine)    Can not completely exclude recurrent stroke but suspect recrudescence   VAN negative, not candidate for IV tPA  - recommend MRI brain in ED to confirm / exclude new infarct. Maintain plavix for now.   - MRA head to assess degree of MCA stenosis   - orthostatic measurements  - assess for degree of neuropathy (diabetic, multiple chronic medical conditions) causing gait instability, proprioceptive loss that could also contribute to "dizziness"         Blood pressure 138/80, pulse 65, temperature 99.4 °F (37.4 °C), temperature source Oral, height 5' 8" (1.727 m), weight 74.8 kg (165 lb), SpO2 99 %.  Alteplase Eligible?: No  Alteplase Recommendation: Alteplase not recommended due to Outside of treatment window , Suspected stroke mimic  and minimal deficit   Possible Interventional Revascularization Candidate? No; No large vessel occlusion    Disposition Recommendation: admit to inpatient    Subjective:     History of Present Illness:  Pt with history of prior stroke, HTN, CKD, DM2, CHF presents with dizziness and dysarthria.  History from daughter and patient. Recently adjusted HTN meds with nephrologist due to hypotension.  On plavix monotherapy. Yesterday 5PM felt dizzy - like going to pass out, or falling sensation - which resolved.  Awoke this AM again with this dizziness, difficulty ambulating.  No HA no diplopia.  Has residual visual field impairment (left) and left lower facial weakness from prior stroke (R MCA, M2 stenosis)       Woke up with symptoms?: yes    Recent bleeding noted: no  Does the patient take any Blood Thinners? no  Medications: Antiplatelets:  clopidogrel/Plavix      Past Medical History: hypertension, diabetes, hyperlipidemia, CHF, stroke and kidney problems/dialysis    Past Surgical History: no major surgeries within the last 2 weeks    Family History: no relevant history    Social History: no " "smoking, no drinking, no drugs    Allergies: Cayenne  Cayenne Pepper      Review of Systems   Constitutional: Negative for chills and fever.   HENT: Negative for trouble swallowing.    Eyes: Positive for visual disturbance.   Respiratory: Negative for cough and shortness of breath.    Cardiovascular: Negative for chest pain and palpitations.   Gastrointestinal: Negative for nausea and vomiting.   Musculoskeletal: Positive for gait problem. Negative for arthralgias.   Skin: Negative for rash.   Neurological: Positive for dizziness. Negative for weakness, numbness and headaches.   Psychiatric/Behavioral: Negative for confusion. The patient is not nervous/anxious.      Objective:   Vitals: Blood pressure 138/80, pulse 65, temperature 99.4 °F (37.4 °C), temperature source Oral, height 5' 8" (1.727 m), weight 74.8 kg (165 lb), SpO2 99 %.     CT READ: Yes  No hemmorhage. No mass effect. No early infarct signs.  chronic infarct R posterior MCA distribution, b/l small vessel ischemic disease    Physical Exam   Constitutional: He appears well-developed and well-nourished.   HENT:   Head: Normocephalic and atraumatic.   Cardiovascular: Normal rate.   Pulmonary/Chest: Effort normal.   Vitals reviewed.            Recommended the emergency room physician to have a brief discussion with the patient and/or family if available regarding the risks and benefits of treatment, and to briefly document the occurrence of that discussion in his clinical encounter note.     The attending portion of this evaluation, treatment, and documentation was performed per David Laurent DO via audiovisual.    Billing code:  (non-stroke, some mimics)    · This patient has neurological symptom(s)/condition/illness, with minimal potential for morbidity and mortality.  · There is a low probability for acute neurological change leading to clinical and possibly life-threatening deterioration requiring highest level of physician preparedness for " urgent intervention.  · Care was coordinated with other physicians involved in the patient's care.  · Radiologic studies and laboratory data were reviewed and interpreted, and plan of care was re-assessed based on the results.  · Diagnosis, treatment options and prognosis may have been discussed with the patient and/or family members or caregiver.      In your opinion, this was a: Tier 2 Van Negative    Consult End Time: 7:48 AM     David Laurent [facility]  Vascular Neurology   [facility]

## 2019-06-23 NOTE — SUBJECTIVE & OBJECTIVE
"  Woke up with symptoms?: yes    Recent bleeding noted: no  Does the patient take any Blood Thinners? no  Medications: Antiplatelets:  clopidogrel/Plavix      Past Medical History: hypertension, diabetes, hyperlipidemia, CHF, stroke and kidney problems/dialysis    Past Surgical History: no major surgeries within the last 2 weeks    Family History: no relevant history    Social History: no smoking, no drinking, no drugs    Allergies: Cayenne  Cayenne Pepper      Review of Systems   Constitutional: Negative for chills and fever.   HENT: Negative for trouble swallowing.    Eyes: Positive for visual disturbance.   Respiratory: Negative for cough and shortness of breath.    Cardiovascular: Negative for chest pain and palpitations.   Gastrointestinal: Negative for nausea and vomiting.   Musculoskeletal: Positive for gait problem. Negative for arthralgias.   Skin: Negative for rash.   Neurological: Positive for dizziness. Negative for weakness, numbness and headaches.   Psychiatric/Behavioral: Negative for confusion. The patient is not nervous/anxious.      Objective:   Vitals: Blood pressure 138/80, pulse 65, temperature 99.4 °F (37.4 °C), temperature source Oral, height 5' 8" (1.727 m), weight 74.8 kg (165 lb), SpO2 99 %.     CT READ: Yes  No hemmorhage. No mass effect. No early infarct signs.  chronic infarct R posterior MCA distribution, b/l small vessel ischemic disease    Physical Exam   Constitutional: He appears well-developed and well-nourished.   HENT:   Head: Normocephalic and atraumatic.   Cardiovascular: Normal rate.   Pulmonary/Chest: Effort normal.   Vitals reviewed.        "

## 2019-06-23 NOTE — HPI
Pt with history of prior stroke, HTN, CKD, DM2, CHF presents with dizziness and dysarthria.  History from daughter and patient. Recently adjusted HTN meds with nephrologist due to hypotension.  On plavix monotherapy. Yesterday 5PM felt dizzy - like going to pass out, or falling sensation - which resolved.  Awoke this AM again with this dizziness, difficulty ambulating.  No HA no diplopia.  Has residual visual field impairment (left) and left lower facial weakness from prior stroke (R MCA, M2 stenosis)
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
## 2019-06-24 VITALS
BODY MASS INDEX: 24.76 KG/M2 | HEART RATE: 69 BPM | HEIGHT: 68 IN | RESPIRATION RATE: 18 BRPM | WEIGHT: 163.38 LBS | DIASTOLIC BLOOD PRESSURE: 84 MMHG | SYSTOLIC BLOOD PRESSURE: 138 MMHG | TEMPERATURE: 98 F | OXYGEN SATURATION: 97 %

## 2019-06-24 LAB
ALBUMIN SERPL BCP-MCNC: 3.9 G/DL (ref 3.5–5.2)
ALP SERPL-CCNC: 96 U/L (ref 55–135)
ALT SERPL W/O P-5'-P-CCNC: 19 U/L (ref 10–44)
ANION GAP SERPL CALC-SCNC: 8 MMOL/L (ref 8–16)
AST SERPL-CCNC: 19 U/L (ref 10–40)
BASOPHILS # BLD AUTO: 0.04 K/UL (ref 0–0.2)
BASOPHILS NFR BLD: 0.6 % (ref 0–1.9)
BILIRUB SERPL-MCNC: 0.4 MG/DL (ref 0.1–1)
BUN SERPL-MCNC: 35 MG/DL (ref 8–23)
CALCIUM SERPL-MCNC: 10.1 MG/DL (ref 8.7–10.5)
CHLORIDE SERPL-SCNC: 106 MMOL/L (ref 95–110)
CO2 SERPL-SCNC: 26 MMOL/L (ref 23–29)
CREAT SERPL-MCNC: 2.1 MG/DL (ref 0.5–1.4)
DIFFERENTIAL METHOD: ABNORMAL
EOSINOPHIL # BLD AUTO: 0.4 K/UL (ref 0–0.5)
EOSINOPHIL NFR BLD: 5.3 % (ref 0–8)
ERYTHROCYTE [DISTWIDTH] IN BLOOD BY AUTOMATED COUNT: 14.4 % (ref 11.5–14.5)
EST. GFR  (AFRICAN AMERICAN): 38 ML/MIN/1.73 M^2
EST. GFR  (NON AFRICAN AMERICAN): 33 ML/MIN/1.73 M^2
FERRITIN SERPL-MCNC: 108 NG/ML (ref 20–300)
FOLATE SERPL-MCNC: 9.2 NG/ML (ref 4–24)
GLUCOSE SERPL-MCNC: 97 MG/DL (ref 70–110)
HCT VFR BLD AUTO: 31.9 % (ref 40–54)
HGB BLD-MCNC: 10.7 G/DL (ref 14–18)
IRON SERPL-MCNC: 65 UG/DL (ref 45–160)
LYMPHOCYTES # BLD AUTO: 2.1 K/UL (ref 1–4.8)
LYMPHOCYTES NFR BLD: 31.9 % (ref 18–48)
MAGNESIUM SERPL-MCNC: 2 MG/DL (ref 1.6–2.6)
MCH RBC QN AUTO: 27.3 PG (ref 27–31)
MCHC RBC AUTO-ENTMCNC: 33.5 G/DL (ref 32–36)
MCV RBC AUTO: 81 FL (ref 82–98)
MONOCYTES # BLD AUTO: 0.5 K/UL (ref 0.3–1)
MONOCYTES NFR BLD: 7.3 % (ref 4–15)
NEUTROPHILS # BLD AUTO: 3.6 K/UL (ref 1.8–7.7)
NEUTROPHILS NFR BLD: 54.7 % (ref 38–73)
PHOSPHATE SERPL-MCNC: 3.2 MG/DL (ref 2.7–4.5)
PLATELET # BLD AUTO: 289 K/UL (ref 150–350)
PMV BLD AUTO: 9.6 FL (ref 9.2–12.9)
POCT GLUCOSE: 153 MG/DL (ref 70–110)
POCT GLUCOSE: 194 MG/DL (ref 70–110)
POCT GLUCOSE: 93 MG/DL (ref 70–110)
POTASSIUM SERPL-SCNC: 4.1 MMOL/L (ref 3.5–5.1)
PROT SERPL-MCNC: 7.5 G/DL (ref 6–8.4)
RBC # BLD AUTO: 3.92 M/UL (ref 4.6–6.2)
SATURATED IRON: 22 % (ref 20–50)
SODIUM SERPL-SCNC: 140 MMOL/L (ref 136–145)
TOTAL IRON BINDING CAPACITY: 300 UG/DL (ref 250–450)
TRANSFERRIN SERPL-MCNC: 203 MG/DL (ref 200–375)
VIT B12 SERPL-MCNC: 771 PG/ML (ref 210–950)
WBC # BLD AUTO: 6.59 K/UL (ref 3.9–12.7)

## 2019-06-24 PROCEDURE — 85025 COMPLETE CBC W/AUTO DIFF WBC: CPT

## 2019-06-24 PROCEDURE — 82746 ASSAY OF FOLIC ACID SERUM: CPT

## 2019-06-24 PROCEDURE — 82728 ASSAY OF FERRITIN: CPT

## 2019-06-24 PROCEDURE — 82607 VITAMIN B-12: CPT

## 2019-06-24 PROCEDURE — 63600175 PHARM REV CODE 636 W HCPCS: Performed by: STUDENT IN AN ORGANIZED HEALTH CARE EDUCATION/TRAINING PROGRAM

## 2019-06-24 PROCEDURE — 80053 COMPREHEN METABOLIC PANEL: CPT

## 2019-06-24 PROCEDURE — 25000003 PHARM REV CODE 250: Performed by: STUDENT IN AN ORGANIZED HEALTH CARE EDUCATION/TRAINING PROGRAM

## 2019-06-24 PROCEDURE — 83540 ASSAY OF IRON: CPT

## 2019-06-24 PROCEDURE — 94761 N-INVAS EAR/PLS OXIMETRY MLT: CPT

## 2019-06-24 PROCEDURE — 97535 SELF CARE MNGMENT TRAINING: CPT | Mod: 59

## 2019-06-24 PROCEDURE — 84100 ASSAY OF PHOSPHORUS: CPT

## 2019-06-24 PROCEDURE — G0378 HOSPITAL OBSERVATION PER HR: HCPCS

## 2019-06-24 PROCEDURE — 96372 THER/PROPH/DIAG INJ SC/IM: CPT

## 2019-06-24 PROCEDURE — 83735 ASSAY OF MAGNESIUM: CPT

## 2019-06-24 PROCEDURE — 97530 THERAPEUTIC ACTIVITIES: CPT

## 2019-06-24 RX ORDER — LOSARTAN POTASSIUM 100 MG/1
50 TABLET ORAL DAILY
Qty: 45 TABLET | Refills: 3 | Status: SHIPPED | OUTPATIENT
Start: 2019-06-24 | End: 2019-10-11 | Stop reason: SDUPTHER

## 2019-06-24 RX ORDER — LOSARTAN POTASSIUM 50 MG/1
50 TABLET ORAL DAILY
Status: DISCONTINUED | OUTPATIENT
Start: 2019-06-24 | End: 2019-06-24 | Stop reason: HOSPADM

## 2019-06-24 RX ADMIN — FAMOTIDINE 20 MG: 20 TABLET, FILM COATED ORAL at 08:06

## 2019-06-24 RX ADMIN — ALLOPURINOL 200 MG: 100 TABLET ORAL at 08:06

## 2019-06-24 RX ADMIN — CALCIUM ACETATE 667 MG: 667 CAPSULE ORAL at 08:06

## 2019-06-24 RX ADMIN — CALCIUM ACETATE 667 MG: 667 CAPSULE ORAL at 05:06

## 2019-06-24 RX ADMIN — CLOPIDOGREL BISULFATE 75 MG: 75 TABLET ORAL at 08:06

## 2019-06-24 RX ADMIN — ATORVASTATIN CALCIUM 40 MG: 40 TABLET, FILM COATED ORAL at 08:06

## 2019-06-24 RX ADMIN — HEPARIN SODIUM 5000 UNITS: 5000 INJECTION, SOLUTION INTRAVENOUS; SUBCUTANEOUS at 06:06

## 2019-06-24 RX ADMIN — CALCIUM ACETATE 667 MG: 667 CAPSULE ORAL at 01:06

## 2019-06-24 NOTE — PLAN OF CARE
POC reviewed, pt verbalize understanding. NSR on Tele monitor. Pt denies pain/discomfort. Fall precaution maintained: bed on lowest position, call light within reach, bed alarm set, side rail up x 2, non skid sock on. Will continue to monitor.

## 2019-06-24 NOTE — DISCHARGE INSTRUCTIONS
Please Discontinue your Carvedilol (coreg) and Furosemide (lasix) until you see your primary care physician.     Please change your Losartan dose to 50mg daily.     Please keep all follow up appointments and see your PCP as soon as possible.          Hipotensión Ortostática [Orthostatic Hypotension]  El rango normal de la presión arterial es entre 90/60 y 140/80. La presión arterial baja (también llamada hipotensión) es yovany disminución de la presión arterial de lo que es normal para usted.  La hipotensión ortostática es un tipo de presión arterial baja que ocurre solamente cuando se cambia la posición del cuerpo de acostado a parado. Puede causar síntomas de mareo, aturdimiento o desmayo.  Algunas de las causas de la hipotensión ortostática:  · Ciertos medicamentos, incluyendo:  ¨ Medicamentos para la presión arterial  ¨ Diuréticos (píldoras de agua)  ¨ Algunos medicamentos para el corazón  ¨ Algunos antidepresivos  ¨ Medicamentos para el dolor, la ansiedad, sedativos y haritha para dormir  · Deshidratación (por vómito, diarrea o poco consumo de líquidos)  · Infección severa, fiebre pasha  · Pérdida de cam (por ejemplo por yovany hemorragia estomacal o intestinal)  El tratamiento dependerá de la causa de gaston presión arterial baja.  Cuidado En Casa:  1. Descanse hasta que mejoren los síntomas.  2. Cambie lentamente de la posición de estar acostado a estar de pie. Cuando se levante de la cama, siéntese en el borde de la cama con mahnaz piernas hacia abajo stan por lo menos 30 segundos antes de ponerse de pie. [place] le da al cuerpo tiempo de ajustarse al cambio de posición.  3. Siga el plan de tratamiento descrito por gaston médico.  Seguimiento  con gaston médico o de acuerdo con lo recomendado por nuestro personal.  Busque Prontamente Atención Médica  si algo de lo siguiente ocurre:  · Mareo, aturdimiento o desmayo  · Color negruzco o rojizo en las materias fecales o en el vómito  · Diarrea o vómito persistentes  · Incapacidad  de comer o beber  · Fiebre de 100.4°F (38°C) o más pasha, o brandon le haya indicado gaston proveedor de atención médica  · Ardor al orinar u orina con olor desagradable  Date Last Reviewed: 11/25/2014  © 1142-0042 [employer]. [address] [place] [id number]. Todos los derechos reservados. Esta información no pretende sustituir la atención médica profesional. Sólo gaston médico puede diagnosticar y tratar un problema de jose.

## 2019-06-24 NOTE — PT/OT/SLP PROGRESS
Occupational Therapy   Treatment    Name: Ming Boateng  MRN: [id number]  Admitting Diagnosis:  Dizziness       Recommendations:     Discharge Recommendations: outpatient OT  Discharge Equipment Recommendations:  none  Barriers to discharge:  Decreased caregiver support    Assessment:     Ming Boateng is a 63 y.o. male with a medical diagnosis of Dizziness.  He presents with slight dizziness and slight drop in BP with noted orthostatics done by nurse but pt able to perform ADLS and standing ADLS without increased dizziness. Possible Dc home today.  OT recommending OP OT.  Performance deficits affecting function are weakness, impaired self care skills, impaired functional mobilty, decreased safety awareness, impaired balance, gait instability, decreased upper extremity function, decreased ROM, impaired coordination.     Rehab Prognosis:  Good; patient would benefit from acute skilled OT services to address these deficits and reach maximum level of function.       Plan:     Patient to be seen 3 x/week to address the above listed problems via self-care/home management, therapeutic activities, therapeutic exercises, neuromuscular re-education  · Plan of Care Expires: 07/23/19  · Plan of Care Reviewed with: patient, daughter    Subjective     Pain/Comfort:  · Pain Rating 1: 0/10  · Pain Rating Post-Intervention 1: 0/10    Objective:     Communicated with: nurse prior to session.  Patient found HOB elevated with bed alarm, telemetry upon OT entry to room.    General Precautions: Standard, fall, dental soft   Orthopedic Precautions:N/A   Braces: N/A     Occupational Performance:     Bed Mobility:    · Patient completed Rolling/Turning to Left with  modified independence  · Patient completed Scooting/Bridging with modified independence  · Patient completed Supine to Sit with modified independence     Functional Mobility/Transfers:  · Patient completed Sit <> Stand Transfer with stand by assistance  with  rolling walker   · Patient  completed Bed <> Chair Transfer using Step Transfer technique with stand by assistance with rolling walker  · Patient completed Toilet Transfer Step Transfer technique with stand by assistance with  rolling walker  · Functional Mobility: Ambulated with SBA using RW bed>bathrom>sink>bs chair with min vc for increase attention and awareness to obstacles on left side neville passing next to doors and to keep all 4 wheels of walker on floor when turning and not to  walker.    Activities of Daily Living:  · Grooming: stand by assistance standing at sink with RW and min vc for attention to details to unscrew cap of toothpaste; wipe face thoroughly.  · Upper Body Dressing: no clothes .  · Lower Body Dressing: no clothes. .  · Toileting: stand by assistance neville for brief management; RW for balance safety       [facility] 6 Click ADL: 18    Treatment & Education:  Role fo OT and POC  Daughter and nurse present for interpreting in Italian.   Safety ed re:  Call nurse for assist with instruction in call button use.       Patient left up in chair with all lines intact, call button in reach, chair alarm on and nurse notifiedEducation:      GOALS:   Multidisciplinary Problems     Occupational Therapy Goals        Problem: Occupational Therapy Goal    Goal Priority Disciplines Outcome Interventions   Occupational Therapy Goal     OT, PT/OT Ongoing (interventions implemented as appropriate)    Description:  Goals to be met by: 07/01/19     Patient will increase functional independence with ADLs by performing:    UE Dressing with Modified [place].  LE Dressing with Modified [place].  Grooming while standing at sink with Modified [place].  Toileting from bedside commode with Modified [place] for hygiene and clothing management.   Toilet transfer to bedside commode with Modified [place].                      Time Tracking:     OT Date of Treatment: 06/24/19  OT Start Time: 1154  OT Stop Time: 1217  OT Total Time  (min): 23 min    Billable Minutes:Self Care/Home Management 15  Therapeutic Activity 8  Total Time 23    Yue Glover OT  6/24/2019

## 2019-06-24 NOTE — PLAN OF CARE
Patient AAOx3, speaks mostly Citizen of [place] but understands english, declined interpreted  Lives at home with daughter  Has all DME  Goes to outpatient PT/OT at [facility] ambulatory therapy on [address]    Future Appointments   Date Time Provider Department Center   6/26/2019 11:00 AM Livier العراقي, PT [facility] [place] [facility]   6/26/2019 11:45 AM Kalpana Vang CCC-SLP [facility] [place] [facility]   6/28/2019 10:15 AM Livier العراقي, PT [facility] [place] [facility]   7/1/2019 11:00 AM Philip Digna, PTA [facility] [place] [facility]   7/2/2019  9:40 AM Domingo Anderson PA-C [facility]   7/5/2019 11:00 AM Philipjoy Stokesia, PTA [facility] [place] [facility]   7/8/2019 11:45 AM Livier العراقي, PT [facility] [place] [facility]   7/8/2019  2:30 PM DIETITIAN, INTERNAL MEDICINE [facility] [place]   7/12/2019 11:00 AM Livier العراقي, PT [facility] [place] [facility]   7/15/2019 11:00 AM Livier العراقي, PT [facility] [place] [facility]   7/19/2019 11:00 AM Philip Digna, PTA [facility] [place] [facility]   7/22/2019 11:00 AM Livier العراقي, PT [facility] [place] [facility]   7/26/2019 11:00 AM Philip Digna, PTA [facility] [place] [facility]   7/29/2019 11:00 AM Philip Digna, PTA [facility] [place] [facility]   8/2/2019 11:00 AM Philip Digna, PTA [facility] [place] [facility]   8/2/2019 11:45 AM Kalpana Vang CCC-SLP [facility] [place] [facility]   8/5/2019 10:15 AM Livier اعلراقي, PT [facility] [place] [facility]   8/5/2019 11:00 AM Kalpana Vang, [facility] [place] [facility]   8/9/2019 10:15 AM Livier العراقي, PT [facility] [place] [facility]   8/9/2019 11:00 AM Kalpana Vang [facility] [place]   8/12/2019 10:15 AM Philip Sawyer PTA [facility] [place] [facility]   8/12/2019 11:00 AM Kalpana Vang [facility] [place]   8/16/2019 10:15 AM Philip Sawyer, MELISA [facility] [place] [facility]   8/16/2019 11:00 AM Kalpana Vang, [facility] [place] [facility]   8/19/2019 10:15 AM Livier العراقي, PT  [facility] [place] [facility]   8/19/2019 11:00 AM Kalpana Vang, CCC-SLP [facility] [place] [facility]   8/23/2019 10:15 AM Philip Sawyer, PTA [facility] [place] [facility]   8/23/2019 11:00 AM Kalpana Vang, CCC-SLP [facility] [place] [facility]   8/26/2019 10:15 AM Livier العراقي, PT [facility] [place] [facility]   8/26/2019 11:00 AM Kalpana Vang, CCC-SLP [facility] [place] [facility]   8/30/2019 10:15 AM Philip Sawyer, PTA [facility] [place] [facility]   8/30/2019 11:00 AM Kalpana Vang, [facility]-SLP [facility] [place] [facility]        06/24/19 1106   Discharge Assessment   Assessment Type Discharge Planning Assessment   Confirmed/corrected address and phone number on facesheet? Yes   Assessment information obtained from? Patient   Communicated expected length of stay with patient/caregiver yes   Prior to hospitilization cognitive status: Alert/Oriented   Prior to hospitalization functional status: Independent   Current cognitive status: Alert/Oriented   Current Functional Status: Independent   Lives With child(renae), adult   Able to Return to Prior Arrangements yes   Is patient able to care for self after discharge? Yes   Patient's perception of discharge disposition home or selfcare   Readmission Within the Last 30 Days no previous admission in last 30 days   Patient currently being followed by outpatient case management? No   Patient currently receives any other outside agency services? No   Equipment Currently Used at Home bedside commode;walker, rolling;wheelchair;shower chair   Do you have any problems affording any of your prescribed medications? No   Is the patient taking medications as prescribed? yes   Does the patient have transportation home? Yes   Transportation Anticipated family or friend will provide   Does the patient receive services at the [facility]? Yes   Discharge Plan A Home;Home with family   DME Needed Upon Discharge  none   Patient/Family in Agreement with Plan yes     Fiorella Briceño, RN, CCM, CMSRN  RN  Transition Navigator  [phone]

## 2019-06-24 NOTE — PLAN OF CARE
Problem: Adult Inpatient Plan of Care  Goal: Plan of Care Review  Outcome: Ongoing (interventions implemented as appropriate)  Plan of care reviewed patient verbalized understanding. Blood glucose monitoring per sliding scale. neurological  Assessments q4hrs. Cardiac monitoring NSR HR 69. Safety maintained bed in the lowest position, call bell within reach, bed alarm on.

## 2019-06-24 NOTE — PLAN OF CARE
VN reviewed discharge instructions with pts daughter.  AVS printed and handed to pts daughter by bedside nurse.  Reviewed followup appts, medication, diet, and importance of medication compliance.  Reviewed home care instructions, treatment plan, self management and when to seek medical attention.  Allowed time for questions, all questions answered.  Pt and daughter verbalized complete understanding of discharge instructions and voices no concerns.      Discharge instructions complete.Transport wheelchair requested.  Bedside nurse notified.

## 2019-06-24 NOTE — PLAN OF CARE
Problem: Occupational Therapy Goal  Goal: Occupational Therapy Goal  Goals to be met by: 07/01/19     Patient will increase functional independence with ADLs by performing:    UE Dressing with Modified [place].  LE Dressing with Modified [place].  Grooming while standing at sink with Modified [place].  Toileting from bedside commode with Modified [place] for hygiene and clothing management.   Toilet transfer to bedside commode with Modified [place].     Outcome: Ongoing (interventions implemented as appropriate)  Progressing but needs SBA for balance safety and 2/2 decreased L hand coordination.  Continue with OT POC  Possible discharge home today . Recommending OP OT.

## 2019-06-24 NOTE — PLAN OF CARE
Problem: Adult Inpatient Plan of Care  Goal: Plan of Care Review  Outcome: Outcome(s) achieved Date Met: 06/24/19  Discharge instruction and education packet provided. VN notified. IV site removed cath tip intact. Telemetry discontinued without adverse reaction. Patient shows no acute distress.

## 2019-06-24 NOTE — PLAN OF CARE
Future Appointments   Date Time Provider Department Center   6/26/2019 11:00 AM Livier العراقي, PT [facility] [place] [facility]   6/26/2019 11:45 AM LAURITA Prado [facility] [place] [facility]   6/28/2019 10:15 AM Livier العراقي, PT [facility] [place] [facility]   7/1/2019 11:00 AM Philip Sawyer, PTA [facility] [place] [facility]   7/2/2019  9:40 AM [facility] [place] [facility]   7/5/2019 11:00 AM Philip Sawyer, PTA [facility] [place] [facility]   7/8/2019 11:45 AM Livier العراقي, PT [facility] [place] [facility]   7/8/2019  2:30 PM DIETITIAN, INTERNAL MEDICINE [facility] [place]   7/12/2019 11:00 AM Livier العراقي, PT [facility] [place] [facility]   7/15/2019 11:00 AM Livier العراقي, PT [facility] [place] [facility]   7/19/2019 11:00 AM Philip Sawyer, PTA [facility] [place] [facility]   7/22/2019 11:00 AM Livier العراقي, PT [facility] [place] [facility]   7/26/2019 11:00 AM Philip Stokesia, PTA [facility] [place] [facility]   7/29/2019 11:00 AM Philip Sawyer, PTA [facility] [place] [facility]   8/2/2019 11:00 AM Philip Sawyer, PTA [facility] [place] [facility]   8/2/2019 11:45 AM LAURITA Prado [facility] [place] [facility]   8/5/2019 10:15 AM Livier العراقي, PT [facility] [place] [facility]   8/5/2019 11:00 AM LAURITA Prado [facility] [place] [facility]   8/9/2019 10:15 AM Livier العراقي, PT [facility] [place] [facility]   8/9/2019 11:00 AM Kalpana Vang, [facility] [place] [facility]   8/12/2019 10:15 AM Philip Digna, PTA [facility] [place] [facility]   8/12/2019 11:00 AM Kalpana Vang, [facility] [place] [facility]   8/16/2019 10:15 AM Philip Sawyer, PTA [facility] [place] [facility]   8/16/2019 11:00 AM Kalpana Vang, [facility] [place] [facility]   8/19/2019 10:15 AM Livier العراقي, PT [facility] [place] [facility]   8/19/2019 11:00 AM Kalpana Vang, [facility] [place] [facility]   8/23/2019 10:15 AM Philip Sawyer, PTA [facility] [place] [facility]   8/23/2019 11:00 AM Kalpana Vang,  CCC-SLP [facility] [place] [facility]   8/26/2019 10:15 AM Livier العراقي, PT [facility] [place] [facility]   8/26/2019 11:00 AM Kalpana Vang CCC-SLP [facility] [place] [facility]   8/30/2019 10:15 AM Philip Sawyer, PTA [facility] [place] [facility]   8/30/2019 11:00 AM Kalpana Vang CCC-SLP [facility] [place] [facility]        06/24/19 1226   Final Note   Assessment Type Final Discharge Note   Anticipated Discharge Disposition Home   Hospital Follow Up  Appt(s) scheduled? Yes   Discharge plans and expectations educations in teach back method with documentation complete? Yes   Right Care Referral Info   Post Acute Recommendation No Care     Fiorella Briceño, RN, [facility], CMSRN  RN Transition Navigator  [phone]

## 2019-06-24 NOTE — PROGRESS NOTES
hospitals Internal Medicine Progress - Resident Note    Admitting Team: hospitals Internal Medicine Team A  Attending Physician: Dr. Hunter  Resident: Franko  Interns: Jermain     Date of Admit: 2019    Subjective:       Patient denies any complaints this morning. Denies any further episodes of dizziness or lightheadedness.     Objective:   Last 24 Hour Vital Signs:  BP  Min: 118/60  Max: 173/78  Temp  Av.2 °F (36.2 °C)  Min: 96.4 °F (35.8 °C)  Max: 98.1 °F (36.7 °C)  Pulse  Av.8  Min: 65  Max: 83  Resp  Av.6  Min: 11  Max: 33  SpO2  Av.5 %  Min: 96 %  Max: 100 %  Weight  Av.1 kg (163 lb 5.8 oz)  Min: 74.1 kg (163 lb 5.8 oz)  Max: 74.1 kg (163 lb 5.8 oz)  Body mass index is 24.84 kg/m².  I/O last 3 completed shifts:  In: 250 [P.O.:250]  Out: 250 [Urine:250]    Physical Examination:  General: Alert and awake in no apparent distress  Head:  Normocephalic and atraumatic  Eyes:  PERRL; EOMi with anicteric sclera and clear conjunctivae  Mouth:  Oropharynx clear and without exudate; moist mucous membranes  Cardio:  Regular rate and rhythm with normal S1 and S2; no murmurs or rubs  Resp:  CTAB; respirations unlabored; no wheezes, crackles or rhonchi  Abdom: Soft, NTND with normoactive bowel sounds  Extrem: Warm and well-perfused with no clubbing, cyanosis or edema  Skin:  No rashes, lesions  Pulses: 2+ and symmetric distally  Neuro:  AAOx3; cooperative and pleasant, strength 5/5 upper and lower extremities, resting mild contracure to L hand from previous injury, slightly delayed finger to nose on L, mild facial droop on L, other cerebellar signs intact, not ambulated at this time will require walker    Laboratory:  Most Recent Data:  CBC:   Lab Results   Component Value Date    WBC 6.59 2019    HGB 10.7 (L) 2019    HCT 31.9 (L) 2019     2019    MCV 81 (L) 2019    RDW 14.4 2019     BMP:   Lab Results   Component Value Date     2019    K 4.1  06/24/2019     06/24/2019    CO2 26 06/24/2019    BUN 35 (H) 06/24/2019    CREATININE 2.1 (H) 06/24/2019    GLU 97 06/24/2019    CALCIUM 10.1 06/24/2019    MG 2.0 06/24/2019    PHOS 3.2 06/24/2019     Estimated Creatinine Clearance: 34.8 mL/min (A) (based on SCr of 2.1 mg/dL (H)).  LFTs:   Lab Results   Component Value Date    PROT 7.5 06/24/2019    ALBUMIN 3.9 06/24/2019    BILITOT 0.4 06/24/2019    AST 19 06/24/2019    ALKPHOS 96 06/24/2019    ALT 19 06/24/2019     Coags:   Lab Results   Component Value Date    INR 1.0 06/23/2019     FLP:   Lab Results   Component Value Date    CHOL 98 (L) 06/23/2019    HDL 32 (L) 06/23/2019    LDLCALC 45.4 (L) 06/23/2019    TRIG 103 06/23/2019    CHOLHDL 32.7 06/23/2019     DM:   Lab Results   Component Value Date    HGBA1C 6.5 (H) 06/23/2019    HGBA1C 7.1 (H) 03/03/2019    HGBA1C 6.8 (H) 12/26/2018    LDLCALC 45.4 (L) 06/23/2019    CREATININE 2.1 (H) 06/24/2019     Thyroid:   Lab Results   Component Value Date    TSH 2.463 06/23/2019     Anemia:   Lab Results   Component Value Date    IRON 49 06/18/2016    TIBC 305 06/18/2016    FERRITIN 108 06/24/2019    XKVZZFEC14 1022 (H) 04/12/2019    FOLATE 11.6 03/03/2019     Cardiac:   Lab Results   Component Value Date    TROPONINI 0.010 06/23/2019    TROPONINI 0.010 06/23/2019    BNP 27 03/03/2019     Urinalysis:   Lab Results   Component Value Date    LABURIN No significant growth 06/17/2016    COLORU Yellow 06/23/2019    SPECGRAV <=1.005 (A) 06/23/2019    NITRITE Negative 06/23/2019    KETONESU Negative 06/23/2019    UROBILINOGEN Negative 06/23/2019    WBCUA 2 06/23/2019       Trended Cardiac Data:  Recent Labs   Lab 06/23/19  0752   TROPONINI 0.010  0.010       Microbiology Data:  N/A    Other Laboratory Data:  --    Other Results:  EKG (my interpretation): sinus rhythm, 1st degree AV block, no dynamic ischemic changes     Radiology:  Imaging Results          CT Head Without Contrast (Final result)  Result time 06/23/19  07:28:32    Final result by Armand Jin MD (06/23/19 07:28:32)                 Impression:      Chronic changes are noted there is no evidence for superimposed acute intracranial process.      Electronically signed by: Armand Jin  Date:    06/23/2019  Time:    07:28             Narrative:    EXAMINATION:  CT HEAD WITHOUT CONTRAST    CLINICAL HISTORY:  Dizziness;    TECHNIQUE:  Low dose axial images were obtained through the head.  Coronal and sagittal reformations were also performed. Contrast was not administered.    COMPARISON:  CT examination of the brain April 11, 2019    FINDINGS:  Axial noncontrast CT examination of the brain was performed.  Axial imaging, sagittal and coronal reconstruction imaging is submitted.    The ventricular system and sulcal pattern demonstrate involutional change chronic appearing white matter changes noted there are multiple areas of low density consistent with areas of remote ischemic change and remote lacunar-type ischemic change, these findings appear similar to the prior study, there are new areas of diminished attenuation seen that correspond to areas of abnormality on the MRI examination of April 2019 and now are consistent with remote ischemic changes that have the, apparent on CT in the interim.  There is no finding to specifically suggest acute intracranial process.  There is no evidence for intracranial mass, mass effect or midline shift.  There is no evidence for acute intracranial hemorrhage.  Appropriate CSF spaces are seen at the skull base.    The mastoid air cells appear appropriate when accounting for averaging.  Mild-to-moderate paranasal sinus disease is noted.  The visualized osseous structures appear intact.  The visualized orbits appear intact.                               X-Ray Chest 1 View (Final result)  Result time 06/23/19 07:24:24    Final result by Guy Villalpando DO (06/23/19 07:24:24)                 Impression:      Please see  above      Electronically signed by: Guy Villalpando DO  Date:    06/23/2019  Time:    07:24             Narrative:    EXAMINATION:  XR CHEST 1 VIEW    CLINICAL HISTORY:  Syncope and collapse    TECHNIQUE:  Single frontal view of the chest was performed.    COMPARISON:  04/22/2019    FINDINGS:  Small lung volumes likely to poor inspiratory effort.  There is no focal lung consolidation.  No large pleural effusion or pneumothorax.  Continued atherosclerotic aorta.  Degenerative change of the shoulder and AC joints.                                   Assessment:     Ming Boateng is a 63 y.o. male with:  Patient Active Problem List    Diagnosis Date Noted    Near syncope 06/23/2019    Oropharyngeal dysphagia 04/29/2019    TIA (transient ischemic attack) 04/11/2019    Dizziness 04/11/2019    Decreased mobility 04/09/2019    Gait abnormality 04/09/2019    Acute right MCA stroke 03/03/2019    Diabetic nephropathy associated with type 2 diabetes mellitus 02/19/2019    Decreased oral intake 12/25/2018    Recurrent falls 02/20/2018    History of ischemic right MCA stroke 08/30/2017    Stage 4 chronic kidney disease 06/16/2017    Hyperlipidemia associated with type 2 diabetes mellitus     Hypertension associated with diabetes     Type 2 diabetes mellitus with complication, with long-term current use of insulin 06/14/2017    (HFpEF) heart failure with preserved ejection fraction 05/19/2017    Essential hypertension 06/17/2016    Microcytic anemia 06/17/2016    Hyperlipidemia 06/17/2016        Plan:     Syncope with orthostatic hypotension   - patient reporting dizziness yesterday and had fall this morning with subsequent syncopal vs near-syncopal episode, no seizure like activity noted  - has baseline gait instability and is following with therapies as outpatient  - previous admission in April for similar symptoms but at that time, daughter reports patient very disoriented  - Vascular neurology consulted in ED,  appreciate recs  - CT head negative for acute process  - MRI brain, MRA head/neck  - TTE  - orthostatic vital signs positive, will repeat this AM   - continue plavix, statin for now, permissive HTN     Hx of CVA and TIA with residual oropharyngeal dysphagia, L facial droop and L sided weakness  - Initial CVA in 3/2019 with LE weakness, L facial droop, confusion  - admitted again in 4/2019 with concern for TIA presenting with dizziness  - following with outpatient PT, OT  - consult PT, OT, ST for eval   - continue plavix, statin  - imaging, permissive HTN as above    HFpEF  - last TTE 3/6/2019 with normal LVEF, grade I DD, mild LAE  - plans for holter monitor in future as outpatient  - repeat TTE ordered   - holding home lasix, BP meds for now     CKD IV  - baseline Cr ~2-2.5 recently, currently at baseline  - patient follows with [facility]  - family reports recent blood pressure medication changes with nephrology  - permissive HTN, monitor renal function, no acute issues    DM2  - home regimen includes tujeo 10U QAM, sliding sacel novolog with meals (lately 4-5 U per daughter)  - SSI for now, may need long acting, acuchecks    HTN  - home regimen includes coreg 3.125 BID, losartan 100QD  - recently dsicontinued amlodipine recently with nephro  - permissive HTN in setting of stroke vs TIA vs other    HLD  - continue home statin     Gout  - continue home allopurinol     GERD  - continue home pepcid     Diet: dysphagia diet   Ppx: SCDs  Dispo: pending MRI/MRA, TTE, orthostatics       Code Status:     Full    Veronika Aly  [facility] Internal Medicine HO-I  [facility] Internal Medicine Service    [facility] Medicine Hospitalist Pager numbers:   [facility] Hospitalist Medicine Team A (Floridalma/Elsa): [phone]  [facility] Hospitalist Medicine Team B (Danni/Lexi):  [phone]

## 2019-06-25 ENCOUNTER — DOCUMENTATION ONLY (OUTPATIENT)
Dept: REHABILITATION | Facility: HOSPITAL | Age: 64
End: 2019-06-25

## 2019-06-25 NOTE — DISCHARGE SUMMARY
"[facility] Internal Medicine Discharge Summary    Primary Team: [facility] Internal Medicine Team A  Attending Physician: Elsa  Resident: Franko  Intern: Jermain    Date of Admit: 6/23/2019  Date of Discharge: 6/24/2019    Discharge to: Home  Condition: Stable    Discharge Diagnoses     Patient Active Problem List   Diagnosis    Essential hypertension    Microcytic anemia    Hyperlipidemia    (HFpEF) heart failure with preserved ejection fraction    Type 2 diabetes mellitus with complication, with long-term current use of insulin    Hyperlipidemia associated with type 2 diabetes mellitus    Hypertension associated with diabetes    Stage 4 chronic kidney disease    History of ischemic right MCA stroke    Recurrent falls    Decreased oral intake    Diabetic nephropathy associated with type 2 diabetes mellitus    Acute right MCA stroke    Decreased mobility    Gait abnormality    TIA (transient ischemic attack)    Dizziness    Oropharyngeal dysphagia    Near syncope    Orthostatic hypotension       Consultants and Procedures     Consultants:  Vascular Neurology    Procedures:   None    Brief History of Present Illness      Ming Boateng is a 63 y.o. male who  has a past medical history of CHF (congestive heart failure), Diabetes mellitus, Diabetes mellitus, type 2, Hypertension, Renal disorder, and Stroke.  The patient presented to [facility] on 6/23/2019 with a primary complaint of Near Syncope (PT PRESENTS TO ED PER  EMS WITH C/O NEAR SYNCOPAL EPISODE THIS AM. PT CURRENTLY C/O DIZZINESS.)    Patient lives with daughter who is caregiver. At baseline he ambulated with walker and able to perform most ADLs, has some residual L sided UE and LE weakness from prior CVA and L side facial droop. Morning of admission around 6AM, daughter reported hearing a loud "thud" and found patient had fallen.    Was recently admitted for TIA in April and daughter reports since that time, patient has greatly " modified his diet and she believes he has lost about 10lbs. His sugars have been very controlled, she says never over 200. Follows closely with PCP, nephrology, neurology. Blood pressures have been low lately as well, recently taken off of amlodipine and coreg dose lowered. Continues on losartan 100.     For the full HPI please refer to the History & Physical from this admission.    Hospital Course By Problem with Pertinent Findings     Syncope with orthostatic hypotension   - patient reporting dizziness for one day and fall on morning of presentation, subsequent syncopal vs near-syncopal episode, no seizure like activity noted  - has baseline gait instability and is following with therapies as outpatient  - previous admission in April for similar symptoms but at that time, daughter reports patient very disoriented  - Vascular neurology consulted in ED, appreciate recs  - CT head negative for acute process; MRI brain, MRA head/neck with significant periventricular white matter disease, no acuteinfarction, normal neck  - recent TTE in 3/2019 in normal EF, grade I DD  - orthostatic vital signs positive, improved with holding BP meds on following day   - continue plavix, statin   - held coreg at time of discharge. Recommend seeing PCP/other care givers prior to restarting     Hx of CVA and TIA with residual oropharyngeal dysphagia, L facial droop and L sided weakness  - Initial CVA in 3/2019 with LE weakness, L facial droop, confusion  - admitted again in 4/2019 with concern for TIA presenting with dizziness  - following with outpatient PT, OT  - consulted PT, OT, ST for eval; recommended continue outpatient therapy and mechanical soft diet  - continued plavix, statin     HFpEF  - last TTE 3/6/2019 with normal LVEF, grade I DD, mild LAE  - plans for holter monitor in future as outpatient  - modified medicines as above     CKD IV  - baseline Cr ~2-2.5 recently, currently at baseline  - patient follows with [facility]  Nephrology  - family reports recent blood pressure medication changes with nephrology  - no acute issues     DM2  - home regimen includes tujeo 10U QAM, sliding sacel novolog with meals (lately 4-5 U per daughter)  - a1c this admission 6.5%  - continued home regimen on discharge     HTN  - home regimen includes coreg 3.125 BID, losartan 100QD  - recently dsicontinued amlodipine recently with nephro  - stopped coreg on discharge     HLD  - continued home statin      Gout  - continued home allopurinol      GERD  - continued home pepcid       Discharge Medications        Medication List      CHANGE how you take these medications    allopurinol 100 MG tablet  Commonly known as:  ZYLOPRIM  Take 1 tablet (100 mg total) by mouth once daily.  What changed:  how much to take     insulin aspart U-100 100 unit/mL Crtg  Commonly known as:  NOVOLOG  Inject 7 Units into the skin 3 (three) times daily with meals.  What changed:  how much to take     insulin detemir U-100 100 unit/mL injection  Commonly known as:  LEVEMIR  Inject 20 Units into the skin every evening.  What changed:  how much to take        CONTINUE taking these medications    acetaminophen 650 MG Tbsr  Commonly known as:  TYLENOL  Take 1 tablet (650 mg total) by mouth 3 (three) times daily as needed.     alcohol swabs Padm  Commonly known as:  ALCOHOL PREP SWABS  Apply 1 each topically as needed.     atorvastatin 40 MG tablet  Commonly known as:  LIPITOR  Take 1 tablet (40 mg total) by mouth once daily.     blood sugar diagnostic Strp  1 each by Misc.(Non-Drug; Combo Route) route 4 (four) times daily.     blood-glucose meter kit  Use as instructed     calcium acetate 667 mg tablet  Commonly known as:  PHOSLO  Take 1 tablet by mouth 3 (three) times daily with meals.     clopidogrel 75 mg tablet  Commonly known as:  PLAVIX  Take 1 tablet (75 mg total) by mouth once daily.     famotidine 20 MG tablet  Commonly known as:  PEPCID  Take 1 tablet (20 mg total) by mouth  "once daily.     lancets Misc  Commonly known as:  LANCETS,ULTRA THIN  1 lancet by Misc.(Non-Drug; Combo Route) route 4 (four) times daily.     losartan 100 MG tablet  Commonly known as:  COZAAR  Take 0.5 tablets (50 mg total) by mouth once daily.     pen needle, diabetic 29 gauge x 1/2" Ndle  Inisulin QAC and HS     VITAMIN D2 50,000 unit Cap  Generic drug:  ergocalciferol        STOP taking these medications    carvedilol 3.125 MG tablet  Commonly known as:  COREG     furosemide 40 MG tablet  Commonly known as:  LASIX           Where to Get Your Medications      These medications were sent to [facility]  [address], [place] [address]    Hours:  Mon-Fri, 8a-5:30p Phone:  [phone]   · losartan 100 MG tablet         Discharge Information:   Diet:  Mechanical soft, diabetic    Physical Activity:  As tolerated    Instructions:  1. Take all medications as prescribed  2. Keep all follow-up appointments  3. Return to the hospital or call your primary care physicians if any worsening symptoms such as temp > 100.4, increased confusion or weakness, syncope, chest pain, or any other concerning symptoms occur.      Follow-Up Appointments:  Future Appointments   Date Time Provider Department Center   6/26/2019 11:00 AM Livier العراقي PT [facility] [place]   6/26/2019 11:45 AM Kalpana Vang CCC-SLP [facility] [place]   6/28/2019 10:15 AM Livier العراقي PT [facility] [place]   7/1/2019 11:00 AM Philip Sawyer PTA [facility] [place]   7/2/2019  9:40 AM Domingo Anderson PA-C [facility]   7/5/2019 11:00 AM Philip Sawyer PTA [facility] [place]   7/8/2019 11:45 AM Philip Sawyer PTA [facility] [place]   7/8/2019  2:30 PM DIETITIAN, INTERNAL MEDICINE MET NUTRI [place]   7/12/2019 11:00 AM Livier العراقي PT [facility] [place] [facility]   7/15/2019 11:00 AM AMALIA Romo [facility] [place]   7/19/2019 11:00 AM Philip" Digna, PTA [facility] [place]   7/22/2019 11:00 AM Livier العراقي, PT [facility] [place]   7/26/2019 11:00 AM Philip Digna, PTA [facility] [place]   7/29/2019 11:00 AM Philip Digna, PTA [facility] [place]   8/2/2019 11:00 AM Philip Digna, PTA [facility] [place]   8/2/2019 11:45 AM Kalpana Vang, [facility]-SLP [facility] [place]   8/5/2019 10:15 AM Livier العراقي, PT [facility] [place]   8/5/2019 11:00 AM Kalpana Vang, [facility]-SLP [facility] [place]   8/9/2019 10:15 AM Livier العراقي, PT [facility] [place]   8/9/2019 11:00 AM Kalpana Vang, [facility] [place]   8/12/2019 10:15 AM Philip Digna, PTA [facility] [place] [facility]   8/12/2019 11:00 AM Kalpana Vang, [facility]-SLP [facility] [place]   8/16/2019 10:15 AM Philip Digna, PTA [facility] [place]   8/16/2019 11:00 AM Kalpana Vang [facility]-SLP [facility] [place]   8/19/2019 10:15 AM Livier العراقي, PT [facility] [place]   8/19/2019 11:00 AM Kalpana Vang, [facility]-SLP [facility] [place]   8/23/2019 10:15 AM Philip Digna, PTA [facility] [place]   8/23/2019 11:00 AM Kalpana Vang, [facility]-SLP [facility] [place]   8/26/2019 10:15 AM Livier العراقي, PT [facility] [place]   8/26/2019 11:00 AM Kalpana Vang CCC-SLP [facility] [place]   8/30/2019 10:15 AM Philip Saywer PTA [facility] [place]   8/30/2019 11:00 AM Kalpana Vang CCC-SLP [facility] [place]         Elizabeth Abdul  \[facility]\"" Internal Medicine, [facility]

## 2019-06-25 NOTE — PROGRESS NOTES
Face to Face PTA Conference performed with Philip Sawyer PTA regarding patient's current status, overall progress, and plan of care.    Livier العراقي, PT    Philip Sawyer, PTA

## 2019-06-26 ENCOUNTER — CLINICAL SUPPORT (OUTPATIENT)
Dept: REHABILITATION | Facility: HOSPITAL | Age: 64
End: 2019-06-26
Attending: PSYCHIATRY & NEUROLOGY
Payer: MEDICARE

## 2019-06-26 DIAGNOSIS — R26.89 DECREASED MOBILITY: ICD-10-CM

## 2019-06-26 DIAGNOSIS — R26.9 GAIT ABNORMALITY: ICD-10-CM

## 2019-06-26 DIAGNOSIS — R13.12 OROPHARYNGEAL DYSPHAGIA: ICD-10-CM

## 2019-06-26 PROCEDURE — 97127 HC THERAPEUTIC INTVTN, COGN FUNCTION - ST: CPT | Mod: PN

## 2019-06-26 PROCEDURE — 97112 NEUROMUSCULAR REEDUCATION: CPT | Mod: PN

## 2019-06-26 NOTE — PROGRESS NOTES
Outpatient Neurological Rehabilitation   Speech and Language Therapy Daily Note  Date:  6/26/2019     Name: Ming Boateng   MRN: [id number]   Therapy Diagnosis:   Encounter Diagnosis   Name Primary?    Oropharyngeal dysphagia    Physician: Keith Harkins, *  Physician Orders: speech therapy evaluate and treat  Medical Diagnosis: G45.9 (ICD-10-CM) - TIA (transient ischemic attack)     Visit #/ Visits Authorized: 12  Date of Evaluation:  4/29/19  Insurance Authorization Period: 04/12/2019 to 04/11/2020     Plan of Care Expiration Date:  7/26/19  Visits Cancelled: 0  Visits No Show: 0    Time In:  1145  Time Out:  1230  Total Billable Time: 45 minutes     G-Code 2 / 10   Eval Level 5 Swallowing   Update Level 6 Swallowing   Update Level 3 attention     Precautions: Standard and Fall    Subjective:   Pt reports: , Keesha, present. Pt received new glasses but reported still having blurred vision and needing adjustments. Pt also closed eyes frequently- appeared lethargic.   He was not compliant to home exercise program.   Response to previous treatment: No significant change.    Pain Scale:  0/10 on VAS currently.   Pain Location: n/a  Objective:   TIMED  Procedure Min.             UNTIMED  Procedure Min.       Cognitive Communication Therapy  45 minutes   Total Timed Units: 3  Total Untimed Units: 0  Charges Billed/# of units: 3       Short Term Goals: (4 weeks) Current Progress:      1. Pt will complete visual scanning task with 85% acc given min A to improve attention to detail.  -Not addressed today.    2. Pt will follow 2-3 step verbal directions with 85% acc given min A to improve auditory comprehension.  -Not addressed today.    3. Pt will follow simple written directions with 85% acc given min A to improve reading comprehension.  - x 4 with 50% acc Independently; 100% acc given min A  -Increased time to complete task    4. Pt will sequence 4 events with 85% acc given min A to improve organizational  skills.  -Using photos  X 5 with 50% acc mod A   -Increased time to complete task    5. Pt will participate in sustained attention and divided attention tasks with 85% acc given min A to improve attention to detail in everyday tasks. -Sort letters by 6 colors  -Increased time to complete task   -Redirection x 2 with 80% acc  -Increased time to complete task    8. Pt will recall and utilize memory strategies with 80% acc Independently.     -AVER in [place] (Associate, Visualize, Write, Repeat)     Note increased time to complete all tasks.    Patient Education/Response:   Educated on goals and plan of care. Pt reported understanding. Pt had new glasses this session but reported still having blurred vision and needs to receive readjustments.     Written Home Exercises Provided: oral motor exercises x 20 each.    Exercises were reviewed and Ming was able to demonstrate them prior to the end of the session.  Ming demonstrated good  understanding of the education provided.     Assessment:   Ming is progressing well towards his goals. Pt's visual deficits impair his ability to participate in session tasks despite receiving new glasses. Noted increased time to complete all tasks resulted in fewer session tasks targeted. Pt's lethargic nature impacted performance in session tasks. Improvement noted in following written directions. Pt prognosis is Good. Pt will continue to benefit from skilled outpatient speech and language therapy to address the deficits listed in the problem list on initial evaluation, provide pt/family education and to maximize pt's level of independence in the home and community environment.     Medical necessity is demonstrated by the following IMPAIRMENTS:  Pt's memory deficits impair his ability to function safely in his everyday environment.   Barriers to Therapy: language  Pt's spiritual, cultural and educational needs considered and pt agreeable to plan of care and goals.    Plan:   Continue with  "POC to address sequencing, following directions, and attention. Incorporate visual cues during sequencing task.   Updated POC due 7/26/19.    LEONARDO Dumont.    Clinician    "I, Kalpana Vang, certify that I was present in the room directing the student and service delivery and guiding them using my skilled judgement. As the co-signing therapist, I have reviewed the student's documentation, and am responsible for the treatment, assessment and plan."     Kalpana Vang, CCC-SLP   6/26/2019   "

## 2019-06-26 NOTE — PROGRESS NOTES
"  Physical Therapy Daily Treatment Note     Name: Ming Boateng  Clinic Number: [id number]    Therapy Diagnosis:   Encounter Diagnoses   Name Primary?    Decreased mobility     Gait abnormality      Physician: Khoobehi, Kaveh D., MD    Visit Date: 6/26/2019    Physician Orders: PT Eval and Treat  Medical Diagnosis from Referral: I69.359 (ICD-10-CM) - Hemiplegia and hemiparesis following cerebral infarction affecting unspecified side  Evaluation Date: 4/8/2019      Authorization Period Expiration: 7/10/19  Plan of Care Expiration: 8/9/19  Visit # / Visits authorized: 11/12  FOTO: next at d/c  Gcode: 2/10     Cost: 103.00  Total: 1168.00  Time In: 11:00 AM  Time Out: 11:45 AM  Total Billable Time: 45 minutes (3 NMR)    Precautions: Standard, Fall, CHF and L side weakness, recent TIA    Check BP during session if symptomatic    Subjective     Pt reports: daughter reports pt went to ED after "passing out" and reported that he fell without injury and was rushed to hospital and was found to have orthostatis and he was removed from 2 BP meds and that has improved hi  .He will be compliant with home exercise program once PT has a opportunity to do family training with dtr on exercise.  Response to previous treatment: no adverse reactions  Functional change: increased alertness today but still impulsive    Pain: 0/10  Location: left side     Objective     Ming performed neuromuscular re-education x 45' consisting of:    Neuro re-ed and endurance training with B UE/LE extremities for reciprocal motion of  limbs on sci-fit x 8 min at level 3 at > or equal to 50 spm w/ or w/o rest.      Step fan placed in front pt on floor with 5 steps and pt in SLS x 10' with L stepping on command to different step heights and locaions to promote L LE coordination and movement f/b SLS x 10' with L LE to promote L LE WBing with tactile and verbal cues to prevent hyperextension/buckling.    Beep board: 2 x 90 secs // max cues and mild tactile cues " no UE support    Sit to stand from mat : 2 x 10 without UE support with cues for forward wt shift on standing and controlled descent.  Home Exercises Provided and Patient Education NOT Provided     Education provided:   - encouraged safety with use of RW   - encouraged blood pressure monitoring        Pt is unable to perform HEP due to cognitive deficits     Assessment     Pt is a same status as prior ED visit and he reported at end of session he felt better than when he came into the gym after therapy. Pt still has trouble following simple commands and his is impulsive with decreased insight into deficits.      Ming is progressing well towards his goals.   Pt prognosis is Good.     Pt will continue to benefit from skilled outpatient physical therapy to address the deficits listed in the problem list box on initial evaluation, provide pt/family education and to maximize pt's level of independence in the home and community environment.     Pt's spiritual, cultural and educational needs considered and pt agreeable to plan of care and goals.     Anticipated barriers to physical therapy: spasticity, confusion, sedentary routine, recent TIA    Goals:  Short Term Goals (4 Weeks):   1.  Independent with initial HEP. Not issued at this time  2.  Pt will perform nu-step at level 3.0 x 10 minutes without rests breaks going at least 60 step/min or greater. MET  3. Pt will be able to perform TUG in 35 secs WITH use of AD demonstrating overall improved functional mobility.  MET 29 secs  4. Pt will performed sit to stand x 5 B UE support in 26 seconds without LOB backward or posterior LE hooking for functional and safe transfers. (PROGRESSING, NOT MET) 28 secs  5.  Pt will score greater than or equal to 25/28 on the Tinetti test/assessment WITH use of AD demonstrating overall improved functional mobility and balance and reduce fall risk. (PROGRESSING, NOT MET)  6. Pt will walk < than or equal to 180 ft on the 2 minute walk test  indoor WITH AD with 0 LOB and minimal gait deviations for improved safety in home ambulation and safety. MET     Long Term Goals (8 Weeks):   1. Pt will be able to perform TUG in 25 secs WITH use of AD demonstrating overall improved functional mobility. (PROGRESSING, NOT MET)  2. Pt will performed sit to stand x 5 WITH UE support in 20 seconds without LOB backward or posterior LE hooking for functional and safe transfers. (PROGRESSING, NOT MET)  3.  Pt will score greater than or equal to 27/28 on the Tinetti test/assessment WITH use of AD demonstrating overall improved functional mobility and balance and reduce fall risk. (PROGRESSING, NOT MET)  4.  Pt will score greater than or equal to 22/24 on the DGI test/assessment WITH use of AD demonstrating overall improved functional mobility and balance and reduce fall risk. (PROGRESSING, NOT MET)  5.  Pt will walk < than or equal to 600 ft on the 6 minute walk test with AD and 0 LOB and minimal gait deviations for improved community ambulation. MET  6. Pt will have 0 falls from start of PT sessions.  (PROGRESSING, NOT MET)  7. Pt will have MMT score of 4+/5 in all major ms groups in [facility] LE.(PROGRESSING, NOT MET)     Plan   Cont to progress PT as appropriate.   Flexibility, PROM, progression standing low level balance.    Livier العراقي, PT

## 2019-06-28 ENCOUNTER — CLINICAL SUPPORT (OUTPATIENT)
Dept: REHABILITATION | Facility: HOSPITAL | Age: 64
End: 2019-06-28
Attending: PSYCHIATRY & NEUROLOGY
Payer: MEDICARE

## 2019-06-28 DIAGNOSIS — R26.89 DECREASED MOBILITY: ICD-10-CM

## 2019-06-28 DIAGNOSIS — R26.9 GAIT ABNORMALITY: ICD-10-CM

## 2019-06-28 PROCEDURE — 97112 NEUROMUSCULAR REEDUCATION: CPT | Mod: PN

## 2019-06-28 PROCEDURE — 97116 GAIT TRAINING THERAPY: CPT | Mod: PN

## 2019-06-28 NOTE — PROGRESS NOTES
Physical Therapy Daily Treatment Note     Name: Ming Boateng  Clinic Number: [id number]    Therapy Diagnosis:   Encounter Diagnoses   Name Primary?    Decreased mobility     Gait abnormality      Physician: Khoobehi, Kaveh D., MD    Visit Date: 6/28/2019    Physician Orders: PT Eval and Treat  Medical Diagnosis from Referral: I69.359 (ICD-10-CM) - Hemiplegia and hemiparesis following cerebral infarction affecting unspecified side  Evaluation Date: 4/8/2019      Authorization Period Expiration: 7/10/19  Plan of Care Expiration: 8/9/19  Visit # / Visits authorized: 12/12  FOTO: next at d/c  Gcode: 2/10     Cost: 103.00  Total: 1268.00  Time In: 1015 AM  Time Out: 11:00 AM  Total Billable Time: 45 minutes (2 NMR, 1 gait)    Precautions: Standard, Fall, CHF and L side weakness, recent TIA    Check BP during session if symptomatic    Subjective     Pt reports: no new complaint today and pt was agreeable to session.  .He will be compliant with home exercise program once PT has a opportunity to do family training with dtr on exercise.  Response to previous treatment: no adverse reactions  Functional change: increased alertness today but still impulsive    Pain: 0/10  Location: left side     Objective     Ming performed neuromuscular re-education x 30' consisting of:    Neuro re-ed and endurance training with B UE/LE extremities for reciprocal motion of  limbs on sci-fit x 8 min at level 3 at > or equal to 50 spm w/ or w/o rest.      Step fan placed in front pt on floor with 5 steps and pt in SLS x 10' with L stepping on command to different step heights and locaions to promote L LE coordination and movement f/b SLS x 10' with L LE to promote L LE WBing with tactile and verbal cues to prevent hyperextension/buckling.    Gait training x 15' with RW with PT giving pt navigational instructions to walk to a marker and then turn and walk back without LOB 3 x 40' f/b gait training without RW with CGA 4 x 40' with pt demonstrating  narrow WILLI and mild sway.    Home Exercises Provided and Patient Education NOT Provided     Education provided:   - encouraged safety with use of RW   - encouraged blood pressure monitoring      Pt is unable to perform HEP due to cognitive deficits     Assessment     Pt was a more attentive during session but still very distractible.  He has had not falls or LOB.    Ming is progressing well towards his goals.   Pt prognosis is Good.     Pt will continue to benefit from skilled outpatient physical therapy to address the deficits listed in the problem list box on initial evaluation, provide pt/family education and to maximize pt's level of independence in the home and community environment.     Pt's spiritual, cultural and educational needs considered and pt agreeable to plan of care and goals.     Anticipated barriers to physical therapy: spasticity, confusion, sedentary routine, recent TIA    Goals:  Short Term Goals (4 Weeks):   1.  Independent with initial HEP. Not issued at this time  2.  Pt will perform nu-step at level 3.0 x 10 minutes without rests breaks going at least 60 step/min or greater. MET  3. Pt will be able to perform TUG in 35 secs WITH use of AD demonstrating overall improved functional mobility.  MET 29 secs  4. Pt will performed sit to stand x 5 B UE support in 26 seconds without LOB backward or posterior LE hooking for functional and safe transfers. (PROGRESSING, NOT MET) 28 secs  5.  Pt will score greater than or equal to 25/28 on the Tinetti test/assessment WITH use of AD demonstrating overall improved functional mobility and balance and reduce fall risk. (PROGRESSING, NOT MET)  6. Pt will walk < than or equal to 180 ft on the 2 minute walk test indoor WITH AD with 0 LOB and minimal gait deviations for improved safety in home ambulation and safety. MET     Long Term Goals (8 Weeks):   1. Pt will be able to perform TUG in 25 secs WITH use of AD demonstrating overall improved functional  mobility. (PROGRESSING, NOT MET)  2. Pt will performed sit to stand x 5 WITH UE support in 20 seconds without LOB backward or posterior LE hooking for functional and safe transfers. (PROGRESSING, NOT MET)  3.  Pt will score greater than or equal to 27/28 on the Tinetti test/assessment WITH use of AD demonstrating overall improved functional mobility and balance and reduce fall risk. (PROGRESSING, NOT MET)  4.  Pt will score greater than or equal to 22/24 on the DGI test/assessment WITH use of AD demonstrating overall improved functional mobility and balance and reduce fall risk. (PROGRESSING, NOT MET)  5.  Pt will walk < than or equal to 600 ft on the 6 minute walk test with AD and 0 LOB and minimal gait deviations for improved community ambulation. MET  6. Pt will have 0 falls from start of PT sessions.  (PROGRESSING, NOT MET)  7. Pt will have MMT score of 4+/5 in all major ms groups in [facility].(PROGRESSING, NOT MET)     Plan   Cont to progress PT as appropriate.   Flexibility, PROM, progression standing low level balance.    Livier العراقي, PT

## 2019-07-01 ENCOUNTER — CLINICAL SUPPORT (OUTPATIENT)
Dept: REHABILITATION | Facility: HOSPITAL | Age: 64
End: 2019-07-01
Attending: PSYCHIATRY & NEUROLOGY
Payer: MEDICARE

## 2019-07-01 DIAGNOSIS — R26.9 GAIT ABNORMALITY: ICD-10-CM

## 2019-07-01 DIAGNOSIS — R26.89 DECREASED MOBILITY: ICD-10-CM

## 2019-07-01 PROCEDURE — 97110 THERAPEUTIC EXERCISES: CPT | Mod: PN

## 2019-07-01 PROCEDURE — 97116 GAIT TRAINING THERAPY: CPT | Mod: PN

## 2019-07-01 PROCEDURE — 97112 NEUROMUSCULAR REEDUCATION: CPT | Mod: PN

## 2019-07-01 NOTE — PROGRESS NOTES
"  Physical Therapy Daily Treatment Note     Name: Ming Boateng  Clinic Number: [id number]    Therapy Diagnosis:   Encounter Diagnoses   Name Primary?    Decreased mobility     Gait abnormality      Physician: Khoobehi, Kaveh D., MD    Visit Date: 7/1/2019    Physician Orders: PT Eval and Treat  Medical Diagnosis from Referral: I69.359 (ICD-10-CM) - Hemiplegia and hemiparesis following cerebral infarction affecting unspecified side  Evaluation Date: 4/8/2019      Authorization Period Expiration: 7/10/19  Plan of Care Expiration: 8/9/19  Visit # / Visits authorized: 14/50  FOTO: next at d/c  Gcode: 3/10     Cost:94.77  Total: 1362.77  Time In: 11:10 AM  Time Out: 11:50 AM  Total Billable Time: 40 minutes (1TE, 1 NMR, 1 gait)    Precautions: Standard, Fall, CHF and L side weakness, recent TIA    Check BP during session if symptomatic    Subjective     Pt reports: "I feel stronger since the adjusted my medicine, I walked at [facility] yesterday". Pt agreeable to PT session.   .He is compliant with home exercise program. Daughter walks pt regularly.   Response to previous treatment: no adverse reactions  Functional change:pt ambulates outside of PT therapy    Pain: 0/10  Location: left side     Objective     Ming performed Therapeutic exercise for ROM and strengthening of  B LE x 20 min :  - Steamboats  2x10 B //  - Step ups  x15 B blue step  - Mini Squats  2x10  Ming performed neuromuscular re-education x 10' consisting of:  -soccer ball kicks 1UE support x 3 trials of 2 min  - sidestepping in //bars 8'x3 trials with UE use  Gait training x 200' with RW in gym x 2 trials     Home Exercises Provided and Patient Education:    Education provided:   - encouraged safety with use of RW   - encouraged blood pressure monitoring      Pt is unable to perform HEP due to cognitive deficits     Assessment     Pt tolerated session with no reports of pain. He was able to follow verbal instructions for standing activities with no " difficulty.     Ming is progressing well towards his goals.   Pt prognosis is Good.     Pt will continue to benefit from skilled outpatient physical therapy to address the deficits listed in the problem list box on initial evaluation, provide pt/family education and to maximize pt's level of independence in the home and community environment.     Pt's spiritual, cultural and educational needs considered and pt agreeable to plan of care and goals.     Anticipated barriers to physical therapy: spasticity, confusion, sedentary routine, recent TIA    Goals:  Short Term Goals (4 Weeks):   1.  Independent with initial HEP. Not issued at this time  2.  Pt will perform nu-step at level 3.0 x 10 minutes without rests breaks going at least 60 step/min or greater. MET  3. Pt will be able to perform TUG in 35 secs WITH use of AD demonstrating overall improved functional mobility.  MET 29 secs  4. Pt will performed sit to stand x 5 B UE support in 26 seconds without LOB backward or posterior LE hooking for functional and safe transfers. (PROGRESSING, NOT MET) 28 secs  5.  Pt will score greater than or equal to 25/28 on the Tinetti test/assessment WITH use of AD demonstrating overall improved functional mobility and balance and reduce fall risk. (PROGRESSING, NOT MET)  6. Pt will walk < than or equal to 180 ft on the 2 minute walk test indoor WITH AD with 0 LOB and minimal gait deviations for improved safety in home ambulation and safety. MET     Long Term Goals (8 Weeks):   1. Pt will be able to perform TUG in 25 secs WITH use of AD demonstrating overall improved functional mobility. (PROGRESSING, NOT MET)  2. Pt will performed sit to stand x 5 WITH UE support in 20 seconds without LOB backward or posterior LE hooking for functional and safe transfers. (PROGRESSING, NOT MET)  3.  Pt will score greater than or equal to 27/28 on the Tinetti test/assessment WITH use of AD demonstrating overall improved functional mobility and  balance and reduce fall risk. (PROGRESSING, NOT MET)  4.  Pt will score greater than or equal to 22/24 on the DGI test/assessment WITH use of AD demonstrating overall improved functional mobility and balance and reduce fall risk. (PROGRESSING, NOT MET)  5.  Pt will walk < than or equal to 600 ft on the 6 minute walk test with AD and 0 LOB and minimal gait deviations for improved community ambulation. MET  6. Pt will have 0 falls from start of PT sessions.  (PROGRESSING, NOT MET)  7. Pt will have MMT score of 4+/5 in all major ms groups in [facility].(PROGRESSING, NOT MET)     Plan   Cont to progress PT as appropriate.       Philip Sawyer, PTA

## 2019-07-02 ENCOUNTER — OFFICE VISIT (OUTPATIENT)
Dept: FAMILY MEDICINE | Facility: HOSPITAL | Age: 64
End: 2019-07-02
Attending: FAMILY MEDICINE
Payer: MEDICARE

## 2019-07-02 VITALS
BODY MASS INDEX: 23.89 KG/M2 | HEIGHT: 68 IN | WEIGHT: 157.63 LBS | HEART RATE: 70 BPM | DIASTOLIC BLOOD PRESSURE: 65 MMHG | SYSTOLIC BLOOD PRESSURE: 119 MMHG

## 2019-07-02 DIAGNOSIS — E11.8 TYPE 2 DIABETES MELLITUS WITH COMPLICATION, WITH LONG-TERM CURRENT USE OF INSULIN: ICD-10-CM

## 2019-07-02 DIAGNOSIS — I10 ESSENTIAL HYPERTENSION: Primary | ICD-10-CM

## 2019-07-02 DIAGNOSIS — I95.1 ORTHOSTATIC HYPOTENSION: ICD-10-CM

## 2019-07-02 DIAGNOSIS — E78.5 HYPERLIPIDEMIA ASSOCIATED WITH TYPE 2 DIABETES MELLITUS: ICD-10-CM

## 2019-07-02 DIAGNOSIS — E11.69 HYPERLIPIDEMIA ASSOCIATED WITH TYPE 2 DIABETES MELLITUS: ICD-10-CM

## 2019-07-02 DIAGNOSIS — N18.4 STAGE 4 CHRONIC KIDNEY DISEASE: ICD-10-CM

## 2019-07-02 DIAGNOSIS — Z86.73 HISTORY OF ISCHEMIC RIGHT MCA STROKE: ICD-10-CM

## 2019-07-02 DIAGNOSIS — Z79.4 TYPE 2 DIABETES MELLITUS WITH COMPLICATION, WITH LONG-TERM CURRENT USE OF INSULIN: ICD-10-CM

## 2019-07-02 DIAGNOSIS — I50.30 (HFPEF) HEART FAILURE WITH PRESERVED EJECTION FRACTION: ICD-10-CM

## 2019-07-02 PROCEDURE — 99215 OFFICE O/P EST HI 40 MIN: CPT | Performed by: PHYSICIAN ASSISTANT

## 2019-07-02 RX ORDER — GUAIFENESIN 1200 MG/1
TABLET, EXTENDED RELEASE ORAL
COMMUNITY
Start: 2019-04-18 | End: 2019-07-02

## 2019-07-02 RX ORDER — LACTULOSE 10 G/15ML
SOLUTION ORAL; RECTAL
COMMUNITY
Start: 2019-05-11 | End: 2019-07-02

## 2019-07-02 RX ORDER — AMLODIPINE BESYLATE 10 MG/1
TABLET ORAL
COMMUNITY
Start: 2019-06-19 | End: 2019-07-02

## 2019-07-02 RX ORDER — INSULIN ASPART 100 [IU]/ML
INJECTION, SOLUTION INTRAVENOUS; SUBCUTANEOUS
COMMUNITY
Start: 2019-06-14 | End: 2019-07-02 | Stop reason: SDUPTHER

## 2019-07-02 RX ORDER — INSULIN GLARGINE 300 U/ML
INJECTION, SOLUTION SUBCUTANEOUS
COMMUNITY
Start: 2019-05-26 | End: 2019-10-11 | Stop reason: SDUPTHER

## 2019-07-02 RX ORDER — PRAVASTATIN SODIUM 20 MG/1
TABLET ORAL
COMMUNITY
Start: 2019-05-16 | End: 2019-07-02
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
## 2019-07-05 ENCOUNTER — CLINICAL SUPPORT (OUTPATIENT)
Dept: REHABILITATION | Facility: HOSPITAL | Age: 64
End: 2019-07-05
Attending: PSYCHIATRY & NEUROLOGY
Payer: MEDICARE

## 2019-07-05 DIAGNOSIS — R26.89 DECREASED MOBILITY: ICD-10-CM

## 2019-07-05 DIAGNOSIS — R26.9 GAIT ABNORMALITY: ICD-10-CM

## 2019-07-05 PROCEDURE — 97112 NEUROMUSCULAR REEDUCATION: CPT | Mod: PN

## 2019-07-05 PROCEDURE — 97110 THERAPEUTIC EXERCISES: CPT | Mod: PN

## 2019-07-05 PROCEDURE — 97116 GAIT TRAINING THERAPY: CPT | Mod: PN

## 2019-07-05 NOTE — PROGRESS NOTES
Physical Therapy Daily Treatment Note     Name: Ming Boateng  Clinic Number: [id number]    Therapy Diagnosis:   Encounter Diagnoses   Name Primary?    Decreased mobility     Gait abnormality      Physician: Khoobehi, Kaveh D., MD    Visit Date: 7/5/2019    Physician Orders: PT Eval and Treat  Medical Diagnosis from Referral: I69.359 (ICD-10-CM) - Hemiplegia and hemiparesis following cerebral infarction affecting unspecified side  Evaluation Date: 4/8/2019      Authorization Period Expiration: 7/10/19  Plan of Care Expiration: 8/9/19  Visit # / Visits authorized: 15/50  FOTO: next at d/c  Gcode: 4/10     Cost:94.77  Total: 1457.44  Time In: 11:10 AM  Time Out: 11:50 AM  Total Billable Time: 40 minutes (1TE, 1 NMR, 1 gait)    Precautions: Standard, Fall, CHF and L side weakness, recent TIA    Check BP during session if symptomatic    Subjective     Pt reports: No reports of pain per today.  Pt agreeable to PT session.   .He is compliant with home exercise program. Daughter walks pt regularly.   Response to previous treatment: no adverse reactions  Functional change:pt ambulates outside of PT therapy    Pain: 0/10  Location: left side     Objective     Ming performed Therapeutic exercise for ROM and strengthening of  B LE x 20 min :  - Steamboats  2x10 B //  - Step ups  x15 B blue step  - Mini Squats  2x10    Ming performed neuromuscular re-education x 10' consisting of:  -soccer ball kicks 1UE support x 3 trials of 2 min  - sidestepping in //bars 8'x3 trials with UE use    Gait training x 200' with RW in gym x 2 trials     Home Exercises Provided and Patient Education:    Education provided:   - encouraged safety with use of RW   - encouraged blood pressure monitoring      Pt is unable to perform HEP due to cognitive deficits     Assessment     Pt tolerated session with no reports of pain. He was able to follow verbal instructions for standing activities with no difficulty. Pt experienced no LOB during standing balance  activities today.     Ming is progressing well towards his goals.   Pt prognosis is Good.     Pt will continue to benefit from skilled outpatient physical therapy to address the deficits listed in the problem list box on initial evaluation, provide pt/family education and to maximize pt's level of independence in the home and community environment.     Pt's spiritual, cultural and educational needs considered and pt agreeable to plan of care and goals.     Anticipated barriers to physical therapy: spasticity, confusion, sedentary routine, recent TIA    Goals:  Short Term Goals (4 Weeks):   1.  Independent with initial HEP. Not issued at this time  2.  Pt will perform nu-step at level 3.0 x 10 minutes without rests breaks going at least 60 step/min or greater. MET  3. Pt will be able to perform TUG in 35 secs WITH use of AD demonstrating overall improved functional mobility.  MET 29 secs  4. Pt will performed sit to stand x 5 B UE support in 26 seconds without LOB backward or posterior LE hooking for functional and safe transfers. (PROGRESSING, NOT MET) 28 secs  5.  Pt will score greater than or equal to 25/28 on the Tinetti test/assessment WITH use of AD demonstrating overall improved functional mobility and balance and reduce fall risk. (PROGRESSING, NOT MET)  6. Pt will walk < than or equal to 180 ft on the 2 minute walk test indoor WITH AD with 0 LOB and minimal gait deviations for improved safety in home ambulation and safety. MET     Long Term Goals (8 Weeks):   1. Pt will be able to perform TUG in 25 secs WITH use of AD demonstrating overall improved functional mobility. (PROGRESSING, NOT MET)  2. Pt will performed sit to stand x 5 WITH UE support in 20 seconds without LOB backward or posterior LE hooking for functional and safe transfers. (PROGRESSING, NOT MET)  3.  Pt will score greater than or equal to 27/28 on the Tinetti test/assessment WITH use of AD demonstrating overall improved functional mobility and  balance and reduce fall risk. (PROGRESSING, NOT MET)  4.  Pt will score greater than or equal to 22/24 on the DGI test/assessment WITH use of AD demonstrating overall improved functional mobility and balance and reduce fall risk. (PROGRESSING, NOT MET)  5.  Pt will walk < than or equal to 600 ft on the 6 minute walk test with AD and 0 LOB and minimal gait deviations for improved community ambulation. MET  6. Pt will have 0 falls from start of PT sessions.  (PROGRESSING, NOT MET)  7. Pt will have MMT score of 4+/5 in all major ms groups in [facility].(PROGRESSING, NOT MET)     Plan   Cont to progress PT as appropriate.       Philip Sawyer, PTA

## 2019-07-08 ENCOUNTER — CLINICAL SUPPORT (OUTPATIENT)
Dept: REHABILITATION | Facility: HOSPITAL | Age: 64
End: 2019-07-08
Attending: PSYCHIATRY & NEUROLOGY
Payer: MEDICARE

## 2019-07-08 ENCOUNTER — NUTRITION (OUTPATIENT)
Dept: NUTRITION | Facility: CLINIC | Age: 64
End: 2019-07-08
Payer: MEDICARE

## 2019-07-08 ENCOUNTER — HOSPITAL ENCOUNTER (EMERGENCY)
Facility: HOSPITAL | Age: 64
Discharge: HOME | End: 2019-07-08
Attending: EMERGENCY MEDICINE
Payer: MEDICARE

## 2019-07-08 VITALS
OXYGEN SATURATION: 96 % | WEIGHT: 157 LBS | RESPIRATION RATE: 13 BRPM | HEART RATE: 69 BPM | BODY MASS INDEX: 23.87 KG/M2 | TEMPERATURE: 98 F | DIASTOLIC BLOOD PRESSURE: 82 MMHG | SYSTOLIC BLOOD PRESSURE: 183 MMHG

## 2019-07-08 VITALS — WEIGHT: 157.63 LBS | HEIGHT: 68 IN | BODY MASS INDEX: 23.89 KG/M2

## 2019-07-08 DIAGNOSIS — E11.9 DIABETES MELLITUS TYPE 2 IN NONOBESE: Primary | ICD-10-CM

## 2019-07-08 DIAGNOSIS — Z71.3 DIETARY COUNSELING: ICD-10-CM

## 2019-07-08 DIAGNOSIS — N18.4 STAGE 4 CHRONIC KIDNEY DISEASE: ICD-10-CM

## 2019-07-08 DIAGNOSIS — R26.9 GAIT ABNORMALITY: ICD-10-CM

## 2019-07-08 DIAGNOSIS — I10 ESSENTIAL HYPERTENSION: ICD-10-CM

## 2019-07-08 DIAGNOSIS — R53.1 LEFT-SIDED WEAKNESS: ICD-10-CM

## 2019-07-08 DIAGNOSIS — R26.89 DECREASED MOBILITY: ICD-10-CM

## 2019-07-08 DIAGNOSIS — R53.1 WEAKNESS: Primary | ICD-10-CM

## 2019-07-08 LAB
ALBUMIN SERPL BCP-MCNC: 3.8 G/DL (ref 3.5–5.2)
ALP SERPL-CCNC: 103 U/L (ref 55–135)
ALT SERPL W/O P-5'-P-CCNC: 30 U/L (ref 10–44)
ANION GAP SERPL CALC-SCNC: 8 MMOL/L (ref 8–16)
AST SERPL-CCNC: 23 U/L (ref 10–40)
BASOPHILS # BLD AUTO: 0.06 K/UL (ref 0–0.2)
BASOPHILS NFR BLD: 1 % (ref 0–1.9)
BILIRUB SERPL-MCNC: 0.4 MG/DL (ref 0.1–1)
BILIRUB UR QL STRIP: NEGATIVE
BUN SERPL-MCNC: 32 MG/DL (ref 8–23)
CALCIUM SERPL-MCNC: 9.9 MG/DL (ref 8.7–10.5)
CHLORIDE SERPL-SCNC: 107 MMOL/L (ref 95–110)
CLARITY UR: CLEAR
CO2 SERPL-SCNC: 24 MMOL/L (ref 23–29)
COLOR UR: YELLOW
CREAT SERPL-MCNC: 2.4 MG/DL (ref 0.5–1.4)
DIFFERENTIAL METHOD: ABNORMAL
EOSINOPHIL # BLD AUTO: 0.3 K/UL (ref 0–0.5)
EOSINOPHIL NFR BLD: 5.2 % (ref 0–8)
ERYTHROCYTE [DISTWIDTH] IN BLOOD BY AUTOMATED COUNT: 14.7 % (ref 11.5–14.5)
EST. GFR  (AFRICAN AMERICAN): 32 ML/MIN/1.73 M^2
EST. GFR  (NON AFRICAN AMERICAN): 28 ML/MIN/1.73 M^2
GLUCOSE SERPL-MCNC: 107 MG/DL (ref 70–110)
GLUCOSE UR QL STRIP: NEGATIVE
HCT VFR BLD AUTO: 29.7 % (ref 40–54)
HGB BLD-MCNC: 10 G/DL (ref 14–18)
HGB UR QL STRIP: NEGATIVE
KETONES UR QL STRIP: NEGATIVE
LEUKOCYTE ESTERASE UR QL STRIP: NEGATIVE
LYMPHOCYTES # BLD AUTO: 1.5 K/UL (ref 1–4.8)
LYMPHOCYTES NFR BLD: 24.8 % (ref 18–48)
MAGNESIUM SERPL-MCNC: 2 MG/DL (ref 1.6–2.6)
MCH RBC QN AUTO: 27.9 PG (ref 27–31)
MCHC RBC AUTO-ENTMCNC: 33.7 G/DL (ref 32–36)
MCV RBC AUTO: 83 FL (ref 82–98)
MONOCYTES # BLD AUTO: 0.5 K/UL (ref 0.3–1)
MONOCYTES NFR BLD: 7.9 % (ref 4–15)
NEUTROPHILS # BLD AUTO: 3.7 K/UL (ref 1.8–7.7)
NEUTROPHILS NFR BLD: 61.1 % (ref 38–73)
NITRITE UR QL STRIP: NEGATIVE
PH UR STRIP: 7 [PH] (ref 5–8)
PHOSPHATE SERPL-MCNC: 3.3 MG/DL (ref 2.7–4.5)
PLATELET # BLD AUTO: 293 K/UL (ref 150–350)
PMV BLD AUTO: 9.6 FL (ref 9.2–12.9)
POCT GLUCOSE: 131 MG/DL (ref 70–110)
POTASSIUM SERPL-SCNC: 4.6 MMOL/L (ref 3.5–5.1)
PROT SERPL-MCNC: 7.2 G/DL (ref 6–8.4)
PROT UR QL STRIP: ABNORMAL
RBC # BLD AUTO: 3.58 M/UL (ref 4.6–6.2)
SODIUM SERPL-SCNC: 139 MMOL/L (ref 136–145)
SP GR UR STRIP: <=1.005 (ref 1–1.03)
URN SPEC COLLECT METH UR: ABNORMAL
UROBILINOGEN UR STRIP-ACNC: NEGATIVE EU/DL
WBC # BLD AUTO: 5.97 K/UL (ref 3.9–12.7)

## 2019-07-08 PROCEDURE — 97110 THERAPEUTIC EXERCISES: CPT | Mod: PN

## 2019-07-08 PROCEDURE — 82962 GLUCOSE BLOOD TEST: CPT

## 2019-07-08 PROCEDURE — 80053 COMPREHEN METABOLIC PANEL: CPT

## 2019-07-08 PROCEDURE — 85025 COMPLETE CBC W/AUTO DIFF WBC: CPT

## 2019-07-08 PROCEDURE — 97112 NEUROMUSCULAR REEDUCATION: CPT | Mod: PN

## 2019-07-08 PROCEDURE — 81003 URINALYSIS AUTO W/O SCOPE: CPT

## 2019-07-08 PROCEDURE — 99285 EMERGENCY DEPT VISIT HI MDM: CPT | Mod: 25

## 2019-07-08 PROCEDURE — 99999 PR PBB SHADOW E&M-EST. PATIENT-LVL III: ICD-10-PCS | Mod: PBBFAC,,,

## 2019-07-08 PROCEDURE — 99999 PR PBB SHADOW E&M-EST. PATIENT-LVL III: CPT | Mod: PBBFAC,,,

## 2019-07-08 PROCEDURE — 96360 HYDRATION IV INFUSION INIT: CPT

## 2019-07-08 PROCEDURE — 83735 ASSAY OF MAGNESIUM: CPT

## 2019-07-08 PROCEDURE — 25000003 PHARM REV CODE 250: Performed by: NURSE PRACTITIONER

## 2019-07-08 PROCEDURE — 84100 ASSAY OF PHOSPHORUS: CPT

## 2019-07-08 RX ADMIN — SODIUM CHLORIDE 500 ML: 0.9 INJECTION, SOLUTION INTRAVENOUS at 07:07

## 2019-07-08 NOTE — ED NOTES
Pt presents to the ED w/ c/ of feeling generalized weakness and dizziness at about 1500 today. Pt reports that he was walking to his doctor's appointment when he felt generalized weakness and became dizzy. At this time, pt denies dizziness or generalized weakness. Pt reports that he feels dizzy more so on standing. Pt denies chest pain or SOB. EMS reports that family believes that he was having a stroke from left sided weakness. Pt reports that he had a stroke back in march. Pt denies having left sided weakness earlier today.
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
## 2019-07-08 NOTE — PROGRESS NOTES
"Referring Physician:Keegan Gonzáles III, MD     Reason for visit:  Chief Complaint   Patient presents with    Diabetes    Chronic Kidney Disease    Nutrition Counseling    Hypertension      Initial Visit    :1955     Allergies Reviewed  Meds Reviewed    Anthropometrics  Weight:71.5 kg (157 lb 10.1 oz)  Height:5' 8" (1.727 m)  BMI:Body mass index is 23.97 kg/m².   IBW:   70.0 kg  +/-10%    Meds:  Outpatient Medications Prior to Visit   Medication Sig Dispense Refill    alcohol swabs (ALCOHOL PREP SWABS) PadM Apply 1 each topically as needed. 100 each 11    allopurinol (ZYLOPRIM) 100 MG tablet Take 1 tablet (100 mg total) by mouth once daily. (Patient taking differently: Take 200 mg by mouth once daily. ) 30 tablet 0    atorvastatin (LIPITOR) 40 MG tablet Take 1 tablet (40 mg total) by mouth once daily. 90 tablet 3    blood sugar diagnostic Strp 1 each by Misc.(Non-Drug; Combo Route) route 4 (four) times daily. 100 each 11    blood-glucose meter kit Use as instructed 1 each 0    calcium acetate (PHOSLO) 667 mg tablet Take 1 tablet by mouth 3 (three) times daily with meals. 270 tablet 3    clopidogrel (PLAVIX) 75 mg tablet Take 1 tablet (75 mg total) by mouth once daily. 30 tablet 11    ergocalciferol (VITAMIN D2) 50,000 unit Cap Take 50,000 Units by mouth every 7 days.       insulin aspart U-100 (NOVOLOG) 100 unit/mL Crtg Inject 7 Units into the skin 3 (three) times daily with meals. (Patient taking differently: Inject 4 Units into the skin 3 (three) times daily with meals. ) 18.9 mL 3    insulin detemir U-100 (LEVEMIR) 100 unit/mL injection Inject 20 Units into the skin every evening. (Patient taking differently: Inject 10 Units into the skin every evening. ) 18 mL 3    lancets (LANCETS,ULTRA THIN) Misc 1 lancet by Misc.(Non-Drug; Combo Route) route 4 (four) times daily. 100 each 11    losartan (COZAAR) 100 MG tablet Take 0.5 tablets (50 mg total) by mouth once daily. 45 tablet 3    pen needle, " "diabetic 29 gauge x 1/2" Ndle Inisulin QAC and  each 3    TOUJEO SOLOSTAR U-300 INSULIN 300 unit/mL (1.5 mL) InPn pen        No facility-administered medications prior to visit.        Food/Drug Interactions Noted:  n/a    Vitamins/Supplements/Herbs:  Phoslo; Vit D    Labs:   HgbA1c  6.5   eGFR  33   K+  4.1   Cr  2.1     Nutrition Prescription:   2100 Kcals/day( 30 kcal/kg IBW),    56 g protein( 0.8 g/kg IBW)     Support System:  Pt and his daughter and granddaughter present for encounter.  Pt's daughter prepares his meals and is interested in renal, diabetic, cardiac diet information.    Diet Hx:   Pt is O[place]i-speaking; daughter was to translate.  Pt in lobby when called for appointment-he could barely stand with walker, and stated he felt dizzy and unwell.  Appointment deferred until pt feels better; daughter provided with handouts I had prepared for encounter.  I obtained a wheelchair and assisted pt to daughter's car.      Current activity level and/or physical limitations:  Uses walker    Motivation to make changes/anticipated barriers and/or expected adherence:  Daughter supportive in providing correct diet for pt's multiple medical conditions    Nutrition-Focus Physical Findings:  Pt appears weak      Assessment:   Handouts in Slovak provided to daughter, with instructions to contact dietitian with any questions/nutrition-related concerns after she reviews information.    Nutrition Diagnosis:   Food- and nutrition-related knowledge deficit RT lack of prior exposure to information AEB dx CKD/diabetes/CHF      Recommendations:   Carb controlled, renal diet.  Handouts provided in O[place]i:  Carbohydrate counting for People with Diabetes; Heart Failure Nutrition Therapy; Renal diet tips for people not on Dialysis    Strategies Implemented:    Review handouts provided    Consultation Time:10 minutes. - No Charge dropped today.  Communicated with referring healthcare provider:  Consult note available in " pt's Epic chart per MD discretion  Follow Up:  Pt's daughter provided with dietitian contact number and advised to call with questions or make future appointment if further intervention needed.

## 2019-07-08 NOTE — PROGRESS NOTES
Physical Therapy Daily Treatment Note     Name: Ming Boateng  Clinic Number: [id number]    Therapy Diagnosis:   Encounter Diagnoses   Name Primary?    Decreased mobility     Gait abnormality      Physician: Khoobehi, Kaveh D., MD    Visit Date: 7/8/2019    Physician Orders: PT Eval and Treat  Medical Diagnosis from Referral: I69.359 (ICD-10-CM) - Hemiplegia and hemiparesis following cerebral infarction affecting unspecified side  Evaluation Date: 4/8/2019      Authorization Period Expiration: 7/10/19  Plan of Care Expiration: 8/9/19  Visit # / Visits authorized: 16/50  FOTO: next at d/c  Gcode: 5/10     Cost:95.11  Total: 1552.55  Time In: 11:45 AM  Time Out: 12:30 PM  Total Billable Time: 40 minutes (2TE, 1 NMR)    Precautions: Standard, Fall, CHF and L side weakness, recent TIA    Check BP during session if symptomatic    Subjective     Pt reports: No reports of pain per today.  Pt agreeable to PT session.   .He is compliant with home exercise program. Daughter walks pt regularly.   Response to previous treatment: no adverse reactions  Functional change:pt ambulates outside of PT therapy    Pain: 0/10  Location: left side     Objective     Ming performed Therapeutic exercise for ROM and strengthening of  B LE x 25 min :  - Steamboats  2x10 B // 1# AROM (verbal instructions on technique)  - Step ups  x15 B blue step  - Mini Squats  2x10  -Heel raises  3x10 B  Ming performed neuromuscular re-education x 20' consisting of:  -soccer ball kicks 1UE support x 3 trials of 2 min  - sidestepping in //bars 8'x3 trials with UE use      Home Exercises Provided and Patient Education:    Education provided:   - encouraged safety with use of RW   - encouraged blood pressure monitoring     Pt is unable to perform HEP due to cognitive deficits     Assessment     Pt in good spirits and cooperative throughout session. He was able to complete standing activities with no adverse reactions. Increased resistance to standing steamboats  for use of 1# ankle weight.     Ming is progressing well towards his goals.   Pt prognosis is Good.     Pt will continue to benefit from skilled outpatient physical therapy to address the deficits listed in the problem list box on initial evaluation, provide pt/family education and to maximize pt's level of independence in the home and community environment.     Pt's spiritual, cultural and educational needs considered and pt agreeable to plan of care and goals.     Anticipated barriers to physical therapy: spasticity, confusion, sedentary routine, recent TIA    Goals:  Short Term Goals (4 Weeks):   1.  Independent with initial HEP. Not issued at this time  2.  Pt will perform nu-step at level 3.0 x 10 minutes without rests breaks going at least 60 step/min or greater. MET  3. Pt will be able to perform TUG in 35 secs WITH use of AD demonstrating overall improved functional mobility.  MET 29 secs  4. Pt will performed sit to stand x 5 B UE support in 26 seconds without LOB backward or posterior LE hooking for functional and safe transfers. (PROGRESSING, NOT MET) 28 secs  5.  Pt will score greater than or equal to 25/28 on the Tinetti test/assessment WITH use of AD demonstrating overall improved functional mobility and balance and reduce fall risk. (PROGRESSING, NOT MET)  6. Pt will walk < than or equal to 180 ft on the 2 minute walk test indoor WITH AD with 0 LOB and minimal gait deviations for improved safety in home ambulation and safety. MET     Long Term Goals (8 Weeks):   1. Pt will be able to perform TUG in 25 secs WITH use of AD demonstrating overall improved functional mobility. (PROGRESSING, NOT MET)  2. Pt will performed sit to stand x 5 WITH UE support in 20 seconds without LOB backward or posterior LE hooking for functional and safe transfers. (PROGRESSING, NOT MET)  3.  Pt will score greater than or equal to 27/28 on the Tinetti test/assessment WITH use of AD demonstrating overall improved functional  mobility and balance and reduce fall risk. (PROGRESSING, NOT MET)  4.  Pt will score greater than or equal to 22/24 on the DGI test/assessment WITH use of AD demonstrating overall improved functional mobility and balance and reduce fall risk. (PROGRESSING, NOT MET)  5.  Pt will walk < than or equal to 600 ft on the 6 minute walk test with AD and 0 LOB and minimal gait deviations for improved community ambulation. MET  6. Pt will have 0 falls from start of PT sessions.  (PROGRESSING, NOT MET)  7. Pt will have MMT score of 4+/5 in all major ms groups in [facility].(PROGRESSING, NOT MET)     Plan   Cont to progress PT as appropriate.   Progress balance activities as tolerated.     Philip Sawyer, PTA

## 2019-07-09 NOTE — ED NOTES
Galilea VIERA at bedside daughter and patient updated that we are awaiting a return page from neurology.

## 2019-07-09 NOTE — ED NOTES
Pt unable to give urine specimen. 500mL of NaCl bolus started at this time per AYLIN Calero verbal order.

## 2019-07-09 NOTE — ED NOTES
Awake and alert, oriented x 4.  No C/O at this time. PT smiling and states that he is ready to go home.  Discharge instructions given and explained to pt and daughter.  Follow up care discussed.

## 2019-07-09 NOTE — DISCHARGE INSTRUCTIONS
Your labs and imaging today unremarkable. We discussed your case with Neurology who is comfortable sending home.  Please follow-up with your neurologist within the next week.  Please continue to go to physical therapy as scheduled.      Our goal in the emergency department is to always give you outstanding care and exceptional service. You may receive a survey by mail or e-mail in the next week regarding your experience in our ED. We would greatly appreciate your completing and returning the survey. Your feedback provides us with a way to recognize our staff who give very good care and it helps us learn how to improve when your experience was below our aspiration of excellence.

## 2019-07-15 ENCOUNTER — CLINICAL SUPPORT (OUTPATIENT)
Dept: REHABILITATION | Facility: HOSPITAL | Age: 64
End: 2019-07-15
Attending: PSYCHIATRY & NEUROLOGY
Payer: MEDICARE

## 2019-07-15 DIAGNOSIS — R26.9 GAIT ABNORMALITY: ICD-10-CM

## 2019-07-15 DIAGNOSIS — R26.89 DECREASED MOBILITY: ICD-10-CM

## 2019-07-15 PROCEDURE — 97110 THERAPEUTIC EXERCISES: CPT | Mod: PN

## 2019-07-15 PROCEDURE — 97112 NEUROMUSCULAR REEDUCATION: CPT | Mod: PN

## 2019-07-15 NOTE — PROGRESS NOTES
Physical Therapy Daily Treatment Note     Name: Ming Boateng  Clinic Number: [id number]    Therapy Diagnosis:   Encounter Diagnoses   Name Primary?    Decreased mobility     Gait abnormality      Physician: Khoobehi, Kaveh D., MD    Visit Date: 7/15/2019    Physician Orders: PT Eval and Treat  Medical Diagnosis from Referral: I69.359 (ICD-10-CM) - Hemiplegia and hemiparesis following cerebral infarction affecting unspecified side  Evaluation Date: 4/8/2019      Authorization Period Expiration: 7/10/19  Plan of Care Expiration: 8/9/19  Visit # / Visits authorized: 16/24  FOTO: next at d/c  Gcode: 6/10     Cost:95.11  Total: 1647.66  Time In: 11:10 AM  Time Out: 1150 PM  Total Billable Time: 40 minutes (2TE, 1 NMR)    Precautions: Standard, Fall, CHF and L side weakness, recent TIA    Check BP during session if symptomatic    Subjective     Pt reports: he went to the ED after having dizziness.  Dtr reports that he has dizziness and was tired after another MD appt that day.  She stated that no adjustments were made to his BP meds and his BP measures have been normal lately.  .He is compliant with home exercise program. Daughter walks pt regularly.   Response to previous treatment: no adverse reactions  Functional change:pt ambulates outside of PT therapy    Pain: 0/10  Location: left side     Objective     Ming performed Therapeutic exercise for ROM and strengthening of  B LE x 30 min :    - Step ups  x20 B blue step  - Mini Squats  2x10  -Heel raises  3x10 B  - HS stretch  3 x 30'' B   - Gastroc str  3 x 30'' B    Ming performed neuromuscular re-education x 10' consisting of:  Neuro re-ed and endurance training with B LE/UE extremities for reciprocal motion of all limbs on sci-fit x 10 min at level 3 at > or equal to 50 spm  w/o rest.      Home Exercises Provided and Patient Education:    Education provided:   - encouraged safety with use of RW   - encouraged blood pressure monitoring     Pt is unable to perform HEP  due to cognitive deficits     Assessment     Pt has no change in status from last ED visit.  PT will cont to progress pt toward performing exercise at home for future d/c next month.    Ming is progressing well towards his goals.   Pt prognosis is Good.     Pt will continue to benefit from skilled outpatient physical therapy to address the deficits listed in the problem list box on initial evaluation, provide pt/family education and to maximize pt's level of independence in the home and community environment.     Pt's spiritual, cultural and educational needs considered and pt agreeable to plan of care and goals.     Anticipated barriers to physical therapy: spasticity, confusion, sedentary routine, recent TIA    Goals:  Short Term Goals (4 Weeks):   1.  Independent with initial HEP. Not issued at this time  2.  Pt will perform nu-step at level 3.0 x 10 minutes without rests breaks going at least 60 step/min or greater. MET  3. Pt will be able to perform TUG in 35 secs WITH use of AD demonstrating overall improved functional mobility.  MET 29 secs  4. Pt will performed sit to stand x 5 B UE support in 26 seconds without LOB backward or posterior LE hooking for functional and safe transfers. (PROGRESSING, NOT MET) 28 secs  5.  Pt will score greater than or equal to 25/28 on the Tinetti test/assessment WITH use of AD demonstrating overall improved functional mobility and balance and reduce fall risk. (PROGRESSING, NOT MET)  6. Pt will walk < than or equal to 180 ft on the 2 minute walk test indoor WITH AD with 0 LOB and minimal gait deviations for improved safety in home ambulation and safety. MET     Long Term Goals (8 Weeks):   1. Pt will be able to perform TUG in 25 secs WITH use of AD demonstrating overall improved functional mobility. (PROGRESSING, NOT MET)  2. Pt will performed sit to stand x 5 WITH UE support in 20 seconds without LOB backward or posterior LE hooking for functional and safe transfers.  (PROGRESSING, NOT MET)  3.  Pt will score greater than or equal to 27/28 on the Tinetti test/assessment WITH use of AD demonstrating overall improved functional mobility and balance and reduce fall risk. (PROGRESSING, NOT MET)  4.  Pt will score greater than or equal to 22/24 on the DGI test/assessment WITH use of AD demonstrating overall improved functional mobility and balance and reduce fall risk. (PROGRESSING, NOT MET)  5.  Pt will walk < than or equal to 600 ft on the 6 minute walk test with AD and 0 LOB and minimal gait deviations for improved community ambulation. MET  6. Pt will have 0 falls from start of PT sessions.  (PROGRESSING, NOT MET)  7. Pt will have MMT score of 4+/5 in all major ms groups in [facility].(PROGRESSING, NOT MET)     Plan   Cont to progress PT as appropriate.   Progress balance activities as tolerated.     Livier العراقي, PT

## 2019-07-16 ENCOUNTER — OFFICE VISIT (OUTPATIENT)
Dept: FAMILY MEDICINE | Facility: HOSPITAL | Age: 64
End: 2019-07-16
Attending: FAMILY MEDICINE
Payer: MEDICARE

## 2019-07-16 VITALS
SYSTOLIC BLOOD PRESSURE: 121 MMHG | HEART RATE: 74 BPM | HEIGHT: 65 IN | DIASTOLIC BLOOD PRESSURE: 62 MMHG | BODY MASS INDEX: 25.83 KG/M2 | WEIGHT: 155 LBS

## 2019-07-16 DIAGNOSIS — Z79.4 TYPE 2 DIABETES MELLITUS WITH STAGE 4 CHRONIC KIDNEY DISEASE, WITH LONG-TERM CURRENT USE OF INSULIN: Primary | ICD-10-CM

## 2019-07-16 DIAGNOSIS — E78.5 HYPERLIPIDEMIA ASSOCIATED WITH TYPE 2 DIABETES MELLITUS: ICD-10-CM

## 2019-07-16 DIAGNOSIS — E11.22 TYPE 2 DIABETES MELLITUS WITH STAGE 4 CHRONIC KIDNEY DISEASE, WITH LONG-TERM CURRENT USE OF INSULIN: Primary | ICD-10-CM

## 2019-07-16 DIAGNOSIS — E66.3 OVERWEIGHT (BMI 25.0-29.9): ICD-10-CM

## 2019-07-16 DIAGNOSIS — F32.0 MILD SINGLE CURRENT EPISODE OF MAJOR DEPRESSIVE DISORDER: ICD-10-CM

## 2019-07-16 DIAGNOSIS — N18.4 TYPE 2 DIABETES MELLITUS WITH STAGE 4 CHRONIC KIDNEY DISEASE, WITH LONG-TERM CURRENT USE OF INSULIN: Primary | ICD-10-CM

## 2019-07-16 DIAGNOSIS — N18.4 STAGE 4 CHRONIC KIDNEY DISEASE: ICD-10-CM

## 2019-07-16 DIAGNOSIS — E11.59 HYPERTENSION ASSOCIATED WITH DIABETES: ICD-10-CM

## 2019-07-16 DIAGNOSIS — I50.30 (HFPEF) HEART FAILURE WITH PRESERVED EJECTION FRACTION: ICD-10-CM

## 2019-07-16 DIAGNOSIS — E11.69 HYPERLIPIDEMIA ASSOCIATED WITH TYPE 2 DIABETES MELLITUS: ICD-10-CM

## 2019-07-16 DIAGNOSIS — I15.2 HYPERTENSION ASSOCIATED WITH DIABETES: ICD-10-CM

## 2019-07-16 PROBLEM — E11.9 DIABETES MELLITUS: Status: ACTIVE | Noted: 2017-06-14

## 2019-07-16 PROBLEM — G47.33 OBSTRUCTIVE SLEEP APNEA SYNDROME: Status: ACTIVE | Noted: 2019-07-16

## 2019-07-16 PROBLEM — I63.511 ACUTE RIGHT MCA STROKE: Status: RESOLVED | Noted: 2019-03-03 | Resolved: 2019-07-16

## 2019-07-16 PROBLEM — Z86.73 HISTORY OF TRANSIENT ISCHEMIC ATTACK (TIA): Status: ACTIVE | Noted: 2019-04-11

## 2019-07-16 PROCEDURE — 99214 OFFICE O/P EST MOD 30 MIN: CPT | Performed by: FAMILY MEDICINE

## 2019-07-16 NOTE — PATIENT INSTRUCTIONS
Insulin plan:  - Detemir/Toujeo 10 units every morning (long acting)  - Goal sugars <180  - If sugars running >180, can start taking short acting  - Short acting plan: Aspart/novolog 2 units with meals
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
## 2019-07-17 ENCOUNTER — OFFICE VISIT (OUTPATIENT)
Dept: CARDIOLOGY | Facility: CLINIC | Age: 64
End: 2019-07-17
Payer: MEDICARE

## 2019-07-17 VITALS
DIASTOLIC BLOOD PRESSURE: 79 MMHG | SYSTOLIC BLOOD PRESSURE: 128 MMHG | HEART RATE: 72 BPM | OXYGEN SATURATION: 100 % | WEIGHT: 154 LBS | HEIGHT: 65 IN | BODY MASS INDEX: 25.66 KG/M2

## 2019-07-17 DIAGNOSIS — I15.2 HYPERTENSION ASSOCIATED WITH DIABETES: ICD-10-CM

## 2019-07-17 DIAGNOSIS — G47.33 OBSTRUCTIVE SLEEP APNEA SYNDROME: ICD-10-CM

## 2019-07-17 DIAGNOSIS — I50.30 (HFPEF) HEART FAILURE WITH PRESERVED EJECTION FRACTION: ICD-10-CM

## 2019-07-17 DIAGNOSIS — I50.30 DIASTOLIC CONGESTIVE HEART FAILURE, UNSPECIFIED HF CHRONICITY: Primary | ICD-10-CM

## 2019-07-17 DIAGNOSIS — E11.59 HYPERTENSION ASSOCIATED WITH DIABETES: ICD-10-CM

## 2019-07-17 DIAGNOSIS — E11.69 HYPERLIPIDEMIA ASSOCIATED WITH TYPE 2 DIABETES MELLITUS: ICD-10-CM

## 2019-07-17 DIAGNOSIS — E78.5 HYPERLIPIDEMIA ASSOCIATED WITH TYPE 2 DIABETES MELLITUS: ICD-10-CM

## 2019-07-17 PROCEDURE — 3078F PR MOST RECENT DIASTOLIC BLOOD PRESSURE < 80 MM HG: ICD-10-PCS | Mod: CPTII,S$GLB,, | Performed by: INTERNAL MEDICINE

## 2019-07-17 PROCEDURE — 3044F PR MOST RECENT HEMOGLOBIN A1C LEVEL <7.0%: ICD-10-PCS | Mod: CPTII,S$GLB,, | Performed by: INTERNAL MEDICINE

## 2019-07-17 PROCEDURE — 3074F PR MOST RECENT SYSTOLIC BLOOD PRESSURE < 130 MM HG: ICD-10-PCS | Mod: CPTII,S$GLB,, | Performed by: INTERNAL MEDICINE

## 2019-07-17 PROCEDURE — 93000 EKG 12-LEAD: ICD-10-PCS | Mod: S$GLB,,, | Performed by: STUDENT IN AN ORGANIZED HEALTH CARE EDUCATION/TRAINING PROGRAM

## 2019-07-17 PROCEDURE — 3008F BODY MASS INDEX DOCD: CPT | Mod: CPTII,S$GLB,, | Performed by: INTERNAL MEDICINE

## 2019-07-17 PROCEDURE — 99204 OFFICE O/P NEW MOD 45 MIN: CPT | Mod: S$GLB,,, | Performed by: INTERNAL MEDICINE

## 2019-07-17 PROCEDURE — 99204 PR OFFICE/OUTPT VISIT, NEW, LEVL IV, 45-59 MIN: ICD-10-PCS | Mod: S$GLB,,, | Performed by: INTERNAL MEDICINE

## 2019-07-17 PROCEDURE — 3044F HG A1C LEVEL LT 7.0%: CPT | Mod: CPTII,S$GLB,, | Performed by: INTERNAL MEDICINE

## 2019-07-17 PROCEDURE — 3008F PR BODY MASS INDEX (BMI) DOCUMENTED: ICD-10-PCS | Mod: CPTII,S$GLB,, | Performed by: INTERNAL MEDICINE

## 2019-07-17 PROCEDURE — 3074F SYST BP LT 130 MM HG: CPT | Mod: CPTII,S$GLB,, | Performed by: INTERNAL MEDICINE

## 2019-07-17 PROCEDURE — 3078F DIAST BP <80 MM HG: CPT | Mod: CPTII,S$GLB,, | Performed by: INTERNAL MEDICINE

## 2019-07-17 PROCEDURE — 99999 PR PBB SHADOW E&M-EST. PATIENT-LVL III: CPT | Mod: PBBFAC,,, | Performed by: INTERNAL MEDICINE

## 2019-07-17 PROCEDURE — 93000 ELECTROCARDIOGRAM COMPLETE: CPT | Mod: S$GLB,,, | Performed by: STUDENT IN AN ORGANIZED HEALTH CARE EDUCATION/TRAINING PROGRAM

## 2019-07-17 PROCEDURE — 99999 PR PBB SHADOW E&M-EST. PATIENT-LVL III: ICD-10-PCS | Mod: PBBFAC,,, | Performed by: INTERNAL MEDICINE

## 2019-07-17 NOTE — LETTER
July 17, 2019      Domingo Anderson PA-C  [address]  [address]  [place] [facility] [address]           [facility]  [address]  [place] [facility] [address]  Phone: [phone]          Patient: Ming oBateng   MR Number: [id number]   YOB: 1955   Date of Visit: 7/17/2019       Dear Domingo Anderson:    Thank you for referring Ming Boateng to me for evaluation. Attached you will find relevant portions of my assessment and plan of care.    If you have questions, please do not hesitate to call me. I look forward to following Ming Boateng along with you.    Sincerely,    Anil Rosario MD    Enclosure  CC:  No Recipients    If you would like to receive this communication electronically, please contact [email or web address] or [phone] to request more information on [employer] access.    For providers and/or their staff who would like to refer a patient to [facility], please contact us through our one-stop-shop provider referral line, Ayse Gupta, at [phone].    If you feel you have received this communication in error or would no longer like to receive these types of communications, please e-mail [email or web address]
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
## 2019-07-19 ENCOUNTER — CLINICAL SUPPORT (OUTPATIENT)
Dept: REHABILITATION | Facility: HOSPITAL | Age: 64
End: 2019-07-19
Attending: PSYCHIATRY & NEUROLOGY
Payer: MEDICARE

## 2019-07-19 DIAGNOSIS — R26.9 GAIT ABNORMALITY: ICD-10-CM

## 2019-07-19 DIAGNOSIS — R26.89 DECREASED MOBILITY: ICD-10-CM

## 2019-07-19 PROCEDURE — 97112 NEUROMUSCULAR REEDUCATION: CPT | Mod: PN

## 2019-07-19 PROCEDURE — 97110 THERAPEUTIC EXERCISES: CPT | Mod: PN

## 2019-07-19 NOTE — PROGRESS NOTES
Physical Therapy Daily Treatment Note     Name: Ming Boateng  Clinic Number: [id number]    Therapy Diagnosis:   Encounter Diagnoses   Name Primary?    Decreased mobility     Gait abnormality      Physician: Khoobehi, Kaveh D., MD    Visit Date: 7/19/2019    Physician Orders: PT Eval and Treat  Medical Diagnosis from Referral: I69.359 (ICD-10-CM) - Hemiplegia and hemiparesis following cerebral infarction affecting unspecified side  Evaluation Date: 4/8/2019      Authorization Period Expiration: 7/10/19  Plan of Care Expiration: 8/9/19  Visit # / Visits authorized: 16/24  FOTO: next at d/c  Gcode: 6/10     Cost:95.11  Total: 1647.66  Time In: 11:00 AM  Time Out: 11:45 PM  Total Billable Time: 45 minutes (2TE, 1 NMR)    Precautions: Standard, Fall, CHF and L side weakness, recent TIA    Check BP during session if symptomatic    Subjective     Pt reports: no reports of dizziness or pain upon arrival. He is agreeable to PT session.   .He is compliant with home exercise program. Daughter walks pt regularly.   Response to previous treatment: no adverse reactions  Functional change:pt ambulates outside of PT therapy    Pain: 0/10  Location: left side     Objective     Ming performed Therapeutic exercise for ROM and strengthening of  B LE x 30 min :    - Step ups  x20 B blue step  - Mini Squats  2x10  - Heel raises  3x10 B  - HS stretch  3 x 30'' B   - Gastroc str  3 x 30'' B    Ming performed neuromuscular re-education x 15' consisting of:  Neuro re-ed and endurance training with B LE/UE extremities for reciprocal motion of all limbs on sci-fit x 10 min at level 3 at > or equal to 50 spm  w/o rest.    -soccer ball stop/ kick 2 min drill x 2 trials  Home Exercises Provided and Patient Education:    Education provided:   - encouraged safety with use of RW indoors/ outdoors  - encouraged blood pressure monitoring     Pt is unable to perform HEP due to cognitive deficits     Assessment     Pt completed standing balance  activities with no episodes of LOB. He requires verbal instructions on postural awareness in standing     Ming is progressing well towards his goals.   Pt prognosis is Good.     Pt will continue to benefit from skilled outpatient physical therapy to address the deficits listed in the problem list box on initial evaluation, provide pt/family education and to maximize pt's level of independence in the home and community environment.     Pt's spiritual, cultural and educational needs considered and pt agreeable to plan of care and goals.     Anticipated barriers to physical therapy: spasticity, confusion, sedentary routine, recent TIA    Goals:  Short Term Goals (4 Weeks):   1.  Independent with initial HEP. Not issued at this time  2.  Pt will perform nu-step at level 3.0 x 10 minutes without rests breaks going at least 60 step/min or greater. MET  3. Pt will be able to perform TUG in 35 secs WITH use of AD demonstrating overall improved functional mobility.  MET 29 secs  4. Pt will performed sit to stand x 5 B UE support in 26 seconds without LOB backward or posterior LE hooking for functional and safe transfers. (PROGRESSING, NOT MET) 28 secs  5.  Pt will score greater than or equal to 25/28 on the Tinetti test/assessment WITH use of AD demonstrating overall improved functional mobility and balance and reduce fall risk. (PROGRESSING, NOT MET)  6. Pt will walk < than or equal to 180 ft on the 2 minute walk test indoor WITH AD with 0 LOB and minimal gait deviations for improved safety in home ambulation and safety. MET     Long Term Goals (8 Weeks):   1. Pt will be able to perform TUG in 25 secs WITH use of AD demonstrating overall improved functional mobility. (PROGRESSING, NOT MET)  2. Pt will performed sit to stand x 5 WITH UE support in 20 seconds without LOB backward or posterior LE hooking for functional and safe transfers. (PROGRESSING, NOT MET)  3.  Pt will score greater than or equal to 27/28 on the Tinetti  test/assessment WITH use of AD demonstrating overall improved functional mobility and balance and reduce fall risk. (PROGRESSING, NOT MET)  4.  Pt will score greater than or equal to 22/24 on the DGI test/assessment WITH use of AD demonstrating overall improved functional mobility and balance and reduce fall risk. (PROGRESSING, NOT MET)  5.  Pt will walk < than or equal to 600 ft on the 6 minute walk test with AD and 0 LOB and minimal gait deviations for improved community ambulation. MET  6. Pt will have 0 falls from start of PT sessions.  (PROGRESSING, NOT MET)  7. Pt will have MMT score of 4+/5 in all major ms groups in [facility].(PROGRESSING, NOT MET)     Plan   Cont to progress PT as appropriate.   Progress balance activities as tolerated.     Philip Sawyer, PTA

## 2019-07-20 PROBLEM — E66.3 OVERWEIGHT (BMI 25.0-29.9): Status: ACTIVE | Noted: 2019-07-20

## 2019-07-20 PROBLEM — R63.8 DECREASED ORAL INTAKE: Status: RESOLVED | Noted: 2018-12-25 | Resolved: 2019-07-20

## 2019-07-20 PROBLEM — F32.0 MILD SINGLE CURRENT EPISODE OF MAJOR DEPRESSIVE DISORDER: Status: ACTIVE | Noted: 2019-07-20

## 2019-07-22 ENCOUNTER — CLINICAL SUPPORT (OUTPATIENT)
Dept: REHABILITATION | Facility: HOSPITAL | Age: 64
End: 2019-07-22
Attending: PSYCHIATRY & NEUROLOGY
Payer: MEDICARE

## 2019-07-22 DIAGNOSIS — R26.9 GAIT ABNORMALITY: ICD-10-CM

## 2019-07-22 DIAGNOSIS — R26.89 DECREASED MOBILITY: ICD-10-CM

## 2019-07-22 PROCEDURE — 97110 THERAPEUTIC EXERCISES: CPT | Mod: PN

## 2019-07-22 PROCEDURE — 97112 NEUROMUSCULAR REEDUCATION: CPT | Mod: PN

## 2019-07-22 NOTE — PROGRESS NOTES
Physical Therapy Daily Treatment Note     Name: Ming Boateng  Clinic Number: [id number]    Therapy Diagnosis:   Encounter Diagnoses   Name Primary?    Decreased mobility     Gait abnormality      Physician: Khoobehi, Kaveh D., MD    Visit Date: 7/22/2019    Physician Orders: PT Eval and Treat  Medical Diagnosis from Referral: I69.359 (ICD-10-CM) - Hemiplegia and hemiparesis following cerebral infarction affecting unspecified side  Evaluation Date: 4/8/2019      Authorization Period Expiration: 7/10/19  Plan of Care Expiration: 8/9/19  Visit # / Visits authorized: 17/24  FOTO: next at d/c  Gcode: 7/10     Cost: 95.11  Total: 1742.77  Time In: 11:00 AM  Time Out: 11:45 PM  Total Billable Time: 45 minutes (2TE, 1 NMR)    Precautions: Standard, Fall, CHF and L side weakness, recent TIA    Check BP during session if symptomatic    Subjective     Pt reports: no reports of dizziness or pain upon arrival. He is agreeable to PT session.   .He is compliant with home exercise program. Daughter walks pt regularly.   Response to previous treatment: no adverse reactions  Functional change:cycles on stationary bike outside of PT therapy    Pain: 0/10  Location: left side     Objective     Ming performed Therapeutic exercise for ROM and strengthening of  B LE x 30 min :    - Step ups  x20 B blue step  - Mini Squats  2x10  - Heel raises  3x10 B  - hip 3 ways   3 x 10 B    Ming performed neuromuscular re-education x 15' consisting of:  Neuro re-ed and endurance training with B LE/UE extremities for reciprocal motion of all limbs on sci-fit x 10 min at level 3 at > or equal to 50 spm  w/o rest.     Sit to stand    2 x 10 B  Dynamic gait training  40 ft x 5 cone weaving using RW   2 minute walk test  230 ft with RW and verbal directional cues.      Home Exercises Provided and Patient Education:    Education provided:   - encouraged safety with use of RW indoors/ outdoors  - encouraged blood pressure monitoring     Pt is unable to  perform HEP due to cognitive deficits     Assessment     Pt needs constant cues for directional navigation with RW due to decreased attention, safety and initiation.  PT will plan for d/c in 2 weeks after education on safety, HEP and increased mobility in the home.    Ming is progressing well towards his goals.   Pt prognosis is Good.     Pt will continue to benefit from skilled outpatient physical therapy to address the deficits listed in the problem list box on initial evaluation, provide pt/family education and to maximize pt's level of independence in the home and community environment.     Pt's spiritual, cultural and educational needs considered and pt agreeable to plan of care and goals.     Anticipated barriers to physical therapy: spasticity, confusion, sedentary routine, recent TIA    Goals:  Short Term Goals (4 Weeks):   1.  Independent with initial HEP. Not issued at this time  2.  Pt will perform nu-step at level 3.0 x 10 minutes without rests breaks going at least 60 step/min or greater. MET  3. Pt will be able to perform TUG in 35 secs WITH use of AD demonstrating overall improved functional mobility.  MET 29 secs  4. Pt will performed sit to stand x 5 B UE support in 26 seconds without LOB backward or posterior LE hooking for functional and safe transfers. (PROGRESSING, NOT MET) 28 secs  5.  Pt will score greater than or equal to 25/28 on the Tinetti test/assessment WITH use of AD demonstrating overall improved functional mobility and balance and reduce fall risk. (PROGRESSING, NOT MET)  6. Pt will walk < than or equal to 180 ft on the 2 minute walk test indoor WITH AD with 0 LOB and minimal gait deviations for improved safety in home ambulation and safety. MET     Long Term Goals (8 Weeks):   1. Pt will be able to perform TUG in 25 secs WITH use of AD demonstrating overall improved functional mobility. (PROGRESSING, NOT MET)  2. Pt will performed sit to stand x 5 WITH UE support in 20 seconds  without LOB backward or posterior LE hooking for functional and safe transfers. (PROGRESSING, NOT MET)  3.  Pt will score greater than or equal to 27/28 on the Tinetti test/assessment WITH use of AD demonstrating overall improved functional mobility and balance and reduce fall risk. (PROGRESSING, NOT MET)  4.  Pt will score greater than or equal to 22/24 on the DGI test/assessment WITH use of AD demonstrating overall improved functional mobility and balance and reduce fall risk. (PROGRESSING, NOT MET)  5.  Pt will walk < than or equal to 600 ft on the 6 minute walk test with AD and 0 LOB and minimal gait deviations for improved community ambulation. MET  6. Pt will have 0 falls from start of PT sessions.  (PROGRESSING, NOT MET)  7. Pt will have MMT score of 4+/5 in all major ms groups in [facility].(PROGRESSING, NOT MET)     Plan   Cont to progress PT as appropriate.   Progress balance activities as tolerated.     Livier العراقي, PT

## 2019-07-26 ENCOUNTER — CLINICAL SUPPORT (OUTPATIENT)
Dept: REHABILITATION | Facility: HOSPITAL | Age: 64
End: 2019-07-26
Attending: PSYCHIATRY & NEUROLOGY
Payer: MEDICARE

## 2019-07-26 DIAGNOSIS — R26.89 DECREASED MOBILITY: ICD-10-CM

## 2019-07-26 DIAGNOSIS — R26.9 GAIT ABNORMALITY: ICD-10-CM

## 2019-07-26 PROCEDURE — 97110 THERAPEUTIC EXERCISES: CPT | Mod: PN

## 2019-07-26 PROCEDURE — 97112 NEUROMUSCULAR REEDUCATION: CPT | Mod: PN

## 2019-07-26 NOTE — PROGRESS NOTES
Physical Therapy Daily Treatment Note     Name: Ming Boateng  Clinic Number: [id number]    Therapy Diagnosis:   Encounter Diagnoses   Name Primary?    Decreased mobility     Gait abnormality      Physician: Khoobehi, Kaveh D., MD    Visit Date: 7/26/2019    Physician Orders: PT Eval and Treat  Medical Diagnosis from Referral: I69.359 (ICD-10-CM) - Hemiplegia and hemiparesis following cerebral infarction affecting unspecified side  Evaluation Date: 4/8/2019      Authorization Period Expiration: 7/10/19  Plan of Care Expiration: 8/9/19  Visit # / Visits authorized: 17/24  FOTO: next at d/c  Gcode: 8/10     Cost: 95.11  Total: 1837.88  Time In: 11:00 AM  Time Out: 11:45 AM  Total Billable Time: 45 minutes (2TE, 1 NMR)    Precautions: Standard, Fall, CHF and L side weakness, recent TIA    Check BP during session if symptomatic    Subjective     Pt reports: He is agreeable to PT session.   .He is compliant with home exercise program. Daughter walks pt regularly.   Response to previous treatment: no adverse reactions  Functional change:cycles on stationary bike outside of PT therapy    Pain: 0/10  Location: left side     Objective     Ming performed Therapeutic exercise for ROM and strengthening of  B LE x 30 min :    - Step ups  x20 B blue step  - Mini Squats  2x10  - Heel raises  3x10 B  - hip 3 ways   3 x 10 B    Ming performed neuromuscular re-education x 15' consisting of:  Neuro re-ed and endurance training with B LE/UE extremities for reciprocal motion of all limbs on sci-fit x 10 min at level 3 at > or equal to 50 spm  w/o rest.     Sit to stand    2 x 10 B  Dynamic gait training  40 ft x 5 cone weaving using RW   2 minute walk test  230 ft with RW and verbal directional cues.      Home Exercises Provided and Patient Education:    Education provided:   - encouraged safety with use of RW indoors/ outdoors    Pt is unable to perform HEP due to cognitive deficits     Assessment     Pt required verbal instructions for  repetition count and for proper completion of therex in standing. No reports of pain noted by pt throughout session. Seated rest breaks provided throughout session as needed.     Ming is progressing well towards his goals.   Pt prognosis is Good.     Pt will continue to benefit from skilled outpatient physical therapy to address the deficits listed in the problem list box on initial evaluation, provide pt/family education and to maximize pt's level of independence in the home and community environment.     Pt's spiritual, cultural and educational needs considered and pt agreeable to plan of care and goals.     Anticipated barriers to physical therapy: spasticity, confusion, sedentary routine, recent TIA    Goals:  Short Term Goals (4 Weeks):   1.  Independent with initial HEP. Not issued at this time  2.  Pt will perform nu-step at level 3.0 x 10 minutes without rests breaks going at least 60 step/min or greater. MET  3. Pt will be able to perform TUG in 35 secs WITH use of AD demonstrating overall improved functional mobility.  MET 29 secs  4. Pt will performed sit to stand x 5 B UE support in 26 seconds without LOB backward or posterior LE hooking for functional and safe transfers. (PROGRESSING, NOT MET) 28 secs  5.  Pt will score greater than or equal to 25/28 on the Tinetti test/assessment WITH use of AD demonstrating overall improved functional mobility and balance and reduce fall risk. (PROGRESSING, NOT MET)  6. Pt will walk < than or equal to 180 ft on the 2 minute walk test indoor WITH AD with 0 LOB and minimal gait deviations for improved safety in home ambulation and safety. MET     Long Term Goals (8 Weeks):   1. Pt will be able to perform TUG in 25 secs WITH use of AD demonstrating overall improved functional mobility. (PROGRESSING, NOT MET)  2. Pt will performed sit to stand x 5 WITH UE support in 20 seconds without LOB backward or posterior LE hooking for functional and safe transfers. (PROGRESSING,  NOT MET)  3.  Pt will score greater than or equal to 27/28 on the Tinetti test/assessment WITH use of AD demonstrating overall improved functional mobility and balance and reduce fall risk. (PROGRESSING, NOT MET)  4.  Pt will score greater than or equal to 22/24 on the DGI test/assessment WITH use of AD demonstrating overall improved functional mobility and balance and reduce fall risk. (PROGRESSING, NOT MET)  5.  Pt will walk < than or equal to 600 ft on the 6 minute walk test with AD and 0 LOB and minimal gait deviations for improved community ambulation. MET  6. Pt will have 0 falls from start of PT sessions.  (PROGRESSING, NOT MET)  7. Pt will have MMT score of 4+/5 in all major ms groups in [facility].(PROGRESSING, NOT MET)     Plan   Cont to progress PT as appropriate.   Progress balance activities as tolerated.     Philip Sawyer, PTA

## 2019-07-29 ENCOUNTER — CLINICAL SUPPORT (OUTPATIENT)
Dept: REHABILITATION | Facility: HOSPITAL | Age: 64
End: 2019-07-29
Attending: PSYCHIATRY & NEUROLOGY
Payer: MEDICARE

## 2019-07-29 DIAGNOSIS — R26.9 GAIT ABNORMALITY: ICD-10-CM

## 2019-07-29 DIAGNOSIS — R26.89 DECREASED MOBILITY: ICD-10-CM

## 2019-07-29 PROCEDURE — 97110 THERAPEUTIC EXERCISES: CPT | Mod: PN

## 2019-07-29 PROCEDURE — 97112 NEUROMUSCULAR REEDUCATION: CPT | Mod: PN

## 2019-07-29 NOTE — PROGRESS NOTES
Physical Therapy Daily Treatment Note     Name: Ming Boateng  Clinic Number: [id number]    Therapy Diagnosis:   Encounter Diagnoses   Name Primary?    Decreased mobility     Gait abnormality      Physician: Khoobehi, Kaveh D., MD    Visit Date: 7/29/2019    Physician Orders: PT Eval and Treat  Medical Diagnosis from Referral: I69.359 (ICD-10-CM) - Hemiplegia and hemiparesis following cerebral infarction affecting unspecified side  Evaluation Date: 4/8/2019      Authorization Period Expiration: 7/10/19  Plan of Care Expiration: 8/9/19  Visit # / Visits authorized: 18/24  FOTO: next at d/c  Gcode: 9/10     Cost: 95.11  Total: 1932.99  Time In: 11:00 AM  Time Out: 11:45 AM  Total Billable Time: 45 minutes (2TE, 1 NMR)    Precautions: Standard, Fall, CHF and L side weakness, recent TIA    Check BP during session if symptomatic    Subjective     Pt reports: He is agreeable to PT session.   .He is compliant with home exercise program. Daughter walks pt regularly.   Response to previous treatment: no adverse reactions  Functional change:cycles on stationary bike outside of PT therapy    Pain: 0/10  Location: left side     Objective     Ming performed Therapeutic exercise for ROM and strengthening of  B LE x 25 min :    - Step ups  x20 B blue step //  - Mini Squats  2x10  - Heel raises  3x10 B  - hip 3 ways   3 x 10 B    Ming performed neuromuscular re-education x 20' consisting of:  Neuro re-ed and endurance training with B LE/UE extremities for reciprocal motion of all limbs on sci-fit x 10 min at level 3 at > or equal to 50 spm  w/o rest.     Sit to stand    2 x 10 B  Dynamic gait training  40 ft x 5 cone weaving using RW   2 minute walk test  Not performed today     Home Exercises Provided and Patient Education:    Education provided:   - encouraged safety with use of RW indoors/ outdoors    Pt is unable to perform HEP due to cognitive deficits     Assessment     Pt able to follow verbal instructions, however he was  easily distracted today. He experienced no LOB while performing balance activities today.     Ming is progressing well towards his goals.   Pt prognosis is Good.     Pt will continue to benefit from skilled outpatient physical therapy to address the deficits listed in the problem list box on initial evaluation, provide pt/family education and to maximize pt's level of independence in the home and community environment.     Pt's spiritual, cultural and educational needs considered and pt agreeable to plan of care and goals.     Anticipated barriers to physical therapy: spasticity, confusion, sedentary routine, recent TIA    Goals:  Short Term Goals (4 Weeks):   1.  Independent with initial HEP. Not issued at this time  2.  Pt will perform nu-step at level 3.0 x 10 minutes without rests breaks going at least 60 step/min or greater. MET  3. Pt will be able to perform TUG in 35 secs WITH use of AD demonstrating overall improved functional mobility.  MET 29 secs  4. Pt will performed sit to stand x 5 B UE support in 26 seconds without LOB backward or posterior LE hooking for functional and safe transfers. (PROGRESSING, NOT MET) 28 secs  5.  Pt will score greater than or equal to 25/28 on the Tinetti test/assessment WITH use of AD demonstrating overall improved functional mobility and balance and reduce fall risk. (PROGRESSING, NOT MET)  6. Pt will walk < than or equal to 180 ft on the 2 minute walk test indoor WITH AD with 0 LOB and minimal gait deviations for improved safety in home ambulation and safety. MET     Long Term Goals (8 Weeks):   1. Pt will be able to perform TUG in 25 secs WITH use of AD demonstrating overall improved functional mobility. (PROGRESSING, NOT MET)  2. Pt will performed sit to stand x 5 WITH UE support in 20 seconds without LOB backward or posterior LE hooking for functional and safe transfers. (PROGRESSING, NOT MET)  3.  Pt will score greater than or equal to 27/28 on the Tinetti  test/assessment WITH use of AD demonstrating overall improved functional mobility and balance and reduce fall risk. (PROGRESSING, NOT MET)  4.  Pt will score greater than or equal to 22/24 on the DGI test/assessment WITH use of AD demonstrating overall improved functional mobility and balance and reduce fall risk. (PROGRESSING, NOT MET)  5.  Pt will walk < than or equal to 600 ft on the 6 minute walk test with AD and 0 LOB and minimal gait deviations for improved community ambulation. MET  6. Pt will have 0 falls from start of PT sessions.  (PROGRESSING, NOT MET)  7. Pt will have MMT score of 4+/5 in all major ms groups in [facility].(PROGRESSING, NOT MET)     Plan   Cont to progress PT as appropriate.   Progress balance activities as tolerated.     Philip Sawyer, PTA

## 2019-08-02 ENCOUNTER — CLINICAL SUPPORT (OUTPATIENT)
Dept: REHABILITATION | Facility: HOSPITAL | Age: 64
End: 2019-08-02
Attending: PSYCHIATRY & NEUROLOGY
Payer: MEDICARE

## 2019-08-02 DIAGNOSIS — R26.89 DECREASED MOBILITY: ICD-10-CM

## 2019-08-02 DIAGNOSIS — R26.9 GAIT ABNORMALITY: ICD-10-CM

## 2019-08-02 PROCEDURE — 97112 NEUROMUSCULAR REEDUCATION: CPT | Mod: KX,PN

## 2019-08-02 PROCEDURE — 97110 THERAPEUTIC EXERCISES: CPT | Mod: KX,PN

## 2019-08-02 NOTE — PROGRESS NOTES
"  Physical Therapy Daily Treatment Note     Name: Ming Boateng  Clinic Number: [id number]    Therapy Diagnosis:   Encounter Diagnoses   Name Primary?    Decreased mobility     Gait abnormality      Physician: Khoobehi, Kaveh D., MD    Visit Date: 8/2/2019    Physician Orders: PT Eval and Treat  Medical Diagnosis from Referral: I69.359 (ICD-10-CM) - Hemiplegia and hemiparesis following cerebral infarction affecting unspecified side  Evaluation Date: 4/8/2019      Authorization Period Expiration: 7/10/19  Plan of Care Expiration: 8/9/19  Visit # / Visits authorized: 19/24  FOTO: next at d/c  Gcode: 10/10     Cost: 95.11  Total: 2028.10 KX  Time In: 11:00 AM  Time Out: 11:45 AM  Total Billable Time: 45 minutes (2TE, 1 NMR)    Precautions: Standard, Fall, CHF and L side weakness, recent TIA    Check BP during session if symptomatic    Subjective     Pt reports: He is agreeable to PT session. "Im walking more w/ my daughter".    .He is compliant with home exercise program. Daughter walks pt regularly.   Response to previous treatment: no adverse reactions  Functional change: less UE use for sit to stand transfers    Pain: 0/10  Location: left side     Objective     Ming performed Therapeutic exercise for ROM and strengthening of  B LE x 30 min :    - Step ups  x20 B blue step // LATERAL UE assist     x20 B blue step // FWD UE assist  - Mini Squats  2x10  - Heel raises  3x10 B  - hip 3 ways   3 x 10 B 1.5 # //    Ming performed neuromuscular re-education x 15' consisting of:  Neuro re-ed and endurance training with B LE/UE extremities for reciprocal motion of all limbs on sci-fit x 10 min at level 3 at > or equal to 50 spm  w/o rest.     Sit to stand    2 x 10 B  Dynamic gait training  NOT PERFORMED TODAY     Home Exercises Provided and Patient Education:    Education provided:   - encouraged safety with use of RW indoors/ outdoors    Pt is unable to perform HEP due to cognitive deficits     Assessment     Pt able to follow " verbal instruction today. Pt initiated some of standing activities without prompting and was compliant. No episodes of LOB today noted.   Ming is progressing well towards his goals.   Pt prognosis is Good.     Pt will continue to benefit from skilled outpatient physical therapy to address the deficits listed in the problem list box on initial evaluation, provide pt/family education and to maximize pt's level of independence in the home and community environment.     Pt's spiritual, cultural and educational needs considered and pt agreeable to plan of care and goals.     Anticipated barriers to physical therapy: spasticity, confusion, sedentary routine, recent TIA    Goals:  Short Term Goals (4 Weeks):   1.  Independent with initial HEP. Not issued at this time  2.  Pt will perform nu-step at level 3.0 x 10 minutes without rests breaks going at least 60 step/min or greater. MET  3. Pt will be able to perform TUG in 35 secs WITH use of AD demonstrating overall improved functional mobility.  MET 29 secs  4. Pt will performed sit to stand x 5 B UE support in 26 seconds without LOB backward or posterior LE hooking for functional and safe transfers. (PROGRESSING, NOT MET) 28 secs  5.  Pt will score greater than or equal to 25/28 on the Tinetti test/assessment WITH use of AD demonstrating overall improved functional mobility and balance and reduce fall risk. (PROGRESSING, NOT MET)  6. Pt will walk < than or equal to 180 ft on the 2 minute walk test indoor WITH AD with 0 LOB and minimal gait deviations for improved safety in home ambulation and safety. MET     Long Term Goals (8 Weeks):   1. Pt will be able to perform TUG in 25 secs WITH use of AD demonstrating overall improved functional mobility. (PROGRESSING, NOT MET)  2. Pt will performed sit to stand x 5 WITH UE support in 20 seconds without LOB backward or posterior LE hooking for functional and safe transfers. (PROGRESSING, NOT MET)  3.  Pt will score greater than or  equal to 27/28 on the Tinetti test/assessment WITH use of AD demonstrating overall improved functional mobility and balance and reduce fall risk. (PROGRESSING, NOT MET)  4.  Pt will score greater than or equal to 22/24 on the DGI test/assessment WITH use of AD demonstrating overall improved functional mobility and balance and reduce fall risk. (PROGRESSING, NOT MET)  5.  Pt will walk < than or equal to 600 ft on the 6 minute walk test with AD and 0 LOB and minimal gait deviations for improved community ambulation. MET  6. Pt will have 0 falls from start of PT sessions.  (PROGRESSING, NOT MET)  7. Pt will have MMT score of 4+/5 in all major ms groups in [facility].(PROGRESSING, NOT MET)     Plan   Cont to progress PT as appropriate.   Progress balance activities as tolerated.     Philip Sawyer, PTA    JOYA Alcantara and Dr. Brandon

## 2019-08-05 ENCOUNTER — CLINICAL SUPPORT (OUTPATIENT)
Dept: REHABILITATION | Facility: HOSPITAL | Age: 64
End: 2019-08-05
Attending: PSYCHIATRY & NEUROLOGY
Payer: MEDICARE

## 2019-08-05 DIAGNOSIS — R26.89 DECREASED MOBILITY: ICD-10-CM

## 2019-08-05 DIAGNOSIS — R26.9 GAIT ABNORMALITY: ICD-10-CM

## 2019-08-05 PROCEDURE — 97112 NEUROMUSCULAR REEDUCATION: CPT | Mod: KX,PN

## 2019-08-05 PROCEDURE — G8980 MOBILITY D/C STATUS: HCPCS | Mod: CK,PN

## 2019-08-05 PROCEDURE — 97110 THERAPEUTIC EXERCISES: CPT | Mod: KX,PN

## 2019-08-05 PROCEDURE — G8979 MOBILITY GOAL STATUS: HCPCS | Mod: CK,PN

## 2019-08-05 NOTE — PROGRESS NOTES
"  Physical Therapy Daily Treatment Note/Discharge summary     Name: Ming Boateng  Clinic Number: [id number]    Therapy Diagnosis:   Encounter Diagnoses   Name Primary?    Decreased mobility     Gait abnormality      Physician: Khoobehi, Kaveh D., MD    Visit Date: 8/5/2019    Physician Orders: PT Eval and Treat  Medical Diagnosis from Referral: I69.359 (ICD-10-CM) - Hemiplegia and hemiparesis following cerebral infarction affecting unspecified side  Evaluation Date: 4/8/2019      Authorization Period Expiration: 7/10/19  Plan of Care Expiration: 8/9/19  Visit # / Visits authorized: 22/24  FOTO: done today  Gcode: 1/10 done    Cost: 95.11   Total: 2132.10 KX   Time In: 10:13AM  Time Out: 11:00 AM  Total Billable Time: 47 minutes (2TE, 1 NMR)    Precautions: Standard, Fall, CHF and L side weakness, recent TIA    Check BP during session if symptomatic    Subjective     Pt reports: he is walking in from the parking lot by himself now without his dtr.   .He is compliant with home exercise program. Daughter walks pt regularly.   Response to previous treatment: no adverse reactions  Functional change: less UE use for sit to stand transfers, no falls lately    Pain: 0/10  Location: left side     Objective     Lower Extremity Strength    RLE LLE   Hip Flexion: 5/5 4/5   Hip Extension:  NT NT   Hip Abduction: 5/5 4-/5   Hip Adduction: NT NT   Knee Extension: 5/5 4+/5   Knee Flexion: 5/5 4+/5   Ankle Dorsiflexion: 4/5 4/5   Ankle Plantarflexion: 4/5 4+/5   Ankle Inversion: NT NT   Ankle Eversion: NT NT      TINETTI BALANCE ASSESSMENT TOOL  BALANCE SECTION  1.Sitting Balance:   Steady; safe = 1  2.Rises from chair :  Able, uses arms to help = 1  3.Attempts to arise :  Able to arise, 1 attempt = 2  4.Immediate standing Balance (first 5 seconds) : Steady but uses walker or other support = 1  5.Standing balance: Steady but wide stance (medal heel>4" apart) & uses cane or other support = 1  6.Nudged : Steady = 2  7.Eyes closed: Steady " = 1  8.Turning 360 degrees:  Discontinuous steps = 0 and Steady = 1  9.Sitting Down : Uses arms or not a smooth motion = 1  Balance Score:  11/16    GAIT SECTION  10.Initiation of Gait (Immediately after told to go.): No hesitancy = 1  11.Step length and height :  Step through R=1 and Step through L=1  12.Foot Clearance :  B foot clears floor=2  13.Step symmetry: Right & Left step length appear equal = 1  14.Step continuity : Steps appear continuous = 1  15.Path: Marked deviation = 0  16.Trunk : No sway, but flexion of knees or back or spreads arm out while walking = 1  17.Walking Time: Heels amost touching while walking = 1    Gait Score: 9/12  Balance score:11/16  Total Score=Balance + Gait Score: 20/28 on 8/5/19    Total Score=Balance + Gait Score: 20/28 on 6/12/19  Total Score=Balance + Gait Score: 23/28 on eval      5x sit to stand: 24 with UEs 8/5/19  (28 sec with UE use on 6/12/19) (36 secs on eval with UE use)  2 min walk: 210 with RW 0 LOB 8/5/19  (220 ft with RW 0 LOB on 6/12/19) (130 ft with RW on eval)  6 minute walk test: 740 with RW 8/5/19 without LOB  (745 ft without RW on 6/12/19)  TUG with AD: 29 secs with RW 8/5/19  (29 sec with RW on 6/12/19) (47 secs on eval)    FOTO: 30% impaired Gcode CK    Ming performed neuromuscular re-education x 8' consisting of:  Neuro re-ed and endurance training with B LE/UE extremities for reciprocal motion of all limbs on sci-fit x 8 min at level 4 at > or equal to 50 spm  w/o rest.     Home Exercises Provided and Patient Education Provided     Education provided:   - reason for d/c  - need to cont use of RW at all times when walking  - cont walking with dtr    Written Home Exercises Provided: yes.  Verbalized to dtr to continue walking in neighborhood weekly      Assessment     Pt has increased in strength and Sit to stand transfers but remained the same in walking distance and balance with his AD.  Because this is CVA number 3 his cognition, attention and  awareness and balance limit him to walking with walker and living with his daughter for safety. He has improved to distant supervision in the home and is safe with household distances. His daughter still needs to monitor his BP regularly due to falls and dizziness due to hypotension at times. Pt is safe to d/c with use of RW with supervision.    Ming is progressing well towards his goals.   Pt prognosis is Good.     Pt will continue to benefit from skilled outpatient physical therapy to address the deficits listed in the problem list box on initial evaluation, provide pt/family education and to maximize pt's level of independence in the home and community environment.     Pt's spiritual, cultural and educational needs considered and pt agreeable to plan of care and goals.     Anticipated barriers to physical therapy: spasticity, confusion, sedentary routine, recent TIA    Goals:  Short Term Goals (4 Weeks):   1.  Independent with initial HEP. Not issued at this time  2.  Pt will perform nu-step at level 3.0 x 10 minutes without rests breaks going at least 60 step/min or greater. MET  3. Pt will be able to perform TUG in 35 secs WITH use of AD demonstrating overall improved functional mobility.  MET 29 secs  4. Pt will performed sit to stand x 5 B UE support in 26 seconds without LOB backward or posterior LE hooking for functional and safe transfers. MET with UE support 24 secs  5.  Pt will score greater than or equal to 25/28 on the Tinetti test/assessment WITH use of AD demonstrating overall improved functional mobility and balance and reduce fall risk. (PROGRESSING, NOT MET)  6. Pt will walk < than or equal to 180 ft on the 2 minute walk test indoor WITH AD with 0 LOB and minimal gait deviations for improved safety in home ambulation and safety. MET     Long Term Goals (8 Weeks):   1. Pt will be able to perform TUG in 25 secs WITH use of AD demonstrating overall improved functional mobility. (PROGRESSING, NOT  MET)  2. Pt will performed sit to stand x 5 WITH UE support in 20 seconds without LOB backward or posterior LE hooking for functional and safe transfers. (PROGRESSING, NOT MET)  3.  Pt will score greater than or equal to 27/28 on the Tinetti test/assessment WITH use of AD demonstrating overall improved functional mobility and balance and reduce fall risk. (PROGRESSING, NOT MET)  4.  Pt will score greater than or equal to 22/24 on the DGI test/assessment WITH use of AD demonstrating overall improved functional mobility and balance and reduce fall risk. Not able to test safely  5.  Pt will walk < than or equal to 600 ft on the 6 minute walk test with AD and 0 LOB and minimal gait deviations for improved community ambulation. MET indoor 740 ft 8/5/19  6. Pt will have 0 falls from start of PT sessions.  No falls in over 30 days  7. Pt will have MMT score of 4+/5 in all major ms groups in [facility].(PROGRESSING, NOT MET)     Plan   D/c today    Livier العراقي, PT

## 2019-08-27 ENCOUNTER — HOSPITAL ENCOUNTER (OUTPATIENT)
Facility: HOSPITAL | Age: 64
Discharge: HOME | End: 2019-08-28
Attending: EMERGENCY MEDICINE | Admitting: INTERNAL MEDICINE
Payer: MEDICARE

## 2019-08-27 DIAGNOSIS — R06.00 DYSPNEA: ICD-10-CM

## 2019-08-27 DIAGNOSIS — R13.10 TROUBLE SWALLOWING: Primary | ICD-10-CM

## 2019-08-27 DIAGNOSIS — R13.10 DYSPHAGIA, UNSPECIFIED TYPE: ICD-10-CM

## 2019-08-27 DIAGNOSIS — R06.02 SHORTNESS OF BREATH: ICD-10-CM

## 2019-08-27 DIAGNOSIS — Z86.73 H/O: CVA (CEREBROVASCULAR ACCIDENT): ICD-10-CM

## 2019-08-27 LAB
ALBUMIN SERPL BCP-MCNC: 4 G/DL (ref 3.5–5.2)
ALP SERPL-CCNC: 111 U/L (ref 55–135)
ALT SERPL W/O P-5'-P-CCNC: 26 U/L (ref 10–44)
ANION GAP SERPL CALC-SCNC: 10 MMOL/L (ref 8–16)
AST SERPL-CCNC: 22 U/L (ref 10–40)
BASOPHILS # BLD AUTO: 0.06 K/UL (ref 0–0.2)
BASOPHILS NFR BLD: 0.9 % (ref 0–1.9)
BILIRUB SERPL-MCNC: 0.4 MG/DL (ref 0.1–1)
BNP SERPL-MCNC: 43 PG/ML (ref 0–99)
BUN SERPL-MCNC: 27 MG/DL (ref 8–23)
CALCIUM SERPL-MCNC: 9.6 MG/DL (ref 8.7–10.5)
CHLORIDE SERPL-SCNC: 107 MMOL/L (ref 95–110)
CO2 SERPL-SCNC: 21 MMOL/L (ref 23–29)
CREAT SERPL-MCNC: 1.8 MG/DL (ref 0.5–1.4)
DIFFERENTIAL METHOD: ABNORMAL
EOSINOPHIL # BLD AUTO: 0.3 K/UL (ref 0–0.5)
EOSINOPHIL NFR BLD: 4.7 % (ref 0–8)
ERYTHROCYTE [DISTWIDTH] IN BLOOD BY AUTOMATED COUNT: 14.8 % (ref 11.5–14.5)
EST. GFR  (AFRICAN AMERICAN): 45 ML/MIN/1.73 M^2
EST. GFR  (NON AFRICAN AMERICAN): 39 ML/MIN/1.73 M^2
GLUCOSE SERPL-MCNC: 115 MG/DL (ref 70–110)
HCT VFR BLD AUTO: 33.1 % (ref 40–54)
HGB BLD-MCNC: 11.3 G/DL (ref 14–18)
LYMPHOCYTES # BLD AUTO: 1.9 K/UL (ref 1–4.8)
LYMPHOCYTES NFR BLD: 28.7 % (ref 18–48)
MCH RBC QN AUTO: 28.3 PG (ref 27–31)
MCHC RBC AUTO-ENTMCNC: 34.1 G/DL (ref 32–36)
MCV RBC AUTO: 83 FL (ref 82–98)
MONOCYTES # BLD AUTO: 0.5 K/UL (ref 0.3–1)
MONOCYTES NFR BLD: 8.1 % (ref 4–15)
NEUTROPHILS # BLD AUTO: 3.8 K/UL (ref 1.8–7.7)
NEUTROPHILS NFR BLD: 57.6 % (ref 38–73)
PLATELET # BLD AUTO: 315 K/UL (ref 150–350)
PMV BLD AUTO: 10.1 FL (ref 9.2–12.9)
POCT GLUCOSE: 135 MG/DL (ref 70–110)
POCT GLUCOSE: 224 MG/DL (ref 70–110)
POTASSIUM SERPL-SCNC: 4.1 MMOL/L (ref 3.5–5.1)
PROT SERPL-MCNC: 7.3 G/DL (ref 6–8.4)
RBC # BLD AUTO: 4 M/UL (ref 4.6–6.2)
SODIUM SERPL-SCNC: 138 MMOL/L (ref 136–145)
TROPONIN I SERPL DL<=0.01 NG/ML-MCNC: 0.02 NG/ML (ref 0–0.03)
WBC # BLD AUTO: 6.58 K/UL (ref 3.9–12.7)

## 2019-08-27 PROCEDURE — G0378 HOSPITAL OBSERVATION PER HR: HCPCS

## 2019-08-27 PROCEDURE — 97535 SELF CARE MNGMENT TRAINING: CPT

## 2019-08-27 PROCEDURE — 93010 ELECTROCARDIOGRAM REPORT: CPT | Mod: ,,, | Performed by: INTERNAL MEDICINE

## 2019-08-27 PROCEDURE — 92610 EVALUATE SWALLOWING FUNCTION: CPT

## 2019-08-27 PROCEDURE — 93005 ELECTROCARDIOGRAM TRACING: CPT

## 2019-08-27 PROCEDURE — 84484 ASSAY OF TROPONIN QUANT: CPT

## 2019-08-27 PROCEDURE — 25000003 PHARM REV CODE 250

## 2019-08-27 PROCEDURE — 85025 COMPLETE CBC W/AUTO DIFF WBC: CPT

## 2019-08-27 PROCEDURE — 96372 THER/PROPH/DIAG INJ SC/IM: CPT | Mod: 59

## 2019-08-27 PROCEDURE — 83880 ASSAY OF NATRIURETIC PEPTIDE: CPT

## 2019-08-27 PROCEDURE — 63600175 PHARM REV CODE 636 W HCPCS

## 2019-08-27 PROCEDURE — 93010 EKG 12-LEAD: ICD-10-PCS | Mod: ,,, | Performed by: INTERNAL MEDICINE

## 2019-08-27 PROCEDURE — 99285 EMERGENCY DEPT VISIT HI MDM: CPT | Mod: 25

## 2019-08-27 PROCEDURE — 80053 COMPREHEN METABOLIC PANEL: CPT

## 2019-08-27 RX ORDER — INSULIN ASPART 100 [IU]/ML
0-5 INJECTION, SOLUTION INTRAVENOUS; SUBCUTANEOUS
Status: DISCONTINUED | OUTPATIENT
Start: 2019-08-27 | End: 2019-08-28 | Stop reason: HOSPADM

## 2019-08-27 RX ORDER — IBUPROFEN 200 MG
16 TABLET ORAL
Status: DISCONTINUED | OUTPATIENT
Start: 2019-08-27 | End: 2019-08-28 | Stop reason: HOSPADM

## 2019-08-27 RX ORDER — GLUCAGON 1 MG
1 KIT INJECTION
Status: DISCONTINUED | OUTPATIENT
Start: 2019-08-27 | End: 2019-08-28 | Stop reason: HOSPADM

## 2019-08-27 RX ORDER — LOSARTAN POTASSIUM 50 MG/1
50 TABLET ORAL DAILY
Status: DISCONTINUED | OUTPATIENT
Start: 2019-08-28 | End: 2019-08-28 | Stop reason: HOSPADM

## 2019-08-27 RX ORDER — SODIUM CHLORIDE 0.9 % (FLUSH) 0.9 %
10 SYRINGE (ML) INJECTION
Status: DISCONTINUED | OUTPATIENT
Start: 2019-08-27 | End: 2019-08-28 | Stop reason: HOSPADM

## 2019-08-27 RX ORDER — ATORVASTATIN CALCIUM 20 MG/1
40 TABLET, FILM COATED ORAL DAILY
Status: DISCONTINUED | OUTPATIENT
Start: 2019-08-28 | End: 2019-08-27

## 2019-08-27 RX ORDER — IBUPROFEN 200 MG
24 TABLET ORAL
Status: DISCONTINUED | OUTPATIENT
Start: 2019-08-27 | End: 2019-08-28 | Stop reason: HOSPADM

## 2019-08-27 RX ORDER — CLOPIDOGREL BISULFATE 75 MG/1
75 TABLET ORAL DAILY
Status: DISCONTINUED | OUTPATIENT
Start: 2019-08-28 | End: 2019-08-28 | Stop reason: HOSPADM

## 2019-08-27 RX ORDER — CALCIUM ACETATE 667 MG/1
667 CAPSULE ORAL
Status: DISCONTINUED | OUTPATIENT
Start: 2019-08-27 | End: 2019-08-28 | Stop reason: HOSPADM

## 2019-08-27 RX ORDER — ATORVASTATIN CALCIUM 20 MG/1
20 TABLET, FILM COATED ORAL DAILY
Status: DISCONTINUED | OUTPATIENT
Start: 2019-08-28 | End: 2019-08-28 | Stop reason: HOSPADM

## 2019-08-27 RX ORDER — ALLOPURINOL 100 MG/1
200 TABLET ORAL DAILY
Status: DISCONTINUED | OUTPATIENT
Start: 2019-08-28 | End: 2019-08-28 | Stop reason: HOSPADM

## 2019-08-27 RX ORDER — HEPARIN SODIUM 5000 [USP'U]/ML
5000 INJECTION, SOLUTION INTRAVENOUS; SUBCUTANEOUS EVERY 8 HOURS
Status: DISCONTINUED | OUTPATIENT
Start: 2019-08-27 | End: 2019-08-28 | Stop reason: HOSPADM

## 2019-08-27 RX ADMIN — HEPARIN SODIUM 5000 UNITS: 5000 INJECTION, SOLUTION INTRAVENOUS; SUBCUTANEOUS at 09:08

## 2019-08-27 RX ADMIN — CALCIUM ACETATE 667 MG: 667 CAPSULE ORAL at 05:08

## 2019-08-27 RX ADMIN — INSULIN ASPART 1 UNITS: 100 INJECTION, SOLUTION INTRAVENOUS; SUBCUTANEOUS at 07:08

## 2019-08-27 NOTE — PLAN OF CARE
Problem: SLP Goal  Goal: SLP Goal  Short Term Goals:  1. Pt will participate in bedside swallow study to determine LRD.-met 8/27  2. Pt will consume Dental Soft/Thin liquid diet, NO straws, without any overt s/s of aspiration.   Outcome: Ongoing (interventions implemented as appropriate)  8/27: Bedside swallow study completed. In house , Sarah, present to translate. Sister at the bedside. REC: Dental Soft/Thin liquids given standard aspiration precautions and NO straws. Pills whole, 1 at a time. SLP to f/u to ensure tolerance.
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
## 2019-08-27 NOTE — ED NOTES
Pt woke up this morning and had excess saliva that he could not clear on his own. Pt has had this happen in the past. EMS reported shortness of breath. Upon arrival pt denies shortness of breath just difficulty clearing secretions. Dr. Alford translated assessment

## 2019-08-27 NOTE — ED NOTES
Rounded on pt and pt is requesting  at this time to answer questions. Language line used for interpretation via [employer]: [id number]. Pt question answered and updated on plan of care for admission.
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
## 2019-08-27 NOTE — NURSING
Pt arrived to the unit via w/c with LISA Walter.  Pt AAOx3 in NAD. NO SOB VSS with no reports of pain.  Will continue to monitor.
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
## 2019-08-27 NOTE — ED NOTES
Neurology md at bedside for eval.. Zeynep device being used for interpretation. Pt. Is awaiting transport to floor.

## 2019-08-27 NOTE — ED NOTES
Bedside report received from Celia NOVOA RN. Pt is still spitting and not tolerating saliva. Pt was provided yanker suction to suction own secretions. Daughter (Nica: [phone]) at bedside and able to provide a history for pt.

## 2019-08-27 NOTE — ED NOTES
Physical therapist at bedside for eval.. Admit team remains at bedside. Hospital  Sarah at bedside for interpretation.

## 2019-08-28 VITALS
RESPIRATION RATE: 18 BRPM | BODY MASS INDEX: 28.04 KG/M2 | TEMPERATURE: 98 F | SYSTOLIC BLOOD PRESSURE: 136 MMHG | HEIGHT: 68 IN | OXYGEN SATURATION: 99 % | HEART RATE: 74 BPM | WEIGHT: 185 LBS | DIASTOLIC BLOOD PRESSURE: 84 MMHG

## 2019-08-28 PROBLEM — R13.10 TROUBLE SWALLOWING: Status: RESOLVED | Noted: 2019-08-27 | Resolved: 2019-08-28

## 2019-08-28 LAB
ALBUMIN SERPL BCP-MCNC: 3.9 G/DL (ref 3.5–5.2)
ALBUMIN SERPL BCP-MCNC: 3.9 G/DL (ref 3.5–5.2)
ALP SERPL-CCNC: 108 U/L (ref 55–135)
ALP SERPL-CCNC: 108 U/L (ref 55–135)
ALT SERPL W/O P-5'-P-CCNC: 25 U/L (ref 10–44)
ALT SERPL W/O P-5'-P-CCNC: 25 U/L (ref 10–44)
ANION GAP SERPL CALC-SCNC: 9 MMOL/L (ref 8–16)
ANION GAP SERPL CALC-SCNC: 9 MMOL/L (ref 8–16)
AST SERPL-CCNC: 22 U/L (ref 10–40)
AST SERPL-CCNC: 22 U/L (ref 10–40)
BASOPHILS # BLD AUTO: 0.03 K/UL (ref 0–0.2)
BASOPHILS # BLD AUTO: 0.03 K/UL (ref 0–0.2)
BASOPHILS NFR BLD: 0.5 % (ref 0–1.9)
BASOPHILS NFR BLD: 0.5 % (ref 0–1.9)
BILIRUB SERPL-MCNC: 0.4 MG/DL (ref 0.1–1)
BILIRUB SERPL-MCNC: 0.4 MG/DL (ref 0.1–1)
BUN SERPL-MCNC: 23 MG/DL (ref 8–23)
BUN SERPL-MCNC: 23 MG/DL (ref 8–23)
CALCIUM SERPL-MCNC: 9.6 MG/DL (ref 8.7–10.5)
CALCIUM SERPL-MCNC: 9.6 MG/DL (ref 8.7–10.5)
CHLORIDE SERPL-SCNC: 109 MMOL/L (ref 95–110)
CHLORIDE SERPL-SCNC: 109 MMOL/L (ref 95–110)
CO2 SERPL-SCNC: 23 MMOL/L (ref 23–29)
CO2 SERPL-SCNC: 23 MMOL/L (ref 23–29)
CREAT SERPL-MCNC: 1.6 MG/DL (ref 0.5–1.4)
CREAT SERPL-MCNC: 1.6 MG/DL (ref 0.5–1.4)
DIFFERENTIAL METHOD: ABNORMAL
DIFFERENTIAL METHOD: ABNORMAL
EOSINOPHIL # BLD AUTO: 0.3 K/UL (ref 0–0.5)
EOSINOPHIL # BLD AUTO: 0.3 K/UL (ref 0–0.5)
EOSINOPHIL NFR BLD: 4.9 % (ref 0–8)
EOSINOPHIL NFR BLD: 4.9 % (ref 0–8)
ERYTHROCYTE [DISTWIDTH] IN BLOOD BY AUTOMATED COUNT: 14.6 % (ref 11.5–14.5)
ERYTHROCYTE [DISTWIDTH] IN BLOOD BY AUTOMATED COUNT: 14.6 % (ref 11.5–14.5)
EST. GFR  (AFRICAN AMERICAN): 52 ML/MIN/1.73 M^2
EST. GFR  (AFRICAN AMERICAN): 52 ML/MIN/1.73 M^2
EST. GFR  (NON AFRICAN AMERICAN): 45 ML/MIN/1.73 M^2
EST. GFR  (NON AFRICAN AMERICAN): 45 ML/MIN/1.73 M^2
GLUCOSE SERPL-MCNC: 148 MG/DL (ref 70–110)
GLUCOSE SERPL-MCNC: 148 MG/DL (ref 70–110)
HCT VFR BLD AUTO: 34.2 % (ref 40–54)
HCT VFR BLD AUTO: 34.2 % (ref 40–54)
HGB BLD-MCNC: 11.4 G/DL (ref 14–18)
HGB BLD-MCNC: 11.4 G/DL (ref 14–18)
LYMPHOCYTES # BLD AUTO: 1.7 K/UL (ref 1–4.8)
LYMPHOCYTES # BLD AUTO: 1.7 K/UL (ref 1–4.8)
LYMPHOCYTES NFR BLD: 26.1 % (ref 18–48)
LYMPHOCYTES NFR BLD: 26.1 % (ref 18–48)
MAGNESIUM SERPL-MCNC: 2.1 MG/DL (ref 1.6–2.6)
MCH RBC QN AUTO: 27.5 PG (ref 27–31)
MCH RBC QN AUTO: 27.5 PG (ref 27–31)
MCHC RBC AUTO-ENTMCNC: 33.3 G/DL (ref 32–36)
MCHC RBC AUTO-ENTMCNC: 33.3 G/DL (ref 32–36)
MCV RBC AUTO: 83 FL (ref 82–98)
MCV RBC AUTO: 83 FL (ref 82–98)
MONOCYTES # BLD AUTO: 0.4 K/UL (ref 0.3–1)
MONOCYTES # BLD AUTO: 0.4 K/UL (ref 0.3–1)
MONOCYTES NFR BLD: 6.5 % (ref 4–15)
MONOCYTES NFR BLD: 6.5 % (ref 4–15)
NEUTROPHILS # BLD AUTO: 4 K/UL (ref 1.8–7.7)
NEUTROPHILS # BLD AUTO: 4 K/UL (ref 1.8–7.7)
NEUTROPHILS NFR BLD: 62 % (ref 38–73)
NEUTROPHILS NFR BLD: 62 % (ref 38–73)
PHOSPHATE SERPL-MCNC: 3.1 MG/DL (ref 2.7–4.5)
PLATELET # BLD AUTO: 306 K/UL (ref 150–350)
PLATELET # BLD AUTO: 306 K/UL (ref 150–350)
PMV BLD AUTO: 9.5 FL (ref 9.2–12.9)
PMV BLD AUTO: 9.5 FL (ref 9.2–12.9)
POCT GLUCOSE: 132 MG/DL (ref 70–110)
POCT GLUCOSE: 175 MG/DL (ref 70–110)
POCT GLUCOSE: 77 MG/DL (ref 70–110)
POTASSIUM SERPL-SCNC: 4.1 MMOL/L (ref 3.5–5.1)
POTASSIUM SERPL-SCNC: 4.1 MMOL/L (ref 3.5–5.1)
PROT SERPL-MCNC: 7.4 G/DL (ref 6–8.4)
PROT SERPL-MCNC: 7.4 G/DL (ref 6–8.4)
RBC # BLD AUTO: 4.14 M/UL (ref 4.6–6.2)
RBC # BLD AUTO: 4.14 M/UL (ref 4.6–6.2)
SODIUM SERPL-SCNC: 141 MMOL/L (ref 136–145)
SODIUM SERPL-SCNC: 141 MMOL/L (ref 136–145)
WBC # BLD AUTO: 6.47 K/UL (ref 3.9–12.7)
WBC # BLD AUTO: 6.47 K/UL (ref 3.9–12.7)

## 2019-08-28 PROCEDURE — 80053 COMPREHEN METABOLIC PANEL: CPT

## 2019-08-28 PROCEDURE — 94761 N-INVAS EAR/PLS OXIMETRY MLT: CPT

## 2019-08-28 PROCEDURE — 36415 COLL VENOUS BLD VENIPUNCTURE: CPT

## 2019-08-28 PROCEDURE — 84100 ASSAY OF PHOSPHORUS: CPT

## 2019-08-28 PROCEDURE — G0378 HOSPITAL OBSERVATION PER HR: HCPCS

## 2019-08-28 PROCEDURE — 25000003 PHARM REV CODE 250

## 2019-08-28 PROCEDURE — 63600175 PHARM REV CODE 636 W HCPCS

## 2019-08-28 PROCEDURE — 83735 ASSAY OF MAGNESIUM: CPT

## 2019-08-28 PROCEDURE — 96372 THER/PROPH/DIAG INJ SC/IM: CPT

## 2019-08-28 PROCEDURE — 85025 COMPLETE CBC W/AUTO DIFF WBC: CPT

## 2019-08-28 PROCEDURE — 92526 ORAL FUNCTION THERAPY: CPT

## 2019-08-28 RX ORDER — ATORVASTATIN CALCIUM 20 MG/1
20 TABLET, FILM COATED ORAL DAILY
Qty: 90 TABLET | Refills: 3 | Status: SHIPPED | OUTPATIENT
Start: 2019-08-29 | End: 2019-10-11 | Stop reason: SDUPTHER

## 2019-08-28 RX ADMIN — CALCIUM ACETATE 667 MG: 667 CAPSULE ORAL at 11:08

## 2019-08-28 RX ADMIN — ALLOPURINOL 200 MG: 100 TABLET ORAL at 08:08

## 2019-08-28 RX ADMIN — CLOPIDOGREL BISULFATE 75 MG: 75 TABLET ORAL at 08:08

## 2019-08-28 RX ADMIN — HEPARIN SODIUM 5000 UNITS: 5000 INJECTION, SOLUTION INTRAVENOUS; SUBCUTANEOUS at 05:08

## 2019-08-28 RX ADMIN — CALCIUM ACETATE 667 MG: 667 CAPSULE ORAL at 08:08

## 2019-08-28 RX ADMIN — LOSARTAN POTASSIUM 50 MG: 50 TABLET, FILM COATED ORAL at 08:08

## 2019-08-28 RX ADMIN — ATORVASTATIN CALCIUM 20 MG: 20 TABLET, FILM COATED ORAL at 08:08

## 2019-08-28 NOTE — PLAN OF CARE
Problem: SLP Goal  Goal: SLP Goal  Short Term Goals:  1. Pt will participate in bedside swallow study to determine LRD.-met 8/27  2. Pt will consume Dental Soft/Thin liquid diet, NO straws, without any overt s/s of aspiration.    Outcome: Outcome(s) achieved Date Met: 08/28/19  Pt to be upgraded to regular textures and thin liquids.

## 2019-08-28 NOTE — NURSING
Gave D/C, FU and RX information to pt and family member. BOY Jha translated. Verbalized understanding. Denied questions. PIV removed. NAD noted. Awaiting ride home around 5PM.

## 2019-08-28 NOTE — PT/OT/SLP PROGRESS
"Speech Language Pathology Treatment    Patient Name:  Ming Boateng   MRN:  [id number]  Admitting Diagnosis: Trouble swallowing vs TIA r/o     Recommendations:                Diet recommendations:  Regular, Liquid Diet Level: Thin   Aspiration Precautions: upright for meals, small bites/sips, tray set-up, whole meds, cup swallows, alert and awake when eating    General Precautions: Standard, fall  Communication strategies:  Primary language is Turkmen, use  or language line    Subjective     Pt seen in room for diet follow-up. Pt asking for diet upgrade.   Patient goals: "comi karson." "I ate fine."    Pain/Comfort:  · Pain Rating 1: 0/10    Objective:     Has the patient been evaluated by SLP for swallowing?   Yes  Keep patient NPO? No   Current Respiratory Status: room air      Pt seen in room, Rn reported no issues with oral meds or breakfast tray. Pt was feeding self lunch. Pt had consumed 100% of tray. He agreed to cracker and juice. Pt with improved mastication, no oral spillage and trace lingual residue noted. Pt able to clear with liquid rinse.   Pt safe for diet upgrade to regular and thin liquids.     Assessment:     Ming Boateng is a 64 y.o. male admitted with Trouble swallowing who presents with no outward s/s of dysphagia per bedside assessment and safe for diet upgrade to regular and thin liquids.       Goals:   Multidisciplinary Problems     SLP Goals     Not on file          Multidisciplinary Problems (Resolved)        Problem: SLP Goal    Goal Priority Disciplines Outcome   SLP Goal   (Resolved)     SLP Outcome(s) achieved   Description:  Short Term Goals:  1. Pt will participate in bedside swallow study to determine LRD.-met 8/27  2. Pt will consume Dental Soft/Thin liquid diet, NO straws, without any overt s/s of aspiration.                     Plan:     · Plan of Care expires:  09/25/19  · Plan of Care reviewed with:  patient(RN)   · SLP Follow-Up:  No       Discharge recommendations:  (home with " dtr)     Time Tracking:     SLP Treatment Date:   08/28/19  Speech Start Time:  1322  Speech Stop Time:  1334     Speech Total Time (min):  12 min    Billable Minutes: Treatment Swallowing Dysfunction 12    VICTORINA Marie, CCC-SLP  08/28/2019

## 2019-08-28 NOTE — PLAN OF CARE
His daughter will take him home.  Rounds completed on pt.  All questions addressed.  Bedside nurse to discuss d/c medications.  Discussed importance to attend all f/u appts and take medications as prescribed.  Verbalized understanding.    What's next     What's next   Sep5 Hospital Follow Up margo Anderson PA-C   Thursday Sep 5, 2019 10:00 AM   Arrive at check-in approximately 15 minutes before your scheduled appointment time. Bring all outside medical records and imaging, along with a list of your current medications and insurance card. [facility] [address], [address]  [facility] [address]   [phone]         08/28/19 1512   Final Note   Assessment Type Final Discharge Note   Anticipated Discharge Disposition Home   Hospital Follow Up  Appt(s) scheduled? Yes   Discharge plans and expectations educations in teach back method with documentation complete? Yes   Right Care Referral Info   Post Acute Recommendation No Care     Arnav Thurston RN,   [phone]

## 2019-08-28 NOTE — PLAN OF CARE
Pt lives with daughter who will transport him home when discharged.  He uses rollator at home to ambulate.  No other needs identified.  White board updated with CM name and contact information.  Discharge brochure provided.  Pt encouraged to call with any questions or concerns.  Cm will continue to follow pt through transitions of care and assist with any discharge needs.       08/28/19 8148   Discharge Assessment   Assessment Type Discharge Planning Assessment   Confirmed/corrected address and phone number on facesheet? Yes   Assessment information obtained from? Patient   Communicated expected length of stay with patient/caregiver yes   Prior to hospitilization cognitive status: Alert/Oriented   Prior to hospitalization functional status: Assistive Equipment   Current cognitive status: Alert/Oriented   Current Functional Status: Assistive Equipment   Lives With child(renae), adult   Able to Return to Prior Arrangements yes   Is patient able to care for self after discharge? Yes   Patient's perception of discharge disposition home or selfcare   Readmission Within the Last 30 Days no previous admission in last 30 days   Patient currently being followed by outpatient case management? No   Patient currently receives any other outside agency services? No   Equipment Currently Used at Home rollator   Do you have any problems affording any of your prescribed medications? No   Is the patient taking medications as prescribed? yes   Does the patient have transportation home? Yes   Transportation Anticipated family or friend will provide   Discharge Plan A Home   Discharge Plan B Home with family   DME Needed Upon Discharge  none   Patient/Family in Agreement with Plan yes     Arnav Thurston RN,   [phone]

## 2019-08-28 NOTE — DISCHARGE SUMMARY
Discharge Summary  Hospital Medicine    Attending Provider on Discharge: Dr. Hardy Danni  [facility] Medicine Team: U HOSPITALIST TEAM B  Date of Admission:  8/27/2019     Date of Discharge:  8/28/2019  Length of Stay: 1 day  Code status: Full Code    Active Hospital Problems    Diagnosis  POA    *Trouble swallowing [R13.10]  Yes      Resolved Hospital Problems   No resolved problems to display.        CHINA Boateng is a 64 y.o. Bulgarian speaking male who has a past medical history of Acute right MCA stroke (3/3/2019), CHF (congestive heart failure), Diabetes mellitus, type 2, Hyperlipidemia (6/17/2016), Hypertension, Renal disorder, and Stroke. He presented to [facility] on 8/27/2019 with a primary complaint of Shortness of Breath.     Patient was in his usual state of health until morning of presentation. He had difficulty sleeping overnight. Then around 3-4 am, he woke up with lots of watery saliva in his mouth. He had difficulty swallowing. He did not feel any swelling and was able to breathe. He checked his blood glucose and it was 173 at that time. He has not been able to tolerate PO intake (including medications).     He has a history of stroke x4. Most recent episode 3 months ago. At the time he also had difficulty swallowing and weakness to the left side of his leg that improved with therapy. He also had reduced vision in left eye since then. Patient reported compliance with sodium free diet. Patient working as , formerly in oil refinery plant (3 years ago).     Residual stroke symptoms: reduced vision in left eye, dysarthria.     Denied HA, focal weakness, diaphoresis, fever/chills, chest pain, abdominal pain, nausea, vomiting, diarrhea, recent illness  Endorsed stable blurry visions of left eye since last stroke and constipation     History collected using  from patient and oldest sister.    Hospital Course  Dysphagia  - Patient presented with sudden onset of  dysphagia since the morning  - CT head unremarkable for acute abnormality and bleed  - EGD (12/2018): small hiatal hernia, LA grade B reflux esophagitis, gastritis  - Consulted speech therapist, appreciate rec  - Modified barium swallow study: dental soft, thin liquid diet --> advanced to regular diet  - consulted neuro, appreciate rec  - Symptoms resolved without any intervention and patient tolerated PO diet.     H/o CVA and TIA  - CVA in 3/2019 with LE weakness, L facial droop, confusion, dysphagia  - Admitted 4/2019 with concern for TIA presenting with dizziness  - Unclear if droopiness of left lip and deviation of tongue is new or residual.   - Current residual symptom: blurry vision of left eye and minimal dysarthria  - LDL 45.4. Reduced home statin from 40 mg to 20 mg  - Continued home clopidogrel  - Consulted neuro, appreciate rec    HTN  - Patient hypertensive 150s-160s in ED. Patient did not take am medications.  - Permissive hypertension until neuro evaluation  - Then continued home losartan 50    DMII (Ha1c: 6.5% 06/2019)  - Home regimen: novolog, levemir, tujeo  - SSI while inpatient    CKD IV  - BUN/Cr: 27/1.8 on admission (at baseline)  - Continued home calcium acetate    Normocytic anemia  - Hgb: 11.3  Hct 33.1  - Iron studies (6/2019): Iron: 65  transferrin 203  TIBC 300  saturated iron 20  - Most likely anemia of chronic disease    Gout  - Continued home allopurinol 200 mg    Vital Signs  Temp:  [97.5 °F (36.4 °C)-98.1 °F (36.7 °C)]   Pulse:  [67-91]   Resp:  [14-25]   BP: (135-169)/(65-83)   SpO2:  [94 %-100 %]   Body mass index is 28.13 kg/m².     Physical Exam  General: alert and awake, no acute distress  HEENT: atraumatic, normocephalic, pupils equal and reactive to light, sclera anicteric, clear conjunctiva, EOMI, moist mucous membrane  Neck: supple, trachea midline, no cervical lymphadenopathy  Heart: normal rate and rhythm, normal S1, S2, no murmur, rubs or gallops appreciated.  Lungs:  clear to auscultation bilaterally, no wheeze or crackles, unlabored breathing  Abdomen: normoactive bowel sounds, nondistended, soft, nontender to palpation  Extremities: no peripheral edema, no cyanosis, no clubbing  Skin: warm and dry, no rash on exposed skin  Pulses: 2+ symmetric peripheral pulses   Neuro: alert and oriented. CN III-VI, X-XI intact. Left lip droop - patient unclear if new or old, tongue deviation to left on protrusion, but able to actively move tongue left and right.    Recent Labs   Lab 08/27/19  0855 08/28/19  0643   WBC 6.58 6.47  6.47   HGB 11.3* 11.4*  11.4*   HCT 33.1* 34.2*  34.2*    306  306     Recent Labs   Lab 08/27/19  0855 08/28/19  0643    141  141   K 4.1 4.1  4.1    109  109   CO2 21* 23  23   BUN 27* 23  23   CREATININE 1.8* 1.6*  1.6*   * 148*  148*   CALCIUM 9.6 9.6  9.6   MG  --  2.1   PHOS  --  3.1     Recent Labs   Lab 08/27/19  0855 08/28/19  0643   ALKPHOS 111 108  108   ALT 26 25  25   AST 22 22  22   ALBUMIN 4.0 3.9  3.9   PROT 7.3 7.4  7.4   BILITOT 0.4 0.4  0.4      Recent Labs     08/27/19  0855   TROPONINI 0.017     No results for input(s): LACTATE in the last 72 hours.    Recent Labs   Lab 08/27/19  1631 08/27/19  1905 08/28/19  0550   POCTGLUCOSE 135* 224* 77      Hemoglobin A1C   Date Value Ref Range Status   06/23/2019 6.5 (H) 4.0 - 5.6 % Final     Comment:     ADA Screening Guidelines:  5.7-6.4%  Consistent with prediabetes  >or=6.5%  Consistent with diabetes  High levels of fetal hemoglobin interfere with the HbA1C  assay. Heterozygous hemoglobin variants (HbS, HgC, etc)do  not significantly interfere with this assay.   However, presence of multiple variants may affect accuracy.     03/03/2019 7.1 (H) 4.0 - 5.6 % Final     Comment:     ADA Screening Guidelines:  5.7-6.4%  Consistent with prediabetes  >or=6.5%  Consistent with diabetes  High levels of fetal hemoglobin interfere with the HbA1C  assay. Heterozygous  hemoglobin variants (HbS, HgC, etc)do  not significantly interfere with this assay.   However, presence of multiple variants may affect accuracy.     12/26/2018 6.8 (H) 4.0 - 5.6 % Final     Comment:     ADA Screening Guidelines:  5.7-6.4%  Consistent with prediabetes  >or=6.5%  Consistent with diabetes  High levels of fetal hemoglobin interfere with the HbA1C  assay. Heterozygous hemoglobin variants (HbS, HgC, etc)do  not significantly interfere with this assay.   However, presence of multiple variants may affect accuracy.         Procedures: None    Consultants: Neurology    Current Discharge Medication List      CONTINUE these medications which have CHANGED    Details   atorvastatin (LIPITOR) 20 MG tablet Take 1 tablet (20 mg total) by mouth once daily.  Qty: 90 tablet, Refills: 3    Comments: Please deliver to bedside 479         CONTINUE these medications which have NOT CHANGED    Details   alcohol swabs (ALCOHOL PREP SWABS) PadM Apply 1 each topically as needed.  Qty: 100 each, Refills: 11    Associated Diagnoses: Type 2 diabetes mellitus with complication, with long-term current use of insulin      allopurinol (ZYLOPRIM) 100 MG tablet Take 1 tablet (100 mg total) by mouth once daily.  Qty: 30 tablet, Refills: 0      blood sugar diagnostic Strp 1 each by Misc.(Non-Drug; Combo Route) route 4 (four) times daily.  Qty: 100 each, Refills: 11    Associated Diagnoses: Type 2 diabetes mellitus with complication, with long-term current use of insulin      blood-glucose meter kit Use as instructed  Qty: 1 each, Refills: 0      calcium acetate (PHOSLO) 667 mg tablet Take 1 tablet by mouth 3 (three) times daily with meals.  Qty: 270 tablet, Refills: 3    Associated Diagnoses: Stage 4 chronic kidney disease      clopidogrel (PLAVIX) 75 mg tablet Take 1 tablet (75 mg total) by mouth once daily.  Qty: 30 tablet, Refills: 11      ergocalciferol (VITAMIN D2) 50,000 unit Cap Take 50,000 Units by mouth every 7 days.      "  insulin aspart U-100 (NOVOLOG) 100 unit/mL Crtg Inject 7 Units into the skin 3 (three) times daily with meals.  Qty: 18.9 mL, Refills: 3    Associated Diagnoses: Type 2 diabetes mellitus with complication, with long-term current use of insulin      insulin detemir U-100 (LEVEMIR) 100 unit/mL injection Inject 20 Units into the skin every evening.  Qty: 18 mL, Refills: 3    Associated Diagnoses: Type 2 diabetes mellitus with complication, with long-term current use of insulin      lancets (LANCETS,ULTRA THIN) Misc 1 lancet by Misc.(Non-Drug; Combo Route) route 4 (four) times daily.  Qty: 100 each, Refills: 11    Associated Diagnoses: Type 2 diabetes mellitus with complication, with long-term current use of insulin      losartan (COZAAR) 100 MG tablet Take 0.5 tablets (50 mg total) by mouth once daily.  Qty: 45 tablet, Refills: 3    Associated Diagnoses: Hypertension associated with diabetes      pen needle, diabetic 29 gauge x 1/2" Ndle Inisulin QAC and HS  Qty: 360 each, Refills: 3    Associated Diagnoses: Type 2 diabetes mellitus with complication, with long-term current use of insulin      TOUJEO SOLOSTAR U-300 INSULIN 300 unit/mL (1.5 mL) InPn pen              Discharge Diet:diabetic diet: 2000 calorie    Activity: activity as tolerated    Discharge Condition: Stable    Disposition: Home or Self Care    Tests pending at the time of discharge: none     Time spent on the discharge of the patient including review of hospital course with the patient. Reviewing discharge medications and arranging follow-up care.    Discharge plan   Follow up with PCP within 1 week    Future Appointments   Date Time Provider Department Center   9/5/2019 10:00 AM Domingo Anderson PA-C [facility] [place] [facility]""       Scottie Steve MD  Internal Medicine/Pediatrics HO-1    "

## 2019-08-28 NOTE — NURSING
Patient morning blood glucose 77. Patient given 8oz of orange juice in cup. No difficulty swallowing.

## 2019-09-05 ENCOUNTER — OFFICE VISIT (OUTPATIENT)
Dept: FAMILY MEDICINE | Facility: HOSPITAL | Age: 64
End: 2019-09-05
Attending: FAMILY MEDICINE
Payer: MEDICARE

## 2019-09-05 VITALS
WEIGHT: 145.31 LBS | HEART RATE: 73 BPM | BODY MASS INDEX: 22.02 KG/M2 | HEIGHT: 68 IN | DIASTOLIC BLOOD PRESSURE: 67 MMHG | SYSTOLIC BLOOD PRESSURE: 123 MMHG

## 2019-09-05 DIAGNOSIS — Z86.73 H/O: CVA (CEREBROVASCULAR ACCIDENT): ICD-10-CM

## 2019-09-05 DIAGNOSIS — N18.4 TYPE 2 DIABETES MELLITUS WITH STAGE 4 CHRONIC KIDNEY DISEASE, WITH LONG-TERM CURRENT USE OF INSULIN: ICD-10-CM

## 2019-09-05 DIAGNOSIS — R35.1 NOCTURIA: ICD-10-CM

## 2019-09-05 DIAGNOSIS — E11.22 TYPE 2 DIABETES MELLITUS WITH STAGE 4 CHRONIC KIDNEY DISEASE, WITH LONG-TERM CURRENT USE OF INSULIN: ICD-10-CM

## 2019-09-05 DIAGNOSIS — R13.12 OROPHARYNGEAL DYSPHAGIA: Primary | ICD-10-CM

## 2019-09-05 DIAGNOSIS — I15.2 HYPERTENSION ASSOCIATED WITH DIABETES: ICD-10-CM

## 2019-09-05 DIAGNOSIS — E11.59 HYPERTENSION ASSOCIATED WITH DIABETES: ICD-10-CM

## 2019-09-05 DIAGNOSIS — F32.0 MILD SINGLE CURRENT EPISODE OF MAJOR DEPRESSIVE DISORDER: ICD-10-CM

## 2019-09-05 DIAGNOSIS — Z79.4 TYPE 2 DIABETES MELLITUS WITH STAGE 4 CHRONIC KIDNEY DISEASE, WITH LONG-TERM CURRENT USE OF INSULIN: ICD-10-CM

## 2019-09-05 DIAGNOSIS — G47.00 INSOMNIA, UNSPECIFIED TYPE: ICD-10-CM

## 2019-09-05 PROCEDURE — 99214 OFFICE O/P EST MOD 30 MIN: CPT | Performed by: PHYSICIAN ASSISTANT

## 2019-09-05 RX ORDER — FAMOTIDINE 20 MG/1
TABLET, FILM COATED ORAL
COMMUNITY
Start: 2019-08-29 | End: 2019-10-11 | Stop reason: SDUPTHER
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
## 2019-09-10 PROBLEM — R13.12 OROPHARYNGEAL DYSPHAGIA: Status: RESOLVED | Noted: 2019-04-29 | Resolved: 2019-09-10

## 2019-09-13 ENCOUNTER — CLINICAL SUPPORT (OUTPATIENT)
Dept: FAMILY MEDICINE | Facility: HOSPITAL | Age: 64
End: 2019-09-13
Attending: FAMILY MEDICINE
Payer: MEDICARE

## 2019-09-13 DIAGNOSIS — Z23 NEED FOR PROPHYLACTIC VACCINATION AND INOCULATION AGAINST INFLUENZA: Primary | ICD-10-CM

## 2019-09-13 PROCEDURE — 90686 IIV4 VACC NO PRSV 0.5 ML IM: CPT

## 2019-09-13 PROCEDURE — 99211 OFF/OP EST MAY X REQ PHY/QHP: CPT | Mod: 25

## 2019-10-01 NOTE — PROGRESS NOTES
Subjective:       Patient ID: Ming Boateng is a 64 y.o. male.    Chief Complaint: Hypertension      HPI:    #med rec:  not taking carvedilol  BP got too low    #DMII:  Sugars mostly 140  No polys  Using usually 8u long acting  Rare 2u short acting    #up at night:  Not sleeping   Up all night peeing  Doesn't pee frequently during day  No hesitance, urgency, dribbling, incomplete voiding    #mood:  Somewhat better, going over to sisters during day to get out of house  Daughter bringing him  Gets really down if alone all day  Has weird fixations, talks a lot about WWII, wants to make sure he has his glucose tabs    #trip to [facility]:  Going end of November for 3m  Wants 3m supply of meds  Wants to know if safe to fly    #forearm pain:  Surgery 20+ years ago  Has been hurting him more  Denies recent injury, just painful    #walker:  Needs walker w/ seat    Review of Systems   Constitutional: Positive for activity change. Negative for chills, fatigue and unexpected weight change.   HENT: Negative for rhinorrhea, sneezing and sore throat.    Endocrine: Positive for polyuria.   Genitourinary: Positive for frequency. Negative for decreased urine volume, difficulty urinating, discharge, dysuria, hematuria, penile pain and urgency.   Musculoskeletal: Positive for gait problem.   Skin: Negative for rash.   Neurological: Positive for facial asymmetry, speech difficulty and weakness.   Psychiatric/Behavioral: Positive for agitation, behavioral problems, confusion, dysphoric mood and sleep disturbance. The patient is hyperactive.          Past Medical History:   Diagnosis Date    Acute right MCA stroke 3/3/2019    CHF (congestive heart failure)     Diabetes mellitus     Diabetes mellitus, type 2     Hyperlipidemia 6/17/2016    Hypertension     Renal disorder     CKD    Stroke     mini speech difficulty, right sided weakness         Current Outpatient Medications:     alcohol swabs (ALCOHOL PREP SWABS) PadM, Apply 1 each  "topically as needed., Disp: 400 each, Rfl: 3    allopurinol (ZYLOPRIM) 100 MG tablet, Take 2 tablets (200 mg total) by mouth once daily., Disp: 90 tablet, Rfl: 3    atorvastatin (LIPITOR) 20 MG tablet, Take 1 tablet (20 mg total) by mouth once daily., Disp: 90 tablet, Rfl: 3    blood sugar diagnostic Strp, 1 each by Misc.(Non-Drug; Combo Route) route 4 (four) times daily., Disp: 200 each, Rfl: 5    calcium acetate (PHOSLO) 667 mg tablet, Take 1 tablet by mouth 3 (three) times daily with meals., Disp: 270 tablet, Rfl: 3    clopidogrel (PLAVIX) 75 mg tablet, Take 1 tablet (75 mg total) by mouth once daily., Disp: 90 tablet, Rfl: 3    famotidine (PEPCID) 20 MG tablet, Take 1 tablet (20 mg total) by mouth 2 (two) times daily as needed for Heartburn., Disp: 180 tablet, Rfl: 3    insulin aspart U-100 (NOVOLOG) 100 unit/mL Crtg, Inject 2 Units into the skin 3 (three) times daily with meals., Disp: 5.4 mL, Rfl: 3    lancets (LANCETS,ULTRA THIN) Misc, 1 lancet by Misc.(Non-Drug; Combo Route) route 4 (four) times daily., Disp: 200 each, Rfl: 5    losartan (COZAAR) 100 MG tablet, Take 0.5 tablets (50 mg total) by mouth once daily., Disp: 45 tablet, Rfl: 3    pen needle, diabetic 29 gauge x 1/2" Ndle, Inisulin QAC and HS, Disp: 360 each, Rfl: 3    TOUJEO SOLOSTAR U-300 INSULIN 300 unit/mL (1.5 mL) InPn pen, Inject 10 Units into the skin once daily., Disp: 1.5 mL, Rfl: 3    blood-glucose meter kit, Use as instructed, Disp: 1 each, Rfl: 0    mirtazapine (REMERON) 15 MG tablet, Take 1 tablet (15 mg total) by mouth every evening. For sleep & mood, Disp: 90 tablet, Rfl: 3    Objective:      Body mass index is 22.82 kg/m².  Vitals:    10/08/19 1008   BP: 126/67   Pulse: 64   Weight: 66.1 kg (145 lb 11.6 oz)   Height: 5' 7" (1.702 m)   PainSc: 0-No pain     Physical Exam   Eyes: EOM are normal.   Cardiovascular:   Well perfused   Pulmonary/Chest: No respiratory distress.   Abdominal: He exhibits no distension. "   Neurological: He is alert.   More conversant this visit, perseverates on WWII   Skin: Skin is warm and dry.   Psychiatric:   Pleasant but flat affect, minimally engaged while provider speaking Yoruba   Vitals reviewed.      Assessment:       1. Type 2 diabetes mellitus with complication, with long-term current use of insulin    2. Insomnia, unspecified type    3. Nocturia    4. Reactive depression    5. Cognitive and behavioral changes    6. Right forearm pain    7. History of ischemic right MCA stroke    8. Recurrent falls    9. Stage 4 chronic kidney disease    10. Chronic heart failure with preserved ejection fraction    11. Essential hypertension    12. BMI 22.0-22.9, adult        Plan:     DMII  Last A1c 6.5 6/2019  - Daughter primary care giver, excellent DM control with improving diet, downtitrating insulin  - Continue Levemir 8u qAM  - Novolog 2u TID prn glucose >180  - DM HCM:  -- Neuropathy/Foot exam: podiatry visit 5/2019  -- Retinopathy: unknown, review next visit [ ]  -- Statin: on Atrova 40  -- Nephropathy: CKDIV, on ARB, followed by kerryo     HTN & HF  Well controlled BP, Last ECHO with normal EF and grade I DD  - Currently on plavix, statin, ARB.Off BB & ok w/ cards  - refills sent     Depression & confusion  Unclear if cognitive changes 2/2 early vascular dementia vs depression  - start mirtazapine as below  - continue to monitor closely, mood more upbeat this visit, though having more disinhibition & perseveration symptoms  - not driving, family on board with this, handicap DMV form completed    Insomnia & Nocturia  No BPH symptoms beyond nocturia, no frequency during day  - suspect nocturia more likely 2/2 insomnia  - trial mirtazapine for mood & sleep    Right forearm pain  Hx of remote fx & pinning  - xray to confirm hardwear placement    Travel to [place]  Reviewed CDC guidelines w/ pt & daughter  - decline malaria ppx & typhoid vaccine, will avoid mosquitos & water, presumed Hep A immune  given age, foregin birth  - 90d supply for all meds sent    Hyperlipidemia  -Continue daily statin     History of ischemic right MCA stroke  Some residual deficits; working outpatient with PT/OT  -Continue plavix and statin     CKD IV  - Followed by Dr. Pate with nephro  - Slight improvement in CKD since DMII improvement     Overweight  Near healthy weight, improved diet since last stroke     Health Maintaince  - Lipids: on statin, last check 6/2019, at goal  - A1C: DMII, see above  - Colon Ca Screen: unknown, review next visit [ ]  - Immunizations: Review Shingrix, otherwise UTD  - Discussed advanced care planning future visit    Follow up in about 6 weeks (around 11/19/2019).    or if unable to come prior to trip to [place], schedule for appt after

## 2019-10-08 ENCOUNTER — OFFICE VISIT (OUTPATIENT)
Dept: FAMILY MEDICINE | Facility: HOSPITAL | Age: 64
End: 2019-10-08
Attending: FAMILY MEDICINE
Payer: MEDICARE

## 2019-10-08 ENCOUNTER — HOSPITAL ENCOUNTER (OUTPATIENT)
Dept: RADIOLOGY | Facility: HOSPITAL | Age: 64
Discharge: HOME | End: 2019-10-08
Attending: FAMILY MEDICINE
Payer: MEDICARE

## 2019-10-08 VITALS
WEIGHT: 145.75 LBS | BODY MASS INDEX: 22.88 KG/M2 | HEART RATE: 64 BPM | HEIGHT: 67 IN | DIASTOLIC BLOOD PRESSURE: 67 MMHG | SYSTOLIC BLOOD PRESSURE: 126 MMHG

## 2019-10-08 DIAGNOSIS — G47.00 INSOMNIA, UNSPECIFIED TYPE: ICD-10-CM

## 2019-10-08 DIAGNOSIS — M79.631 RIGHT FOREARM PAIN: ICD-10-CM

## 2019-10-08 DIAGNOSIS — R35.1 NOCTURIA: ICD-10-CM

## 2019-10-08 DIAGNOSIS — N18.4 STAGE 4 CHRONIC KIDNEY DISEASE: ICD-10-CM

## 2019-10-08 DIAGNOSIS — R46.89 COGNITIVE AND BEHAVIORAL CHANGES: ICD-10-CM

## 2019-10-08 DIAGNOSIS — R29.6 RECURRENT FALLS: ICD-10-CM

## 2019-10-08 DIAGNOSIS — Z71.84 COUNSELING FOR TRAVEL: ICD-10-CM

## 2019-10-08 DIAGNOSIS — E11.8 TYPE 2 DIABETES MELLITUS WITH COMPLICATION, WITH LONG-TERM CURRENT USE OF INSULIN: Primary | ICD-10-CM

## 2019-10-08 DIAGNOSIS — I50.32 CHRONIC HEART FAILURE WITH PRESERVED EJECTION FRACTION: ICD-10-CM

## 2019-10-08 DIAGNOSIS — R41.89 COGNITIVE AND BEHAVIORAL CHANGES: ICD-10-CM

## 2019-10-08 DIAGNOSIS — Z86.73 HISTORY OF ISCHEMIC RIGHT MCA STROKE: ICD-10-CM

## 2019-10-08 DIAGNOSIS — I10 ESSENTIAL HYPERTENSION: ICD-10-CM

## 2019-10-08 DIAGNOSIS — F32.9 REACTIVE DEPRESSION: ICD-10-CM

## 2019-10-08 DIAGNOSIS — Z79.4 TYPE 2 DIABETES MELLITUS WITH COMPLICATION, WITH LONG-TERM CURRENT USE OF INSULIN: Primary | ICD-10-CM

## 2019-10-08 PROCEDURE — 73090 XR FOREARM LEFT: ICD-10-PCS | Mod: 26,LT,, | Performed by: RADIOLOGY

## 2019-10-08 PROCEDURE — 73090 X-RAY EXAM OF FOREARM: CPT | Mod: TC,FY,LT

## 2019-10-08 PROCEDURE — 73090 X-RAY EXAM OF FOREARM: CPT | Mod: 26,LT,, | Performed by: RADIOLOGY

## 2019-10-08 PROCEDURE — 99213 OFFICE O/P EST LOW 20 MIN: CPT | Mod: 25 | Performed by: FAMILY MEDICINE

## 2019-10-11 ENCOUNTER — TELEPHONE (OUTPATIENT)
Dept: FAMILY MEDICINE | Facility: HOSPITAL | Age: 64
End: 2019-10-11

## 2019-10-11 RX ORDER — INSULIN GLARGINE 300 U/ML
10 INJECTION, SOLUTION SUBCUTANEOUS DAILY
Qty: 1.5 ML | Refills: 3 | Status: SHIPPED | OUTPATIENT
Start: 2019-10-11 | End: 2020-08-17 | Stop reason: SDUPTHER

## 2019-10-11 RX ORDER — INSULIN ASPART 100 [IU]/ML
2 INJECTION, SOLUTION INTRAVENOUS; SUBCUTANEOUS
Qty: 5.4 ML | Refills: 3 | Status: SHIPPED | OUTPATIENT
Start: 2019-10-11 | End: 2020-08-17 | Stop reason: SDUPTHER

## 2019-10-11 RX ORDER — LOSARTAN POTASSIUM 100 MG/1
50 TABLET ORAL DAILY
Qty: 45 TABLET | Refills: 3 | Status: SHIPPED | OUTPATIENT
Start: 2019-10-11 | End: 2020-08-17 | Stop reason: SDUPTHER

## 2019-10-11 RX ORDER — CLOPIDOGREL BISULFATE 75 MG/1
75 TABLET ORAL DAILY
Qty: 90 TABLET | Refills: 3
Start: 2019-10-11 | End: 2019-10-11

## 2019-10-11 RX ORDER — CALCIUM ACETATE 667 MG/1
667 TABLET ORAL
Qty: 270 TABLET | Refills: 3 | Status: SHIPPED | OUTPATIENT
Start: 2019-10-11 | End: 2020-08-17 | Stop reason: SDUPTHER

## 2019-10-11 RX ORDER — CLOPIDOGREL BISULFATE 75 MG/1
75 TABLET ORAL DAILY
Qty: 90 TABLET | Refills: 3 | Status: SHIPPED | OUTPATIENT
Start: 2019-10-11 | End: 2020-08-17 | Stop reason: SDUPTHER

## 2019-10-11 RX ORDER — PEN NEEDLE, DIABETIC 29 G X1/2"
NEEDLE, DISPOSABLE MISCELLANEOUS
Qty: 360 EACH | Refills: 3 | Status: SHIPPED | OUTPATIENT
Start: 2019-10-11 | End: 2020-08-17 | Stop reason: SDUPTHER

## 2019-10-11 RX ORDER — ISOPROPYL ALCOHOL 70 ML/100ML
1 SWAB TOPICAL
Qty: 400 EACH | Refills: 3 | Status: ON HOLD | OUTPATIENT
Start: 2019-10-11 | End: 2020-09-18 | Stop reason: HOSPADM

## 2019-10-11 RX ORDER — FAMOTIDINE 20 MG/1
20 TABLET, FILM COATED ORAL 2 TIMES DAILY PRN
Qty: 180 TABLET | Refills: 3 | Status: SHIPPED | OUTPATIENT
Start: 2019-10-11 | End: 2020-08-17 | Stop reason: SDUPTHER

## 2019-10-11 RX ORDER — MIRTAZAPINE 15 MG/1
15 TABLET, FILM COATED ORAL NIGHTLY
Qty: 90 TABLET | Refills: 3 | Status: SHIPPED | OUTPATIENT
Start: 2019-10-11 | End: 2020-02-11 | Stop reason: ALTCHOICE

## 2019-10-11 RX ORDER — LANCETS
1 EACH MISCELLANEOUS 4 TIMES DAILY
Qty: 200 EACH | Refills: 5 | Status: SHIPPED | OUTPATIENT
Start: 2019-10-11 | End: 2020-08-17 | Stop reason: SDUPTHER

## 2019-10-11 RX ORDER — ALLOPURINOL 100 MG/1
200 TABLET ORAL DAILY
Qty: 90 TABLET | Refills: 3 | Status: SHIPPED | OUTPATIENT
Start: 2019-10-11 | End: 2020-08-17 | Stop reason: SDUPTHER

## 2019-10-11 RX ORDER — ATORVASTATIN CALCIUM 20 MG/1
20 TABLET, FILM COATED ORAL DAILY
Qty: 90 TABLET | Refills: 3 | Status: SHIPPED | OUTPATIENT
Start: 2019-10-11 | End: 2020-08-17 | Stop reason: SDUPTHER

## 2019-10-11 NOTE — TELEPHONE ENCOUNTER
Phone note:    Called pt daughter to confirm correct med list & sent to pharmacy.     Daughter asked for note stating pt unable to participate in jury duty, asked to be emailed to [email or web address]. Will send.    Wen Ramirez MD  Family Medicine

## 2019-10-11 NOTE — LETTER
[facility]  [address], [address]  [place] [address]  Phone: [phone]  Fax: [phone] October 11, 2019    Ming Boateng  [address]  [place] [address]      To Whom It May Concern:    Ming Boateng is unable to participate in jury duty due to a recent stroke and current work up for early dementia. He is showing signs of early cognitive impairment, and would find it difficult to participate in jury duty.    If you have any questions or concerns, please feel free to call my office.    Sincerely,        Wen Flaherty MD

## 2020-01-22 ENCOUNTER — HOSPITAL ENCOUNTER (EMERGENCY)
Facility: HOSPITAL | Age: 65
Discharge: HOME | End: 2020-01-23
Attending: EMERGENCY MEDICINE
Payer: MEDICARE

## 2020-01-22 DIAGNOSIS — I10 HYPERTENSION: ICD-10-CM

## 2020-01-22 DIAGNOSIS — I10 ASYMPTOMATIC HYPERTENSION: Primary | ICD-10-CM

## 2020-01-22 LAB
BACTERIA #/AREA URNS HPF: NORMAL /HPF
BASOPHILS # BLD AUTO: 0.03 K/UL (ref 0–0.2)
BASOPHILS NFR BLD: 0.4 % (ref 0–1.9)
BILIRUB UR QL STRIP: NEGATIVE
BNP SERPL-MCNC: 31 PG/ML (ref 0–99)
CK SERPL-CCNC: 79 U/L (ref 20–200)
CLARITY UR: CLEAR
COLOR UR: YELLOW
D DIMER PPP IA.FEU-MCNC: 0.81 MG/L FEU
DIFFERENTIAL METHOD: ABNORMAL
EOSINOPHIL # BLD AUTO: 0.4 K/UL (ref 0–0.5)
EOSINOPHIL NFR BLD: 6.1 % (ref 0–8)
ERYTHROCYTE [DISTWIDTH] IN BLOOD BY AUTOMATED COUNT: 13.7 % (ref 11.5–14.5)
GLUCOSE UR QL STRIP: NEGATIVE
HCT VFR BLD AUTO: 36.1 % (ref 40–54)
HGB BLD-MCNC: 12 G/DL (ref 14–18)
HGB UR QL STRIP: ABNORMAL
HYALINE CASTS #/AREA URNS LPF: 0 /LPF
INR PPP: 1 (ref 0.8–1.2)
KETONES UR QL STRIP: NEGATIVE
LEUKOCYTE ESTERASE UR QL STRIP: NEGATIVE
LYMPHOCYTES # BLD AUTO: 1.6 K/UL (ref 1–4.8)
LYMPHOCYTES NFR BLD: 24.4 % (ref 18–48)
MAGNESIUM SERPL-MCNC: 1.9 MG/DL (ref 1.6–2.6)
MCH RBC QN AUTO: 27.8 PG (ref 27–31)
MCHC RBC AUTO-ENTMCNC: 33.2 G/DL (ref 32–36)
MCV RBC AUTO: 84 FL (ref 82–98)
MICROSCOPIC COMMENT: NORMAL
MONOCYTES # BLD AUTO: 0.5 K/UL (ref 0.3–1)
MONOCYTES NFR BLD: 7.8 % (ref 4–15)
NEUTROPHILS # BLD AUTO: 4.1 K/UL (ref 1.8–7.7)
NEUTROPHILS NFR BLD: 61.3 % (ref 38–73)
NITRITE UR QL STRIP: NEGATIVE
PH UR STRIP: 6 [PH] (ref 5–8)
PLATELET # BLD AUTO: 314 K/UL (ref 150–350)
PMV BLD AUTO: 9.6 FL (ref 9.2–12.9)
PROT UR QL STRIP: ABNORMAL
PROTHROMBIN TIME: 10.5 SEC (ref 9–12.5)
RBC # BLD AUTO: 4.32 M/UL (ref 4.6–6.2)
RBC #/AREA URNS HPF: 3 /HPF (ref 0–4)
SP GR UR STRIP: 1.01 (ref 1–1.03)
SQUAMOUS #/AREA URNS HPF: 3 /HPF
TROPONIN I SERPL DL<=0.01 NG/ML-MCNC: 0.02 NG/ML (ref 0–0.03)
URN SPEC COLLECT METH UR: ABNORMAL
UROBILINOGEN UR STRIP-ACNC: NEGATIVE EU/DL
WBC # BLD AUTO: 6.67 K/UL (ref 3.9–12.7)
WBC #/AREA URNS HPF: 1 /HPF (ref 0–5)

## 2020-01-22 PROCEDURE — 83735 ASSAY OF MAGNESIUM: CPT

## 2020-01-22 PROCEDURE — 99284 EMERGENCY DEPT VISIT MOD MDM: CPT | Mod: 25

## 2020-01-22 PROCEDURE — 80053 COMPREHEN METABOLIC PANEL: CPT

## 2020-01-22 PROCEDURE — 93010 EKG 12-LEAD: ICD-10-PCS | Mod: ,,, | Performed by: INTERNAL MEDICINE

## 2020-01-22 PROCEDURE — 81000 URINALYSIS NONAUTO W/SCOPE: CPT

## 2020-01-22 PROCEDURE — 82550 ASSAY OF CK (CPK): CPT

## 2020-01-22 PROCEDURE — 84484 ASSAY OF TROPONIN QUANT: CPT

## 2020-01-22 PROCEDURE — 85025 COMPLETE CBC W/AUTO DIFF WBC: CPT

## 2020-01-22 PROCEDURE — 96374 THER/PROPH/DIAG INJ IV PUSH: CPT

## 2020-01-22 PROCEDURE — 85379 FIBRIN DEGRADATION QUANT: CPT

## 2020-01-22 PROCEDURE — 93005 ELECTROCARDIOGRAM TRACING: CPT

## 2020-01-22 PROCEDURE — 83880 ASSAY OF NATRIURETIC PEPTIDE: CPT

## 2020-01-22 PROCEDURE — 93010 ELECTROCARDIOGRAM REPORT: CPT | Mod: ,,, | Performed by: INTERNAL MEDICINE

## 2020-01-22 PROCEDURE — 85610 PROTHROMBIN TIME: CPT

## 2020-01-23 VITALS
DIASTOLIC BLOOD PRESSURE: 87 MMHG | OXYGEN SATURATION: 99 % | SYSTOLIC BLOOD PRESSURE: 185 MMHG | HEIGHT: 68 IN | WEIGHT: 146.19 LBS | RESPIRATION RATE: 18 BRPM | HEART RATE: 96 BPM | TEMPERATURE: 99 F | BODY MASS INDEX: 22.16 KG/M2

## 2020-01-23 LAB
ALBUMIN SERPL BCP-MCNC: 3.8 G/DL (ref 3.5–5.2)
ALP SERPL-CCNC: 143 U/L (ref 55–135)
ALT SERPL W/O P-5'-P-CCNC: 40 U/L (ref 10–44)
ANION GAP SERPL CALC-SCNC: 11 MMOL/L (ref 8–16)
AST SERPL-CCNC: 30 U/L (ref 10–40)
BILIRUB SERPL-MCNC: 0.3 MG/DL (ref 0.1–1)
BUN SERPL-MCNC: 26 MG/DL (ref 8–23)
CALCIUM SERPL-MCNC: 9.6 MG/DL (ref 8.7–10.5)
CHLORIDE SERPL-SCNC: 105 MMOL/L (ref 95–110)
CO2 SERPL-SCNC: 23 MMOL/L (ref 23–29)
CREAT SERPL-MCNC: 1.6 MG/DL (ref 0.5–1.4)
EST. GFR  (AFRICAN AMERICAN): 52 ML/MIN/1.73 M^2
EST. GFR  (NON AFRICAN AMERICAN): 45 ML/MIN/1.73 M^2
GLUCOSE SERPL-MCNC: 124 MG/DL (ref 70–110)
POTASSIUM SERPL-SCNC: 4.2 MMOL/L (ref 3.5–5.1)
PROT SERPL-MCNC: 8.2 G/DL (ref 6–8.4)
SODIUM SERPL-SCNC: 139 MMOL/L (ref 136–145)

## 2020-01-23 PROCEDURE — 63600175 PHARM REV CODE 636 W HCPCS: Performed by: EMERGENCY MEDICINE

## 2020-01-23 PROCEDURE — 25500020 PHARM REV CODE 255: Performed by: EMERGENCY MEDICINE

## 2020-01-23 PROCEDURE — 25000003 PHARM REV CODE 250: Performed by: EMERGENCY MEDICINE

## 2020-01-23 RX ORDER — HYDRALAZINE HYDROCHLORIDE 20 MG/ML
10 INJECTION INTRAMUSCULAR; INTRAVENOUS
Status: COMPLETED | OUTPATIENT
Start: 2020-01-23 | End: 2020-01-23

## 2020-01-23 RX ORDER — AMLODIPINE BESYLATE 5 MG/1
5 TABLET ORAL
Status: COMPLETED | OUTPATIENT
Start: 2020-01-23 | End: 2020-01-23

## 2020-01-23 RX ADMIN — HYDRALAZINE HYDROCHLORIDE 10 MG: 20 INJECTION INTRAMUSCULAR; INTRAVENOUS at 01:01

## 2020-01-23 RX ADMIN — IOHEXOL 100 ML: 350 INJECTION, SOLUTION INTRAVENOUS at 12:01

## 2020-01-23 RX ADMIN — AMLODIPINE BESYLATE 5 MG: 5 TABLET ORAL at 12:01

## 2020-01-23 NOTE — PROVIDER PROGRESS NOTES - EMERGENCY DEPT.
Encounter Date: 1/22/2020    ED Physician Progress Notes        Physician Note:   Patient handed off to me from Dr Ramirez at midnight for follow up CTA chest results.  Patient is 65 y/o male with HTN, HLD, DM, CVA who presents today for elevated BP.  Daughter is at bedside and states that patient was recently taken off carvedilol and lasix because his pressure had gotten too low.  Now he takes losartan only daily.  Daughter noticed his BP to be elevated at home and brought him to ED.  States that he is unable to get in to see PCP until mid-end of February.  Patient is asymptomatic.  Hypertensive in ED, other VSS.  NAD, nontoxic appearing.    BP improved after IV hydralazine.    - H&H stable, no leukocytosis  - Cr stable  - CT head without acute intracranial abnormality  - CTA chest without PE.  - UA not consistent with infection    Discussed case with [facility] FM who will see patient in ED.  Recommended follow up in clinic tomorrow for recheck of blood pressure and management of anti-hypertensive medications.

## 2020-01-23 NOTE — ED TRIAGE NOTES
Patient brought in by daughter. Pt is Portuguese speaking. Daughter stated patient returned from [place] today after being gone since . Daughter noticed BP had been elevated today even with taking prescribed BP medication. Losartan 50 mg given in morning and also at 1 pm. She stated family told her his BP has also been elevated while he was in [place]. Pt was not on his normal low sodium diet.  She also noticed some swelling in lower extremities. Minor swelling noted to left foot. Patient denies chest pain, HA, or SOB. Patient has history of past strokes. Ambulates with walker. Patient is at baseline mobility wise. Patient does look a little shaky. Patient denies being cold. States he could be nervous.    Review of patient's allergies indicates:   Allergen Reactions    Cayenne Anaphylaxis     Throat swells up     Cayenne pepper     Grapefruit         Patient has verified the spelling of their name and  on armband.   APPEARANCE: Patient is alert, calm, oriented x 4, and does not appear distressed.  SKIN: Skin is normal for race, warm, and dry. Normal skin turgor and mucous membranes moist.  CARDIAC: Normal rate and rhythm, no murmur heard. +HTN  RESPIRATORY:Normal rate and effort. Breath sounds clear bilaterally throughout chest. Respirations are equal and unlabored.    GASTRO: Bowel sounds normal, abdomen is soft, no tenderness, and no abdominal distention.  MUSCLE: Full ROM. No bony tenderness or soft tissue tenderness. No obvious deformity. +past stoke that affected left side. Ambulates with walker +bilateral arm  shakiness  PERIPHERAL VASCULAR: peripheral pulses present. Normal cap refill. No edema. Warm to touch. +minor swelling to left foot  NEURO: 5/5 strength major flexors/extensors bilaterally. Sensory intact to light touch bilaterally. [place] coma scale: eyes open spontaneously-4, oriented & converses-5, obeys commands-6. No neurological abnormalities.   MENTAL STATUS: awake, alert and  aware of environment.

## 2020-01-23 NOTE — ED NOTES
Patient and daughter updated on CT scan. BP still elevated. Patient asymptomatic. Will let MD know.
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
## 2020-01-23 NOTE — ED NOTES
Patient aware we will just need urine to send off. Specimen cup at bedside. Patient and daughter aware of lab and scan resulting times.

## 2020-01-23 NOTE — ED NOTES
Admit resident at bedside to assess patient and speak with daughter. Giving instructions for discharge. Daughter aware to bring patient back if patient starts to develop symptoms. Instructed to call clinic for afternoon appt today.

## 2020-01-27 ENCOUNTER — OFFICE VISIT (OUTPATIENT)
Dept: FAMILY MEDICINE | Facility: HOSPITAL | Age: 65
End: 2020-01-27
Attending: FAMILY MEDICINE
Payer: MEDICARE

## 2020-01-27 ENCOUNTER — HOSPITAL ENCOUNTER (EMERGENCY)
Facility: HOSPITAL | Age: 65
Discharge: HOME | End: 2020-01-28
Attending: EMERGENCY MEDICINE
Payer: MEDICARE

## 2020-01-27 VITALS
HEIGHT: 68 IN | WEIGHT: 146 LBS | BODY MASS INDEX: 22.13 KG/M2 | DIASTOLIC BLOOD PRESSURE: 74 MMHG | HEART RATE: 80 BPM | SYSTOLIC BLOOD PRESSURE: 129 MMHG

## 2020-01-27 DIAGNOSIS — R11.0 NAUSEA: ICD-10-CM

## 2020-01-27 DIAGNOSIS — I63.9 CEREBROVASCULAR ACCIDENT (CVA), UNSPECIFIED MECHANISM: Primary | ICD-10-CM

## 2020-01-27 DIAGNOSIS — I10 ESSENTIAL HYPERTENSION: ICD-10-CM

## 2020-01-27 DIAGNOSIS — E11.00 TYPE 2 DIABETES MELLITUS WITH HYPEROSMOLARITY WITHOUT COMA, WITHOUT LONG-TERM CURRENT USE OF INSULIN: ICD-10-CM

## 2020-01-27 DIAGNOSIS — N18.4 STAGE 4 CHRONIC KIDNEY DISEASE: ICD-10-CM

## 2020-01-27 DIAGNOSIS — N28.9 RENAL INSUFFICIENCY: ICD-10-CM
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
## 2020-01-27 LAB
ALBUMIN SERPL BCP-MCNC: 4 G/DL (ref 3.5–5.2)
ALP SERPL-CCNC: 134 U/L (ref 55–135)
ALT SERPL W/O P-5'-P-CCNC: 34 U/L (ref 10–44)
AMMONIA PLAS-SCNC: 31 UMOL/L (ref 10–50)
ANION GAP SERPL CALC-SCNC: 11 MMOL/L (ref 8–16)
ANION GAP SERPL CALC-SCNC: 7 MMOL/L (ref 8–16)
AST SERPL-CCNC: 22 U/L (ref 10–40)
BACTERIA #/AREA URNS HPF: NORMAL /HPF
BASOPHILS # BLD AUTO: 0.04 K/UL (ref 0–0.2)
BASOPHILS NFR BLD: 0.4 % (ref 0–1.9)
BILIRUB SERPL-MCNC: 0.4 MG/DL (ref 0.1–1)
BILIRUB UR QL STRIP: NEGATIVE
BUN SERPL-MCNC: 28 MG/DL (ref 8–23)
BUN SERPL-MCNC: 31 MG/DL (ref 8–23)
CALCIUM SERPL-MCNC: 8.5 MG/DL (ref 8.7–10.5)
CALCIUM SERPL-MCNC: 9.7 MG/DL (ref 8.7–10.5)
CHLORIDE SERPL-SCNC: 104 MMOL/L (ref 95–110)
CHLORIDE SERPL-SCNC: 109 MMOL/L (ref 95–110)
CLARITY UR: CLEAR
CO2 SERPL-SCNC: 23 MMOL/L (ref 23–29)
CO2 SERPL-SCNC: 24 MMOL/L (ref 23–29)
COLOR UR: YELLOW
CREAT SERPL-MCNC: 1.9 MG/DL (ref 0.5–1.4)
CREAT SERPL-MCNC: 2.4 MG/DL (ref 0.5–1.4)
DIFFERENTIAL METHOD: ABNORMAL
EOSINOPHIL # BLD AUTO: 0.1 K/UL (ref 0–0.5)
EOSINOPHIL NFR BLD: 0.5 % (ref 0–8)
ERYTHROCYTE [DISTWIDTH] IN BLOOD BY AUTOMATED COUNT: 13.7 % (ref 11.5–14.5)
EST. GFR  (AFRICAN AMERICAN): 32 ML/MIN/1.73 M^2
EST. GFR  (AFRICAN AMERICAN): 42 ML/MIN/1.73 M^2
EST. GFR  (NON AFRICAN AMERICAN): 27 ML/MIN/1.73 M^2
EST. GFR  (NON AFRICAN AMERICAN): 36 ML/MIN/1.73 M^2
GLUCOSE SERPL-MCNC: 126 MG/DL (ref 70–110)
GLUCOSE SERPL-MCNC: 185 MG/DL (ref 70–110)
GLUCOSE UR QL STRIP: ABNORMAL
HCT VFR BLD AUTO: 36.2 % (ref 40–54)
HGB BLD-MCNC: 12 G/DL (ref 14–18)
HGB UR QL STRIP: ABNORMAL
HYALINE CASTS #/AREA URNS LPF: 0 /LPF
KETONES UR QL STRIP: NEGATIVE
LEUKOCYTE ESTERASE UR QL STRIP: NEGATIVE
LYMPHOCYTES # BLD AUTO: 1 K/UL (ref 1–4.8)
LYMPHOCYTES NFR BLD: 9.4 % (ref 18–48)
MCH RBC QN AUTO: 27.6 PG (ref 27–31)
MCHC RBC AUTO-ENTMCNC: 33.1 G/DL (ref 32–36)
MCV RBC AUTO: 83 FL (ref 82–98)
MICROSCOPIC COMMENT: NORMAL
MONOCYTES # BLD AUTO: 0.4 K/UL (ref 0.3–1)
MONOCYTES NFR BLD: 3.9 % (ref 4–15)
NEUTROPHILS # BLD AUTO: 9.4 K/UL (ref 1.8–7.7)
NEUTROPHILS NFR BLD: 85.8 % (ref 38–73)
NITRITE UR QL STRIP: NEGATIVE
PH UR STRIP: 6 [PH] (ref 5–8)
PLATELET # BLD AUTO: 301 K/UL (ref 150–350)
PMV BLD AUTO: 9.9 FL (ref 9.2–12.9)
POTASSIUM SERPL-SCNC: 4 MMOL/L (ref 3.5–5.1)
POTASSIUM SERPL-SCNC: 4.5 MMOL/L (ref 3.5–5.1)
PROT SERPL-MCNC: 7.8 G/DL (ref 6–8.4)
PROT UR QL STRIP: ABNORMAL
RBC # BLD AUTO: 4.34 M/UL (ref 4.6–6.2)
RBC #/AREA URNS HPF: 4 /HPF (ref 0–4)
SODIUM SERPL-SCNC: 138 MMOL/L (ref 136–145)
SODIUM SERPL-SCNC: 140 MMOL/L (ref 136–145)
SP GR UR STRIP: 1.02 (ref 1–1.03)
SQUAMOUS #/AREA URNS HPF: 3 /HPF
TROPONIN I SERPL DL<=0.01 NG/ML-MCNC: 0.01 NG/ML (ref 0–0.03)
URN SPEC COLLECT METH UR: ABNORMAL
UROBILINOGEN UR STRIP-ACNC: NEGATIVE EU/DL
WBC # BLD AUTO: 11.02 K/UL (ref 3.9–12.7)
WBC #/AREA URNS HPF: 2 /HPF (ref 0–5)

## 2020-01-27 PROCEDURE — 84484 ASSAY OF TROPONIN QUANT: CPT

## 2020-01-27 PROCEDURE — 81000 URINALYSIS NONAUTO W/SCOPE: CPT

## 2020-01-27 PROCEDURE — 63600175 PHARM REV CODE 636 W HCPCS: Performed by: EMERGENCY MEDICINE

## 2020-01-27 PROCEDURE — 80053 COMPREHEN METABOLIC PANEL: CPT

## 2020-01-27 PROCEDURE — 99285 EMERGENCY DEPT VISIT HI MDM: CPT | Mod: 25,27

## 2020-01-27 PROCEDURE — 80048 BASIC METABOLIC PNL TOTAL CA: CPT

## 2020-01-27 PROCEDURE — 82140 ASSAY OF AMMONIA: CPT

## 2020-01-27 PROCEDURE — 96360 HYDRATION IV INFUSION INIT: CPT

## 2020-01-27 PROCEDURE — 93005 ELECTROCARDIOGRAM TRACING: CPT

## 2020-01-27 PROCEDURE — 99214 OFFICE O/P EST MOD 30 MIN: CPT | Performed by: STUDENT IN AN ORGANIZED HEALTH CARE EDUCATION/TRAINING PROGRAM

## 2020-01-27 PROCEDURE — 85025 COMPLETE CBC W/AUTO DIFF WBC: CPT

## 2020-01-27 RX ADMIN — SODIUM CHLORIDE 1000 ML: 0.9 INJECTION, SOLUTION INTRAVENOUS at 08:01

## 2020-01-27 NOTE — PROGRESS NOTES
Subjective:       Patient ID: Ming Boateng is a 64 y.o. male.    Chief Complaint: Fall and Nausea    HPI Patient is a 63yo male with PMHx of CVAx3 with residual left side facial droop and LUE and LLE weakness, HTN, HFpEF, CKD 4, and DM2 who presents urgently to the clinic after a fall at 7am today. He was getting into the tub after urinating on himself when he fell. The handle to the shower that he typically uses for balance was loose. He denies hitting his head. At around 1230pm he started experiencing dizziness and N/V. He typically uses a walker for ambulation and has only been able to use a wheelchair due to weakness. His daughter is present with him and provided the additional history that he is mildly confused and that he is not talking as robustly as he normally does. He also states his left arm hurts.     Patient is [place] speaking only and daughter provided translation. He recently returned from 2 months in [place] where he was no compliant with his medications or diet.     Review of Systems   Constitutional: Negative for activity change and fatigue.   HENT: Negative for hearing loss and trouble swallowing.    Eyes: Negative for visual disturbance.   Respiratory: Negative for cough and shortness of breath.    Cardiovascular: Negative for chest pain and leg swelling.   Gastrointestinal: Negative for abdominal pain, constipation, diarrhea, nausea and vomiting.   Musculoskeletal: Positive for arthralgias. Negative for myalgias.   Neurological: Positive for dizziness, facial asymmetry, speech difficulty, weakness and light-headedness. Negative for numbness and headaches.   Psychiatric/Behavioral: Negative for decreased concentration and sleep disturbance. The patient is not nervous/anxious.        Objective:      Vitals:    01/27/20 1540   BP: 129/74   Pulse: 80     Physical Exam   Constitutional: He appears well-developed and well-nourished. He appears lethargic.   Appears uncomfortable. Emesis bag in hand.     HENT:   Head: Normocephalic and atraumatic.   Eyes: Conjunctivae and EOM are normal.   Cardiovascular: Normal rate, regular rhythm, normal heart sounds and intact distal pulses.   Pulmonary/Chest: Effort normal and breath sounds normal.   Abdominal: Soft. Bowel sounds are normal.   Musculoskeletal: Normal range of motion.        Arms:  Neurological: He appears lethargic. He displays no atrophy and no tremor. A cranial nerve deficit (left sided facial droop (chronic)) is present. He exhibits normal muscle tone. He displays no seizure activity.   Patient with right side 5/5 upper and lower extremities. Left side upper 5/5, left lower 4/5, possibly chronic. Patient dizzy and predominantly keeping eyes down and closed.    Skin: Skin is warm and dry. Capillary refill takes less than 2 seconds.   Vitals reviewed.      Assessment:       1. Cerebrovascular accident (CVA), unspecified mechanism    2. Essential hypertension    3. Type 2 diabetes mellitus with hyperosmolarity without coma, without long-term current use of insulin    4. Stage 4 chronic kidney disease        Plan:       Cerebrovascular accident (CVA), unspecified mechanism  Given patient's past medical history, recent noncompliance with medication, and symptoms today, concerned for repeat CVA. Patient sent to ED for further work-up for potential CVA.     Follow up if symptoms worsen or fail to improve.
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
## 2020-01-28 VITALS
TEMPERATURE: 98 F | DIASTOLIC BLOOD PRESSURE: 98 MMHG | OXYGEN SATURATION: 100 % | BODY MASS INDEX: 22.13 KG/M2 | HEART RATE: 92 BPM | SYSTOLIC BLOOD PRESSURE: 192 MMHG | RESPIRATION RATE: 20 BRPM | HEIGHT: 68 IN | WEIGHT: 146 LBS

## 2020-01-28 NOTE — PROGRESS NOTES
I have reviewed the notes, assessments, and/or procedures performed, I concur with her/his documentation of Ming Boateng.
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
## 2020-01-29 ENCOUNTER — HOSPITAL ENCOUNTER (EMERGENCY)
Facility: HOSPITAL | Age: 65
Discharge: HOME | End: 2020-01-29
Attending: EMERGENCY MEDICINE
Payer: MEDICARE

## 2020-01-29 VITALS
TEMPERATURE: 99 F | RESPIRATION RATE: 18 BRPM | HEART RATE: 80 BPM | WEIGHT: 146 LBS | SYSTOLIC BLOOD PRESSURE: 166 MMHG | OXYGEN SATURATION: 99 % | HEIGHT: 68 IN | DIASTOLIC BLOOD PRESSURE: 88 MMHG | BODY MASS INDEX: 22.13 KG/M2

## 2020-01-29 DIAGNOSIS — R19.7 NAUSEA VOMITING AND DIARRHEA: ICD-10-CM

## 2020-01-29 DIAGNOSIS — R11.2 NAUSEA VOMITING AND DIARRHEA: ICD-10-CM

## 2020-01-29 LAB
ALBUMIN SERPL BCP-MCNC: 3.5 G/DL (ref 3.5–5.2)
ALP SERPL-CCNC: 116 U/L (ref 55–135)
ALT SERPL W/O P-5'-P-CCNC: 25 U/L (ref 10–44)
ANION GAP SERPL CALC-SCNC: 9 MMOL/L (ref 8–16)
AST SERPL-CCNC: 22 U/L (ref 10–40)
BACTERIA #/AREA URNS HPF: NORMAL /HPF
BILIRUB SERPL-MCNC: 0.3 MG/DL (ref 0.1–1)
BILIRUB UR QL STRIP: NEGATIVE
BUN SERPL-MCNC: 28 MG/DL (ref 8–23)
CALCIUM SERPL-MCNC: 9.1 MG/DL (ref 8.7–10.5)
CHLORIDE SERPL-SCNC: 106 MMOL/L (ref 95–110)
CLARITY UR: CLEAR
CO2 SERPL-SCNC: 23 MMOL/L (ref 23–29)
COLOR UR: YELLOW
CREAT SERPL-MCNC: 2.1 MG/DL (ref 0.5–1.4)
ERYTHROCYTE [DISTWIDTH] IN BLOOD BY AUTOMATED COUNT: 13.8 % (ref 11.5–14.5)
EST. GFR  (AFRICAN AMERICAN): 37 ML/MIN/1.73 M^2
EST. GFR  (NON AFRICAN AMERICAN): 32 ML/MIN/1.73 M^2
GLUCOSE SERPL-MCNC: 134 MG/DL (ref 70–110)
GLUCOSE UR QL STRIP: NEGATIVE
HCT VFR BLD AUTO: 34.9 % (ref 40–54)
HGB BLD-MCNC: 11.6 G/DL (ref 14–18)
HGB UR QL STRIP: NEGATIVE
HYALINE CASTS #/AREA URNS LPF: 0 /LPF
KETONES UR QL STRIP: NEGATIVE
LEUKOCYTE ESTERASE UR QL STRIP: NEGATIVE
MCH RBC QN AUTO: 27.7 PG (ref 27–31)
MCHC RBC AUTO-ENTMCNC: 33.2 G/DL (ref 32–36)
MCV RBC AUTO: 83 FL (ref 82–98)
MICROSCOPIC COMMENT: NORMAL
NITRITE UR QL STRIP: NEGATIVE
PH UR STRIP: 7 [PH] (ref 5–8)
PLATELET # BLD AUTO: 304 K/UL (ref 150–350)
PMV BLD AUTO: 9.9 FL (ref 9.2–12.9)
POTASSIUM SERPL-SCNC: 4.4 MMOL/L (ref 3.5–5.1)
PROT SERPL-MCNC: 7.5 G/DL (ref 6–8.4)
PROT UR QL STRIP: ABNORMAL
RBC # BLD AUTO: 4.19 M/UL (ref 4.6–6.2)
RBC #/AREA URNS HPF: 1 /HPF (ref 0–4)
SODIUM SERPL-SCNC: 138 MMOL/L (ref 136–145)
SP GR UR STRIP: 1.01 (ref 1–1.03)
SQUAMOUS #/AREA URNS HPF: 0 /HPF
URN SPEC COLLECT METH UR: ABNORMAL
UROBILINOGEN UR STRIP-ACNC: NEGATIVE EU/DL
WBC # BLD AUTO: 5.39 K/UL (ref 3.9–12.7)
WBC #/AREA URNS HPF: 1 /HPF (ref 0–5)

## 2020-01-29 PROCEDURE — 63600175 PHARM REV CODE 636 W HCPCS: Performed by: EMERGENCY MEDICINE

## 2020-01-29 PROCEDURE — 99284 EMERGENCY DEPT VISIT MOD MDM: CPT | Mod: 25

## 2020-01-29 PROCEDURE — 81000 URINALYSIS NONAUTO W/SCOPE: CPT

## 2020-01-29 PROCEDURE — 96361 HYDRATE IV INFUSION ADD-ON: CPT

## 2020-01-29 PROCEDURE — 96374 THER/PROPH/DIAG INJ IV PUSH: CPT

## 2020-01-29 PROCEDURE — 80053 COMPREHEN METABOLIC PANEL: CPT

## 2020-01-29 PROCEDURE — 85027 COMPLETE CBC AUTOMATED: CPT

## 2020-01-29 PROCEDURE — 25000003 PHARM REV CODE 250: Performed by: EMERGENCY MEDICINE

## 2020-01-29 PROCEDURE — 93005 ELECTROCARDIOGRAM TRACING: CPT

## 2020-01-29 RX ORDER — SODIUM CHLORIDE 9 MG/ML
1000 INJECTION, SOLUTION INTRAVENOUS
Status: COMPLETED | OUTPATIENT
Start: 2020-01-29 | End: 2020-01-29

## 2020-01-29 RX ORDER — ONDANSETRON 2 MG/ML
4 INJECTION INTRAMUSCULAR; INTRAVENOUS
Status: COMPLETED | OUTPATIENT
Start: 2020-01-29 | End: 2020-01-29

## 2020-01-29 RX ORDER — ONDANSETRON 4 MG/1
4 TABLET, FILM COATED ORAL EVERY 6 HOURS PRN
Qty: 12 TABLET | Refills: 0 | OUTPATIENT
Start: 2020-01-29 | End: 2020-01-30

## 2020-01-29 RX ORDER — LOPERAMIDE HYDROCHLORIDE 2 MG/1
2 CAPSULE ORAL
Status: COMPLETED | OUTPATIENT
Start: 2020-01-29 | End: 2020-01-29

## 2020-01-29 RX ADMIN — ONDANSETRON 4 MG: 2 INJECTION INTRAMUSCULAR; INTRAVENOUS at 05:01

## 2020-01-29 RX ADMIN — SODIUM CHLORIDE 1000 ML: 0.9 INJECTION, SOLUTION INTRAVENOUS at 05:01

## 2020-01-29 RX ADMIN — SODIUM CHLORIDE, POTASSIUM CHLORIDE, SODIUM LACTATE AND CALCIUM CHLORIDE 1000 ML: 600; 310; 30; 20 INJECTION, SOLUTION INTRAVENOUS at 07:01

## 2020-01-29 RX ADMIN — LOPERAMIDE HYDROCHLORIDE 2 MG: 2 CAPSULE ORAL at 10:01

## 2020-01-29 NOTE — ED NOTES
Clarified order for LR bolus with Dr. Melvin. Dr. Melvin requested only 500 mL of LR. RN will implement.

## 2020-01-29 NOTE — ED NOTES
Patient identifiers for Ming Boateng checked and correct.  LOC: The patient is awake, alert and aware of environment with an appropriate affect, the patient is oriented x 3 and speaking appropriately.  APPEARANCE: Patient uncomfortable and in no acute distress, patient is clean and well groomed, patient's clothing are properly fastened.  SKIN: The skin is warm and dry, patient has normal skin turgor and moist mucus membranes.  MUSKULOSKELETAL: Patient moving all extremities well, no obvious swelling or deformities noted.  RESPIRATORY: Airway is open and patent, respirations are spontaneous, patient has a normal effort and rate.  CARDIAC: Patient has a normal rate and rhythm, no periphreal edema noted.  ABDOMEN: Soft and non tender to palpation, no distention noted.

## 2020-01-29 NOTE — PLAN OF CARE
Met with family unit in room:  Daughter Nica:   [phone]    She states that she has been looking to get her father (Ming) placed at [facility] but was told they have to go to the hospital first.    Patient has no medical necessity to be in hospital.  Patient was given contact information through [facility] to other NH facilities in the area and told Nica patient can be admitted through their admissions process.    Contact information with [facility] given to patient  [phone].  They are a sitter service but also offer respite care to [facility] patients.    Notified Lisy Anthony of consult.
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
## 2020-01-29 NOTE — PLAN OF CARE
spoke with patient's daughter Nica Boateng, [phone], discussed concerns of patient's car at home and how she would like assistance with placement. SW discussed that placement could be done from home and offered to email half-way nursing home list to daughter.     SW offered additional resources for Ramona Andrea. SW faxed face sheet and requested help on behalf of patient and daugter. Daughter understood all that was discussed and will make an informed decision after reviewing list and contacting facilities. No further assistance needed.

## 2020-01-29 NOTE — ED NOTES
Pt attempted to provide urine specimen with assistance from RN. Unable to void. Requested a few more minutes.

## 2020-01-29 NOTE — PROVIDER PROGRESS NOTES - EMERGENCY DEPT.
Encounter Date: 1/29/2020    ED Physician Progress Notes        Physician Note:   This is an assumption of care note.  0600 Patient handed off to me from Dr Millan at shift change for follow up labs, reassessment and disposition.    Patient is 63 y/o male with PMHx of CVA with residual left sided deficits, CHF, DM,  HLD, HTN, CKD who has been having 2 days of N/V/D.   Patient denies abdominal pain, fever.      VSS with exception of hypertension.  NAD, nontoxic appearing.   Alert and answering questions appropriately  RRR, no murmurs  Lungs CTAB, no respiratory distress.   Abd is soft and nontender. No guarding or rebound    Cr 2.1, mildly elevated but overall consistent with his previous Cr.  Electrolytes wnl  No leukocytosis, H&H stable  UA not consistent with infection    Daughter arrived at bedside and expresses concern taking patient home.  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    I discussed case with [facility] but recommended discharge with zofran.  Patient has been tolerating PO well in ED.    I have consulted  to see if they can assist patient's daughter, but he is stable for discharge.   Given strict return precautions.

## 2020-01-29 NOTE — ED NOTES
Assumed care from BOY Rollins. Bedside care hand off preformed. Pt alert and oriented. Reports improvement in symptoms.

## 2020-01-30 ENCOUNTER — HOSPITAL ENCOUNTER (EMERGENCY)
Facility: HOSPITAL | Age: 65
Discharge: HOME | End: 2020-01-30
Attending: EMERGENCY MEDICINE
Payer: MEDICARE

## 2020-01-30 VITALS
HEIGHT: 68 IN | SYSTOLIC BLOOD PRESSURE: 151 MMHG | RESPIRATION RATE: 17 BRPM | OXYGEN SATURATION: 99 % | BODY MASS INDEX: 21.83 KG/M2 | DIASTOLIC BLOOD PRESSURE: 70 MMHG | TEMPERATURE: 98 F | HEART RATE: 78 BPM | WEIGHT: 144.06 LBS

## 2020-01-30 DIAGNOSIS — N30.00 ACUTE CYSTITIS WITHOUT HEMATURIA: ICD-10-CM

## 2020-01-30 DIAGNOSIS — K52.9 GASTROENTERITIS: Primary | ICD-10-CM

## 2020-01-30 LAB
ALBUMIN SERPL BCP-MCNC: 3.5 G/DL (ref 3.5–5.2)
ALP SERPL-CCNC: 98 U/L (ref 38–126)
ALT SERPL W/O P-5'-P-CCNC: 20 U/L (ref 10–44)
ANION GAP SERPL CALC-SCNC: 6 MMOL/L (ref 8–16)
AST SERPL-CCNC: 19 U/L (ref 15–46)
BACTERIA #/AREA URNS AUTO: ABNORMAL /HPF
BASOPHILS # BLD AUTO: 0.01 K/UL (ref 0–0.2)
BASOPHILS NFR BLD: 0.2 % (ref 0–1.9)
BILIRUB SERPL-MCNC: 0.3 MG/DL (ref 0.1–1)
BILIRUB UR QL STRIP: NEGATIVE
BUN SERPL-MCNC: 26 MG/DL (ref 2–20)
CALCIUM SERPL-MCNC: 8.1 MG/DL (ref 8.7–10.5)
CHLORIDE SERPL-SCNC: 107 MMOL/L (ref 95–110)
CLARITY UR REFRACT.AUTO: CLEAR
CO2 SERPL-SCNC: 24 MMOL/L (ref 23–29)
COLOR UR AUTO: YELLOW
CREAT SERPL-MCNC: 1.92 MG/DL (ref 0.5–1.4)
DIFFERENTIAL METHOD: ABNORMAL
EOSINOPHIL # BLD AUTO: 0.2 K/UL (ref 0–0.5)
EOSINOPHIL NFR BLD: 3.9 % (ref 0–8)
ERYTHROCYTE [DISTWIDTH] IN BLOOD BY AUTOMATED COUNT: 13.3 % (ref 11.5–14.5)
EST. GFR  (AFRICAN AMERICAN): 41.6 ML/MIN/1.73 M^2
EST. GFR  (NON AFRICAN AMERICAN): 36 ML/MIN/1.73 M^2
GLUCOSE SERPL-MCNC: 140 MG/DL (ref 70–110)
GLUCOSE UR QL STRIP: NEGATIVE
HCT VFR BLD AUTO: 32.8 % (ref 40–54)
HGB BLD-MCNC: 10.9 G/DL (ref 14–18)
HGB UR QL STRIP: NEGATIVE
HYALINE CASTS UR QL AUTO: 0 /LPF
IMM GRANULOCYTES # BLD AUTO: 0 K/UL (ref 0–0.04)
IMM GRANULOCYTES NFR BLD AUTO: 0 % (ref 0–0.5)
KETONES UR QL STRIP: NEGATIVE
LEUKOCYTE ESTERASE UR QL STRIP: NEGATIVE
LIPASE SERPL-CCNC: <10 U/L (ref 23–300)
LYMPHOCYTES # BLD AUTO: 1 K/UL (ref 1–4.8)
LYMPHOCYTES NFR BLD: 24.8 % (ref 18–48)
MCH RBC QN AUTO: 28.1 PG (ref 27–31)
MCHC RBC AUTO-ENTMCNC: 33.2 G/DL (ref 32–36)
MCV RBC AUTO: 85 FL (ref 82–98)
MICROSCOPIC COMMENT: ABNORMAL
MONOCYTES # BLD AUTO: 0.6 K/UL (ref 0.3–1)
MONOCYTES NFR BLD: 15.4 % (ref 4–15)
NEUTROPHILS # BLD AUTO: 2.3 K/UL (ref 1.8–7.7)
NEUTROPHILS NFR BLD: 55.7 % (ref 38–73)
NITRITE UR QL STRIP: NEGATIVE
NRBC BLD-RTO: 0 /100 WBC
PH UR STRIP: 5 [PH] (ref 5–8)
PLATELET # BLD AUTO: 276 K/UL (ref 150–350)
PMV BLD AUTO: 9.8 FL (ref 9.2–12.9)
POTASSIUM SERPL-SCNC: 4.3 MMOL/L (ref 3.5–5.1)
PROT SERPL-MCNC: 6.8 G/DL (ref 6–8.4)
PROT UR QL STRIP: ABNORMAL
RBC # BLD AUTO: 3.88 M/UL (ref 4.6–6.2)
RBC #/AREA URNS AUTO: 3 /HPF (ref 0–4)
SODIUM SERPL-SCNC: 137 MMOL/L (ref 136–145)
SP GR UR STRIP: 1.01 (ref 1–1.03)
URN SPEC COLLECT METH UR: ABNORMAL
UROBILINOGEN UR STRIP-ACNC: NEGATIVE EU/DL
WBC # BLD AUTO: 4.15 K/UL (ref 3.9–12.7)
WBC #/AREA URNS AUTO: 0 /HPF (ref 0–5)

## 2020-01-30 PROCEDURE — 99284 EMERGENCY DEPT VISIT MOD MDM: CPT | Mod: 25,ER

## 2020-01-30 PROCEDURE — 63600175 PHARM REV CODE 636 W HCPCS: Mod: ER | Performed by: PHYSICIAN ASSISTANT

## 2020-01-30 PROCEDURE — 25000003 PHARM REV CODE 250: Mod: ER | Performed by: PHYSICIAN ASSISTANT

## 2020-01-30 PROCEDURE — 80053 COMPREHEN METABOLIC PANEL: CPT | Mod: ER

## 2020-01-30 PROCEDURE — 81000 URINALYSIS NONAUTO W/SCOPE: CPT | Mod: ER

## 2020-01-30 PROCEDURE — 83690 ASSAY OF LIPASE: CPT | Mod: ER

## 2020-01-30 PROCEDURE — 96374 THER/PROPH/DIAG INJ IV PUSH: CPT | Mod: ER

## 2020-01-30 PROCEDURE — 85025 COMPLETE CBC W/AUTO DIFF WBC: CPT | Mod: ER

## 2020-01-30 PROCEDURE — 96361 HYDRATE IV INFUSION ADD-ON: CPT | Mod: ER

## 2020-01-30 RX ORDER — CIPROFLOXACIN 250 MG/1
250 TABLET, FILM COATED ORAL 2 TIMES DAILY
Qty: 14 TABLET | Refills: 0 | Status: SHIPPED | OUTPATIENT
Start: 2020-01-30 | End: 2020-02-06

## 2020-01-30 RX ORDER — ONDANSETRON 2 MG/ML
4 INJECTION INTRAMUSCULAR; INTRAVENOUS
Status: COMPLETED | OUTPATIENT
Start: 2020-01-30 | End: 2020-01-30

## 2020-01-30 RX ORDER — SODIUM CHLORIDE 9 MG/ML
1000 INJECTION, SOLUTION INTRAVENOUS
Status: COMPLETED | OUTPATIENT
Start: 2020-01-30 | End: 2020-01-30

## 2020-01-30 RX ORDER — DICYCLOMINE HYDROCHLORIDE 20 MG/1
20 TABLET ORAL 3 TIMES DAILY PRN
Qty: 21 TABLET | Refills: 0 | Status: SHIPPED | OUTPATIENT
Start: 2020-01-30 | End: 2020-02-29

## 2020-01-30 RX ORDER — DICYCLOMINE HYDROCHLORIDE 10 MG/1
20 CAPSULE ORAL
Status: COMPLETED | OUTPATIENT
Start: 2020-01-30 | End: 2020-01-30

## 2020-01-30 RX ORDER — ONDANSETRON 4 MG/1
4 TABLET, FILM COATED ORAL EVERY 8 HOURS PRN
Qty: 10 TABLET | Refills: 0 | Status: SHIPPED | OUTPATIENT
Start: 2020-01-30 | End: 2020-08-17

## 2020-01-30 RX ADMIN — SODIUM CHLORIDE 1000 ML: 0.9 INJECTION, SOLUTION INTRAVENOUS at 11:01

## 2020-01-30 RX ADMIN — ONDANSETRON 4 MG: 2 INJECTION INTRAMUSCULAR; INTRAVENOUS at 11:01

## 2020-01-30 RX ADMIN — DICYCLOMINE HYDROCHLORIDE 20 MG: 10 CAPSULE ORAL at 12:01

## 2020-01-30 NOTE — DISCHARGE INSTRUCTIONS
Return to the ED for severe pain, black or bloody stool, vomiting and unable to keep fluids down or if worse in any way.
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
## 2020-01-30 NOTE — ED NOTES
ptient able to hold down chicken broth,  No disrrhea or vomiting noted on this er visit today so far

## 2020-02-04 ENCOUNTER — OFFICE VISIT (OUTPATIENT)
Dept: FAMILY MEDICINE | Facility: HOSPITAL | Age: 65
End: 2020-02-04
Attending: FAMILY MEDICINE
Payer: MEDICARE

## 2020-02-04 ENCOUNTER — HOSPITAL ENCOUNTER (OUTPATIENT)
Dept: RADIOLOGY | Facility: HOSPITAL | Age: 65
Discharge: HOME | End: 2020-02-04
Attending: PHYSICIAN ASSISTANT
Payer: MEDICARE

## 2020-02-04 VITALS
HEART RATE: 92 BPM | SYSTOLIC BLOOD PRESSURE: 162 MMHG | HEIGHT: 68 IN | DIASTOLIC BLOOD PRESSURE: 80 MMHG | BODY MASS INDEX: 21.88 KG/M2 | TEMPERATURE: 99 F | WEIGHT: 144.38 LBS

## 2020-02-04 DIAGNOSIS — R29.6 RECURRENT FALLS: ICD-10-CM

## 2020-02-04 DIAGNOSIS — Z11.1 TUBERCULIN SKIN TEST ENCOUNTER: ICD-10-CM

## 2020-02-04 DIAGNOSIS — R26.89 DECREASED MOBILITY: ICD-10-CM

## 2020-02-04 DIAGNOSIS — I10 ESSENTIAL HYPERTENSION: Primary | ICD-10-CM

## 2020-02-04 DIAGNOSIS — Z86.73 HISTORY OF ISCHEMIC RIGHT MCA STROKE: ICD-10-CM

## 2020-02-04 DIAGNOSIS — R26.9 GAIT ABNORMALITY: ICD-10-CM

## 2020-02-04 PROCEDURE — 71046 XR CHEST PA AND LATERAL: ICD-10-PCS | Mod: 26,,, | Performed by: RADIOLOGY

## 2020-02-04 PROCEDURE — 71046 X-RAY EXAM CHEST 2 VIEWS: CPT | Mod: 26,,, | Performed by: RADIOLOGY

## 2020-02-04 PROCEDURE — 71046 X-RAY EXAM CHEST 2 VIEWS: CPT | Mod: TC,FY

## 2020-02-04 PROCEDURE — 99214 OFFICE O/P EST MOD 30 MIN: CPT | Mod: 25 | Performed by: PHYSICIAN ASSISTANT

## 2020-02-04 RX ORDER — CARVEDILOL 12.5 MG/1
12.5 TABLET ORAL 2 TIMES DAILY
Qty: 180 TABLET | Refills: 0 | Status: SHIPPED | OUTPATIENT
Start: 2020-02-04 | End: 2020-02-06

## 2020-02-04 NOTE — PROGRESS NOTES
PPD was administered on R. Forearm. Patient tolerated well. Instructed to return for read between 48-72hours.

## 2020-02-04 NOTE — PROGRESS NOTES
Subjective:       Patient ID: Ming Boaetng is a 64 y.o. male.    Chief Complaint: Hospital Follow Up; Paperwork; and Hypertension    With PMH of CVA, renal failure, CHF, hypertension, diabetes and hyperlipidemia.  He was recently seen in the emergency department after several episodes of vomiting and diarrhea. Today he is accompanied by his daughter, who reports that he has not been vomiting or had continued diarrhea at this time. He has actually been constipated and she would like to give him is normal fiber supplement.    The main reason they are here today is to have paperwork filled out for nursing home placement. Daughter has been his primary care giver for many years, but due to his decreased mobility and inability to independently complete ADLs, it has become impossible for her to manage on her own.     HTN:  Only taking losartan 50mg at this time, but daughter has been giving him 100mg daily. Running high at home 180s-190s/80s-90s. Cardiology stopped amlodipine, carvedilol, and lasix due to low BP at previous admission.     Diabetes:  Blood sugar always less than 150. Rarely getting insulin at this time.      Review of Systems   Constitutional: Positive for activity change and fatigue.   HENT: Negative.    Respiratory: Negative.    Cardiovascular: Negative.    Gastrointestinal: Negative.    Genitourinary: Negative.    Musculoskeletal: Positive for gait problem.   Skin: Negative.    Neurological: Positive for weakness.   Hematological: Negative.    Psychiatric/Behavioral: Negative.         Memory problem       Objective:      Vitals:    02/04/20 0937   BP: (!) 162/80   Pulse: 92   Temp: 99 °F (37.2 °C)     Physical Exam   Constitutional: He appears well-developed and well-nourished. No distress.   HENT:   Head: Normocephalic and atraumatic.   Eyes: Conjunctivae and EOM are normal.   Neck: No tracheal deviation present.   Cardiovascular: Normal rate, regular rhythm and normal heart sounds.   No murmur  heard.  Pulmonary/Chest: Effort normal and breath sounds normal. No respiratory distress. He has no wheezes. He exhibits no tenderness.   Abdominal: Soft. Bowel sounds are normal. He exhibits no distension. There is no tenderness. There is no guarding.   Musculoskeletal: Normal range of motion. He exhibits no edema, tenderness or deformity.   Neurological: He is alert. Coordination normal.   Skin: Skin is warm and dry. He is not diaphoretic.   Psychiatric: He is slowed.   Nursing note and vitals reviewed.      Assessment:       1. Essential hypertension    2. 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 Decreased mobility    4. Recurrent falls    5. Gait abnormality    6. History of ischemic right MCA stroke        Plan:       Essential hypertension   -Continue 100mg losartan and add coreg back on BID   -Will come in for TB read Thursday and possibly BP check  -     carvediloL (COREG) 12.5 MG tablet; Take 1 tablet (12.5 mg total) by mouth 2 (two) times daily.  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 Flaherty   -Will fax     Future Appointments   Date Time Provider Department Center   2/6/2020 11:00 AM NURSE, FAMILY MED [facility]   2/18/2020 10:20 AM Wen Flaherty MD [facility]

## 2020-02-06 ENCOUNTER — CLINICAL SUPPORT (OUTPATIENT)
Dept: FAMILY MEDICINE | Facility: HOSPITAL | Age: 65
End: 2020-02-06
Attending: FAMILY MEDICINE
Payer: MEDICARE

## 2020-02-06 VITALS — HEART RATE: 71 BPM | DIASTOLIC BLOOD PRESSURE: 73 MMHG | SYSTOLIC BLOOD PRESSURE: 132 MMHG

## 2020-02-06 DIAGNOSIS — I10 ESSENTIAL HYPERTENSION: ICD-10-CM

## 2020-02-06 PROCEDURE — 99212 OFFICE O/P EST SF 10 MIN: CPT

## 2020-02-06 RX ORDER — SENNOSIDES 8.6 MG/1
1 TABLET ORAL 2 TIMES DAILY PRN
Qty: 60 TABLET | Refills: 1 | Status: ON HOLD | OUTPATIENT
Start: 2020-02-06 | End: 2020-09-18 | Stop reason: HOSPADM

## 2020-02-06 RX ORDER — DOCUSATE SODIUM 250 MG
250 CAPSULE ORAL 2 TIMES DAILY PRN
Qty: 180 CAPSULE | Refills: 1 | Status: SHIPPED | OUTPATIENT
Start: 2020-02-06 | End: 2020-08-17

## 2020-02-06 RX ORDER — CARVEDILOL 3.12 MG/1
3.12 TABLET ORAL 2 TIMES DAILY
Qty: 180 TABLET | Refills: 1 | Status: SHIPPED | OUTPATIENT
Start: 2020-02-06 | End: 2020-08-17 | Stop reason: SDUPTHER

## 2020-02-06 NOTE — PROGRESS NOTES
Briefly chatted with patient & daughter regarding blood pressure & constipation. 12mg carvedilol too much, felt weak. BP today 132/73 24hrs without carvedilol. Likely needs something, will move carvedilol down to 3.25 BID.   Constipated, giving fiber, will add prn Colace +Senna.  Nursing home paperwork reviewed, for [facility] bed, will fax to nursing home.    Wen Ramirez MD  Family Medicine

## 2020-02-11 ENCOUNTER — TELEPHONE (OUTPATIENT)
Dept: FAMILY MEDICINE | Facility: HOSPITAL | Age: 65
End: 2020-02-11

## 2020-02-12 NOTE — TELEPHONE ENCOUNTER
"Current Outpatient Medications on File Prior to Visit   Medication Sig Dispense Refill    alcohol swabs (ALCOHOL PREP SWABS) PadM Apply 1 each topically as needed. 400 each 3    allopurinol (ZYLOPRIM) 100 MG tablet Take 2 tablets (200 mg total) by mouth once daily. 90 tablet 3    atorvastatin (LIPITOR) 20 MG tablet Take 1 tablet (20 mg total) by mouth once daily. 90 tablet 3    blood sugar diagnostic Strp 1 each by Misc.(Non-Drug; Combo Route) route 4 (four) times daily. 200 each 5    blood-glucose meter kit Use as instructed 1 each 0    calcium acetate (PHOSLO) 667 mg tablet Take 1 tablet by mouth 3 (three) times daily with meals. 270 tablet 3    carvediloL (COREG) 3.125 MG tablet Take 1 tablet (3.125 mg total) by mouth 2 (two) times daily. 180 tablet 1    clopidogrel (PLAVIX) 75 mg tablet Take 1 tablet (75 mg total) by mouth once daily. 90 tablet 3    dicyclomine (BENTYL) 20 mg tablet Take 1 tablet (20 mg total) by mouth 3 (three) times daily as needed (abdominal cramps, diarrhea). (Patient not taking: Reported on 2/4/2020) 21 tablet 0    docusate sodium (COLACE) 250 MG capsule Take 1 capsule (250 mg total) by mouth 2 (two) times daily as needed for Constipation. Take daily if not constipated 180 capsule 1    famotidine (PEPCID) 20 MG tablet Take 1 tablet (20 mg total) by mouth 2 (two) times daily as needed for Heartburn. 180 tablet 3    insulin aspart U-100 (NOVOLOG) 100 unit/mL Crtg Inject 2 Units into the skin 3 (three) times daily with meals. 5.4 mL 3    lancets (LANCETS,ULTRA THIN) Misc 1 lancet by Misc.(Non-Drug; Combo Route) route 4 (four) times daily. 200 each 5    losartan (COZAAR) 100 MG tablet Take 0.5 tablets (50 mg total) by mouth once daily. (Patient taking differently: Take 50 mg by mouth once daily. ) 45 tablet 3    ondansetron (ZOFRAN) 4 MG tablet Take 1 tablet (4 mg total) by mouth every 8 (eight) hours as needed for Nausea. 10 tablet 0    pen needle, diabetic 29 gauge x 1/2" Ndle " Inisulin QAC and  each 3    senna (SENNA) 8.6 mg tablet Take 1 tablet by mouth 2 (two) times daily as needed for Constipation. 60 tablet 1    TOUJEO SOLOSTAR U-300 INSULIN 300 unit/mL (1.5 mL) InPn pen Inject 10 Units into the skin once daily. 1.5 mL 3    [DISCONTINUED] mirtazapine (REMERON) 15 MG tablet Take 1 tablet (15 mg total) by mouth every evening. For sleep & mood (Patient taking differently: Take 15 mg by mouth every evening. For sleep & mood) 90 tablet 3     No current facility-administered medications on file prior to visit.

## 2020-02-12 NOTE — TELEPHONE ENCOUNTER
Phone note    Called daughter to discuss Mirtazipine. When I started it, daughter & I had low concern for sleep, was more focused a sleep aid though had some concern for mild reactive depression 2/2 stroke. Pt started titrating off med while here & stopped in [place] I completely. Normal affect on my encounters with patient off medication.    Wen Ramirez MD  Family Medicine

## 2020-02-24 ENCOUNTER — TELEPHONE (OUTPATIENT)
Dept: FAMILY MEDICINE | Facility: HOSPITAL | Age: 65
End: 2020-02-24

## 2020-02-24 RX ORDER — ALLOPURINOL 100 MG/1
TABLET ORAL
Qty: 180 TABLET | Refills: 1 | OUTPATIENT
Start: 2020-02-24

## 2020-02-24 NOTE — TELEPHONE ENCOUNTER
Phone note    Called daughter, pt at nursing home now. Doing ok, transition a bit difficult but he's adapting, making friends. Will transfer PCP to nursing home doc.     Wen Ramirez MD  Family Medicine

## 2020-03-23 ENCOUNTER — DOCUMENTATION ONLY (OUTPATIENT)
Dept: REHABILITATION | Facility: HOSPITAL | Age: 65
End: 2020-03-23

## 2020-03-23 NOTE — PROGRESS NOTES
Discharge  Patient: Ming Boateng  Date: 3/23/2020  MRN: [id number]    Pt was evaluated on 04/29/2019 and was seen 12 times for ST. Pt has not attended ST since 06/26/19. Pt was given Home Program. Current status is unknown. Pt to be d/c'd at this time.    Kalpana Vang M.S.CCC-SLP  Speech Language Pathologist   3/23/2020

## 2020-04-25 DIAGNOSIS — I10 ESSENTIAL HYPERTENSION: ICD-10-CM

## 2020-04-27 RX ORDER — CARVEDILOL 12.5 MG/1
12.5 TABLET ORAL 2 TIMES DAILY
Qty: 180 TABLET | Refills: 0 | Status: SHIPPED | OUTPATIENT
Start: 2020-04-27 | End: 2020-08-17

## 2020-06-07 RX ORDER — PANTOPRAZOLE SODIUM 40 MG/1
TABLET, DELAYED RELEASE ORAL
Qty: 30 TABLET | Refills: 4 | OUTPATIENT
Start: 2020-06-07

## 2020-06-24 ENCOUNTER — LAB VISIT (OUTPATIENT)
Dept: LAB | Facility: OTHER | Age: 65
End: 2020-06-24
Payer: MEDICARE

## 2020-06-24 DIAGNOSIS — Z20.822 SUSPECTED COVID-19 VIRUS INFECTION: ICD-10-CM

## 2020-06-24 PROCEDURE — U0003 INFECTIOUS AGENT DETECTION BY NUCLEIC ACID (DNA OR RNA); SEVERE ACUTE RESPIRATORY SYNDROME CORONAVIRUS 2 (SARS-COV-2) (CORONAVIRUS DISEASE [COVID-19]), AMPLIFIED PROBE TECHNIQUE, MAKING USE OF HIGH THROUGHPUT TECHNOLOGIES AS DESCRIBED BY CMS-2020-01-R: HCPCS

## 2020-06-28 LAB — SARS-COV-2 RNA RESP QL NAA+PROBE: NOT DETECTED

## 2020-07-01 ENCOUNTER — LAB VISIT (OUTPATIENT)
Dept: LAB | Facility: OTHER | Age: 65
End: 2020-07-01
Payer: MEDICARE

## 2020-07-01 DIAGNOSIS — Z20.822 SUSPECTED COVID-19 VIRUS INFECTION: ICD-10-CM

## 2020-07-01 PROCEDURE — U0003 INFECTIOUS AGENT DETECTION BY NUCLEIC ACID (DNA OR RNA); SEVERE ACUTE RESPIRATORY SYNDROME CORONAVIRUS 2 (SARS-COV-2) (CORONAVIRUS DISEASE [COVID-19]), AMPLIFIED PROBE TECHNIQUE, MAKING USE OF HIGH THROUGHPUT TECHNOLOGIES AS DESCRIBED BY CMS-2020-01-R: HCPCS

## 2020-07-05 LAB — SARS-COV-2 RNA RESP QL NAA+PROBE: NOT DETECTED

## 2020-08-17 ENCOUNTER — OFFICE VISIT (OUTPATIENT)
Dept: FAMILY MEDICINE | Facility: HOSPITAL | Age: 65
End: 2020-08-17
Attending: FAMILY MEDICINE
Payer: MEDICARE

## 2020-08-17 VITALS
HEART RATE: 72 BPM | WEIGHT: 147.06 LBS | HEIGHT: 68 IN | BODY MASS INDEX: 22.29 KG/M2 | DIASTOLIC BLOOD PRESSURE: 93 MMHG | SYSTOLIC BLOOD PRESSURE: 185 MMHG

## 2020-08-17 DIAGNOSIS — I10 ESSENTIAL HYPERTENSION: ICD-10-CM

## 2020-08-17 DIAGNOSIS — Z09 HOSPITAL DISCHARGE FOLLOW-UP: Primary | ICD-10-CM

## 2020-08-17 DIAGNOSIS — E11.69 HYPERLIPIDEMIA ASSOCIATED WITH TYPE 2 DIABETES MELLITUS: ICD-10-CM

## 2020-08-17 DIAGNOSIS — Z86.73 HISTORY OF ISCHEMIC RIGHT MCA STROKE: ICD-10-CM

## 2020-08-17 DIAGNOSIS — E11.8 TYPE 2 DIABETES MELLITUS WITH COMPLICATION, WITH LONG-TERM CURRENT USE OF INSULIN: ICD-10-CM

## 2020-08-17 DIAGNOSIS — I50.32 CHRONIC HEART FAILURE WITH PRESERVED EJECTION FRACTION: ICD-10-CM

## 2020-08-17 DIAGNOSIS — Z79.4 TYPE 2 DIABETES MELLITUS WITH COMPLICATION, WITH LONG-TERM CURRENT USE OF INSULIN: ICD-10-CM

## 2020-08-17 DIAGNOSIS — E78.5 HYPERLIPIDEMIA ASSOCIATED WITH TYPE 2 DIABETES MELLITUS: ICD-10-CM

## 2020-08-17 DIAGNOSIS — N18.4 STAGE 4 CHRONIC KIDNEY DISEASE: ICD-10-CM

## 2020-08-17 PROCEDURE — 99213 OFFICE O/P EST LOW 20 MIN: CPT | Performed by: STUDENT IN AN ORGANIZED HEALTH CARE EDUCATION/TRAINING PROGRAM

## 2020-08-17 RX ORDER — ESCITALOPRAM OXALATE 20 MG/1
20 TABLET ORAL DAILY
Qty: 30 TABLET | Refills: 11 | Status: ON HOLD | OUTPATIENT
Start: 2020-08-17 | End: 2020-09-18 | Stop reason: HOSPADM

## 2020-08-17 RX ORDER — BROMPHENIRAMINE MALEATE, DEXTROMETHORPHAN HBR, PHENYLEPHRINE HCL, DIPHENHYDRAMINE HCL, PHENYLEPHRINE HCL 0.52G
0.52 KIT ORAL DAILY
Status: ON HOLD | COMMUNITY
End: 2020-09-18 | Stop reason: HOSPADM

## 2020-08-17 RX ORDER — LOSARTAN POTASSIUM 100 MG/1
100 TABLET ORAL DAILY
Qty: 90 TABLET | Refills: 3 | Status: ON HOLD | OUTPATIENT
Start: 2020-08-17 | End: 2020-09-18 | Stop reason: HOSPADM

## 2020-08-17 RX ORDER — CARBAMAZEPINE 100 MG/1
100 TABLET, CHEWABLE ORAL 2 TIMES DAILY
Qty: 60 TABLET | Refills: 11 | Status: ON HOLD | OUTPATIENT
Start: 2020-08-17 | End: 2020-09-18 | Stop reason: HOSPADM

## 2020-08-17 RX ORDER — MEMANTINE HYDROCHLORIDE 5 MG/1
5 TABLET ORAL 2 TIMES DAILY
COMMUNITY
End: 2020-08-17 | Stop reason: SDUPTHER

## 2020-08-17 RX ORDER — MIRTAZAPINE 30 MG/1
30 TABLET, FILM COATED ORAL NIGHTLY
Qty: 30 TABLET | Refills: 11 | Status: ON HOLD | OUTPATIENT
Start: 2020-08-17 | End: 2020-09-18 | Stop reason: HOSPADM

## 2020-08-17 RX ORDER — DICYCLOMINE HYDROCHLORIDE 20 MG/1
20 TABLET ORAL EVERY 8 HOURS
Qty: 90 TABLET | Refills: 11 | Status: ON HOLD | OUTPATIENT
Start: 2020-08-17 | End: 2020-09-18 | Stop reason: HOSPADM

## 2020-08-17 RX ORDER — QUETIAPINE FUMARATE 25 MG/1
25 TABLET, FILM COATED ORAL DAILY
Qty: 30 TABLET | Refills: 11 | Status: ON HOLD | OUTPATIENT
Start: 2020-08-17 | End: 2020-09-18 | Stop reason: HOSPADM

## 2020-08-17 RX ORDER — DICYCLOMINE HYDROCHLORIDE 20 MG/1
20 TABLET ORAL EVERY 8 HOURS
COMMUNITY
End: 2020-08-17 | Stop reason: SDUPTHER

## 2020-08-17 RX ORDER — MIRTAZAPINE 30 MG/1
30 TABLET, FILM COATED ORAL NIGHTLY
COMMUNITY
End: 2020-08-17 | Stop reason: SDUPTHER

## 2020-08-17 RX ORDER — CLOPIDOGREL BISULFATE 75 MG/1
75 TABLET ORAL DAILY
Qty: 90 TABLET | Refills: 3 | Status: ON HOLD | OUTPATIENT
Start: 2020-08-17 | End: 2020-09-18 | Stop reason: HOSPADM

## 2020-08-17 RX ORDER — INSULIN ASPART 100 [IU]/ML
2 INJECTION, SOLUTION INTRAVENOUS; SUBCUTANEOUS
Qty: 5.4 ML | Refills: 3 | Status: ON HOLD | OUTPATIENT
Start: 2020-08-17 | End: 2020-09-18 | Stop reason: HOSPADM

## 2020-08-17 RX ORDER — ALLOPURINOL 100 MG/1
200 TABLET ORAL DAILY
Qty: 90 TABLET | Refills: 3 | Status: ON HOLD | OUTPATIENT
Start: 2020-08-17 | End: 2020-09-18 | Stop reason: HOSPADM

## 2020-08-17 RX ORDER — CALCIUM ACETATE 667 MG/1
667 TABLET ORAL
Qty: 270 TABLET | Refills: 3 | Status: ON HOLD | OUTPATIENT
Start: 2020-08-17 | End: 2020-09-18 | Stop reason: HOSPADM

## 2020-08-17 RX ORDER — INSULIN GLARGINE 300 U/ML
10 INJECTION, SOLUTION SUBCUTANEOUS DAILY
Qty: 1.5 ML | Refills: 3 | Status: ON HOLD | OUTPATIENT
Start: 2020-08-17 | End: 2020-09-18 | Stop reason: HOSPADM

## 2020-08-17 RX ORDER — MEMANTINE HYDROCHLORIDE 5 MG/1
5 TABLET ORAL 2 TIMES DAILY
Qty: 60 TABLET | Refills: 11 | Status: ON HOLD | OUTPATIENT
Start: 2020-08-17 | End: 2020-09-18 | Stop reason: HOSPADM

## 2020-08-17 RX ORDER — ESCITALOPRAM OXALATE 20 MG/1
20 TABLET ORAL DAILY
COMMUNITY
End: 2020-08-17 | Stop reason: SDUPTHER

## 2020-08-17 RX ORDER — CARBAMAZEPINE 100 MG/1
TABLET, CHEWABLE ORAL 2 TIMES DAILY
COMMUNITY
End: 2020-08-17 | Stop reason: SDUPTHER

## 2020-08-17 RX ORDER — PEN NEEDLE, DIABETIC 29 G X1/2"
NEEDLE, DISPOSABLE MISCELLANEOUS
Qty: 360 EACH | Refills: 3 | Status: ON HOLD | OUTPATIENT
Start: 2020-08-17 | End: 2020-09-18 | Stop reason: HOSPADM

## 2020-08-17 RX ORDER — QUETIAPINE FUMARATE 25 MG/1
25 TABLET, FILM COATED ORAL DAILY
COMMUNITY
End: 2020-08-17 | Stop reason: SDUPTHER

## 2020-08-17 RX ORDER — LANCETS
1 EACH MISCELLANEOUS 4 TIMES DAILY
Qty: 200 EACH | Refills: 5 | Status: ON HOLD | OUTPATIENT
Start: 2020-08-17 | End: 2020-09-18 | Stop reason: HOSPADM

## 2020-08-17 RX ORDER — ATORVASTATIN CALCIUM 20 MG/1
20 TABLET, FILM COATED ORAL DAILY
Qty: 90 TABLET | Refills: 3 | Status: ON HOLD | OUTPATIENT
Start: 2020-08-17 | End: 2020-09-18 | Stop reason: HOSPADM

## 2020-08-17 RX ORDER — CARVEDILOL 3.12 MG/1
3.12 TABLET ORAL 2 TIMES DAILY
Qty: 180 TABLET | Refills: 1 | Status: ON HOLD | OUTPATIENT
Start: 2020-08-17 | End: 2020-09-18 | Stop reason: HOSPADM

## 2020-08-17 RX ORDER — FAMOTIDINE 20 MG/1
20 TABLET, FILM COATED ORAL 2 TIMES DAILY PRN
Qty: 180 TABLET | Refills: 3 | Status: ON HOLD | OUTPATIENT
Start: 2020-08-17 | End: 2020-09-18 | Stop reason: HOSPADM

## 2020-08-18 NOTE — PROGRESS NOTES
"Progress Note  [facility]    Subjective:      Ming Boateng is a 65 y.o. male with past medical history of CVA, hyperlipidemia, CHF, type 2 diabetes, hypertension, depression, GERD, dementia came to clinic today with daughter for medication refill.  Daughter expresses a lot of concern as her father was recently discharged from [facility] from which he was previously at nursing facility but due to full with suspected as psychiatric issues patient was sent to the emergency department and is no longer excepted back to facility.  Daughter is overwhelmed as she is currently a student and the reason he was sent to nursing home and [facility] was to give her help in being able to pursue her studies.  Now she is not sure what to do and states that home health as comes to see her father but does not seem to have a practical solution.     This is my 1st time seeing patient and understand that he was previously a patient of Dr. Ramirez in our clinic.     Review of Systems   Unable to perform ROS: Dementia         Objective:   BP (!) 185/93   Pulse 72   Ht 5' 8" (1.727 m)   Wt 66.7 kg (147 lb 0.8 oz)   BMI 22.36 kg/m²      Physical Exam   Constitutional: He is oriented to person, place, and time and well-developed, well-nourished, and in no distress.   In wheelchair   HENT:   Head: Normocephalic and atraumatic.   Eyes: Pupils are equal, round, and reactive to light.   Cardiovascular: Normal rate, regular rhythm, normal heart sounds and intact distal pulses.   Pulmonary/Chest: Effort normal and breath sounds normal.   Abdominal: Soft. Bowel sounds are normal.   Musculoskeletal: Normal range of motion.   Neurological: He is alert and oriented to person, place, and time.   Psychiatric: Affect normal.        Assessment:   65 y.o. with        1. Hospital discharge follow-up    2. History of ischemic right MCA stroke    3. Type 2 diabetes mellitus with complication, with long-term current use of insulin    4. " "Chronic heart failure with preserved ejection fraction    5. Stage 4 chronic kidney disease    6. Essential hypertension    7. Hyperlipidemia associated with type 2 diabetes mellitus         Plan:     Orders Placed This Encounter    allopurinoL (ZYLOPRIM) 100 MG tablet    atorvastatin (LIPITOR) 20 MG tablet    calcium acetate,phosphat bind, (PHOSLO) 667 mg tablet    carBAMazepine (TEGRETOL) 100 mg chewable tablet    carvediloL (COREG) 3.125 MG tablet    clopidogreL (PLAVIX) 75 mg tablet    escitalopram oxalate (LEXAPRO) 20 MG tablet    famotidine (PEPCID) 20 MG tablet    insulin aspart U-100 (NOVOLOG) 100 unit/mL Crtg    lancets (LANCETS,ULTRA THIN) Misc    TOUJEO SOLOSTAR U-300 INSULIN 300 unit/mL (1.5 mL) InPn pen    QUEtiapine (SEROQUEL) 25 MG Tab    pen needle, diabetic 29 gauge x 1/2" Ndle    multivitamin capsule    mirtazapine (REMERON) 30 MG tablet    memantine (NAMENDA) 5 MG Tab    blood sugar diagnostic Strp    dicyclomine (BENTYL) 20 mg tablet    losartan (COZAAR) 100 MG tablet       Will plan to speak with home health about possible options moving forward. Pt has appt with psych beginning of September, not sure if needs to be on so many mood stabilizers? Pt would be best suited in facility as daughter is overwhelmed and cannot take care of pt on her own.     The patient's diagnosis and medications were discussed.    I will review labs and notify patient with results either by mail or contact by phone.    Deep Ashford MD, MSc   [facility] Family Medicine PGY-2  08/17/2020      *This note was dictated using the M*Modal Fluency Direct word recognition program. There are word rescognition mistakes that are occasionally missed on review.   "

## 2020-08-19 ENCOUNTER — HOSPITAL ENCOUNTER (EMERGENCY)
Facility: HOSPITAL | Age: 65
Discharge: PSYCHIATRIC OR SUBSTANCE USE FACILITY | End: 2020-08-20
Attending: EMERGENCY MEDICINE
Payer: COMMERCIAL

## 2020-08-19 DIAGNOSIS — N18.30 CKD (CHRONIC KIDNEY DISEASE) STAGE 3, GFR 30-59 ML/MIN: ICD-10-CM

## 2020-08-19 DIAGNOSIS — I10 ESSENTIAL HYPERTENSION: ICD-10-CM

## 2020-08-19 DIAGNOSIS — Z00.8 ENCOUNTER FOR PSYCHOLOGICAL EVALUATION: ICD-10-CM

## 2020-08-19 DIAGNOSIS — F03.918 DEMENTIA WITH AGGRESSIVE BEHAVIOR: Primary | ICD-10-CM

## 2020-08-19 DIAGNOSIS — T67.9XXA HEAT EXPOSURE, INITIAL ENCOUNTER: ICD-10-CM

## 2020-08-19 LAB
ALBUMIN SERPL BCP-MCNC: 4.2 G/DL (ref 3.5–5.2)
ALP SERPL-CCNC: 97 U/L (ref 38–126)
ALT SERPL W/O P-5'-P-CCNC: 14 U/L (ref 10–44)
AMPHET+METHAMPHET UR QL: NEGATIVE
ANION GAP SERPL CALC-SCNC: 10 MMOL/L (ref 8–16)
APAP SERPL-MCNC: <10 UG/ML (ref 10–20)
AST SERPL-CCNC: 25 U/L (ref 15–46)
BACTERIA #/AREA URNS AUTO: ABNORMAL /HPF
BARBITURATES UR QL SCN>200 NG/ML: NEGATIVE
BASOPHILS # BLD AUTO: 0.09 K/UL (ref 0–0.2)
BASOPHILS NFR BLD: 1.3 % (ref 0–1.9)
BENZODIAZ UR QL SCN>200 NG/ML: NEGATIVE
BILIRUB SERPL-MCNC: 0.2 MG/DL (ref 0.1–1)
BILIRUB UR QL STRIP: NEGATIVE
BUN SERPL-MCNC: 32 MG/DL (ref 2–20)
BZE UR QL SCN: NEGATIVE
CALCIUM SERPL-MCNC: 9.5 MG/DL (ref 8.7–10.5)
CANNABINOIDS UR QL SCN: NEGATIVE
CHLORIDE SERPL-SCNC: 106 MMOL/L (ref 95–110)
CK SERPL-CCNC: 73 U/L (ref 55–170)
CLARITY UR REFRACT.AUTO: CLEAR
CO2 SERPL-SCNC: 26 MMOL/L (ref 23–29)
COLOR UR AUTO: ABNORMAL
CREAT SERPL-MCNC: 2.03 MG/DL (ref 0.5–1.4)
CREAT UR-MCNC: 51.9 MG/DL (ref 23–375)
DIFFERENTIAL METHOD: ABNORMAL
EOSINOPHIL # BLD AUTO: 0.2 K/UL (ref 0–0.5)
EOSINOPHIL NFR BLD: 3.5 % (ref 0–8)
ERYTHROCYTE [DISTWIDTH] IN BLOOD BY AUTOMATED COUNT: 13.1 % (ref 11.5–14.5)
EST. GFR  (AFRICAN AMERICAN): 38.6 ML/MIN/1.73 M^2
EST. GFR  (NON AFRICAN AMERICAN): 33.4 ML/MIN/1.73 M^2
ETHANOL SERPL-MCNC: <10 MG/DL
GLUCOSE SERPL-MCNC: 123 MG/DL (ref 70–110)
GLUCOSE UR QL STRIP: NEGATIVE
HCT VFR BLD AUTO: 31.8 % (ref 40–54)
HGB BLD-MCNC: 10.6 G/DL (ref 14–18)
HGB UR QL STRIP: ABNORMAL
HYALINE CASTS UR QL AUTO: 0 /LPF
IMM GRANULOCYTES # BLD AUTO: 0.02 K/UL (ref 0–0.04)
IMM GRANULOCYTES NFR BLD AUTO: 0.3 % (ref 0–0.5)
KETONES UR QL STRIP: NEGATIVE
LACTATE SERPL-SCNC: 1.1 MMOL/L (ref 0.5–2.2)
LACTATE SERPL-SCNC: 3.1 MMOL/L (ref 0.5–2.2)
LEUKOCYTE ESTERASE UR QL STRIP: NEGATIVE
LYMPHOCYTES # BLD AUTO: 1.8 K/UL (ref 1–4.8)
LYMPHOCYTES NFR BLD: 26.6 % (ref 18–48)
MCH RBC QN AUTO: 27.9 PG (ref 27–31)
MCHC RBC AUTO-ENTMCNC: 33.3 G/DL (ref 32–36)
MCV RBC AUTO: 84 FL (ref 82–98)
METHADONE UR QL SCN>300 NG/ML: NEGATIVE
MICROSCOPIC COMMENT: ABNORMAL
MONOCYTES # BLD AUTO: 0.5 K/UL (ref 0.3–1)
MONOCYTES NFR BLD: 8 % (ref 4–15)
NEUTROPHILS # BLD AUTO: 4.1 K/UL (ref 1.8–7.7)
NEUTROPHILS NFR BLD: 60.3 % (ref 38–73)
NITRITE UR QL STRIP: NEGATIVE
NRBC BLD-RTO: 0 /100 WBC
OPIATES UR QL SCN: NEGATIVE
PCP UR QL SCN>25 NG/ML: NEGATIVE
PH UR STRIP: 7 [PH] (ref 5–8)
PLATELET # BLD AUTO: 294 K/UL (ref 150–350)
PMV BLD AUTO: 9.7 FL (ref 9.2–12.9)
POTASSIUM SERPL-SCNC: 4.5 MMOL/L (ref 3.5–5.1)
PROT SERPL-MCNC: 7.7 G/DL (ref 6–8.4)
PROT UR QL STRIP: ABNORMAL
RBC # BLD AUTO: 3.8 M/UL (ref 4.6–6.2)
RBC #/AREA URNS AUTO: 5 /HPF (ref 0–4)
SALICYLATES SERPL-MCNC: <5 MG/DL (ref 15–30)
SARS-COV-2 RDRP RESP QL NAA+PROBE: NEGATIVE
SODIUM SERPL-SCNC: 142 MMOL/L (ref 136–145)
SP GR UR STRIP: 1.01 (ref 1–1.03)
SQUAMOUS #/AREA URNS AUTO: ABNORMAL /HPF
TOXICOLOGY INFORMATION: NORMAL
TSH SERPL DL<=0.005 MIU/L-ACNC: 2.54 UIU/ML (ref 0.4–4)
URN SPEC COLLECT METH UR: ABNORMAL
UROBILINOGEN UR STRIP-ACNC: NEGATIVE EU/DL
WBC # BLD AUTO: 6.77 K/UL (ref 3.9–12.7)
WBC #/AREA URNS AUTO: 0 /HPF (ref 0–5)

## 2020-08-19 PROCEDURE — 63600175 PHARM REV CODE 636 W HCPCS: Mod: ER | Performed by: EMERGENCY MEDICINE

## 2020-08-19 PROCEDURE — 93005 ELECTROCARDIOGRAM TRACING: CPT | Mod: ER

## 2020-08-19 PROCEDURE — 82550 ASSAY OF CK (CPK): CPT | Mod: ER

## 2020-08-19 PROCEDURE — 84145 PROCALCITONIN (PCT): CPT | Mod: ER

## 2020-08-19 PROCEDURE — 80053 COMPREHEN METABOLIC PANEL: CPT | Mod: ER

## 2020-08-19 PROCEDURE — 93010 EKG 12-LEAD: ICD-10-PCS | Mod: ,,, | Performed by: INTERNAL MEDICINE

## 2020-08-19 PROCEDURE — 80329 ANALGESICS NON-OPIOID 1 OR 2: CPT | Mod: ER

## 2020-08-19 PROCEDURE — 85025 COMPLETE CBC W/AUTO DIFF WBC: CPT | Mod: ER

## 2020-08-19 PROCEDURE — 83605 ASSAY OF LACTIC ACID: CPT | Mod: 91,ER

## 2020-08-19 PROCEDURE — 82962 GLUCOSE BLOOD TEST: CPT | Mod: ER

## 2020-08-19 PROCEDURE — 25000003 PHARM REV CODE 250: Mod: ER | Performed by: EMERGENCY MEDICINE

## 2020-08-19 PROCEDURE — 96374 THER/PROPH/DIAG INJ IV PUSH: CPT | Mod: ER

## 2020-08-19 PROCEDURE — 93010 ELECTROCARDIOGRAM REPORT: CPT | Mod: ,,, | Performed by: INTERNAL MEDICINE

## 2020-08-19 PROCEDURE — 99285 EMERGENCY DEPT VISIT HI MDM: CPT | Mod: 25,ER

## 2020-08-19 PROCEDURE — 81000 URINALYSIS NONAUTO W/SCOPE: CPT | Mod: ER,59

## 2020-08-19 PROCEDURE — 96375 TX/PRO/DX INJ NEW DRUG ADDON: CPT | Mod: ER

## 2020-08-19 PROCEDURE — 80307 DRUG TEST PRSMV CHEM ANLYZR: CPT | Mod: ER

## 2020-08-19 PROCEDURE — 87040 BLOOD CULTURE FOR BACTERIA: CPT | Mod: ER

## 2020-08-19 PROCEDURE — 80320 DRUG SCREEN QUANTALCOHOLS: CPT | Mod: ER

## 2020-08-19 PROCEDURE — 84443 ASSAY THYROID STIM HORMONE: CPT | Mod: ER

## 2020-08-19 PROCEDURE — 96372 THER/PROPH/DIAG INJ SC/IM: CPT | Mod: ER,59

## 2020-08-19 PROCEDURE — 96361 HYDRATE IV INFUSION ADD-ON: CPT | Mod: ER

## 2020-08-19 PROCEDURE — U0002 COVID-19 LAB TEST NON-CDC: HCPCS | Mod: ER

## 2020-08-19 RX ORDER — LABETALOL HYDROCHLORIDE 5 MG/ML
20 INJECTION, SOLUTION INTRAVENOUS
Status: COMPLETED | OUTPATIENT
Start: 2020-08-19 | End: 2020-08-19

## 2020-08-19 RX ADMIN — LABETALOL HYDROCHLORIDE 20 MG: 5 INJECTION, SOLUTION INTRAVENOUS at 11:08

## 2020-08-19 RX ADMIN — SODIUM CHLORIDE 2178 ML: 0.9 INJECTION, SOLUTION INTRAVENOUS at 07:08

## 2020-08-19 RX ADMIN — LORAZEPAM 1 MG: 2 INJECTION INTRAMUSCULAR; INTRAVENOUS at 08:08

## 2020-08-20 VITALS
HEART RATE: 61 BPM | BODY MASS INDEX: 25.11 KG/M2 | SYSTOLIC BLOOD PRESSURE: 179 MMHG | DIASTOLIC BLOOD PRESSURE: 81 MMHG | RESPIRATION RATE: 18 BRPM | OXYGEN SATURATION: 100 % | TEMPERATURE: 98 F | WEIGHT: 160 LBS | HEIGHT: 67 IN

## 2020-08-20 LAB
POCT GLUCOSE: 133 MG/DL (ref 70–110)
POCT GLUCOSE: 77 MG/DL (ref 70–110)
PROCALCITONIN SERPL IA-MCNC: 0.04 NG/ML

## 2020-08-20 PROCEDURE — 82962 GLUCOSE BLOOD TEST: CPT | Mod: ER,91

## 2020-08-20 PROCEDURE — 25000003 PHARM REV CODE 250: Mod: ER | Performed by: EMERGENCY MEDICINE

## 2020-08-20 PROCEDURE — 99203 PR OFFICE/OUTPT VISIT, NEW, LEVL III, 30-44 MIN: ICD-10-PCS | Mod: 95,,, | Performed by: PSYCHIATRY & NEUROLOGY

## 2020-08-20 PROCEDURE — S0166 INJ OLANZAPINE 2.5MG: HCPCS | Mod: ER | Performed by: EMERGENCY MEDICINE

## 2020-08-20 PROCEDURE — 99203 OFFICE O/P NEW LOW 30 MIN: CPT | Mod: 95,,, | Performed by: PSYCHIATRY & NEUROLOGY

## 2020-08-20 PROCEDURE — 25000003 PHARM REV CODE 250: Mod: ER | Performed by: FAMILY MEDICINE

## 2020-08-20 RX ORDER — CLONIDINE HYDROCHLORIDE 0.1 MG/1
0.1 TABLET ORAL
Status: COMPLETED | OUTPATIENT
Start: 2020-08-20 | End: 2020-08-20

## 2020-08-20 RX ORDER — OLANZAPINE 10 MG/2ML
10 INJECTION, POWDER, FOR SOLUTION INTRAMUSCULAR
Status: COMPLETED | OUTPATIENT
Start: 2020-08-20 | End: 2020-08-20

## 2020-08-20 RX ADMIN — CLONIDINE HYDROCHLORIDE 0.1 MG: 0.1 TABLET ORAL at 10:08

## 2020-08-20 RX ADMIN — OLANZAPINE 10 MG: 10 INJECTION, POWDER, LYOPHILIZED, FOR SOLUTION INTRAMUSCULAR at 02:08

## 2020-08-20 NOTE — ED NOTES
[facility] arrived to transport pt to [facility]. Pt escorted out via wheelchair and assisted into van with no difficulty. Paperwork given to transport drivers.

## 2020-08-20 NOTE — ED NOTES
Pt calm and cooperative. Pt removed from restraints. Pt provided cereal and juice. Pt sitting up eating. PCT at bedside to assist.

## 2020-08-20 NOTE — ED NOTES
MD notified of elevated BP. Clonidine ordered and given. MD also notified of . No new orders given at this time.

## 2020-08-20 NOTE — CONSULTS
"[facility]  Psychiatry  Telepsychiatry Consult Note    Please see previous notes:  Patient agreeable to consultation via telepsychiatry.  Tele-Consultation from Psychiatry started: 8/20/2020 at 0000  The chief complaint leading to psychiatric consultation is: NCD w/behavioral disturbances  This consultation was requested by dr vanegas, the Emergency Department attending physician.  The location of the consulting psychiatrist is [address] [place] [address].  The patient location is  [facility] EMERGENCY DEPARTMENT   The patient arrived at the ED at: 2300    Also present with the patient at the time of the consultation: ed rn and md  Patient Identification:   Ming Boateng is a 65 y.o. male.  Patient information was obtained from relative(s) and past medical records.  Patient presented involuntarily to the Emergency Department by ambulance where the patient received see Ambulance Run Sheet prior to arrival.    Consults  Subjective:     History of Present Illness: This is a 64 y/o HM that presents to the ED after his daughter called emergency services, for his increasingly combative and disorganized behavior. The pt's native language is [place]uneian. The HPI is obtained through collateral from his daughter = Nica Boateng = [phone]. She reports, that the pt was dx with dementia in March 2020, after having progressive memory loss. His FM MD, at that time, recommend placement in a NH. He was placed and did poorly, b/c as his daughter states "they did not have memory care". Two weeks ago she brought the pt home to live with her and her aunt, but he has had worsening behavior. He has been wandering and combative with his family. He was combative with Emergency personal PTA and on arrival. He required prn medication. The daughter reports, that he has been in the psychiatric unit twice, 2/2 his behavioral disturbances. Prior to his dementia, he had no previous psychiatric hx. He is currently on low dose namenda, " "seroquel, tegretol and lexapro. Unclear why not on aricept, and only 5 mg of namenda. The pt has multiple medical comorbidities and per his MR, was recently seen by his PCP on the 17th of this month. The daughter expresses concern about placement and requests he stay within [facility] and [facility], as oppose to going to [facility], again. At this time, the pt needs admission. He is a potential danger to himself or others and is GD at this time.         Psychiatric Mental Status Exam:  Arousal: lethargic  Sensorium/Orientation: oriented to grossly intact, person  Behavior/Cooperation: cooperative   Speech: normal tone, normal rate, normal pitch, normal volume  Language: grossly intact  Mood: " ok "   Affect: flat  Thought Process: concrete  Thought Content:   Auditory hallucinations: NO  Visual hallucinations: NO  Paranoia: YES:      Delusions:  NO  Suicidal ideation: NO  Homicidal ideation: NO  Insight: poor awareness of illness  Judgment: impaired due to NCD      Past Medical History:   Past Medical History:   Diagnosis Date    Acute right MCA stroke 3/3/2019    CHF (congestive heart failure)     Diabetes mellitus     Diabetes mellitus, type 2     Hyperlipidemia 6/17/2016    Hypertension     Renal disorder     CKD    Stroke     mini speech difficulty, right sided weakness      Laboratory Data:   Labs Reviewed   CBC W/ AUTO DIFFERENTIAL - Abnormal; Notable for the following components:       Result Value    RBC 3.80 (*)     Hemoglobin 10.6 (*)     Hematocrit 31.8 (*)     All other components within normal limits   COMPREHENSIVE METABOLIC PANEL - Abnormal; Notable for the following components:    Glucose 123 (*)     BUN, Bld 32 (*)     Creatinine 2.03 (*)     eGFR if  38.6 (*)     eGFR if non  33.4 (*)     All other components within normal limits   SALICYLATE LEVEL - Abnormal; Notable for the following components:    Salicylate Lvl <5.0 (*)     All other " components within normal limits   URINALYSIS, REFLEX TO URINE CULTURE - Abnormal; Notable for the following components:    Protein, UA 2+ (*)     Occult Blood UA 1+ (*)     All other components within normal limits    Narrative:     Specimen Source->Urine   LACTIC ACID, PLASMA - Abnormal; Notable for the following components:    Lactate (Lactic Acid) 3.1 (*)     All other components within normal limits   URINALYSIS MICROSCOPIC - Abnormal; Notable for the following components:    RBC, UA 5 (*)     All other components within normal limits    Narrative:     Specimen Source->Urine   CULTURE, BLOOD   CULTURE, BLOOD   SARS-COV-2 RNA AMPLIFICATION, QUAL   DRUG SCREEN PANEL, URINE EMERGENCY    Narrative:     Specimen Source->Urine   ALCOHOL,MEDICAL (ETHANOL)   TSH   CK   ACETAMINOPHEN LEVEL   LACTIC ACID, PLASMA   PROCALCITONIN     Review of patient's allergies indicates:   Allergen Reactions    Cayenne Anaphylaxis     Throat swells up     Cayenne pepper     Grapefruit    Medications in ER:   Medications   sodium chloride 0.9% bolus 2,178 mL (0 mL/kg × 72.6 kg Intravenous Stopped 8/19/20 2158)   lorazepam (ATIVAN) injection 1 mg (1 mg Intravenous Given 8/19/20 2052)   labetaloL injection 20 mg (20 mg Intravenous Given 8/19/20 2338)   Medications at home: per MAR  No new subjective & objective note has been filed under this hospital service since the last note was generated.      Assessment - Diagnosis - Goals:     Diagnosis/Impression:   - Major Neurocognitive Disorder with Behavioral Disturbances    Rec:   - PEC  - [facility] for [facility]     Time with patient: 30 min  More than 50% of the time was spent counseling/coordinating care  Consulting clinician was informed of the encounter and consult note.  Consultation ended: 8/20/2020 at 0030    Ja Keating MD, O   Psychiatry  [facility]
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
## 2020-08-20 NOTE — ED NOTES
Pt daughter, Nica Boateng called to see if placement had been located.  Updated that no known bed located at this time and that pt is resting quietly at present. Daughter left phone number, [phone].

## 2020-08-20 NOTE — ED NOTES
Attempted to call report to [facility]. Nurse states bed is not available at this time. States they are waiting for pt to be discharged. Contacted CPT and spoke with Nisha about bed not being available at this time. Nisha states to wait and call report at noon. States bed will be available. Charge nurse aware of situation.

## 2020-08-20 NOTE — ED NOTES
Pt resting quietly on stretcher with eyes closed. Pt remains calm. NAD noted. Sitter remains in view of pt for observation.

## 2020-08-25 LAB
BACTERIA BLD CULT: NORMAL
BACTERIA BLD CULT: NORMAL

## 2020-09-05 ENCOUNTER — HOSPITAL ENCOUNTER (EMERGENCY)
Facility: HOSPITAL | Age: 65
Discharge: ANOTHER HOSPITAL | End: 2020-09-06
Attending: EMERGENCY MEDICINE
Payer: MEDICARE

## 2020-09-05 DIAGNOSIS — R50.9 FEVER: ICD-10-CM

## 2020-09-05 DIAGNOSIS — U07.1 COVID-19 VIRUS INFECTION: Primary | ICD-10-CM

## 2020-09-05 DIAGNOSIS — K92.2 GASTROINTESTINAL HEMORRHAGE, UNSPECIFIED GASTROINTESTINAL HEMORRHAGE TYPE: ICD-10-CM

## 2020-09-05 DIAGNOSIS — Z79.01 CURRENT USE OF LONG TERM ANTICOAGULATION: ICD-10-CM

## 2020-09-05 DIAGNOSIS — N30.00 ACUTE CYSTITIS WITHOUT HEMATURIA: ICD-10-CM

## 2020-09-05 DIAGNOSIS — B34.9 ACUTE VIRAL SYNDROME: ICD-10-CM

## 2020-09-05 DIAGNOSIS — D64.9 ANEMIA: ICD-10-CM

## 2020-09-05 LAB
ABO + RH BLD: NORMAL
ALBUMIN SERPL BCP-MCNC: 3 G/DL (ref 3.5–5.2)
ALP SERPL-CCNC: 98 U/L (ref 55–135)
ALT SERPL W/O P-5'-P-CCNC: 18 U/L (ref 10–44)
ANION GAP SERPL CALC-SCNC: 12 MMOL/L (ref 8–16)
ANISOCYTOSIS BLD QL SMEAR: ABNORMAL
APTT BLDCRRT: 36.1 SEC (ref 21–32)
AST SERPL-CCNC: 20 U/L (ref 10–40)
BACTERIA #/AREA URNS HPF: ABNORMAL /HPF
BASOPHILS # BLD AUTO: 0.01 K/UL (ref 0–0.2)
BASOPHILS NFR BLD: 0.1 % (ref 0–1.9)
BILIRUB SERPL-MCNC: 0.2 MG/DL (ref 0.1–1)
BILIRUB UR QL STRIP: NEGATIVE
BLD GP AB SCN CELLS X3 SERPL QL: NORMAL
BLD PROD TYP BPU: NORMAL
BLOOD UNIT EXPIRATION DATE: NORMAL
BLOOD UNIT TYPE CODE: 5100
BLOOD UNIT TYPE: NORMAL
BNP SERPL-MCNC: 50 PG/ML (ref 0–99)
BUN SERPL-MCNC: 48 MG/DL (ref 8–23)
CALCIUM SERPL-MCNC: 7.8 MG/DL (ref 8.7–10.5)
CHLORIDE SERPL-SCNC: 103 MMOL/L (ref 95–110)
CK SERPL-CCNC: 260 U/L (ref 20–200)
CLARITY UR: CLEAR
CO2 SERPL-SCNC: 21 MMOL/L (ref 23–29)
CODING SYSTEM: NORMAL
COLOR UR: YELLOW
CREAT SERPL-MCNC: 2.7 MG/DL (ref 0.5–1.4)
CRP SERPL-MCNC: 127.4 MG/L (ref 0–8.2)
DIFFERENTIAL METHOD: ABNORMAL
DISPENSE STATUS: NORMAL
EOSINOPHIL # BLD AUTO: 0 K/UL (ref 0–0.5)
EOSINOPHIL NFR BLD: 0.1 % (ref 0–8)
ERYTHROCYTE [DISTWIDTH] IN BLOOD BY AUTOMATED COUNT: 13.2 % (ref 11.5–14.5)
EST. GFR  (AFRICAN AMERICAN): 27 ML/MIN/1.73 M^2
EST. GFR  (NON AFRICAN AMERICAN): 24 ML/MIN/1.73 M^2
FERRITIN SERPL-MCNC: 319 NG/ML (ref 20–300)
GLUCOSE SERPL-MCNC: 315 MG/DL (ref 70–110)
GLUCOSE UR QL STRIP: ABNORMAL
HCT VFR BLD AUTO: 13.7 % (ref 40–54)
HGB BLD-MCNC: 4.7 G/DL (ref 14–18)
HGB UR QL STRIP: ABNORMAL
HYALINE CASTS #/AREA URNS LPF: 0 /LPF
HYPOCHROMIA BLD QL SMEAR: ABNORMAL
IMM GRANULOCYTES # BLD AUTO: 0.05 K/UL (ref 0–0.04)
IMM GRANULOCYTES NFR BLD AUTO: 0.4 % (ref 0–0.5)
INR PPP: 1 (ref 0.8–1.2)
KETONES UR QL STRIP: NEGATIVE
LACTATE SERPL-SCNC: 1.5 MMOL/L (ref 0.5–2.2)
LDH SERPL L TO P-CCNC: 256 U/L (ref 110–260)
LEUKOCYTE ESTERASE UR QL STRIP: NEGATIVE
LYMPHOCYTES # BLD AUTO: 1.8 K/UL (ref 1–4.8)
LYMPHOCYTES NFR BLD: 14 % (ref 18–48)
MCH RBC QN AUTO: 29 PG (ref 27–31)
MCHC RBC AUTO-ENTMCNC: 34.3 G/DL (ref 32–36)
MCV RBC AUTO: 85 FL (ref 82–98)
MICROSCOPIC COMMENT: ABNORMAL
MONOCYTES # BLD AUTO: 0.8 K/UL (ref 0.3–1)
MONOCYTES NFR BLD: 6.3 % (ref 4–15)
NEUTROPHILS # BLD AUTO: 10.4 K/UL (ref 1.8–7.7)
NEUTROPHILS NFR BLD: 79.1 % (ref 38–73)
NITRITE UR QL STRIP: NEGATIVE
NRBC BLD-RTO: 0 /100 WBC
OB PNL STL: POSITIVE
OVALOCYTES BLD QL SMEAR: ABNORMAL
PH UR STRIP: 6 [PH] (ref 5–8)
PLATELET # BLD AUTO: 267 K/UL (ref 150–350)
PLATELET BLD QL SMEAR: ABNORMAL
PMV BLD AUTO: 9.7 FL (ref 9.2–12.9)
POCT GLUCOSE: 306 MG/DL (ref 70–110)
POIKILOCYTOSIS BLD QL SMEAR: ABNORMAL
POTASSIUM SERPL-SCNC: 4.2 MMOL/L (ref 3.5–5.1)
PROCALCITONIN SERPL IA-MCNC: 0.49 NG/ML
PROT SERPL-MCNC: 6.1 G/DL (ref 6–8.4)
PROT UR QL STRIP: ABNORMAL
PROTHROMBIN TIME: 10.4 SEC (ref 9–12.5)
RBC # BLD AUTO: 1.62 M/UL (ref 4.6–6.2)
RBC #/AREA URNS HPF: 2 /HPF (ref 0–4)
SARS-COV-2 RDRP RESP QL NAA+PROBE: POSITIVE
SCHISTOCYTES BLD QL SMEAR: ABNORMAL
SODIUM SERPL-SCNC: 136 MMOL/L (ref 136–145)
SP GR UR STRIP: 1.02 (ref 1–1.03)
TRANS ERYTHROCYTES VOL PATIENT: NORMAL ML
TROPONIN I SERPL DL<=0.01 NG/ML-MCNC: 0.04 NG/ML (ref 0–0.03)
URN SPEC COLLECT METH UR: ABNORMAL
UROBILINOGEN UR STRIP-ACNC: NEGATIVE EU/DL
WBC # BLD AUTO: 13.11 K/UL (ref 3.9–12.7)
WBC #/AREA URNS HPF: 1 /HPF (ref 0–5)

## 2020-09-05 PROCEDURE — 93010 ELECTROCARDIOGRAM REPORT: CPT | Mod: ,,, | Performed by: INTERNAL MEDICINE

## 2020-09-05 PROCEDURE — 83880 ASSAY OF NATRIURETIC PEPTIDE: CPT

## 2020-09-05 PROCEDURE — 85025 COMPLETE CBC W/AUTO DIFF WBC: CPT

## 2020-09-05 PROCEDURE — 86920 COMPATIBILITY TEST SPIN: CPT

## 2020-09-05 PROCEDURE — 93005 ELECTROCARDIOGRAM TRACING: CPT

## 2020-09-05 PROCEDURE — 96375 TX/PRO/DX INJ NEW DRUG ADDON: CPT

## 2020-09-05 PROCEDURE — 83615 LACTATE (LD) (LDH) ENZYME: CPT

## 2020-09-05 PROCEDURE — 25000003 PHARM REV CODE 250: Performed by: NURSE PRACTITIONER

## 2020-09-05 PROCEDURE — 85610 PROTHROMBIN TIME: CPT

## 2020-09-05 PROCEDURE — 83605 ASSAY OF LACTIC ACID: CPT

## 2020-09-05 PROCEDURE — 63600175 PHARM REV CODE 636 W HCPCS: Performed by: NURSE PRACTITIONER

## 2020-09-05 PROCEDURE — 82272 OCCULT BLD FECES 1-3 TESTS: CPT

## 2020-09-05 PROCEDURE — U0002 COVID-19 LAB TEST NON-CDC: HCPCS

## 2020-09-05 PROCEDURE — 87040 BLOOD CULTURE FOR BACTERIA: CPT

## 2020-09-05 PROCEDURE — 82962 GLUCOSE BLOOD TEST: CPT

## 2020-09-05 PROCEDURE — 85730 THROMBOPLASTIN TIME PARTIAL: CPT

## 2020-09-05 PROCEDURE — 86140 C-REACTIVE PROTEIN: CPT

## 2020-09-05 PROCEDURE — 36430 TRANSFUSION BLD/BLD COMPNT: CPT

## 2020-09-05 PROCEDURE — 84484 ASSAY OF TROPONIN QUANT: CPT

## 2020-09-05 PROCEDURE — 84145 PROCALCITONIN (PCT): CPT

## 2020-09-05 PROCEDURE — 99285 EMERGENCY DEPT VISIT HI MDM: CPT | Mod: 25

## 2020-09-05 PROCEDURE — 82550 ASSAY OF CK (CPK): CPT

## 2020-09-05 PROCEDURE — 86850 RBC ANTIBODY SCREEN: CPT

## 2020-09-05 PROCEDURE — 96365 THER/PROPH/DIAG IV INF INIT: CPT

## 2020-09-05 PROCEDURE — 82728 ASSAY OF FERRITIN: CPT

## 2020-09-05 PROCEDURE — P9021 RED BLOOD CELLS UNIT: HCPCS

## 2020-09-05 PROCEDURE — 80053 COMPREHEN METABOLIC PANEL: CPT

## 2020-09-05 PROCEDURE — 81000 URINALYSIS NONAUTO W/SCOPE: CPT

## 2020-09-05 PROCEDURE — 93010 EKG 12-LEAD: ICD-10-PCS | Mod: ,,, | Performed by: INTERNAL MEDICINE

## 2020-09-05 RX ORDER — HYDROCODONE BITARTRATE AND ACETAMINOPHEN 500; 5 MG/1; MG/1
TABLET ORAL
Status: DISCONTINUED | OUTPATIENT
Start: 2020-09-05 | End: 2020-09-06 | Stop reason: HOSPADM

## 2020-09-05 RX ORDER — ACETAMINOPHEN 650 MG/20.3ML
500 LIQUID ORAL
Status: COMPLETED | OUTPATIENT
Start: 2020-09-05 | End: 2020-09-05

## 2020-09-05 RX ADMIN — ACETAMINOPHEN 499.51 MG: 160 SOLUTION ORAL at 08:09

## 2020-09-05 RX ADMIN — DEXTROSE 500 MG: 5 SOLUTION INTRAVENOUS at 11:09

## 2020-09-05 RX ADMIN — CEFTRIAXONE 1 G: 1 INJECTION, SOLUTION INTRAVENOUS at 10:09

## 2020-09-06 ENCOUNTER — HOSPITAL ENCOUNTER (INPATIENT)
Facility: HOSPITAL | Age: 65
LOS: 12 days | Discharge: HOSPICE | DRG: 178 | End: 2020-09-18
Attending: HOSPITALIST | Admitting: HOSPITALIST
Payer: MEDICARE

## 2020-09-06 VITALS
WEIGHT: 160 LBS | RESPIRATION RATE: 18 BRPM | HEART RATE: 68 BPM | HEIGHT: 70 IN | SYSTOLIC BLOOD PRESSURE: 114 MMHG | TEMPERATURE: 99 F | DIASTOLIC BLOOD PRESSURE: 55 MMHG | OXYGEN SATURATION: 95 % | BODY MASS INDEX: 22.9 KG/M2

## 2020-09-06 DIAGNOSIS — U07.1 COVID-19: ICD-10-CM

## 2020-09-06 PROBLEM — D62 ACUTE BLOOD LOSS ANEMIA: Status: ACTIVE | Noted: 2020-09-06

## 2020-09-06 PROBLEM — Z71.89 GOALS OF CARE, COUNSELING/DISCUSSION: Status: ACTIVE | Noted: 2020-09-06

## 2020-09-06 PROBLEM — Z71.89 GOALS OF CARE, COUNSELING/DISCUSSION: Status: RESOLVED | Noted: 2020-09-06 | Resolved: 2020-09-06

## 2020-09-06 PROBLEM — Z51.5 PALLIATIVE CARE ENCOUNTER: Status: ACTIVE | Noted: 2020-09-06

## 2020-09-06 LAB
ALBUMIN SERPL BCP-MCNC: 2.9 G/DL (ref 3.5–5.2)
ALP SERPL-CCNC: 103 U/L (ref 55–135)
ALT SERPL W/O P-5'-P-CCNC: 17 U/L (ref 10–44)
ANION GAP SERPL CALC-SCNC: 9 MMOL/L (ref 8–16)
AST SERPL-CCNC: 21 U/L (ref 10–40)
BASOPHILS # BLD AUTO: 0.02 K/UL (ref 0–0.2)
BASOPHILS # BLD AUTO: 0.02 K/UL (ref 0–0.2)
BASOPHILS NFR BLD: 0.2 % (ref 0–1.9)
BASOPHILS NFR BLD: 0.2 % (ref 0–1.9)
BILIRUB SERPL-MCNC: 0.3 MG/DL (ref 0.1–1)
BNP SERPL-MCNC: 189 PG/ML (ref 0–99)
BUN SERPL-MCNC: 45 MG/DL (ref 8–23)
CALCIUM SERPL-MCNC: 8.3 MG/DL (ref 8.7–10.5)
CHLORIDE SERPL-SCNC: 106 MMOL/L (ref 95–110)
CO2 SERPL-SCNC: 23 MMOL/L (ref 23–29)
CREAT SERPL-MCNC: 2.4 MG/DL (ref 0.5–1.4)
CRP SERPL-MCNC: 159.3 MG/L (ref 0–8.2)
D DIMER PPP IA.FEU-MCNC: 1.19 MG/L FEU
DIFFERENTIAL METHOD: ABNORMAL
DIFFERENTIAL METHOD: ABNORMAL
EOSINOPHIL # BLD AUTO: 0 K/UL (ref 0–0.5)
EOSINOPHIL # BLD AUTO: 0 K/UL (ref 0–0.5)
EOSINOPHIL NFR BLD: 0.1 % (ref 0–8)
EOSINOPHIL NFR BLD: 0.1 % (ref 0–8)
ERYTHROCYTE [DISTWIDTH] IN BLOOD BY AUTOMATED COUNT: 13.9 % (ref 11.5–14.5)
ERYTHROCYTE [DISTWIDTH] IN BLOOD BY AUTOMATED COUNT: 14 % (ref 11.5–14.5)
EST. GFR  (AFRICAN AMERICAN): 31.5 ML/MIN/1.73 M^2
EST. GFR  (NON AFRICAN AMERICAN): 27.3 ML/MIN/1.73 M^2
ESTIMATED AVG GLUCOSE: 180 MG/DL (ref 68–131)
GLUCOSE SERPL-MCNC: 216 MG/DL (ref 70–110)
HAPTOGLOB SERPL-MCNC: 339 MG/DL (ref 30–250)
HBA1C MFR BLD HPLC: 7.9 % (ref 4–5.6)
HCT VFR BLD AUTO: 33 % (ref 40–54)
HCT VFR BLD AUTO: 34.6 % (ref 40–54)
HGB BLD-MCNC: 10.6 G/DL (ref 14–18)
HGB BLD-MCNC: 11.3 G/DL (ref 14–18)
IMM GRANULOCYTES # BLD AUTO: 0.03 K/UL (ref 0–0.04)
IMM GRANULOCYTES # BLD AUTO: 0.04 K/UL (ref 0–0.04)
IMM GRANULOCYTES NFR BLD AUTO: 0.4 % (ref 0–0.5)
IMM GRANULOCYTES NFR BLD AUTO: 0.4 % (ref 0–0.5)
LDH SERPL L TO P-CCNC: 251 U/L (ref 110–260)
LYMPHOCYTES # BLD AUTO: 1.2 K/UL (ref 1–4.8)
LYMPHOCYTES # BLD AUTO: 1.4 K/UL (ref 1–4.8)
LYMPHOCYTES NFR BLD: 13.9 % (ref 18–48)
LYMPHOCYTES NFR BLD: 15.4 % (ref 18–48)
MAGNESIUM SERPL-MCNC: 1.8 MG/DL (ref 1.6–2.6)
MCH RBC QN AUTO: 27.7 PG (ref 27–31)
MCH RBC QN AUTO: 28.3 PG (ref 27–31)
MCHC RBC AUTO-ENTMCNC: 32.1 G/DL (ref 32–36)
MCHC RBC AUTO-ENTMCNC: 32.7 G/DL (ref 32–36)
MCV RBC AUTO: 86 FL (ref 82–98)
MCV RBC AUTO: 87 FL (ref 82–98)
MONOCYTES # BLD AUTO: 0.5 K/UL (ref 0.3–1)
MONOCYTES # BLD AUTO: 0.6 K/UL (ref 0.3–1)
MONOCYTES NFR BLD: 5.9 % (ref 4–15)
MONOCYTES NFR BLD: 6.3 % (ref 4–15)
NEUTROPHILS # BLD AUTO: 6.8 K/UL (ref 1.8–7.7)
NEUTROPHILS # BLD AUTO: 7.1 K/UL (ref 1.8–7.7)
NEUTROPHILS NFR BLD: 77.6 % (ref 38–73)
NEUTROPHILS NFR BLD: 79.5 % (ref 38–73)
NRBC BLD-RTO: 0 /100 WBC
NRBC BLD-RTO: 0 /100 WBC
PHOSPHATE SERPL-MCNC: 3.4 MG/DL (ref 2.7–4.5)
PLATELET # BLD AUTO: 184 K/UL (ref 150–350)
PLATELET # BLD AUTO: 198 K/UL (ref 150–350)
PMV BLD AUTO: 9.8 FL (ref 9.2–12.9)
PMV BLD AUTO: 9.9 FL (ref 9.2–12.9)
POCT GLUCOSE: 190 MG/DL (ref 70–110)
POTASSIUM SERPL-SCNC: 3.8 MMOL/L (ref 3.5–5.1)
PROT SERPL-MCNC: 6.7 G/DL (ref 6–8.4)
RBC # BLD AUTO: 3.83 M/UL (ref 4.6–6.2)
RBC # BLD AUTO: 4 M/UL (ref 4.6–6.2)
SODIUM SERPL-SCNC: 138 MMOL/L (ref 136–145)
WBC # BLD AUTO: 8.49 K/UL (ref 3.9–12.7)
WBC # BLD AUTO: 9.15 K/UL (ref 3.9–12.7)

## 2020-09-06 PROCEDURE — 85025 COMPLETE CBC W/AUTO DIFF WBC: CPT

## 2020-09-06 PROCEDURE — 94640 AIRWAY INHALATION TREATMENT: CPT

## 2020-09-06 PROCEDURE — 83036 HEMOGLOBIN GLYCOSYLATED A1C: CPT

## 2020-09-06 PROCEDURE — 25000003 PHARM REV CODE 250: Performed by: STUDENT IN AN ORGANIZED HEALTH CARE EDUCATION/TRAINING PROGRAM

## 2020-09-06 PROCEDURE — 84100 ASSAY OF PHOSPHORUS: CPT

## 2020-09-06 PROCEDURE — 99223 PR INITIAL HOSPITAL CARE,LEVL III: ICD-10-PCS | Mod: AI,GC,, | Performed by: INTERNAL MEDICINE

## 2020-09-06 PROCEDURE — 83880 ASSAY OF NATRIURETIC PEPTIDE: CPT

## 2020-09-06 PROCEDURE — 94761 N-INVAS EAR/PLS OXIMETRY MLT: CPT

## 2020-09-06 PROCEDURE — 99223 1ST HOSP IP/OBS HIGH 75: CPT | Mod: AI,GC,, | Performed by: INTERNAL MEDICINE

## 2020-09-06 PROCEDURE — 36415 COLL VENOUS BLD VENIPUNCTURE: CPT

## 2020-09-06 PROCEDURE — 99900035 HC TECH TIME PER 15 MIN (STAT)

## 2020-09-06 PROCEDURE — 85379 FIBRIN DEGRADATION QUANT: CPT

## 2020-09-06 PROCEDURE — 83615 LACTATE (LD) (LDH) ENZYME: CPT

## 2020-09-06 PROCEDURE — 63600175 PHARM REV CODE 636 W HCPCS: Performed by: STUDENT IN AN ORGANIZED HEALTH CARE EDUCATION/TRAINING PROGRAM

## 2020-09-06 PROCEDURE — 25000242 PHARM REV CODE 250 ALT 637 W/ HCPCS: Performed by: STUDENT IN AN ORGANIZED HEALTH CARE EDUCATION/TRAINING PROGRAM

## 2020-09-06 PROCEDURE — 80053 COMPREHEN METABOLIC PANEL: CPT

## 2020-09-06 PROCEDURE — 83735 ASSAY OF MAGNESIUM: CPT

## 2020-09-06 PROCEDURE — 11000001 HC ACUTE MED/SURG PRIVATE ROOM

## 2020-09-06 PROCEDURE — 27100098 HC SPACER

## 2020-09-06 PROCEDURE — 99222 1ST HOSP IP/OBS MODERATE 55: CPT | Mod: ,,, | Performed by: INTERNAL MEDICINE

## 2020-09-06 PROCEDURE — 99222 PR INITIAL HOSPITAL CARE,LEVL II: ICD-10-PCS | Mod: ,,, | Performed by: INTERNAL MEDICINE

## 2020-09-06 PROCEDURE — 83010 ASSAY OF HAPTOGLOBIN QUANT: CPT

## 2020-09-06 PROCEDURE — 86140 C-REACTIVE PROTEIN: CPT

## 2020-09-06 RX ORDER — CARVEDILOL 3.12 MG/1
3.12 TABLET ORAL 2 TIMES DAILY
Status: DISCONTINUED | OUTPATIENT
Start: 2020-09-06 | End: 2020-09-10

## 2020-09-06 RX ORDER — TALC
6 POWDER (GRAM) TOPICAL NIGHTLY PRN
Status: DISCONTINUED | OUTPATIENT
Start: 2020-09-06 | End: 2020-09-18 | Stop reason: HOSPADM

## 2020-09-06 RX ORDER — PROCHLORPERAZINE MALEATE 10 MG
10 TABLET ORAL EVERY 6 HOURS PRN
Status: DISCONTINUED | OUTPATIENT
Start: 2020-09-06 | End: 2020-09-18 | Stop reason: HOSPADM

## 2020-09-06 RX ORDER — ASCORBIC ACID 500 MG
500 TABLET ORAL 2 TIMES DAILY
Status: DISCONTINUED | OUTPATIENT
Start: 2020-09-06 | End: 2020-09-10

## 2020-09-06 RX ORDER — SODIUM CHLORIDE 0.9 % (FLUSH) 0.9 %
10 SYRINGE (ML) INJECTION
Status: DISCONTINUED | OUTPATIENT
Start: 2020-09-06 | End: 2020-09-18 | Stop reason: HOSPADM

## 2020-09-06 RX ORDER — ATORVASTATIN CALCIUM 20 MG/1
20 TABLET, FILM COATED ORAL DAILY
Status: DISCONTINUED | OUTPATIENT
Start: 2020-09-06 | End: 2020-09-10

## 2020-09-06 RX ORDER — POLYETHYLENE GLYCOL 3350 17 G/17G
17 POWDER, FOR SOLUTION ORAL DAILY
Status: DISCONTINUED | OUTPATIENT
Start: 2020-09-06 | End: 2020-09-10

## 2020-09-06 RX ORDER — IBUPROFEN 200 MG
16 TABLET ORAL
Status: DISCONTINUED | OUTPATIENT
Start: 2020-09-06 | End: 2020-09-18 | Stop reason: HOSPADM

## 2020-09-06 RX ORDER — AMOXICILLIN 250 MG
1 CAPSULE ORAL 2 TIMES DAILY
Status: DISCONTINUED | OUTPATIENT
Start: 2020-09-06 | End: 2020-09-10

## 2020-09-06 RX ORDER — LABETALOL HCL 20 MG/4 ML
10 SYRINGE (ML) INTRAVENOUS ONCE
Status: COMPLETED | OUTPATIENT
Start: 2020-09-06 | End: 2020-09-06

## 2020-09-06 RX ORDER — ALBUTEROL SULFATE 90 UG/1
2 AEROSOL, METERED RESPIRATORY (INHALATION) EVERY 6 HOURS
Status: DISCONTINUED | OUTPATIENT
Start: 2020-09-06 | End: 2020-09-18 | Stop reason: HOSPADM

## 2020-09-06 RX ORDER — GLUCAGON 1 MG
1 KIT INJECTION
Status: DISCONTINUED | OUTPATIENT
Start: 2020-09-06 | End: 2020-09-18 | Stop reason: HOSPADM

## 2020-09-06 RX ORDER — MIRTAZAPINE 7.5 MG/1
30 TABLET, FILM COATED ORAL NIGHTLY
Status: DISCONTINUED | OUTPATIENT
Start: 2020-09-06 | End: 2020-09-10

## 2020-09-06 RX ORDER — ACETAMINOPHEN 650 MG/1
650 SUPPOSITORY RECTAL EVERY 6 HOURS PRN
Status: DISCONTINUED | OUTPATIENT
Start: 2020-09-06 | End: 2020-09-18 | Stop reason: HOSPADM

## 2020-09-06 RX ORDER — INSULIN ASPART 100 [IU]/ML
4 INJECTION, SOLUTION INTRAVENOUS; SUBCUTANEOUS
Status: DISCONTINUED | OUTPATIENT
Start: 2020-09-06 | End: 2020-09-06

## 2020-09-06 RX ORDER — INSULIN ASPART 100 [IU]/ML
4 INJECTION, SOLUTION INTRAVENOUS; SUBCUTANEOUS
Status: DISCONTINUED | OUTPATIENT
Start: 2020-09-06 | End: 2020-09-07

## 2020-09-06 RX ORDER — ACETAMINOPHEN 325 MG/1
650 TABLET ORAL EVERY 4 HOURS PRN
Status: DISCONTINUED | OUTPATIENT
Start: 2020-09-06 | End: 2020-09-06

## 2020-09-06 RX ORDER — CHOLECALCIFEROL (VITAMIN D3) 25 MCG
1000 TABLET ORAL DAILY
Status: DISCONTINUED | OUTPATIENT
Start: 2020-09-06 | End: 2020-09-10

## 2020-09-06 RX ORDER — BENZONATATE 100 MG/1
100 CAPSULE ORAL 3 TIMES DAILY PRN
Status: DISCONTINUED | OUTPATIENT
Start: 2020-09-06 | End: 2020-09-18 | Stop reason: HOSPADM

## 2020-09-06 RX ORDER — ESCITALOPRAM OXALATE 20 MG/1
20 TABLET ORAL DAILY
Status: DISCONTINUED | OUTPATIENT
Start: 2020-09-06 | End: 2020-09-10

## 2020-09-06 RX ORDER — QUETIAPINE FUMARATE 25 MG/1
25 TABLET, FILM COATED ORAL DAILY
Status: DISCONTINUED | OUTPATIENT
Start: 2020-09-06 | End: 2020-09-10

## 2020-09-06 RX ORDER — GLYCERIN 1 G/1
1 SUPPOSITORY RECTAL ONCE
Status: DISCONTINUED | OUTPATIENT
Start: 2020-09-06 | End: 2020-09-08

## 2020-09-06 RX ORDER — IBUPROFEN 200 MG
24 TABLET ORAL
Status: DISCONTINUED | OUTPATIENT
Start: 2020-09-06 | End: 2020-09-18 | Stop reason: HOSPADM

## 2020-09-06 RX ADMIN — Medication 10 MG: at 06:09

## 2020-09-06 RX ADMIN — IPRATROPIUM BROMIDE 2 PUFF: 17 AEROSOL, METERED RESPIRATORY (INHALATION) at 07:09

## 2020-09-06 RX ADMIN — ALBUTEROL SULFATE 2 PUFF: 90 AEROSOL, METERED RESPIRATORY (INHALATION) at 01:09

## 2020-09-06 RX ADMIN — MIRTAZAPINE 30 MG: 7.5 TABLET ORAL at 08:09

## 2020-09-06 RX ADMIN — SODIUM CHLORIDE 500 ML: 0.9 INJECTION, SOLUTION INTRAVENOUS at 05:09

## 2020-09-06 RX ADMIN — ALBUTEROL SULFATE 2 PUFF: 90 AEROSOL, METERED RESPIRATORY (INHALATION) at 07:09

## 2020-09-06 NOTE — HPI
Patient is a 64 yo male with CVA (3/2019), diastolic CHF, DM2,HLD, HTN, CKD4, dementia who presents as a transfer from [facility] for COVID-19 infection and concern for GI bleed.   Apparently recent admission to psych unit for aggressive behavior.  Fevers and cough noted at home.  COVID positive in ED.  Also noted to have hgb 4.6, patient is on plavix.  No blood in stool noted by patient.  Transfused and repeat Hgb 10.6, may have been lab error.   We have been asked to see to help delineate goals of care and placement.

## 2020-09-06 NOTE — ASSESSMENT & PLAN NOTE
This is a 65-year-old man with multiple medical problems who has had a progressive downward course prior to infection with COVID.  He is now in our COVID unit his needs are being met.  Patient has expressed to his daughter that he is ready to go.  He has also said that no one should live like this.  I wonder if he would qualify for the inpatient hospice COVID unit under [facility]?  Her goal for him is to provide comfort focus treatments and to maximize the quality of his days.  He is DNR and I would institute comfort measures although at this time he has few symptoms.
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
## 2020-09-06 NOTE — ED TRIAGE NOTES
Pt presents to the ed with daughter who states pt has been c/o sore throat, weakness and has had fevers since yesterday.  Daughter who is primary cregiver states she attempted to give the pt tylenol and was only able to give approx 15ml.   Pt has hx of dementia and was recently [facility] ago.

## 2020-09-06 NOTE — ED NOTES
Patient accepted by Dr. Hassan at [facility]. Room number 53440. Number for report is [phone].

## 2020-09-06 NOTE — ED NOTES
Blood tranfusing. No s/s of transfusion reactions at this time. Pt sleeping, non labored respirations. Daughter at bedside.

## 2020-09-06 NOTE — ASSESSMENT & PLAN NOTE
-- serum creatinine on admission 2.7  -- baseline serum creatinine 1.9-2.1  -- likely prerenal from poor p.o. intake  -- will give 500 bolus IVF  -- trend sCr with daily CMP  -- encourage p.o. intake  -- strict I&Os  -- avoid nephrotoxic agent  -- renally dose medication

## 2020-09-06 NOTE — ASSESSMENT & PLAN NOTE
Ms. Boateng is 66y/o Turkmen speaking male with PMHx significant for CVA (3/2019), CHF ( EF 55%, TTE 3/2019), DMII,HLD, HTN, CKD, recent behavioral problem. Per daughter, he was coughing and when checked his temperature was 100.9°.  He was brought to ED of [facility] and found to be positive for COVID.    -- patient denies any shortness of breath   -- SpO2 100%on RA with no need for oxygen   -- CXR with no signs of infiltrate or consolidation  -- elevated inflammatory markers    Plan:   -- admit the patient to COVID units  -- maintain isolation precaution  -- monitor for worsening of symptoms  -- close monitoring for oxygen saturation and vital signs  -- will hold of on starting steroid, given the patient not on oxygen  -- no signs of PNA on examination or CXR, will hold off on antibiotic

## 2020-09-06 NOTE — CONSULTS
Thank you for consult. Will see this afternoon.  Ana Alford MD  Palliative Medicine   [phone]

## 2020-09-06 NOTE — PROGRESS NOTES
"Throughout the day patient has refused to open his mouth for food, medications or the thermometer. His daughter has spoken to him a couple of times over the phone to try to get him to comply, but he refuses her as well. When taking a BP he stiffens up and tremors. I have tried multiple areas to get an accurate BP with the automatic cuff, and his pressure was reading 190's/80's. I tried multiple times to take a manual BP. He would punch his fist at me and call me "queenie" and "derek coles". I was finally able to get him to relax enough to stretch his arm out for a manual and his BP is 220/110. Paged med team.  "

## 2020-09-06 NOTE — ASSESSMENT & PLAN NOTE
-- patient with multiple comorbidities  -- daughter is POA, she wants the patient to be DNR with no aggressive measures  -- consider palliative care consult to discuss goal of care for the patient
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
## 2020-09-06 NOTE — PLAN OF CARE
Pt arived at 0200, pt was lethargic and only responds to touch. Provider notifed, used  during assessment, pt orientated X1 (person). Pt sl;ept rest of night, bed locked, lowered, ans call light in reach.

## 2020-09-06 NOTE — PLAN OF CARE
(Physician in Lead of Transfers)   Outside Transfer Acceptance Note / Regional Referral Center      Upon patient arrival to floor, please call extension [phone] (if no answer, this will flip to a beeper, so enter your call back number) for Hospital Medicine admit team assignment and for additional admit orders for the patient.  Do not page the attending physician associated with the patient on arrival (this physician may not be on duty at the time of arrival).  Rather, always call [phone] to reach the triage physician for orders and team assignment.      Transferring Physician: Brittany Martinez NP / Dr. Ramirez     Accepting Physician: Cecelia Hassan MD    Date of Acceptance: 09/06/2020    Transferring Facility: [place]    Reason for Transfer: covid +    Report from Transferring Physician/Hospital course: The patient is a 64 y/o male with PMH of CHF, stroke, DM, CKD, HTN, HLP, and dementia. He presented after his daughter noted cough and fever to 100.9 after he was recently discharged from [facility] for aggressive behavior. He was found to be covid positive but CXR unrevealing and found to have a UTI. He has been given ceftriaxone. He was found to be profoundly anemic with H&H of 4.7 & 13.7. WBC 13.11 and ALBERTO of 2.7 which is slightly above his baseline. No reports of bloody bowel movements at home but was found to be FOBT +. He is currently receiving his first unit of blood. The patient's daughter, Nica, is his POA and would like him to be a DNR and does not want any aggressive measures at this time. Advising palliative care consult. Please see AYLIN Soto's notes for additional details.       Labs & Radiographs: see EPIC      To Do List:   1) Admit with covid + protocols   2) Post transfusion H&H  3) Palliative care consult       Cecelia Hassan MD  Hospital Medicine Staff
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
## 2020-09-06 NOTE — ASSESSMENT & PLAN NOTE
-- patient with baseline hemoglobin 11  -- in OSH, he found with hemoglobin 4.7  -- patient denies any active source of bleed  -- received 1 unit of PRBCs  -- repeated CBC with Hg 10.6  -- trend hemoglobin with daily CBC  -- transfuse if Hg < 7
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
## 2020-09-06 NOTE — HPI
Ms. Boateng is 66y/o Cook Islander speaking male with PMHx significant for CVA (3/2019), CHF ( EF 55%, TTE 3/2019), DMII,HLD, HTN, CKD, recent behavioral problem.  Who present to ED of [facility] with fever and cough.  Patient is [place] Islander-speaking,  was used to interview the patient, however the patient has difficulty responding with answering few questions.  History was taken mainly from the chart.  Patient was recently discharged on Thursday from [facility] after he was admitted for aggressive behavior.  Per daughter, he was coughing and when checked his temperature was 100.9°.  He was brought to ED of [facility] and found to be positive for COVID.  He denies chest pain, SOB, palpitation, abdominal pain, N/V, diarrhea or dysuria.  His daughter reports that he has not been eating or drinking well since he was discharged.  In the ED of OSH he found with hemoglobin of 4.6.  He denies black stool or BRBPR, and he denies any source of bleeding.  Per ED note, his daughter is his POA and she wants him to be DNR with no aggressive measures.

## 2020-09-07 LAB
ALBUMIN SERPL BCP-MCNC: 2.7 G/DL (ref 3.5–5.2)
ALP SERPL-CCNC: 118 U/L (ref 55–135)
ALT SERPL W/O P-5'-P-CCNC: 25 U/L (ref 10–44)
ANION GAP SERPL CALC-SCNC: 12 MMOL/L (ref 8–16)
AST SERPL-CCNC: 38 U/L (ref 10–40)
BASOPHILS # BLD AUTO: 0.01 K/UL (ref 0–0.2)
BASOPHILS NFR BLD: 0.2 % (ref 0–1.9)
BILIRUB SERPL-MCNC: 0.3 MG/DL (ref 0.1–1)
BLD PROD TYP BPU: NORMAL
BLOOD UNIT EXPIRATION DATE: NORMAL
BLOOD UNIT TYPE CODE: 5100
BLOOD UNIT TYPE: NORMAL
BUN SERPL-MCNC: 36 MG/DL (ref 8–23)
CALCIUM SERPL-MCNC: 8.3 MG/DL (ref 8.7–10.5)
CHLORIDE SERPL-SCNC: 105 MMOL/L (ref 95–110)
CO2 SERPL-SCNC: 20 MMOL/L (ref 23–29)
CODING SYSTEM: NORMAL
CREAT SERPL-MCNC: 2 MG/DL (ref 0.5–1.4)
DIFFERENTIAL METHOD: ABNORMAL
DISPENSE STATUS: NORMAL
EOSINOPHIL # BLD AUTO: 0 K/UL (ref 0–0.5)
EOSINOPHIL NFR BLD: 0 % (ref 0–8)
ERYTHROCYTE [DISTWIDTH] IN BLOOD BY AUTOMATED COUNT: 13.8 % (ref 11.5–14.5)
EST. GFR  (AFRICAN AMERICAN): 39.3 ML/MIN/1.73 M^2
EST. GFR  (NON AFRICAN AMERICAN): 34 ML/MIN/1.73 M^2
GLUCOSE SERPL-MCNC: 258 MG/DL (ref 70–110)
HCT VFR BLD AUTO: 36.8 % (ref 40–54)
HGB BLD-MCNC: 12 G/DL (ref 14–18)
IMM GRANULOCYTES # BLD AUTO: 0.02 K/UL (ref 0–0.04)
IMM GRANULOCYTES NFR BLD AUTO: 0.4 % (ref 0–0.5)
LYMPHOCYTES # BLD AUTO: 0.6 K/UL (ref 1–4.8)
LYMPHOCYTES NFR BLD: 11.6 % (ref 18–48)
MAGNESIUM SERPL-MCNC: 1.8 MG/DL (ref 1.6–2.6)
MCH RBC QN AUTO: 28 PG (ref 27–31)
MCHC RBC AUTO-ENTMCNC: 32.6 G/DL (ref 32–36)
MCV RBC AUTO: 86 FL (ref 82–98)
MONOCYTES # BLD AUTO: 0.5 K/UL (ref 0.3–1)
MONOCYTES NFR BLD: 8.9 % (ref 4–15)
NEUTROPHILS # BLD AUTO: 4.3 K/UL (ref 1.8–7.7)
NEUTROPHILS NFR BLD: 78.9 % (ref 38–73)
NRBC BLD-RTO: 0 /100 WBC
PHOSPHATE SERPL-MCNC: 3.7 MG/DL (ref 2.7–4.5)
PLATELET # BLD AUTO: 198 K/UL (ref 150–350)
PMV BLD AUTO: 10 FL (ref 9.2–12.9)
POCT GLUCOSE: 143 MG/DL (ref 70–110)
POCT GLUCOSE: 196 MG/DL (ref 70–110)
POCT GLUCOSE: 251 MG/DL (ref 70–110)
POCT GLUCOSE: 256 MG/DL (ref 70–110)
POTASSIUM SERPL-SCNC: 4.1 MMOL/L (ref 3.5–5.1)
PROT SERPL-MCNC: 7 G/DL (ref 6–8.4)
RBC # BLD AUTO: 4.28 M/UL (ref 4.6–6.2)
SARS-COV-2 RDRP RESP QL NAA+PROBE: POSITIVE
SODIUM SERPL-SCNC: 137 MMOL/L (ref 136–145)
TRANS ERYTHROCYTES VOL PATIENT: NORMAL ML
WBC # BLD AUTO: 5.42 K/UL (ref 3.9–12.7)

## 2020-09-07 PROCEDURE — 25000003 PHARM REV CODE 250: Performed by: INTERNAL MEDICINE

## 2020-09-07 PROCEDURE — 11000001 HC ACUTE MED/SURG PRIVATE ROOM

## 2020-09-07 PROCEDURE — 84100 ASSAY OF PHOSPHORUS: CPT

## 2020-09-07 PROCEDURE — 94761 N-INVAS EAR/PLS OXIMETRY MLT: CPT

## 2020-09-07 PROCEDURE — 25000003 PHARM REV CODE 250: Performed by: STUDENT IN AN ORGANIZED HEALTH CARE EDUCATION/TRAINING PROGRAM

## 2020-09-07 PROCEDURE — 85025 COMPLETE CBC W/AUTO DIFF WBC: CPT

## 2020-09-07 PROCEDURE — 63600175 PHARM REV CODE 636 W HCPCS: Performed by: STUDENT IN AN ORGANIZED HEALTH CARE EDUCATION/TRAINING PROGRAM

## 2020-09-07 PROCEDURE — U0002 COVID-19 LAB TEST NON-CDC: HCPCS

## 2020-09-07 PROCEDURE — 94640 AIRWAY INHALATION TREATMENT: CPT

## 2020-09-07 PROCEDURE — C9399 UNCLASSIFIED DRUGS OR BIOLOG: HCPCS | Performed by: STUDENT IN AN ORGANIZED HEALTH CARE EDUCATION/TRAINING PROGRAM

## 2020-09-07 PROCEDURE — 99233 SBSQ HOSP IP/OBS HIGH 50: CPT | Mod: GC,,, | Performed by: INTERNAL MEDICINE

## 2020-09-07 PROCEDURE — 80053 COMPREHEN METABOLIC PANEL: CPT

## 2020-09-07 PROCEDURE — 97162 PT EVAL MOD COMPLEX 30 MIN: CPT

## 2020-09-07 PROCEDURE — 36415 COLL VENOUS BLD VENIPUNCTURE: CPT

## 2020-09-07 PROCEDURE — 99233 PR SUBSEQUENT HOSPITAL CARE,LEVL III: ICD-10-PCS | Mod: GC,,, | Performed by: INTERNAL MEDICINE

## 2020-09-07 PROCEDURE — 97535 SELF CARE MNGMENT TRAINING: CPT

## 2020-09-07 PROCEDURE — 83735 ASSAY OF MAGNESIUM: CPT

## 2020-09-07 PROCEDURE — 97165 OT EVAL LOW COMPLEX 30 MIN: CPT

## 2020-09-07 RX ORDER — ENALAPRILAT 1.25 MG/ML
0.62 INJECTION INTRAVENOUS ONCE
Status: COMPLETED | OUTPATIENT
Start: 2020-09-07 | End: 2020-09-07

## 2020-09-07 RX ORDER — GLYCERIN 1 G/1
1 SUPPOSITORY RECTAL DAILY PRN
Status: DISCONTINUED | OUTPATIENT
Start: 2020-09-07 | End: 2020-09-18 | Stop reason: HOSPADM

## 2020-09-07 RX ORDER — LABETALOL HCL 20 MG/4 ML
10 SYRINGE (ML) INTRAVENOUS EVERY 6 HOURS PRN
Status: DISCONTINUED | OUTPATIENT
Start: 2020-09-07 | End: 2020-09-07

## 2020-09-07 RX ORDER — LABETALOL HCL 20 MG/4 ML
10 SYRINGE (ML) INTRAVENOUS ONCE
Status: COMPLETED | OUTPATIENT
Start: 2020-09-07 | End: 2020-09-07

## 2020-09-07 RX ORDER — LOSARTAN POTASSIUM 25 MG/1
100 TABLET ORAL DAILY
Status: DISCONTINUED | OUTPATIENT
Start: 2020-09-07 | End: 2020-09-10

## 2020-09-07 RX ORDER — INSULIN ASPART 100 [IU]/ML
6 INJECTION, SOLUTION INTRAVENOUS; SUBCUTANEOUS
Status: DISCONTINUED | OUTPATIENT
Start: 2020-09-07 | End: 2020-09-14

## 2020-09-07 RX ORDER — ENALAPRILAT 1.25 MG/ML
1.25 INJECTION INTRAVENOUS EVERY 6 HOURS PRN
Status: DISCONTINUED | OUTPATIENT
Start: 2020-09-07 | End: 2020-09-09

## 2020-09-07 RX ORDER — ACETAMINOPHEN 650 MG/1
650 SUPPOSITORY RECTAL EVERY 6 HOURS PRN
Refills: 0
Start: 2020-09-07 | End: 2020-09-18

## 2020-09-07 RX ADMIN — ALBUTEROL SULFATE 2 PUFF: 90 AEROSOL, METERED RESPIRATORY (INHALATION) at 08:09

## 2020-09-07 RX ADMIN — CARVEDILOL 3.12 MG: 3.12 TABLET, FILM COATED ORAL at 08:09

## 2020-09-07 RX ADMIN — INSULIN ASPART 4 UNITS: 100 INJECTION, SOLUTION INTRAVENOUS; SUBCUTANEOUS at 07:09

## 2020-09-07 RX ADMIN — IPRATROPIUM BROMIDE 2 PUFF: 17 AEROSOL, METERED RESPIRATORY (INHALATION) at 07:09

## 2020-09-07 RX ADMIN — Medication 10 MG: at 05:09

## 2020-09-07 RX ADMIN — ENALAPRILAT 0.62 MG: 2.5 INJECTION INTRAVENOUS at 07:09

## 2020-09-07 RX ADMIN — ALBUTEROL SULFATE 2 PUFF: 90 AEROSOL, METERED RESPIRATORY (INHALATION) at 07:09

## 2020-09-07 RX ADMIN — IPRATROPIUM BROMIDE 2 PUFF: 17 AEROSOL, METERED RESPIRATORY (INHALATION) at 01:09

## 2020-09-07 RX ADMIN — IPRATROPIUM BROMIDE 2 PUFF: 17 AEROSOL, METERED RESPIRATORY (INHALATION) at 08:09

## 2020-09-07 RX ADMIN — INSULIN ASPART 6 UNITS: 100 INJECTION, SOLUTION INTRAVENOUS; SUBCUTANEOUS at 12:09

## 2020-09-07 RX ADMIN — INSULIN DETEMIR 20 UNITS: 100 INJECTION, SOLUTION SUBCUTANEOUS at 09:09

## 2020-09-07 RX ADMIN — Medication 10 MG: at 08:09

## 2020-09-07 RX ADMIN — ENALAPRILAT 1.25 MG: 2.5 INJECTION INTRAVENOUS at 08:09

## 2020-09-07 RX ADMIN — ALBUTEROL SULFATE 2 PUFF: 90 AEROSOL, METERED RESPIRATORY (INHALATION) at 01:09

## 2020-09-07 NOTE — ASSESSMENT & PLAN NOTE
-- patient with multiple comorbidities  -- daughter is POA, she wants the patient to be DNR with no aggressive measures  -- Refusing PO meds/food. Palliative meeting yesterday with patient and daughter. Daughter reports patient expressed he is ready to go and no one should be living like this. Patient remains DNR and d/c to hospice for focus on comfort. 9/7/2020 vs 9/8/2020, pending placement with CM assist.

## 2020-09-07 NOTE — PLAN OF CARE
Problem: Occupational Therapy Goal  Goal: Occupational Therapy Goal  Description: Goals to be met by: 9/20/20     Patient will increase functional independence with ADLs by performing:    Feeding with Set-up Assistance.  UE Dressing with Minimal Assistance.  LE Dressing with Moderate Assistance.  Grooming while seated at sink with Stand-by Assistance.  Toilet transfer to bedside commode with Minimal Assistance.    Outcome: Ongoing, Progressing   OT eval completed, and above goals established. OSKAR Guthrei  9/7/2020

## 2020-09-07 NOTE — ASSESSMENT & PLAN NOTE
-- serum creatinine on admission 2.7  -- baseline serum creatinine 1.9-2.1. Now at baseline after IVF  -- likely prerenal from poor p.o. intake

## 2020-09-07 NOTE — SUBJECTIVE & OBJECTIVE
Interval History: Elevated BP overnight to 220/106. Treated with Labetolol x2 and Vasotec x1. Still elevated this AM, and repeat Labetolol. Refusing PO meds/food. Palliative meeting yesterday with patient and daughter. Patient remains DNR with plans to d/c to hospice today    Review of Systems   Constitutional: Negative for chills and fever.   HENT: Negative for sore throat.    Respiratory: Negative for cough and shortness of breath.    Cardiovascular: Negative for chest pain and leg swelling.   Gastrointestinal: Negative for abdominal pain, constipation, diarrhea, nausea and vomiting.   Genitourinary: Negative for dysuria.   Musculoskeletal: Negative for myalgias.   Skin: Negative for rash.   Neurological: Negative for dizziness and headaches.   Psychiatric/Behavioral: Negative for confusion.     Objective:     Vital Signs (Most Recent):  Temp: 99.9 °F (37.7 °C) (09/07/20 1125)  Pulse: 64 (09/07/20 1125)  Resp: (!) 22 (09/07/20 1125)  BP: (!) 149/63 (09/07/20 1125)  SpO2: 97 % (09/07/20 1125) Vital Signs (24h Range):  Temp:  [98.7 °F (37.1 °C)-101.1 °F (38.4 °C)] 99.9 °F (37.7 °C)  Pulse:  [64-96] 64  Resp:  [18-29] 22  SpO2:  [93 %-99 %] 97 %  BP: (149-222)/(63-96) 149/63        There is no height or weight on file to calculate BMI.    Intake/Output Summary (Last 24 hours) at 9/7/2020 1244  Last data filed at 9/7/2020 0424  Gross per 24 hour   Intake 1080 ml   Output --   Net 1080 ml      Physical Exam  Constitutional:       Appearance: Normal appearance.   HENT:      Head: Normocephalic and atraumatic.      Mouth/Throat:      Mouth: Mucous membranes are moist.      Pharynx: Oropharynx is clear.   Eyes:      Extraocular Movements: Extraocular movements intact.      Pupils: Pupils are equal, round, and reactive to light.   Neck:      Musculoskeletal: Neck supple.   Cardiovascular:      Rate and Rhythm: Normal rate and regular rhythm.      Pulses: Normal pulses.      Heart sounds: Normal heart sounds.   Pulmonary:       Effort: Pulmonary effort is normal. No respiratory distress.      Breath sounds: Normal breath sounds.   Abdominal:      General: Abdomen is flat. Bowel sounds are normal.      Palpations: Abdomen is soft.   Musculoskeletal:         General: No swelling.   Skin:     General: Skin is warm and dry.      Capillary Refill: Capillary refill takes less than 2 seconds.   Neurological:      General: No focal deficit present.      Mental Status: He is alert and oriented to person, place, and time. Mental status is at baseline.   Psychiatric:         Mood and Affect: Mood normal.         Behavior: Behavior normal.         Thought Content: Thought content normal.         Judgment: Judgment normal.      Comments: Refusing meds/food         Significant Labs:   CBC:   Recent Labs   Lab 09/06/20  0320 09/06/20  0545 09/07/20  0524   WBC 9.15 8.49 5.42   HGB 10.6* 11.3* 12.0*   HCT 33.0* 34.6* 36.8*    198 198     CMP:   Recent Labs   Lab 09/05/20  2017 09/06/20  0544 09/07/20  0524    138 137   K 4.2 3.8 4.1    106 105   CO2 21* 23 20*   * 216* 258*   BUN 48* 45* 36*   CREATININE 2.7* 2.4* 2.0*   CALCIUM 7.8* 8.3* 8.3*   PROT 6.1 6.7 7.0   ALBUMIN 3.0* 2.9* 2.7*   BILITOT 0.2 0.3 0.3   ALKPHOS 98 103 118   AST 20 21 38   ALT 18 17 25   ANIONGAP 12 9 12   EGFRNONAA 24* 27.3* 34.0*     POCT Glucose:   Recent Labs   Lab 09/06/20  0809 09/07/20  0729 09/07/20  1154   POCTGLUCOSE 190* 256* 251*       Significant Imaging: I have reviewed all pertinent imaging results/findings within the past 24 hours.

## 2020-09-07 NOTE — PLAN OF CARE
Carroll received call from Mitchel with [facility] at [phone], asked Sw to fax documents as their [employer] is not working. Carroll faxed to . Sw to follow with family and consents. Emily with [facility] is reaching out to [facility] (POA), Sw to follow.

## 2020-09-07 NOTE — PLAN OF CARE
Problem: Physical Therapy Goal  Goal: Physical Therapy Goal  Description: Goals to be met by: 20    Patient will increase functional independence with mobility by performin. Supine to sit with MInimal Assistance -not met  2. Sit to stand transfer with Contact Guard Assistance with AD -not met  3. Bed to chair transfer with Contact Guard Assistance using Rolling Walker -not met  4. Gait  x 20 feet with Contact Guard Assistance using Rolling Walker. -not met    Outcome: Ongoing, Progressing   Evaluation completed and goals appropriate. Citlalli Bangura, PT  2020

## 2020-09-07 NOTE — PROGRESS NOTES
[facility] - ICU [facility] Medicine  Progress Note    Patient Name: Ming Boateng  MRN: [id number]  Patient Class: IP- Inpatient   Admission Date: 9/6/2020  Length of Stay: 1 days  Attending Physician: Hien Barboza MD  Primary Care Provider: Bibi Castaneda MD        Subjective:     Principal Problem:COVID-19        HPI:  Ms. Boateng is 64y/o Guyanese speaking male with PMHx significant for CVA (3/2019), CHF ( EF 55%, TTE 3/2019), DMII,HLD, HTN, CKD, recent behavioral problem.  Who present to ED of [facility] with fever and cough.  Patient is Guyanese-speaking,  was used to interview the patient, however the patient has difficulty responding with answering few questions.  History was taken mainly from the chart.  Patient was recently discharged on Thursday from [facility] after he was admitted for aggressive behavior.  Per daughter, he was coughing and when checked his temperature was 100.9°.  He was brought to ED of [facility] and found to be positive for COVID.  He denies chest pain, SOB, palpitation, abdominal pain, N/V, diarrhea or dysuria.  His daughter reports that he has not been eating or drinking well since he was discharged.  In the ED of OSH he found with hemoglobin of 4.6.  He denies black stool or BRBPR, and he denies any source of bleeding.  Per ED note, his daughter is his POA and she wants him to be DNR with no aggressive measures.    Overview/Hospital Course:  Elevated BP overnight to 220/106. Treated with Labetolol x2 and Vasotec x1. Still elevated this AM, and repeat Labetolol. Refusing PO meds/food. Palliative meeting yesterday with patient and daughter. Patient remains DNR with plans to d/c to hospice 9/7/2020 vs 9/8/2020, pending placement with CM assist.    Interval History: Elevated BP overnight to 220/106. Treated with Labetolol x2 and Vasotec x1. Still elevated this AM, and repeat Labetolol. Refusing PO meds/food. Palliative meeting yesterday with  patient and daughter. Patient remains DNR with plans to d/c to hospice today vs tomorrow, pending placement with CM assist    Review of Systems   Constitutional: Negative for chills and fever.   HENT: Negative for sore throat.    Respiratory: Negative for cough and shortness of breath.    Cardiovascular: Negative for chest pain and leg swelling.   Gastrointestinal: Negative for abdominal pain, constipation, diarrhea, nausea and vomiting.   Genitourinary: Negative for dysuria.   Musculoskeletal: Negative for myalgias.   Skin: Negative for rash.   Neurological: Negative for dizziness and headaches.   Psychiatric/Behavioral: Negative for confusion.     Objective:     Vital Signs (Most Recent):  Temp: 99.9 °F (37.7 °C) (09/07/20 1125)  Pulse: 64 (09/07/20 1125)  Resp: (!) 22 (09/07/20 1125)  BP: (!) 149/63 (09/07/20 1125)  SpO2: 97 % (09/07/20 1125) Vital Signs (24h Range):  Temp:  [98.7 °F (37.1 °C)-101.1 °F (38.4 °C)] 99.9 °F (37.7 °C)  Pulse:  [64-96] 64  Resp:  [18-29] 22  SpO2:  [93 %-99 %] 97 %  BP: (149-222)/(63-96) 149/63        There is no height or weight on file to calculate BMI.    Intake/Output Summary (Last 24 hours) at 9/7/2020 1334  Last data filed at 9/7/2020 0424  Gross per 24 hour   Intake 1080 ml   Output --   Net 1080 ml      Physical Exam  Constitutional:       Appearance: Normal appearance.   HENT:      Head: Normocephalic and atraumatic.      Mouth/Throat:      Mouth: Mucous membranes are moist.      Pharynx: Oropharynx is clear.   Eyes:      Extraocular Movements: Extraocular movements intact.      Pupils: Pupils are equal, round, and reactive to light.   Neck:      Musculoskeletal: Neck supple.   Cardiovascular:      Rate and Rhythm: Normal rate and regular rhythm.      Pulses: Normal pulses.      Heart sounds: Normal heart sounds.   Pulmonary:      Effort: Pulmonary effort is normal. No respiratory distress.      Breath sounds: Normal breath sounds.   Abdominal:      General: Abdomen is flat.  Bowel sounds are normal.      Palpations: Abdomen is soft.   Musculoskeletal:         General: No swelling.   Skin:     General: Skin is warm and dry.      Capillary Refill: Capillary refill takes less than 2 seconds.   Neurological:      General: No focal deficit present.      Mental Status: He is alert and oriented to person, place, and time. Mental status is at baseline.   Psychiatric:         Mood and Affect: Mood normal.         Behavior: Behavior normal.         Thought Content: Thought content normal.         Judgment: Judgment normal.      Comments: Refusing meds/food         Significant Labs:   CBC:   Recent Labs   Lab 09/06/20  0320 09/06/20  0545 09/07/20  0524   WBC 9.15 8.49 5.42   HGB 10.6* 11.3* 12.0*   HCT 33.0* 34.6* 36.8*    198 198     CMP:   Recent Labs   Lab 09/05/20 2017 09/06/20  0544 09/07/20  0524    138 137   K 4.2 3.8 4.1    106 105   CO2 21* 23 20*   * 216* 258*   BUN 48* 45* 36*   CREATININE 2.7* 2.4* 2.0*   CALCIUM 7.8* 8.3* 8.3*   PROT 6.1 6.7 7.0   ALBUMIN 3.0* 2.9* 2.7*   BILITOT 0.2 0.3 0.3   ALKPHOS 98 103 118   AST 20 21 38   ALT 18 17 25   ANIONGAP 12 9 12   EGFRNONAA 24* 27.3* 34.0*       Significant Imaging: I have reviewed all pertinent imaging results/findings within the past 24 hours.      Assessment/Plan:      * COVID-19  Ms. Boateng is 64y/o Nigerien speaking male with PMHx significant for CVA (3/2019), CHF ( EF 55%, TTE 3/2019), DMII,HLD, HTN, CKD, recent behavioral problem. Per daughter, he was coughing and when checked his temperature was 100.9°.  He was brought to ED of [facility] and found to be positive for COVID.    -- patient denies any shortness of breath   -- SpO2 % on RA with no need for oxygen   -- CXR with no signs of infiltrate or consolidation  -- elevated inflammatory markers  -- Monitor for decompensation. Stable during hospitalization    Palliative care encounter  -- patient with multiple comorbidities  -- daughter is  POA, she wants the patient to be DNR with no aggressive measures  -- Refusing PO meds/food. Palliative meeting yesterday with patient and daughter. Daughter reports patient expressed he is ready to go and no one should be living like this. Patient remains DNR and d/c to hospice for focus on comfort. 9/7/2020 vs 9/8/2020, pending placement with CM assist.      Acute blood loss anemia  -- patient with baseline hemoglobin 11  -- in OSH, he found with hemoglobin 4.7  -- patient denies any active source of bleed  -- received 1 unit of PRBCs  -- Repeated hgb 11 and 12  -- Suspect original lab error    Hyperlipidemia associated with type 2 diabetes mellitus  -- resume home medication atorvastatin 20 mg      Type 2 diabetes mellitus with stage 4 chronic kidney disease, with long-term current use of insulin  -- blood glucose on admission 315  -- last hemoglobin A1c 6.5 ( 6/2019), now 7.9  -- Elevated BG this . Will increase to aspart 6 units with meals and determir 15 QHS today  -- Patient not eating. Caution for hypoglycemia  -- D/c on home regimen    (HFpEF) heart failure with preserved ejection fraction  -- no clinical signs of volume overload   -- last echo 3/2019 with EF 55%        Essential hypertension  -- continue home medication carvedilol and resume losartan (ALBERTO resolved)  -- Refusing PO meds  -- Elevated BP responsive to Labetolol. HR downtrending now into high 50's  -- Vasotec PRN      ALBERTO on CKD  -- serum creatinine on admission 2.7  -- baseline serum creatinine 1.9-2.1. Now at baseline after IVF  -- likely prerenal from poor p.o. intake        VTE Risk Mitigation (From admission, onward)         Ordered     Reason for No Pharmacological VTE Prophylaxis  Once     Question:  Reasons:  Answer:  Risk of Bleeding    09/06/20 1146     IP VTE HIGH RISK PATIENT  Once      09/06/20 1146     Place sequential compression device  Until discontinued      09/06/20 0332                Discharge Planning   ANTHONY: 9/7/2020      Code Status: DNR   Is the patient medically ready for discharge?:     Reason for patient still in hospital (select all that apply): Other (specify) pending hospice placement                     Rochelle Mccloud MD  Department of Hospital Medicine   [facility] - ICU [facility]
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
## 2020-09-07 NOTE — ASSESSMENT & PLAN NOTE
-- continue home medication carvedilol and resume losartan (ALBERTO resolved)  -- Refusing PO meds  -- Elevated BP responsive to Labetolol. HR downtrending now into high 50's  -- Vasotec PRN

## 2020-09-07 NOTE — PLAN OF CARE
Pt does not meet inpatient hospice criteria, family can not take Pt home. Sw to send out NH referrals with hospice to be setup at facility and Sw on floor to follow in the am.

## 2020-09-07 NOTE — PLAN OF CARE
09/07/20 0841   Post-Acute Status   Post-Acute Authorization Hospice   Hospice Status Referrals Sent     Sw sent referral to [facility] for inpatient bed, Pt stable, Covid +, Sw to follow. Carroll placed call to Michael liaison for [facility] at , left VM to ask her to return Sw's call.

## 2020-09-07 NOTE — ASSESSMENT & PLAN NOTE
-- patient with multiple comorbidities  -- daughter is POA, she wants the patient to be DNR with no aggressive measures  -- Refusing PO meds/food. Palliative meeting yesterday with patient and daughter. Daughter reports patient expressed he is ready to go and no one should be living like this. Patient remains DNR and d/c to hospice today for focus on comfort

## 2020-09-07 NOTE — PLAN OF CARE
Pt asked for water and was hungry at 2000, pt ate pudding, applesauce, jello, and some boost. Pt refused medication, called provider at 0400 because b/p was elevated, received order for Labatolol, administered, effective, call light in reach,. Bed locked, alarm on.

## 2020-09-07 NOTE — PLAN OF CARE
[facility]  Department of Hospital Medicine  [address] [place] [address]  [phone] after hours  [phone] fax    HOSPICE  ORDERS    09/07/2020    Admit to Hospice:  Inpatient Service    Diagnoses:   Active Hospital Problems    Diagnosis  POA    *COVID-19 [U07.1]  Yes    Acute blood loss anemia [D62]  Yes    Palliative care encounter [Z51.5]  Not Applicable    Hyperlipidemia associated with type 2 diabetes mellitus [E11.69, E78.5]  Yes    Type 2 diabetes mellitus with stage 4 chronic kidney disease, with long-term current use of insulin [E11.22, N18.4, Z79.4]  Not Applicable    (HFpEF) heart failure with preserved ejection fraction [I50.30]  Yes    Essential hypertension [I10]  Yes    ALBERTO on CKD [N17.9]  Yes      Resolved Hospital Problems   No resolved problems to display.       Hospice Qualifying Diagnoses:       Patient has a life expectancy < 6 months due to:  1) Primary Hospice Diagnosis:  End-stage dementia, COVID+  Comorbid Conditions Contributing to Decline: failure to thrive, chronic diastolic CHF, CVA  Vital Signs: Routine per Hospice Protocol.    Code Status: DNR    Allergies:   Review of patient's allergies indicates:   Allergen Reactions    Cayenne Anaphylaxis     Throat swells up     Cayenne pepper     Grapefruit        Diet: Diabetic diet but ok for pleasure feeding    Activities: As tolerated    Nursing: Per Hospice Routine.  Routine Skin for Bedridden Patients: Apply moisture barrier cream to all skin folds and   wet areas in perineal area daily and after baths and all bowel movements.    Oxygen: 1-2 L PRN for comfort or O2 <90%    Other Miscellaneous Care: Airborne and Contact and Droplet Precautions for COVID+    Medications:      Ming Boateng   Home Medication Instructions KHANG:[id number]    Printed on:09/07/20 0809   Medication Information                      acetaminophen (TYLENOL) 650 MG Supp  Place 1 suppository (650 mg total)  rectally every 6 (six) hours as needed (101).             alcohol swabs (ALCOHOL PREP SWABS) PadM  Apply 1 each topically as needed.             allopurinoL (ZYLOPRIM) 100 MG tablet  Take 2 tablets (200 mg total) by mouth once daily.             atorvastatin (LIPITOR) 20 MG tablet  Take 1 tablet (20 mg total) by mouth once daily.             bisacodyL (FLEET) 10 mg/30 mL Enem  Place 10 mg rectally once.             blood sugar diagnostic Strp  1 each by Misc.(Non-Drug; Combo Route) route 4 (four) times daily.             blood-glucose meter kit  Use as instructed             calcium acetate,phosphat bind, (PHOSLO) 667 mg tablet  Take 1 tablet (667 mg total) by mouth 3 (three) times daily with meals.             carBAMazepine (TEGRETOL) 100 mg chewable tablet  Take 1 tablet (100 mg total) by mouth 2 (two) times a day.             carvediloL (COREG) 3.125 MG tablet  Take 1 tablet (3.125 mg total) by mouth 2 (two) times daily.             clopidogreL (PLAVIX) 75 mg tablet  Take 1 tablet (75 mg total) by mouth once daily.             dicyclomine (BENTYL) 20 mg tablet  Take 1 tablet (20 mg total) by mouth every 8 (eight) hours.             escitalopram oxalate (LEXAPRO) 20 MG tablet  Take 1 tablet (20 mg total) by mouth once daily.             famotidine (PEPCID) 20 MG tablet  Take 1 tablet (20 mg total) by mouth 2 (two) times daily as needed for Heartburn.             insulin aspart U-100 (NOVOLOG) 100 unit/mL Crtg  Inject 2 Units into the skin 3 (three) times daily with meals.             lancets (LANCETS,ULTRA THIN) Misc  1 lancet by Misc.(Non-Drug; Combo Route) route 4 (four) times daily.             losartan (COZAAR) 100 MG tablet  Take 1 tablet (100 mg total) by mouth once daily.             memantine (NAMENDA) 5 MG Tab  Take 1 tablet (5 mg total) by mouth 2 (two) times daily.             mirtazapine (REMERON) 30 MG tablet  Take 1 tablet (30 mg total) by mouth every evening.             multivitamin capsule  Take 1  "capsule by mouth once daily.             pen needle, diabetic 29 gauge x 1/2" Ndle  Inisulin QAC and HS             psyllium 0.52 gram capsule  Take 0.52 g by mouth once daily.             QUEtiapine (SEROQUEL) 25 MG Tab  Take 1 tablet (25 mg total) by mouth once daily.             senna (SENNA) 8.6 mg tablet  Take 1 tablet by mouth 2 (two) times daily as needed for Constipation.             TOUJEO SOLOSTAR U-300 INSULIN 300 unit/mL (1.5 mL) InPn pen  Inject 10 Units into the skin once daily.                   DIABETES CARE:   Nurse to perform and educate diabetic management with blood glucose monitoring:      Fingerstick blood sugar AC and HS     Fingerstick blood sugar every 6 hours if patient is unable to eat    Report CBG < 60 or > 350 to physician.         Insulin Sliding Scale         Glucose  Novolog Insulin Subcutaneous        0 - 60   Orange juice or glucose tablet      No insulin   201-250  2 units   251-300  4 units   301-350  6 units   351-400  8 units   >400   10 units then call physician      Future Orders:  Hospice Medical Director may dictate new orders for comfortable care measures & sign death certificate.        _________________________________  Hien Barboza MD  09/07/2020      "

## 2020-09-07 NOTE — SUBJECTIVE & OBJECTIVE
Interval History: Elevated BP overnight to 220/106. Treated with Labetolol x2 and Vasotec x1. Still elevated this AM, and repeat Labetolol. Refusing PO meds/food. Palliative meeting yesterday with patient and daughter. Patient remains DNR with plans to d/c to hospice today vs tomorrow, pending placement with CM assist    Review of Systems   Constitutional: Negative for chills and fever.   HENT: Negative for sore throat.    Respiratory: Negative for cough and shortness of breath.    Cardiovascular: Negative for chest pain and leg swelling.   Gastrointestinal: Negative for abdominal pain, constipation, diarrhea, nausea and vomiting.   Genitourinary: Negative for dysuria.   Musculoskeletal: Negative for myalgias.   Skin: Negative for rash.   Neurological: Negative for dizziness and headaches.   Psychiatric/Behavioral: Negative for confusion.     Objective:     Vital Signs (Most Recent):  Temp: 99.9 °F (37.7 °C) (09/07/20 1125)  Pulse: 64 (09/07/20 1125)  Resp: (!) 22 (09/07/20 1125)  BP: (!) 149/63 (09/07/20 1125)  SpO2: 97 % (09/07/20 1125) Vital Signs (24h Range):  Temp:  [98.7 °F (37.1 °C)-101.1 °F (38.4 °C)] 99.9 °F (37.7 °C)  Pulse:  [64-96] 64  Resp:  [18-29] 22  SpO2:  [93 %-99 %] 97 %  BP: (149-222)/(63-96) 149/63        There is no height or weight on file to calculate BMI.    Intake/Output Summary (Last 24 hours) at 9/7/2020 1334  Last data filed at 9/7/2020 0424  Gross per 24 hour   Intake 1080 ml   Output --   Net 1080 ml      Physical Exam  Constitutional:       Appearance: Normal appearance.   HENT:      Head: Normocephalic and atraumatic.      Mouth/Throat:      Mouth: Mucous membranes are moist.      Pharynx: Oropharynx is clear.   Eyes:      Extraocular Movements: Extraocular movements intact.      Pupils: Pupils are equal, round, and reactive to light.   Neck:      Musculoskeletal: Neck supple.   Cardiovascular:      Rate and Rhythm: Normal rate and regular rhythm.      Pulses: Normal pulses.       Heart sounds: Normal heart sounds.   Pulmonary:      Effort: Pulmonary effort is normal. No respiratory distress.      Breath sounds: Normal breath sounds.   Abdominal:      General: Abdomen is flat. Bowel sounds are normal.      Palpations: Abdomen is soft.   Musculoskeletal:         General: No swelling.   Skin:     General: Skin is warm and dry.      Capillary Refill: Capillary refill takes less than 2 seconds.   Neurological:      General: No focal deficit present.      Mental Status: He is alert and oriented to person, place, and time. Mental status is at baseline.   Psychiatric:         Mood and Affect: Mood normal.         Behavior: Behavior normal.         Thought Content: Thought content normal.         Judgment: Judgment normal.      Comments: Refusing meds/food         Significant Labs:   CBC:   Recent Labs   Lab 09/06/20  0320 09/06/20  0545 09/07/20  0524   WBC 9.15 8.49 5.42   HGB 10.6* 11.3* 12.0*   HCT 33.0* 34.6* 36.8*    198 198     CMP:   Recent Labs   Lab 09/05/20  2017 09/06/20  0544 09/07/20  0524    138 137   K 4.2 3.8 4.1    106 105   CO2 21* 23 20*   * 216* 258*   BUN 48* 45* 36*   CREATININE 2.7* 2.4* 2.0*   CALCIUM 7.8* 8.3* 8.3*   PROT 6.1 6.7 7.0   ALBUMIN 3.0* 2.9* 2.7*   BILITOT 0.2 0.3 0.3   ALKPHOS 98 103 118   AST 20 21 38   ALT 18 17 25   ANIONGAP 12 9 12   EGFRNONAA 24* 27.3* 34.0*       Significant Imaging: I have reviewed all pertinent imaging results/findings within the past 24 hours.

## 2020-09-07 NOTE — ASSESSMENT & PLAN NOTE
-- patient with baseline hemoglobin 11  -- in OSH, he found with hemoglobin 4.7  -- patient denies any active source of bleed  -- received 1 unit of PRBCs  -- Repeated hgb 11-13  -- Suspect original lab error
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
## 2020-09-07 NOTE — ASSESSMENT & PLAN NOTE
Ms. Boateng is 64y/o Czech speaking male with PMHx significant for CVA (3/2019), CHF ( EF 55%, TTE 3/2019), DMII,HLD, HTN, CKD, recent behavioral problem. Per daughter, he was coughing and when checked his temperature was 100.9°.  [facility] and found to be positive for COVID.    -- patient denies any shortness of breath   -- SpO2 % on RA with no need for oxygen   -- CXR with no signs of infiltrate or consolidation  -- elevated inflammatory markers  -- Monitor for decompensation. Stable during hospitalization

## 2020-09-07 NOTE — PLAN OF CARE
[facility] working on criteria for inpatient hospice and consents from the family. Sw to follow, IM team updated.

## 2020-09-07 NOTE — HOSPITAL COURSE
Admitted for fever and COVID +, not initially hypoxic. Elevated blood pressure readings initially treated with Vasotec, labetalol, hydralazine PRN. Patient started refusing PO meds/food. Respiratory status remained stable, repeat COVID test remained positive. Palliative meeting had with patient and daughter. Patient remains DNR/DNI with plans to d/c to hospice and focus on comfort, pending placement with CM assist. Transitioning to comfort measures. Started becoming unresponsive to voice, not following commands, withdrawing to pain. Worsening hypoxia and increased work of breathing. Discussed with daughter, she would like to transition to home hospice given father's worsening condition and hypoxia, her brother is flying into the city today to help.

## 2020-09-07 NOTE — DISCHARGE SUMMARY
[facility] ICU 14 [facility] Medicine  Discharge Summary      Patient Name: Ming Boateng  MRN: [id number]  Admission Date: 9/6/2020  Hospital Length of Stay: 1 days  Discharge Date and Time:  09/07/2020 12:54 PM  Attending Physician: Linda Cunningham MD   Discharging Provider: Rochelle Mccloud MD  Primary Care Provider: Bibi Castaneda MD      HPI:   Ms. Boateng is 66y/o Ecuadorean speaking male with PMHx significant for CVA (3/2019), CHF ( EF 55%, TTE 3/2019), DMII,HLD, HTN, CKD, recent behavioral problem.  Who present to ED of [facility] with fever and cough.  Patient is Ecuadorean-speaking,  was used to interview the patient, however the patient has difficulty responding with answering few questions.  History was taken mainly from the chart.  Patient was recently discharged on Thursday from [facility] after he was admitted for aggressive behavior.  Per daughter, he was coughing and when checked his temperature was 100.9°.  He was brought to ED of [facility] and found to be positive for COVID.  He denies chest pain, SOB, palpitation, abdominal pain, N/V, diarrhea or dysuria.  His daughter reports that he has not been eating or drinking well since he was discharged.  In the ED of OSH he found with hemoglobin of 4.6.  He denies black stool or BRBPR, and he denies any source of bleeding.  Per ED note, his daughter is his POA and she wants him to be DNR with no aggressive measures.    * No surgery found *      Hospital Course:   Elevated BP overnight to 220/106. Treated with Labetolol x2 and Vasotec x1. Still elevated this AM, and repeat Labetolol. Refusing PO meds/food. Palliative meeting yesterday with patient and daughter. Patient remains DNR with plans to d/c to hospice 9/7/2020.     Consults:   Consults (From admission, onward)        Status Ordering Provider     Inpatient consult to Hospice  Once     Provider:  (Not yet assigned)    Completed LINDA CUNNINGHAM     Inpatient consult  to Palliative Care  Once     Provider:  (Not yet assigned)    Completed LIZ IRELAND        Interval History: Elevated BP overnight to 220/106. Treated with Labetolol x2 and Vasotec x1. Still elevated this AM, and repeat Labetolol. Refusing PO meds/food. Palliative meeting yesterday with patient and daughter. Patient remains DNR with plans to d/c to hospice today    Review of Systems   Constitutional: Negative for chills and fever.   HENT: Negative for sore throat.    Respiratory: Negative for cough and shortness of breath.    Cardiovascular: Negative for chest pain and leg swelling.   Gastrointestinal: Negative for abdominal pain, constipation, diarrhea, nausea and vomiting.   Genitourinary: Negative for dysuria.   Musculoskeletal: Negative for myalgias.   Skin: Negative for rash.   Neurological: Negative for dizziness and headaches.   Psychiatric/Behavioral: Negative for confusion.     Objective:     Vital Signs (Most Recent):  Temp: 99.9 °F (37.7 °C) (09/07/20 1125)  Pulse: 64 (09/07/20 1125)  Resp: (!) 22 (09/07/20 1125)  BP: (!) 149/63 (09/07/20 1125)  SpO2: 97 % (09/07/20 1125) Vital Signs (24h Range):  Temp:  [98.7 °F (37.1 °C)-101.1 °F (38.4 °C)] 99.9 °F (37.7 °C)  Pulse:  [64-96] 64  Resp:  [18-29] 22  SpO2:  [93 %-99 %] 97 %  BP: (149-222)/(63-96) 149/63        There is no height or weight on file to calculate BMI.    Intake/Output Summary (Last 24 hours) at 9/7/2020 1244  Last data filed at 9/7/2020 0424  Gross per 24 hour   Intake 1080 ml   Output --   Net 1080 ml      Physical Exam  Constitutional:       Appearance: Normal appearance.   HENT:      Head: Normocephalic and atraumatic.      Mouth/Throat:      Mouth: Mucous membranes are moist.      Pharynx: Oropharynx is clear.   Eyes:      Extraocular Movements: Extraocular movements intact.      Pupils: Pupils are equal, round, and reactive to light.   Neck:      Musculoskeletal: Neck supple.   Cardiovascular:      Rate and Rhythm: Normal rate  and regular rhythm.      Pulses: Normal pulses.      Heart sounds: Normal heart sounds.   Pulmonary:      Effort: Pulmonary effort is normal. No respiratory distress.      Breath sounds: Normal breath sounds.   Abdominal:      General: Abdomen is flat. Bowel sounds are normal.      Palpations: Abdomen is soft.   Musculoskeletal:         General: No swelling.   Skin:     General: Skin is warm and dry.      Capillary Refill: Capillary refill takes less than 2 seconds.   Neurological:      General: No focal deficit present.      Mental Status: He is alert and oriented to person, place, and time. Mental status is at baseline.   Psychiatric:         Mood and Affect: Mood normal.         Behavior: Behavior normal.         Thought Content: Thought content normal.         Judgment: Judgment normal.      Comments: Refusing meds/food         Significant Labs:   CBC:   Recent Labs   Lab 09/06/20  0320 09/06/20  0545 09/07/20  0524   WBC 9.15 8.49 5.42   HGB 10.6* 11.3* 12.0*   HCT 33.0* 34.6* 36.8*    198 198     CMP:   Recent Labs   Lab 09/05/20 2017 09/06/20  0544 09/07/20  0524    138 137   K 4.2 3.8 4.1    106 105   CO2 21* 23 20*   * 216* 258*   BUN 48* 45* 36*   CREATININE 2.7* 2.4* 2.0*   CALCIUM 7.8* 8.3* 8.3*   PROT 6.1 6.7 7.0   ALBUMIN 3.0* 2.9* 2.7*   BILITOT 0.2 0.3 0.3   ALKPHOS 98 103 118   AST 20 21 38   ALT 18 17 25   ANIONGAP 12 9 12   EGFRNONAA 24* 27.3* 34.0*     POCT Glucose:   Recent Labs   Lab 09/06/20  0809 09/07/20  0729 09/07/20  1154   POCTGLUCOSE 190* 256* 251*       Significant Imaging: I have reviewed all pertinent imaging results/findings within the past 24 hours.      * COVID-19  Ms. Boateng is 64y/o French speaking male with PMHx significant for CVA (3/2019), CHF ( EF 55%, TTE 3/2019), DMII,HLD, HTN, CKD, recent behavioral problem. Per daughter, he was coughing and when checked his temperature was 100.9°.  He was brought to ED of [facility] and found to be positive  for COVID.    -- patient denies any shortness of breath   -- SpO2 % on RA with no need for oxygen   -- CXR with no signs of infiltrate or consolidation  -- elevated inflammatory markers  -- Monitor for decompensation. Stable during hospitalization    Palliative care encounter  -- patient with multiple comorbidities  -- daughter is POA, she wants the patient to be DNR with no aggressive measures  -- Refusing PO meds/food. Palliative meeting yesterday with patient and daughter. Daughter reports patient expressed he is ready to go and no one should be living like this. Patient remains DNR and d/c to hospice today for focus on comfort      Acute blood loss anemia  -- patient with baseline hemoglobin 11  -- in OSH, he found with hemoglobin 4.7  -- patient denies any active source of bleed  -- received 1 unit of PRBCs  -- Repeated hgb 11 and 12  -- Suspect original lab error    Hyperlipidemia associated with type 2 diabetes mellitus  -- resume home medication atorvastatin 20 mg      Type 2 diabetes mellitus with stage 4 chronic kidney disease, with long-term current use of insulin  -- blood glucose on admission 315  -- last hemoglobin A1c 6.5 ( 6/2019), now 7.9  -- Elevated BG this . Will increase to aspart 6 units with meals and determir 15 QHS today  -- Patient not eating. Caution for hypoglycemia  -- D/c on home regimen    (HFpEF) heart failure with preserved ejection fraction  -- no clinical signs of volume overload   -- last echo 3/2019 with EF 55%        Essential hypertension  -- continue home medication carvedilol and resume losartan (ALBERTO resolved)  -- Refusing PO meds  -- Labetalol IV PRN      ALBERTO on CKD  -- serum creatinine on admission 2.7  -- baseline serum creatinine 1.9-2.1. Now at baseline after IVF  -- likely prerenal from poor p.o. intake        Final Active Diagnoses:    Diagnosis Date Noted POA    PRINCIPAL PROBLEM:  COVID-19 [U07.1] 09/06/2020 Yes    Acute blood loss anemia [D62]  09/06/2020 Yes    Palliative care encounter [Z51.5] 09/06/2020 Not Applicable    Hyperlipidemia associated with type 2 diabetes mellitus [E11.69, E78.5]  Yes    Type 2 diabetes mellitus with stage 4 chronic kidney disease, with long-term current use of insulin [E11.22, N18.4, Z79.4] 06/14/2017 Not Applicable    (HFpEF) heart failure with preserved ejection fraction [I50.30] 05/19/2017 Yes    Essential hypertension [I10] 06/17/2016 Yes    ALBERTO on CKD [N17.9] 06/17/2016 Yes      Problems Resolved During this Admission:       Discharged Condition: fair    Disposition: Hospice/Medical Facility    Follow Up:    Patient Instructions:   No discharge procedures on file.    Significant Diagnostic Studies: Labs:   CMP   Recent Labs   Lab 09/05/20 2017 09/06/20  0544 09/07/20  0524    138 137   K 4.2 3.8 4.1    106 105   CO2 21* 23 20*   * 216* 258*   BUN 48* 45* 36*   CREATININE 2.7* 2.4* 2.0*   CALCIUM 7.8* 8.3* 8.3*   PROT 6.1 6.7 7.0   ALBUMIN 3.0* 2.9* 2.7*   BILITOT 0.2 0.3 0.3   ALKPHOS 98 103 118   AST 20 21 38   ALT 18 17 25   ANIONGAP 12 9 12   ESTGFRAFRICA 27* 31.5* 39.3*   EGFRNONAA 24* 27.3* 34.0*    and CBC   Recent Labs   Lab 09/06/20  0320 09/06/20  0545 09/07/20  0524   WBC 9.15 8.49 5.42   HGB 10.6* 11.3* 12.0*   HCT 33.0* 34.6* 36.8*    198 198     Radiology: X-Ray: CXR: X-Ray Chest 1 View (CXR): No results found for this visit on 09/06/20. and X-Ray Chest PA and Lateral (CXR): No results found for this visit on 09/06/20.    Pending Diagnostic Studies:     None         Medications:  Reconciled Home Medications:      Medication List      START taking these medications    acetaminophen 650 MG Supp  Commonly known as: TYLENOL  Place 1 suppository (650 mg total) rectally every 6 (six) hours as needed (101).        CONTINUE taking these medications    alcohol swabs Padm  Commonly known as: ALCOHOL PREP SWABS  Apply 1 each topically as needed.     allopurinoL 100 MG tablet  Commonly  known as: ZYLOPRIM  Take 2 tablets (200 mg total) by mouth once daily.     atorvastatin 20 MG tablet  Commonly known as: LIPITOR  [place] yovany tableta (20 mg en total) por vía oral diariamente.  (Take 1 tablet (20 mg total) by mouth once daily.)     bisacodyL 10 mg/30 mL Enem  Commonly known as: FLEET  Place 10 mg rectally once.     blood sugar diagnostic Strp  1 each by Misc.(Non-Drug; Combo Route) route 4 (four) times daily.     blood-glucose meter kit  Use as instructed     calcium acetate(phosphat bind) 667 mg tablet  Commonly known as: PHOSLO  Take 1 tablet (667 mg total) by mouth 3 (three) times daily with meals.     carBAMazepine 100 mg chewable tablet  Commonly known as: TEGRETOL  Take 1 tablet (100 mg total) by mouth 2 (two) times a day.     carvediloL 3.125 MG tablet  Commonly known as: COREG  Take 1 tablet (3.125 mg total) by mouth 2 (two) times daily.     clopidogreL 75 mg tablet  Commonly known as: PLAVIX  Take 1 tablet (75 mg total) by mouth once daily.     dicyclomine 20 mg tablet  Commonly known as: BENTYL  Take 1 tablet (20 mg total) by mouth every 8 (eight) hours.     escitalopram oxalate 20 MG tablet  Commonly known as: LEXAPRO  Take 1 tablet (20 mg total) by mouth once daily.     famotidine 20 MG tablet  Commonly known as: PEPCID  Take 1 tablet (20 mg total) by mouth 2 (two) times daily as needed for Heartburn.     insulin aspart U-100 100 unit/mL Crtg  Commonly known as: NOVOLOG  Inject 2 Units into the skin 3 (three) times daily with meals.     lancets Misc  Commonly known as: LANCETS,ULTRA THIN  1 lancet by Misc.(Non-Drug; Combo Route) route 4 (four) times daily.     losartan 100 MG tablet  Commonly known as: COZAAR  Take 1 tablet (100 mg total) by mouth once daily.     memantine 5 MG Tab  Commonly known as: NAMENDA  Take 1 tablet (5 mg total) by mouth 2 (two) times daily.     mirtazapine 30 MG tablet  Commonly known as: REMERON  Take 1 tablet (30 mg total) by mouth every evening.     multivitamin  "capsule  Take 1 capsule by mouth once daily.     pen needle, diabetic 29 gauge x 1/2" Ndle  Inisulin QAC and HS     psyllium 0.52 gram capsule  Take 0.52 g by mouth once daily.     QUEtiapine 25 MG Tab  Commonly known as: SEROQUEL  Take 1 tablet (25 mg total) by mouth once daily.     senna 8.6 mg tablet  Commonly known as: SENNA  Take 1 tablet by mouth 2 (two) times daily as needed for Constipation.     TOUJEO SOLOSTAR U-300 INSULIN 300 unit/mL (1.5 mL) Inpn pen  Generic drug: insulin glargine (TOUJEO)  Inject 10 Units into the skin once daily.            Indwelling Lines/Drains at time of discharge:   Lines/Drains/Airways     None                 Time spent on the discharge of patient: > 30 minutes  Patient was seen and examined on the date of discharge and determined to be suitable for discharge.         Rochelle Mccloud MD  Department of Hospital Medicine  [facility]  "

## 2020-09-07 NOTE — ASSESSMENT & PLAN NOTE
Ms. Boateng is 64y/o Indonesian speaking male with PMHx significant for CVA (3/2019), CHF ( EF 55%, TTE 3/2019), DMII,HLD, HTN, CKD, recent behavioral problem. Per daughter, he was coughing and when checked his temperature was 100.9°.  [facility] and found to be positive for COVID.    -- patient denies any shortness of breath   -- SpO2 % on RA with no need for oxygen   -- CXR with no signs of infiltrate or consolidation  -- elevated inflammatory markers  -- Monitor for decompensation. Stable during hospitalization

## 2020-09-07 NOTE — ASSESSMENT & PLAN NOTE
-- continue home medication carvedilol and resume losartan (ALBERTO resolved)  -- Refusing PO meds  -- Labetalol IV PRN

## 2020-09-08 LAB
ALBUMIN SERPL BCP-MCNC: 2.6 G/DL (ref 3.5–5.2)
ALP SERPL-CCNC: 111 U/L (ref 55–135)
ALT SERPL W/O P-5'-P-CCNC: 22 U/L (ref 10–44)
ANION GAP SERPL CALC-SCNC: 10 MMOL/L (ref 8–16)
AST SERPL-CCNC: 30 U/L (ref 10–40)
BASOPHILS # BLD AUTO: 0.05 K/UL (ref 0–0.2)
BASOPHILS NFR BLD: 0.8 % (ref 0–1.9)
BILIRUB SERPL-MCNC: 0.3 MG/DL (ref 0.1–1)
BUN SERPL-MCNC: 41 MG/DL (ref 8–23)
CALCIUM SERPL-MCNC: 8.3 MG/DL (ref 8.7–10.5)
CHLORIDE SERPL-SCNC: 110 MMOL/L (ref 95–110)
CO2 SERPL-SCNC: 21 MMOL/L (ref 23–29)
CREAT SERPL-MCNC: 2.2 MG/DL (ref 0.5–1.4)
CRP SERPL-MCNC: 144.7 MG/L (ref 0–8.2)
DIFFERENTIAL METHOD: ABNORMAL
EOSINOPHIL # BLD AUTO: 0 K/UL (ref 0–0.5)
EOSINOPHIL NFR BLD: 0.2 % (ref 0–8)
ERYTHROCYTE [DISTWIDTH] IN BLOOD BY AUTOMATED COUNT: 13.9 % (ref 11.5–14.5)
EST. GFR  (AFRICAN AMERICAN): 35 ML/MIN/1.73 M^2
EST. GFR  (NON AFRICAN AMERICAN): 30.3 ML/MIN/1.73 M^2
GLUCOSE SERPL-MCNC: 155 MG/DL (ref 70–110)
HCT VFR BLD AUTO: 37.4 % (ref 40–54)
HGB BLD-MCNC: 12.9 G/DL (ref 14–18)
IMM GRANULOCYTES # BLD AUTO: 0.2 K/UL (ref 0–0.04)
IMM GRANULOCYTES NFR BLD AUTO: 3.3 % (ref 0–0.5)
LYMPHOCYTES # BLD AUTO: 1.2 K/UL (ref 1–4.8)
LYMPHOCYTES NFR BLD: 20.2 % (ref 18–48)
MAGNESIUM SERPL-MCNC: 2 MG/DL (ref 1.6–2.6)
MCH RBC QN AUTO: 28.4 PG (ref 27–31)
MCHC RBC AUTO-ENTMCNC: 34.5 G/DL (ref 32–36)
MCV RBC AUTO: 82 FL (ref 82–98)
MONOCYTES # BLD AUTO: 0.7 K/UL (ref 0.3–1)
MONOCYTES NFR BLD: 11 % (ref 4–15)
NEUTROPHILS # BLD AUTO: 3.9 K/UL (ref 1.8–7.7)
NEUTROPHILS NFR BLD: 64.5 % (ref 38–73)
NRBC BLD-RTO: 0 /100 WBC
PHOSPHATE SERPL-MCNC: 3.4 MG/DL (ref 2.7–4.5)
PLATELET # BLD AUTO: 204 K/UL (ref 150–350)
PMV BLD AUTO: 10.4 FL (ref 9.2–12.9)
POCT GLUCOSE: 116 MG/DL (ref 70–110)
POCT GLUCOSE: 127 MG/DL (ref 70–110)
POCT GLUCOSE: 139 MG/DL (ref 70–110)
POCT GLUCOSE: 145 MG/DL (ref 70–110)
POTASSIUM SERPL-SCNC: 4.1 MMOL/L (ref 3.5–5.1)
PROT SERPL-MCNC: 7 G/DL (ref 6–8.4)
RBC # BLD AUTO: 4.54 M/UL (ref 4.6–6.2)
SODIUM SERPL-SCNC: 141 MMOL/L (ref 136–145)
WBC # BLD AUTO: 5.99 K/UL (ref 3.9–12.7)

## 2020-09-08 PROCEDURE — 94761 N-INVAS EAR/PLS OXIMETRY MLT: CPT

## 2020-09-08 PROCEDURE — 86140 C-REACTIVE PROTEIN: CPT

## 2020-09-08 PROCEDURE — 94640 AIRWAY INHALATION TREATMENT: CPT

## 2020-09-08 PROCEDURE — 80053 COMPREHEN METABOLIC PANEL: CPT

## 2020-09-08 PROCEDURE — 36415 COLL VENOUS BLD VENIPUNCTURE: CPT

## 2020-09-08 PROCEDURE — 25000003 PHARM REV CODE 250: Performed by: STUDENT IN AN ORGANIZED HEALTH CARE EDUCATION/TRAINING PROGRAM

## 2020-09-08 PROCEDURE — 85025 COMPLETE CBC W/AUTO DIFF WBC: CPT

## 2020-09-08 PROCEDURE — 99232 PR SUBSEQUENT HOSPITAL CARE,LEVL II: ICD-10-PCS | Mod: GC,,, | Performed by: INTERNAL MEDICINE

## 2020-09-08 PROCEDURE — 83735 ASSAY OF MAGNESIUM: CPT

## 2020-09-08 PROCEDURE — 11000001 HC ACUTE MED/SURG PRIVATE ROOM

## 2020-09-08 PROCEDURE — 25000003 PHARM REV CODE 250: Performed by: INTERNAL MEDICINE

## 2020-09-08 PROCEDURE — 63600175 PHARM REV CODE 636 W HCPCS: Performed by: INTERNAL MEDICINE

## 2020-09-08 PROCEDURE — 99232 SBSQ HOSP IP/OBS MODERATE 35: CPT | Mod: GC,,, | Performed by: INTERNAL MEDICINE

## 2020-09-08 PROCEDURE — 84100 ASSAY OF PHOSPHORUS: CPT

## 2020-09-08 RX ORDER — HYDRALAZINE HYDROCHLORIDE 20 MG/ML
10 INJECTION INTRAMUSCULAR; INTRAVENOUS EVERY 6 HOURS PRN
Status: DISCONTINUED | OUTPATIENT
Start: 2020-09-08 | End: 2020-09-09

## 2020-09-08 RX ORDER — LABETALOL HCL 20 MG/4 ML
10 SYRINGE (ML) INTRAVENOUS EVERY 6 HOURS PRN
Status: DISCONTINUED | OUTPATIENT
Start: 2020-09-08 | End: 2020-09-18 | Stop reason: HOSPADM

## 2020-09-08 RX ORDER — LABETALOL HCL 20 MG/4 ML
10 SYRINGE (ML) INTRAVENOUS ONCE
Status: COMPLETED | OUTPATIENT
Start: 2020-09-08 | End: 2020-09-08

## 2020-09-08 RX ORDER — NIFEDIPINE 30 MG/1
30 TABLET, EXTENDED RELEASE ORAL DAILY
Status: DISCONTINUED | OUTPATIENT
Start: 2020-09-08 | End: 2020-09-10

## 2020-09-08 RX ORDER — CLOPIDOGREL BISULFATE 75 MG/1
75 TABLET ORAL DAILY
Status: DISCONTINUED | OUTPATIENT
Start: 2020-09-08 | End: 2020-09-10

## 2020-09-08 RX ADMIN — ENALAPRILAT 1.25 MG: 2.5 INJECTION INTRAVENOUS at 03:09

## 2020-09-08 RX ADMIN — ENALAPRILAT 1.25 MG: 2.5 INJECTION INTRAVENOUS at 01:09

## 2020-09-08 RX ADMIN — Medication 10 MG: at 05:09

## 2020-09-08 RX ADMIN — ALBUTEROL SULFATE 2 PUFF: 90 AEROSOL, METERED RESPIRATORY (INHALATION) at 01:09

## 2020-09-08 RX ADMIN — Medication 10 MG: at 10:09

## 2020-09-08 RX ADMIN — IPRATROPIUM BROMIDE 2 PUFF: 17 AEROSOL, METERED RESPIRATORY (INHALATION) at 01:09

## 2020-09-08 RX ADMIN — HYDRALAZINE HYDROCHLORIDE 10 MG: 20 INJECTION INTRAMUSCULAR; INTRAVENOUS at 12:09

## 2020-09-08 RX ADMIN — ALBUTEROL SULFATE 2 PUFF: 90 AEROSOL, METERED RESPIRATORY (INHALATION) at 07:09

## 2020-09-08 RX ADMIN — Medication 10 MG: at 03:09

## 2020-09-08 RX ADMIN — IPRATROPIUM BROMIDE 2 PUFF: 17 AEROSOL, METERED RESPIRATORY (INHALATION) at 07:09

## 2020-09-08 RX ADMIN — Medication 10 MG: at 02:09

## 2020-09-08 NOTE — PLAN OF CARE
Patient resting quietly, disoriented to place, time and situation.  BP elevated through shift, attempted to control with PRN IVP meds, patient still refusing all PO meds.   R arm 20g & L arm 20g in place, both saline flushed and locked, dressings CDI.   Condom cath in place to gravity, changed this shift.   Patient refused all food except for 5 bites of fruit for lunch.  Patient remains free from injury, WCTM.

## 2020-09-08 NOTE — PROGRESS NOTES
[facility] - ICU 14 [facility] Medicine  Progress Note    Patient Name: Ming Boateng  MRN: [id number]  Patient Class: IP- Inpatient   Admission Date: 9/6/2020  Length of Stay: 2 days  Attending Physician: Hien Barboza MD  Primary Care Provider: Bibi Castaneda MD        Subjective:     Principal Problem:COVID-19        HPI:  Ms. Boateng is 64y/o Indonesian speaking male with PMHx significant for CVA (3/2019), CHF ( EF 55%, TTE 3/2019), DMII,HLD, HTN, CKD, recent behavioral problem.  Who present to ED of [facility] with fever and cough.  Patient is Indonesian-speaking,  was used to interview the patient, however the patient has difficulty responding with answering few questions.  History was taken mainly from the chart.  Patient was recently discharged on Thursday from [facility] after he was admitted for aggressive behavior.  Per daughter, he was coughing and when checked his temperature was 100.9°.  He was brought to ED of [facility] and found to be positive for COVID.  He denies chest pain, SOB, palpitation, abdominal pain, N/V, diarrhea or dysuria.  His daughter reports that he has not been eating or drinking well since he was discharged.  In the ED of OSH he found with hemoglobin of 4.6.  He denies black stool or BRBPR, and he denies any source of bleeding.  Per ED note, his daughter is his POA and she wants him to be DNR with no aggressive measures.    Overview/Hospital Course:  Elevated BP treating with Vasotec, labetalol, hydralazine PRN. Refusing PO meds/food. Respiratory status stable. Repeat COVID test remains positive. Palliative meeting with patient and daughter. Patient remains DNR with plans to d/c to hospice and focus on comfort, pending placement with CM assist.    Interval History: Elevated BP treated with PRN labetalol, vasotec, hydralazine. Refusing PO meds/food. Requests some coffee this morning. Respiratory status stable. Repeat COVID positive. CM assist for  inpatient hospice placement.    Review of Systems   Constitutional: Negative for chills and fever.   HENT: Negative for sore throat.    Respiratory: Negative for cough and shortness of breath.    Cardiovascular: Negative for chest pain and leg swelling.   Gastrointestinal: Negative for abdominal pain, constipation, diarrhea, nausea and vomiting.   Genitourinary: Negative for dysuria.   Musculoskeletal: Negative for myalgias.   Skin: Negative for rash.   Neurological: Negative for dizziness and headaches.   Psychiatric/Behavioral: Negative for confusion.     Objective:     Vital Signs (Most Recent):  Temp: (!) 100.7 °F (38.2 °C) (09/08/20 0845)  Pulse: 70 (09/08/20 1000)  Resp: 17 (09/08/20 1000)  BP: (!) 184/83 (09/08/20 1000)  SpO2: 96 % (09/08/20 1000) Vital Signs (24h Range):  Temp:  [98.2 °F (36.8 °C)-100.7 °F (38.2 °C)] 100.7 °F (38.2 °C)  Pulse:  [62-74] 70  Resp:  [14-29] 17  SpO2:  [94 %-98 %] 96 %  BP: (139-219)/() 184/83     Weight: 72.6 kg (160 lb 0.9 oz)  Body mass index is 22.97 kg/m².    Intake/Output Summary (Last 24 hours) at 9/8/2020 1100  Last data filed at 9/8/2020 0600  Gross per 24 hour   Intake 220 ml   Output --   Net 220 ml      Physical Exam  Constitutional:       Appearance: Normal appearance.   HENT:      Head: Normocephalic and atraumatic.      Mouth/Throat:      Mouth: Mucous membranes are moist.      Pharynx: Oropharynx is clear.   Eyes:      Extraocular Movements: Extraocular movements intact.      Pupils: Pupils are equal, round, and reactive to light.   Neck:      Musculoskeletal: Neck supple.   Cardiovascular:      Rate and Rhythm: Normal rate and regular rhythm.      Pulses: Normal pulses.      Heart sounds: Normal heart sounds.   Pulmonary:      Effort: Pulmonary effort is normal. No respiratory distress.      Breath sounds: Normal breath sounds.   Abdominal:      General: Abdomen is flat. Bowel sounds are normal.      Palpations: Abdomen is soft.   Musculoskeletal:          "General: No swelling.   Skin:     General: Skin is warm and dry.      Capillary Refill: Capillary refill takes less than 2 seconds.   Neurological:      General: No focal deficit present.      Mental Status: He is alert and oriented to person, place, and time. Mental status is at baseline.   Psychiatric:      Comments: Flat, withdrawn. Requests "leave me alone". Refusing PO meds/food, although requests some black coffee this morning. Amenable to physical exam.         Significant Labs:   CBC:   Recent Labs   Lab 09/07/20  0524 09/08/20  0212   WBC 5.42 5.99   HGB 12.0* 12.9*   HCT 36.8* 37.4*    204     CMP:   Recent Labs   Lab 09/07/20  0524 09/08/20  0642    141   K 4.1 4.1    110   CO2 20* 21*   * 155*   BUN 36* 41*   CREATININE 2.0* 2.2*   CALCIUM 8.3* 8.3*   PROT 7.0 7.0   ALBUMIN 2.7* 2.6*   BILITOT 0.3 0.3   ALKPHOS 118 111   AST 38 30   ALT 25 22   ANIONGAP 12 10   EGFRNONAA 34.0* 30.3*       Significant Imaging: I have reviewed all pertinent imaging results/findings within the past 24 hours.      Assessment/Plan:      * COVID-19  Ms. Boateng is 64y/o Guamanian speaking male with PMHx significant for CVA (3/2019), CHF ( EF 55%, TTE 3/2019), DMII,HLD, HTN, CKD, recent behavioral problem. Per daughter, he was coughing and when checked his temperature was 100.9°.  He was brought to ED of [facility] and found to be positive for COVID.    -- patient denies any shortness of breath   -- SpO2 % on RA with no need for oxygen   -- CXR with no signs of infiltrate or consolidation  -- elevated inflammatory markers  -- Monitor for decompensation. Stable during hospitalization  -- Repeat test positive on 9/7/2020    Palliative care encounter  -- patient with multiple comorbidities  -- daughter is POA, she wants the patient to be DNR with no aggressive measures  -- Refusing PO meds/food. Patient/family inability to care for him at this time. Palliative meeting with patient and daughter. " Daughter reports patient expressed he is ready to go and no one should be living like this. Patient remains DNR and d/c to inpatient hospice for focus on comfort, pending placement      Acute blood loss anemia  -- patient with baseline hemoglobin 11  -- in OSH, he found with hemoglobin 4.7  -- patient denies any active source of bleed  -- received 1 unit of PRBCs  -- Repeated hgb 11-13  -- Suspect original lab error    Hyperlipidemia associated with type 2 diabetes mellitus  -- resume home medication atorvastatin 20 mg      Type 2 diabetes mellitus with stage 4 chronic kidney disease, with long-term current use of insulin  -- blood glucose on admission 315  -- last hemoglobin A1c 6.5 ( 6/2019), now 7.9  -- Elevated BG improved with increasing to aspart 6 units with meals and determir 15 QHS today  -- Patient not eating. Caution for hypoglycemia. Hold aspart if refusing meal  -- D/c on home regimen    (HFpEF) heart failure with preserved ejection fraction  -- no clinical signs of volume overload   -- last echo 3/2019 with EF 55%        Essential hypertension  -- continue home medication carvedilol and losartan (ALBERTO resolved) on d/c  -- Refusing PO meds here  -- Elevated BP responsive to Labetolol. PRN vasotec and hydralazine also available      ALBERTO on CKD  -- serum creatinine on admission 2.7  -- baseline serum creatinine 1.9-2.1. Now at baseline after IVF  -- likely prerenal from poor p.o. intake        VTE Risk Mitigation (From admission, onward)         Ordered     Reason for No Pharmacological VTE Prophylaxis  Once     Question:  Reasons:  Answer:  Risk of Bleeding    09/06/20 1146     IP VTE HIGH RISK PATIENT  Once      09/06/20 1146     Place sequential compression device  Until discontinued      09/06/20 0332                Discharge Planning   ANTHONY: 9/8/2020     Code Status: DNR   Is the patient medically ready for discharge?: Yes    Reason for patient still in hospital (select all that apply): Other (specify)  awaiting placement  Discharge Plan A: New Nursing Home placement - shelter care facility                  Rochelle Mccloud MD  Department of Hospital Medicine   [facility]

## 2020-09-08 NOTE — NURSING
Pt with elevated blood pressure of 205/93 hr 74. Med team 2 paged. Call returned. Dr. Sun updated on pt current condition and nursing assessment. Pt with dementia refusing po med. New order received to give prn vasotec now. Order carried out.

## 2020-09-08 NOTE — PLAN OF CARE
DX: Covid    Shift Events: Pt hypertensive throughout shift and refusing po medication. New orders received for Labetalol. Low grade temp during the nigh     Plan of Care: continue covid treatment     Neuro: AAO x 2     Respiratory: Clear bilaterally on room air     Cardiac: NSR     Diet:  Diabetic     Gtts: Saline locked     Skin: Intact

## 2020-09-08 NOTE — ASSESSMENT & PLAN NOTE
-- continue home medication carvedilol and losartan (ALBERTO resolved) on d/c  -- Refusing PO meds here  -- Elevated BP responsive to Labetolol. PRN vasotec and hydralazine also available

## 2020-09-08 NOTE — ASSESSMENT & PLAN NOTE
Ms. Boateng is 66y/o Lithuanian speaking male with PMHx significant for CVA (3/2019), CHF ( EF 55%, TTE 3/2019), DMII,HLD, HTN, CKD, recent behavioral problem. Per daughter, he was coughing and when checked his temperature was 100.9°.  He was brought to ED of [facility] and found to be positive for COVID.    -- patient denies any shortness of breath   -- SpO2 % on RA with no need for oxygen   -- CXR with no signs of infiltrate or consolidation  -- elevated inflammatory markers  -- Monitor for decompensation. Stable during hospitalization  -- Repeat test positive on 9/7/2020

## 2020-09-08 NOTE — ASSESSMENT & PLAN NOTE
-- patient with multiple comorbidities  -- daughter is POA, she wants the patient to be DNR with no aggressive measures  -- Refusing PO meds/food. Patient/family inability to care for him at this time. Palliative meeting with patient and daughter. Daughter reports patient expressed he is ready to go and no one should be living like this. Patient remains DNR and d/c to inpatient hospice for focus on comfort, pending placement

## 2020-09-08 NOTE — CARE UPDATE
Rapid Response Nurse Chart Check     Chart check completed, abnormal VS noted. Bedside RN, Latonia contacted. No concerns verbalized at this time. Instructed to call [phone] for further concerns or assistance.

## 2020-09-08 NOTE — PLAN OF CARE
DUE TO COVID AND PT BEING Latvian SPEAKING CM DID THE ASSESSMENT INTERVIEW WITH PTS DAUGHTER WHO IS ALSO HIS POA   DAUGHTER BEVERLEYFLOYD  [phone]  PCP [facility] AT THE [facility] IN [place]  PHARMACY [facility] [address] [facility] RYLE[address]ARIN  PT IS A DNR CM SPOKE AT LENGTH WITH MS TSE WHO STATED THAT SHE IS A FULL TIME NURSING STUDENT AND A SINGLE MOM WHO AT THIS TIME CAN NOT ASSUME THE CARE OF HER FATHER SHE FURTHER STATED THAT HER FATHER WAS A RESIDENT OF [facility] BUT WAS ASKED TO LEAVE DUE TO BEHAVIOURAL ISSUES HER FATHER WAS ALSO A RESIDENT OF [facility] UNTIL RECENTLY MS GONZALEZ STATED THAT SHE WOULD LIKE TO HAVE HER FATHER IN A FACILITY IN [facility] OR [facility]   09/08/20 1211   Discharge Assessment   Assessment Type Discharge Planning Assessment   Confirmed/corrected address and phone number on facesheet? Yes   Assessment information obtained from? Caregiver   Expected Length of Stay (days) 5   Communicated expected length of stay with patient/caregiver no   Prior to hospitilization cognitive status: Not Oriented to Person;Not Oriented to Place;Inappropriate Behavior   Prior to hospitalization functional status: Assistive Equipment   Current cognitive status: Not Oriented to Person;Not Oriented to Place   Current Functional Status: Needs Assistance   Lives With child(renae), adult   Able to Return to Prior Arrangements no   Is patient able to care for self after discharge? No   Patient's perception of discharge disposition home or selfcare   Readmission Within the Last 30 Days lack of support   Patient currently being followed by outpatient case management? No   Patient currently receives any other outside agency services? No   Equipment Currently Used at Home bedside commode;shower chair;walker, rolling   Do you have any problems affording any of your prescribed medications? No   Is the patient taking medications as prescribed? yes   Does the patient have transportation home? Yes   Does  the patient receive services at the [facility]? No   Discharge Plan A New Nursing Home placement - long-term care facility   Discharge Plan B New Nursing Home placement - long-term care facility   DME Needed Upon Discharge  other (see comments)   Patient/Family in Agreement with Plan unable to assess

## 2020-09-08 NOTE — SUBJECTIVE & OBJECTIVE
Interval History: Elevated BP treated with PRN labetalol, vasotec, hydralazine. Refusing PO meds/food. Requests some coffee this morning. Respiratory status stable. Repeat COVID positive. CM assist for inpatient hospice placement.    Review of Systems   Constitutional: Negative for chills and fever.   HENT: Negative for sore throat.    Respiratory: Negative for cough and shortness of breath.    Cardiovascular: Negative for chest pain and leg swelling.   Gastrointestinal: Negative for abdominal pain, constipation, diarrhea, nausea and vomiting.   Genitourinary: Negative for dysuria.   Musculoskeletal: Negative for myalgias.   Skin: Negative for rash.   Neurological: Negative for dizziness and headaches.   Psychiatric/Behavioral: Negative for confusion.     Objective:     Vital Signs (Most Recent):  Temp: (!) 100.7 °F (38.2 °C) (09/08/20 0845)  Pulse: 70 (09/08/20 1000)  Resp: 17 (09/08/20 1000)  BP: (!) 184/83 (09/08/20 1000)  SpO2: 96 % (09/08/20 1000) Vital Signs (24h Range):  Temp:  [98.2 °F (36.8 °C)-100.7 °F (38.2 °C)] 100.7 °F (38.2 °C)  Pulse:  [62-74] 70  Resp:  [14-29] 17  SpO2:  [94 %-98 %] 96 %  BP: (139-219)/() 184/83     Weight: 72.6 kg (160 lb 0.9 oz)  Body mass index is 22.97 kg/m².    Intake/Output Summary (Last 24 hours) at 9/8/2020 1100  Last data filed at 9/8/2020 0600  Gross per 24 hour   Intake 220 ml   Output --   Net 220 ml      Physical Exam  Constitutional:       Appearance: Normal appearance.   HENT:      Head: Normocephalic and atraumatic.      Mouth/Throat:      Mouth: Mucous membranes are moist.      Pharynx: Oropharynx is clear.   Eyes:      Extraocular Movements: Extraocular movements intact.      Pupils: Pupils are equal, round, and reactive to light.   Neck:      Musculoskeletal: Neck supple.   Cardiovascular:      Rate and Rhythm: Normal rate and regular rhythm.      Pulses: Normal pulses.      Heart sounds: Normal heart sounds.   Pulmonary:      Effort: Pulmonary effort is  "normal. No respiratory distress.      Breath sounds: Normal breath sounds.   Abdominal:      General: Abdomen is flat. Bowel sounds are normal.      Palpations: Abdomen is soft.   Musculoskeletal:         General: No swelling.   Skin:     General: Skin is warm and dry.      Capillary Refill: Capillary refill takes less than 2 seconds.   Neurological:      General: No focal deficit present.      Mental Status: He is alert and oriented to person, place, and time. Mental status is at baseline.   Psychiatric:      Comments: Flat, withdrawn. Requests "leave me alone". Refusing PO meds/food, although requests some black coffee this morning. Amenable to physical exam.         Significant Labs:   CBC:   Recent Labs   Lab 09/07/20  0524 09/08/20  0212   WBC 5.42 5.99   HGB 12.0* 12.9*   HCT 36.8* 37.4*    204     CMP:   Recent Labs   Lab 09/07/20  0524 09/08/20  0642    141   K 4.1 4.1    110   CO2 20* 21*   * 155*   BUN 36* 41*   CREATININE 2.0* 2.2*   CALCIUM 8.3* 8.3*   PROT 7.0 7.0   ALBUMIN 2.7* 2.6*   BILITOT 0.3 0.3   ALKPHOS 118 111   AST 38 30   ALT 25 22   ANIONGAP 12 10   EGFRNONAA 34.0* 30.3*       Significant Imaging: I have reviewed all pertinent imaging results/findings within the past 24 hours.  "

## 2020-09-08 NOTE — PLAN OF CARE
[facility]  Department of Hospital Medicine  [address] [place] [address]  [phone] after hours  [phone] fax    HOSPICE  ORDERS    09/08/2020    Admit to Hospice:  Inpatient Service    Diagnoses:   Active Hospital Problems    Diagnosis  POA    *COVID-19 [U07.1]  Yes    Acute blood loss anemia [D62]  Yes    Palliative care encounter [Z51.5]  Not Applicable    Hyperlipidemia associated with type 2 diabetes mellitus [E11.69, E78.5]  Yes    Type 2 diabetes mellitus with stage 4 chronic kidney disease, with long-term current use of insulin [E11.22, N18.4, Z79.4]  Not Applicable    (HFpEF) heart failure with preserved ejection fraction [I50.30]  Yes    Essential hypertension [I10]  Yes    ALBERTO on CKD [N17.9]  Yes      Resolved Hospital Problems   No resolved problems to display.       Hospice Qualifying Diagnoses:       Patient has a life expectancy < 6 months due to:  1) Primary Hospice Diagnosis:  End-stage dementia, COVID+  Comorbid Conditions Contributing to Decline: failure to thrive, chronic diastolic CHF, CVA  Vital Signs: Routine per Hospice Protocol.    Code Status: DNR    Allergies:   Review of patient's allergies indicates:   Allergen Reactions    Cayenne Anaphylaxis     Throat swells up     Cayenne pepper     Grapefruit        Diet: Diabetic diet but ok for pleasure feeding    Activities: As tolerated    Nursing: Per Hospice Routine.  Routine Skin for Bedridden Patients: Apply moisture barrier cream to all skin folds and   wet areas in perineal area daily and after baths and all bowel movements.    Oxygen: 1-2 L PRN for comfort or O2 <90%    Other Miscellaneous Care: Airborne and Contact and Droplet Precautions for COVID+    Medications:      Ming Boateng   Home Medication Instructions KHANG:[id number]    Printed on:09/07/20 0809   Medication Information                      acetaminophen (TYLENOL) 650 MG Supp  Place 1 suppository (650 mg total)  rectally every 6 (six) hours as needed (101).             alcohol swabs (ALCOHOL PREP SWABS) PadM  Apply 1 each topically as needed.             allopurinoL (ZYLOPRIM) 100 MG tablet  Take 2 tablets (200 mg total) by mouth once daily.             atorvastatin (LIPITOR) 20 MG tablet  Take 1 tablet (20 mg total) by mouth once daily.             bisacodyL (FLEET) 10 mg/30 mL Enem  Place 10 mg rectally once.             blood sugar diagnostic Strp  1 each by Misc.(Non-Drug; Combo Route) route 4 (four) times daily.             blood-glucose meter kit  Use as instructed             calcium acetate,phosphat bind, (PHOSLO) 667 mg tablet  Take 1 tablet (667 mg total) by mouth 3 (three) times daily with meals.             carBAMazepine (TEGRETOL) 100 mg chewable tablet  Take 1 tablet (100 mg total) by mouth 2 (two) times a day.             carvediloL (COREG) 3.125 MG tablet  Take 1 tablet (3.125 mg total) by mouth 2 (two) times daily.             clopidogreL (PLAVIX) 75 mg tablet  Take 1 tablet (75 mg total) by mouth once daily.             dicyclomine (BENTYL) 20 mg tablet  Take 1 tablet (20 mg total) by mouth every 8 (eight) hours.             escitalopram oxalate (LEXAPRO) 20 MG tablet  Take 1 tablet (20 mg total) by mouth once daily.             famotidine (PEPCID) 20 MG tablet  Take 1 tablet (20 mg total) by mouth 2 (two) times daily as needed for Heartburn.             insulin aspart U-100 (NOVOLOG) 100 unit/mL Crtg  Inject 2 Units into the skin 3 (three) times daily with meals.             lancets (LANCETS,ULTRA THIN) Misc  1 lancet by Misc.(Non-Drug; Combo Route) route 4 (four) times daily.             losartan (COZAAR) 100 MG tablet  Take 1 tablet (100 mg total) by mouth once daily.             memantine (NAMENDA) 5 MG Tab  Take 1 tablet (5 mg total) by mouth 2 (two) times daily.             mirtazapine (REMERON) 30 MG tablet  Take 1 tablet (30 mg total) by mouth every evening.             multivitamin capsule  Take 1  "capsule by mouth once daily.             pen needle, diabetic 29 gauge x 1/2" Ndle  Inisulin QAC and HS             psyllium 0.52 gram capsule  Take 0.52 g by mouth once daily.             QUEtiapine (SEROQUEL) 25 MG Tab  Take 1 tablet (25 mg total) by mouth once daily.             senna (SENNA) 8.6 mg tablet  Take 1 tablet by mouth 2 (two) times daily as needed for Constipation.             TOUJEO SOLOSTAR U-300 INSULIN 300 unit/mL (1.5 mL) InPn pen  Inject 10 Units into the skin once daily.                   DIABETES CARE:   Nurse to perform and educate diabetic management with blood glucose monitoring:      Fingerstick blood sugar AC and HS     Fingerstick blood sugar every 6 hours if patient is unable to eat    Report CBG < 60 or > 350 to physician.         Insulin Sliding Scale         Glucose  Novolog Insulin Subcutaneous        0 - 60   Orange juice or glucose tablet      No insulin   201-250  2 units   251-300  4 units   301-350  6 units   351-400  8 units   >400   10 units then call physician      Future Orders:  Hospice Medical Director may dictate new orders for comfortable care measures & sign death certificate.        _________________________________  Rochelle Mccloud MD  09/08/2020      "

## 2020-09-09 PROBLEM — Z51.5 COMFORT MEASURES ONLY STATUS: Status: ACTIVE | Noted: 2020-09-09

## 2020-09-09 LAB
POCT GLUCOSE: 118 MG/DL (ref 70–110)
POCT GLUCOSE: 149 MG/DL (ref 70–110)

## 2020-09-09 PROCEDURE — 63600175 PHARM REV CODE 636 W HCPCS: Performed by: INTERNAL MEDICINE

## 2020-09-09 PROCEDURE — 99232 SBSQ HOSP IP/OBS MODERATE 35: CPT | Mod: GC,,, | Performed by: INTERNAL MEDICINE

## 2020-09-09 PROCEDURE — 99900035 HC TECH TIME PER 15 MIN (STAT)

## 2020-09-09 PROCEDURE — 25000003 PHARM REV CODE 250: Performed by: STUDENT IN AN ORGANIZED HEALTH CARE EDUCATION/TRAINING PROGRAM

## 2020-09-09 PROCEDURE — 99232 PR SUBSEQUENT HOSPITAL CARE,LEVL II: ICD-10-PCS | Mod: GC,,, | Performed by: INTERNAL MEDICINE

## 2020-09-09 PROCEDURE — 11000001 HC ACUTE MED/SURG PRIVATE ROOM

## 2020-09-09 PROCEDURE — 94761 N-INVAS EAR/PLS OXIMETRY MLT: CPT

## 2020-09-09 PROCEDURE — 94640 AIRWAY INHALATION TREATMENT: CPT

## 2020-09-09 RX ADMIN — ENALAPRILAT 1.25 MG: 2.5 INJECTION INTRAVENOUS at 06:09

## 2020-09-09 RX ADMIN — ALBUTEROL SULFATE 2 PUFF: 90 AEROSOL, METERED RESPIRATORY (INHALATION) at 07:09

## 2020-09-09 RX ADMIN — HYDRALAZINE HYDROCHLORIDE 10 MG: 20 INJECTION INTRAMUSCULAR; INTRAVENOUS at 08:09

## 2020-09-09 RX ADMIN — INSULIN ASPART 6 UNITS: 100 INJECTION, SOLUTION INTRAVENOUS; SUBCUTANEOUS at 08:09

## 2020-09-09 RX ADMIN — ALBUTEROL SULFATE 2 PUFF: 90 AEROSOL, METERED RESPIRATORY (INHALATION) at 01:09

## 2020-09-09 RX ADMIN — IPRATROPIUM BROMIDE 2 PUFF: 17 AEROSOL, METERED RESPIRATORY (INHALATION) at 07:09

## 2020-09-09 RX ADMIN — HYDRALAZINE HYDROCHLORIDE 10 MG: 20 INJECTION INTRAMUSCULAR; INTRAVENOUS at 01:09

## 2020-09-09 RX ADMIN — IPRATROPIUM BROMIDE 2 PUFF: 17 AEROSOL, METERED RESPIRATORY (INHALATION) at 01:09

## 2020-09-09 NOTE — PROGRESS NOTES
[facility] - ICU 14 [facility] Medicine  Progress Note    Patient Name: Ming Baoteng  MRN: [id number]  Patient Class: IP- Inpatient   Admission Date: 9/6/2020  Length of Stay: 3 days  Attending Physician: Hien Barboza MD  Primary Care Provider: Bibi Castaneda MD        Subjective:     Principal Problem:COVID-19        HPI:  Ms. Boateng is 64y/o Eritrean speaking male with PMHx significant for CVA (3/2019), CHF ( EF 55%, TTE 3/2019), DMII,HLD, HTN, CKD, recent behavioral problem.  Who present to ED of [facility] with fever and cough.  Patient is Eritrean-speaking,  was used to interview the patient, however the patient has difficulty responding with answering few questions.  History was taken mainly from the chart.  Patient was recently discharged on Thursday from [facility] after he was admitted for aggressive behavior.  Per daughter, he was coughing and when checked his temperature was 100.9°.  He was brought to ED of [facility] and found to be positive for COVID.  He denies chest pain, SOB, palpitation, abdominal pain, N/V, diarrhea or dysuria.  His daughter reports that he has not been eating or drinking well since he was discharged.  In the ED of OSH he found with hemoglobin of 4.6.  He denies black stool or BRBPR, and he denies any source of bleeding.  Per ED note, his daughter is his POA and she wants him to be DNR with no aggressive measures.    Overview/Hospital Course:  Elevated BP treating with Vasotec, labetalol, hydralazine PRN. Refusing PO meds/food. Respiratory status stable. Repeat COVID test remains positive. Palliative meeting with patient and daughter. Patient remains DNR with plans to d/c to hospice and focus on comfort, pending placement with CM assist. Transitioning to comfort measures 9/9/2020, to include no labs or vital signs.    Interval History: Elevated BP treating with Vasotec and hydralazine PRN. Refusing PO meds/food. Respiratory status stable.  Repeat COVID test remains positive. Patient remains DNR with plans to d/c to hospice and focus on comfort, pending placement with CM assist. Transitioning to comfort measures 9/9/2020, to include no labs or vital signs.    Review of Systems   Constitutional: Negative for chills and fever.   HENT: Negative for sore throat.    Respiratory: Negative for cough and shortness of breath.    Cardiovascular: Negative for chest pain and leg swelling.   Gastrointestinal: Negative for abdominal pain, constipation, diarrhea, nausea and vomiting.   Genitourinary: Negative for dysuria.   Musculoskeletal: Negative for myalgias.   Skin: Negative for rash.   Neurological: Negative for dizziness and headaches.   Psychiatric/Behavioral: Negative for confusion.     Objective:     Vital Signs (Most Recent):  Temp: 98.7 °F (37.1 °C) (09/09/20 0800)  Pulse: 80 (09/09/20 1520)  Resp: (!) 22 (09/09/20 1315)  BP: (!) 187/86 (09/09/20 1315)  SpO2: (!) 94 % (09/09/20 1315) Vital Signs (24h Range):  Temp:  [98.7 °F (37.1 °C)-99.3 °F (37.4 °C)] 98.7 °F (37.1 °C)  Pulse:  [59-85] 80  Resp:  [14-22] 22  SpO2:  [94 %-98 %] 94 %  BP: (137-191)/(66-96) 187/86     Weight: 72.6 kg (160 lb 0.9 oz)  Body mass index is 22.97 kg/m².    Intake/Output Summary (Last 24 hours) at 9/9/2020 1646  Last data filed at 9/9/2020 0600  Gross per 24 hour   Intake 50 ml   Output 410 ml   Net -360 ml      Physical Exam  Constitutional:       Appearance: Normal appearance.   HENT:      Head: Normocephalic and atraumatic.      Mouth/Throat:      Mouth: Mucous membranes are moist.      Pharynx: Oropharynx is clear.   Eyes:      Extraocular Movements: Extraocular movements intact.      Pupils: Pupils are equal, round, and reactive to light.   Neck:      Musculoskeletal: Neck supple.   Cardiovascular:      Rate and Rhythm: Normal rate and regular rhythm.      Pulses: Normal pulses.      Heart sounds: Normal heart sounds.   Pulmonary:      Effort: Pulmonary effort is normal. No  respiratory distress.      Breath sounds: Normal breath sounds.   Abdominal:      General: Abdomen is flat. Bowel sounds are normal.      Palpations: Abdomen is soft.   Musculoskeletal:         General: No swelling.   Skin:     General: Skin is warm and dry.      Capillary Refill: Capillary refill takes less than 2 seconds.   Neurological:      General: No focal deficit present.      Mental Status: He is alert and oriented to person, place, and time. Mental status is at baseline.   Psychiatric:      Comments: Flat, withdrawn         Significant Labs:   CBC:   Recent Labs   Lab 09/08/20  0212   WBC 5.99   HGB 12.9*   HCT 37.4*        CMP:   Recent Labs   Lab 09/08/20  0642      K 4.1      CO2 21*   *   BUN 41*   CREATININE 2.2*   CALCIUM 8.3*   PROT 7.0   ALBUMIN 2.6*   BILITOT 0.3   ALKPHOS 111   AST 30   ALT 22   ANIONGAP 10   EGFRNONAA 30.3*       Significant Imaging: I have reviewed all pertinent imaging results/findings within the past 24 hours.      Assessment/Plan:      * COVID-19  Ms. Boateng is 64y/o Romanian speaking male with PMHx significant for CVA (3/2019), CHF ( EF 55%, TTE 3/2019), DMII,HLD, HTN, CKD, recent behavioral problem. Per daughter, he was coughing and when checked his temperature was 100.9°.  He was brought to ED of [facility] and found to be positive for COVID.    -- patient denies any shortness of breath   -- SpO2 % on RA with no need for oxygen   -- CXR with no signs of infiltrate or consolidation  -- elevated inflammatory markers  -- Monitor for decompensation. Stable during hospitalization  -- Repeat test positive on 9/7/2020    Comfort measures only status  Transitioning to comfort measures 9/9/2020, to include no labs or vital signs.      Palliative care encounter  -- patient with multiple comorbidities  -- daughter is POA, she wants the patient to be DNR with no aggressive measures  -- Refusing PO meds/food. Patient/family inability to care for him at  this time. Palliative meeting with patient and daughter. Daughter reports patient expressed he is ready to go and no one should be living like this.   -- Patient remains DNR and d/c to inpatient hospice for focus on comfort, pending placement      Acute blood loss anemia  -- patient with baseline hemoglobin 11  -- in OSH, he found with hemoglobin 4.7  -- patient denies any active source of bleed  -- received 1 unit of PRBCs  -- Repeated hgb 11-13  -- Suspect original lab error    Hyperlipidemia associated with type 2 diabetes mellitus  -- resume home medication atorvastatin 20 mg      Type 2 diabetes mellitus with stage 4 chronic kidney disease, with long-term current use of insulin  -- blood glucose on admission 315  -- last hemoglobin A1c 6.5 ( 6/2019), now 7.9  -- Elevated BG improved with increasing to aspart 6 units with meals and determir 15 QHS  -- Patient not eating. Caution for hypoglycemia. Hold aspart if refusing meal  -- D/c on home regimen    (HFpEF) heart failure with preserved ejection fraction  -- no clinical signs of volume overload   -- last echo 3/2019 with EF 55%        Essential hypertension  -- continue home medication carvedilol and losartan (ALBERTO resolved) on d/c  -- Refusing PO meds here  -- Elevated BP responsive to Labetolol. PRN vasotec and hydralazine also available  -- Transitioning to comfort measures 9/9/2020, to include no labs or vital signs.    ALBERTO on CKD  -- serum creatinine on admission 2.7  -- baseline serum creatinine 1.9-2.1. Now at baseline after IVF  -- likely prerenal from poor p.o. intake        VTE Risk Mitigation (From admission, onward)         Ordered     Reason for No Pharmacological VTE Prophylaxis  Once     Question:  Reasons:  Answer:  Risk of Bleeding    09/06/20 1146     IP VTE HIGH RISK PATIENT  Once      09/06/20 1146     Place sequential compression device  Until discontinued      09/06/20 0332                Discharge Planning   ANTHONY: 9/9/2020     Code Status:  DNR   Is the patient medically ready for discharge?: Yes    Reason for patient still in hospital (select all that apply): Other (specify) pending placement  Discharge Plan A: New Nursing Home placement - nursing home care facility                  Rochelle Mccloud MD  Department of Hospital Medicine   [facility] - ICU 14 [facility]

## 2020-09-09 NOTE — CARE UPDATE
"RAPID RESPONSE NURSE PROACTIVE ROUNDING NOTE     Time of Visit: 09:50    Admit Date: 2020  LOS: 3  Code Status: DNR   Date of Visit: 2020  : 1955  Age: 65 y.o.  Sex: male  Race: White  Bed: 20498/24711 A:   MRN: [id number]  Was the patient discharged from an ICU this admission? no   Was the patient discharged from a PACU within last 24 hours?  no  Did the patient receive conscious sedation/general anesthesia in last 24 hours?  no  Was the patient in the ED within the past 24 hours?  no  Was the patient started on NIPPV within the past 24 hours?  no  Attending Physician: Hien Barboza MD  Primary Service: [facility] MED 2    ASSESSMENT     Notified by Rapid Response Team Chart Audit.  Reason for alert: refusal of medications, plan of care clarification.     Diagnosis: COVID-19    Abnormal Vital Signs: BP (!) 191/93   Pulse 79   Temp 98.7 °F (37.1 °C) (Axillary)   Resp 18   Ht 5' 10" (1.778 m)   Wt 72.6 kg (160 lb 0.9 oz)   SpO2 96%   BMI 22.97 kg/m²      Clinical Issues: behavioral    Patient  has a past medical history of Acute right MCA stroke, CHF (congestive heart failure), Diabetes mellitus, Diabetes mellitus, type 2, Hyperlipidemia, Hypertension, Renal disorder, and Stroke.      Patient awake and alert on room air. Per bedside Rn patient has been refusing medications over the last couple of days and eating minimal amounts of food. Primary RN stated that primary medical team spoke with patient's family and are transitioning to comfort care today with d/c to hospice.     INTERVENTIONS/ RECOMMENDATIONS     Continue current plan of care.     Discussed plan of care with RN, Ashok Valdez.    PHYSICIAN ESCALATION     Yes/No  no    Orders received and case discussed with NA.    Disposition: Remain in room 14357.    FOLLOW-UP     Call back the Rapid Response Nurse, Rich Santoyo RN at [phone] for additional questions or concerns.        "

## 2020-09-09 NOTE — PLAN OF CARE
DX: Covid     Shift Events: Pt with increased aggressive behaviors during shift with attempts at med administration and personal care. Pt punching at staff during care. Episode of htn requiring prn Hydralazine Pt continues to refuse medication.      Plan of Care: continue covid treatment     Neuro: AAO x 2     Respiratory: Clear bilaterally on room air     Cardiac: NSR     Diet:  Diabetic     Gtts: Saline locked     Skin: Intact

## 2020-09-09 NOTE — ASSESSMENT & PLAN NOTE
-- blood glucose on admission 315  -- last hemoglobin A1c 6.5 ( 6/2019), now 7.9  -- Elevated BG improved with increasing to aspart 6 units with meals and determir 15 QHS  -- Patient not eating. Caution for hypoglycemia. Hold aspart if refusing meal  -- D/c on home regimen

## 2020-09-09 NOTE — PLAN OF CARE
Problem: Adult Inpatient Plan of Care  Goal: Plan of Care Review  Outcome: Ongoing, Progressing  Goal: Patient-Specific Goal (Individualization)  Outcome: Ongoing, Progressing  Goal: Absence of Hospital-Acquired Illness or Injury  Outcome: Ongoing, Progressing  Goal: Optimal Comfort and Wellbeing  Outcome: Ongoing, Progressing  Goal: Readiness for Transition of Care  Outcome: Ongoing, Progressing  Goal: Rounds/Family Conference  Outcome: Ongoing, Progressing     Problem: Diabetes Comorbidity  Goal: Blood Glucose Level Within Desired Range  Outcome: Ongoing, Progressing     Problem: Fall Injury Risk  Goal: Absence of Fall and Fall-Related Injury  Outcome: Ongoing, Progressing     Problem: Skin Injury Risk Increased  Goal: Skin Health and Integrity  Outcome: Ongoing, Progressing     Problem: Electrolyte Imbalance (Acute Kidney Injury/Impairment)  Goal: Serum Electrolyte Balance  Outcome: Ongoing, Progressing     Problem: Fluid Imbalance (Acute Kidney Injury/Impairment)  Goal: Optimal Fluid Balance  Outcome: Ongoing, Progressing     Problem: Hematologic Alteration (Acute Kidney Injury/Impairment)  Goal: Hemoglobin, Hematocrit and Platelets Within Normal Range  Outcome: Ongoing, Progressing     Problem: Oral Intake Inadequate (Acute Kidney Injury/Impairment)  Goal: Optimal Nutrition Intake  Outcome: Ongoing, Progressing     Problem: Renal Function Impairment (Acute Kidney Injury/Impairment)  Goal: Effective Renal Function  Outcome: Ongoing, Progressing     Problem: Coping Ineffective  Goal: Effective Coping  Outcome: Ongoing, Progressing     Problem: Palliative Care  Goal: Enhanced Quality of Life  Outcome: Ongoing, Progressing

## 2020-09-09 NOTE — ASSESSMENT & PLAN NOTE
-- patient with multiple comorbidities  -- daughter is POA, she wants the patient to be DNR with no aggressive measures  -- Refusing PO meds/food. Patient/family inability to care for him at this time. Palliative meeting with patient and daughter. Daughter reports patient expressed he is ready to go and no one should be living like this.   -- Patient remains DNR and d/c to inpatient hospice for focus on comfort, pending placement

## 2020-09-09 NOTE — SUBJECTIVE & OBJECTIVE
Interval History: Elevated BP treating with Vasotec and hydralazine PRN. Refusing PO meds/food. Respiratory status stable. Repeat COVID test remains positive. Patient remains DNR with plans to d/c to hospice and focus on comfort, pending placement with CM assist. Transitioning to comfort measures 9/9/2020, to include no labs or vital signs.    Review of Systems   Constitutional: Negative for chills and fever.   HENT: Negative for sore throat.    Respiratory: Negative for cough and shortness of breath.    Cardiovascular: Negative for chest pain and leg swelling.   Gastrointestinal: Negative for abdominal pain, constipation, diarrhea, nausea and vomiting.   Genitourinary: Negative for dysuria.   Musculoskeletal: Negative for myalgias.   Skin: Negative for rash.   Neurological: Negative for dizziness and headaches.   Psychiatric/Behavioral: Negative for confusion.     Objective:     Vital Signs (Most Recent):  Temp: 98.7 °F (37.1 °C) (09/09/20 0800)  Pulse: 80 (09/09/20 1520)  Resp: (!) 22 (09/09/20 1315)  BP: (!) 187/86 (09/09/20 1315)  SpO2: (!) 94 % (09/09/20 1315) Vital Signs (24h Range):  Temp:  [98.7 °F (37.1 °C)-99.3 °F (37.4 °C)] 98.7 °F (37.1 °C)  Pulse:  [59-85] 80  Resp:  [14-22] 22  SpO2:  [94 %-98 %] 94 %  BP: (137-191)/(66-96) 187/86     Weight: 72.6 kg (160 lb 0.9 oz)  Body mass index is 22.97 kg/m².    Intake/Output Summary (Last 24 hours) at 9/9/2020 1646  Last data filed at 9/9/2020 0600  Gross per 24 hour   Intake 50 ml   Output 410 ml   Net -360 ml      Physical Exam  Constitutional:       Appearance: Normal appearance.   HENT:      Head: Normocephalic and atraumatic.      Mouth/Throat:      Mouth: Mucous membranes are moist.      Pharynx: Oropharynx is clear.   Eyes:      Extraocular Movements: Extraocular movements intact.      Pupils: Pupils are equal, round, and reactive to light.   Neck:      Musculoskeletal: Neck supple.   Cardiovascular:      Rate and Rhythm: Normal rate and regular rhythm.       Pulses: Normal pulses.      Heart sounds: Normal heart sounds.   Pulmonary:      Effort: Pulmonary effort is normal. No respiratory distress.      Breath sounds: Normal breath sounds.   Abdominal:      General: Abdomen is flat. Bowel sounds are normal.      Palpations: Abdomen is soft.   Musculoskeletal:         General: No swelling.   Skin:     General: Skin is warm and dry.      Capillary Refill: Capillary refill takes less than 2 seconds.   Neurological:      General: No focal deficit present.      Mental Status: He is alert and oriented to person, place, and time. Mental status is at baseline.   Psychiatric:      Comments: Flat, withdrawn         Significant Labs:   CBC:   Recent Labs   Lab 09/08/20  0212   WBC 5.99   HGB 12.9*   HCT 37.4*        CMP:   Recent Labs   Lab 09/08/20  0642      K 4.1      CO2 21*   *   BUN 41*   CREATININE 2.2*   CALCIUM 8.3*   PROT 7.0   ALBUMIN 2.6*   BILITOT 0.3   ALKPHOS 111   AST 30   ALT 22   ANIONGAP 10   EGFRNONAA 30.3*       Significant Imaging: I have reviewed all pertinent imaging results/findings within the past 24 hours.

## 2020-09-09 NOTE — ASSESSMENT & PLAN NOTE
-- continue home medication carvedilol and losartan (ALBERTO resolved) on d/c  -- Refusing PO meds here  -- Elevated BP responsive to Labetolol. PRN vasotec and hydralazine also available  -- Transitioning to comfort measures 9/9/2020, to include no labs or vital signs.

## 2020-09-10 LAB — POCT GLUCOSE: 299 MG/DL (ref 70–110)

## 2020-09-10 PROCEDURE — 94640 AIRWAY INHALATION TREATMENT: CPT

## 2020-09-10 PROCEDURE — 25000003 PHARM REV CODE 250: Performed by: INTERNAL MEDICINE

## 2020-09-10 PROCEDURE — 99232 SBSQ HOSP IP/OBS MODERATE 35: CPT | Mod: GC,,, | Performed by: STUDENT IN AN ORGANIZED HEALTH CARE EDUCATION/TRAINING PROGRAM

## 2020-09-10 PROCEDURE — 94761 N-INVAS EAR/PLS OXIMETRY MLT: CPT

## 2020-09-10 PROCEDURE — 99232 PR SUBSEQUENT HOSPITAL CARE,LEVL II: ICD-10-PCS | Mod: GC,,, | Performed by: STUDENT IN AN ORGANIZED HEALTH CARE EDUCATION/TRAINING PROGRAM

## 2020-09-10 PROCEDURE — 99900035 HC TECH TIME PER 15 MIN (STAT)

## 2020-09-10 PROCEDURE — 25000003 PHARM REV CODE 250: Performed by: STUDENT IN AN ORGANIZED HEALTH CARE EDUCATION/TRAINING PROGRAM

## 2020-09-10 PROCEDURE — 11000001 HC ACUTE MED/SURG PRIVATE ROOM

## 2020-09-10 RX ADMIN — ALBUTEROL SULFATE 2 PUFF: 90 AEROSOL, METERED RESPIRATORY (INHALATION) at 07:09

## 2020-09-10 RX ADMIN — IPRATROPIUM BROMIDE 2 PUFF: 17 AEROSOL, METERED RESPIRATORY (INHALATION) at 01:09

## 2020-09-10 RX ADMIN — IPRATROPIUM BROMIDE 2 PUFF: 17 AEROSOL, METERED RESPIRATORY (INHALATION) at 12:09

## 2020-09-10 RX ADMIN — ALBUTEROL SULFATE 2 PUFF: 90 AEROSOL, METERED RESPIRATORY (INHALATION) at 01:09

## 2020-09-10 RX ADMIN — ALBUTEROL SULFATE 2 PUFF: 90 AEROSOL, METERED RESPIRATORY (INHALATION) at 12:09

## 2020-09-10 RX ADMIN — IPRATROPIUM BROMIDE 2 PUFF: 17 AEROSOL, METERED RESPIRATORY (INHALATION) at 07:09

## 2020-09-10 NOTE — NURSING
Writer spoke with patient daughter/KHARI Yousif for update on patient status. Update given. Daughter verbalized she and family are satisfied with current plan of care and continuing comfort measures only. All questions answered.

## 2020-09-10 NOTE — PROGRESS NOTES
[facility] Medicine  Progress Note    Patient Name: Ming Boateng  MRN: [id number]  Patient Class: IP- Inpatient   Admission Date: 9/6/2020  Length of Stay: 4 days  Attending Physician: Edy Mendiola MD  Primary Care Provider: Bibi Castaneda MD      Subjective:     Principal Problem:COVID-19    HPI:  Ms. Boateng is 66y/o Irish speaking male with PMHx significant for CVA (3/2019), CHF ( EF 55%, TTE 3/2019), DMII,HLD, HTN, CKD, recent behavioral problem.  Who present to ED of [facility] with fever and cough.  Patient is Irish-speaking,  was used to interview the patient, however the patient has difficulty responding with answering few questions.  History was taken mainly from the chart.  Patient was recently discharged on Thursday from [facility] after he was admitted for aggressive behavior.  Per daughter, he was coughing and when checked his temperature was 100.9°.  He was brought to ED of [facility] and found to be positive for COVID.  He denies chest pain, SOB, palpitation, abdominal pain, N/V, diarrhea or dysuria.  His daughter reports that he has not been eating or drinking well since he was discharged.  In the ED of OSH he found with hemoglobin of 4.6.  He denies black stool or BRBPR, and he denies any source of bleeding.  Per ED note, his daughter is his POA and she wants him to be DNR with no aggressive measures.    Overview/Hospital Course:  Elevated BP treating with Vasotec, labetalol, hydralazine PRN. Refusing PO meds/food. Respiratory status stable. Repeat COVID test remains positive. Palliative meeting with patient and daughter. Patient remains DNR with plans to d/c to hospice and focus on comfort, pending placement with CM assist. Transitioning to comfort measures 9/9/2020, to include no labs or vital signs.    Interval History: Continue to await placement. Pt otherwise completely ready for discharge. Comfort measures only. No labs  or vital signs.    Review of Systems   Constitutional: Negative for chills and fever.   HENT: Negative for sore throat.    Respiratory: Negative for cough and shortness of breath.    Cardiovascular: Negative for chest pain and leg swelling.   Gastrointestinal: Negative for abdominal pain, constipation, diarrhea, nausea and vomiting.   Genitourinary: Negative for dysuria.   Musculoskeletal: Negative for myalgias.   Skin: Negative for rash.   Neurological: Negative for dizziness and headaches.   Psychiatric/Behavioral: Negative for confusion.     Objective:     Vital Signs (Most Recent):  Temp: (patient refused) (09/10/20 1035)  Pulse: 90 (09/10/20 1214)  Resp: 20 (09/10/20 1214)  BP: (!) 174/94 (09/10/20 1214)  SpO2: 97 % (09/10/20 1214) Vital Signs (24h Range):  Temp:  [96.9 °F (36.1 °C)-97.6 °F (36.4 °C)] 97.6 °F (36.4 °C)  Pulse:  [78-90] 90  Resp:  [16-20] 20  SpO2:  [94 %-99 %] 97 %  BP: (174-192)/(84-94) 174/94     Weight: 72.6 kg (160 lb 0.9 oz)  Body mass index is 22.97 kg/m².    Intake/Output Summary (Last 24 hours) at 9/10/2020 1344  Last data filed at 9/10/2020 1233  Gross per 24 hour   Intake 0 ml   Output 1100 ml   Net -1100 ml      Physical Exam  Constitutional:       Appearance: Normal appearance.   HENT:      Head: Normocephalic and atraumatic.      Mouth/Throat:      Mouth: Mucous membranes are moist.      Pharynx: Oropharynx is clear.   Eyes:      Extraocular Movements: Extraocular movements intact.      Pupils: Pupils are equal, round, and reactive to light.   Neck:      Musculoskeletal: Neck supple.   Cardiovascular:      Rate and Rhythm: Normal rate and regular rhythm.      Pulses: Normal pulses.      Heart sounds: Normal heart sounds.   Pulmonary:      Effort: Pulmonary effort is normal. No respiratory distress.      Breath sounds: Normal breath sounds.   Abdominal:      General: Abdomen is flat. Bowel sounds are normal.      Palpations: Abdomen is soft.   Musculoskeletal:         General: No  swelling.   Skin:     General: Skin is warm and dry.      Capillary Refill: Capillary refill takes less than 2 seconds.   Neurological:      General: No focal deficit present.      Mental Status: He is alert and oriented to person, place, and time. Mental status is at baseline.   Psychiatric:      Comments: Flat, withdrawn         Significant Labs:   CBC:   No results for input(s): WBC, HGB, HCT, PLT in the last 48 hours.  CMP:   No results for input(s): NA, K, CL, CO2, GLU, BUN, CREATININE, CALCIUM, PROT, ALBUMIN, BILITOT, ALKPHOS, AST, ALT, ANIONGAP, EGFRNONAA in the last 48 hours.    Invalid input(s): ESTGF[place]    Significant Imaging: I have reviewed all pertinent imaging results/findings within the past 24 hours.      Assessment/Plan:      * COVID-19  Ms. Boateng is 64y/o [place] speaking male with PMHx significant for CVA (3/2019), CHF ( EF 55%, TTE 3/2019), DMII,HLD, HTN, CKD, recent behavioral problem. Per daughter, he was coughing and when checked his temperature was 100.9°.  He was brought to ED of [facility] and found to be positive for COVID.    -- patient denies any shortness of breath   -- SpO2 % on RA with no need for oxygen   -- CXR with no signs of infiltrate or consolidation  -- elevated inflammatory markers  -- Monitor for decompensation. Stable during hospitalization  -- Repeat test positive on 9/7/2020    Comfort measures only status  Transitioning to comfort measures 9/9/2020, to include no labs or vital signs.      Palliative care encounter  -- patient with multiple comorbidities  -- daughter is POA, she wants the patient to be DNR with no aggressive measures  -- Refusing PO meds/food. Patient/family inability to care for him at this time. Palliative meeting with patient and daughter. Daughter reports patient expressed he is ready to go and no one should be living like this.   -- Patient remains DNR and d/c to inpatient hospice for focus on comfort, pending placement      Acute blood  loss anemia  -- patient with baseline hemoglobin 11  -- in OSH, he found with hemoglobin 4.7  -- patient denies any active source of bleed  -- received 1 unit of PRBCs  -- Repeated hgb 11-13  -- Suspect original lab error    Hyperlipidemia associated with type 2 diabetes mellitus  -- resume home medication atorvastatin 20 mg      Type 2 diabetes mellitus with stage 4 chronic kidney disease, with long-term current use of insulin  -- blood glucose on admission 315  -- last hemoglobin A1c 6.5 ( 6/2019), now 7.9  -- Elevated BG improved with increasing to aspart 6 units with meals and determir 15 QHS  -- Patient not eating. Caution for hypoglycemia. Hold aspart if refusing meal  -- D/c on home regimen    (HFpEF) heart failure with preserved ejection fraction  -- no clinical signs of volume overload   -- last echo 3/2019 with EF 55%        Essential hypertension  -- continue home medication carvedilol and losartan (ALBERTO resolved) on d/c  -- Refusing PO meds here  -- Elevated BP responsive to Labetolol. PRN vasotec and hydralazine also available  -- Transitioning to comfort measures 9/9/2020, to include no labs or vital signs.    ALBERTO on CKD  -- serum creatinine on admission 2.7  -- baseline serum creatinine 1.9-2.1. Now at baseline after IVF  -- likely prerenal from poor p.o. intake        VTE Risk Mitigation (From admission, onward)         Ordered     Reason for No Pharmacological VTE Prophylaxis  Once     Question:  Reasons:  Answer:  Risk of Bleeding    09/06/20 1146     IP VTE HIGH RISK PATIENT  Once      09/06/20 1146     Place sequential compression device  Until discontinued      09/06/20 0332                Discharge Planning   ANTHONY: 9/14/2020     Code Status: DNR   Is the patient medically ready for discharge?: Yes    Reason for patient still in hospital (select all that apply): Treatment  Discharge Plan A: New Nursing Home placement - [facility] care facility(With Hospice)   Discharge Delays: (!) Other(Pending  accepting facility - D/T Covid positive and behavioral issues)              Britton Hadley MD  Department of Hospital Medicine   [facility]
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
## 2020-09-10 NOTE — SUBJECTIVE & OBJECTIVE
Interval History: Continue to await placement. Pt otherwise completely ready for discharge. Comfort measures only. No labs or vital signs.    Review of Systems   Constitutional: Negative for chills and fever.   HENT: Negative for sore throat.    Respiratory: Negative for cough and shortness of breath.    Cardiovascular: Negative for chest pain and leg swelling.   Gastrointestinal: Negative for abdominal pain, constipation, diarrhea, nausea and vomiting.   Genitourinary: Negative for dysuria.   Musculoskeletal: Negative for myalgias.   Skin: Negative for rash.   Neurological: Negative for dizziness and headaches.   Psychiatric/Behavioral: Negative for confusion.     Objective:     Vital Signs (Most Recent):  Temp: (patient refused) (09/10/20 1035)  Pulse: 90 (09/10/20 1214)  Resp: 20 (09/10/20 1214)  BP: (!) 174/94 (09/10/20 1214)  SpO2: 97 % (09/10/20 1214) Vital Signs (24h Range):  Temp:  [96.9 °F (36.1 °C)-97.6 °F (36.4 °C)] 97.6 °F (36.4 °C)  Pulse:  [78-90] 90  Resp:  [16-20] 20  SpO2:  [94 %-99 %] 97 %  BP: (174-192)/(84-94) 174/94     Weight: 72.6 kg (160 lb 0.9 oz)  Body mass index is 22.97 kg/m².    Intake/Output Summary (Last 24 hours) at 9/10/2020 1344  Last data filed at 9/10/2020 1233  Gross per 24 hour   Intake 0 ml   Output 1100 ml   Net -1100 ml      Physical Exam  Constitutional:       Appearance: Normal appearance.   HENT:      Head: Normocephalic and atraumatic.      Mouth/Throat:      Mouth: Mucous membranes are moist.      Pharynx: Oropharynx is clear.   Eyes:      Extraocular Movements: Extraocular movements intact.      Pupils: Pupils are equal, round, and reactive to light.   Neck:      Musculoskeletal: Neck supple.   Cardiovascular:      Rate and Rhythm: Normal rate and regular rhythm.      Pulses: Normal pulses.      Heart sounds: Normal heart sounds.   Pulmonary:      Effort: Pulmonary effort is normal. No respiratory distress.      Breath sounds: Normal breath sounds.   Abdominal:       General: Abdomen is flat. Bowel sounds are normal.      Palpations: Abdomen is soft.   Musculoskeletal:         General: No swelling.   Skin:     General: Skin is warm and dry.      Capillary Refill: Capillary refill takes less than 2 seconds.   Neurological:      General: No focal deficit present.      Mental Status: He is alert and oriented to person, place, and time. Mental status is at baseline.   Psychiatric:      Comments: Flat, withdrawn         Significant Labs:   CBC:   No results for input(s): WBC, HGB, HCT, PLT in the last 48 hours.  CMP:   No results for input(s): NA, K, CL, CO2, GLU, BUN, CREATININE, CALCIUM, PROT, ALBUMIN, BILITOT, ALKPHOS, AST, ALT, ANIONGAP, EGFRNONAA in the last 48 hours.    Invalid input(s): [place]GF[place]    Significant Imaging: I have reviewed all pertinent imaging results/findings within the past 24 hours.

## 2020-09-10 NOTE — PLAN OF CARE
Patient is medically stable for discharge.  Patient is refusing all healthcare.  DNR.   Palliative care following.  Recs: Nursing Home placement with hospice. Referrals sent to several facilities - to date we do not have an accepting facility.  Barriers - Covid positive and history of behavioral issues requiring psych admission.    CM/SW to follow for discharge planning needs.  Marta Molina RN  Case Management  [phone]       09/10/20 1023   Discharge Reassessment   Assessment Type Discharge Planning Reassessment   Provided patient/caregiver education on the expected discharge date and the discharge plan Yes   Do you have any problems affording any of your prescribed medications? TBD   Discharge Plan A New Nursing Home placement - snf care facility  (With Hospice)   Discharge Plan B Inpatient Hospice   DME Needed Upon Discharge  other (see comments)  (TBD)   Anticipated Discharge Disposition [facility]  (With Hospice)   Can the patient/caregiver answer the patient profile reliably? No, cognitively impaired   Describe the patient's ability to walk at the present time. Minor restrictions or changes   Number of comorbid conditions (as recorded on the chart) Four   Post-Acute Status   Post-Acute Authorization Placement   Post-Acute Placement Status Patient Evaluation by Facility  (Pending accepting facility)   Discharge Delays (!) Other  (Pending accepting facility - D/T Covid positive and behavioral issues)

## 2020-09-10 NOTE — PT/OT/SLP DISCHARGE
Physical Therapy Discharge Summary    Name: Ming Boateng  MRN: [id number]   Principal Problem: COVID-19     Patient Discharged from acute Physical Therapy on 9/10/2020.  Please refer to prior PT note for functional status.     Assessment:     Patient is not appropriate for therapy at this time. Pt has transitioned to comfort measures and awaiting care home placement. Per NSG pt refusing all healthcare at this time. Acute therapy will d/c current orders, please re-consult if functional status changes.     Objective:     GOALS:   Multidisciplinary Problems     Physical Therapy Goals        Problem: Physical Therapy Goal    Goal Priority Disciplines Outcome Goal Variances Interventions   Physical Therapy Goal     PT, PT/OT Ongoing, Progressing     Description: Goals to be met by: 20    Patient will increase functional independence with mobility by performin. Supine to sit with MInimal Assistance -not met  2. Sit to stand transfer with Contact Guard Assistance with AD -not met  3. Bed to chair transfer with Contact Guard Assistance using Rolling Walker -not met  4. Gait  x 20 feet with Contact Guard Assistance using Rolling Walker. -not met                     Reasons for Discontinuation of Therapy Services  Transfering to alternate level of care.      Plan:     Planned discharge to basic care home NH.    Rusty Burks, PT  9/10/2020

## 2020-09-10 NOTE — PLAN OF CARE
Update:  CM spoke with pt's daughter regarding status of facility referrals.    CM contacted patient's daughter Nica [phone] to provide an update with placment/referrals.  She currently is on a call and will call me back.  CM provided contact phone number.    CM to follow.  Marta Molina RN  Case Management  [phone]

## 2020-09-10 NOTE — PLAN OF CARE
Pt on comfort measures, refused medication, refused food, slept all night, call light in reach, bed locked and low

## 2020-09-10 NOTE — PLAN OF CARE
Facility updates:     [facility] (Pt family choice)-Tarik (med dir) states that admission coordinator is out with COVID. Stated that facility accepts pts on case by case basis. SW provided pt name on referral. Tarik to review and f/u.     [facility], [facility], [facility]- Do NOT accept COVID positive pts.     [facility] ()- Stated that pt can admit after 14 day hospital stay following his first positive result. Stated they can review pt September 18.     [facility] ()- to review for admit and follow up.    [facility]- Stated they are still reviewing pt.     2:48 PM    [facility] contacted SW back. Stated declined client due to inability to meet needs and client agitation.

## 2020-09-11 LAB — BACTERIA BLD CULT: NORMAL

## 2020-09-11 PROCEDURE — 94761 N-INVAS EAR/PLS OXIMETRY MLT: CPT

## 2020-09-11 PROCEDURE — 20600001 HC STEP DOWN PRIVATE ROOM

## 2020-09-11 PROCEDURE — 99231 PR SUBSEQUENT HOSPITAL CARE,LEVL I: ICD-10-PCS | Mod: GC,,, | Performed by: STUDENT IN AN ORGANIZED HEALTH CARE EDUCATION/TRAINING PROGRAM

## 2020-09-11 PROCEDURE — 99231 SBSQ HOSP IP/OBS SF/LOW 25: CPT | Mod: GC,,, | Performed by: STUDENT IN AN ORGANIZED HEALTH CARE EDUCATION/TRAINING PROGRAM

## 2020-09-11 PROCEDURE — 94640 AIRWAY INHALATION TREATMENT: CPT

## 2020-09-11 RX ADMIN — IPRATROPIUM BROMIDE 2 PUFF: 17 AEROSOL, METERED RESPIRATORY (INHALATION) at 12:09

## 2020-09-11 RX ADMIN — ALBUTEROL SULFATE 2 PUFF: 90 AEROSOL, METERED RESPIRATORY (INHALATION) at 01:09

## 2020-09-11 RX ADMIN — ALBUTEROL SULFATE 2 PUFF: 90 AEROSOL, METERED RESPIRATORY (INHALATION) at 08:09

## 2020-09-11 RX ADMIN — ALBUTEROL SULFATE 2 PUFF: 90 AEROSOL, METERED RESPIRATORY (INHALATION) at 12:09

## 2020-09-11 RX ADMIN — IPRATROPIUM BROMIDE 2 PUFF: 17 AEROSOL, METERED RESPIRATORY (INHALATION) at 01:09

## 2020-09-11 RX ADMIN — IPRATROPIUM BROMIDE 2 PUFF: 17 AEROSOL, METERED RESPIRATORY (INHALATION) at 07:09

## 2020-09-11 RX ADMIN — IPRATROPIUM BROMIDE 2 PUFF: 17 AEROSOL, METERED RESPIRATORY (INHALATION) at 08:09

## 2020-09-11 RX ADMIN — ALBUTEROL SULFATE 2 PUFF: 90 AEROSOL, METERED RESPIRATORY (INHALATION) at 07:09

## 2020-09-11 NOTE — PLAN OF CARE
Pt slept all night, O2 was high 90's, comfort care maintained, call light available, bed low and locked.

## 2020-09-11 NOTE — ASSESSMENT & PLAN NOTE
-- continue home medication carvedilol and losartan (ALBERTO resolved) on d/c  -- Refusing PO meds here  -- Elevated BP responsive to Labetolol  -- Transitioning to comfort measures 9/9/2020, to include no labs or vital signs. Patient refusing anti-hypertensives and all PO meds
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
## 2020-09-11 NOTE — SUBJECTIVE & OBJECTIVE
Interval History: NAEON. Comfort measures in place. Pending inpatient hospice placement.    Review of Systems   Unable to perform ROS: Other     Objective:     Vital Signs (Most Recent):  Temp: (patient refused) (09/10/20 1035)  Pulse: 83 (09/11/20 1220)  Resp: 16 (09/11/20 1220)  BP: (!) 198/84 (09/11/20 0707)  SpO2: 95 % (09/11/20 1220) Vital Signs (24h Range):  Pulse:  [77-93] 83  Resp:  [16-18] 16  SpO2:  [94 %-98 %] 95 %  BP: (198)/(84) 198/84     Weight: 72.6 kg (160 lb 0.9 oz)  Body mass index is 22.97 kg/m².    Intake/Output Summary (Last 24 hours) at 9/11/2020 1608  Last data filed at 9/10/2020 1700  Gross per 24 hour   Intake 240 ml   Output --   Net 240 ml      Physical Exam  Constitutional:       Appearance: Normal appearance.   HENT:      Head: Normocephalic and atraumatic.      Mouth/Throat:      Mouth: Mucous membranes are moist.      Pharynx: Oropharynx is clear.   Eyes:      Extraocular Movements: Extraocular movements intact.      Pupils: Pupils are equal, round, and reactive to light.   Neck:      Musculoskeletal: Neck supple.   Cardiovascular:      Rate and Rhythm: Normal rate and regular rhythm.      Pulses: Normal pulses.      Heart sounds: Normal heart sounds.   Pulmonary:      Effort: Pulmonary effort is normal. No respiratory distress.      Breath sounds: Normal breath sounds.   Abdominal:      General: Abdomen is flat. Bowel sounds are normal.      Palpations: Abdomen is soft.   Musculoskeletal:         General: No swelling.   Skin:     General: Skin is warm and dry.      Capillary Refill: Capillary refill takes less than 2 seconds.   Neurological:      General: No focal deficit present.      Mental Status: He is alert and oriented to person, place, and time. Mental status is at baseline.   Psychiatric:      Comments: Flat, withdrawn         Significant Labs: All pertinent labs within the past 24 hours have been reviewed.    Significant Imaging: I have reviewed all pertinent imaging  results/findings within the past 24 hours.

## 2020-09-11 NOTE — NURSING
Writer spoke with patient salma Lai for update on patient status. Update given. Son verbalized he satisfied with current plan of care and continuing comfort measures only. All questions answered.

## 2020-09-11 NOTE — PROGRESS NOTES
[facility] - ICU [facility] Medicine  Progress Note    Patient Name: Ming Boateng  MRN: [id number]  Patient Class: IP- Inpatient   Admission Date: 9/6/2020  Length of Stay: 5 days  Attending Physician: Edy Mendiola MD  Primary Care Provider: Bibi Castaneda MD        Subjective:     Principal Problem:COVID-19        HPI:  Ms. Boateng is 64y/o Bruneian speaking male with PMHx significant for CVA (3/2019), CHF ( EF 55%, TTE 3/2019), DMII,HLD, HTN, CKD, recent behavioral problem.  Who present to ED of [facility] with fever and cough.  Patient is Bruneian-speaking,  was used to interview the patient, however the patient has difficulty responding with answering few questions.  History was taken mainly from the chart.  Patient was recently discharged on Thursday from [facility] after he was admitted for aggressive behavior.  Per daughter, he was coughing and when checked his temperature was 100.9°.  He was brought to ED of [facility] and found to be positive for COVID.  He denies chest pain, SOB, palpitation, abdominal pain, N/V, diarrhea or dysuria.  His daughter reports that he has not been eating or drinking well since he was discharged.  In the ED of OSH he found with hemoglobin of 4.6.  He denies black stool or BRBPR, and he denies any source of bleeding.  Per ED note, his daughter is his POA and she wants him to be DNR with no aggressive measures.    Overview/Hospital Course:  Elevated BP treating with Vasotec, labetalol, hydralazine PRN. Refusing PO meds/food. Respiratory status stable. Repeat COVID test remains positive. Palliative meeting with patient and daughter. Patient remains DNR with plans to d/c to hospice and focus on comfort, pending placement with CM assist. Transitioning to comfort measures.    Interval History: NAEON. Comfort measures in place. Pending inpatient hospice placement.    Review of Systems   Unable to perform ROS: Other     Objective:      Vital Signs (Most Recent):  Temp: (patient refused) (09/10/20 1035)  Pulse: 83 (09/11/20 1220)  Resp: 16 (09/11/20 1220)  BP: (!) 198/84 (09/11/20 0707)  SpO2: 95 % (09/11/20 1220) Vital Signs (24h Range):  Pulse:  [77-93] 83  Resp:  [16-18] 16  SpO2:  [94 %-98 %] 95 %  BP: (198)/(84) 198/84     Weight: 72.6 kg (160 lb 0.9 oz)  Body mass index is 22.97 kg/m².    Intake/Output Summary (Last 24 hours) at 9/11/2020 1608  Last data filed at 9/10/2020 1700  Gross per 24 hour   Intake 240 ml   Output --   Net 240 ml      Physical Exam  Constitutional:       Appearance: Normal appearance.   HENT:      Head: Normocephalic and atraumatic.      Mouth/Throat:      Mouth: Mucous membranes are moist.      Pharynx: Oropharynx is clear.   Eyes:      Extraocular Movements: Extraocular movements intact.      Pupils: Pupils are equal, round, and reactive to light.   Neck:      Musculoskeletal: Neck supple.   Cardiovascular:      Rate and Rhythm: Normal rate and regular rhythm.      Pulses: Normal pulses.      Heart sounds: Normal heart sounds.   Pulmonary:      Effort: Pulmonary effort is normal. No respiratory distress.      Breath sounds: Normal breath sounds.   Abdominal:      General: Abdomen is flat. Bowel sounds are normal.      Palpations: Abdomen is soft.   Musculoskeletal:         General: No swelling.   Skin:     General: Skin is warm and dry.      Capillary Refill: Capillary refill takes less than 2 seconds.   Neurological:      General: No focal deficit present.      Mental Status: He is alert and oriented to person, place, and time. Mental status is at baseline.   Psychiatric:      Comments: Flat, withdrawn         Significant Labs: All pertinent labs within the past 24 hours have been reviewed.    Significant Imaging: I have reviewed all pertinent imaging results/findings within the past 24 hours.      Assessment/Plan:      * COVID-19  Ms. Boateng is 66y/o Mohawk speaking male with PMHx significant for CVA (3/2019),  CHF ( EF 55%, TTE 3/2019), DMII,HLD, HTN, CKD, recent behavioral problem. Per daughter, he was coughing and when checked his temperature was 100.9°.  He was brought to ED of [facility] and found to be positive for COVID.    -- patient denies any shortness of breath   -- SpO2 % on RA with no need for oxygen   -- CXR with no signs of infiltrate or consolidation  -- elevated inflammatory markers  -- Monitor for decompensation. Stable during hospitalization  -- Repeat test positive on 9/7/2020    Comfort measures only status  Transitioning to comfort measures      Palliative care encounter  -- patient with multiple comorbidities  -- daughter is POA, she wants the patient to be DNR with no aggressive measures  -- Refusing PO meds/food. Patient/family inability to care for him at this time. Palliative meeting with patient and daughter. Daughter reports patient expressed he is ready to go and no one should be living like this.   -- Patient remains DNR and d/c to inpatient hospice for focus on comfort, pending placement      Acute blood loss anemia  -- patient with baseline hemoglobin 11  -- in OSH, he found with hemoglobin 4.7  -- patient denies any active source of bleed  -- received 1 unit of PRBCs  -- Repeated hgb 11-13  -- Suspect original lab error    Hyperlipidemia associated with type 2 diabetes mellitus  -- resume home medication atorvastatin 20 mg      Type 2 diabetes mellitus with stage 4 chronic kidney disease, with long-term current use of insulin  -- blood glucose on admission 315  -- last hemoglobin A1c 6.5 ( 6/2019), now 7.9  -- Elevated BG improved with increasing to aspart 6 units with meals and determir 15 QHS  -- Patient not eating. Caution for hypoglycemia. Hold aspart if refusing meal  -- D/c on home regimen    (HFpEF) heart failure with preserved ejection fraction  -- no clinical signs of volume overload   -- last echo 3/2019 with EF 55%        Essential hypertension  -- continue home  medication carvedilol and losartan (ALBERTO resolved) on d/c  -- Refusing PO meds here  -- Elevated BP responsive to Labetolol  -- Transitioning to comfort measures 9/9/2020, to include no labs or vital signs. Patient refusing anti-hypertensives and all PO meds    ALBERTO on CKD  -- serum creatinine on admission 2.7  -- baseline serum creatinine 1.9-2.1. Now at baseline after IVF  -- likely prerenal from poor p.o. intake        VTE Risk Mitigation (From admission, onward)         Ordered     Reason for No Pharmacological VTE Prophylaxis  Once     Question:  Reasons:  Answer:  Risk of Bleeding    09/06/20 1146     IP VTE HIGH RISK PATIENT  Once      09/06/20 1146     Place sequential compression device  Until discontinued      09/06/20 0332                Discharge Planning   ANTHONY: 9/14/2020     Code Status: DNR   Is the patient medically ready for discharge?: Yes    Reason for patient still in hospital (select all that apply): Pending disposition  Discharge Plan A: New Nursing Home placement - residential care facility(With Hospice)   Discharge Delays: (!) Other(Pending accepting facility - D/T Covid positive and behavioral issues)              Rochelle Mccloud MD  Department of Hospital Medicine   [facility] - ICU [facility]

## 2020-09-11 NOTE — ASSESSMENT & PLAN NOTE
Ms. Boateng is 66y/o Uzbek speaking male with PMHx significant for CVA (3/2019), CHF ( EF 55%, TTE 3/2019), DMII,HLD, HTN, CKD, recent behavioral problem. Per daughter, he was coughing and when checked his temperature was 100.9°.  He was brought to ED of [facility] and found to be positive for COVID.    -- patient denies any shortness of breath   -- SpO2 % on RA with no need for oxygen   -- CXR with no signs of infiltrate or consolidation  -- elevated inflammatory markers  -- Monitor for decompensation. Stable during hospitalization  -- Repeat test positive on 9/7/2020

## 2020-09-12 LAB
POCT GLUCOSE: 306 MG/DL (ref 70–110)
POCT GLUCOSE: 314 MG/DL (ref 70–110)

## 2020-09-12 PROCEDURE — 99231 SBSQ HOSP IP/OBS SF/LOW 25: CPT | Mod: GC,,, | Performed by: STUDENT IN AN ORGANIZED HEALTH CARE EDUCATION/TRAINING PROGRAM

## 2020-09-12 PROCEDURE — 94640 AIRWAY INHALATION TREATMENT: CPT

## 2020-09-12 PROCEDURE — 99231 PR SUBSEQUENT HOSPITAL CARE,LEVL I: ICD-10-PCS | Mod: GC,,, | Performed by: STUDENT IN AN ORGANIZED HEALTH CARE EDUCATION/TRAINING PROGRAM

## 2020-09-12 PROCEDURE — 20600001 HC STEP DOWN PRIVATE ROOM

## 2020-09-12 PROCEDURE — 94761 N-INVAS EAR/PLS OXIMETRY MLT: CPT

## 2020-09-12 RX ADMIN — IPRATROPIUM BROMIDE 2 PUFF: 17 AEROSOL, METERED RESPIRATORY (INHALATION) at 08:09

## 2020-09-12 RX ADMIN — ALBUTEROL SULFATE 2 PUFF: 90 AEROSOL, METERED RESPIRATORY (INHALATION) at 01:09

## 2020-09-12 RX ADMIN — ALBUTEROL SULFATE 2 PUFF: 90 AEROSOL, METERED RESPIRATORY (INHALATION) at 08:09

## 2020-09-12 RX ADMIN — IPRATROPIUM BROMIDE 2 PUFF: 17 AEROSOL, METERED RESPIRATORY (INHALATION) at 01:09

## 2020-09-12 NOTE — PROGRESS NOTES
[facility] Medicine  Progress Note    Patient Name: Ming Boateng  MRN: [id number]  Patient Class: IP- Inpatient   Admission Date: 9/6/2020  Length of Stay: 6 days  Attending Physician: Edy Mendiola MD  Primary Care Provider: Bibi Castaneda MD        Subjective:     Principal Problem:COVID-19        HPI:  Ms. Boateng is 64y/o Prydeinig speaking male with PMHx significant for CVA (3/2019), CHF ( EF 55%, TTE 3/2019), DMII,HLD, HTN, CKD, recent behavioral problem.  Who present to ED of [facility] with fever and cough.  Patient is Prydeinig-speaking,  was used to interview the patient, however the patient has difficulty responding with answering few questions.  History was taken mainly from the chart.  Patient was recently discharged on Thursday from [facility] after he was admitted for aggressive behavior.  Per daughter, he was coughing and when checked his temperature was 100.9°.  He was brought to ED of [facility] and found to be positive for COVID.  He denies chest pain, SOB, palpitation, abdominal pain, N/V, diarrhea or dysuria.  His daughter reports that he has not been eating or drinking well since he was discharged.  In the ED of OSH he found with hemoglobin of 4.6.  He denies black stool or BRBPR, and he denies any source of bleeding.  Per ED note, his daughter is his POA and she wants him to be DNR with no aggressive measures.    Overview/Hospital Course:  Elevated BP treating with Vasotec, labetalol, hydralazine PRN. Refusing PO meds/food. Respiratory status stable. Repeat COVID test remains positive. Palliative meeting with patient and daughter. Patient remains DNR with plans to d/c to hospice and focus on comfort, pending placement with CM assist. Transitioning to comfort measures.    Interval History: NAEON. Awaiting inpatient hospice placement.    Review of Systems   Unable to perform ROS: Other     Objective:     Vital Signs (Most  Recent):  Temp: (patient refused) (09/10/20 1035)  Pulse: 63 (09/12/20 0824)  Resp: 18 (09/12/20 0824)  BP: (!) 198/84 (09/11/20 0707)  SpO2: 96 % (09/12/20 0824) Vital Signs (24h Range):  Pulse:  [63-83] 63  Resp:  [15-18] 18  SpO2:  [95 %-96 %] 96 %     Weight: 72.6 kg (160 lb 0.9 oz)  Body mass index is 22.97 kg/m².    Intake/Output Summary (Last 24 hours) at 9/12/2020 1130  Last data filed at 9/11/2020 1826  Gross per 24 hour   Intake 300 ml   Output --   Net 300 ml      Physical Exam  Constitutional:       Appearance: Normal appearance.   HENT:      Head: Normocephalic and atraumatic.      Mouth/Throat:      Mouth: Mucous membranes are moist.      Pharynx: Oropharynx is clear.   Eyes:      Extraocular Movements: Extraocular movements intact.      Pupils: Pupils are equal, round, and reactive to light.   Neck:      Musculoskeletal: Neck supple.   Cardiovascular:      Rate and Rhythm: Normal rate and regular rhythm.      Pulses: Normal pulses.      Heart sounds: Normal heart sounds.   Pulmonary:      Effort: Pulmonary effort is normal. No respiratory distress.      Breath sounds: Normal breath sounds.   Abdominal:      General: Abdomen is flat. Bowel sounds are normal.      Palpations: Abdomen is soft.   Musculoskeletal:         General: No swelling.   Skin:     General: Skin is warm and dry.      Capillary Refill: Capillary refill takes less than 2 seconds.   Neurological:      General: No focal deficit present.      Mental Status: He is alert and oriented to person, place, and time. Mental status is at baseline.   Psychiatric:      Comments: Flat, withdrawn         Significant Labs: None    Significant Imaging: None      Assessment/Plan:      * COVID-19  Ms. Boateng is 66y/o Korean speaking male with PMHx significant for CVA (3/2019), CHF ( EF 55%, TTE 3/2019), DMII,HLD, HTN, CKD, recent behavioral problem. Per daughter, he was coughing and when checked his temperature was 100.9°.  He was brought to ED of  [facility] and found to be positive for COVID.    -- patient denies any shortness of breath   -- SpO2 % on RA with no need for oxygen   -- CXR with no signs of infiltrate or consolidation  -- elevated inflammatory markers  -- Monitor for decompensation. Stable during hospitalization  -- Repeat test positive on 9/7/2020    Comfort measures only status  Transitioning to comfort measures      Palliative care encounter  -- patient with multiple comorbidities  -- daughter is POA, she wants the patient to be DNR with no aggressive measures  -- Refusing PO meds/food. Patient/family inability to care for him at this time. Palliative meeting with patient and daughter. Daughter reports patient expressed he is ready to go and no one should be living like this.   -- Patient remains DNR and d/c to inpatient hospice for focus on comfort, pending placement      Acute blood loss anemia  -- patient with baseline hemoglobin 11  -- in OSH, he found with hemoglobin 4.7  -- patient denies any active source of bleed  -- received 1 unit of PRBCs  -- Repeated hgb 11-13  -- Suspect original lab error    Hyperlipidemia associated with type 2 diabetes mellitus  -- resume home medication atorvastatin 20 mg      Type 2 diabetes mellitus with stage 4 chronic kidney disease, with long-term current use of insulin  -- blood glucose on admission 315  -- last hemoglobin A1c 6.5 ( 6/2019), now 7.9  -- Elevated BG improved with increasing to aspart 6 units with meals and determir 15 QHS  -- Patient not eating. Caution for hypoglycemia. Hold aspart if refusing meal  -- D/c on home regimen    (HFpEF) heart failure with preserved ejection fraction  -- no clinical signs of volume overload   -- last echo 3/2019 with EF 55%        Essential hypertension  -- continue home medication carvedilol and losartan (ALBERTO resolved) on d/c  -- Refusing PO meds here  -- Elevated BP responsive to Labetolol  -- Transitioning to comfort measures 9/9/2020, to  include no labs or vital signs. Patient refusing anti-hypertensives and all PO meds    ALBERTO on CKD  -- serum creatinine on admission 2.7  -- baseline serum creatinine 1.9-2.1. Now at baseline after IVF  -- likely prerenal from poor p.o. intake        VTE Risk Mitigation (From admission, onward)         Ordered     Reason for No Pharmacological VTE Prophylaxis  Once     Question:  Reasons:  Answer:  Risk of Bleeding    09/06/20 1146     IP VTE HIGH RISK PATIENT  Once      09/06/20 1146     Place sequential compression device  Until discontinued      09/06/20 0332                Discharge Planning   ANTHONY: 9/14/2020     Code Status: DNR   Is the patient medically ready for discharge?: Yes    Reason for patient still in hospital (select all that apply): Pending disposition  Discharge Plan A: New Nursing Home placement - nursing home care facility(With Hospice)   Discharge Delays: (!) Other(Pending accepting facility - D/T Covid positive and behavioral issues)              Rochelle Mccloud MD  Department of Hospital Medicine   [facility] - ICU [facility]

## 2020-09-12 NOTE — NURSING
Resting in bed, eyes closed but open to tactile stimuli. Patient is non-communicative, non-cooperative with treatments at this time. Appears with O2 sats of 96% on room air.

## 2020-09-12 NOTE — PLAN OF CARE
Problem: Adult Inpatient Plan of Care  Goal: Absence of Hospital-Acquired Illness or Injury  Intervention: Identify and Manage Fall Risk  Flowsheets (Taken 9/12/2020 0603)  Safety Promotion/Fall Prevention: assistive device/personal item within reach  Intervention: Prevent VTE (venous thromboembolism)  Flowsheets (Taken 9/12/2020 0603)  VTE Prevention/Management: remove, assess skin and reapply sequential compression device  Goal: Optimal Comfort and Wellbeing  Intervention: Provide Person-Centered Care  Flowsheets (Taken 9/12/2020 0603)  Trust Relationship/Rapport: care explained   Pt resting comfortably, no s/s of acute distress noted at this time.  Call light in hand

## 2020-09-12 NOTE — ASSESSMENT & PLAN NOTE
Ms. Boateng is 64y/o Syriac speaking male with PMHx significant for CVA (3/2019), CHF ( EF 55%, TTE 3/2019), DMII,HLD, HTN, CKD, recent behavioral problem. Per daughter, he was coughing and when checked his temperature was 100.9°.  He was brought to ED of [facility] and found to be positive for COVID.    -- patient denies any shortness of breath   -- SpO2 % on RA with no need for oxygen   -- CXR with no signs of infiltrate or consolidation  -- elevated inflammatory markers  -- Monitor for decompensation. Stable during hospitalization  -- Repeat test positive on 9/7/2020

## 2020-09-12 NOTE — SUBJECTIVE & OBJECTIVE
Interval History: NAEON. Awaiting inpatient hospice placement.    Review of Systems   Unable to perform ROS: Other     Objective:     Vital Signs (Most Recent):  Temp: (patient refused) (09/10/20 1035)  Pulse: 63 (09/12/20 0824)  Resp: 18 (09/12/20 0824)  BP: (!) 198/84 (09/11/20 0707)  SpO2: 96 % (09/12/20 0824) Vital Signs (24h Range):  Pulse:  [63-83] 63  Resp:  [15-18] 18  SpO2:  [95 %-96 %] 96 %     Weight: 72.6 kg (160 lb 0.9 oz)  Body mass index is 22.97 kg/m².    Intake/Output Summary (Last 24 hours) at 9/12/2020 1130  Last data filed at 9/11/2020 1826  Gross per 24 hour   Intake 300 ml   Output --   Net 300 ml      Physical Exam  Constitutional:       Appearance: Normal appearance.   HENT:      Head: Normocephalic and atraumatic.      Mouth/Throat:      Mouth: Mucous membranes are moist.      Pharynx: Oropharynx is clear.   Eyes:      Extraocular Movements: Extraocular movements intact.      Pupils: Pupils are equal, round, and reactive to light.   Neck:      Musculoskeletal: Neck supple.   Cardiovascular:      Rate and Rhythm: Normal rate and regular rhythm.      Pulses: Normal pulses.      Heart sounds: Normal heart sounds.   Pulmonary:      Effort: Pulmonary effort is normal. No respiratory distress.      Breath sounds: Normal breath sounds.   Abdominal:      General: Abdomen is flat. Bowel sounds are normal.      Palpations: Abdomen is soft.   Musculoskeletal:         General: No swelling.   Skin:     General: Skin is warm and dry.      Capillary Refill: Capillary refill takes less than 2 seconds.   Neurological:      General: No focal deficit present.      Mental Status: He is alert and oriented to person, place, and time. Mental status is at baseline.   Psychiatric:      Comments: Flat, withdrawn         Significant Labs: None    Significant Imaging: None

## 2020-09-13 LAB
POCT GLUCOSE: 313 MG/DL (ref 70–110)
POCT GLUCOSE: 419 MG/DL (ref 70–110)

## 2020-09-13 PROCEDURE — 25000003 PHARM REV CODE 250: Performed by: STUDENT IN AN ORGANIZED HEALTH CARE EDUCATION/TRAINING PROGRAM

## 2020-09-13 PROCEDURE — 99231 SBSQ HOSP IP/OBS SF/LOW 25: CPT | Mod: GC,,, | Performed by: STUDENT IN AN ORGANIZED HEALTH CARE EDUCATION/TRAINING PROGRAM

## 2020-09-13 PROCEDURE — 20600001 HC STEP DOWN PRIVATE ROOM

## 2020-09-13 PROCEDURE — 99231 PR SUBSEQUENT HOSPITAL CARE,LEVL I: ICD-10-PCS | Mod: GC,,, | Performed by: STUDENT IN AN ORGANIZED HEALTH CARE EDUCATION/TRAINING PROGRAM

## 2020-09-13 PROCEDURE — 94761 N-INVAS EAR/PLS OXIMETRY MLT: CPT

## 2020-09-13 PROCEDURE — 94640 AIRWAY INHALATION TREATMENT: CPT

## 2020-09-13 PROCEDURE — C9399 UNCLASSIFIED DRUGS OR BIOLOG: HCPCS | Performed by: STUDENT IN AN ORGANIZED HEALTH CARE EDUCATION/TRAINING PROGRAM

## 2020-09-13 RX ADMIN — ALBUTEROL SULFATE 2 PUFF: 90 AEROSOL, METERED RESPIRATORY (INHALATION) at 01:09

## 2020-09-13 RX ADMIN — IPRATROPIUM BROMIDE 2 PUFF: 17 AEROSOL, METERED RESPIRATORY (INHALATION) at 01:09

## 2020-09-13 RX ADMIN — IPRATROPIUM BROMIDE 2 PUFF: 17 AEROSOL, METERED RESPIRATORY (INHALATION) at 08:09

## 2020-09-13 RX ADMIN — IPRATROPIUM BROMIDE 2 PUFF: 17 AEROSOL, METERED RESPIRATORY (INHALATION) at 07:09

## 2020-09-13 RX ADMIN — ALBUTEROL SULFATE 2 PUFF: 90 AEROSOL, METERED RESPIRATORY (INHALATION) at 08:09

## 2020-09-13 RX ADMIN — ALBUTEROL SULFATE 2 PUFF: 90 AEROSOL, METERED RESPIRATORY (INHALATION) at 07:09

## 2020-09-13 RX ADMIN — INSULIN DETEMIR 20 UNITS: 100 INJECTION, SOLUTION SUBCUTANEOUS at 09:09

## 2020-09-13 NOTE — SUBJECTIVE & OBJECTIVE
Interval History: NAEON. Respiratory status stable. Patient remains DNR with plans to d/c to hospice and focus on comfort, pending placement with CM assist. Transitioning to comfort measures.    Review of Systems   Unable to perform ROS: Other     Objective:     Vital Signs (Most Recent):  Temp: 98.1 °F (36.7 °C) (09/13/20 0731)  Pulse: 71 (09/13/20 0731)  Resp: 16 (09/13/20 0731)  BP: (!) 159/70 (09/13/20 0731)  SpO2: (!) 92 % (09/13/20 0731) Vital Signs (24h Range):  Temp:  [98 °F (36.7 °C)-98.3 °F (36.8 °C)] 98.1 °F (36.7 °C)  Pulse:  [63-72] 71  Resp:  [15-18] 16  SpO2:  [92 %-98 %] 92 %  BP: (143-170)/(67-77) 159/70     Weight: 72.6 kg (160 lb 0.9 oz)  Body mass index is 22.97 kg/m².    Intake/Output Summary (Last 24 hours) at 9/13/2020 0821  Last data filed at 9/13/2020 0600  Gross per 24 hour   Intake 0 ml   Output --   Net 0 ml      Physical Exam  Constitutional:       Appearance: Normal appearance.   HENT:      Head: Normocephalic and atraumatic.      Mouth/Throat:      Mouth: Mucous membranes are moist.      Pharynx: Oropharynx is clear.   Eyes:      Extraocular Movements: Extraocular movements intact.      Pupils: Pupils are equal, round, and reactive to light.   Neck:      Musculoskeletal: Neck supple.   Cardiovascular:      Rate and Rhythm: Normal rate and regular rhythm.      Pulses: Normal pulses.      Heart sounds: Normal heart sounds.   Pulmonary:      Effort: Pulmonary effort is normal. No respiratory distress.      Breath sounds: Normal breath sounds.   Abdominal:      General: Abdomen is flat. Bowel sounds are normal.      Palpations: Abdomen is soft.   Musculoskeletal:         General: No swelling.   Skin:     General: Skin is warm and dry.      Capillary Refill: Capillary refill takes less than 2 seconds.   Neurological:      General: No focal deficit present.      Mental Status: He is alert and oriented to person, place, and time. Mental status is at baseline.   Psychiatric:      Comments:  Flat, withdrawn         Significant Labs: None    Significant Imaging: None

## 2020-09-13 NOTE — ASSESSMENT & PLAN NOTE
-- serum creatinine on admission 2.7  -- baseline serum creatinine 1.9-2.1. At baseline after IVF  -- likely prerenal from poor p.o. intake

## 2020-09-13 NOTE — ASSESSMENT & PLAN NOTE
-- blood glucose on admission 315  -- last hemoglobin A1c 6.5 ( 6/2019), now 7.9  -- Elevated BG improved with increasing to aspart 6 units with meals and determir 15 QHS  -- Patient not eating. No BG checks for comfort. No insulins in setting of low/no PO intake to prevent hypoglycemia

## 2020-09-13 NOTE — NURSING
Attempt to assist patient with drinking some water. Patient appears too weak to pull through straw, after several attempts to allow patient to drink from the straw.

## 2020-09-13 NOTE — PLAN OF CARE
Problem: Adult Inpatient Plan of Care  Goal: Absence of Hospital-Acquired Illness or Injury  Intervention: Identify and Manage Fall Risk  Flowsheets (Taken 9/13/2020 0139)  Safety Promotion/Fall Prevention: assistive device/personal item within reach  Intervention: Prevent VTE (venous thromboembolism)  Flowsheets (Taken 9/13/2020 0139)  VTE Prevention/Management: bleeding precautions maintained     Problem: Fall Injury Risk  Goal: Absence of Fall and Fall-Related Injury  Intervention: Identify and Manage Contributors to Fall Injury Risk  Flowsheets (Taken 9/13/2020 0139)  Medication Review/Management: medications reviewed   Pt in bed, appears to be comfortable.  Resp even and unlabored.  No acute distress noted.  Afebrile.  No SOB noted at this time.  Spo2 ranging in the mid to high 90s on room air.  Assisted pt with bedtime care.  Bed to lowest limit, call light in hand, No complaints voiced at this time.

## 2020-09-13 NOTE — ASSESSMENT & PLAN NOTE
Ms. Boateng is 66y/o Divehi speaking male with PMHx significant for CVA (3/2019), CHF ( EF 55%, TTE 3/2019), DMII,HLD, HTN, CKD, recent behavioral problem. Per daughter, he was coughing and when checked his temperature was 100.9°.  He was brought to ED of [facility] and found to be positive for COVID.    -- patient denies any shortness of breath   -- SpO2 % on RA with no need for oxygen   -- CXR with no signs of infiltrate or consolidation  -- elevated inflammatory markers  -- Monitor for decompensation.   -- Repeat test positive on 9/7/2020  -- Stable during hospitalization

## 2020-09-13 NOTE — PROGRESS NOTES
[facility] - ICU 15 [facility] Medicine  Progress Note    Patient Name: Ming Boateng  MRN: [id number]  Patient Class: IP- Inpatient   Admission Date: 9/6/2020  Length of Stay: 7 days  Attending Physician: Edy Mendiola MD  Primary Care Provider: Bibi Castaneda MD        Subjective:     Principal Problem:COVID-19        HPI:  Ms. Boateng is 66y/o Guyanese speaking male with PMHx significant for CVA (3/2019), CHF ( EF 55%, TTE 3/2019), DMII,HLD, HTN, CKD, recent behavioral problem.  Who present to ED of [facility] with fever and cough.  Patient is Guyanese-speaking,  was used to interview the patient, however the patient has difficulty responding with answering few questions.  History was taken mainly from the chart.  Patient was recently discharged on Thursday from [facility] after he was admitted for aggressive behavior.  Per daughter, he was coughing and when checked his temperature was 100.9°.  He was brought to ED of [facility] and found to be positive for COVID.  He denies chest pain, SOB, palpitation, abdominal pain, N/V, diarrhea or dysuria.  His daughter reports that he has not been eating or drinking well since he was discharged.  In the ED of OSH he found with hemoglobin of 4.6.  He denies black stool or BRBPR, and he denies any source of bleeding.  Per ED note, his daughter is his POA and she wants him to be DNR with no aggressive measures.    Overview/Hospital Course:  Elevated BP treating with Vasotec, labetalol, hydralazine PRN. Refusing PO meds/food. Respiratory status stable. Repeat COVID test remains positive. Palliative meeting with patient and daughter. Patient remains DNR with plans to d/c to hospice and focus on comfort, pending placement with CM assist. Transitioning to comfort measures.    Interval History: NAEON. Respiratory status stable. Patient remains DNR with plans to d/c to hospice and focus on comfort, pending placement with CM assist.  Transitioning to comfort measures.    Review of Systems   Unable to perform ROS: Other     Objective:     Vital Signs (Most Recent):  Temp: 98.1 °F (36.7 °C) (09/13/20 0731)  Pulse: 71 (09/13/20 0731)  Resp: 16 (09/13/20 0731)  BP: (!) 159/70 (09/13/20 0731)  SpO2: (!) 92 % (09/13/20 0731) Vital Signs (24h Range):  Temp:  [98 °F (36.7 °C)-98.3 °F (36.8 °C)] 98.1 °F (36.7 °C)  Pulse:  [63-72] 71  Resp:  [15-18] 16  SpO2:  [92 %-98 %] 92 %  BP: (143-170)/(67-77) 159/70     Weight: 72.6 kg (160 lb 0.9 oz)  Body mass index is 22.97 kg/m².    Intake/Output Summary (Last 24 hours) at 9/13/2020 0821  Last data filed at 9/13/2020 0600  Gross per 24 hour   Intake 0 ml   Output --   Net 0 ml      Physical Exam  Constitutional:       Appearance: Normal appearance.   HENT:      Head: Normocephalic and atraumatic.      Mouth/Throat:      Mouth: Mucous membranes are moist.      Pharynx: Oropharynx is clear.   Eyes:      Extraocular Movements: Extraocular movements intact.      Pupils: Pupils are equal, round, and reactive to light.   Neck:      Musculoskeletal: Neck supple.   Cardiovascular:      Rate and Rhythm: Normal rate and regular rhythm.      Pulses: Normal pulses.      Heart sounds: Normal heart sounds.   Pulmonary:      Effort: Pulmonary effort is normal. No respiratory distress.      Breath sounds: Normal breath sounds.   Abdominal:      General: Abdomen is flat. Bowel sounds are normal.      Palpations: Abdomen is soft.   Musculoskeletal:         General: No swelling.   Skin:     General: Skin is warm and dry.      Capillary Refill: Capillary refill takes less than 2 seconds.   Neurological:      General: No focal deficit present.      Mental Status: He is alert and oriented to person, place, and time. Mental status is at baseline.   Psychiatric:      Comments: Flat, withdrawn         Significant Labs: None    Significant Imaging: None      Assessment/Plan:      * COVID-19  Ms. Boateng is 64y/o [place] Islander speaking male with  PMHx significant for CVA (3/2019), CHF ( EF 55%, TTE 3/2019), DMII,HLD, HTN, CKD, recent behavioral problem. Per daughter, he was coughing and when checked his temperature was 100.9°.  He was brought to ED of [facility] and found to be positive for COVID.    -- patient denies any shortness of breath   -- SpO2 % on RA with no need for oxygen   -- CXR with no signs of infiltrate or consolidation  -- elevated inflammatory markers  -- Monitor for decompensation.   -- Repeat test positive on 9/7/2020  -- Stable during hospitalization    Comfort measures only status  Transitioning to comfort measures      Palliative care encounter  -- patient with multiple comorbidities  -- daughter is POA, she wants the patient to be DNR with no aggressive measures  -- Refusing PO meds/food. Patient/family inability to care for him at this time. Palliative meeting with patient and daughter. Daughter reports patient expressed he is ready to go and no one should be living like this.   -- Patient remains DNR and d/c to inpatient hospice for focus on comfort, pending placement      Acute blood loss anemia  -- patient with baseline hemoglobin 11  -- in OSH, he found with hemoglobin 4.7  -- patient denies any active source of bleed  -- received 1 unit of PRBCs  -- Repeated hgb 11-13  -- Suspect original lab error    Hyperlipidemia associated with type 2 diabetes mellitus  -- Refusing all meds, including home medication atorvastatin 20 mg      Type 2 diabetes mellitus with stage 4 chronic kidney disease, with long-term current use of insulin  -- blood glucose on admission 315  -- last hemoglobin A1c 6.5 ( 6/2019), now 7.9  -- Elevated BG improved with increasing to aspart 6 units with meals and determir 15 QHS  -- Patient not eating. No BG checks for comfort. No insulins in setting of low/no PO intake to prevent hypoglycemia    (HFpEF) heart failure with preserved ejection fraction  -- no clinical signs of volume overload   -- last  echo 3/2019 with EF 55%        Essential hypertension  -- continue home medication carvedilol and losartan (ALBERTO resolved) on d/c  -- Refusing PO meds here  -- Elevated BP responsive to Labetolol  -- Transitioning to comfort measures 9/9/2020, to include no labs or vital signs. Patient refusing anti-hypertensives and all PO meds    ALBERTO on CKD  -- serum creatinine on admission 2.7  -- baseline serum creatinine 1.9-2.1. At baseline after IVF  -- likely prerenal from poor p.o. intake        VTE Risk Mitigation (From admission, onward)         Ordered     Reason for No Pharmacological VTE Prophylaxis  Once     Question:  Reasons:  Answer:  Risk of Bleeding    09/06/20 1146     IP VTE HIGH RISK PATIENT  Once      09/06/20 1146     Place sequential compression device  Until discontinued      09/06/20 0332                Discharge Planning   ANTHONY: 9/14/2020     Code Status: DNR   Is the patient medically ready for discharge?: Yes    Reason for patient still in hospital (select all that apply): Pending disposition  Discharge Plan A: New Nursing Home placement - [facility] care facility(With Hospice)   Discharge Delays: (!) Other(Pending accepting facility - D/T Covid positive and behavioral issues)              Rochelle Mccloud MD  Department of Hospital Medicine   [facility]

## 2020-09-14 LAB
POCT GLUCOSE: 116 MG/DL (ref 70–110)
POCT GLUCOSE: 175 MG/DL (ref 70–110)
POCT GLUCOSE: 268 MG/DL (ref 70–110)
POCT GLUCOSE: 336 MG/DL (ref 70–110)
POCT GLUCOSE: 352 MG/DL (ref 70–110)

## 2020-09-14 PROCEDURE — 20600001 HC STEP DOWN PRIVATE ROOM

## 2020-09-14 PROCEDURE — 99231 SBSQ HOSP IP/OBS SF/LOW 25: CPT | Mod: GC,,, | Performed by: STUDENT IN AN ORGANIZED HEALTH CARE EDUCATION/TRAINING PROGRAM

## 2020-09-14 PROCEDURE — 94640 AIRWAY INHALATION TREATMENT: CPT

## 2020-09-14 PROCEDURE — 94761 N-INVAS EAR/PLS OXIMETRY MLT: CPT

## 2020-09-14 PROCEDURE — 99231 PR SUBSEQUENT HOSPITAL CARE,LEVL I: ICD-10-PCS | Mod: GC,,, | Performed by: STUDENT IN AN ORGANIZED HEALTH CARE EDUCATION/TRAINING PROGRAM

## 2020-09-14 RX ORDER — INSULIN ASPART 100 [IU]/ML
3 INJECTION, SOLUTION INTRAVENOUS; SUBCUTANEOUS
Status: DISCONTINUED | OUTPATIENT
Start: 2020-09-15 | End: 2020-09-14

## 2020-09-14 RX ADMIN — IPRATROPIUM BROMIDE 2 PUFF: 17 AEROSOL, METERED RESPIRATORY (INHALATION) at 08:09

## 2020-09-14 RX ADMIN — IPRATROPIUM BROMIDE 2 PUFF: 17 AEROSOL, METERED RESPIRATORY (INHALATION) at 01:09

## 2020-09-14 RX ADMIN — IPRATROPIUM BROMIDE 2 PUFF: 17 AEROSOL, METERED RESPIRATORY (INHALATION) at 07:09

## 2020-09-14 RX ADMIN — ALBUTEROL SULFATE 2 PUFF: 90 AEROSOL, METERED RESPIRATORY (INHALATION) at 12:09

## 2020-09-14 RX ADMIN — ALBUTEROL SULFATE 2 PUFF: 90 AEROSOL, METERED RESPIRATORY (INHALATION) at 08:09

## 2020-09-14 RX ADMIN — ALBUTEROL SULFATE 2 PUFF: 90 AEROSOL, METERED RESPIRATORY (INHALATION) at 01:09

## 2020-09-14 RX ADMIN — INSULIN ASPART 6 UNITS: 100 INJECTION, SOLUTION INTRAVENOUS; SUBCUTANEOUS at 12:09

## 2020-09-14 RX ADMIN — INSULIN ASPART 6 UNITS: 100 INJECTION, SOLUTION INTRAVENOUS; SUBCUTANEOUS at 03:09

## 2020-09-14 RX ADMIN — INSULIN ASPART 6 UNITS: 100 INJECTION, SOLUTION INTRAVENOUS; SUBCUTANEOUS at 08:09

## 2020-09-14 RX ADMIN — IPRATROPIUM BROMIDE 2 PUFF: 17 AEROSOL, METERED RESPIRATORY (INHALATION) at 12:09

## 2020-09-14 RX ADMIN — ALBUTEROL SULFATE 2 PUFF: 90 AEROSOL, METERED RESPIRATORY (INHALATION) at 07:09

## 2020-09-14 NOTE — PLAN OF CARE
09/14/20 1216   Post-Acute Status   Post-Acute Authorization Placement   Post-Acute Placement Status Referrals Sent     SW contacted patient daughter in regard to home hospice care post discharge and she states that she is a single mother in nursing school and unable care for her father in the home at this time and would like to continue to NH placement with hospice care. ZACK contacted [facility] and spoke to Marva in admissions and she is requesting SW to fax the referral to [phone].    Updated 12:27 pm  SW faxed the referral to [facility] and will continue to follow up with Marva in admissions.    Updated 2:57 pm  SW spoke to [facility] with [facility] and she reports that patient referral was declined due to not being able to meet patient needs.      Maddy Kaminski LMSW  [facility]   [id number]

## 2020-09-14 NOTE — PLAN OF CARE
Problem: Adult Inpatient Plan of Care  Goal: Plan of Care Review  Outcome: Ongoing, Progressing  Poc discussed with pt, GISELLE understanding     Problem: Fall Injury Risk  Goal: Absence of Fall and Fall-Related Injury  Outcome: Ongoing, Progressing  Tq2  Bed low/locked     Problem: Diabetes Comorbidity  Goal: Blood Glucose Level Within Desired Range  Outcome: Ongoing, Not Progressing\  Cbg remained high despite insulin

## 2020-09-14 NOTE — PLAN OF CARE
09/14/20 1104   Post-Acute Status   Post-Acute Authorization Placement   Post-Acute Placement Status Referrals Sent   SW completed the ADT 32 form for patient in pursuing inpatient psych placement. SW will continue to follow up.

## 2020-09-14 NOTE — CARE UPDATE
Rapid Response Nurse Chart Check     Chart check completed, abnormal VS noted.Please call [phone] for further concerns or assistance.

## 2020-09-15 LAB
POCT GLUCOSE: 173 MG/DL (ref 70–110)
POCT GLUCOSE: 184 MG/DL (ref 70–110)
POCT GLUCOSE: 217 MG/DL (ref 70–110)
POCT GLUCOSE: 230 MG/DL (ref 70–110)
POCT GLUCOSE: 268 MG/DL (ref 70–110)

## 2020-09-15 PROCEDURE — 99900035 HC TECH TIME PER 15 MIN (STAT)

## 2020-09-15 PROCEDURE — 25000003 PHARM REV CODE 250: Performed by: STUDENT IN AN ORGANIZED HEALTH CARE EDUCATION/TRAINING PROGRAM

## 2020-09-15 PROCEDURE — 94640 AIRWAY INHALATION TREATMENT: CPT

## 2020-09-15 PROCEDURE — 99231 PR SUBSEQUENT HOSPITAL CARE,LEVL I: ICD-10-PCS | Mod: GC,,, | Performed by: STUDENT IN AN ORGANIZED HEALTH CARE EDUCATION/TRAINING PROGRAM

## 2020-09-15 PROCEDURE — 20600001 HC STEP DOWN PRIVATE ROOM

## 2020-09-15 PROCEDURE — 27000221 HC OXYGEN, UP TO 24 HOURS

## 2020-09-15 PROCEDURE — 99231 SBSQ HOSP IP/OBS SF/LOW 25: CPT | Mod: GC,,, | Performed by: STUDENT IN AN ORGANIZED HEALTH CARE EDUCATION/TRAINING PROGRAM

## 2020-09-15 PROCEDURE — 94761 N-INVAS EAR/PLS OXIMETRY MLT: CPT

## 2020-09-15 RX ADMIN — IPRATROPIUM BROMIDE 2 PUFF: 17 AEROSOL, METERED RESPIRATORY (INHALATION) at 12:09

## 2020-09-15 RX ADMIN — ALBUTEROL SULFATE 2 PUFF: 90 AEROSOL, METERED RESPIRATORY (INHALATION) at 12:09

## 2020-09-15 RX ADMIN — ACETAMINOPHEN 650 MG: 650 SUPPOSITORY RECTAL at 09:09

## 2020-09-15 NOTE — ASSESSMENT & PLAN NOTE
Ms. Boateng is 66y/o Nepali speaking male with PMHx significant for CVA (3/2019), CHF ( EF 55%, TTE 3/2019), DMII,HLD, HTN, CKD, recent behavioral problem. Per daughter, he was coughing and when checked his temperature was 100.9°.  He was brought to ED of [facility] and found to be positive for COVID.    -- SpO2 % on RA with no need for oxygen   -- CXR with no signs of infiltrate or consolidation  -- elevated inflammatory markers  -- Monitor for decompensation.   -- Repeat test positive on 9/7/2020  -- Stable during hospitalization

## 2020-09-15 NOTE — ASSESSMENT & PLAN NOTE
Transitioning to comfort measures, with the exception of insulin and bg checks, considering discussion about discontinuing.

## 2020-09-15 NOTE — SUBJECTIVE & OBJECTIVE
Interval History: NAEON. Respiratory status stable. Patient remains DNR with plans to d/c to hospice and focus on comfort, pending placement with CM assist. Transitioning to comfort measures.    Review of Systems   Unable to perform ROS: Other   Constitutional: Positive for appetite change.     Objective:     Vital Signs (Most Recent):  Temp: 97.9 °F (36.6 °C) (09/14/20 1545)  Pulse: 89 (09/14/20 2006)  Resp: 18 (09/14/20 2006)  BP: (!) 143/64 (09/14/20 1545)  SpO2: 96 % (09/14/20 2006) Vital Signs (24h Range):  Temp:  [97.4 °F (36.3 °C)-97.9 °F (36.6 °C)] 97.9 °F (36.6 °C)  Pulse:  [70-89] 89  Resp:  [15-24] 18  SpO2:  [92 %-97 %] 96 %  BP: (139-165)/(62-73) 143/64     Weight: 72.6 kg (160 lb 0.9 oz)  Body mass index is 22.97 kg/m².    Intake/Output Summary (Last 24 hours) at 9/14/2020 2203  Last data filed at 9/14/2020 1545  Gross per 24 hour   Intake --   Output 250 ml   Net -250 ml      Physical Exam  Constitutional:       Appearance: Normal appearance.   HENT:      Head: Normocephalic and atraumatic.      Mouth/Throat:      Mouth: Mucous membranes are moist.      Pharynx: Oropharynx is clear.   Eyes:      Extraocular Movements: Extraocular movements intact.      Pupils: Pupils are equal, round, and reactive to light.   Neck:      Musculoskeletal: Neck supple.   Cardiovascular:      Rate and Rhythm: Normal rate and regular rhythm.      Pulses: Normal pulses.      Heart sounds: Normal heart sounds.   Pulmonary:      Effort: Pulmonary effort is normal. No respiratory distress.      Breath sounds: Normal breath sounds.   Abdominal:      General: Abdomen is flat. Bowel sounds are normal.      Palpations: Abdomen is soft.   Musculoskeletal:         General: No swelling.   Skin:     General: Skin is warm and dry.      Capillary Refill: Capillary refill takes less than 2 seconds.   Neurological:      General: No focal deficit present.      Mental Status: He is alert. Mental status is at baseline.   Psychiatric:       Comments: Flat, withdrawn       Significant Labs: Reviewed.    Significant Imaging: None

## 2020-09-15 NOTE — PROGRESS NOTES
[facility] - ICU 15 [facility] Medicine  Progress Note    Patient Name: Ming Boateng  MRN: [id number]  Patient Class: IP- Inpatient   Admission Date: 9/6/2020  Length of Stay: 8 days  Attending Physician: Edy Mendiola MD  Primary Care Provider: Bibi Castaneda MD      Subjective:     Principal Problem:COVID-19      HPI:  Ms. Boateng is 64y/o Arabic speaking male with PMHx significant for CVA (3/2019), CHF ( EF 55%, TTE 3/2019), DMII,HLD, HTN, CKD, recent behavioral problem.  Who present to ED of [facility] with fever and cough.  Patient is Arabic-speaking,  was used to interview the patient, however the patient has difficulty responding with answering few questions.  History was taken mainly from the chart.  Patient was recently discharged on Thursday from [facility] after he was admitted for aggressive behavior.  Per daughter, he was coughing and when checked his temperature was 100.9°.  He was brought to ED of [facility] and found to be positive for COVID.  He denies chest pain, SOB, palpitation, abdominal pain, N/V, diarrhea or dysuria.  His daughter reports that he has not been eating or drinking well since he was discharged.  In the ED of OSH he found with hemoglobin of 4.6.  He denies black stool or BRBPR, and he denies any source of bleeding.  Per ED note, his daughter is his POA and she wants him to be DNR with no aggressive measures.    Overview/Hospital Course:  Elevated BP treating with Vasotec, labetalol, hydralazine PRN. Refusing PO meds/food. Respiratory status stable. Repeat COVID test remains positive. Palliative meeting with patient and daughter. Patient remains DNR with plans to d/c to hospice and focus on comfort, pending placement with CM assist. Transitioning to comfort measures.    Interval History: NAEON. Respiratory status stable. Patient remains DNR with plans to d/c to hospice and focus on comfort, pending placement with CM assist.  Transitioning to comfort measures.    Review of Systems   Unable to perform ROS: Other   Constitutional: Positive for appetite change.     Objective:     Vital Signs (Most Recent):  Temp: 97.9 °F (36.6 °C) (09/14/20 1545)  Pulse: 89 (09/14/20 2006)  Resp: 18 (09/14/20 2006)  BP: (!) 143/64 (09/14/20 1545)  SpO2: 96 % (09/14/20 2006) Vital Signs (24h Range):  Temp:  [97.4 °F (36.3 °C)-97.9 °F (36.6 °C)] 97.9 °F (36.6 °C)  Pulse:  [70-89] 89  Resp:  [15-24] 18  SpO2:  [92 %-97 %] 96 %  BP: (139-165)/(62-73) 143/64     Weight: 72.6 kg (160 lb 0.9 oz)  Body mass index is 22.97 kg/m².    Intake/Output Summary (Last 24 hours) at 9/14/2020 2203  Last data filed at 9/14/2020 1545  Gross per 24 hour   Intake --   Output 250 ml   Net -250 ml      Physical Exam  Constitutional:       Appearance: Normal appearance.   HENT:      Head: Normocephalic and atraumatic.      Mouth/Throat:      Mouth: Mucous membranes are moist.      Pharynx: Oropharynx is clear.   Eyes:      Extraocular Movements: Extraocular movements intact.      Pupils: Pupils are equal, round, and reactive to light.   Neck:      Musculoskeletal: Neck supple.   Cardiovascular:      Rate and Rhythm: Normal rate and regular rhythm.      Pulses: Normal pulses.      Heart sounds: Normal heart sounds.   Pulmonary:      Effort: Pulmonary effort is normal. No respiratory distress.      Breath sounds: Normal breath sounds.   Abdominal:      General: Abdomen is flat. Bowel sounds are normal.      Palpations: Abdomen is soft.   Musculoskeletal:         General: No swelling.   Skin:     General: Skin is warm and dry.      Capillary Refill: Capillary refill takes less than 2 seconds.   Neurological:      General: No focal deficit present.      Mental Status: He is alert. Mental status is at baseline.   Psychiatric:      Comments: Flat, withdrawn       Significant Labs: Reviewed.    Significant Imaging: None      Assessment/Plan:      * COVID-19  Ms. Boateng is 66y/o Estonian  speaking male with PMHx significant for CVA (3/2019), CHF ( EF 55%, TTE 3/2019), DMII,HLD, HTN, CKD, recent behavioral problem. Per daughter, he was coughing and when checked his temperature was 100.9°.  He was brought to ED of [facility] and found to be positive for COVID.    -- SpO2 % on RA with no need for oxygen   -- CXR with no signs of infiltrate or consolidation  -- elevated inflammatory markers  -- Monitor for decompensation.   -- Repeat test positive on 9/7/2020  -- Stable during hospitalization    Comfort measures only status  Transitioning to comfort measures, with the exception of insulin and bg checks, considering discussion about discontinuing.     Palliative care encounter  -- patient with multiple comorbidities  -- daughter is POA, she wants the patient to be DNR with no aggressive measures  -- Refusing PO meds/food. Patient/family inability to care for him at this time. Palliative meeting with patient and daughter. Daughter reports patient expressed he is ready to go and no one should be living like this.   -- Patient remains DNR and d/c to inpatient hospice for focus on comfort, pending placement      Acute blood loss anemia  -- patient with baseline hemoglobin 11  -- in OSH, he found with hemoglobin 4.7  -- patient denies any active source of bleed  -- received 1 unit of PRBCs  -- Repeated hgb 11-13  -- Suspect original lab error    Hyperlipidemia associated with type 2 diabetes mellitus  -- Refusing all meds, including home medication atorvastatin 20 mg      Type 2 diabetes mellitus with stage 4 chronic kidney disease, with long-term current use of insulin  -- blood glucose on admission 315  -- last hemoglobin A1c 6.5 ( 6/2019), now 7.9  -- Elevated BG improved with increasing to aspart 6 units with meals and determir 15 QHS  --discuss discontinuing insulin and glucose checks, patient not eating    (HFpEF) heart failure with preserved ejection fraction  -- no clinical signs of volume  overload   -- last echo 3/2019 with EF 55%        Essential hypertension  -- continue home medication carvedilol and losartan (ALBERTO resolved) on d/c  -- Refusing PO meds here  -- Elevated BP responsive to Labetolol  -- Transitioning to comfort measures 9/9/2020, to include no labs or vital signs. Patient refusing anti-hypertensives and all PO meds    ALBERTO on CKD  -- serum creatinine on admission 2.7  -- baseline serum creatinine 1.9-2.1. At baseline after IVF  -- likely prerenal from poor p.o. intake        VTE Risk Mitigation (From admission, onward)         Ordered     Reason for No Pharmacological VTE Prophylaxis  Once     Question:  Reasons:  Answer:  Risk of Bleeding    09/06/20 1146     IP VTE HIGH RISK PATIENT  Once      09/06/20 1146     Place sequential compression device  Until discontinued      09/06/20 0332                Discharge Planning   ANTHONY: 9/16/2020     Code Status: DNR   Is the patient medically ready for discharge?: Yes    Reason for patient still in hospital (select all that apply): Pending disposition  Discharge Plan A: New Nursing Home placement - alf care facility(With Hospice)   Discharge Delays: (!) Other(Pending accepting facility - D/T Covid positive and behavioral issues)      Jessie Singleton MD  Department of Hospital Medicine   [facility] - ICU [facility]

## 2020-09-15 NOTE — SUBJECTIVE & OBJECTIVE
Interval History: Hypoglycemic overnight. Insulin discontinued. Respiratory status stable. Patient remains DNR with plans to d/c to hospice and focus on comfort, pending placement with CM assist. Continue comfort measures.    Review of Systems   Unable to perform ROS: Other   Constitutional: Positive for appetite change.     Objective:     Vital Signs (Most Recent):  Temp: 97.3 °F (36.3 °C) (09/15/20 0815)  Pulse: 88 (09/15/20 0815)  Resp: 16 (09/15/20 0815)  BP: 136/63 (09/15/20 0815)  SpO2: (!) 90 % (09/15/20 0815) Vital Signs (24h Range):  Temp:  [97.3 °F (36.3 °C)-97.9 °F (36.6 °C)] 97.3 °F (36.3 °C)  Pulse:  [83-90] 88  Resp:  [16-18] 16  SpO2:  [90 %-96 %] 90 %  BP: (136-143)/(63-65) 136/63     Weight: 72.6 kg (160 lb 0.9 oz)  Body mass index is 22.97 kg/m².    Intake/Output Summary (Last 24 hours) at 9/15/2020 1325  Last data filed at 9/15/2020 0815  Gross per 24 hour   Intake --   Output 950 ml   Net -950 ml      Physical Exam  Constitutional:       Appearance: Normal appearance.   HENT:      Head: Normocephalic and atraumatic.      Mouth/Throat:      Mouth: Mucous membranes are moist.      Pharynx: Oropharynx is clear.   Eyes:      Extraocular Movements: Extraocular movements intact.      Pupils: Pupils are equal, round, and reactive to light.   Neck:      Musculoskeletal: Neck supple.   Cardiovascular:      Rate and Rhythm: Normal rate and regular rhythm.      Pulses: Normal pulses.      Heart sounds: Normal heart sounds.   Pulmonary:      Effort: Pulmonary effort is normal. No respiratory distress.      Breath sounds: Normal breath sounds.   Abdominal:      General: Abdomen is flat. Bowel sounds are normal.      Palpations: Abdomen is soft.   Musculoskeletal:         General: No swelling.   Skin:     General: Skin is warm and dry.      Capillary Refill: Capillary refill takes less than 2 seconds.   Neurological:      General: No focal deficit present.      Mental Status: He is alert. Mental status is at  baseline.   Psychiatric:      Comments: Flat, withdrawn       Significant Labs: Reviewed.    Significant Imaging: None

## 2020-09-15 NOTE — PROGRESS NOTES
[facility] - ICU [facility] Medicine  Progress Note    Patient Name: Ming Boateng  MRN: [id number]  Patient Class: IP- Inpatient   Admission Date: 9/6/2020  Length of Stay: 9 days  Attending Physician: Edy Mendiola MD  Primary Care Provider: Bibi Castaneda MD        Subjective:     Principal Problem:COVID-19    HPI:  Ms. Boateng is 66y/o Polish speaking male with PMHx significant for CVA (3/2019), CHF ( EF 55%, TTE 3/2019), DMII,HLD, HTN, CKD, recent behavioral problem.  Who present to ED of [facility] with fever and cough.  Patient is Polish-speaking,  was used to interview the patient, however the patient has difficulty responding with answering few questions.  History was taken mainly from the chart.  Patient was recently discharged on Thursday from [facility] after he was admitted for aggressive behavior.  Per daughter, he was coughing and when checked his temperature was 100.9°.  He was brought to ED of [facility] and found to be positive for COVID.  He denies chest pain, SOB, palpitation, abdominal pain, N/V, diarrhea or dysuria.  His daughter reports that he has not been eating or drinking well since he was discharged.  In the ED of OSH he found with hemoglobin of 4.6.  He denies black stool or BRBPR, and he denies any source of bleeding.  Per ED note, his daughter is his POA and she wants him to be DNR with no aggressive measures.    Overview/Hospital Course:  Elevated BP treating with Vasotec, labetalol, hydralazine PRN. Refusing PO meds/food. Respiratory status stable. Repeat COVID test remains positive. Palliative meeting with patient and daughter. Patient remains DNR with plans to d/c to hospice and focus on comfort, pending placement with CM assist. Transitioning to comfort measures.    Interval History: Hypoglycemic overnight. Insulin discontinued. Respiratory status stable. Patient remains DNR with plans to d/c to hospice and focus on comfort,  pending placement with CM assist. Continue comfort measures.    Review of Systems   Unable to perform ROS: Other   Constitutional: Positive for appetite change.     Objective:     Vital Signs (Most Recent):  Temp: 97.3 °F (36.3 °C) (09/15/20 0815)  Pulse: 88 (09/15/20 0815)  Resp: 16 (09/15/20 0815)  BP: 136/63 (09/15/20 0815)  SpO2: (!) 90 % (09/15/20 0815) Vital Signs (24h Range):  Temp:  [97.3 °F (36.3 °C)-97.9 °F (36.6 °C)] 97.3 °F (36.3 °C)  Pulse:  [83-90] 88  Resp:  [16-18] 16  SpO2:  [90 %-96 %] 90 %  BP: (136-143)/(63-65) 136/63     Weight: 72.6 kg (160 lb 0.9 oz)  Body mass index is 22.97 kg/m².    Intake/Output Summary (Last 24 hours) at 9/15/2020 1325  Last data filed at 9/15/2020 0815  Gross per 24 hour   Intake --   Output 950 ml   Net -950 ml      Physical Exam  Constitutional:       Appearance: Normal appearance.   HENT:      Head: Normocephalic and atraumatic.      Mouth/Throat:      Mouth: Mucous membranes are moist.      Pharynx: Oropharynx is clear.   Eyes:      Extraocular Movements: Extraocular movements intact.      Pupils: Pupils are equal, round, and reactive to light.   Neck:      Musculoskeletal: Neck supple.   Cardiovascular:      Rate and Rhythm: Normal rate and regular rhythm.      Pulses: Normal pulses.      Heart sounds: Normal heart sounds.   Pulmonary:      Effort: Pulmonary effort is normal. No respiratory distress.      Breath sounds: Normal breath sounds.   Abdominal:      General: Abdomen is flat. Bowel sounds are normal.      Palpations: Abdomen is soft.   Musculoskeletal:         General: No swelling.   Skin:     General: Skin is warm and dry.      Capillary Refill: Capillary refill takes less than 2 seconds.   Neurological:      General: No focal deficit present.      Mental Status: He is alert. Mental status is at baseline.   Psychiatric:      Comments: Flat, withdrawn       Significant Labs: Reviewed.    Significant Imaging: None      Assessment/Plan:      * COVID-19  Ms.  Kilo is 66y/o Thai speaking male with PMHx significant for CVA (3/2019), CHF ( EF 55%, TTE 3/2019), DMII,HLD, HTN, CKD, recent behavioral problem. Per daughter, he was coughing and when checked his temperature was 100.9°.  He was brought to ED of [facility] and found to be positive for COVID.    -- SpO2 % on RA with no need for oxygen   -- CXR with no signs of infiltrate or consolidation  -- elevated inflammatory markers  -- Monitor for decompensation.   -- Repeat test positive on 9/7/2020  -- Stable during hospitalization    Comfort measures only status  Transitioning to comfort measures, with the exception of insulin and bg checks, considering discussion about discontinuing.     Palliative care encounter  -- patient with multiple comorbidities  -- daughter is POA, she wants the patient to be DNR with no aggressive measures  -- Refusing PO meds/food. Patient/family inability to care for him at this time. Palliative meeting with patient and daughter. Daughter reports patient expressed he is ready to go and no one should be living like this.   -- Patient remains DNR and d/c to inpatient hospice for focus on comfort, pending placement      Acute blood loss anemia  -- patient with baseline hemoglobin 11  -- in OSH, he found with hemoglobin 4.7  -- patient denies any active source of bleed  -- received 1 unit of PRBCs  -- Repeated hgb 11-13  -- Suspect original lab error    Hyperlipidemia associated with type 2 diabetes mellitus  -- Refusing all meds, including home medication atorvastatin 20 mg      Type 2 diabetes mellitus with stage 4 chronic kidney disease, with long-term current use of insulin  -- blood glucose on admission 315  -- last hemoglobin A1c 6.5 ( 6/2019), now 7.9  -- Elevated BG improved with increasing to aspart 6 units with meals and determir 15 QHS  --Pt not eating. Discontinued insulin and glucose checks    (HFpEF) heart failure with preserved ejection fraction  -- no clinical signs of  volume overload   -- last echo 3/2019 with EF 55%        Essential hypertension  -- continue home medication carvedilol and losartan (ALBERTO resolved) on d/c  -- Refusing PO meds here  -- Elevated BP responsive to Labetolol  -- Transitioning to comfort measures 9/9/2020, to include no labs or vital signs. Patient refusing anti-hypertensives and all PO meds    ALBERTO on CKD  -- serum creatinine on admission 2.7  -- baseline serum creatinine 1.9-2.1. At baseline after IVF  -- likely prerenal from poor p.o. intake      VTE Risk Mitigation (From admission, onward)         Ordered     Reason for No Pharmacological VTE Prophylaxis  Once     Question:  Reasons:  Answer:  Risk of Bleeding    09/06/20 1146     IP VTE HIGH RISK PATIENT  Once      09/06/20 1146     Place sequential compression device  Until discontinued      09/06/20 0332                Discharge Planning   ANTHONY: 9/16/2020     Code Status: DNR   Is the patient medically ready for discharge?: Yes    Reason for patient still in hospital (select all that apply): Treatment  Discharge Plan A: New Nursing Home placement - long-term care facility(With Hospice)   Discharge Delays: (!) Other(Pending accepting facility - D/T Covid positive and behavioral issues)              Britton Hadley MD  Department of Hospital Medicine   [facility] - ICU [facility]

## 2020-09-15 NOTE — ASSESSMENT & PLAN NOTE
-- blood glucose on admission 315  -- last hemoglobin A1c 6.5 ( 6/2019), now 7.9  -- Elevated BG improved with increasing to aspart 6 units with meals and determir 15 QHS  --discuss discontinuing insulin and glucose checks, patient not eating

## 2020-09-15 NOTE — ASSESSMENT & PLAN NOTE
-- blood glucose on admission 315  -- last hemoglobin A1c 6.5 ( 6/2019), now 7.9  -- Elevated BG improved with increasing to aspart 6 units with meals and determir 15 QHS  --Pt not eating. Discontinued insulin and glucose checks

## 2020-09-15 NOTE — PLAN OF CARE
Problem: Adult Inpatient Plan of Care  Goal: Plan of Care Review  Outcome: Ongoing, Progressing  POC reviewed.     Problem: Diabetes Comorbidity  Goal: Blood Glucose Level Within Desired Range  Outcome: Ongoing, Progressing  Spoke with stephen MORRISON. New orders rec'd.     Problem: Fall Injury Risk  Goal: Absence of Fall and Fall-Related Injury  Outcome: Ongoing, Progressing  Safety maintained. No falls/injury.      Problem: Skin Injury Risk Increased  Goal: Skin Health and Integrity  Outcome: Ongoing, Progressing   Pt kept clean and dry  Tq2  Condom cath remained in use.

## 2020-09-15 NOTE — PLAN OF CARE
Awaiting placement for intermediate NURSING HOME - no medically suitable for inpatient hospice. Will continue to follow.    ERON Fish  Case Management  Ext [phone]       09/15/20 8063   Discharge Reassessment   Assessment Type Discharge Planning Reassessment   Provided patient/caregiver education on the expected discharge date and the discharge plan Yes   Do you have any problems affording any of your prescribed medications? TBD   Discharge Plan A New Nursing Home placement - assisted care facility   Discharge Plan B Inpatient Hospice   DME Needed Upon Discharge  none   Patient choice form signed by patient/caregiver N/A   Anticipated Discharge Disposition skilled nursing Nu   Can the patient/caregiver answer the patient profile reliably? No, cognitively impaired   Post-Acute Status   Post-Acute Authorization Placement   Post-Acute Placement Status Awaiting Internal Medical Clearance   Discharge Delays None known at this time

## 2020-09-15 NOTE — PLAN OF CARE
09/15/20 0844   Post-Acute Status   Post-Acute Authorization Hospice     Patient is in need of inpatient hospice care, ZACK sent referrals via [facility] and also contacted Michael , liasion with [facility] to follow up on bed placement for today, SW awaiting a call back.    Updated 9:15    Patient referral is being reevaluated by [facility]. ZACK will continue to follow up.    Updated 11:19 am    ZACK has contacted Michael with [facility] and she reports that patient referral is still under review and awaiting final decision from their team, awaiting a call back.    ZACK also spoke with Ashley with [facility] and she reports that patient referral has been declined due to COV19.      Maddy Kaminski LMSW  [facility]   [phone]

## 2020-09-16 LAB
POCT GLUCOSE: 246 MG/DL (ref 70–110)
POCT GLUCOSE: 287 MG/DL (ref 70–110)
POCT GLUCOSE: 302 MG/DL (ref 70–110)
POCT GLUCOSE: 360 MG/DL (ref 70–110)
POCT GLUCOSE: 396 MG/DL (ref 70–110)
POCT GLUCOSE: 434 MG/DL (ref 70–110)

## 2020-09-16 PROCEDURE — 27000221 HC OXYGEN, UP TO 24 HOURS

## 2020-09-16 PROCEDURE — 94640 AIRWAY INHALATION TREATMENT: CPT

## 2020-09-16 PROCEDURE — 99231 SBSQ HOSP IP/OBS SF/LOW 25: CPT | Mod: GC,,, | Performed by: STUDENT IN AN ORGANIZED HEALTH CARE EDUCATION/TRAINING PROGRAM

## 2020-09-16 PROCEDURE — 99900035 HC TECH TIME PER 15 MIN (STAT)

## 2020-09-16 PROCEDURE — 94761 N-INVAS EAR/PLS OXIMETRY MLT: CPT

## 2020-09-16 PROCEDURE — 99231 PR SUBSEQUENT HOSPITAL CARE,LEVL I: ICD-10-PCS | Mod: GC,,, | Performed by: STUDENT IN AN ORGANIZED HEALTH CARE EDUCATION/TRAINING PROGRAM

## 2020-09-16 PROCEDURE — 20600001 HC STEP DOWN PRIVATE ROOM

## 2020-09-16 RX ADMIN — IPRATROPIUM BROMIDE 2 PUFF: 17 AEROSOL, METERED RESPIRATORY (INHALATION) at 08:09

## 2020-09-16 RX ADMIN — ALBUTEROL SULFATE 2 PUFF: 90 AEROSOL, METERED RESPIRATORY (INHALATION) at 08:09

## 2020-09-16 RX ADMIN — ALBUTEROL SULFATE 2 PUFF: 90 AEROSOL, METERED RESPIRATORY (INHALATION) at 07:09

## 2020-09-16 RX ADMIN — IPRATROPIUM BROMIDE 2 PUFF: 17 AEROSOL, METERED RESPIRATORY (INHALATION) at 07:09

## 2020-09-16 NOTE — ASSESSMENT & PLAN NOTE
Ms. Boateng is 64y/o Bulgarian speaking male with PMHx significant for CVA (3/2019), CHF ( EF 55%, TTE 3/2019), DMII,HLD, HTN, CKD, recent behavioral problem. Per daughter, he was coughing and when checked his temperature was 100.9°.  He was brought to ED of [facility] and found to be positive for COVID.    -- SpO2 % on RA with no need for oxygen   -- CXR with no signs of infiltrate or consolidation  -- elevated inflammatory markers  -- Monitor for decompensation.   -- Repeat test positive on 9/7/2020  -- Stable during hospitalization, hypoxia to 91% overnight, on 2L

## 2020-09-16 NOTE — ASSESSMENT & PLAN NOTE
Patient with baseline hemoglobin 11. In OSH, he found with hemoglobin 4.7. Patient denies any active source of bleed.  - comfort cares

## 2020-09-16 NOTE — PLAN OF CARE
Problem: Adult Inpatient Plan of Care  Goal: Plan of Care Review  Outcome: Ongoing, Progressing  POC reviewed. Pt GISELLE understanding.     Problem: Diabetes Comorbidity  Goal: Blood Glucose Level Within Desired Range  Outcome: Ongoing, Progressing  CBG checks every 4 hours completed.  No coverage at this time.       Problem: Oral Intake Inadequate (Acute Kidney Injury/Impairment)  Goal: Optimal Nutrition Intake  Outcome: Ongoing, Not Progressing      Not interrested in taking anything po through shift.       Multiple differnet drinks attempted       Refuses any food intake.

## 2020-09-16 NOTE — CARE UPDATE
Rapid Response Nurse Chart Check     Chart check completed, abnormal VS noted. Please call [phone] for further concerns or assistance.

## 2020-09-16 NOTE — NURSING
ON INITIAL ROUNDS PT WAS FEBRILE @100.4. COVERS TAKEN OFF PT AND TEMPERATURE ADJUSTED IN ROOM. RECHECKED TEMP @101.6 SEE FLOW SHEET.  MEDICATED FOR FEVER AS ORDERED. WILL CONTINUE MONITORING.

## 2020-09-16 NOTE — ASSESSMENT & PLAN NOTE
-- continue home medication carvedilol and losartan (ALBERTO resolved) on d/c  -- Refusing PO meds here  -- Elevated BP responsive to Labetolol  -- Transitioning to comfort measures 9/9/2020, to include no labs. Patient refusing anti-hypertensives and all PO meds.

## 2020-09-16 NOTE — NURSING
Notified MD Mendiola of blood sugar results:Results for TAYO TSE (MRN [id number]) as of 9/16/2020 14:00   Ref. Range 9/16/2020 07:32 9/16/2020 11:50   POCT Glucose Latest Ref Range: 70 - 110 mg/dL 302 (H) 396 (H)     Verbal order MD Mendiola: continue comfort measures and continue to monitor. No intervention at this time.

## 2020-09-16 NOTE — PROGRESS NOTES
"[facility] - ICU 15 [facility]  Adult Nutrition  Progress Note    SUMMARY       Recommendations  Continue diabetic diet + Boost Glucose Control TID as tolerated/appropriate.    Goals: not appropriate at this time, pt on comfort measures  Nutrition Goal Status: other (comment)(comfort measures, to d/c with hospice)  Communication of RD Recs: other (comment)(see above)    Reason for Assessment    Reason For Assessment: length of stay  Diagnosis: (COVID-19)  Relevant Medical History: CVA, CHF, DM2, HTN, HLD, CKD  Interdisciplinary Rounds: did not attend  General Information Comments: Pt transitioning to comfort care per chart, to d/c with hospice. Pt refusing meals/PO intake per RN, ONS order noted in Epic. Wt stable 145-147# x 1 year with recent wt gain on admission (admit wt 160#). Pt noted to be positive for COVID-19 and on comfort measures, NFPE not appropriate at this time.  Nutrition Discharge Planning: Pt to remain comfortable    Nutrition Risk Screen    Nutrition Risk Screen: no indicators present    Nutrition/Diet History    Spiritual, Cultural Beliefs, Zoroastrian Practices, Values that Affect Care: no    Anthropometrics    Temp: 98 °F (36.7 °C)  Height: 5' 10" (177.8 cm)  Height (inches): 70 in  Weight: 72.6 kg (160 lb 0.9 oz)  Weight (lb): 160.06 lb  Ideal Body Weight (IBW), Male: 166 lb  % Ideal Body Weight, Male (lb): 96.42 %  BMI (Calculated): 23    Lab/Procedures/Meds    Pertinent Labs Reviewed: reviewed  Pertinent Labs Comments: Cr 2.2, Glu 155, Ca 8.3, Alb 2.6, .7  Pertinent Medications Reviewed: reviewed  Pertinent Medications Comments: noted    Estimated/Assessed Needs    Weight Used For Calorie Calculations: 72.6 kg (160 lb 0.9 oz)  Energy Calorie Requirements (kcal): 1897 kcal/day  Energy Need Method: Starke-St Jeor(x 1.25)  Protein Requirements: 73-87 g/day  Weight Used For Protein Calculations: 72.6 kg (160 lb 0.9 oz)  Fluid Requirements (mL): per MD or 1 mL/kcal     RDA Method (mL): " 1897  CHO Requirement: 237 g/day      Nutrition Prescription Ordered    Current Diet Order: Diabetic    Evaluation of Received Nutrient/Fluid Intake    Comments: LBM 9/15  % Intake of Estimated Energy Needs: 0 - 25 %  % Meal Intake: 0 - 25 %    Nutrition Risk    Level of Risk/Frequency of Follow-up: low     Assessment and Plan    No nutrition diagnosis at this time - pt on comfort measures, pending d/c with hospice    Monitor and Evaluation    Food and Nutrient Intake: energy intake, food and beverage intake  Food and Nutrient Adminstration: diet order  Anthropometric Measurements: weight, weight change, body mass index  Biochemical Data, Medical Tests and Procedures: electrolyte and renal panel, gastrointestinal profile, glucose/endocrine profile, inflammatory profile, lipid profile  Nutrition-Focused Physical Findings: overall appearance     Nutrition Follow-Up    RD Follow-up?: Yes

## 2020-09-16 NOTE — SUBJECTIVE & OBJECTIVE
Interval History:  Febrile 101.6 resolved with tylenol. Hypoxic to 91% up to 96% on 2 L. Patient remains DNR/DNI with plans to d/c to hospice and focus on comfort, pending placement with CM assist. Continue comfort measures.     Review of Systems   Unable to perform ROS: Other   Constitutional: Positive for appetite change.     Objective:     Vital Signs (Most Recent):  Temp: 97.8 °F (36.6 °C) (09/16/20 1155)  Pulse: 87 (09/16/20 1155)  Resp: 16 (09/16/20 1155)  BP: (!) 143/76 (09/16/20 1155)  SpO2: 95 % (09/16/20 1155) Vital Signs (24h Range):  Temp:  [97.8 °F (36.6 °C)-101.6 °F (38.7 °C)] 97.8 °F (36.6 °C)  Pulse:  [75-91] 87  Resp:  [16-20] 16  SpO2:  [91 %-96 %] 95 %  BP: (103-143)/(50-76) 143/76     Weight: 72.6 kg (160 lb 0.9 oz)  Body mass index is 22.97 kg/m².    Intake/Output Summary (Last 24 hours) at 9/16/2020 1552  Last data filed at 9/16/2020 1200  Gross per 24 hour   Intake 100 ml   Output 350 ml   Net -250 ml      Physical Exam  Constitutional:       Appearance: Normal appearance.   HENT:      Head: Normocephalic and atraumatic.      Mouth/Throat:      Mouth: Mucous membranes are moist.      Pharynx: Oropharynx is clear.   Eyes:      Extraocular Movements: Extraocular movements intact.      Pupils: Pupils are equal, round, and reactive to light.   Neck:      Musculoskeletal: Neck supple.   Cardiovascular:      Rate and Rhythm: Normal rate and regular rhythm.      Pulses: Normal pulses.      Heart sounds: Normal heart sounds.   Pulmonary:      Effort: Pulmonary effort is normal. No respiratory distress.      Breath sounds: Normal breath sounds.   Abdominal:      General: Abdomen is flat. Bowel sounds are normal.      Palpations: Abdomen is soft.   Musculoskeletal:         General: No swelling.   Skin:     General: Skin is warm and dry.      Capillary Refill: Capillary refill takes less than 2 seconds.   Neurological:      General: No focal deficit present.      Mental Status: He is alert. Mental status  is at baseline.   Psychiatric:      Comments: Flat, withdrawn       Significant Labs: Reviewed.    Significant Imaging: None

## 2020-09-16 NOTE — PLAN OF CARE
09/16/20 1009   Post-Acute Status   Post-Acute Authorization Placement     ZACK received a call from Gina cabrera with [facility] [phone] and she reports that patient referral is being reviewed by their team and she will contact ZACK with the decision.         Maddy Kaminski, SARINA  [facility]   [phone]

## 2020-09-16 NOTE — PROGRESS NOTES
[facility] - ICU 15 [facility] Medicine  Progress Note    Patient Name: Ming Boateng  MRN: [id number]  Patient Class: IP- Inpatient   Admission Date: 9/6/2020  Length of Stay: 10 days  Attending Physician: Edy Mendiola MD  Primary Care Provider: Bibi Castaneda MD        Subjective:     Principal Problem:COVID-19        HPI:  Ms. Boateng is 64y/o Bengali speaking male with PMHx significant for CVA (3/2019), CHF ( EF 55%, TTE 3/2019), DMII,HLD, HTN, CKD, recent behavioral problem.  Who present to ED of [facility] with fever and cough.  Patient is Bengali-speaking,  was used to interview the patient, however the patient has difficulty responding with answering few questions.  History was taken mainly from the chart.  Patient was recently discharged on Thursday from [facility] after he was admitted for aggressive behavior.  Per daughter, he was coughing and when checked his temperature was 100.9°.  He was brought to ED of [facility] and found to be positive for COVID.  He denies chest pain, SOB, palpitation, abdominal pain, N/V, diarrhea or dysuria.  His daughter reports that he has not been eating or drinking well since he was discharged.  In the ED of OSH he found with hemoglobin of 4.6.  He denies black stool or BRBPR, and he denies any source of bleeding.  Per ED note, his daughter is his POA and she wants him to be DNR with no aggressive measures.    Overview/Hospital Course:  Elevated BP treating with Vasotec, labetalol, hydralazine PRN. Refusing PO meds/food. Respiratory status stable. Repeat COVID test remains positive. Palliative meeting with patient and daughter. Patient remains DNR with plans to d/c to hospice and focus on comfort, pending placement with CM assist. Transitioning to comfort measures.    Interval History:  Febrile 101.6 resolved with tylenol. Hypoxic to 91% up to 96% on 2 L. Patient remains DNR/DNI with plans to d/c to hospice and focus on  comfort, pending placement with CM assist. Continue comfort measures.     Review of Systems   Unable to perform ROS: Other   Constitutional: Positive for appetite change.     Objective:     Vital Signs (Most Recent):  Temp: 97.8 °F (36.6 °C) (09/16/20 1155)  Pulse: 87 (09/16/20 1155)  Resp: 16 (09/16/20 1155)  BP: (!) 143/76 (09/16/20 1155)  SpO2: 95 % (09/16/20 1155) Vital Signs (24h Range):  Temp:  [97.8 °F (36.6 °C)-101.6 °F (38.7 °C)] 97.8 °F (36.6 °C)  Pulse:  [75-91] 87  Resp:  [16-20] 16  SpO2:  [91 %-96 %] 95 %  BP: (103-143)/(50-76) 143/76     Weight: 72.6 kg (160 lb 0.9 oz)  Body mass index is 22.97 kg/m².    Intake/Output Summary (Last 24 hours) at 9/16/2020 1552  Last data filed at 9/16/2020 1200  Gross per 24 hour   Intake 100 ml   Output 350 ml   Net -250 ml      Physical Exam  Constitutional:       Appearance: Normal appearance.   HENT:      Head: Normocephalic and atraumatic.      Mouth/Throat:      Mouth: Mucous membranes are moist.      Pharynx: Oropharynx is clear.   Eyes:      Extraocular Movements: Extraocular movements intact.      Pupils: Pupils are equal, round, and reactive to light.   Neck:      Musculoskeletal: Neck supple.   Cardiovascular:      Rate and Rhythm: Normal rate and regular rhythm.      Pulses: Normal pulses.      Heart sounds: Normal heart sounds.   Pulmonary:      Effort: Pulmonary effort is normal. No respiratory distress.      Breath sounds: Normal breath sounds.   Abdominal:      General: Abdomen is flat. Bowel sounds are normal.      Palpations: Abdomen is soft.   Musculoskeletal:         General: No swelling.   Skin:     General: Skin is warm and dry.      Capillary Refill: Capillary refill takes less than 2 seconds.   Neurological:      General: No focal deficit present.      Mental Status: He is alert. Mental status is at baseline.   Psychiatric:      Comments: Flat, withdrawn       Significant Labs: Reviewed.    Significant Imaging: None      Assessment/Plan:      *  COVID-19  Ms. Boateng is 66y/o Greek speaking male with PMHx significant for CVA (3/2019), CHF ( EF 55%, TTE 3/2019), DMII,HLD, HTN, CKD, recent behavioral problem. Per daughter, he was coughing and when checked his temperature was 100.9°.  He was brought to ED of [facility] and found to be positive for COVID.    -- SpO2 % on RA with no need for oxygen   -- CXR with no signs of infiltrate or consolidation  -- elevated inflammatory markers  -- Monitor for decompensation.   -- Repeat test positive on 9/7/2020  -- Stable during hospitalization, hypoxia to 91% overnight, on 2L    Comfort measures only status  Transitioning to comfort measures.    Palliative care encounter  -- patient with multiple comorbidities  -- daughter is POA, she wants the patient to be DNR/DNI with no aggressive measures  -- Refusing PO meds/food. Patient/family inability to care for him at this time. Palliative meeting with patient and daughter. Daughter reports patient expressed he is ready to go and no one should be living like this.   -- Patient remains DNR/DNI and d/c to inpatient hospice for focus on comfort, pending placement      Acute blood loss anemia  Patient with baseline hemoglobin 11. In OSH, he found with hemoglobin 4.7. Patient denies any active source of bleed.  - comfort cares    Hyperlipidemia associated with type 2 diabetes mellitus  -- Refusing all meds, including home medication atorvastatin 20 mg  - d/c due to comfort cares      Type 2 diabetes mellitus with stage 4 chronic kidney disease, with long-term current use of insulin  Blood glucose on admission 315. Last hemoglobin A1c 6.5 ( 6/2019), now 7.9  - Discontinued insulin and glucose checks    (HFpEF) heart failure with preserved ejection fraction  -- no clinical signs of volume overload   -- last echo 3/2019 with EF 55%        Essential hypertension  -- continue home medication carvedilol and losartan (ALBERTO resolved) on d/c  -- Refusing PO meds here  --  Elevated BP responsive to Labetolol  -- Transitioning to comfort measures 9/9/2020, to include no labs. Patient refusing anti-hypertensives and all PO meds.    ALBERTO on CKD  -- serum creatinine on admission 2.7  -- baseline serum creatinine 1.9-2.1. At baseline after IVF  -- likely prerenal from poor p.o. intake        VTE Risk Mitigation (From admission, onward)         Ordered     Reason for No Pharmacological VTE Prophylaxis  Once     Question:  Reasons:  Answer:  Risk of Bleeding    09/06/20 1146     IP VTE HIGH RISK PATIENT  Once      09/06/20 1146     Place sequential compression device  Until discontinued      09/06/20 0332                Discharge Planning   ANTHONY: 9/17/2020     Code Status: DNR   Is the patient medically ready for discharge?: Yes    Reason for patient still in hospital (select all that apply): Pending disposition  Discharge Plan A: New Nursing Home placement - detention care facility   Discharge Delays: None known at this time              Jessie Singleton MD  Department of Hospital Medicine   [facility]

## 2020-09-16 NOTE — ASSESSMENT & PLAN NOTE
-- patient with multiple comorbidities  -- daughter is POA, she wants the patient to be DNR/DNI with no aggressive measures  -- Refusing PO meds/food. Patient/family inability to care for him at this time. Palliative meeting with patient and daughter. Daughter reports patient expressed he is ready to go and no one should be living like this.   -- Patient remains DNR/DNI and d/c to inpatient hospice for focus on comfort, pending placement

## 2020-09-16 NOTE — ASSESSMENT & PLAN NOTE
Blood glucose on admission 315. Last hemoglobin A1c 6.5 ( 6/2019), now 7.9  - Discontinued insulin and glucose checks

## 2020-09-17 LAB
POCT GLUCOSE: 399 MG/DL (ref 70–110)
POCT GLUCOSE: 410 MG/DL (ref 70–110)
POCT GLUCOSE: 474 MG/DL (ref 70–110)

## 2020-09-17 PROCEDURE — 94761 N-INVAS EAR/PLS OXIMETRY MLT: CPT

## 2020-09-17 PROCEDURE — 99232 PR SUBSEQUENT HOSPITAL CARE,LEVL II: ICD-10-PCS | Mod: GC,,, | Performed by: INTERNAL MEDICINE

## 2020-09-17 PROCEDURE — 27000221 HC OXYGEN, UP TO 24 HOURS

## 2020-09-17 PROCEDURE — 94640 AIRWAY INHALATION TREATMENT: CPT

## 2020-09-17 PROCEDURE — 99232 SBSQ HOSP IP/OBS MODERATE 35: CPT | Mod: GC,,, | Performed by: INTERNAL MEDICINE

## 2020-09-17 PROCEDURE — 99900035 HC TECH TIME PER 15 MIN (STAT)

## 2020-09-17 PROCEDURE — 20600001 HC STEP DOWN PRIVATE ROOM

## 2020-09-17 RX ADMIN — IPRATROPIUM BROMIDE 2 PUFF: 17 AEROSOL, METERED RESPIRATORY (INHALATION) at 01:09

## 2020-09-17 RX ADMIN — ALBUTEROL SULFATE 2 PUFF: 90 AEROSOL, METERED RESPIRATORY (INHALATION) at 01:09

## 2020-09-17 RX ADMIN — ALBUTEROL SULFATE 2 PUFF: 90 AEROSOL, METERED RESPIRATORY (INHALATION) at 08:09

## 2020-09-17 RX ADMIN — IPRATROPIUM BROMIDE 2 PUFF: 17 AEROSOL, METERED RESPIRATORY (INHALATION) at 07:09

## 2020-09-17 RX ADMIN — IPRATROPIUM BROMIDE 2 PUFF: 17 AEROSOL, METERED RESPIRATORY (INHALATION) at 08:09

## 2020-09-17 RX ADMIN — ALBUTEROL SULFATE 2 PUFF: 90 AEROSOL, METERED RESPIRATORY (INHALATION) at 07:09

## 2020-09-17 NOTE — PLAN OF CARE
Problem: Adult Inpatient Plan of Care  Goal: Plan of Care Review  Outcome: Ongoing, Not Progressing  POC noted. Pt's daughter at bedside. Understands POC. Agrees with POC.      Problem: Diabetes Comorbidity  Goal: Blood Glucose Level Within Desired Range  Outcome: Ongoing, Not Progressing  CBG monitoring, see flowsheet results.  Remains elevated consistently.  No medications ordered.

## 2020-09-17 NOTE — PROGRESS NOTES
[facility] - ICU 15 [facility] Medicine  Progress Note    Patient Name: Ming Boateng  MRN: [id number]  Patient Class: IP- Inpatient   Admission Date: 9/6/2020  Length of Stay: 11 days  Attending Physician: Hien Barboza MD  Primary Care Provider: Bibi Castaneda MD      Subjective:     Principal Problem:COVID-19      HPI:  Ms. Boateng is 66y/o Hungarian speaking male with PMHx significant for CVA (3/2019), CHF ( EF 55%, TTE 3/2019), DMII,HLD, HTN, CKD, recent behavioral problem.  Who present to ED of [facility] with fever and cough.  Patient is Hungarian-speaking,  was used to interview the patient, however the patient has difficulty responding with answering few questions.  History was taken mainly from the chart.  Patient was recently discharged on Thursday from [facility] after he was admitted for aggressive behavior.  Per daughter, he was coughing and when checked his temperature was 100.9°.  He was brought to ED of [facility] and found to be positive for COVID.  He denies chest pain, SOB, palpitation, abdominal pain, N/V, diarrhea or dysuria.  His daughter reports that he has not been eating or drinking well since he was discharged.  In the ED of OSH he found with hemoglobin of 4.6.  He denies black stool or BRBPR, and he denies any source of bleeding.  Per ED note, his daughter is his POA and she wants him to be DNR with no aggressive measures.    Overview/Hospital Course:  Elevated BP treating with Vasotec, labetalol, hydralazine PRN. Refusing PO meds/food. Respiratory status stable. Repeat COVID test remains positive. Palliative meeting with patient and daughter. Patient remains DNR with plans to d/c to hospice and focus on comfort, pending placement with CM assist. Transitioning to comfort measures.    Interval History:  On 2L NC overnight for hypoxia to the 90%. DNR/DNI. Hospice placement pending. Continuing comfort measures. Not eating.     Review of Systems   Unable  to perform ROS: Other   pt flat, withdrawn, becoming unresponsive    Objective:     Vital Signs (Most Recent):  Temp: 97.4 °F (36.3 °C) (09/17/20 1550)  Pulse: 73 (09/17/20 1550)  Resp: (!) 34 (09/17/20 1550)  BP: (!) 110/57 (09/17/20 1550)  SpO2: (!) 90 % (09/17/20 1550) Vital Signs (24h Range):  Temp:  [97 °F (36.1 °C)-98.9 °F (37.2 °C)] 97.4 °F (36.3 °C)  Pulse:  [73-87] 73  Resp:  [15-34] 34  SpO2:  [88 %-98 %] 90 %  BP: (110-130)/(56-61) 110/57     Weight: 72.6 kg (160 lb 0.9 oz)  Body mass index is 22.97 kg/m².    Intake/Output Summary (Last 24 hours) at 9/17/2020 1803  Last data filed at 9/17/2020 1600  Gross per 24 hour   Intake 50 ml   Output 200 ml   Net -150 ml      Physical Exam  Constitutional:       Appearance: He is ill-appearing.      Comments: Curled into ball. Non-combative, becoming non-responsive.   HENT:      Head: Normocephalic and atraumatic.      Mouth/Throat:      Mouth: Mucous membranes are moist.      Pharynx: Oropharynx is clear.   Eyes:      Comments: Maintains closed eyes, not following commands.   Neck:      Musculoskeletal: Neck supple.   Cardiovascular:      Rate and Rhythm: Normal rate and regular rhythm.      Pulses: Normal pulses.      Heart sounds: Normal heart sounds.   Pulmonary:      Effort: Pulmonary effort is normal. No respiratory distress.      Comments: Increased respiratory rate  Abdominal:      General: Abdomen is flat. Bowel sounds are normal.   Musculoskeletal:         General: No swelling.   Skin:     General: Skin is warm and dry.      Capillary Refill: Capillary refill takes less than 2 seconds.   Neurological:      General: No focal deficit present.      Mental Status: He is alert. Mental status is at baseline.   Psychiatric:      Comments: Flat, withdrawn       Significant Labs: Reviewed.    Significant Imaging: None      Assessment/Plan:      * COVID-19  Ms. Boateng is 64y/o Iraqi speaking male with PMHx significant for CVA (3/2019), CHF ( EF 55%, TTE 3/2019),  DMII,HLD, HTN, CKD, recent behavioral problem. Per daughter, he was coughing and when checked his temperature was 100.9°.  He was brought to ED of [facility] and found to be positive for COVID.    Initially SpO2 % on RA with no need for oxygen. CXR with no signs of infiltrate or consolidation. Elevated inflammatory markers. Repeat test positive on 9/7/2020  -- Stable during hospitalization, hypoxia to 90% overnight, on 2L    Comfort measures only status  Comfort measures only.     Palliative care encounter  -- patient with multiple comorbidities  -- daughter is POA, she wants the patient to be DNR/DNI with no aggressive measures  -- Refusing PO meds/food. Patient/family inability to care for him at this time. Palliative meeting with patient and daughter. Daughter reports patient expressed he is ready to go and no one should be living like this.   -- Patient remains DNR/DNI and d/c to inpatient hospice for focus on comfort, pending placement      Acute blood loss anemia  Patient with baseline hemoglobin 11. In OSH, he found with hemoglobin 4.7. Patient denies any active source of bleed.  - comfort cares    Hyperlipidemia associated with type 2 diabetes mellitus  -- Refusing all meds, including home medication atorvastatin 20 mg  - d/c due to comfort cares      Type 2 diabetes mellitus with stage 4 chronic kidney disease, with long-term current use of insulin  Blood glucose on admission 315. Last hemoglobin A1c 6.5 ( 6/2019), now 7.9  - Discontinued insulin and glucose checks    (HFpEF) heart failure with preserved ejection fraction  No clinical signs of volume overload. Last echo 3/2019 with EF 55%.        Essential hypertension  -- continue home medication carvedilol and losartan (ALBERTO resolved) on d/c  -- Refusing PO meds here  -- Elevated BP responsive to Labetolol  -- Transitioning to comfort measures 9/9/2020, to include no labs. Patient refusing anti-hypertensives and all PO meds.    ALBERTO on CKD  Serum  creatinine on admission 2.7. Baseline serum creatinine 1.9-2.1. At baseline after IVF. Likely prerenal from poor p.o. intake.  - labs d/c due to comfort cares.       VTE Risk Mitigation (From admission, onward)         Ordered     Reason for No Pharmacological VTE Prophylaxis  Once     Question:  Reasons:  Answer:  Risk of Bleeding    09/06/20 1146     IP VTE HIGH RISK PATIENT  Once      09/06/20 1146     Place sequential compression device  Until discontinued      09/06/20 0332                Discharge Planning   ANTHONY: 9/19/2020     Code Status: DNR   Is the patient medically ready for discharge?: Yes    Reason for patient still in hospital (select all that apply): Pending disposition  Discharge Plan A: Skilled Nursing Facility, Hospice/home   Discharge Delays: (!) Patient and Family Barriers      Jessie Singleton MD  Department of Hospital Medicine   [facility] 15 [facility]

## 2020-09-17 NOTE — PLAN OF CARE
Patient has hx of aggressive behavior in the past but no aggressive behavior has been witnessed by any staff during this admission.  Patient would be an excellent candidate for NH with hospice and I feel his behavior would be appropriate for NH placement.  No concerns of aggressive behavior or safety issues.      Hien Barboza MD  [facility] Medicine Staff  [phone] pager

## 2020-09-17 NOTE — CARE UPDATE
Rapid Response Nurse Chart Check     Chart check completed, abnormal VS noted.   Please call [phone] for further concerns or assistance.

## 2020-09-17 NOTE — PLAN OF CARE
Currently stable for discharge -  more referrals sent.  SW also received patients 142 from the state. HINN discussion per SW with daughter.  Continuing to follow    Laura Toledo RN  Case Management  Ext [phone]       09/17/20 1430   Discharge Reassessment   Assessment Type Discharge Planning Reassessment   Provided patient/caregiver education on the expected discharge date and the discharge plan Yes   Do you have any problems affording any of your prescribed medications? No   Discharge Plan A Skilled Nursing Facility;Hospice/home   DME Needed Upon Discharge  none   Patient choice form signed by patient/caregiver Yes   Anticipated Discharge Disposition SNF  (with hospice)   Post-Acute Status   Post-Acute Authorization Placement   Post-Acute Placement Status Referrals Sent   Discharge Delays (!) Patient and Family Barriers

## 2020-09-17 NOTE — PLAN OF CARE
09/17/20 1240   Post-Acute Status   Post-Acute Authorization Placement     Patient discharge plan is currently Nursing Home placement with Hospice care and SW/CM have been waiting on an accepting facility. The previous team following patient was pursuing hospice such as home hospice/ inpatient but patient daughter reports she is unable to care for patient in her home at this time. We reached out to inpatient hospice and patient does not meet inpatient hospice criteria. I have received a call from Gina, admissions coordinator with [facility] located in [place] but coordinator states that patient daughter declined stating she is wanting placement to be close to the [place] area and trying to honor for father wishes to be close to family in his last days. I am going to reach out to her and discuss the dc plan/barriers.    Updated 10:05 am  ZACK contacted patient daughter and she states that she does not want placement for her father with [facility] due to this facility being too far. She states that patient is wanting his family close to him during his last days and she does not have transportation to make it to [place] if he admits there for placement. ZACK informed patient of the NH barriers occurring at this time and difficulties of placement. ZACK reached out the [facility] to follow up on patient 142 awaiting a response. ZACK will continue to follow up        Updated 12:54pm  ZACK faxed updated clinicals to Gina at [facility] [phone] and awaiting a decision in regard to acceptance. ZACK also received patients 142 from the state. SW resent referrals to previous no acceptance from previous facilities yet.     Updated 1:17pm  ZACK contacted patient daughter Nica to discuss the financials in regard to the HINN letter and patient being medically stable. She upset such as crying with this decision she has to make but did verbalize understanding. ZACK informed her Case Management  will continue to pursue facilities that are closer to her area.   ZACK will continue to follow up.    Updated 3:32pm    ZACK contacted Gina with [facility] and she states that she is still reviewing the referral and will call the SW back.    Updated 3:39 pm    ZACK received a call from Laura, admissions coordinator [facility] in regard to patient referral and she states that patient referral is under review and she will call ZACK back.           Maddy Kaminski LMSW  [facility]   [phone]

## 2020-09-17 NOTE — SUBJECTIVE & OBJECTIVE
Interval History:  On 2L NC overnight for hypoxia to the 90%. DNR/DNI. Hospice placement pending. Continuing comfort measures. Not eating.     Review of Systems   Unable to perform ROS: Other   pt flat, withdrawn, becoming unresponsive    Objective:     Vital Signs (Most Recent):  Temp: 97.4 °F (36.3 °C) (09/17/20 1550)  Pulse: 73 (09/17/20 1550)  Resp: (!) 34 (09/17/20 1550)  BP: (!) 110/57 (09/17/20 1550)  SpO2: (!) 90 % (09/17/20 1550) Vital Signs (24h Range):  Temp:  [97 °F (36.1 °C)-98.9 °F (37.2 °C)] 97.4 °F (36.3 °C)  Pulse:  [73-87] 73  Resp:  [15-34] 34  SpO2:  [88 %-98 %] 90 %  BP: (110-130)/(56-61) 110/57     Weight: 72.6 kg (160 lb 0.9 oz)  Body mass index is 22.97 kg/m².    Intake/Output Summary (Last 24 hours) at 9/17/2020 1803  Last data filed at 9/17/2020 1600  Gross per 24 hour   Intake 50 ml   Output 200 ml   Net -150 ml      Physical Exam  Constitutional:       Appearance: He is ill-appearing.      Comments: Curled into ball. Non-combative, becoming non-responsive.   HENT:      Head: Normocephalic and atraumatic.      Mouth/Throat:      Mouth: Mucous membranes are moist.      Pharynx: Oropharynx is clear.   Eyes:      Comments: Maintains closed eyes, not following commands.   Neck:      Musculoskeletal: Neck supple.   Cardiovascular:      Rate and Rhythm: Normal rate and regular rhythm.      Pulses: Normal pulses.      Heart sounds: Normal heart sounds.   Pulmonary:      Effort: Pulmonary effort is normal. No respiratory distress.      Comments: Increased respiratory rate  Abdominal:      General: Abdomen is flat. Bowel sounds are normal.   Musculoskeletal:         General: No swelling.   Skin:     General: Skin is warm and dry.      Capillary Refill: Capillary refill takes less than 2 seconds.   Neurological:      General: No focal deficit present.      Mental Status: He is alert. Mental status is at baseline.   Psychiatric:      Comments: Flat, withdrawn       Significant Labs:  Reviewed.    Significant Imaging: None

## 2020-09-17 NOTE — ASSESSMENT & PLAN NOTE
Ms. Boateng is 64y/o Maori speaking male with PMHx significant for CVA (3/2019), CHF ( EF 55%, TTE 3/2019), DMII,HLD, HTN, CKD, recent behavioral problem. Per daughter, he was coughing and when checked his temperature was 100.9°.  He was brought to ED of [facility] and found to be positive for COVID.    Initially SpO2 % on RA with no need for oxygen. CXR with no signs of infiltrate or consolidation. Elevated inflammatory markers. Repeat test positive on 9/7/2020  -- Stable during hospitalization, hypoxia to 90% overnight, on 2L

## 2020-09-17 NOTE — ASSESSMENT & PLAN NOTE
Serum creatinine on admission 2.7. Baseline serum creatinine 1.9-2.1. At baseline after IVF. Likely prerenal from poor p.o. intake.  - labs d/c due to comfort cares.

## 2020-09-18 VITALS
DIASTOLIC BLOOD PRESSURE: 63 MMHG | SYSTOLIC BLOOD PRESSURE: 134 MMHG | HEIGHT: 70 IN | HEART RATE: 83 BPM | RESPIRATION RATE: 16 BRPM | TEMPERATURE: 97 F | WEIGHT: 160.06 LBS | OXYGEN SATURATION: 91 % | BODY MASS INDEX: 22.91 KG/M2

## 2020-09-18 PROCEDURE — 94761 N-INVAS EAR/PLS OXIMETRY MLT: CPT

## 2020-09-18 PROCEDURE — 94640 AIRWAY INHALATION TREATMENT: CPT

## 2020-09-18 PROCEDURE — 99239 PR HOSPITAL DISCHARGE DAY,>30 MIN: ICD-10-PCS | Mod: GC,,, | Performed by: INTERNAL MEDICINE

## 2020-09-18 PROCEDURE — 99239 HOSP IP/OBS DSCHRG MGMT >30: CPT | Mod: GC,,, | Performed by: INTERNAL MEDICINE

## 2020-09-18 PROCEDURE — 27000221 HC OXYGEN, UP TO 24 HOURS

## 2020-09-18 RX ORDER — ACETAMINOPHEN 650 MG/1
650 SUPPOSITORY RECTAL EVERY 6 HOURS PRN
Refills: 0
Start: 2020-09-18

## 2020-09-18 RX ORDER — MORPHINE SULFATE 2 MG/ML
1 INJECTION, SOLUTION INTRAMUSCULAR; INTRAVENOUS EVERY 6 HOURS PRN
Status: DISCONTINUED | OUTPATIENT
Start: 2020-09-18 | End: 2020-09-18 | Stop reason: HOSPADM

## 2020-09-18 RX ORDER — LORAZEPAM 0.5 MG/1
1 TABLET ORAL EVERY 4 HOURS PRN
Status: DISCONTINUED | OUTPATIENT
Start: 2020-09-18 | End: 2020-09-18 | Stop reason: HOSPADM

## 2020-09-18 RX ADMIN — ALBUTEROL SULFATE 2 PUFF: 90 AEROSOL, METERED RESPIRATORY (INHALATION) at 07:09

## 2020-09-18 RX ADMIN — IPRATROPIUM BROMIDE 2 PUFF: 17 AEROSOL, METERED RESPIRATORY (INHALATION) at 12:09

## 2020-09-18 RX ADMIN — ALBUTEROL SULFATE 2 PUFF: 90 AEROSOL, METERED RESPIRATORY (INHALATION) at 12:09

## 2020-09-18 RX ADMIN — IPRATROPIUM BROMIDE 2 PUFF: 17 AEROSOL, METERED RESPIRATORY (INHALATION) at 07:09

## 2020-09-18 NOTE — PLAN OF CARE
09/18/20 1553   Final Note   Assessment Type Final Discharge Note   Anticipated Discharge Disposition HospiceHome   Right Care Referral Info   Facility Name [facility]   Post-Acute Status   Post-Acute Authorization Hospice   Post-Acute Placement Status Referrals Sent   Hospice Status Referrals Sent

## 2020-09-18 NOTE — PLAN OF CARE
09/18/20 0938   Post-Acute Status   Post-Acute Authorization Other;Placement     ZACK contacted Laura, coordinator with [facility] and she report that patient referral is under review and she will contact ZACK after their morning meeting with a decision. ZACK also contacted [facility] and [facility] but was unable to reach anyone in admissions.      Updated 9:42 am  ZACK contacted Gina with [facility] and she reports that patient is accepted but would not be able to admit there until Monday. ZACK is awaiting call from Laura with [facility] in regard to placement for today.    ZACK received a call from coordinator with [facility] and she reports that she will reevaluate patient referral, SW will continue to follow up.        Updated 10:03 am  Patient is declined by [facility].    Updated 12:04    ZACK received a call back from Yomaira with [facility] and she reports that patient still does not meet criteria for inpatient hospice and unable to accept him. She states that patient is resting comfortably and not requring any symptom mangament at this time, not in pain    Pt referral declined by [facility]     Updated 2:12 pm  Patient daughter is wanting to patient to be admitted to her home with hospice care, ZACK spoke to Michael coordinator with [facility] and she will be contacting patient daughter to complete paperwork  Maddy Kaminski LMSW  [facility]   [phone]

## 2020-09-18 NOTE — ASSESSMENT & PLAN NOTE
Ms. Boateng is 64y/o Croatian speaking male with PMHx significant for CVA (3/2019), CHF ( EF 55%, TTE 3/2019), DMII,HLD, HTN, CKD, recent behavioral problem. Per daughter, he was coughing and when checked his temperature was 100.9°.  He was [facility] and found to be positive for COVID.    Initially SpO2 % on RA with no need for oxygen. CXR with no signs of infiltrate or consolidation. Elevated inflammatory markers. Repeat test positive on 9/7/2020  -- actively dying with worsening hypoxia, increased WOB, up from 2-->5L, unresponsive to voice, withdrawing to pain

## 2020-09-18 NOTE — ASSESSMENT & PLAN NOTE
-- patient with multiple comorbidities  -- daughter is POA, she wants the patient to be DNR/DNI with no aggressive measures  -- Refusing PO meds/food. Patient/family inability to care for him at this time. Palliative meeting with patient and daughter. Daughter reports patient expressed he is ready to go and no one should be living like this.   -- d/c to home with home hospice

## 2020-09-18 NOTE — ASSESSMENT & PLAN NOTE
Initially continued on home medication carvedilol and losartan (ALBERTO resolved) on d/c, however patient began refusing PO meds here.  -- Transitioning to comfort measures 9/9/2020, to include no labs. Patient refusing anti-hypertensives and all PO meds.

## 2020-09-18 NOTE — CARE UPDATE
Patient declining, now requiring 5L O2 via NC, actively dying and minimally responsive, he has increased work of breathing requiring morphine, patient would benefit from transfer to inpatient hospice.  Patient likely only has hours to days to live.      Hien Barboza MD  [facility] Medicine Staff  [phone] pager

## 2020-09-18 NOTE — PLAN OF CARE
09/18/20 1623   Post-Acute Status   Post-Acute Authorization Hospice   Post-Acute Placement Status Set-up Complete     SW spoke to patient daughter Nica in regard to home hospice and patient will be admitting to her home for home hospice care through [facility]. Patient daughter states that she  wanting [facility] to provide services for her father. SW set up transportation for 6:00 pm via [facility] and patient will be transported by stretcher with 5 liters of oxygen. Adalid is the  with [facility] [phone]        Maddy Kaminski LMSW  [facility]   [id number]

## 2020-09-18 NOTE — PLAN OF CARE
Patient is debilitated and will need to travel by ambulance home for safety reasons.    Hien Barboza MD  Hospital Medicine Staff  [phone] pager

## 2020-09-18 NOTE — ASSESSMENT & PLAN NOTE
Blood glucose on admission 315. Last hemoglobin A1c 6.5 ( 6/2019), now 7.9.  - Discontinued insulin and glucose checks

## 2020-09-18 NOTE — DISCHARGE SUMMARY
[facility] - ICU 15 [facility] Medicine  Discharge Summary      Patient Name: Ming Boateng  MRN: [id number]  Admission Date: 9/6/2020  Hospital Length of Stay: 12 days  Discharge Date and Time:  09/18/2020 1:52 PM  Attending Physician: Hien Barboza MD   Discharging Provider: Jessie Singleton MD  Primary Care Provider: Bibi Castaneda MD      HPI:   Ms. Boateng is 64y/o Citizen of [place] speaking male with PMHx significant for CVA (3/2019), CHF ( EF 55%, TTE 3/2019), DMII,HLD, HTN, CKD, recent behavioral problem.  Who present to ED of [facility] with fever and cough.  Patient is Citizen of [place]-speaking,  was used to interview the patient, however the patient has difficulty responding with answering few questions.  History was taken mainly from the chart.  Patient was recently discharged on Thursday from [facility] after he was admitted for aggressive behavior.  Per daughter, he was coughing and when checked his temperature was 100.9°.  He was brought to ED of [facility] and found to be positive for COVID.  He denies chest pain, SOB, palpitation, abdominal pain, N/V, diarrhea or dysuria.  His daughter reports that he has not been eating or drinking well since he was discharged.  In the ED of OSH he found with hemoglobin of 4.6.  He denies black stool or BRBPR, and he denies any source of bleeding.  Per ED note, his daughter is his POA and she wants him to be DNR with no aggressive measures.    * No surgery found *      Hospital Course:   Admitted for fever and COVID +, not initially hypoxic. Elevated blood pressure readings initially treated with Vasotec, labetalol, hydralazine PRN. Patient started refusing PO meds/food. Respiratory status remained stable, repeat COVID test remained positive. Palliative meeting had with patient and daughter. Patient remains DNR/DNI with plans to d/c to hospice and focus on comfort, pending placement with CM assist. Transitioning to comfort measures. Started  becoming unresponsive to voice, not following commands, withdrawing to pain. Worsening hypoxia and increased work of breathing. Discussed with daughter, she would like to transition to home hospice given father's worsening condition and hypoxia, her brother is flying into the city today to help.      Consults:   Consults (From admission, onward)        Status Ordering Provider     Inpatient consult to Hospice  Once     Provider:  (Not yet assigned)    Completed LINDA CUNNINGHAM     Inpatient consult to Palliative Care  Once     Provider:  (Not yet assigned)    Completed LIZ IRELAND        Physical Exam  Constitutional:       Appearance: He is ill-appearing.      Comments: Curled into ball. Non-combative. Non-responsive to voice, withdrawing to pain  HENT:      Head: Normocephalic and atraumatic.      Mouth/Throat:      Mouth: Mucous membranes are moist.      Pharynx: Oropharynx is clear.   Eyes:      Comments: Maintains closed eyes, not following commands.   Neck:      Musculoskeletal: Neck supple.   Cardiovascular:      Rate and Rhythm: Normal rate and regular rhythm.      Pulses: Normal pulses.      Heart sounds: Normal heart sounds.   Pulmonary:      Effort: increased WOB     Comments: Increased respiratory rate and hypoxia, on 5L  Abdominal:      General: Abdomen is flat. Bowel sounds are normal.   Musculoskeletal:         General: No swelling.   Skin:     General: Skin is warm and dry.      Capillary Refill: Capillary refill takes less than 2 seconds.   Neurological:      General: non-responsive to voice, withdraws to pain, no clonus.     Mental Status: He is alert. Mental status is at baseline.   Psychiatric:      Comments: unresponsive to voice (Maltese)    * COVID-19  Ms. Boateng is 66y/o Maltese speaking male with PMHx significant for CVA (3/2019), CHF ( EF 55%, TTE 3/2019), DMII,HLD, HTN, CKD, recent behavioral problem. Per daughter, he was coughing and when checked his temperature was 100.9°.  He  was brought to ED of [facility] and found to be positive for COVID.    Initially SpO2 % on RA with no need for oxygen. CXR with no signs of infiltrate or consolidation. Elevated inflammatory markers. Repeat test positive on 9/7/2020  -- actively dying with worsening hypoxia, increased WOB, up from 2-->5L, unresponsive to voice, withdrawing to pain    Comfort measures only status  Comfort measures only.     Palliative care encounter  -- patient with multiple comorbidities  -- daughter is POA, she wants the patient to be DNR/DNI with no aggressive measures  -- Refusing PO meds/food. Patient/family inability to care for him at this time. Palliative meeting with patient and daughter. Daughter reports patient expressed he is ready to go and no one should be living like this.   -- d/c to home with home hospice    Acute blood loss anemia  Patient with baseline hemoglobin 11. In OSH, he found with hemoglobin 4.7. Patient denies any active source of bleed.  - comfort cares    Hyperlipidemia associated with type 2 diabetes mellitus  -- Refusing all meds, including home medication atorvastatin 20 mg  - d/c due to comfort cares    Type 2 diabetes mellitus with stage 4 chronic kidney disease, with long-term current use of insulin  Blood glucose on admission 315. Last hemoglobin A1c 6.5 ( 6/2019), now 7.9.  - Discontinued insulin and glucose checks    (HFpEF) heart failure with preserved ejection fraction  No clinical signs of volume overload. Last echo 3/2019 with EF 55%.    Essential hypertension  Initially continued on home medication carvedilol and losartan (ALBERTO resolved) on d/c, however patient began refusing PO meds here.  -- Transitioning to comfort measures 9/9/2020, to include no labs. Patient refusing anti-hypertensives and all PO meds.    ALBERTO on CKD  Serum creatinine on admission 2.7. Baseline serum creatinine 1.9-2.1. At baseline after IVF. Likely prerenal from poor p.o. intake.  - labs d/c due to comfort  cares.     Final Active Diagnoses:    Diagnosis Date Noted POA    PRINCIPAL PROBLEM:  COVID-19 [U07.1] 09/06/2020 Yes    Comfort measures only status [Z51.5] 09/09/2020 Not Applicable    Acute blood loss anemia [D62] 09/06/2020 Yes    Palliative care encounter [Z51.5] 09/06/2020 Not Applicable    Hyperlipidemia associated with type 2 diabetes mellitus [E11.69, E78.5]  Yes    Type 2 diabetes mellitus with stage 4 chronic kidney disease, with long-term current use of insulin [E11.22, N18.4, Z79.4] 06/14/2017 Not Applicable    (HFpEF) heart failure with preserved ejection fraction [I50.30] 05/19/2017 Yes    Essential hypertension [I10] 06/17/2016 Yes    ALBERTO on CKD [N17.9] 06/17/2016 Yes      Problems Resolved During this Admission:       Discharged Condition: poor, dying with home hospice    Disposition: Hospice/Medical Facility    Follow Up: n/a    Patient Instructions:   No discharge procedures on file.    Significant Diagnostic Studies: comfort cares, no recent labs    Pending Diagnostic Studies:     None         Medications:  Reconciled Home Medications:      Medication List      START taking these medications    acetaminophen 650 MG Supp  Commonly known as: TYLENOL  Place 1 suppository (650 mg total) rectally every 6 (six) hours as needed (for pain).        STOP taking these medications    alcohol swabs Padm  Commonly known as: ALCOHOL PREP SWABS     allopurinoL 100 MG tablet  Commonly known as: ZYLOPRIM     atorvastatin 20 MG tablet  Commonly known as: LIPITOR     bisacodyL 10 mg/30 mL Enem  Commonly known as: FLEET     blood sugar diagnostic Strp     blood-glucose meter kit     calcium acetate(phosphat bind) 667 mg tablet  Commonly known as: PHOSLO     carBAMazepine 100 mg chewable tablet  Commonly known as: TEGRETOL     carvediloL 3.125 MG tablet  Commonly known as: COREG     clopidogreL 75 mg tablet  Commonly known as: PLAVIX     dicyclomine 20 mg tablet  Commonly known as: BENTYL     escitalopram  "oxalate 20 MG tablet  Commonly known as: LEXAPRO     famotidine 20 MG tablet  Commonly known as: PEPCID     insulin aspart U-100 100 unit/mL Crtg  Commonly known as: NOVOLOG     lancets Misc  Commonly known as: LANCETS,ULTRA THIN     losartan 100 MG tablet  Commonly known as: COZAAR     memantine 5 MG Tab  Commonly known as: NAMENDA     mirtazapine 30 MG tablet  Commonly known as: REMERON     multivitamin capsule     pen needle, diabetic 29 gauge x 1/2" Ndle     psyllium 0.52 gram capsule     QUEtiapine 25 MG Tab  Commonly known as: SEROQUEL     senna 8.6 mg tablet  Commonly known as: SENNA     TOUJEO SOLOSTAR U-300 INSULIN 300 unit/mL (1.5 mL) Inpn pen  Generic drug: insulin glargine (TOUJEO)            Indwelling Lines/Drains at time of discharge:   Lines/Drains/Airways     Drain            Male External Urinary Catheter 09/14/20 0940 Small 4 days                Time spent on the discharge of patient: 30 minutes  Patient was seen and examined on the date of discharge and determined to be suitable for discharge.         Jessie Singleton MD  Department of Hospital Medicine  [facility] - ICU [facility]  "

## 2020-09-18 NOTE — PLAN OF CARE
[facility]  Department of Hospital Medicine  [address] [place] [address]  [phone] after hours  [phone] fax    HOSPICE  ORDERS    09/18/2020    Admit to Hospice:  Home hospice    Diagnoses:   Active Hospital Problems    Diagnosis  POA    *COVID-19 [U07.1]  Yes    Comfort measures only status [Z51.5]  Not Applicable    Acute blood loss anemia [D62]  Yes    Palliative care encounter [Z51.5]  Not Applicable    Hyperlipidemia associated with type 2 diabetes mellitus [E11.69, E78.5]  Yes    Type 2 diabetes mellitus with stage 4 chronic kidney disease, with long-term current use of insulin [E11.22, N18.4, Z79.4]  Not Applicable    (HFpEF) heart failure with preserved ejection fraction [I50.30]  Yes    Essential hypertension [I10]  Yes    ALBERTO on CKD [N17.9]  Yes      Resolved Hospital Problems   No resolved problems to display.       Hospice Qualifying Diagnoses:        Patient has a life expectancy < 6 months due to:  1) Primary Hospice Diagnosis: Failure to thrive, COVID   2) Comorbid Conditions Contributing to Decline: CKD4, CHF, DM2, dementia    Vital Signs: Routine per Hospice Protocol.    Code Status: DNR/DNI    Allergies:   Review of patient's allergies indicates:   Allergen Reactions    Cayenne Anaphylaxis     Throat swells up     Cayenne pepper     Grapefruit        Diet: NPO    Activities: As tolerated, withdraws to pain    Nursing: Per Hospice Routine.     Routine Skin for Bedridden Patients: Apply moisture barrier cream to all skin folds and   wet areas in perineal area daily and after baths and all bowel movements.    Oxygen: On 5L NC, titrate up as needed.     Other Miscellaneous Care:   n/a    Medications:      Ming Boateng   Home Medication Instructions KHANG:[id number]    Printed on:09/18/20 1049   Medication Information                      acetaminophen (TYLENOL) 650 MG Supp  Place 1 suppository (650 mg total) rectally every 6 (six)  hours as needed (for pain).                   DIABETES CARE: no glucose checks    Future Orders:  Hospice Medical Director may dictate new orders for comfortable care measures & sign death certificate.    _________________________________  Jessie Singleton MD  09/18/2020

## 2020-09-19 NOTE — NURSING
Pt adequate for discharge to home with hospice care. Pt discharged from bedside monitor. Incontinent care provided prior to departure. Pt transported off unit via stretcher by ambulance staff. 5L O2 NC in place. PIV removed, catheter tip in place. Condom cath remained in place per family request. VSS. Afebrile.

## 2021-01-07 NOTE — PLAN OF CARE
Please triage   Problem: Adult Inpatient Plan of Care  Goal: Plan of Care Review  Outcome: Ongoing (interventions implemented as appropriate)  Pt on RA with documented sats. Will continue to monitor.

## 2021-02-06 NOTE — PLAN OF CARE
Problem: Occupational Therapy Goal  Goal: Occupational Therapy Goal  Goals to be met by: 07/01/19     Patient will increase functional independence with ADLs by performing:    UE Dressing with Modified [place]ove.  LE Dressing with Modified Gove.  Grooming while standing at sink with Modified Gove.  Toileting from bedside commode with Modified Gove for hygiene and clothing management.   Toilet transfer to bedside commode with Modified Gove.    Outcome: Ongoing (interventions implemented as appropriate)    Pt was agreeable to OT/PT evaluation.  Goals established to assist pt with returning to [facility] regarding ADLs and func mobility.  Pt will benefit from skilled OT services in order to increase his level of safety and independence with ADLs and mobility.      Keesha Marino, OT  6/23/2019         No

## 2021-08-24 NOTE — ASSESSMENT & PLAN NOTE
-- blood glucose on admission 315  -- last hemoglobin A1c 6.5 ( 6/2019)  -- started on weight based insulin 0.4/kg   -- Levemir 15 units and NovoLog 4 units TIDWM  -- POCT glucose TID before meal and at bedtime  -- hemoglobin A1c ordered, follow-up   Negative

## 2023-11-05 NOTE — PLAN OF CARE
10:00 AM  Pt has been denied by following facilities do to inability to meet needs:     [facility]   [facility]     Pt was denied by [facility] due to pt behavior.       11:00 AM     ZACK f/u with [facility] to determine facility ability to accept pt. No progress on acceptance.    11:23 AM    ZACK connected with Edy at [facility]. Reviewing pt chart.     12:15 PM     SW received request with questions for pt care. Asked if pt will con't HD. Requested updated hemoglobin panel.     12:31 PM    ZACK received call from Domingo at [facility] D[facility]. Requesting above info. ZACK reported that information had been requested of tx team. Domingo requested updates on pt behavior. ZACK provided updated report on pt behavior. Will also send 72 hour report.     ZACK will con't to follow.     Sarah Norris LMSW  Case Management Social Worker   [facility], [facility]       [phone]